# Patient Record
Sex: FEMALE | Race: WHITE | NOT HISPANIC OR LATINO | URBAN - METROPOLITAN AREA
[De-identification: names, ages, dates, MRNs, and addresses within clinical notes are randomized per-mention and may not be internally consistent; named-entity substitution may affect disease eponyms.]

---

## 2017-03-02 ENCOUNTER — OFFICE (AMBULATORY)
Dept: URBAN - METROPOLITAN AREA CLINIC 64 | Facility: CLINIC | Age: 42
End: 2017-03-02

## 2017-03-02 VITALS
SYSTOLIC BLOOD PRESSURE: 108 MMHG | HEART RATE: 83 BPM | HEIGHT: 63 IN | WEIGHT: 120 LBS | DIASTOLIC BLOOD PRESSURE: 72 MMHG

## 2017-03-02 DIAGNOSIS — R19.7 DIARRHEA, UNSPECIFIED: ICD-10-CM

## 2017-03-02 DIAGNOSIS — R10.13 EPIGASTRIC PAIN: ICD-10-CM

## 2017-03-02 PROCEDURE — 99213 OFFICE O/P EST LOW 20 MIN: CPT | Performed by: NURSE PRACTITIONER

## 2017-04-20 ENCOUNTER — OFFICE (AMBULATORY)
Dept: URBAN - METROPOLITAN AREA CLINIC 64 | Facility: CLINIC | Age: 42
End: 2017-04-20
Payer: COMMERCIAL

## 2017-04-20 ENCOUNTER — ON CAMPUS - OUTPATIENT (AMBULATORY)
Dept: URBAN - METROPOLITAN AREA HOSPITAL 2 | Facility: HOSPITAL | Age: 42
End: 2017-04-20

## 2017-04-20 VITALS
SYSTOLIC BLOOD PRESSURE: 101 MMHG | DIASTOLIC BLOOD PRESSURE: 61 MMHG | OXYGEN SATURATION: 100 % | RESPIRATION RATE: 20 BRPM | DIASTOLIC BLOOD PRESSURE: 71 MMHG | DIASTOLIC BLOOD PRESSURE: 58 MMHG | WEIGHT: 118 LBS | HEART RATE: 101 BPM | HEART RATE: 92 BPM | SYSTOLIC BLOOD PRESSURE: 127 MMHG | HEART RATE: 94 BPM | TEMPERATURE: 99.2 F | RESPIRATION RATE: 18 BRPM | HEIGHT: 63 IN | SYSTOLIC BLOOD PRESSURE: 103 MMHG | SYSTOLIC BLOOD PRESSURE: 118 MMHG | RESPIRATION RATE: 19 BRPM | DIASTOLIC BLOOD PRESSURE: 75 MMHG | RESPIRATION RATE: 16 BRPM | HEART RATE: 90 BPM | SYSTOLIC BLOOD PRESSURE: 121 MMHG | HEART RATE: 96 BPM | DIASTOLIC BLOOD PRESSURE: 85 MMHG | SYSTOLIC BLOOD PRESSURE: 108 MMHG | DIASTOLIC BLOOD PRESSURE: 70 MMHG

## 2017-04-20 DIAGNOSIS — Z48.815 ENCOUNTER FOR SURGICAL AFTERCARE FOLLOWING SURGERY ON THE DI: ICD-10-CM

## 2017-04-20 DIAGNOSIS — T81.89XS OTHER COMPLICATIONS OF PROCEDURES, NOT ELSEWHERE CLASSIFIED,: ICD-10-CM

## 2017-04-20 DIAGNOSIS — K25.9 GASTRIC ULCER, UNSPECIFIED AS ACUTE OR CHRONIC, WITHOUT HEMO: ICD-10-CM

## 2017-04-20 DIAGNOSIS — R11.2 NAUSEA WITH VOMITING, UNSPECIFIED: ICD-10-CM

## 2017-04-20 DIAGNOSIS — R10.13 EPIGASTRIC PAIN: ICD-10-CM

## 2017-04-20 LAB
GI HISTOLOGY: A. SELECT: (no result)
GI HISTOLOGY: PDF REPORT: (no result)

## 2017-04-20 PROCEDURE — 88305 TISSUE EXAM BY PATHOLOGIST: CPT | Performed by: INTERNAL MEDICINE

## 2017-04-20 PROCEDURE — 43239 EGD BIOPSY SINGLE/MULTIPLE: CPT | Performed by: INTERNAL MEDICINE

## 2017-04-20 RX ORDER — SUCRALFATE 1 G/1
4 TABLET ORAL
Qty: 120 | Refills: 6 | Status: COMPLETED
Start: 2017-04-20 | End: 2017-08-11

## 2017-04-20 RX ORDER — PANTOPRAZOLE SODIUM 40 MG/1
80 TABLET, DELAYED RELEASE ORAL
Qty: 60 | Refills: 11 | Status: COMPLETED
Start: 2017-04-20 | End: 2017-08-11

## 2017-04-20 RX ADMIN — PROPOFOL: 10 INJECTION, EMULSION INTRAVENOUS at 17:08

## 2017-04-26 ENCOUNTER — INPATIENT HOSPITAL (AMBULATORY)
Dept: URBAN - METROPOLITAN AREA HOSPITAL 84 | Facility: HOSPITAL | Age: 42
End: 2017-04-26
Payer: COMMERCIAL

## 2017-04-26 DIAGNOSIS — R10.9 UNSPECIFIED ABDOMINAL PAIN: ICD-10-CM

## 2017-04-26 DIAGNOSIS — R94.5 ABNORMAL RESULTS OF LIVER FUNCTION STUDIES: ICD-10-CM

## 2017-04-26 DIAGNOSIS — D64.9 ANEMIA, UNSPECIFIED: ICD-10-CM

## 2017-04-26 DIAGNOSIS — R18.8 OTHER ASCITES: ICD-10-CM

## 2017-04-26 PROCEDURE — 99254 IP/OBS CNSLTJ NEW/EST MOD 60: CPT | Performed by: NURSE PRACTITIONER

## 2017-04-27 PROCEDURE — 99231 SBSQ HOSP IP/OBS SF/LOW 25: CPT | Performed by: NURSE PRACTITIONER

## 2017-04-28 PROCEDURE — 99231 SBSQ HOSP IP/OBS SF/LOW 25: CPT | Performed by: NURSE PRACTITIONER

## 2017-04-29 ENCOUNTER — INPATIENT HOSPITAL (AMBULATORY)
Dept: URBAN - METROPOLITAN AREA HOSPITAL 84 | Facility: HOSPITAL | Age: 42
End: 2017-04-29
Payer: COMMERCIAL

## 2017-04-29 DIAGNOSIS — D64.9 ANEMIA, UNSPECIFIED: ICD-10-CM

## 2017-04-29 DIAGNOSIS — R94.5 ABNORMAL RESULTS OF LIVER FUNCTION STUDIES: ICD-10-CM

## 2017-04-29 DIAGNOSIS — R10.9 UNSPECIFIED ABDOMINAL PAIN: ICD-10-CM

## 2017-04-29 DIAGNOSIS — R18.8 OTHER ASCITES: ICD-10-CM

## 2017-04-29 PROCEDURE — 99232 SBSQ HOSP IP/OBS MODERATE 35: CPT | Performed by: INTERNAL MEDICINE

## 2017-04-30 PROCEDURE — 99231 SBSQ HOSP IP/OBS SF/LOW 25: CPT | Performed by: INTERNAL MEDICINE

## 2017-05-01 ENCOUNTER — INPATIENT HOSPITAL (AMBULATORY)
Dept: URBAN - METROPOLITAN AREA HOSPITAL 84 | Facility: HOSPITAL | Age: 42
End: 2017-05-01
Payer: COMMERCIAL

## 2017-05-01 DIAGNOSIS — R94.5 ABNORMAL RESULTS OF LIVER FUNCTION STUDIES: ICD-10-CM

## 2017-05-01 DIAGNOSIS — D64.9 ANEMIA, UNSPECIFIED: ICD-10-CM

## 2017-05-01 DIAGNOSIS — R18.8 OTHER ASCITES: ICD-10-CM

## 2017-05-01 DIAGNOSIS — R10.9 UNSPECIFIED ABDOMINAL PAIN: ICD-10-CM

## 2017-05-01 PROCEDURE — 99232 SBSQ HOSP IP/OBS MODERATE 35: CPT | Performed by: NURSE PRACTITIONER

## 2017-05-02 ENCOUNTER — INPATIENT HOSPITAL (AMBULATORY)
Dept: URBAN - METROPOLITAN AREA HOSPITAL 84 | Facility: HOSPITAL | Age: 42
End: 2017-05-02

## 2017-05-02 DIAGNOSIS — E46 UNSPECIFIED PROTEIN-CALORIE MALNUTRITION: ICD-10-CM

## 2017-05-02 DIAGNOSIS — R11.2 NAUSEA WITH VOMITING, UNSPECIFIED: ICD-10-CM

## 2017-05-02 PROCEDURE — 43241 EGD TUBE/CATH INSERTION: CPT | Performed by: INTERNAL MEDICINE

## 2017-05-10 ENCOUNTER — INPATIENT HOSPITAL (AMBULATORY)
Dept: URBAN - METROPOLITAN AREA HOSPITAL 76 | Facility: HOSPITAL | Age: 42
End: 2017-05-10
Payer: COMMERCIAL

## 2017-05-10 DIAGNOSIS — R63.3 FEEDING DIFFICULTIES: ICD-10-CM

## 2017-05-10 DIAGNOSIS — K74.69 OTHER CIRRHOSIS OF LIVER: ICD-10-CM

## 2017-05-10 DIAGNOSIS — E46 UNSPECIFIED PROTEIN-CALORIE MALNUTRITION: ICD-10-CM

## 2017-05-10 DIAGNOSIS — Z43.1 ENCOUNTER FOR ATTENTION TO GASTROSTOMY: ICD-10-CM

## 2017-05-10 DIAGNOSIS — R11.0 NAUSEA: ICD-10-CM

## 2017-05-10 DIAGNOSIS — K94.23 GASTROSTOMY MALFUNCTION: ICD-10-CM

## 2017-05-10 DIAGNOSIS — R18.8 OTHER ASCITES: ICD-10-CM

## 2017-05-10 PROCEDURE — 43752 NASAL/OROGASTRIC W/TUBE PLMT: CPT | Performed by: INTERNAL MEDICINE

## 2017-05-11 ENCOUNTER — INPATIENT HOSPITAL (AMBULATORY)
Dept: URBAN - METROPOLITAN AREA HOSPITAL 76 | Facility: HOSPITAL | Age: 42
End: 2017-05-11
Payer: COMMERCIAL

## 2017-05-11 DIAGNOSIS — K74.69 OTHER CIRRHOSIS OF LIVER: ICD-10-CM

## 2017-05-11 DIAGNOSIS — R63.3 FEEDING DIFFICULTIES: ICD-10-CM

## 2017-05-11 DIAGNOSIS — R18.8 OTHER ASCITES: ICD-10-CM

## 2017-05-11 DIAGNOSIS — Z43.1 ENCOUNTER FOR ATTENTION TO GASTROSTOMY: ICD-10-CM

## 2017-05-11 DIAGNOSIS — K94.23 GASTROSTOMY MALFUNCTION: ICD-10-CM

## 2017-05-11 PROCEDURE — 99232 SBSQ HOSP IP/OBS MODERATE 35: CPT | Performed by: NURSE PRACTITIONER

## 2017-05-13 ENCOUNTER — INPATIENT HOSPITAL (AMBULATORY)
Dept: URBAN - METROPOLITAN AREA HOSPITAL 84 | Facility: HOSPITAL | Age: 42
End: 2017-05-13
Payer: COMMERCIAL

## 2017-05-13 DIAGNOSIS — R18.8 OTHER ASCITES: ICD-10-CM

## 2017-05-13 DIAGNOSIS — K74.69 OTHER CIRRHOSIS OF LIVER: ICD-10-CM

## 2017-05-13 DIAGNOSIS — R10.84 GENERALIZED ABDOMINAL PAIN: ICD-10-CM

## 2017-05-13 DIAGNOSIS — D64.9 ANEMIA, UNSPECIFIED: ICD-10-CM

## 2017-05-13 PROCEDURE — 99232 SBSQ HOSP IP/OBS MODERATE 35: CPT | Performed by: INTERNAL MEDICINE

## 2017-05-14 PROCEDURE — 99232 SBSQ HOSP IP/OBS MODERATE 35: CPT | Performed by: INTERNAL MEDICINE

## 2017-05-15 ENCOUNTER — INPATIENT HOSPITAL (AMBULATORY)
Dept: URBAN - METROPOLITAN AREA HOSPITAL 84 | Facility: HOSPITAL | Age: 42
End: 2017-05-15
Payer: COMMERCIAL

## 2017-05-15 DIAGNOSIS — R18.8 OTHER ASCITES: ICD-10-CM

## 2017-05-15 DIAGNOSIS — K74.69 OTHER CIRRHOSIS OF LIVER: ICD-10-CM

## 2017-05-15 PROCEDURE — 99232 SBSQ HOSP IP/OBS MODERATE 35: CPT | Performed by: NURSE PRACTITIONER

## 2017-05-16 PROCEDURE — 99232 SBSQ HOSP IP/OBS MODERATE 35: CPT | Performed by: NURSE PRACTITIONER

## 2017-05-17 ENCOUNTER — INPATIENT HOSPITAL (AMBULATORY)
Dept: URBAN - METROPOLITAN AREA HOSPITAL 84 | Facility: HOSPITAL | Age: 42
End: 2017-05-17

## 2017-05-17 DIAGNOSIS — R63.3 FEEDING DIFFICULTIES: ICD-10-CM

## 2017-05-17 DIAGNOSIS — K31.9 DISEASE OF STOMACH AND DUODENUM, UNSPECIFIED: ICD-10-CM

## 2017-05-17 PROCEDURE — 43241 EGD TUBE/CATH INSERTION: CPT | Performed by: INTERNAL MEDICINE

## 2017-05-18 ENCOUNTER — INPATIENT HOSPITAL (AMBULATORY)
Dept: URBAN - METROPOLITAN AREA HOSPITAL 84 | Facility: HOSPITAL | Age: 42
End: 2017-05-18
Payer: COMMERCIAL

## 2017-05-18 DIAGNOSIS — R18.8 OTHER ASCITES: ICD-10-CM

## 2017-05-18 DIAGNOSIS — K74.69 OTHER CIRRHOSIS OF LIVER: ICD-10-CM

## 2017-05-18 DIAGNOSIS — K31.9 DISEASE OF STOMACH AND DUODENUM, UNSPECIFIED: ICD-10-CM

## 2017-05-18 DIAGNOSIS — R63.3 FEEDING DIFFICULTIES: ICD-10-CM

## 2017-05-18 PROCEDURE — 99232 SBSQ HOSP IP/OBS MODERATE 35: CPT | Performed by: NURSE PRACTITIONER

## 2017-05-19 ENCOUNTER — INPATIENT HOSPITAL (AMBULATORY)
Dept: URBAN - METROPOLITAN AREA HOSPITAL 84 | Facility: HOSPITAL | Age: 42
End: 2017-05-19
Payer: COMMERCIAL

## 2017-05-19 DIAGNOSIS — R18.8 OTHER ASCITES: ICD-10-CM

## 2017-05-19 DIAGNOSIS — K74.69 OTHER CIRRHOSIS OF LIVER: ICD-10-CM

## 2017-05-19 DIAGNOSIS — R63.3 FEEDING DIFFICULTIES: ICD-10-CM

## 2017-05-19 DIAGNOSIS — K31.9 DISEASE OF STOMACH AND DUODENUM, UNSPECIFIED: ICD-10-CM

## 2017-05-19 PROCEDURE — 99231 SBSQ HOSP IP/OBS SF/LOW 25: CPT | Performed by: NURSE PRACTITIONER

## 2017-05-24 ENCOUNTER — OFFICE (AMBULATORY)
Dept: URBAN - METROPOLITAN AREA CLINIC 64 | Facility: CLINIC | Age: 42
End: 2017-05-24

## 2017-05-24 VITALS
HEIGHT: 63 IN | HEART RATE: 73 BPM | SYSTOLIC BLOOD PRESSURE: 102 MMHG | WEIGHT: 116 LBS | DIASTOLIC BLOOD PRESSURE: 58 MMHG

## 2017-05-24 DIAGNOSIS — E46 UNSPECIFIED PROTEIN-CALORIE MALNUTRITION: ICD-10-CM

## 2017-05-24 DIAGNOSIS — R10.84 GENERALIZED ABDOMINAL PAIN: ICD-10-CM

## 2017-05-24 LAB
COMP. METABOLIC PANEL (14): A/G RATIO: 1.1 — LOW (ref 1.2–2.2)
COMP. METABOLIC PANEL (14): ALBUMIN, SERUM: 3.4 G/DL — LOW (ref 3.5–5.5)
COMP. METABOLIC PANEL (14): ALKALINE PHOSPHATASE, S: 153 IU/L — HIGH (ref 39–117)
COMP. METABOLIC PANEL (14): ALT (SGPT): 21 IU/L (ref 0–32)
COMP. METABOLIC PANEL (14): AST (SGOT): 37 IU/L (ref 0–40)
COMP. METABOLIC PANEL (14): BILIRUBIN, TOTAL: 0.5 MG/DL (ref 0–1.2)
COMP. METABOLIC PANEL (14): BUN/CREATININE RATIO: 18 (ref 9–23)
COMP. METABOLIC PANEL (14): BUN: 10 MG/DL (ref 6–24)
COMP. METABOLIC PANEL (14): CALCIUM, SERUM: 8.9 MG/DL (ref 8.7–10.2)
COMP. METABOLIC PANEL (14): CARBON DIOXIDE, TOTAL: 25 MMOL/L (ref 18–29)
COMP. METABOLIC PANEL (14): CHLORIDE, SERUM: 94 MMOL/L — LOW (ref 96–106)
COMP. METABOLIC PANEL (14): CREATININE, SERUM: 0.57 MG/DL (ref 0.57–1)
COMP. METABOLIC PANEL (14): EGFR IF AFRICN AM: 131 ML/MIN/1.73 (ref 59–?)
COMP. METABOLIC PANEL (14): EGFR IF NONAFRICN AM: 114 ML/MIN/1.73 (ref 59–?)
COMP. METABOLIC PANEL (14): GLOBULIN, TOTAL: 3.2 G/DL (ref 1.5–4.5)
COMP. METABOLIC PANEL (14): GLUCOSE, SERUM: 147 MG/DL — HIGH (ref 65–99)
COMP. METABOLIC PANEL (14): POTASSIUM, SERUM: 4.6 MMOL/L (ref 3.5–5.2)
COMP. METABOLIC PANEL (14): PROTEIN, TOTAL, SERUM: 6.6 G/DL (ref 6–8.5)
COMP. METABOLIC PANEL (14): SODIUM, SERUM: 137 MMOL/L (ref 134–144)

## 2017-05-24 PROCEDURE — 99213 OFFICE O/P EST LOW 20 MIN: CPT | Performed by: NURSE PRACTITIONER

## 2017-05-28 ENCOUNTER — INPATIENT HOSPITAL (AMBULATORY)
Dept: URBAN - METROPOLITAN AREA HOSPITAL 84 | Facility: HOSPITAL | Age: 42
End: 2017-05-28
Payer: COMMERCIAL

## 2017-05-28 DIAGNOSIS — R18.8 OTHER ASCITES: ICD-10-CM

## 2017-05-28 DIAGNOSIS — R94.5 ABNORMAL RESULTS OF LIVER FUNCTION STUDIES: ICD-10-CM

## 2017-05-28 DIAGNOSIS — D64.9 ANEMIA, UNSPECIFIED: ICD-10-CM

## 2017-05-28 DIAGNOSIS — R10.9 UNSPECIFIED ABDOMINAL PAIN: ICD-10-CM

## 2017-05-28 PROCEDURE — 99254 IP/OBS CNSLTJ NEW/EST MOD 60: CPT | Performed by: INTERNAL MEDICINE

## 2017-05-29 PROCEDURE — 99232 SBSQ HOSP IP/OBS MODERATE 35: CPT | Performed by: INTERNAL MEDICINE

## 2017-06-05 ENCOUNTER — INPATIENT HOSPITAL (AMBULATORY)
Dept: URBAN - METROPOLITAN AREA HOSPITAL 84 | Facility: HOSPITAL | Age: 42
End: 2017-06-05
Payer: COMMERCIAL

## 2017-06-05 DIAGNOSIS — D64.9 ANEMIA, UNSPECIFIED: ICD-10-CM

## 2017-06-05 DIAGNOSIS — R18.8 OTHER ASCITES: ICD-10-CM

## 2017-06-05 DIAGNOSIS — R10.9 UNSPECIFIED ABDOMINAL PAIN: ICD-10-CM

## 2017-06-05 DIAGNOSIS — K74.60 UNSPECIFIED CIRRHOSIS OF LIVER: ICD-10-CM

## 2017-06-05 DIAGNOSIS — R63.3 FEEDING DIFFICULTIES: ICD-10-CM

## 2017-06-05 PROCEDURE — 99254 IP/OBS CNSLTJ NEW/EST MOD 60: CPT | Performed by: NURSE PRACTITIONER

## 2017-08-11 ENCOUNTER — OFFICE (AMBULATORY)
Dept: URBAN - METROPOLITAN AREA CLINIC 64 | Facility: CLINIC | Age: 42
End: 2017-08-11
Payer: COMMERCIAL

## 2017-08-11 VITALS
HEART RATE: 83 BPM | DIASTOLIC BLOOD PRESSURE: 76 MMHG | HEIGHT: 63 IN | WEIGHT: 122 LBS | SYSTOLIC BLOOD PRESSURE: 107 MMHG

## 2017-08-11 DIAGNOSIS — K74.60 UNSPECIFIED CIRRHOSIS OF LIVER: ICD-10-CM

## 2017-08-11 DIAGNOSIS — R18.8 OTHER ASCITES: ICD-10-CM

## 2017-08-11 DIAGNOSIS — K72.90 HEPATIC FAILURE, UNSPECIFIED WITHOUT COMA: ICD-10-CM

## 2017-08-11 DIAGNOSIS — Z98.84 BARIATRIC SURGERY STATUS: ICD-10-CM

## 2017-08-11 LAB
AFP, SERUM, TUMOR MARKER: 5.6 NG/ML (ref 0–8.3)
AMMONIA, PLASMA: 79 UG/DL (ref 19–87)
CBC WITH DIFFERENTIAL/PLATELET: BASO (ABSOLUTE): 0.1 X10E3/UL (ref 0–0.2)
CBC WITH DIFFERENTIAL/PLATELET: BASOS: 3 %
CBC WITH DIFFERENTIAL/PLATELET: EOS (ABSOLUTE): 0.2 X10E3/UL (ref 0–0.4)
CBC WITH DIFFERENTIAL/PLATELET: EOS: 3 %
CBC WITH DIFFERENTIAL/PLATELET: HEMATOCRIT: 34.6 % (ref 34–46.6)
CBC WITH DIFFERENTIAL/PLATELET: HEMATOLOGY COMMENTS: (no result)
CBC WITH DIFFERENTIAL/PLATELET: HEMOGLOBIN: 10.5 G/DL — LOW (ref 11.1–15.9)
CBC WITH DIFFERENTIAL/PLATELET: IMMATURE CELLS: (no result)
CBC WITH DIFFERENTIAL/PLATELET: IMMATURE GRANS (ABS): 0 X10E3/UL (ref 0–0.1)
CBC WITH DIFFERENTIAL/PLATELET: IMMATURE GRANULOCYTES: 0 %
CBC WITH DIFFERENTIAL/PLATELET: LYMPHS (ABSOLUTE): 1.5 X10E3/UL (ref 0.7–3.1)
CBC WITH DIFFERENTIAL/PLATELET: LYMPHS: 34 %
CBC WITH DIFFERENTIAL/PLATELET: MCH: 25.2 PG — LOW (ref 26.6–33)
CBC WITH DIFFERENTIAL/PLATELET: MCHC: 30.3 G/DL — LOW (ref 31.5–35.7)
CBC WITH DIFFERENTIAL/PLATELET: MCV: 83 FL (ref 79–97)
CBC WITH DIFFERENTIAL/PLATELET: MONOCYTES(ABSOLUTE): 0.3 X10E3/UL (ref 0.1–0.9)
CBC WITH DIFFERENTIAL/PLATELET: MONOCYTES: 8 %
CBC WITH DIFFERENTIAL/PLATELET: NEUTROPHILS (ABSOLUTE): 2.2 X10E3/UL (ref 1.4–7)
CBC WITH DIFFERENTIAL/PLATELET: NEUTROPHILS: 52 %
CBC WITH DIFFERENTIAL/PLATELET: NRBC: (no result)
CBC WITH DIFFERENTIAL/PLATELET: PLATELETS: 708 X10E3/UL — HIGH (ref 150–379)
CBC WITH DIFFERENTIAL/PLATELET: RBC: 4.16 X10E6/UL (ref 3.77–5.28)
CBC WITH DIFFERENTIAL/PLATELET: RDW: 18 % — HIGH (ref 12.3–15.4)
CBC WITH DIFFERENTIAL/PLATELET: WBC: 4.4 X10E3/UL (ref 3.4–10.8)
COMP. METABOLIC PANEL (14): A/G RATIO: 0.9 — LOW (ref 1.2–2.2)
COMP. METABOLIC PANEL (14): ALBUMIN, SERUM: 3.1 G/DL — LOW (ref 3.5–5.5)
COMP. METABOLIC PANEL (14): ALKALINE PHOSPHATASE, S: 174 IU/L — HIGH (ref 39–117)
COMP. METABOLIC PANEL (14): ALT (SGPT): 16 IU/L (ref 0–32)
COMP. METABOLIC PANEL (14): AST (SGOT): 32 IU/L (ref 0–40)
COMP. METABOLIC PANEL (14): BILIRUBIN, TOTAL: 1.2 MG/DL (ref 0–1.2)
COMP. METABOLIC PANEL (14): BUN/CREATININE RATIO: 11 (ref 9–23)
COMP. METABOLIC PANEL (14): BUN: 9 MG/DL (ref 6–24)
COMP. METABOLIC PANEL (14): CALCIUM, SERUM: 8.6 MG/DL — LOW (ref 8.7–10.2)
COMP. METABOLIC PANEL (14): CARBON DIOXIDE, TOTAL: 27 MMOL/L (ref 18–29)
COMP. METABOLIC PANEL (14): CHLORIDE, SERUM: 96 MMOL/L (ref 96–106)
COMP. METABOLIC PANEL (14): CREATININE, SERUM: 0.85 MG/DL (ref 0.57–1)
COMP. METABOLIC PANEL (14): EGFR IF AFRICN AM: 97 ML/MIN/1.73 (ref 59–?)
COMP. METABOLIC PANEL (14): EGFR IF NONAFRICN AM: 84 ML/MIN/1.73 (ref 59–?)
COMP. METABOLIC PANEL (14): GLOBULIN, TOTAL: 3.5 G/DL (ref 1.5–4.5)
COMP. METABOLIC PANEL (14): GLUCOSE, SERUM: 85 MG/DL (ref 65–99)
COMP. METABOLIC PANEL (14): POTASSIUM, SERUM: 4.2 MMOL/L (ref 3.5–5.2)
COMP. METABOLIC PANEL (14): PROTEIN, TOTAL, SERUM: 6.6 G/DL (ref 6–8.5)
COMP. METABOLIC PANEL (14): SODIUM, SERUM: 137 MMOL/L (ref 134–144)

## 2017-08-11 PROCEDURE — 99214 OFFICE O/P EST MOD 30 MIN: CPT | Performed by: INTERNAL MEDICINE

## 2017-08-11 RX ORDER — LACTULOSE 10 G/15ML
SOLUTION ORAL
Qty: 1800 | Refills: 6 | Status: ACTIVE

## 2017-08-12 ENCOUNTER — INPATIENT HOSPITAL (AMBULATORY)
Dept: URBAN - METROPOLITAN AREA HOSPITAL 76 | Facility: HOSPITAL | Age: 42
End: 2017-08-12
Payer: COMMERCIAL

## 2017-08-12 DIAGNOSIS — R10.9 UNSPECIFIED ABDOMINAL PAIN: ICD-10-CM

## 2017-08-12 DIAGNOSIS — R18.8 OTHER ASCITES: ICD-10-CM

## 2017-08-12 DIAGNOSIS — R14.0 ABDOMINAL DISTENSION (GASEOUS): ICD-10-CM

## 2017-08-12 PROCEDURE — 99254 IP/OBS CNSLTJ NEW/EST MOD 60: CPT | Performed by: INTERNAL MEDICINE

## 2017-08-13 PROCEDURE — 99231 SBSQ HOSP IP/OBS SF/LOW 25: CPT | Performed by: INTERNAL MEDICINE

## 2017-09-06 ENCOUNTER — HOSPITAL ENCOUNTER (OUTPATIENT)
Dept: INFUSION THERAPY | Facility: HOSPITAL | Age: 42
Discharge: HOME | End: 2017-09-06
Attending: INTERNAL MEDICINE | Admitting: INTERNAL MEDICINE

## 2017-09-29 ENCOUNTER — HOSPITAL ENCOUNTER (OUTPATIENT)
Dept: ULTRASOUND IMAGING | Facility: HOSPITAL | Age: 42
Discharge: HOME | End: 2017-09-29
Attending: INTERNAL MEDICINE | Admitting: INTERNAL MEDICINE

## 2017-10-03 ENCOUNTER — HOSPITAL ENCOUNTER (OUTPATIENT)
Dept: INFUSION THERAPY | Facility: HOSPITAL | Age: 42
Discharge: HOME | End: 2017-10-03
Attending: INTERNAL MEDICINE | Admitting: INTERNAL MEDICINE

## 2017-10-05 ENCOUNTER — INPATIENT HOSPITAL (AMBULATORY)
Dept: URBAN - METROPOLITAN AREA HOSPITAL 84 | Facility: HOSPITAL | Age: 42
End: 2017-10-05
Payer: COMMERCIAL

## 2017-10-05 DIAGNOSIS — K44.9 DIAPHRAGMATIC HERNIA WITHOUT OBSTRUCTION OR GANGRENE: ICD-10-CM

## 2017-10-05 DIAGNOSIS — K75.81 NONALCOHOLIC STEATOHEPATITIS (NASH): ICD-10-CM

## 2017-10-05 DIAGNOSIS — D72.829 ELEVATED WHITE BLOOD CELL COUNT, UNSPECIFIED: ICD-10-CM

## 2017-10-05 DIAGNOSIS — R10.84 GENERALIZED ABDOMINAL PAIN: ICD-10-CM

## 2017-10-05 DIAGNOSIS — R18.8 OTHER ASCITES: ICD-10-CM

## 2017-10-05 DIAGNOSIS — K72.90 HEPATIC FAILURE, UNSPECIFIED WITHOUT COMA: ICD-10-CM

## 2017-10-05 PROCEDURE — 99252 IP/OBS CONSLTJ NEW/EST SF 35: CPT | Performed by: NURSE PRACTITIONER

## 2017-10-06 ENCOUNTER — INPATIENT HOSPITAL (AMBULATORY)
Dept: URBAN - METROPOLITAN AREA HOSPITAL 84 | Facility: HOSPITAL | Age: 42
End: 2017-10-06
Payer: COMMERCIAL

## 2017-10-06 DIAGNOSIS — K42.9 UMBILICAL HERNIA WITHOUT OBSTRUCTION OR GANGRENE: ICD-10-CM

## 2017-10-06 DIAGNOSIS — R18.8 OTHER ASCITES: ICD-10-CM

## 2017-10-06 DIAGNOSIS — K65.2 SPONTANEOUS BACTERIAL PERITONITIS: ICD-10-CM

## 2017-10-06 PROCEDURE — 99232 SBSQ HOSP IP/OBS MODERATE 35: CPT | Performed by: NURSE PRACTITIONER

## 2017-10-07 ENCOUNTER — INPATIENT HOSPITAL (AMBULATORY)
Dept: URBAN - METROPOLITAN AREA HOSPITAL 84 | Facility: HOSPITAL | Age: 42
End: 2017-10-07
Payer: COMMERCIAL

## 2017-10-07 DIAGNOSIS — K74.60 UNSPECIFIED CIRRHOSIS OF LIVER: ICD-10-CM

## 2017-10-07 DIAGNOSIS — R50.9 FEVER, UNSPECIFIED: ICD-10-CM

## 2017-10-07 DIAGNOSIS — K42.9 UMBILICAL HERNIA WITHOUT OBSTRUCTION OR GANGRENE: ICD-10-CM

## 2017-10-07 DIAGNOSIS — Z98.84 BARIATRIC SURGERY STATUS: ICD-10-CM

## 2017-10-07 DIAGNOSIS — K75.81 NONALCOHOLIC STEATOHEPATITIS (NASH): ICD-10-CM

## 2017-10-07 DIAGNOSIS — D72.829 ELEVATED WHITE BLOOD CELL COUNT, UNSPECIFIED: ICD-10-CM

## 2017-10-07 DIAGNOSIS — R10.9 UNSPECIFIED ABDOMINAL PAIN: ICD-10-CM

## 2017-10-07 PROCEDURE — 99232 SBSQ HOSP IP/OBS MODERATE 35: CPT | Performed by: NURSE PRACTITIONER

## 2017-10-08 ENCOUNTER — INPATIENT HOSPITAL (AMBULATORY)
Dept: URBAN - METROPOLITAN AREA HOSPITAL 84 | Facility: HOSPITAL | Age: 42
End: 2017-10-08
Payer: COMMERCIAL

## 2017-10-08 DIAGNOSIS — K74.60 UNSPECIFIED CIRRHOSIS OF LIVER: ICD-10-CM

## 2017-10-08 DIAGNOSIS — R10.9 UNSPECIFIED ABDOMINAL PAIN: ICD-10-CM

## 2017-10-08 DIAGNOSIS — D72.829 ELEVATED WHITE BLOOD CELL COUNT, UNSPECIFIED: ICD-10-CM

## 2017-10-08 DIAGNOSIS — K42.9 UMBILICAL HERNIA WITHOUT OBSTRUCTION OR GANGRENE: ICD-10-CM

## 2017-10-08 DIAGNOSIS — Z98.84 BARIATRIC SURGERY STATUS: ICD-10-CM

## 2017-10-08 DIAGNOSIS — R50.9 FEVER, UNSPECIFIED: ICD-10-CM

## 2017-10-08 PROCEDURE — 99232 SBSQ HOSP IP/OBS MODERATE 35: CPT | Performed by: NURSE PRACTITIONER

## 2017-10-09 ENCOUNTER — INPATIENT HOSPITAL (AMBULATORY)
Dept: URBAN - METROPOLITAN AREA HOSPITAL 84 | Facility: HOSPITAL | Age: 42
End: 2017-10-09
Payer: COMMERCIAL

## 2017-10-09 DIAGNOSIS — D72.829 ELEVATED WHITE BLOOD CELL COUNT, UNSPECIFIED: ICD-10-CM

## 2017-10-09 DIAGNOSIS — Z98.84 BARIATRIC SURGERY STATUS: ICD-10-CM

## 2017-10-09 DIAGNOSIS — K74.60 UNSPECIFIED CIRRHOSIS OF LIVER: ICD-10-CM

## 2017-10-09 DIAGNOSIS — K75.81 NONALCOHOLIC STEATOHEPATITIS (NASH): ICD-10-CM

## 2017-10-09 DIAGNOSIS — K42.9 UMBILICAL HERNIA WITHOUT OBSTRUCTION OR GANGRENE: ICD-10-CM

## 2017-10-09 DIAGNOSIS — R50.9 FEVER, UNSPECIFIED: ICD-10-CM

## 2017-10-09 DIAGNOSIS — R10.9 UNSPECIFIED ABDOMINAL PAIN: ICD-10-CM

## 2017-10-09 PROCEDURE — 99232 SBSQ HOSP IP/OBS MODERATE 35: CPT | Performed by: NURSE PRACTITIONER

## 2017-10-10 PROCEDURE — 99232 SBSQ HOSP IP/OBS MODERATE 35: CPT | Performed by: NURSE PRACTITIONER

## 2017-10-11 PROCEDURE — 99232 SBSQ HOSP IP/OBS MODERATE 35: CPT | Performed by: NURSE PRACTITIONER

## 2017-10-12 PROCEDURE — 99232 SBSQ HOSP IP/OBS MODERATE 35: CPT | Performed by: NURSE PRACTITIONER

## 2017-10-13 ENCOUNTER — INPATIENT HOSPITAL (AMBULATORY)
Dept: URBAN - METROPOLITAN AREA HOSPITAL 84 | Facility: HOSPITAL | Age: 42
End: 2017-10-13
Payer: COMMERCIAL

## 2017-10-13 DIAGNOSIS — R18.8 OTHER ASCITES: ICD-10-CM

## 2017-10-13 DIAGNOSIS — D64.89 OTHER SPECIFIED ANEMIAS: ICD-10-CM

## 2017-10-13 DIAGNOSIS — K72.90 HEPATIC FAILURE, UNSPECIFIED WITHOUT COMA: ICD-10-CM

## 2017-10-13 DIAGNOSIS — R10.84 GENERALIZED ABDOMINAL PAIN: ICD-10-CM

## 2017-10-13 DIAGNOSIS — K75.81 NONALCOHOLIC STEATOHEPATITIS (NASH): ICD-10-CM

## 2017-10-13 PROCEDURE — 99232 SBSQ HOSP IP/OBS MODERATE 35: CPT | Performed by: NURSE PRACTITIONER

## 2017-10-14 ENCOUNTER — INPATIENT HOSPITAL (AMBULATORY)
Dept: URBAN - METROPOLITAN AREA HOSPITAL 84 | Facility: HOSPITAL | Age: 42
End: 2017-10-14
Payer: COMMERCIAL

## 2017-10-14 DIAGNOSIS — K75.81 NONALCOHOLIC STEATOHEPATITIS (NASH): ICD-10-CM

## 2017-10-14 DIAGNOSIS — D72.829 ELEVATED WHITE BLOOD CELL COUNT, UNSPECIFIED: ICD-10-CM

## 2017-10-14 DIAGNOSIS — Z98.84 BARIATRIC SURGERY STATUS: ICD-10-CM

## 2017-10-14 DIAGNOSIS — R50.9 FEVER, UNSPECIFIED: ICD-10-CM

## 2017-10-14 DIAGNOSIS — K42.9 UMBILICAL HERNIA WITHOUT OBSTRUCTION OR GANGRENE: ICD-10-CM

## 2017-10-14 DIAGNOSIS — R10.9 UNSPECIFIED ABDOMINAL PAIN: ICD-10-CM

## 2017-10-14 DIAGNOSIS — K74.60 UNSPECIFIED CIRRHOSIS OF LIVER: ICD-10-CM

## 2017-10-14 PROCEDURE — 99232 SBSQ HOSP IP/OBS MODERATE 35: CPT | Performed by: NURSE PRACTITIONER

## 2017-10-15 PROCEDURE — 99232 SBSQ HOSP IP/OBS MODERATE 35: CPT | Performed by: NURSE PRACTITIONER

## 2017-10-16 ENCOUNTER — INPATIENT HOSPITAL (AMBULATORY)
Dept: URBAN - METROPOLITAN AREA HOSPITAL 84 | Facility: HOSPITAL | Age: 42
End: 2017-10-16
Payer: COMMERCIAL

## 2017-10-16 DIAGNOSIS — Z98.84 BARIATRIC SURGERY STATUS: ICD-10-CM

## 2017-10-16 DIAGNOSIS — R10.84 GENERALIZED ABDOMINAL PAIN: ICD-10-CM

## 2017-10-16 DIAGNOSIS — D72.829 ELEVATED WHITE BLOOD CELL COUNT, UNSPECIFIED: ICD-10-CM

## 2017-10-16 DIAGNOSIS — R18.8 OTHER ASCITES: ICD-10-CM

## 2017-10-16 DIAGNOSIS — R50.9 FEVER, UNSPECIFIED: ICD-10-CM

## 2017-10-16 DIAGNOSIS — K72.90 HEPATIC FAILURE, UNSPECIFIED WITHOUT COMA: ICD-10-CM

## 2017-10-16 DIAGNOSIS — K75.81 NONALCOHOLIC STEATOHEPATITIS (NASH): ICD-10-CM

## 2017-10-16 DIAGNOSIS — D64.89 OTHER SPECIFIED ANEMIAS: ICD-10-CM

## 2017-10-16 DIAGNOSIS — K42.9 UMBILICAL HERNIA WITHOUT OBSTRUCTION OR GANGRENE: ICD-10-CM

## 2017-10-16 DIAGNOSIS — R10.9 UNSPECIFIED ABDOMINAL PAIN: ICD-10-CM

## 2017-10-16 PROCEDURE — 99232 SBSQ HOSP IP/OBS MODERATE 35: CPT | Performed by: INTERNAL MEDICINE

## 2017-11-09 ENCOUNTER — HOSPITAL ENCOUNTER (OUTPATIENT)
Dept: ONCOLOGY | Facility: CLINIC | Age: 42
Setting detail: INFUSION SERIES
Discharge: HOME | End: 2017-11-09
Attending: INTERNAL MEDICINE | Admitting: INTERNAL MEDICINE

## 2017-11-09 ENCOUNTER — HOSPITAL ENCOUNTER (OUTPATIENT)
Dept: GENERAL RADIOLOGY | Facility: HOSPITAL | Age: 42
Discharge: HOME | End: 2017-11-09
Attending: INTERNAL MEDICINE | Admitting: INTERNAL MEDICINE

## 2017-11-09 ENCOUNTER — CLINICAL SUPPORT (OUTPATIENT)
Dept: ONCOLOGY | Facility: HOSPITAL | Age: 42
End: 2017-11-09

## 2017-11-09 ENCOUNTER — HOSPITAL ENCOUNTER (OUTPATIENT)
Dept: ONCOLOGY | Facility: HOSPITAL | Age: 42
Discharge: HOME | End: 2017-11-09
Attending: INTERNAL MEDICINE | Admitting: INTERNAL MEDICINE

## 2017-11-09 NOTE — PROGRESS NOTES
PATIENTS ONCOLOGY RECORD LOCATED IN [facility]      Subjective     Name:  APOORVA LIM     Date:  2017  Address:  [facility] [place] [address]  Home:   :  1975 AGE:  42 y.o.        RECORDS OBTAINED:  Patients Oncology Record is located in [facility]

## 2017-11-17 ENCOUNTER — HOSPITAL ENCOUNTER (OUTPATIENT)
Dept: ONCOLOGY | Facility: CLINIC | Age: 42
Setting detail: INFUSION SERIES
Discharge: HOME | End: 2017-11-17
Attending: INTERNAL MEDICINE | Admitting: INTERNAL MEDICINE

## 2017-11-17 ENCOUNTER — HOSPITAL ENCOUNTER (OUTPATIENT)
Dept: ONCOLOGY | Facility: HOSPITAL | Age: 42
Discharge: HOME | End: 2017-11-17
Attending: INTERNAL MEDICINE | Admitting: INTERNAL MEDICINE

## 2017-11-17 ENCOUNTER — CLINICAL SUPPORT (OUTPATIENT)
Dept: ONCOLOGY | Facility: HOSPITAL | Age: 42
End: 2017-11-17

## 2017-11-17 NOTE — PROGRESS NOTES
PATIENTS ONCOLOGY RECORD LOCATED IN [facility]      Subjective     Name:  APOORVA LIM     Date:  2017  Address:  [facility] [place] [address]  Home:   :  1975 AGE:  42 y.o.        RECORDS OBTAINED:  Patients Oncology Record is located in [facility]

## 2017-11-28 ENCOUNTER — INPATIENT HOSPITAL (AMBULATORY)
Dept: URBAN - METROPOLITAN AREA HOSPITAL 84 | Facility: HOSPITAL | Age: 42
End: 2017-11-28
Payer: COMMERCIAL

## 2017-11-28 DIAGNOSIS — R18.8 OTHER ASCITES: ICD-10-CM

## 2017-11-28 DIAGNOSIS — K75.81 NONALCOHOLIC STEATOHEPATITIS (NASH): ICD-10-CM

## 2017-11-28 DIAGNOSIS — R10.84 GENERALIZED ABDOMINAL PAIN: ICD-10-CM

## 2017-11-28 PROCEDURE — 99252 IP/OBS CONSLTJ NEW/EST SF 35: CPT | Performed by: NURSE PRACTITIONER

## 2017-11-29 ENCOUNTER — INPATIENT HOSPITAL (AMBULATORY)
Dept: URBAN - METROPOLITAN AREA HOSPITAL 84 | Facility: HOSPITAL | Age: 42
End: 2017-11-29
Payer: COMMERCIAL

## 2017-11-29 DIAGNOSIS — Z98.0 INTESTINAL BYPASS AND ANASTOMOSIS STATUS: ICD-10-CM

## 2017-11-29 DIAGNOSIS — R11.2 NAUSEA WITH VOMITING, UNSPECIFIED: ICD-10-CM

## 2017-11-29 DIAGNOSIS — K91.89 OTHER POSTPROCEDURAL COMPLICATIONS AND DISORDERS OF DIGESTIV: ICD-10-CM

## 2017-11-29 DIAGNOSIS — K28.7 CHRONIC GASTROJEJUNAL ULCER WITHOUT HEMORRHAGE OR PERFORATIO: ICD-10-CM

## 2017-11-29 PROCEDURE — 43235 EGD DIAGNOSTIC BRUSH WASH: CPT | Performed by: INTERNAL MEDICINE

## 2017-11-30 PROCEDURE — 99232 SBSQ HOSP IP/OBS MODERATE 35: CPT | Performed by: INTERNAL MEDICINE

## 2017-12-01 ENCOUNTER — HOSPITAL ENCOUNTER (OUTPATIENT)
Dept: PERIOP | Facility: HOSPITAL | Age: 42
Setting detail: HOSPITAL OUTPATIENT SURGERY
End: 2017-12-01
Attending: INTERNAL MEDICINE | Admitting: INTERNAL MEDICINE

## 2018-03-02 ENCOUNTER — HOSPITAL ENCOUNTER (OUTPATIENT)
Dept: INTERVENTIONAL RADIOLOGY/VASCULAR | Facility: HOSPITAL | Age: 43
Discharge: HOME | End: 2018-03-02
Attending: INTERNAL MEDICINE | Admitting: INTERNAL MEDICINE

## 2018-04-16 ENCOUNTER — TELEPHONE (OUTPATIENT)
Dept: BARIATRICS/WEIGHT MGMT | Facility: CLINIC | Age: 43
End: 2018-04-16

## 2018-04-16 NOTE — TELEPHONE ENCOUNTER
Spoke with the patient.  Called her to schedule her 2 year followup appointment.  She notes that she is not interested in having any more followup's with our office.

## 2018-04-29 ENCOUNTER — INPATIENT HOSPITAL (AMBULATORY)
Dept: URBAN - METROPOLITAN AREA HOSPITAL 84 | Facility: HOSPITAL | Age: 43
End: 2018-04-29
Payer: COMMERCIAL

## 2018-04-29 DIAGNOSIS — K42.9 UMBILICAL HERNIA WITHOUT OBSTRUCTION OR GANGRENE: ICD-10-CM

## 2018-04-29 DIAGNOSIS — K75.81 NONALCOHOLIC STEATOHEPATITIS (NASH): ICD-10-CM

## 2018-04-29 DIAGNOSIS — R10.11 RIGHT UPPER QUADRANT PAIN: ICD-10-CM

## 2018-04-29 DIAGNOSIS — R11.2 NAUSEA WITH VOMITING, UNSPECIFIED: ICD-10-CM

## 2018-04-29 PROCEDURE — 99253 IP/OBS CNSLTJ NEW/EST LOW 45: CPT | Performed by: NURSE PRACTITIONER

## 2018-04-30 ENCOUNTER — INPATIENT HOSPITAL (AMBULATORY)
Dept: URBAN - METROPOLITAN AREA HOSPITAL 84 | Facility: HOSPITAL | Age: 43
End: 2018-04-30
Payer: COMMERCIAL

## 2018-04-30 DIAGNOSIS — R10.11 RIGHT UPPER QUADRANT PAIN: ICD-10-CM

## 2018-04-30 DIAGNOSIS — K75.81 NONALCOHOLIC STEATOHEPATITIS (NASH): ICD-10-CM

## 2018-04-30 DIAGNOSIS — K42.9 UMBILICAL HERNIA WITHOUT OBSTRUCTION OR GANGRENE: ICD-10-CM

## 2018-04-30 DIAGNOSIS — R11.2 NAUSEA WITH VOMITING, UNSPECIFIED: ICD-10-CM

## 2018-04-30 PROCEDURE — 99231 SBSQ HOSP IP/OBS SF/LOW 25: CPT | Performed by: NURSE PRACTITIONER

## 2018-05-01 ENCOUNTER — INPATIENT HOSPITAL (AMBULATORY)
Dept: URBAN - METROPOLITAN AREA HOSPITAL 84 | Facility: HOSPITAL | Age: 43
End: 2018-05-01
Payer: COMMERCIAL

## 2018-05-01 DIAGNOSIS — K42.9 UMBILICAL HERNIA WITHOUT OBSTRUCTION OR GANGRENE: ICD-10-CM

## 2018-05-01 DIAGNOSIS — R10.11 RIGHT UPPER QUADRANT PAIN: ICD-10-CM

## 2018-05-01 DIAGNOSIS — K75.81 NONALCOHOLIC STEATOHEPATITIS (NASH): ICD-10-CM

## 2018-05-01 DIAGNOSIS — R11.2 NAUSEA WITH VOMITING, UNSPECIFIED: ICD-10-CM

## 2018-05-01 PROCEDURE — 99231 SBSQ HOSP IP/OBS SF/LOW 25: CPT | Performed by: NURSE PRACTITIONER

## 2018-08-24 ENCOUNTER — OFFICE (AMBULATORY)
Dept: URBAN - METROPOLITAN AREA CLINIC 64 | Facility: CLINIC | Age: 43
End: 2018-08-24

## 2018-08-24 VITALS
HEIGHT: 63 IN | HEART RATE: 74 BPM | WEIGHT: 142 LBS | DIASTOLIC BLOOD PRESSURE: 60 MMHG | SYSTOLIC BLOOD PRESSURE: 102 MMHG

## 2018-08-24 DIAGNOSIS — R11.2 NAUSEA WITH VOMITING, UNSPECIFIED: ICD-10-CM

## 2018-08-24 DIAGNOSIS — K43.9 VENTRAL HERNIA WITHOUT OBSTRUCTION OR GANGRENE: ICD-10-CM

## 2018-08-24 DIAGNOSIS — K72.90 HEPATIC FAILURE, UNSPECIFIED WITHOUT COMA: ICD-10-CM

## 2018-08-24 DIAGNOSIS — R18.8 OTHER ASCITES: ICD-10-CM

## 2018-08-24 DIAGNOSIS — K74.69 OTHER CIRRHOSIS OF LIVER: ICD-10-CM

## 2018-08-24 DIAGNOSIS — Z98.84 BARIATRIC SURGERY STATUS: ICD-10-CM

## 2018-08-24 DIAGNOSIS — R10.13 EPIGASTRIC PAIN: ICD-10-CM

## 2018-08-24 PROCEDURE — 99214 OFFICE O/P EST MOD 30 MIN: CPT | Performed by: INTERNAL MEDICINE

## 2018-08-24 RX ORDER — SUCRALFATE 1 G/1
4 TABLET ORAL
Qty: 120 | Refills: 3 | Status: ACTIVE
Start: 2018-08-24

## 2018-10-08 ENCOUNTER — ON CAMPUS - OUTPATIENT (AMBULATORY)
Dept: URBAN - METROPOLITAN AREA HOSPITAL 77 | Facility: HOSPITAL | Age: 43
End: 2018-10-08

## 2018-10-08 DIAGNOSIS — R10.13 EPIGASTRIC PAIN: ICD-10-CM

## 2018-10-08 DIAGNOSIS — Z98.0 INTESTINAL BYPASS AND ANASTOMOSIS STATUS: ICD-10-CM

## 2018-10-08 DIAGNOSIS — R11.10 VOMITING, UNSPECIFIED: ICD-10-CM

## 2018-10-08 PROCEDURE — 44360 SMALL BOWEL ENDOSCOPY: CPT | Performed by: INTERNAL MEDICINE

## 2019-02-26 ENCOUNTER — CONVERSION ENCOUNTER (OUTPATIENT)
Dept: GASTROENTEROLOGY | Facility: CLINIC | Age: 44
End: 2019-02-26

## 2019-02-26 ENCOUNTER — OFFICE VISIT CONVERTED (OUTPATIENT)
Dept: GASTROENTEROLOGY | Facility: CLINIC | Age: 44
End: 2019-02-26
Attending: INTERNAL MEDICINE

## 2019-03-08 ENCOUNTER — HOSPITAL ENCOUNTER (OUTPATIENT)
Dept: ULTRASOUND IMAGING | Facility: HOSPITAL | Age: 44
Discharge: HOME | End: 2019-03-08
Attending: INTERNAL MEDICINE

## 2019-03-08 LAB
ALBUMIN SERPL-MCNC: 3 G/DL (ref 3.5–5)
ALBUMIN/GLOB SERPL: 0.9 {RATIO} (ref 1.4–2.6)
ALP SERPL-CCNC: 183 U/L (ref 42–98)
ALT SERPL-CCNC: 21 U/L (ref 10–40)
ANION GAP SERPL CALC-SCNC: 10 MMOL/L (ref 8–19)
AST SERPL-CCNC: 26 U/L (ref 15–50)
BASOPHILS # BLD AUTO: 0.14 10*3/UL (ref 0–0.2)
BASOPHILS NFR BLD AUTO: 2 % (ref 0–3)
BILIRUB SERPL-MCNC: 1.94 MG/DL (ref 0.2–1.3)
BUN SERPL-MCNC: 6 MG/DL (ref 5–25)
BUN/CREAT SERPL: 9 {RATIO} (ref 6–20)
CALCIUM SERPL-MCNC: 9.2 MG/DL (ref 8.7–10.4)
CHLORIDE SERPL-SCNC: 102 MMOL/L (ref 99–111)
CONV ABS IMM GRAN: 0.01 10*3/UL (ref 0–0.2)
CONV CO2: 31 MMOL/L (ref 22–32)
CONV IMMATURE GRAN: 0.1 % (ref 0–1.8)
CONV TOTAL PROTEIN: 6.2 G/DL (ref 6.3–8.2)
CREAT UR-MCNC: 0.7 MG/DL (ref 0.5–0.9)
DEPRECATED RDW RBC AUTO: 52.6 FL (ref 36.4–46.3)
EOSINOPHIL # BLD AUTO: 0.49 10*3/UL (ref 0–0.7)
EOSINOPHIL # BLD AUTO: 7.1 % (ref 0–7)
ERYTHROCYTE [DISTWIDTH] IN BLOOD BY AUTOMATED COUNT: 14.9 % (ref 11.7–14.4)
GFR SERPLBLD BASED ON 1.73 SQ M-ARVRAT: >60 ML/MIN/{1.73_M2}
GLOBULIN UR ELPH-MCNC: 3.2 G/DL (ref 2–3.5)
GLUCOSE SERPL-MCNC: 96 MG/DL (ref 65–99)
HBA1C MFR BLD: 12.9 G/DL (ref 12–16)
HCT VFR BLD AUTO: 38.1 % (ref 37–47)
INR PPP: 1.17 (ref 2–3)
LYMPHOCYTES # BLD AUTO: 2.65 10*3/UL (ref 1–5)
MCH RBC QN AUTO: 32.2 PG (ref 27–31)
MCHC RBC AUTO-ENTMCNC: 33.9 G/DL (ref 33–37)
MCV RBC AUTO: 95 FL (ref 81–99)
MONOCYTES # BLD AUTO: 0.74 10*3/UL (ref 0.2–1.2)
MONOCYTES NFR BLD AUTO: 10.8 % (ref 3–10)
NEUTROPHILS # BLD AUTO: 2.83 10*3/UL (ref 2–8)
NEUTROPHILS NFR BLD AUTO: 41.4 % (ref 30–85)
NRBC CBCN: 0 % (ref 0–0.7)
OSMOLALITY SERPL CALC.SUM OF ELEC: 287 MOSM/KG (ref 273–304)
PLATELET # BLD AUTO: 398 10*3/UL (ref 130–400)
PMV BLD AUTO: 10.8 FL (ref 9.4–12.3)
POTASSIUM SERPL-SCNC: 3.1 MMOL/L (ref 3.5–5.3)
PROTHROMBIN TIME: 11.8 S (ref 9.4–12)
RBC # BLD AUTO: 4.01 10*6/UL (ref 4.2–5.4)
SODIUM SERPL-SCNC: 140 MMOL/L (ref 135–147)
VARIANT LYMPHS NFR BLD MANUAL: 38.6 % (ref 20–45)
WBC # BLD AUTO: 6.86 10*3/UL (ref 4.8–10.8)

## 2019-03-09 LAB
CONV HEPATITIS B SURFACE AG W CONFIRMATION RE: NEGATIVE
HAV AB SER QL IA: POSITIVE
HBV CORE AB SER DONR QL IA: NEGATIVE
HCV AB SER DONR QL: 0.1 S/CO RATIO (ref 0–0.9)
HCV AB SER DONR QL: 0.2 S/CO RATIO (ref 0–0.9)

## 2019-03-12 ENCOUNTER — ON CAMPUS - OUTPATIENT (AMBULATORY)
Dept: URBAN - METROPOLITAN AREA HOSPITAL 85 | Facility: HOSPITAL | Age: 44
End: 2019-03-12
Payer: COMMERCIAL

## 2019-03-12 DIAGNOSIS — R11.2 NAUSEA WITH VOMITING, UNSPECIFIED: ICD-10-CM

## 2019-03-12 DIAGNOSIS — R94.5 ABNORMAL RESULTS OF LIVER FUNCTION STUDIES: ICD-10-CM

## 2019-03-12 DIAGNOSIS — R10.84 GENERALIZED ABDOMINAL PAIN: ICD-10-CM

## 2019-03-12 DIAGNOSIS — R18.8 OTHER ASCITES: ICD-10-CM

## 2019-03-12 DIAGNOSIS — D72.829 ELEVATED WHITE BLOOD CELL COUNT, UNSPECIFIED: ICD-10-CM

## 2019-03-12 DIAGNOSIS — K75.81 NONALCOHOLIC STEATOHEPATITIS (NASH): ICD-10-CM

## 2019-03-12 PROCEDURE — 99244 OFF/OP CNSLTJ NEW/EST MOD 40: CPT | Performed by: NURSE PRACTITIONER

## 2019-03-13 ENCOUNTER — ON CAMPUS - OUTPATIENT (AMBULATORY)
Dept: URBAN - METROPOLITAN AREA HOSPITAL 85 | Facility: HOSPITAL | Age: 44
End: 2019-03-13
Payer: COMMERCIAL

## 2019-03-13 DIAGNOSIS — R10.84 GENERALIZED ABDOMINAL PAIN: ICD-10-CM

## 2019-03-13 DIAGNOSIS — K75.81 NONALCOHOLIC STEATOHEPATITIS (NASH): ICD-10-CM

## 2019-03-13 DIAGNOSIS — R18.8 OTHER ASCITES: ICD-10-CM

## 2019-03-13 DIAGNOSIS — R11.2 NAUSEA WITH VOMITING, UNSPECIFIED: ICD-10-CM

## 2019-03-13 DIAGNOSIS — D72.829 ELEVATED WHITE BLOOD CELL COUNT, UNSPECIFIED: ICD-10-CM

## 2019-03-13 DIAGNOSIS — R94.5 ABNORMAL RESULTS OF LIVER FUNCTION STUDIES: ICD-10-CM

## 2019-03-13 PROCEDURE — 99213 OFFICE O/P EST LOW 20 MIN: CPT | Performed by: NURSE PRACTITIONER

## 2019-04-10 ENCOUNTER — HOSPITAL ENCOUNTER (OUTPATIENT)
Dept: OTHER | Facility: HOSPITAL | Age: 44
Discharge: HOME | End: 2019-04-10
Attending: INTERNAL MEDICINE

## 2019-04-10 LAB
ANION GAP SERPL CALC-SCNC: 15 MMOL/L (ref 8–19)
BUN SERPL-MCNC: 8 MG/DL (ref 5–25)
BUN/CREAT SERPL: 9 {RATIO} (ref 6–20)
CALCIUM SERPL-MCNC: 8.4 MG/DL (ref 8.7–10.4)
CHLORIDE SERPL-SCNC: 105 MMOL/L (ref 99–111)
CONV CO2: 25 MMOL/L (ref 22–32)
CREAT UR-MCNC: 0.93 MG/DL (ref 0.5–0.9)
GFR SERPLBLD BASED ON 1.73 SQ M-ARVRAT: >60 ML/MIN/{1.73_M2}
GLUCOSE SERPL-MCNC: 77 MG/DL (ref 65–99)
OSMOLALITY SERPL CALC.SUM OF ELEC: 289 MOSM/KG (ref 273–304)
POTASSIUM SERPL-SCNC: 3.6 MMOL/L (ref 3.5–5.3)
SODIUM SERPL-SCNC: 141 MMOL/L (ref 135–147)

## 2019-06-27 ENCOUNTER — OFFICE VISIT CONVERTED (OUTPATIENT)
Dept: GASTROENTEROLOGY | Facility: CLINIC | Age: 44
End: 2019-06-27
Attending: INTERNAL MEDICINE

## 2019-07-26 ENCOUNTER — HOSPITAL ENCOUNTER (OUTPATIENT)
Facility: HOSPITAL | Age: 44
Setting detail: OBSERVATION
Discharge: HOME | End: 2019-07-28
Attending: INTERNAL MEDICINE | Admitting: HOSPITALIST

## 2019-07-26 ENCOUNTER — APPOINTMENT (OUTPATIENT)
Dept: GENERAL RADIOLOGY | Facility: HOSPITAL | Age: 44
End: 2019-07-26

## 2019-07-26 ENCOUNTER — APPOINTMENT (OUTPATIENT)
Dept: CT IMAGING | Facility: HOSPITAL | Age: 44
End: 2019-07-26

## 2019-07-26 DIAGNOSIS — R10.11 RIGHT UPPER QUADRANT PAIN: Primary | ICD-10-CM

## 2019-07-26 DIAGNOSIS — R18.8 OTHER ASCITES: ICD-10-CM

## 2019-07-26 LAB
ALBUMIN SERPL-MCNC: 3 G/DL (ref 3.5–4.8)
ALBUMIN/GLOB SERPL: 0.9 G/DL (ref 1–1.7)
ALP SERPL-CCNC: 154 U/L (ref 32–91)
ALT SERPL W P-5'-P-CCNC: 29 U/L (ref 14–54)
ANION GAP SERPL CALCULATED.3IONS-SCNC: 13.2 MMOL/L (ref 5–15)
AST SERPL-CCNC: 43 U/L (ref 15–41)
BASOPHILS # BLD AUTO: 0.1 10*3/MM3 (ref 0–0.2)
BASOPHILS NFR BLD AUTO: 1.4 % (ref 0–1.5)
BILIRUB SERPL-MCNC: 1.6 MG/DL (ref 0.3–1.2)
BILIRUB UR QL STRIP: ABNORMAL
BUN BLD-MCNC: 7 MG/DL (ref 8–20)
BUN/CREAT SERPL: 11.7 (ref 5.4–26.2)
CALCIUM SPEC-SCNC: 8.5 MG/DL (ref 8.9–10.3)
CHLORIDE SERPL-SCNC: 106 MMOL/L (ref 101–111)
CLARITY UR: CLEAR
CO2 SERPL-SCNC: 23 MMOL/L (ref 22–32)
COLOR UR: ABNORMAL
CREAT BLD-MCNC: 0.6 MG/DL (ref 0.4–1)
DEPRECATED RDW RBC AUTO: 49.9 FL (ref 37–54)
EOSINOPHIL # BLD AUTO: 0.4 10*3/MM3 (ref 0–0.4)
EOSINOPHIL NFR BLD AUTO: 5.1 % (ref 0.3–6.2)
ERYTHROCYTE [DISTWIDTH] IN BLOOD BY AUTOMATED COUNT: 15.1 % (ref 12.3–15.4)
GFR SERPL CREATININE-BSD FRML MDRD: 108 ML/MIN/1.73
GLOBULIN UR ELPH-MCNC: 3.5 GM/DL (ref 2.5–3.8)
GLUCOSE BLD-MCNC: 87 MG/DL (ref 65–99)
GLUCOSE UR STRIP-MCNC: NEGATIVE MG/DL
HCT VFR BLD AUTO: 39.5 % (ref 34–46.6)
HGB BLD-MCNC: 13.3 G/DL (ref 12–15.9)
HGB UR QL STRIP.AUTO: NEGATIVE
INR PPP: 1.22 (ref 2–3)
KETONES UR QL STRIP: ABNORMAL
LEUKOCYTE ESTERASE UR QL STRIP.AUTO: NEGATIVE
LIPASE SERPL-CCNC: 33 U/L (ref 22–51)
LYMPHOCYTES # BLD AUTO: 3.4 10*3/MM3 (ref 0.7–3.1)
LYMPHOCYTES NFR BLD AUTO: 43.4 % (ref 19.6–45.3)
MCH RBC QN AUTO: 32.2 PG (ref 26.6–33)
MCHC RBC AUTO-ENTMCNC: 33.8 G/DL (ref 31.5–35.7)
MCV RBC AUTO: 95.5 FL (ref 79–97)
MONOCYTES # BLD AUTO: 1 10*3/MM3 (ref 0.1–0.9)
MONOCYTES NFR BLD AUTO: 13.4 % (ref 5–12)
NEUTROPHILS # BLD AUTO: 2.8 10*3/MM3 (ref 1.7–7)
NEUTROPHILS NFR BLD AUTO: 36.7 % (ref 42.7–76)
NITRITE UR QL STRIP: NEGATIVE
NRBC BLD AUTO-RTO: 0.2 /100 WBC (ref 0–0.2)
PH UR STRIP.AUTO: 6 [PH] (ref 5–8)
PLATELET # BLD AUTO: 430 10*3/MM3 (ref 140–450)
PMV BLD AUTO: 8.9 FL (ref 6–12)
POTASSIUM BLD-SCNC: 4.2 MMOL/L (ref 3.6–5.1)
PROT SERPL-MCNC: 6.5 G/DL (ref 6.1–7.9)
PROT UR QL STRIP: NEGATIVE
PROTHROMBIN TIME: 12.2 SECONDS (ref 19.4–28.5)
RBC # BLD AUTO: 4.13 10*6/MM3 (ref 3.77–5.28)
SODIUM BLD-SCNC: 138 MMOL/L (ref 136–144)
SP GR UR STRIP: >=1.03 (ref 1–1.03)
TROPONIN I SERPL-MCNC: <0.03 NG/ML (ref 0–0.03)
UROBILINOGEN UR QL STRIP: ABNORMAL
WBC NRBC COR # BLD: 7.7 10*3/MM3 (ref 3.4–10.8)

## 2019-07-26 PROCEDURE — 99284 EMERGENCY DEPT VISIT MOD MDM: CPT

## 2019-07-26 PROCEDURE — 85610 PROTHROMBIN TIME: CPT | Performed by: NURSE PRACTITIONER

## 2019-07-26 PROCEDURE — 81003 URINALYSIS AUTO W/O SCOPE: CPT | Performed by: NURSE PRACTITIONER

## 2019-07-26 PROCEDURE — 96374 THER/PROPH/DIAG INJ IV PUSH: CPT

## 2019-07-26 PROCEDURE — 84484 ASSAY OF TROPONIN QUANT: CPT | Performed by: NURSE PRACTITIONER

## 2019-07-26 PROCEDURE — 74176 CT ABD & PELVIS W/O CONTRAST: CPT

## 2019-07-26 PROCEDURE — 83690 ASSAY OF LIPASE: CPT | Performed by: NURSE PRACTITIONER

## 2019-07-26 PROCEDURE — 25010000002 PROCHLORPERAZINE 10 MG/2ML SOLUTION: Performed by: NURSE PRACTITIONER

## 2019-07-26 PROCEDURE — 96375 TX/PRO/DX INJ NEW DRUG ADDON: CPT

## 2019-07-26 PROCEDURE — 80053 COMPREHEN METABOLIC PANEL: CPT | Performed by: NURSE PRACTITIONER

## 2019-07-26 PROCEDURE — 71045 X-RAY EXAM CHEST 1 VIEW: CPT

## 2019-07-26 PROCEDURE — 25010000002 DIPHENHYDRAMINE PER 50 MG: Performed by: NURSE PRACTITIONER

## 2019-07-26 PROCEDURE — 25010000002 HYDROMORPHONE PER 4 MG: Performed by: NURSE PRACTITIONER

## 2019-07-26 PROCEDURE — 25010000002 ONDANSETRON PER 1 MG: Performed by: NURSE PRACTITIONER

## 2019-07-26 PROCEDURE — 25010000002 CEFTRIAXONE PER 250 MG: Performed by: NURSE PRACTITIONER

## 2019-07-26 PROCEDURE — 85025 COMPLETE CBC W/AUTO DIFF WBC: CPT | Performed by: NURSE PRACTITIONER

## 2019-07-26 PROCEDURE — 93005 ELECTROCARDIOGRAM TRACING: CPT | Performed by: NURSE PRACTITIONER

## 2019-07-26 RX ORDER — ONDANSETRON 4 MG/1
8 TABLET, FILM COATED ORAL EVERY 6 HOURS PRN
COMMUNITY
End: 2020-12-16

## 2019-07-26 RX ORDER — SODIUM CHLORIDE 0.9 % (FLUSH) 0.9 %
10 SYRINGE (ML) INJECTION AS NEEDED
Status: DISCONTINUED | OUTPATIENT
Start: 2019-07-26 | End: 2019-07-28 | Stop reason: HOSPADM

## 2019-07-26 RX ORDER — MEPERIDINE HYDROCHLORIDE 25 MG/ML
25 INJECTION INTRAMUSCULAR; INTRAVENOUS; SUBCUTANEOUS ONCE
Status: COMPLETED | OUTPATIENT
Start: 2019-07-26 | End: 2019-07-26

## 2019-07-26 RX ORDER — OXYCODONE HYDROCHLORIDE 5 MG/1
10 CAPSULE ORAL EVERY 6 HOURS
COMMUNITY
End: 2021-01-09

## 2019-07-26 RX ORDER — SPIRONOLACTONE 100 MG/1
400 TABLET, FILM COATED ORAL DAILY
COMMUNITY

## 2019-07-26 RX ORDER — DIPHENHYDRAMINE HYDROCHLORIDE 50 MG/ML
50 INJECTION INTRAMUSCULAR; INTRAVENOUS ONCE
Status: COMPLETED | OUTPATIENT
Start: 2019-07-26 | End: 2019-07-26

## 2019-07-26 RX ORDER — ONDANSETRON 2 MG/ML
4 INJECTION INTRAMUSCULAR; INTRAVENOUS ONCE
Status: COMPLETED | OUTPATIENT
Start: 2019-07-26 | End: 2019-07-26

## 2019-07-26 RX ORDER — LEVOTHYROXINE SODIUM 0.2 MG/1
200 TABLET ORAL DAILY
COMMUNITY
End: 2020-03-22

## 2019-07-26 RX ORDER — BUMETANIDE 2 MG/1
4 TABLET ORAL DAILY
COMMUNITY

## 2019-07-26 RX ORDER — HYDROMORPHONE HCL 110MG/55ML
1 PATIENT CONTROLLED ANALGESIA SYRINGE INTRAVENOUS ONCE
Status: COMPLETED | OUTPATIENT
Start: 2019-07-26 | End: 2019-07-26

## 2019-07-26 RX ORDER — PROCHLORPERAZINE EDISYLATE 5 MG/ML
10 INJECTION INTRAMUSCULAR; INTRAVENOUS ONCE
Status: COMPLETED | OUTPATIENT
Start: 2019-07-26 | End: 2019-07-26

## 2019-07-26 RX ADMIN — CEFTRIAXONE SODIUM 1 G: 10 INJECTION, POWDER, FOR SOLUTION INTRAVENOUS at 23:49

## 2019-07-26 RX ADMIN — HYDROMORPHONE HYDROCHLORIDE 1 MG: 2 INJECTION INTRAMUSCULAR; INTRAVENOUS; SUBCUTANEOUS at 23:59

## 2019-07-26 RX ADMIN — DIPHENHYDRAMINE HYDROCHLORIDE 50 MG: 50 INJECTION, SOLUTION INTRAMUSCULAR; INTRAVENOUS at 23:29

## 2019-07-26 RX ADMIN — MEPERIDINE HYDROCHLORIDE 25 MG: 25 INJECTION INTRAMUSCULAR; INTRAVENOUS; SUBCUTANEOUS at 21:18

## 2019-07-26 RX ADMIN — ONDANSETRON 4 MG: 2 INJECTION INTRAMUSCULAR; INTRAVENOUS at 21:18

## 2019-07-26 RX ADMIN — PROCHLORPERAZINE EDISYLATE 10 MG: 5 INJECTION INTRAMUSCULAR; INTRAVENOUS at 23:29

## 2019-07-27 LAB
ANION GAP SERPL CALCULATED.3IONS-SCNC: 9.3 MMOL/L (ref 5–15)
BASOPHILS # BLD AUTO: 0.1 10*3/MM3 (ref 0–0.2)
BASOPHILS NFR BLD AUTO: 1 % (ref 0–1.5)
BUN BLD-MCNC: 6 MG/DL (ref 8–20)
BUN/CREAT SERPL: 10 (ref 5.4–26.2)
CALCIUM SPEC-SCNC: 8.2 MG/DL (ref 8.9–10.3)
CHLORIDE SERPL-SCNC: 106 MMOL/L (ref 101–111)
CO2 SERPL-SCNC: 25 MMOL/L (ref 22–32)
CREAT BLD-MCNC: 0.6 MG/DL (ref 0.4–1)
D DIMER PPP FEU-MCNC: 2.39 MCGFEU/ML (ref 0.17–0.59)
DEPRECATED RDW RBC AUTO: 49.9 FL (ref 37–54)
EOSINOPHIL # BLD AUTO: 0.6 10*3/MM3 (ref 0–0.4)
EOSINOPHIL NFR BLD AUTO: 7.5 % (ref 0.3–6.2)
ERYTHROCYTE [DISTWIDTH] IN BLOOD BY AUTOMATED COUNT: 14.9 % (ref 12.3–15.4)
GFR SERPL CREATININE-BSD FRML MDRD: 108 ML/MIN/1.73
GLUCOSE BLD-MCNC: 94 MG/DL (ref 65–99)
HCT VFR BLD AUTO: 37.5 % (ref 34–46.6)
HGB BLD-MCNC: 12.5 G/DL (ref 12–15.9)
LYMPHOCYTES # BLD AUTO: 3.7 10*3/MM3 (ref 0.7–3.1)
LYMPHOCYTES NFR BLD AUTO: 43.7 % (ref 19.6–45.3)
MCH RBC QN AUTO: 32.1 PG (ref 26.6–33)
MCHC RBC AUTO-ENTMCNC: 33.4 G/DL (ref 31.5–35.7)
MCV RBC AUTO: 96.1 FL (ref 79–97)
MONOCYTES # BLD AUTO: 1.1 10*3/MM3 (ref 0.1–0.9)
MONOCYTES NFR BLD AUTO: 13.1 % (ref 5–12)
NEUTROPHILS # BLD AUTO: 2.9 10*3/MM3 (ref 1.7–7)
NEUTROPHILS NFR BLD AUTO: 34.7 % (ref 42.7–76)
NRBC BLD AUTO-RTO: 0.2 /100 WBC (ref 0–0.2)
PLATELET # BLD AUTO: 372 10*3/MM3 (ref 140–450)
PMV BLD AUTO: 9.7 FL (ref 6–12)
POTASSIUM BLD-SCNC: 3.3 MMOL/L (ref 3.6–5.1)
RBC # BLD AUTO: 3.91 10*6/MM3 (ref 3.77–5.28)
SODIUM BLD-SCNC: 137 MMOL/L (ref 136–144)
TROPONIN I SERPL-MCNC: <0.03 NG/ML (ref 0–0.03)
WBC NRBC COR # BLD: 8.4 10*3/MM3 (ref 3.4–10.8)

## 2019-07-27 PROCEDURE — 96372 THER/PROPH/DIAG INJ SC/IM: CPT

## 2019-07-27 PROCEDURE — 84484 ASSAY OF TROPONIN QUANT: CPT | Performed by: PHYSICIAN ASSISTANT

## 2019-07-27 PROCEDURE — 25010000002 ENOXAPARIN PER 10 MG: Performed by: HOSPITALIST

## 2019-07-27 PROCEDURE — 99220 PR INITIAL OBSERVATION CARE/DAY 70 MINUTES: CPT | Performed by: HOSPITALIST

## 2019-07-27 PROCEDURE — 85379 FIBRIN DEGRADATION QUANT: CPT | Performed by: HOSPITALIST

## 2019-07-27 PROCEDURE — G0378 HOSPITAL OBSERVATION PER HR: HCPCS

## 2019-07-27 PROCEDURE — 80048 BASIC METABOLIC PNL TOTAL CA: CPT | Performed by: PHYSICIAN ASSISTANT

## 2019-07-27 PROCEDURE — 85025 COMPLETE CBC W/AUTO DIFF WBC: CPT | Performed by: PHYSICIAN ASSISTANT

## 2019-07-27 RX ORDER — BUMETANIDE 1 MG/1
2 TABLET ORAL DAILY
Status: DISCONTINUED | OUTPATIENT
Start: 2019-07-27 | End: 2019-07-28 | Stop reason: HOSPADM

## 2019-07-27 RX ORDER — LACTULOSE 10 G/15ML
10 SOLUTION ORAL 3 TIMES DAILY
Status: DISCONTINUED | OUTPATIENT
Start: 2019-07-27 | End: 2019-07-28 | Stop reason: HOSPADM

## 2019-07-27 RX ORDER — CHOLECALCIFEROL (VITAMIN D3) 125 MCG
5 CAPSULE ORAL NIGHTLY PRN
Status: DISCONTINUED | OUTPATIENT
Start: 2019-07-27 | End: 2019-07-28 | Stop reason: HOSPADM

## 2019-07-27 RX ORDER — SPIRONOLACTONE 100 MG/1
200 TABLET, FILM COATED ORAL DAILY
Status: DISCONTINUED | OUTPATIENT
Start: 2019-07-27 | End: 2019-07-28 | Stop reason: HOSPADM

## 2019-07-27 RX ORDER — ONDANSETRON 4 MG/1
8 TABLET, FILM COATED ORAL EVERY 6 HOURS PRN
Status: DISCONTINUED | OUTPATIENT
Start: 2019-07-27 | End: 2019-07-28 | Stop reason: HOSPADM

## 2019-07-27 RX ORDER — ACETAMINOPHEN 650 MG/1
650 SUPPOSITORY RECTAL EVERY 4 HOURS PRN
Status: DISCONTINUED | OUTPATIENT
Start: 2019-07-27 | End: 2019-07-27

## 2019-07-27 RX ORDER — NITROGLYCERIN 0.4 MG/1
0.4 TABLET SUBLINGUAL
Status: DISCONTINUED | OUTPATIENT
Start: 2019-07-27 | End: 2019-07-28 | Stop reason: HOSPADM

## 2019-07-27 RX ORDER — OXYCODONE HYDROCHLORIDE 5 MG/1
10 TABLET ORAL EVERY 4 HOURS PRN
Status: DISCONTINUED | OUTPATIENT
Start: 2019-07-27 | End: 2019-07-28 | Stop reason: HOSPADM

## 2019-07-27 RX ORDER — ACETAMINOPHEN 325 MG/1
650 TABLET ORAL EVERY 4 HOURS PRN
Status: DISCONTINUED | OUTPATIENT
Start: 2019-07-27 | End: 2019-07-27

## 2019-07-27 RX ORDER — OXYCODONE HYDROCHLORIDE 5 MG/1
10 TABLET ORAL ONCE
Status: COMPLETED | OUTPATIENT
Start: 2019-07-27 | End: 2019-07-27

## 2019-07-27 RX ORDER — BISACODYL 10 MG
10 SUPPOSITORY, RECTAL RECTAL DAILY PRN
Status: DISCONTINUED | OUTPATIENT
Start: 2019-07-27 | End: 2019-07-28 | Stop reason: HOSPADM

## 2019-07-27 RX ORDER — KETOROLAC TROMETHAMINE 15 MG/ML
15 INJECTION, SOLUTION INTRAMUSCULAR; INTRAVENOUS EVERY 6 HOURS PRN
Status: DISCONTINUED | OUTPATIENT
Start: 2019-07-27 | End: 2019-07-27

## 2019-07-27 RX ORDER — LEVOTHYROXINE SODIUM 0.2 MG/1
200 TABLET ORAL
Status: DISCONTINUED | OUTPATIENT
Start: 2019-07-27 | End: 2019-07-28 | Stop reason: HOSPADM

## 2019-07-27 RX ORDER — DOCUSATE SODIUM 100 MG/1
100 CAPSULE, LIQUID FILLED ORAL 2 TIMES DAILY PRN
Status: DISCONTINUED | OUTPATIENT
Start: 2019-07-27 | End: 2019-07-28 | Stop reason: HOSPADM

## 2019-07-27 RX ORDER — ONDANSETRON 4 MG/1
4 TABLET, FILM COATED ORAL EVERY 6 HOURS PRN
Status: DISCONTINUED | OUTPATIENT
Start: 2019-07-27 | End: 2019-07-28 | Stop reason: HOSPADM

## 2019-07-27 RX ORDER — LIDOCAINE 50 MG/G
1 PATCH TOPICAL
Status: DISCONTINUED | OUTPATIENT
Start: 2019-07-27 | End: 2019-07-28 | Stop reason: HOSPADM

## 2019-07-27 RX ORDER — ALUMINA, MAGNESIA, AND SIMETHICONE 2400; 2400; 240 MG/30ML; MG/30ML; MG/30ML
15 SUSPENSION ORAL EVERY 6 HOURS PRN
Status: DISCONTINUED | OUTPATIENT
Start: 2019-07-27 | End: 2019-07-28 | Stop reason: HOSPADM

## 2019-07-27 RX ORDER — ONDANSETRON 2 MG/ML
4 INJECTION INTRAMUSCULAR; INTRAVENOUS EVERY 6 HOURS PRN
Status: DISCONTINUED | OUTPATIENT
Start: 2019-07-27 | End: 2019-07-28 | Stop reason: HOSPADM

## 2019-07-27 RX ORDER — CALCIUM CARBONATE 200(500)MG
2 TABLET,CHEWABLE ORAL 2 TIMES DAILY PRN
Status: DISCONTINUED | OUTPATIENT
Start: 2019-07-27 | End: 2019-07-28 | Stop reason: HOSPADM

## 2019-07-27 RX ORDER — SODIUM CHLORIDE 0.9 % (FLUSH) 0.9 %
3 SYRINGE (ML) INJECTION EVERY 12 HOURS SCHEDULED
Status: DISCONTINUED | OUTPATIENT
Start: 2019-07-27 | End: 2019-07-28 | Stop reason: HOSPADM

## 2019-07-27 RX ORDER — SODIUM CHLORIDE 0.9 % (FLUSH) 0.9 %
3-10 SYRINGE (ML) INJECTION AS NEEDED
Status: DISCONTINUED | OUTPATIENT
Start: 2019-07-27 | End: 2019-07-28 | Stop reason: HOSPADM

## 2019-07-27 RX ADMIN — BUMETANIDE 2 MG: 1 TABLET ORAL at 09:10

## 2019-07-27 RX ADMIN — OXYCODONE HYDROCHLORIDE 10 MG: 5 TABLET ORAL at 14:29

## 2019-07-27 RX ADMIN — OXYCODONE HYDROCHLORIDE 10 MG: 5 TABLET ORAL at 23:31

## 2019-07-27 RX ADMIN — OXYCODONE HYDROCHLORIDE 10 MG: 5 TABLET ORAL at 05:48

## 2019-07-27 RX ADMIN — LACTULOSE 10 G: 10 SOLUTION ORAL at 20:43

## 2019-07-27 RX ADMIN — ONDANSETRON HYDROCHLORIDE 8 MG: 4 TABLET, FILM COATED ORAL at 19:13

## 2019-07-27 RX ADMIN — LEVOTHYROXINE SODIUM 200 MCG: 200 TABLET ORAL at 05:49

## 2019-07-27 RX ADMIN — Medication 3 ML: at 20:43

## 2019-07-27 RX ADMIN — SPIRONOLACTONE 200 MG: 100 TABLET, FILM COATED ORAL at 09:11

## 2019-07-27 RX ADMIN — OXYCODONE HYDROCHLORIDE 10 MG: 5 TABLET ORAL at 19:13

## 2019-07-27 RX ADMIN — ONDANSETRON HYDROCHLORIDE 8 MG: 4 TABLET, FILM COATED ORAL at 09:27

## 2019-07-27 RX ADMIN — ENOXAPARIN SODIUM 60 MG: 60 INJECTION SUBCUTANEOUS at 19:13

## 2019-07-27 RX ADMIN — ROPINIROLE 1.5 MG: 1 TABLET, FILM COATED ORAL at 20:43

## 2019-07-27 RX ADMIN — RIFAXIMIN 550 MG: 550 TABLET ORAL at 09:11

## 2019-07-27 RX ADMIN — Medication 3 ML: at 09:11

## 2019-07-28 ENCOUNTER — APPOINTMENT (OUTPATIENT)
Dept: GENERAL RADIOLOGY | Facility: HOSPITAL | Age: 44
End: 2019-07-28

## 2019-07-28 ENCOUNTER — APPOINTMENT (OUTPATIENT)
Dept: NUCLEAR MEDICINE | Facility: HOSPITAL | Age: 44
End: 2019-07-28

## 2019-07-28 VITALS
HEIGHT: 63 IN | OXYGEN SATURATION: 96 % | DIASTOLIC BLOOD PRESSURE: 72 MMHG | SYSTOLIC BLOOD PRESSURE: 111 MMHG | TEMPERATURE: 99 F | WEIGHT: 134 LBS | HEART RATE: 87 BPM | BODY MASS INDEX: 23.74 KG/M2 | RESPIRATION RATE: 14 BRPM

## 2019-07-28 PROBLEM — K59.00 CONSTIPATION: Status: ACTIVE | Noted: 2019-07-28

## 2019-07-28 PROBLEM — R07.89 CHEST PAIN, ATYPICAL: Status: ACTIVE | Noted: 2019-07-28

## 2019-07-28 LAB
AMMONIA BLD-SCNC: 56 UMOL/L (ref 9–35)
ANION GAP SERPL CALCULATED.3IONS-SCNC: 10.1 MMOL/L (ref 5–15)
BASOPHILS # BLD AUTO: 0.1 10*3/MM3 (ref 0–0.2)
BASOPHILS NFR BLD AUTO: 1 % (ref 0–1.5)
BUN BLD-MCNC: 7 MG/DL (ref 8–20)
BUN/CREAT SERPL: 8.8 (ref 5.4–26.2)
CALCIUM SPEC-SCNC: 8.1 MG/DL (ref 8.9–10.3)
CHLORIDE SERPL-SCNC: 103 MMOL/L (ref 101–111)
CO2 SERPL-SCNC: 27 MMOL/L (ref 22–32)
CREAT BLD-MCNC: 0.8 MG/DL (ref 0.4–1)
CRP SERPL-MCNC: 0.14 MG/DL (ref 0–0.7)
D-LACTATE SERPL-SCNC: 1.2 MMOL/L (ref 0.5–2.2)
DEPRECATED RDW RBC AUTO: 49 FL (ref 37–54)
EOSINOPHIL # BLD AUTO: 0.6 10*3/MM3 (ref 0–0.4)
EOSINOPHIL NFR BLD AUTO: 7.1 % (ref 0.3–6.2)
ERYTHROCYTE [DISTWIDTH] IN BLOOD BY AUTOMATED COUNT: 14.8 % (ref 12.3–15.4)
FERRITIN SERPL-MCNC: 17 NG/ML (ref 11–307)
GFR SERPL CREATININE-BSD FRML MDRD: 78 ML/MIN/1.73
GLUCOSE BLD-MCNC: 100 MG/DL (ref 65–99)
HCT VFR BLD AUTO: 37 % (ref 34–46.6)
HGB BLD-MCNC: 12.3 G/DL (ref 12–15.9)
LYMPHOCYTES # BLD AUTO: 3.8 10*3/MM3 (ref 0.7–3.1)
LYMPHOCYTES NFR BLD AUTO: 48.5 % (ref 19.6–45.3)
MAGNESIUM SERPL-MCNC: 1.6 MG/DL (ref 1.8–2.5)
MCH RBC QN AUTO: 31.8 PG (ref 26.6–33)
MCHC RBC AUTO-ENTMCNC: 33.3 G/DL (ref 31.5–35.7)
MCV RBC AUTO: 95.3 FL (ref 79–97)
MONOCYTES # BLD AUTO: 1 10*3/MM3 (ref 0.1–0.9)
MONOCYTES NFR BLD AUTO: 12.2 % (ref 5–12)
NEUTROPHILS # BLD AUTO: 2.5 10*3/MM3 (ref 1.7–7)
NEUTROPHILS NFR BLD AUTO: 31.2 % (ref 42.7–76)
NRBC BLD AUTO-RTO: 0.2 /100 WBC (ref 0–0.2)
PHOSPHATE SERPL-MCNC: 4.8 MG/DL (ref 2.4–4.7)
PLATELET # BLD AUTO: 375 10*3/MM3 (ref 140–450)
PMV BLD AUTO: 8.9 FL (ref 6–12)
POTASSIUM BLD-SCNC: 3.1 MMOL/L (ref 3.6–5.1)
RBC # BLD AUTO: 3.88 10*6/MM3 (ref 3.77–5.28)
SODIUM BLD-SCNC: 137 MMOL/L (ref 136–144)
T4 FREE SERPL-MCNC: 1.51 NG/DL (ref 0.58–1.64)
TSH SERPL DL<=0.05 MIU/L-ACNC: <0.01 MIU/ML (ref 0.34–5.6)
VIT B12 BLD-MCNC: 510 PG/ML (ref 180–914)
WBC NRBC COR # BLD: 7.9 10*3/MM3 (ref 3.4–10.8)

## 2019-07-28 PROCEDURE — 80048 BASIC METABOLIC PNL TOTAL CA: CPT | Performed by: PHYSICIAN ASSISTANT

## 2019-07-28 PROCEDURE — A9538 TC99M PYROPHOSPHATE: HCPCS | Performed by: HOSPITALIST

## 2019-07-28 PROCEDURE — 82140 ASSAY OF AMMONIA: CPT | Performed by: HOSPITALIST

## 2019-07-28 PROCEDURE — 78582 LUNG VENTILAT&PERFUS IMAGING: CPT

## 2019-07-28 PROCEDURE — 71046 X-RAY EXAM CHEST 2 VIEWS: CPT

## 2019-07-28 PROCEDURE — 0 TECHNETIUM ALBUMIN AGGREGATED: Performed by: HOSPITALIST

## 2019-07-28 PROCEDURE — 84100 ASSAY OF PHOSPHORUS: CPT | Performed by: HOSPITALIST

## 2019-07-28 PROCEDURE — 84439 ASSAY OF FREE THYROXINE: CPT | Performed by: HOSPITALIST

## 2019-07-28 PROCEDURE — 85025 COMPLETE CBC W/AUTO DIFF WBC: CPT | Performed by: PHYSICIAN ASSISTANT

## 2019-07-28 PROCEDURE — 0 TECHNETIUM TC99M PYROPHOSPHATE: Performed by: HOSPITALIST

## 2019-07-28 PROCEDURE — 25010000002 ENOXAPARIN PER 10 MG: Performed by: HOSPITALIST

## 2019-07-28 PROCEDURE — A9540 TC99M MAA: HCPCS | Performed by: HOSPITALIST

## 2019-07-28 PROCEDURE — 83735 ASSAY OF MAGNESIUM: CPT | Performed by: HOSPITALIST

## 2019-07-28 PROCEDURE — 84443 ASSAY THYROID STIM HORMONE: CPT | Performed by: HOSPITALIST

## 2019-07-28 PROCEDURE — 82728 ASSAY OF FERRITIN: CPT | Performed by: HOSPITALIST

## 2019-07-28 PROCEDURE — 86140 C-REACTIVE PROTEIN: CPT | Performed by: HOSPITALIST

## 2019-07-28 PROCEDURE — 99217 PR OBSERVATION CARE DISCHARGE MANAGEMENT: CPT | Performed by: HOSPITALIST

## 2019-07-28 PROCEDURE — 83605 ASSAY OF LACTIC ACID: CPT | Performed by: HOSPITALIST

## 2019-07-28 PROCEDURE — G0378 HOSPITAL OBSERVATION PER HR: HCPCS

## 2019-07-28 PROCEDURE — 82607 VITAMIN B-12: CPT | Performed by: HOSPITALIST

## 2019-07-28 PROCEDURE — 96372 THER/PROPH/DIAG INJ SC/IM: CPT

## 2019-07-28 RX ORDER — PREDNISONE 50 MG/1
50 TABLET ORAL DAILY
Status: DISCONTINUED | OUTPATIENT
Start: 2019-07-28 | End: 2019-07-28 | Stop reason: HOSPADM

## 2019-07-28 RX ORDER — DIPHENHYDRAMINE HYDROCHLORIDE 50 MG/ML
50 INJECTION INTRAMUSCULAR; INTRAVENOUS ONCE
Status: DISCONTINUED | OUTPATIENT
Start: 2019-07-28 | End: 2019-07-28 | Stop reason: HOSPADM

## 2019-07-28 RX ADMIN — Medication 1 DOSE: at 15:40

## 2019-07-28 RX ADMIN — LACTULOSE 10 G: 10 SOLUTION ORAL at 15:05

## 2019-07-28 RX ADMIN — LEVOTHYROXINE SODIUM 200 MCG: 200 TABLET ORAL at 05:17

## 2019-07-28 RX ADMIN — OXYCODONE HYDROCHLORIDE 10 MG: 5 TABLET ORAL at 03:37

## 2019-07-28 RX ADMIN — ENOXAPARIN SODIUM 60 MG: 60 INJECTION SUBCUTANEOUS at 09:09

## 2019-07-28 RX ADMIN — BUMETANIDE 2 MG: 1 TABLET ORAL at 09:09

## 2019-07-28 RX ADMIN — OXYCODONE HYDROCHLORIDE 10 MG: 5 TABLET ORAL at 15:05

## 2019-07-28 RX ADMIN — ONDANSETRON HYDROCHLORIDE 8 MG: 4 TABLET, FILM COATED ORAL at 03:37

## 2019-07-28 RX ADMIN — OXYCODONE HYDROCHLORIDE 10 MG: 5 TABLET ORAL at 09:08

## 2019-07-28 RX ADMIN — RIFAXIMIN 550 MG: 550 TABLET ORAL at 09:09

## 2019-07-28 RX ADMIN — LACTULOSE 10 G: 10 SOLUTION ORAL at 09:08

## 2019-07-28 RX ADMIN — SPIRONOLACTONE 200 MG: 100 TABLET, FILM COATED ORAL at 09:09

## 2019-07-28 RX ADMIN — TECHNETIUM TC99M PYROPHOSPHATE 1 DOSE: 12 INJECTION INTRAVENOUS at 15:37

## 2019-07-28 RX ADMIN — ONDANSETRON HYDROCHLORIDE 4 MG: 4 TABLET, FILM COATED ORAL at 10:59

## 2019-07-28 RX ADMIN — Medication 3 ML: at 09:12

## 2019-07-29 ENCOUNTER — READMISSION MANAGEMENT (OUTPATIENT)
Dept: CALL CENTER | Facility: HOSPITAL | Age: 44
End: 2019-07-29

## 2019-07-30 ENCOUNTER — OFFICE VISIT CONVERTED (OUTPATIENT)
Dept: ORTHOPEDIC SURGERY | Facility: CLINIC | Age: 44
End: 2019-07-30
Attending: ORTHOPAEDIC SURGERY

## 2019-07-30 NOTE — OUTREACH NOTE
Prep Survey      Responses   Facility patient discharged from?  [facility]   Is patient eligible?  Yes   Discharge diagnosis  Constipation   Does the patient have one of the following disease processes/diagnoses(primary or secondary)?  Other   Does the patient have Home health ordered?  No   Is there a DME ordered?  No   Prep survey completed?  Yes          Mirta Nance RN

## 2019-07-31 ENCOUNTER — READMISSION MANAGEMENT (OUTPATIENT)
Dept: CALL CENTER | Facility: HOSPITAL | Age: 44
End: 2019-07-31

## 2019-07-31 NOTE — OUTREACH NOTE
Medical Week 1 Survey      Responses   Facility patient discharged from?  [facility]   Does the patient have one of the following disease processes/diagnoses(primary or secondary)?  Other   Is there a successful TCM telephone encounter documented?  No   Week 1 attempt successful?  No   Rescheduled  Revoked   Revoke  Decline to participate [Left Voicemail]          Juanis Chang LPN

## 2019-08-15 ENCOUNTER — HOSPITAL ENCOUNTER (OUTPATIENT)
Dept: CARDIOLOGY | Facility: HOSPITAL | Age: 44
Setting detail: OBSERVATION
Discharge: HOME | End: 2019-08-15

## 2019-08-15 ENCOUNTER — HOSPITAL ENCOUNTER (OUTPATIENT)
Facility: HOSPITAL | Age: 44
Setting detail: OBSERVATION
Discharge: HOME | End: 2019-08-16
Attending: EMERGENCY MEDICINE | Admitting: INTERNAL MEDICINE

## 2019-08-15 ENCOUNTER — APPOINTMENT (OUTPATIENT)
Dept: CT IMAGING | Facility: HOSPITAL | Age: 44
End: 2019-08-15

## 2019-08-15 DIAGNOSIS — R18.8 OTHER ASCITES: ICD-10-CM

## 2019-08-15 DIAGNOSIS — I80.9 PHLEBITIS: ICD-10-CM

## 2019-08-15 DIAGNOSIS — R10.9 ABDOMINAL PAIN, UNSPECIFIED ABDOMINAL LOCATION: Primary | ICD-10-CM

## 2019-08-15 PROBLEM — I82.411 DEEP VENOUS THROMBOSIS OF RIGHT PROFUNDA FEMORIS VEIN (HCC): Status: ACTIVE | Noted: 2019-08-15

## 2019-08-15 PROBLEM — R07.89 CHEST PAIN, ATYPICAL: Chronic | Status: ACTIVE | Noted: 2019-07-28

## 2019-08-15 PROBLEM — K59.00 CONSTIPATION: Chronic | Status: ACTIVE | Noted: 2019-07-28

## 2019-08-15 LAB
ALBUMIN SERPL-MCNC: 2.8 G/DL (ref 3.5–4.8)
ALBUMIN/GLOB SERPL: 0.9 G/DL (ref 1–1.7)
ALP SERPL-CCNC: 117 U/L (ref 32–91)
ALT SERPL W P-5'-P-CCNC: 31 U/L (ref 14–54)
ANION GAP SERPL CALCULATED.3IONS-SCNC: 13.5 MMOL/L (ref 5–15)
APTT PPP: 25.9 SECONDS (ref 24–31)
AST SERPL-CCNC: 33 U/L (ref 15–41)
B-HCG UR QL: NEGATIVE
BASOPHILS # BLD AUTO: 0.1 10*3/MM3 (ref 0–0.2)
BASOPHILS NFR BLD AUTO: 1.4 % (ref 0–1.5)
BH CV LOW VAS RIGHT VARICOSITY AK VESSEL: 1
BH CV LOW VAS RIGHT VARICOSITY BK VESSEL: 1
BH CV LOWER VASCULAR LEFT COMMON FEMORAL AUGMENT: NORMAL
BH CV LOWER VASCULAR LEFT COMMON FEMORAL COMPETENT: NORMAL
BH CV LOWER VASCULAR LEFT COMMON FEMORAL COMPRESS: NORMAL
BH CV LOWER VASCULAR LEFT COMMON FEMORAL PHASIC: NORMAL
BH CV LOWER VASCULAR LEFT COMMON FEMORAL SPONT: NORMAL
BH CV LOWER VASCULAR RIGHT COMMON FEMORAL AUGMENT: NORMAL
BH CV LOWER VASCULAR RIGHT COMMON FEMORAL COMPETENT: NORMAL
BH CV LOWER VASCULAR RIGHT COMMON FEMORAL COMPRESS: NORMAL
BH CV LOWER VASCULAR RIGHT COMMON FEMORAL PHASIC: NORMAL
BH CV LOWER VASCULAR RIGHT COMMON FEMORAL SPONT: NORMAL
BH CV LOWER VASCULAR RIGHT DISTAL FEMORAL COMPRESS: NORMAL
BH CV LOWER VASCULAR RIGHT GASTRONEMIUS COMPRESS: NORMAL
BH CV LOWER VASCULAR RIGHT GREATER SAPH AK COMPRESS: NORMAL
BH CV LOWER VASCULAR RIGHT GREATER SAPH BK COMPRESS: NORMAL
BH CV LOWER VASCULAR RIGHT LESSER SAPH COMPRESS: NORMAL
BH CV LOWER VASCULAR RIGHT MID FEMORAL AUGMENT: NORMAL
BH CV LOWER VASCULAR RIGHT MID FEMORAL COMPETENT: NORMAL
BH CV LOWER VASCULAR RIGHT MID FEMORAL COMPRESS: NORMAL
BH CV LOWER VASCULAR RIGHT MID FEMORAL PHASIC: NORMAL
BH CV LOWER VASCULAR RIGHT MID FEMORAL SPONT: NORMAL
BH CV LOWER VASCULAR RIGHT PERONEAL COMPRESS: NORMAL
BH CV LOWER VASCULAR RIGHT POPLITEAL AUGMENT: NORMAL
BH CV LOWER VASCULAR RIGHT POPLITEAL COMPETENT: NORMAL
BH CV LOWER VASCULAR RIGHT POPLITEAL COMPRESS: NORMAL
BH CV LOWER VASCULAR RIGHT POPLITEAL PHASIC: NORMAL
BH CV LOWER VASCULAR RIGHT POPLITEAL SPONT: NORMAL
BH CV LOWER VASCULAR RIGHT POSTERIOR TIBIAL COMPRESS: NORMAL
BH CV LOWER VASCULAR RIGHT PROXIMAL FEMORAL COMPRESS: NORMAL
BH CV LOWER VASCULAR RIGHT SAPHENOFEMORAL JUNCTION COMPRESS: NORMAL
BH CV LOWER VASCULAR RIGHT VARICOSITY AK COMPRESS: NORMAL
BH CV LOWER VASCULAR RIGHT VARICOSITY AK THROMBUS: NORMAL
BH CV LOWER VASCULAR RIGHT VARICOSITY BK COMPRESS: NORMAL
BH CV LOWER VASCULAR RIGHT VARICOSITY BK THROMBUS: NORMAL
BILIRUB SERPL-MCNC: 1.1 MG/DL (ref 0.3–1.2)
BILIRUB UR QL STRIP: NEGATIVE
BUN BLD-MCNC: 9 MG/DL (ref 8–20)
BUN/CREAT SERPL: 15 (ref 5.4–26.2)
CALCIUM SPEC-SCNC: 8.2 MG/DL (ref 8.9–10.3)
CHLORIDE SERPL-SCNC: 105 MMOL/L (ref 101–111)
CLARITY UR: CLEAR
CO2 SERPL-SCNC: 21 MMOL/L (ref 22–32)
COLOR UR: YELLOW
CREAT BLD-MCNC: 0.6 MG/DL (ref 0.4–1)
D DIMER PPP FEU-MCNC: 2.84 MCGFEU/ML (ref 0.17–0.59)
DEPRECATED RDW RBC AUTO: 50.8 FL (ref 37–54)
DEPRECATED RDW RBC AUTO: 51.2 FL (ref 37–54)
EOSINOPHIL # BLD AUTO: 0.5 10*3/MM3 (ref 0–0.4)
EOSINOPHIL NFR BLD AUTO: 7.1 % (ref 0.3–6.2)
ERYTHROCYTE [DISTWIDTH] IN BLOOD BY AUTOMATED COUNT: 15.2 % (ref 12.3–15.4)
ERYTHROCYTE [DISTWIDTH] IN BLOOD BY AUTOMATED COUNT: 15.3 % (ref 12.3–15.4)
GFR SERPL CREATININE-BSD FRML MDRD: 108 ML/MIN/1.73
GLOBULIN UR ELPH-MCNC: 3 GM/DL (ref 2.5–3.8)
GLUCOSE BLD-MCNC: 75 MG/DL (ref 65–99)
GLUCOSE UR STRIP-MCNC: NEGATIVE MG/DL
HCT VFR BLD AUTO: 35.3 % (ref 34–46.6)
HCT VFR BLD AUTO: 40.9 % (ref 34–46.6)
HGB BLD-MCNC: 11.8 G/DL (ref 12–15.9)
HGB BLD-MCNC: 13.6 G/DL (ref 12–15.9)
HGB UR QL STRIP.AUTO: NEGATIVE
INR PPP: 1.07 (ref 0.9–1.1)
INR PPP: 1.11 (ref 0.9–1.1)
KETONES UR QL STRIP: NEGATIVE
LEUKOCYTE ESTERASE UR QL STRIP.AUTO: NEGATIVE
LIPASE SERPL-CCNC: 20 U/L (ref 22–51)
LYMPHOCYTES # BLD AUTO: 3.4 10*3/MM3 (ref 0.7–3.1)
LYMPHOCYTES NFR BLD AUTO: 48.7 % (ref 19.6–45.3)
MCH RBC QN AUTO: 31.8 PG (ref 26.6–33)
MCH RBC QN AUTO: 32.1 PG (ref 26.6–33)
MCHC RBC AUTO-ENTMCNC: 33.2 G/DL (ref 31.5–35.7)
MCHC RBC AUTO-ENTMCNC: 33.5 G/DL (ref 31.5–35.7)
MCV RBC AUTO: 95.1 FL (ref 79–97)
MCV RBC AUTO: 96.6 FL (ref 79–97)
MONOCYTES # BLD AUTO: 0.8 10*3/MM3 (ref 0.1–0.9)
MONOCYTES NFR BLD AUTO: 11.1 % (ref 5–12)
NEUTROPHILS # BLD AUTO: 2.2 10*3/MM3 (ref 1.7–7)
NEUTROPHILS NFR BLD AUTO: 31.7 % (ref 42.7–76)
NITRITE UR QL STRIP: NEGATIVE
NRBC BLD AUTO-RTO: 0.1 /100 WBC (ref 0–0.2)
PH UR STRIP.AUTO: 6.5 [PH] (ref 5–8)
PLATELET # BLD AUTO: 319 10*3/MM3 (ref 140–450)
PLATELET # BLD AUTO: 352 10*3/MM3 (ref 140–450)
PMV BLD AUTO: 8.1 FL (ref 6–12)
PMV BLD AUTO: 8.8 FL (ref 6–12)
POTASSIUM BLD-SCNC: 3.5 MMOL/L (ref 3.6–5.1)
POTASSIUM BLD-SCNC: 3.9 MMOL/L (ref 3.6–5.1)
PROT SERPL-MCNC: 5.8 G/DL (ref 6.1–7.9)
PROT UR QL STRIP: NEGATIVE
PROTHROMBIN TIME: 10.9 SECONDS (ref 9.6–11.7)
PROTHROMBIN TIME: 11.2 SECONDS (ref 9.6–11.7)
RBC # BLD AUTO: 3.72 10*6/MM3 (ref 3.77–5.28)
RBC # BLD AUTO: 4.23 10*6/MM3 (ref 3.77–5.28)
SODIUM BLD-SCNC: 136 MMOL/L (ref 136–144)
SP GR UR STRIP: 1.01 (ref 1–1.03)
UROBILINOGEN UR QL STRIP: NORMAL
WBC NRBC COR # BLD: 6.1 10*3/MM3 (ref 3.4–10.8)
WBC NRBC COR # BLD: 7 10*3/MM3 (ref 3.4–10.8)

## 2019-08-15 PROCEDURE — 96376 TX/PRO/DX INJ SAME DRUG ADON: CPT

## 2019-08-15 PROCEDURE — 93971 EXTREMITY STUDY: CPT

## 2019-08-15 PROCEDURE — 85025 COMPLETE CBC W/AUTO DIFF WBC: CPT | Performed by: EMERGENCY MEDICINE

## 2019-08-15 PROCEDURE — G0378 HOSPITAL OBSERVATION PER HR: HCPCS

## 2019-08-15 PROCEDURE — 80053 COMPREHEN METABOLIC PANEL: CPT | Performed by: EMERGENCY MEDICINE

## 2019-08-15 PROCEDURE — 96372 THER/PROPH/DIAG INJ SC/IM: CPT

## 2019-08-15 PROCEDURE — 85027 COMPLETE CBC AUTOMATED: CPT | Performed by: INTERNAL MEDICINE

## 2019-08-15 PROCEDURE — 83690 ASSAY OF LIPASE: CPT | Performed by: EMERGENCY MEDICINE

## 2019-08-15 PROCEDURE — 84132 ASSAY OF SERUM POTASSIUM: CPT | Performed by: INTERNAL MEDICINE

## 2019-08-15 PROCEDURE — 85610 PROTHROMBIN TIME: CPT | Performed by: EMERGENCY MEDICINE

## 2019-08-15 PROCEDURE — 85610 PROTHROMBIN TIME: CPT | Performed by: INTERNAL MEDICINE

## 2019-08-15 PROCEDURE — 85379 FIBRIN DEGRADATION QUANT: CPT | Performed by: EMERGENCY MEDICINE

## 2019-08-15 PROCEDURE — 99284 EMERGENCY DEPT VISIT MOD MDM: CPT

## 2019-08-15 PROCEDURE — 96375 TX/PRO/DX INJ NEW DRUG ADDON: CPT

## 2019-08-15 PROCEDURE — 99220 PR INITIAL OBSERVATION CARE/DAY 70 MINUTES: CPT | Performed by: NURSE PRACTITIONER

## 2019-08-15 PROCEDURE — 25010000002 ONDANSETRON PER 1 MG: Performed by: EMERGENCY MEDICINE

## 2019-08-15 PROCEDURE — 74176 CT ABD & PELVIS W/O CONTRAST: CPT

## 2019-08-15 PROCEDURE — 25010000002 HYDROMORPHONE PER 4 MG: Performed by: EMERGENCY MEDICINE

## 2019-08-15 PROCEDURE — 81025 URINE PREGNANCY TEST: CPT | Performed by: EMERGENCY MEDICINE

## 2019-08-15 PROCEDURE — 25010000002 CEFTRIAXONE PER 250 MG: Performed by: EMERGENCY MEDICINE

## 2019-08-15 PROCEDURE — 96365 THER/PROPH/DIAG IV INF INIT: CPT

## 2019-08-15 PROCEDURE — 25010000002 ENOXAPARIN PER 10 MG: Performed by: EMERGENCY MEDICINE

## 2019-08-15 PROCEDURE — 85730 THROMBOPLASTIN TIME PARTIAL: CPT | Performed by: EMERGENCY MEDICINE

## 2019-08-15 PROCEDURE — 96366 THER/PROPH/DIAG IV INF ADDON: CPT

## 2019-08-15 PROCEDURE — 81003 URINALYSIS AUTO W/O SCOPE: CPT | Performed by: EMERGENCY MEDICINE

## 2019-08-15 RX ORDER — HYDROMORPHONE HCL 110MG/55ML
0.5 PATIENT CONTROLLED ANALGESIA SYRINGE INTRAVENOUS ONCE
Status: COMPLETED | OUTPATIENT
Start: 2019-08-15 | End: 2019-08-15

## 2019-08-15 RX ORDER — POTASSIUM CHLORIDE 1.5 G/1.77G
40 POWDER, FOR SOLUTION ORAL AS NEEDED
Status: DISCONTINUED | OUTPATIENT
Start: 2019-08-15 | End: 2019-08-16 | Stop reason: HOSPADM

## 2019-08-15 RX ORDER — BUSPIRONE HYDROCHLORIDE 10 MG/1
5 TABLET ORAL 2 TIMES DAILY
Status: DISCONTINUED | OUTPATIENT
Start: 2019-08-15 | End: 2019-08-16 | Stop reason: HOSPADM

## 2019-08-15 RX ORDER — ONDANSETRON 4 MG/1
8 TABLET, FILM COATED ORAL EVERY 6 HOURS PRN
Status: DISCONTINUED | OUTPATIENT
Start: 2019-08-15 | End: 2019-08-16 | Stop reason: HOSPADM

## 2019-08-15 RX ORDER — ONDANSETRON 2 MG/ML
4 INJECTION INTRAMUSCULAR; INTRAVENOUS ONCE
Status: COMPLETED | OUTPATIENT
Start: 2019-08-15 | End: 2019-08-15

## 2019-08-15 RX ORDER — SODIUM CHLORIDE 0.9 % (FLUSH) 0.9 %
3 SYRINGE (ML) INJECTION EVERY 12 HOURS SCHEDULED
Status: DISCONTINUED | OUTPATIENT
Start: 2019-08-15 | End: 2019-08-16 | Stop reason: HOSPADM

## 2019-08-15 RX ORDER — CYCLOBENZAPRINE HCL 10 MG
5 TABLET ORAL 3 TIMES DAILY PRN
Status: DISCONTINUED | OUTPATIENT
Start: 2019-08-15 | End: 2019-08-16 | Stop reason: HOSPADM

## 2019-08-15 RX ORDER — BUMETANIDE 1 MG/1
2 TABLET ORAL DAILY
Status: DISCONTINUED | OUTPATIENT
Start: 2019-08-15 | End: 2019-08-16 | Stop reason: HOSPADM

## 2019-08-15 RX ORDER — ACETAMINOPHEN 325 MG/1
650 TABLET ORAL EVERY 6 HOURS PRN
Status: DISCONTINUED | OUTPATIENT
Start: 2019-08-15 | End: 2019-08-16 | Stop reason: HOSPADM

## 2019-08-15 RX ORDER — BUSPIRONE HYDROCHLORIDE 5 MG/1
5 TABLET ORAL 2 TIMES DAILY
COMMUNITY
End: 2020-03-22

## 2019-08-15 RX ORDER — POTASSIUM CHLORIDE 20 MEQ/1
40 TABLET, EXTENDED RELEASE ORAL AS NEEDED
Status: DISCONTINUED | OUTPATIENT
Start: 2019-08-15 | End: 2019-08-16 | Stop reason: HOSPADM

## 2019-08-15 RX ORDER — MAGNESIUM SULFATE 1 G/100ML
1 INJECTION INTRAVENOUS AS NEEDED
Status: DISCONTINUED | OUTPATIENT
Start: 2019-08-15 | End: 2019-08-16 | Stop reason: HOSPADM

## 2019-08-15 RX ORDER — OXYCODONE HCL 10 MG/1
10 TABLET, FILM COATED, EXTENDED RELEASE ORAL EVERY 6 HOURS
Status: DISCONTINUED | OUTPATIENT
Start: 2019-08-15 | End: 2019-08-15

## 2019-08-15 RX ORDER — LEVOTHYROXINE SODIUM 0.2 MG/1
200 TABLET ORAL
Status: DISCONTINUED | OUTPATIENT
Start: 2019-08-15 | End: 2019-08-16 | Stop reason: HOSPADM

## 2019-08-15 RX ORDER — LACTULOSE 10 G/15ML
20 SOLUTION ORAL 3 TIMES DAILY
Status: DISCONTINUED | OUTPATIENT
Start: 2019-08-15 | End: 2019-08-16 | Stop reason: HOSPADM

## 2019-08-15 RX ORDER — LACTULOSE 10 G/15ML
20 SOLUTION ORAL 2 TIMES DAILY PRN
COMMUNITY
End: 2020-09-16

## 2019-08-15 RX ORDER — OXYCODONE HYDROCHLORIDE 5 MG/1
10 TABLET ORAL EVERY 6 HOURS PRN
Status: DISCONTINUED | OUTPATIENT
Start: 2019-08-15 | End: 2019-08-16 | Stop reason: HOSPADM

## 2019-08-15 RX ORDER — SPIRONOLACTONE 100 MG/1
200 TABLET, FILM COATED ORAL DAILY
Status: DISCONTINUED | OUTPATIENT
Start: 2019-08-15 | End: 2019-08-16 | Stop reason: HOSPADM

## 2019-08-15 RX ORDER — MAGNESIUM SULFATE HEPTAHYDRATE 40 MG/ML
2 INJECTION, SOLUTION INTRAVENOUS AS NEEDED
Status: DISCONTINUED | OUTPATIENT
Start: 2019-08-15 | End: 2019-08-16 | Stop reason: HOSPADM

## 2019-08-15 RX ORDER — SODIUM CHLORIDE 0.9 % (FLUSH) 0.9 %
3-10 SYRINGE (ML) INJECTION AS NEEDED
Status: DISCONTINUED | OUTPATIENT
Start: 2019-08-15 | End: 2019-08-16 | Stop reason: HOSPADM

## 2019-08-15 RX ADMIN — ONDANSETRON 8 MG: 4 TABLET, FILM COATED ORAL at 20:21

## 2019-08-15 RX ADMIN — POTASSIUM CHLORIDE 40 MEQ: 20 TABLET, EXTENDED RELEASE ORAL at 10:43

## 2019-08-15 RX ADMIN — BUSPIRONE HYDROCHLORIDE 5 MG: 10 TABLET ORAL at 08:23

## 2019-08-15 RX ADMIN — OXYCODONE HYDROCHLORIDE 10 MG: 5 TABLET ORAL at 05:11

## 2019-08-15 RX ADMIN — OXYCODONE HYDROCHLORIDE 10 MG: 5 TABLET ORAL at 17:02

## 2019-08-15 RX ADMIN — OXYCODONE HYDROCHLORIDE 10 MG: 5 TABLET ORAL at 11:03

## 2019-08-15 RX ADMIN — Medication 10 ML: at 08:24

## 2019-08-15 RX ADMIN — ACETAMINOPHEN 650 MG: 325 TABLET ORAL at 09:13

## 2019-08-15 RX ADMIN — LACTULOSE 20 G: 10 SOLUTION ORAL at 16:36

## 2019-08-15 RX ADMIN — LEVOTHYROXINE SODIUM 200 MCG: 200 TABLET ORAL at 05:57

## 2019-08-15 RX ADMIN — RIFAXIMIN 550 MG: 550 TABLET ORAL at 08:22

## 2019-08-15 RX ADMIN — Medication 3 ML: at 20:21

## 2019-08-15 RX ADMIN — LACTULOSE 20 G: 10 SOLUTION ORAL at 08:22

## 2019-08-15 RX ADMIN — SPIRONOLACTONE 200 MG: 100 TABLET, FILM COATED ORAL at 08:23

## 2019-08-15 RX ADMIN — ENOXAPARIN SODIUM 60 MG: 60 INJECTION SUBCUTANEOUS at 03:05

## 2019-08-15 RX ADMIN — BUMETANIDE 2 MG: 1 TABLET ORAL at 08:23

## 2019-08-15 RX ADMIN — OXYCODONE HYDROCHLORIDE 10 MG: 5 TABLET ORAL at 23:19

## 2019-08-15 RX ADMIN — CEFTRIAXONE SODIUM 2 G: 10 INJECTION, POWDER, FOR SOLUTION INTRAVENOUS at 03:05

## 2019-08-15 RX ADMIN — BUSPIRONE HYDROCHLORIDE 5 MG: 10 TABLET ORAL at 20:20

## 2019-08-15 RX ADMIN — ROPINIROLE 1.5 MG: 1 TABLET, FILM COATED ORAL at 20:20

## 2019-08-15 RX ADMIN — ONDANSETRON 4 MG: 2 INJECTION INTRAMUSCULAR; INTRAVENOUS at 01:01

## 2019-08-15 RX ADMIN — RIFAXIMIN 550 MG: 550 TABLET ORAL at 20:21

## 2019-08-15 RX ADMIN — POTASSIUM CHLORIDE 40 MEQ: 20 TABLET, EXTENDED RELEASE ORAL at 08:30

## 2019-08-15 RX ADMIN — CYCLOBENZAPRINE HYDROCHLORIDE 5 MG: 10 TABLET, FILM COATED ORAL at 18:39

## 2019-08-15 RX ADMIN — HYDROMORPHONE HYDROCHLORIDE 0.5 MG: 2 INJECTION, SOLUTION INTRAMUSCULAR; INTRAVENOUS; SUBCUTANEOUS at 01:02

## 2019-08-15 RX ADMIN — HYDROMORPHONE HYDROCHLORIDE 0.5 MG: 2 INJECTION, SOLUTION INTRAMUSCULAR; INTRAVENOUS; SUBCUTANEOUS at 02:24

## 2019-08-15 NOTE — PLAN OF CARE
Problem: Patient Care Overview  Goal: Interprofessional Rounds/Family Conf  Outcome: Ongoing (interventions implemented as appropriate)         middle

## 2019-08-15 NOTE — PLAN OF CARE
Problem: Patient Care Overview  Goal: Plan of Care Review  Outcome: Ongoing (interventions implemented as appropriate)   08/15/19 1532   Coping/Psychosocial   Plan of Care Reviewed With patient   OTHER   Outcome Summary Patient has two superficial DVT on her right leg. Spoke to Dr. Motta and he order warm or cold compression to the right leg. Patient also has a paracentesis tomorrow.      Goal: Individualization and Mutuality  Outcome: Ongoing (interventions implemented as appropriate)   08/15/19 1532   Individualization   Patient Specific Preferences Patient likes BSC beside bed     Goal: Discharge Needs Assessment   08/15/19 1532   Discharge Needs Assessment   Readmission Within the Last 30 Days no previous admission in last 30 days   Concerns to be Addressed no discharge needs identified   Patient/Family Anticipates Transition to home with family   Transportation Concerns car, none   Transportation Anticipated family or friend will provide   Anticipated Changes Related to Illness none   Equipment Needed After Discharge none   Disability   Equipment Currently Used at Home none     Goal: Interprofessional Rounds/Family Conf  Outcome: Ongoing (interventions implemented as appropriate)   08/15/19 1532   Interdisciplinary Rounds/Family Conf   Participants ;nursing;social work/services;pharmacy       Problem: Pain, Chronic (Adult)  Goal: Identify Related Risk Factors and Signs and Symptoms  Outcome: Ongoing (interventions implemented as appropriate)   08/15/19 1532   Pain, Chronic (Adult)   Related Risk Factors (Chronic Pain) disease process;pain control inadequate   Signs and Symptoms (Chronic Pain) fear of reinjury;verbalization of pain descriptors     Goal: Acceptable Pain/Comfort Level and Functional Ability  Outcome: Ongoing (interventions implemented as appropriate)   08/15/19 1532   Pain, Chronic (Adult)   Acceptable Pain/Comfort Level and Functional Ability making progress toward outcome

## 2019-08-15 NOTE — PROGRESS NOTES
Discharge Planning Assessment   [facility]     Patient Name: Lucinda Cope  MRN: [id number]  Today's Date: 8/15/2019    Admit Date: 8/15/2019    Discharge Needs Assessment     Row Name 08/15/19 1701       Living Environment    Lives With  child(arely), adult;child(arely), dependent    Name(s) of Who Lives With Patient  26, 20 and 15 yo at home.    Current Living Arrangements  home/apartment/condo    Primary Care Provided by  self    Able to Return to Prior Arrangements  yes    Living Arrangement Comments  Lives in [facility]. Pt's PCP is in [facility].       Resource/Environmental Concerns    Resource/Environmental Concerns  none       Transition Planning    Patient/Family Anticipates Transition to  home with family       Discharge Needs Assessment    Readmission Within the Last 30 Days  current reason for admission unrelated to previous admission    Equipment Currently Used at Home  none    Anticipated Changes Related to Illness  none    Equipment Needed After Discharge  none    Discharge Coordination/Progress  Return home with family, possible anticoagulant need at DC.        Discharge Plan     Row Name 08/15/19 8681       Plan    Plan  Return home with family, possible anticoagulant need at DC.                           Beto Dunaway RN

## 2019-08-15 NOTE — H&P
"[facility] HOSPITALIST     Melissa Iniguez APRN    CHIEF COMPLAINT:     Right leg pain with swelling.  Abdominal pain    HISTORY OF PRESENT ILLNESS:    Ms. 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  She is followed by  liver transplant team.     She reports to the ER tonight with complaints of abdominal pain and right leg pain and swelling.  She also reports intermittent leg cramping over the past several days.  Initial work up in the ER showed potassium 3.5, Ca 8.2, Alk Phos 117, ALT 31, AST 33, Albumin 2.8, Lipase 20. Dimer 2.84.  INR 1.11.  WBC 7.0, Hgb 11.8, PLT's 319.  Urinalysis bland.  CT of the abdomen and pelvis showed \"The patient has an umbilical hernia that is filled with fluid. The hernia sac measures about 8 cm across and the fluid is related to the patient's moderate ascites. The hernia sac contains a small loop of small bowel that was not present on the prior study. There is no obstruction or acute abnormality of the bowel at this site. Cirrhotic liver with a moderate amount of simple appearing ascites.\"    She received a weight based dose of Lovenox in the ER.    GI: [facility]     Past Medical History:   Diagnosis Date   • Abdominal pain    • Anemia    • Cancer (CMS/HCC)    • Clot    • Deep venous thrombosis of right profunda femoris vein (CMS/HCC) 8/15/2019   • Depression    • Disease of thyroid gland    • Empyema of pleura (CMS/HCC)    • GERD (gastroesophageal reflux disease)    • Heartburn    • Hemorrhoids    • Hernia, ventral    • History of malignant neoplasm of thyroid    • History of transfusion    • Irregular menstrual cycle    • Migraine    • Parathyroid " abnormality (CMS/HCC)    • Radiation    • Restless legs syndrome (RLS)    • Urge and stress incontinence    • Varicose veins      Past Surgical History:   Procedure Laterality Date   • ABDOMINAL SURGERY     • CHOLECYSTECTOMY      Laparoscopic   • ENDOSCOPY N/A 3/17/2016    Procedure: ESOPHAGOGASTRODUODENOSCOPY WITH BALOON DILATATION 18-20MM WITH GASTRIC SLEEVE SEALANT;  Surgeon: Leonardo Ortiz Jr., MD;  Location: [facility] ENDOSCOPY;  Service:    • ENDOSCOPY N/A 3/14/2016    Procedure: esophagogastroduodenoscopy with fluoroscopy;  Surgeon: Greg Avila III, MD;  Location: [facility] ENDOSCOPY;  Service:    • ENDOSCOPY N/A 4/15/2016    Procedure: EGD WITH DILITATION AND STENT PLACEMENT dilation of pylorus ;  Surgeon: Leonardo Ortiz Jr., MD;  Location: [facility] ENDOSCOPY;  Service:    • ENDOSCOPY N/A 5/13/2016    Procedure: ESOPHAGOGASTRODUODENOSCOPY  W/ STENT;  Surgeon: Leonardo Ortiz Jr., MD;  Location: [facility] ENDOSCOPY;  Service:    • GASTRECTOMY      Gastric Surgery for Morbid Obesity Laparoscopic Longitudinal Gastrectomy   • GASTRIC SLEEVE LAPAROSCOPIC     • HEMORRHOIDECTOMY     • HERNIA REPAIR     • LAPAROSCOPY REPAIR HIATAL HERNIA     • LARYNGOSCOPY      Direct Laryngoscopy with Injection into Vocal Cords   • OTHER SURGICAL HISTORY Bilateral     Ear Pressure Equalization Tube, Insertion, Bilaterally   • PERIPHERALLY INSERTED CENTRAL CATHETER INSERTION     • SPLENECTOMY     • THYROID SURGERY     • TUBAL ABDOMINAL LIGATION       Family History   Problem Relation Age of Onset   • Stroke Father    • Diabetes Father         Diabetes Mellitus   • Hypertension Father    • Cancer Other      Social History     Tobacco Use   • Smoking status: Never Smoker   • Smokeless tobacco: Never Used   Substance Use Topics   • Alcohol use: No   • Drug use: No     Medications Prior to Admission   Medication Sig Dispense Refill Last Dose   • bumetanide (BUMEX) 2 MG tablet Take 2 mg by mouth Daily.   8/14/2019 at 0800   •  "busPIRone (BUSPAR) 5 MG tablet Take 5 mg by mouth 2 (Two) Times a Day.   8/14/2019 at 2000   • lactulose (CHRONULAC) 10 GM/15ML solution Take 20 g by mouth 2 (Two) Times a Day As Needed.      • levothyroxine (SYNTHROID, LEVOTHROID) 200 MCG tablet Take 200 mcg by mouth Daily.   8/14/2019 at 0800   • ondansetron (ZOFRAN) 4 MG tablet Take 8 mg by mouth Every 6 (Six) Hours As Needed for Nausea or Vomiting.   Past Month at Unknown time   • oxyCODONE (OXY-IR) 5 MG capsule Take 10 mg by mouth Every 6 (Six) Hours As Needed for Moderate Pain .   8/14/2019 at 1900   • rifaximin (XIFAXAN) 550 MG tablet Take 550 mg by mouth Every 12 (Twelve) Hours.   8/14/2019 at 2000   • rOPINIRole (REQUIP) 1 MG tablet Take 1.5 mg by mouth every night.   8/14/2019 at 2000   • spironolactone (ALDACTONE) 100 MG tablet Take 200 mg by mouth Daily.   8/14/2019 at 0800     Allergies:  Contrast dye; Iodinated diagnostic agents; Iodine; Morphine; Nsaids; and Hydrocodone      There is no immunization history on file for this patient.        REVIEW OF SYSTEMS:     Review of Systems   Cardiovascular: Positive for claudication and leg swelling.   Skin:        Erythema   Gastrointestinal: Positive for abdominal pain.   All other systems reviewed and are negative.      Vital Signs  Temp:  [98.5 °F (36.9 °C)] 98.5 °F (36.9 °C)  Heart Rate:  [88-98] 88  Resp:  [18] 18  BP: (125-129)/(57-92) 127/77    Flowsheet Rows      First Filed Value   Admission Height  160 cm (63\") Documented at 08/15/2019 0003   Admission Weight  61.5 kg (135 lb 9.3 oz) Documented at 08/15/2019 0003           Physical Exam:    Physical Exam   Constitutional: She is oriented to person, place, and time. She appears well-developed and well-nourished.   HENT:   Head: Normocephalic and atraumatic.   Eyes: EOM are normal. Pupils are equal, round, and reactive to light.   Neck: Normal range of motion.   Cardiovascular: Normal rate and regular rhythm.   Pulmonary/Chest: Effort normal and breath " sounds normal.   Abdominal: Soft.   Large umbilical hernia   Musculoskeletal: Normal range of motion.   Right leg edema    Neurological: She is alert and oriented to person, place, and time.   Skin: Skin is warm and dry.   Erythema to right leg         Results Review:    I reviewed the patient's new clinical results.  Lab Results (most recent)     Procedure Component Value Units Date/Time    Comprehensive Metabolic Panel [[id number]]  (Abnormal) Collected:  08/15/19 0133    Specimen:  Blood Updated:  08/15/19 0209     Glucose 75 mg/dL      BUN 9 mg/dL      Creatinine 0.60 mg/dL      Sodium 136 mmol/L      Potassium 3.5 mmol/L      Chloride 105 mmol/L      CO2 21.0 mmol/L      Calcium 8.2 mg/dL      Total Protein 5.8 g/dL      Albumin 2.80 g/dL      ALT (SGPT) 31 U/L      AST (SGOT) 33 U/L      Alkaline Phosphatase 117 U/L      Total Bilirubin 1.1 mg/dL      eGFR Non African Amer 108 mL/min/1.73      Globulin 3.0 gm/dL      A/G Ratio 0.9 g/dL      BUN/Creatinine Ratio 15.0     Anion Gap 13.5 mmol/L     Lipase [[id number]]  (Abnormal) Collected:  08/15/19 0133    Specimen:  Blood Updated:  08/15/19 0209     Lipase 20 U/L     Protime-INR [[id number]]  (Abnormal) Collected:  08/15/19 0130    Specimen:  Blood Updated:  08/15/19 0153     Protime 11.2 Seconds      INR 1.11    aPTT [[id number]]  (Normal) Collected:  08/15/19 0130    Specimen:  Blood Updated:  08/15/19 0153     PTT 25.9 seconds     D-dimer, Quantitative [[id number]]  (Abnormal) Collected:  08/15/19 0130    Specimen:  Blood Updated:  08/15/19 0153     D-Dimer, Quantitative 2.84 MCGFEU/mL     Narrative:       Reference Range  --------------------------------------------------------------------     < 0.50   Negative Predictive Value  0.50-0.59   Indeterminate    >= 0.60   Probable VTE             A very low percentage of patients with DVT may yield D-Dimer results   below the cut-off of 0.50 MCGFEU/mL.  This is known to be more   prevalent in patients with distal  DVT.             Results of this test should always be interpreted in conjunction with   the patient's medical history, clinical presentation and other   findings.  Clinical diagnosis should not be based on the result of   INNOVANCE D-Dimer alone.    Urinalysis With Microscopic If Indicated (No Culture) - Urine, Clean Catch [[id number]]  (Normal) Collected:  08/15/19 0131    Specimen:  Urine, Clean Catch Updated:  08/15/19 0142     Color, UA Yellow     Appearance, UA Clear     pH, UA 6.5     Specific Gravity, UA 1.015     Glucose, UA Negative     Ketones, UA Negative     Bilirubin, UA Negative     Blood, UA Negative     Protein, UA Negative     Leuk Esterase, UA Negative     Nitrite, UA Negative     Urobilinogen, UA 1.0 E.U./dL    Narrative:       Urine microscopic not indicated.    Pregnancy, Urine - Urine, Clean Catch [[id number]]  (Normal) Collected:  08/15/19 0131    Specimen:  Urine, Clean Catch Updated:  08/15/19 0142     HCG, Urine QL Negative    CBC & Differential [[id number]] Collected:  08/15/19 0130    Specimen:  Blood Updated:  08/15/19 0141    Narrative:       The following orders were created for panel order CBC & Differential.  Procedure                               Abnormality         Status                     ---------                               -----------         ------                     CBC Auto Differential[[id number]]        Abnormal            Final result                 Please view results for these tests on the individual orders.    CBC Auto Differential [[id number]]  (Abnormal) Collected:  08/15/19 0130    Specimen:  Blood Updated:  08/15/19 0141     WBC 7.00 10*3/mm3      RBC 3.72 10*6/mm3      Hemoglobin 11.8 g/dL      Hematocrit 35.3 %      MCV 95.1 fL      MCH 31.8 pg      MCHC 33.5 g/dL      RDW 15.3 %      RDW-SD 51.2 fl      MPV 8.1 fL      Platelets 319 10*3/mm3      Neutrophil % 31.7 %      Lymphocyte % 48.7 %      Monocyte % 11.1 %      Eosinophil % 7.1 %      Basophil % 1.4 %       Neutrophils, Absolute 2.20 10*3/mm3      Lymphocytes, Absolute 3.40 10*3/mm3      Monocytes, Absolute 0.80 10*3/mm3      Eosinophils, Absolute 0.50 10*3/mm3      Basophils, Absolute 0.10 10*3/mm3      nRBC 0.1 /100 WBC           Imaging Results (most recent)     Procedure Component Value Units Date/Time    CT Abdomen Pelvis Without Contrast [[id number]] Collected:  08/15/19 0021     Updated:  08/15/19 0233    Narrative:       EXAMINATION:  CT SCAN OF THE ABDOMEN AND PELVIS WITHOUT INTRAVENOUS CONTRAST    DATE OF EXAM: 8/15/2019 2:02 AM     HISTORY: Abdominal pain      COMPARISON: 7/26/2019    TECHNIQUE: CT examination of the abdomen and pelvis with sagittal and coronal reformations was performed without intravenous contrast.  CT dose lowering techniques were used, to include: automated exposure control, adjustment for patient size, and/or use   of iterative reconstruction.    Note: The exam is limited because some types of pathology may not be adequately demonstrated due to lack of contrast enhancement.       FINDINGS:    ABDOMEN/PELVIS:    Lower Chest:  Lung bases are clear. Inferior mediastinum is normal.    Liver: The liver is cirrhotic. No focal abnormality.    Gallbladder/Biliary: Surgically absent.    Pancreas: Normal.    Spleen: Normal.     Adrenal Glands: Normal.     Kidneys: Normal.    GI Tract: There is no bowel wall thickening or obstruction. There is a small segment of small bowel bowel protruding into the umbilical hernia which is also filled with fluid. This bowel is not obstructed in appearance    Mesentery/Peritoneum: There is a moderate amount of simple appearing ascites that appears similar to the patient's prior study.    Vasculature: Normal.     Lymph Nodes: Normal.     Abdominal Wall: Umbilical hernia that is filled with fluid and contains a small loop of small bowel.    Bladder: Normal.     Reproductive: Normal.     Musculoskeletal: Normal.        Impression:         1.  The patient has an  umbilical hernia that is filled with fluid. The hernia sac measures about 8 cm across and the fluid is related to the patient's moderate ascites. The hernia sac contains a small loop of small bowel that was not present on the prior   study. There is no obstruction or acute abnormality of the bowel at this site.  2.  Cirrhotic liver with a moderate amount of simple appearing ascites.    Electronically signed by:  Mateusz Marshall M.D.    8/15/2019 12:31 AM        reviewed    ECG/EMG Results (most recent)     None        reviewed              CT Abdomen Pelvis Without Contrast  Narrative: EXAMINATION:  CT SCAN OF THE ABDOMEN AND PELVIS WITHOUT INTRAVENOUS CONTRAST    DATE OF EXAM: 8/15/2019 2:02 AM     HISTORY: Abdominal pain      COMPARISON: 7/26/2019    TECHNIQUE: CT examination of the abdomen and pelvis with sagittal and coronal reformations was performed without intravenous contrast.  CT dose lowering techniques were used, to include: automated exposure control, adjustment for patient size, and/or use   of iterative reconstruction.    Note: The exam is limited because some types of pathology may not be adequately demonstrated due to lack of contrast enhancement.       FINDINGS:    ABDOMEN/PELVIS:    Lower Chest:  Lung bases are clear. Inferior mediastinum is normal.    Liver: The liver is cirrhotic. No focal abnormality.    Gallbladder/Biliary: Surgically absent.    Pancreas: Normal.    Spleen: Normal.     Adrenal Glands: Normal.     Kidneys: Normal.    GI Tract: There is no bowel wall thickening or obstruction. There is a small segment of small bowel bowel protruding into the umbilical hernia which is also filled with fluid. This bowel is not obstructed in appearance    Mesentery/Peritoneum: There is a moderate amount of simple appearing ascites that appears similar to the patient's prior study.    Vasculature: Normal.     Lymph Nodes: Normal.     Abdominal Wall: Umbilical hernia that is filled with fluid and  contains a small loop of small bowel.    Bladder: Normal.     Reproductive: Normal.     Musculoskeletal: Normal.  Impression: 1.  The patient has an umbilical hernia that is filled with fluid. The hernia sac measures about 8 cm across and the fluid is related to the patient's moderate ascites. The hernia sac contains a small loop of small bowel that was not present on the prior   study. There is no obstruction or acute abnormality of the bowel at this site.  2.  Cirrhotic liver with a moderate amount of simple appearing ascites.    Electronically signed by:  Mateusz Marshall M.D.    8/15/2019 12:31 AM      Assessment/Plan       Deep venous thrombosis of right profunda femoris vein (CMS/HCC)    Abdominal pain      Plan    Right leg DVT  -Received weight based dose of Lovenox in the ER.  History of PE and DVT.  -Doppler of the right leg in the am  -Continue anticoagulation if clinically indicated    Abdominal pain with history of CARTER/cirrhosis   -She reports that her abdominal pain is not worse than normal.  CT scan of the abdomen showed her hernia with moderate ascites.  She reports a paracentesis last in March.  She received a dose of Rocephin in the ER.  -No clinical indication for additional antibiotics   -INSPECT verified, continue Oxycodone  -Continue Aldactone, Bumex, and Xifaxan  -Continue Lacutlose    Hypokalemia  -Potassium 3.5, replace per protocol  -Check Mag and replace per protocol     Chronic depression/anxiety  -Continue Buspar    Hypothyroidism s/p thyroid cancer and thyroidectomy  -Continue Levothyroxine     DVT Prophylaxis  -Received weight based dose of Lovenox in the ER.  Will not need additional Lovenox for 24 hours.     Disposition     Observation     I discussed the patients findings and my recommendations with patient.    Caron Burleson-Robert, GRIFFIN  08/15/19  5:29 AM

## 2019-08-15 NOTE — ED PROVIDER NOTES
Subjective   Pleasant 43-year-old female who suffers from chronic abdominal pain with chronic ventral and umbilical hernias planes of decreased pain at her ventral hernias this evening, no vomiting or diarrhea, normal bowel movement today no dysuria.  Patient also reports history of DVT in the past, currently on no blood thinners with right leg pain and swelling this evening.  Patient has chronic intermittent chest pain but none currently, no shortness of air.            Review of Systems   Cardiovascular: Positive for chest pain.   Gastrointestinal: Positive for abdominal pain.   Musculoskeletal:        As per HPI   Skin: Positive for rash.   All other systems reviewed and are negative.      Past Medical History:   Diagnosis Date   • Abdominal pain    • Anemia    • Cancer (CMS/HCC)    • Clot    • Depression    • Disease of thyroid gland    • Empyema of pleura (CMS/HCC)    • GERD (gastroesophageal reflux disease)    • Heartburn    • Hemorrhoids    • Hernia, ventral    • History of malignant neoplasm of thyroid    • History of transfusion    • Irregular menstrual cycle    • Migraine    • Parathyroid abnormality (CMS/HCC)    • Radiation    • Restless legs syndrome (RLS)    • Urge and stress incontinence    • Varicose veins        Allergies   Allergen Reactions   • Contrast Dye Hives   • Iodinated Diagnostic Agents    • Iodine Hives   • Morphine Itching   • Nsaids Other (See Comments)     States I am not suppose to take them   • Hydrocodone Rash     Lortab elixir per pt       Past Surgical History:   Procedure Laterality Date   • ABDOMINAL SURGERY     • CHOLECYSTECTOMY      Laparoscopic   • ENDOSCOPY N/A 3/17/2016    Procedure: ESOPHAGOGASTRODUODENOSCOPY WITH BALOON DILATATION 18-20MM WITH GASTRIC SLEEVE SEALANT;  Surgeon: Leonardo Ortiz Jr., MD;  Location: [facility] ENDOSCOPY;  Service:    • ENDOSCOPY N/A 3/14/2016    Procedure: esophagogastroduodenoscopy with fluoroscopy;  Surgeon: Greg Avila III, MD;   Location: [facility] ENDOSCOPY;  Service:    • ENDOSCOPY N/A 4/15/2016    Procedure: EGD WITH DILITATION AND STENT PLACEMENT dilation of pylorus ;  Surgeon: Leonardo Ortiz Jr., MD;  Location: [facility] ENDOSCOPY;  Service:    • ENDOSCOPY N/A 5/13/2016    Procedure: ESOPHAGOGASTRODUODENOSCOPY  W/ STENT;  Surgeon: Leonardo Ortiz Jr., MD;  Location: [facility] ENDOSCOPY;  Service:    • GASTRECTOMY      Gastric Surgery for Morbid Obesity Laparoscopic Longitudinal Gastrectomy   • GASTRIC SLEEVE LAPAROSCOPIC     • HEMORRHOIDECTOMY     • HERNIA REPAIR     • LAPAROSCOPY REPAIR HIATAL HERNIA     • LARYNGOSCOPY      Direct Laryngoscopy with Injection into Vocal Cords   • OTHER SURGICAL HISTORY Bilateral     Ear Pressure Equalization Tube, Insertion, Bilaterally   • PERIPHERALLY INSERTED CENTRAL CATHETER INSERTION     • SPLENECTOMY     • THYROID SURGERY     • TUBAL ABDOMINAL LIGATION         Family History   Problem Relation Age of Onset   • Stroke Father    • Diabetes Father         Diabetes Mellitus   • Hypertension Father    • Cancer Other        Social History     Socioeconomic History   • Marital status: Single     Spouse name: Not on file   • Number of children: Not on file   • Years of education: Not on file   • Highest education level: Not on file   Tobacco Use   • Smoking status: Never Smoker   • Smokeless tobacco: Never Used   Substance and Sexual Activity   • Alcohol use: No   • Drug use: No   • Sexual activity: Defer           Objective   Physical Exam   Constitutional: She is oriented to person, place, and time.   Thin female in no acute distress   HENT:   Head: Normocephalic and atraumatic.   Mouth/Throat: Oropharynx is clear and moist.   Eyes: Conjunctivae are normal. Pupils are equal, round, and reactive to light.   Neck: Normal range of motion. Neck supple.   Cardiovascular: Normal rate, regular rhythm, normal heart sounds and intact distal pulses.   Pulmonary/Chest: Effort normal and breath sounds normal.    Abdominal:   Mildly protuberant abdomen with ventral hernia, nontender, umbilical hernia, nontender, bowel sounds present   Musculoskeletal:   Right lower extremity with 2 patches of erythema right medial knee and right medial calf approximately 5 cm in diameter, no induration, mildly tender, appear consistent with superficial thrombophlebitis.  Negative Homans.   Neurological: She is alert and oriented to person, place, and time.   Motor and sensation intact   Skin: Skin is warm and dry. Capillary refill takes less than 2 seconds.   Psychiatric: She has a normal mood and affect. Her behavior is normal.       Procedures           ED Course                  MDM  Number of Diagnoses or Management Options  Abdominal pain, unspecified abdominal location:   Other ascites:   Phlebitis:   Diagnosis management comments: Results for orders placed or performed during the hospital encounter of 08/15/19  -Comprehensive Metabolic Panel       Result                      Value             Ref Range           Glucose                     75                65 - 99 mg/dL       BUN                         9                 8 - 20 mg/dL        Creatinine                  0.60              0.40 - 1.00 *       Sodium                      136               136 - 144 mm*       Potassium                   3.5 (L)           3.6 - 5.1 mm*       Chloride                    105               101 - 111 mm*       CO2                         21.0 (L)          22.0 - 32.0 *       Calcium                     8.2 (L)           8.9 - 10.3 m*       Total Protein               5.8 (L)           6.1 - 7.9 g/*       Albumin                     2.80 (L)          3.50 - 4.80 *       ALT (SGPT)                  31                14 - 54 U/L         AST (SGOT)                  33                15 - 41 U/L         Alkaline Phosphatase        117 (H)           32 - 91 U/L         Total Bilirubin             1.1               0.3 - 1.2 mg*       eGFR Non   Amer       108               >60 mL/min/1*       Globulin                    3.0               2.5 - 3.8 gm*       A/G Ratio                   0.9 (L)           1.0 - 1.7 g/*       BUN/Creatinine Ratio        15.0              5.4 - 26.2          Anion Gap                   13.5              5.0 - 15.0 m*  -Protime-INR       Result                      Value             Ref Range           Protime                     11.2              9.6 - 11.7 S*       INR                         1.11 (H)          0.90 - 1.10    -aPTT       Result                      Value             Ref Range           PTT                         25.9              24.0 - 31.0 *  -Urinalysis With Microscopic If Indicated (No Culture) - Urine, Clean Catch       Result                      Value             Ref Range           Color, UA                   Yellow            Yellow, Straw       Appearance, UA              Clear             Clear               pH, UA                      6.5               5.0 - 8.0           [place], UA        1.015             1.005 - 1.030       Glucose, UA                 Negative          Negative            Ketones, UA                 Negative          Negative            Bilirubin, UA               Negative          Negative            Blood, UA                   Negative          Negative            Protein, UA                 Negative          Negative            Leuk Esterase, UA           Negative          Negative            Nitrite, UA                 Negative          Negative            Urobilinogen, UA            1.0 E.U./dL       0.2 - 1.0 E.*  -Lipase       Result                      Value             Ref Range           Lipase                      20 (L)            22 - 51 U/L    -Pregnancy, Urine - Urine, Clean Catch       Result                      Value             Ref Range           HCG, Urine QL               Negative          Negative       -D-dimer, Quantitative        Result                      Value             Ref Range           D-Dimer, Quantitative       2.84 (H)          0.17 - 0.59 *  -CBC Auto Differential       Result                      Value             Ref Range           WBC                         7.00              3.40 - 10.80*       RBC                         3.72 (L)          3.77 - 5.28 *       Hemoglobin                  11.8 (L)          12.0 - 15.9 *       Hematocrit                  35.3              34.0 - 46.6 %       MCV                         95.1              79.0 - 97.0 *       MCH                         31.8              26.6 - 33.0 *       MCHC                        33.5              31.5 - 35.7 *       RDW                         15.3              12.3 - 15.4 %       RDW-SD                      51.2              37.0 - 54.0 *       MPV                         8.1               6.0 - 12.0 fL       Platelets                   319               140 - 450 10*       Neutrophil %                31.7 (L)          42.7 - 76.0 %       Lymphocyte %                48.7 (H)          19.6 - 45.3 %       Monocyte %                  11.1              5.0 - 12.0 %        Eosinophil %                7.1 (H)           0.3 - 6.2 %         Basophil %                  1.4               0.0 - 1.5 %         Neutrophils, Absolute       2.20              1.70 - 7.00 *       Lymphocytes, Absolute       3.40 (H)          0.70 - 3.10 *       Monocytes, Absolute         0.80              0.10 - 0.90 *       Eosinophils, Absolute       0.50 (H)          0.00 - 0.40 *       Basophils, Absolute         0.10              0.00 - 0.20 *       nRBC                        0.1               0.0 - 0.2 /1*  Ct Abdomen Pelvis Without Contrast    Result Date: 8/15/2019  1.  The patient has an umbilical hernia that is filled with fluid. The hernia sac measures about 8 cm across and the fluid is related to the patient's moderate ascites. The hernia sac contains a small loop of small  bowel that was not present on the prior  study. There is no obstruction or acute abnormality of the bowel at this site. 2.  Cirrhotic liver with a moderate amount of simple appearing ascites. Electronically signed by:  Mateusz Marshall M.D.  8/15/2019 12:31 AM    She will need admission for right lower extremity ultrasound in the morning there was felt most likely to be superficial thrombophlebitis, patient will be covered for possible SBP though the majority of her pain seems to be related to her hernia.       Amount and/or Complexity of Data Reviewed  Clinical lab tests: reviewed  Tests in the radiology section of CPT®: reviewed          Final diagnoses:   Abdominal pain, unspecified abdominal location   Other ascites   Phlebitis            Leonardo Encinas MD  08/15/19 0250

## 2019-08-16 ENCOUNTER — ON CAMPUS - OUTPATIENT (AMBULATORY)
Dept: URBAN - METROPOLITAN AREA HOSPITAL 85 | Facility: HOSPITAL | Age: 44
End: 2019-08-16
Payer: COMMERCIAL

## 2019-08-16 VITALS
TEMPERATURE: 98.6 F | WEIGHT: 132.72 LBS | HEART RATE: 91 BPM | OXYGEN SATURATION: 95 % | SYSTOLIC BLOOD PRESSURE: 117 MMHG | DIASTOLIC BLOOD PRESSURE: 78 MMHG | RESPIRATION RATE: 18 BRPM | BODY MASS INDEX: 23.52 KG/M2 | HEIGHT: 63 IN

## 2019-08-16 DIAGNOSIS — R10.9 UNSPECIFIED ABDOMINAL PAIN: ICD-10-CM

## 2019-08-16 DIAGNOSIS — K75.81 NONALCOHOLIC STEATOHEPATITIS (NASH): ICD-10-CM

## 2019-08-16 DIAGNOSIS — R11.2 NAUSEA WITH VOMITING, UNSPECIFIED: ICD-10-CM

## 2019-08-16 DIAGNOSIS — Z98.84 BARIATRIC SURGERY STATUS: ICD-10-CM

## 2019-08-16 DIAGNOSIS — R18.8 OTHER ASCITES: ICD-10-CM

## 2019-08-16 LAB
ALBUMIN SERPL-MCNC: 2.7 G/DL (ref 3.5–4.8)
ALBUMIN/GLOB SERPL: 0.8 G/DL (ref 1–1.7)
ALP SERPL-CCNC: 136 U/L (ref 32–91)
ALT SERPL W P-5'-P-CCNC: 28 U/L (ref 14–54)
ANION GAP SERPL CALCULATED.3IONS-SCNC: 12.8 MMOL/L (ref 5–15)
AST SERPL-CCNC: 30 U/L (ref 15–41)
BASOPHILS # BLD AUTO: 0.1 10*3/MM3 (ref 0–0.2)
BASOPHILS NFR BLD AUTO: 1.2 % (ref 0–1.5)
BILIRUB SERPL-MCNC: 1.6 MG/DL (ref 0.3–1.2)
BUN BLD-MCNC: 8 MG/DL (ref 8–20)
BUN/CREAT SERPL: 11.4 (ref 5.4–26.2)
CALCIUM SPEC-SCNC: 8.6 MG/DL (ref 8.9–10.3)
CHLORIDE SERPL-SCNC: 103 MMOL/L (ref 101–111)
CO2 SERPL-SCNC: 24 MMOL/L (ref 22–32)
CREAT BLD-MCNC: 0.7 MG/DL (ref 0.4–1)
DEPRECATED RDW RBC AUTO: 50.8 FL (ref 37–54)
EOSINOPHIL # BLD AUTO: 0.4 10*3/MM3 (ref 0–0.4)
EOSINOPHIL NFR BLD AUTO: 6.6 % (ref 0.3–6.2)
ERYTHROCYTE [DISTWIDTH] IN BLOOD BY AUTOMATED COUNT: 15.2 % (ref 12.3–15.4)
GFR SERPL CREATININE-BSD FRML MDRD: 90 ML/MIN/1.73
GLOBULIN UR ELPH-MCNC: 3.3 GM/DL (ref 2.5–3.8)
GLUCOSE BLD-MCNC: 90 MG/DL (ref 65–99)
HCT VFR BLD AUTO: 38.7 % (ref 34–46.6)
HGB BLD-MCNC: 13 G/DL (ref 12–15.9)
LYMPHOCYTES # BLD AUTO: 2.2 10*3/MM3 (ref 0.7–3.1)
LYMPHOCYTES NFR BLD AUTO: 32.4 % (ref 19.6–45.3)
MCH RBC QN AUTO: 32 PG (ref 26.6–33)
MCHC RBC AUTO-ENTMCNC: 33.6 G/DL (ref 31.5–35.7)
MCV RBC AUTO: 95.1 FL (ref 79–97)
MONOCYTES # BLD AUTO: 0.9 10*3/MM3 (ref 0.1–0.9)
MONOCYTES NFR BLD AUTO: 13.2 % (ref 5–12)
NEUTROPHILS # BLD AUTO: 3.1 10*3/MM3 (ref 1.7–7)
NEUTROPHILS NFR BLD AUTO: 46.6 % (ref 42.7–76)
NRBC BLD AUTO-RTO: 0.1 /100 WBC (ref 0–0.2)
PLATELET # BLD AUTO: 347 10*3/MM3 (ref 140–450)
PMV BLD AUTO: 9.3 FL (ref 6–12)
POTASSIUM BLD-SCNC: 3.8 MMOL/L (ref 3.6–5.1)
PROT SERPL-MCNC: 6 G/DL (ref 6.1–7.9)
RBC # BLD AUTO: 4.07 10*6/MM3 (ref 3.77–5.28)
SODIUM BLD-SCNC: 136 MMOL/L (ref 136–144)
WBC NRBC COR # BLD: 6.7 10*3/MM3 (ref 3.4–10.8)

## 2019-08-16 PROCEDURE — 99243 OFF/OP CNSLTJ NEW/EST LOW 30: CPT | Performed by: NURSE PRACTITIONER

## 2019-08-16 PROCEDURE — G0378 HOSPITAL OBSERVATION PER HR: HCPCS

## 2019-08-16 PROCEDURE — 85025 COMPLETE CBC W/AUTO DIFF WBC: CPT | Performed by: INTERNAL MEDICINE

## 2019-08-16 PROCEDURE — 25010000002 CEFTRIAXONE PER 250 MG: Performed by: INTERNAL MEDICINE

## 2019-08-16 PROCEDURE — 80053 COMPREHEN METABOLIC PANEL: CPT | Performed by: INTERNAL MEDICINE

## 2019-08-16 PROCEDURE — 99217 PR OBSERVATION CARE DISCHARGE MANAGEMENT: CPT | Performed by: INTERNAL MEDICINE

## 2019-08-16 RX ORDER — CYCLOBENZAPRINE HCL 5 MG
5 TABLET ORAL 3 TIMES DAILY PRN
Qty: 30 TABLET | Refills: 0 | Status: SHIPPED | OUTPATIENT
Start: 2019-08-16 | End: 2019-08-26

## 2019-08-16 RX ADMIN — OXYCODONE HYDROCHLORIDE 10 MG: 5 TABLET ORAL at 05:58

## 2019-08-16 RX ADMIN — CEFTRIAXONE SODIUM 1 G: 1 INJECTION, POWDER, FOR SOLUTION INTRAMUSCULAR; INTRAVENOUS at 02:55

## 2019-08-16 RX ADMIN — RIFAXIMIN 550 MG: 550 TABLET ORAL at 10:37

## 2019-08-16 RX ADMIN — LEVOTHYROXINE SODIUM 200 MCG: 200 TABLET ORAL at 05:58

## 2019-08-16 RX ADMIN — BUSPIRONE HYDROCHLORIDE 5 MG: 10 TABLET ORAL at 08:44

## 2019-08-16 RX ADMIN — LACTULOSE 20 G: 10 SOLUTION ORAL at 08:43

## 2019-08-16 RX ADMIN — OXYCODONE HYDROCHLORIDE 10 MG: 5 TABLET ORAL at 12:08

## 2019-08-16 RX ADMIN — CYCLOBENZAPRINE HYDROCHLORIDE 5 MG: 10 TABLET, FILM COATED ORAL at 10:37

## 2019-08-16 RX ADMIN — Medication 3 ML: at 08:44

## 2019-08-16 RX ADMIN — BUMETANIDE 2 MG: 1 TABLET ORAL at 08:43

## 2019-08-16 RX ADMIN — SPIRONOLACTONE 200 MG: 100 TABLET, FILM COATED ORAL at 08:44

## 2019-08-16 NOTE — DISCHARGE SUMMARY
Date of Admission: 8/15/2019    Date of Discharge:  8/16/2019    Length of stay:  LOS: 0 days     Presenting Problem/History of Present Illness  Active Hospital Problems    Diagnosis  POA   • **Deep venous thrombosis of right profunda femoris vein (CMS/HCC) [I82.411]  Unknown   • Abdominal pain [R10.9]  Yes      Resolved Hospital Problems   No resolved problems to display.          Hospital Course  Patient is a 43 y.o. female presented with right teeth pain in addition to worsening chronic abdominal pain.  Work-up shows evidence of superficial acute right lower extremity superficial thrombophlebitis noted in the varicosity (above knee) and varicosity (below knee)  Patient seems to be comfortable now and her symptoms have improved in the right lower extremity.  Abdominal pain is been evaluated by GI service as patient refused further evaluation for paracentesis and will be signed off on her case given that she is been stable.  She may follow-up on outpatient basis as well.  Patient reports no active history of cancer at this time and had previous history of thyroid cancer.  She is advised to follow-up with her ENT physician.  She did not have any family history of colon cancer and has not had a colonoscopy in the past.  She is following with GI service for cirrhosis and has regular follow-up plan.  She has had regular mammograms with her primary care physician as well seems to be okay.        Past Medical History:     Past Medical History:   Diagnosis Date   • Abdominal pain    • Anemia    • Cancer (CMS/HCC)    • Clot    • Deep venous thrombosis of right profunda femoris vein (CMS/HCC) 8/15/2019   • Depression    • Disease of thyroid gland    • Empyema of pleura (CMS/HCC)    • GERD (gastroesophageal reflux disease)    • Heartburn    • Hemorrhoids    • Hernia, ventral    • History of malignant neoplasm of thyroid    • History of transfusion    • Irregular menstrual cycle    • Migraine    • Parathyroid abnormality  (CMS/HCC)    • Radiation    • Restless legs syndrome (RLS)    • Urge and stress incontinence    • Varicose veins        Past Surgical History:     Past Surgical History:   Procedure Laterality Date   • ABDOMINAL SURGERY     • CHOLECYSTECTOMY      Laparoscopic   • ENDOSCOPY N/A 3/17/2016    Procedure: ESOPHAGOGASTRODUODENOSCOPY WITH BALOON DILATATION 18-20MM WITH GASTRIC SLEEVE SEALANT;  Surgeon: Leonardo Ortiz Jr., MD;  Location: [facility] ENDOSCOPY;  Service:    • ENDOSCOPY N/A 3/14/2016    Procedure: esophagogastroduodenoscopy with fluoroscopy;  Surgeon: Greg Avila III, MD;  Location: [facility] ENDOSCOPY;  Service:    • ENDOSCOPY N/A 4/15/2016    Procedure: EGD WITH DILITATION AND STENT PLACEMENT dilation of pylorus ;  Surgeon: Leonardo Ortiz Jr., MD;  Location: [facility] ENDOSCOPY;  Service:    • ENDOSCOPY N/A 5/13/2016    Procedure: ESOPHAGOGASTRODUODENOSCOPY  W/ STENT;  Surgeon: Leonardo Ortiz Jr., MD;  Location: [facility] ENDOSCOPY;  Service:    • GASTRECTOMY      Gastric Surgery for Morbid Obesity Laparoscopic Longitudinal Gastrectomy   • GASTRIC SLEEVE LAPAROSCOPIC     • HEMORRHOIDECTOMY     • HERNIA REPAIR     • LAPAROSCOPY REPAIR HIATAL HERNIA     • LARYNGOSCOPY      Direct Laryngoscopy with Injection into Vocal Cords   • OTHER SURGICAL HISTORY Bilateral     Ear Pressure Equalization Tube, Insertion, Bilaterally   • PERIPHERALLY INSERTED CENTRAL CATHETER INSERTION     • SPLENECTOMY     • THYROID SURGERY     • TUBAL ABDOMINAL LIGATION         Social History:   Social History     Socioeconomic History   • Marital status: Single     Spouse name: Not on file   • Number of children: Not on file   • Years of education: Not on file   • Highest education level: Not on file   Tobacco Use   • Smoking status: Never Smoker   • Smokeless tobacco: Never Used   Substance and Sexual Activity   • Alcohol use: No   • Drug use: No   • Sexual activity: Defer       Procedures Performed         Consults:   Consults      Date and Time Order Name Status Description    8/16/2019 0810 Inpatient Gastroenterology Consult Completed     8/15/2019 0248 Inpatient Hospitalist Consult Completed     7/26/2019 3082 Hospitalist (on-call MD unless specified) Completed           Pertinent Test Results:     I reviewed the patient's new clinical results.    Results from last 7 days   Lab Units 08/16/19  0401 08/15/19  1331 08/15/19  0130   WBC 10*3/mm3 6.70 6.10 7.00   HEMOGLOBIN g/dL 13.0 13.6 11.8*   HEMATOCRIT % 38.7 40.9 35.3   PLATELETS 10*3/mm3 347 352 319     Results from last 7 days   Lab Units 08/16/19  0401 08/15/19  1331 08/15/19  0133   SODIUM mmol/L 136  --  136   POTASSIUM mmol/L 3.8 3.9 3.5*   CHLORIDE mmol/L 103  --  105   CO2 mmol/L 24.0  --  21.0*   BUN mg/dL 8  --  9   CREATININE mg/dL 0.70  --  0.60   GLUCOSE mg/dL 90  --  75   CALCIUM mg/dL 8.6*  --  8.2*     Results from last 7 days   Lab Units 08/16/19  0401 08/15/19  1331 08/15/19  0133   SODIUM mmol/L 136  --  136   POTASSIUM mmol/L 3.8 3.9 3.5*   CHLORIDE mmol/L 103  --  105   CO2 mmol/L 24.0  --  21.0*   BUN mg/dL 8  --  9   CREATININE mg/dL 0.70  --  0.60   CALCIUM mg/dL 8.6*  --  8.2*   BILIRUBIN mg/dL 1.6*  --  1.1   ALK PHOS U/L 136*  --  117*   ALT (SGPT) U/L 28  --  31   AST (SGOT) U/L 30  --  33   GLUCOSE mg/dL 90  --  75         Lab Results   Component Value Date    CALCIUM 8.6 (L) 08/16/2019    PHOS 4.8 (H) 07/28/2019     No results found for: HGBA1C          Microbiology Results (last 10 days)     ** No results found for the last 240 hours. **          ECG/EMG Results (most recent)     None          Results for orders placed during the hospital encounter of 08/15/19   Duplex Venous Lower Extremity - Right    Narrative · Acute right lower extremity superficial thrombophlebitis noted in the   varicosity (above knee) and varicosity (below knee).  · All other right sided veins appeared normal.               Ct Abdomen Pelvis Without Contrast    Result Date:  8/15/2019  1.  The patient has an umbilical hernia that is filled with fluid. The hernia sac measures about 8 cm across and the fluid is related to the patient's moderate ascites. The hernia sac contains a small loop of small bowel that was not present on the prior  study. There is no obstruction or acute abnormality of the bowel at this site. 2.  Cirrhotic liver with a moderate amount of simple appearing ascites. Electronically signed by:  Mateusz Marshall M.D.  8/15/2019 12:31 AM      Xrays, labs reviewed personally by physician.      Condition on Discharge:    Stable    Vital Signs  Temp:  [98.6 °F (37 °C)-98.7 °F (37.1 °C)] 98.6 °F (37 °C)  Heart Rate:  [80-91] 91  Resp:  [18-19] 18  BP: (111-117)/(73-78) 117/78    Physical Exam:  Physical Exam  Decreasing redness and tenderness right medial thigh in the upper leg over the inflamed course of venous thrombophlebitis.  Adequate pulsation bilateral dorsalis pedis.    Discharge Diagnosis:     Deep venous thrombosis of right profunda femoris vein (CMS/HCC)    Abdominal pain      Discharge Disposition  Home or Self Care       Discharge Medications      New Medications      Instructions Start Date   cyclobenzaprine 5 MG tablet  Commonly known as:  FLEXERIL   5 mg, Oral, 3 Times Daily PRN         Continue These Medications      Instructions Start Date   bumetanide 2 MG tablet  Commonly known as:  BUMEX   2 mg, Oral, Daily      busPIRone 5 MG tablet  Commonly known as:  BUSPAR   5 mg, Oral, 2 Times Daily      lactulose 10 GM/15ML solution  Commonly known as:  CHRONULAC   20 g, Oral, 2 Times Daily PRN      levothyroxine 200 MCG tablet  Commonly known as:  SYNTHROID, LEVOTHROID   200 mcg, Oral, Daily      ondansetron 4 MG tablet  Commonly known as:  ZOFRAN   8 mg, Oral, Every 6 Hours PRN      oxyCODONE 5 MG capsule  Commonly known as:  OXY-IR   10 mg, Oral, Every 6 Hours PRN      rifaximin 550 MG tablet  Commonly known as:  XIFAXAN   550 mg, Oral, Every 12 Hours  Scheduled      rOPINIRole 1 MG tablet  Commonly known as:  REQUIP   1.5 mg, Oral, Nightly      spironolactone 100 MG tablet  Commonly known as:  ALDACTONE   200 mg, Oral, Daily             Discharge Diet:     Activity at Discharge:     Follow-up Appointments  No future appointments.      Test Results Pending at Discharge       Risk for Readmission (LACE) Score: 2 (8/16/2019  6:00 AM)          Ever Motta MD  08/16/19  12:13 PM    Time: Greater than 30 minutes in arranging for the discharge.

## 2019-08-16 NOTE — CONSULTS
"GI CONSULT  NOTE:    Referring Provider:  Dr. Motta    Chief complaint: Abdominal pain    Subjective   \" Right leg pain with redness for 2 days\"    History of present illness: Patient is a 43-year-old female with a history of cirrhosis, ascites and hepatic encephalopathy status post bariatric surgery.  She had a gastric sleeve followed by Alfonzo-en-Y gastric bypass revision secondary to a leak and abscess from the gastric sleeve.  The Alfonzo-en-Y was complicated by anastomotic ulceration and stricturing requiring NJ tube feeding and serial balloon dilation.  Following that she had ulceration.  EGD in October 2018 showed Alfonzo-en-Y bypass with patent anastomosis and no further ulceration.  She is followed by Dr. Mckeon in our office with last paracentesis in March with minimal fluid removed.  She takes diuretics at home and follows 2 g sodium diet.  She is maintained on lactulose and Xifaxan without confusion.  She has intermittent chronic nausea with vomiting without hematemesis, heartburn or difficulty swallowing.  She has chronic periumbilical hernia pain that is unchanged without increased distention of abdomen.  No fevers or chills.  No constipation, diarrhea, melena or rectal bleeding.  Weight has been stable.  No pedal edema.  She has had a 2-day history of right sided leg pain with cramping and some erythema.  She has a history of PE and DVT but has not been on anticoagulation for some time.  Gallbladder is absent.  She is also followed by [facility] and follows them every 6 months.      Endo History:  10/2018 EGD by Dr. Mckeon shows Alfonzo-en-Y gastric bypass with patent anastomosis and no further ulceration noted    Past Medical History:  Past Medical History:   Diagnosis Date   • Abdominal pain    • Anemia    • Cancer (CMS/HCC)    • Clot    • Deep venous thrombosis of right profunda femoris vein (CMS/HCC) 8/15/2019   • Depression    • Disease of thyroid gland    • Empyema of pleura (CMS/HCC)    • GERD " (gastroesophageal reflux disease)    • Heartburn    • Hemorrhoids    • Hernia, ventral    • History of malignant neoplasm of thyroid    • History of transfusion    • Irregular menstrual cycle    • Migraine    • Parathyroid abnormality (CMS/HCC)    • Radiation    • Restless legs syndrome (RLS)    • Urge and stress incontinence    • Varicose veins        Past Surgical History:  Past Surgical History:   Procedure Laterality Date   • ABDOMINAL SURGERY     • CHOLECYSTECTOMY      Laparoscopic   • ENDOSCOPY N/A 3/17/2016    Procedure: ESOPHAGOGASTRODUODENOSCOPY WITH BALOON DILATATION 18-20MM WITH GASTRIC SLEEVE SEALANT;  Surgeon: Leonardo Ortiz Jr., MD;  Location: [facility] ENDOSCOPY;  Service:    • ENDOSCOPY N/A 3/14/2016    Procedure: esophagogastroduodenoscopy with fluoroscopy;  Surgeon: Greg Avila III, MD;  Location: [facility] ENDOSCOPY;  Service:    • ENDOSCOPY N/A 4/15/2016    Procedure: EGD WITH DILITATION AND STENT PLACEMENT dilation of pylorus ;  Surgeon: Leonardo Ortiz Jr., MD;  Location: [facility] ENDOSCOPY;  Service:    • ENDOSCOPY N/A 5/13/2016    Procedure: ESOPHAGOGASTRODUODENOSCOPY  W/ STENT;  Surgeon: Leonardo Ortiz Jr., MD;  Location: [facility] ENDOSCOPY;  Service:    • GASTRECTOMY      Gastric Surgery for Morbid Obesity Laparoscopic Longitudinal Gastrectomy   • GASTRIC SLEEVE LAPAROSCOPIC     • HEMORRHOIDECTOMY     • HERNIA REPAIR     • LAPAROSCOPY REPAIR HIATAL HERNIA     • LARYNGOSCOPY      Direct Laryngoscopy with Injection into Vocal Cords   • OTHER SURGICAL HISTORY Bilateral     Ear Pressure Equalization Tube, Insertion, Bilaterally   • PERIPHERALLY INSERTED CENTRAL CATHETER INSERTION     • SPLENECTOMY     • THYROID SURGERY     • TUBAL ABDOMINAL LIGATION         Social History:  Social History     Tobacco Use   • Smoking status: Never Smoker   • Smokeless tobacco: Never Used   Substance Use Topics   • Alcohol use: No   • Drug use: No   No alcohol, tobacco or illicit drug use    Family  History:  Family History   Problem Relation Age of Onset   • Stroke Father    • Diabetes Father         Diabetes Mellitus   • Hypertension Father    • Cancer Other    No colon cancer    Medications:  Medications Prior to Admission   Medication Sig Dispense Refill Last Dose   • bumetanide (BUMEX) 2 MG tablet Take 2 mg by mouth Daily.   8/14/2019 at 0800   • busPIRone (BUSPAR) 5 MG tablet Take 5 mg by mouth 2 (Two) Times a Day.   8/14/2019 at 2000   • lactulose (CHRONULAC) 10 GM/15ML solution Take 20 g by mouth 2 (Two) Times a Day As Needed.      • levothyroxine (SYNTHROID, LEVOTHROID) 200 MCG tablet Take 200 mcg by mouth Daily.   8/14/2019 at 0800   • ondansetron (ZOFRAN) 4 MG tablet Take 8 mg by mouth Every 6 (Six) Hours As Needed for Nausea or Vomiting.   Past Month at Unknown time   • oxyCODONE (OXY-IR) 5 MG capsule Take 10 mg by mouth Every 6 (Six) Hours As Needed for Moderate Pain .   8/14/2019 at 1900   • rifaximin (XIFAXAN) 550 MG tablet Take 550 mg by mouth Every 12 (Twelve) Hours.   8/14/2019 at 2000   • rOPINIRole (REQUIP) 1 MG tablet Take 1.5 mg by mouth every night.   8/14/2019 at 2000   • spironolactone (ALDACTONE) 100 MG tablet Take 200 mg by mouth Daily.   8/14/2019 at 0800       Scheduled Meds:  bumetanide 2 mg Oral Daily   busPIRone 5 mg Oral BID   ceftriaxone 1 g Intravenous Q24H   lactulose 20 g Oral TID   levothyroxine 200 mcg Oral Q AM   rifaximin 550 mg Oral Q12H   rOPINIRole 1.5 mg Oral Nightly   sodium chloride 3 mL Intravenous Q12H   spironolactone 200 mg Oral Daily     Continuous Infusions:   PRN Meds:.•  acetaminophen  •  cyclobenzaprine  •  magnesium sulfate **OR** magnesium sulfate in D5W 1g/100mL (PREMIX)  •  ondansetron  •  oxyCODONE  •  potassium chloride  •  potassium chloride  •  sodium chloride    ALLERGIES:  Contrast dye; Iodinated diagnostic agents; Iodine; Morphine; Nsaids; and Hydrocodone    ROS:  Review of Systems   Constitutional: Negative for chills, fever and unexpected  weight change.   HENT: Negative for nosebleeds and trouble swallowing.    Respiratory: Negative for cough and shortness of breath.    Cardiovascular: Negative for chest pain and palpitations.   Gastrointestinal: Positive for abdominal pain, nausea and vomiting. Negative for blood in stool, constipation and diarrhea.   Genitourinary: Negative for difficulty urinating and dysuria.   Musculoskeletal: Negative for gait problem and joint swelling.        Right leg pain   Skin: Negative for color change and rash.   Neurological: Negative for dizziness and syncope.   Psychiatric/Behavioral: Negative for agitation and confusion.       Objective Resting in bed, no acute distress, no family present    Vital Signs:   Temp:  [98.6 °F (37 °C)-98.7 °F (37.1 °C)] 98.6 °F (37 °C)  Heart Rate:  [80-91] 91  Resp:  [18-19] 18  BP: (111-117)/(73-78) 117/78    Physical Exam:      General Appearance:    Awake and alert, in no acute distress   Head:    Normocephalic, without obvious abnormality, atraumatic   Eyes:            Conjunctivae normal, anicteric sclera   Ears:    Ears appear intact with no abnormalities noted   Throat:   No oral lesions, no thrush, oral mucosa moist   Neck:    supple, no JVD   Lungs:     Clear to auscultation bilaterally, respirations regular, even and unlabored    Heart:    Regular rhythm and normal rate, normal S1 and S2   Chest Wall:    No abnormalities observed   Abdomen:     Normal bowel sounds, soft, mild periumbilical hernia tenderness, no rebound or guarding, mild/moderately distended with ascites, nontense, healed surgical scars.  Umbilical hernia easily reduced.     Rectal:     Deferred   Extremities:   Moves all extremities well, no edema, no cyanosis, no             redness   Pulses:   Pulses palpable and equal bilaterally   Skin:   No bleeding, bruising or rash, no jaundice       Neurologic:   Cranial nerves 2 - 12 grossly intact, sensation intact       Results Review:   I reviewed the patient's  labs and imaging.  Lab Results (last 24 hours)     Procedure Component Value Units Date/Time    Comprehensive Metabolic Panel [[id number]]  (Abnormal) Collected:  08/16/19 0401    Specimen:  Blood Updated:  08/16/19 0526     Glucose 90 mg/dL      BUN 8 mg/dL      Creatinine 0.70 mg/dL      Sodium 136 mmol/L      Potassium 3.8 mmol/L      Chloride 103 mmol/L      CO2 24.0 mmol/L      Calcium 8.6 mg/dL      Total Protein 6.0 g/dL      Albumin 2.70 g/dL      ALT (SGPT) 28 U/L      AST (SGOT) 30 U/L      Alkaline Phosphatase 136 U/L      Total Bilirubin 1.6 mg/dL      eGFR Non African Amer 90 mL/min/1.73      Globulin 3.3 gm/dL      A/G Ratio 0.8 g/dL      BUN/Creatinine Ratio 11.4     Anion Gap 12.8 mmol/L     CBC & Differential [[id number]] Collected:  08/16/19 0401    Specimen:  Blood Updated:  08/16/19 0509    Narrative:       The following orders were created for panel order CBC & Differential.  Procedure                               Abnormality         Status                     ---------                               -----------         ------                     CBC Auto Differential[[id number]]        Abnormal            Final result                 Please view results for these tests on the individual orders.    CBC Auto Differential [[id number]]  (Abnormal) Collected:  08/16/19 0401    Specimen:  Blood Updated:  08/16/19 0509     WBC 6.70 10*3/mm3      RBC 4.07 10*6/mm3      Hemoglobin 13.0 g/dL      Hematocrit 38.7 %      MCV 95.1 fL      MCH 32.0 pg      MCHC 33.6 g/dL      RDW 15.2 %      RDW-SD 50.8 fl      MPV 9.3 fL      Platelets 347 10*3/mm3      Neutrophil % 46.6 %      Lymphocyte % 32.4 %      Monocyte % 13.2 %      Eosinophil % 6.6 %      Basophil % 1.2 %      Neutrophils, Absolute 3.10 10*3/mm3      Lymphocytes, Absolute 2.20 10*3/mm3      Monocytes, Absolute 0.90 10*3/mm3      Eosinophils, Absolute 0.40 10*3/mm3      Basophils, Absolute 0.10 10*3/mm3      nRBC 0.1 /100 WBC     Potassium [[id number]]   (Normal) Collected:  08/15/19 1331    Specimen:  Blood Updated:  08/15/19 1417     Potassium 3.9 mmol/L     CBC (No Diff) [[id number]]  (Normal) Collected:  08/15/19 1331    Specimen:  Blood Updated:  08/15/19 1407     WBC 6.10 10*3/mm3      RBC 4.23 10*6/mm3      Hemoglobin 13.6 g/dL      Hematocrit 40.9 %      MCV 96.6 fL      MCH 32.1 pg      MCHC 33.2 g/dL      Comment: Result checked         RDW 15.2 %      RDW-SD 50.8 fl      MPV 8.8 fL      Platelets 352 10*3/mm3     Protime-INR [[id number]]  (Normal) Collected:  08/15/19 1331    Specimen:  Blood Updated:  08/15/19 1359     Protime 10.9 Seconds      INR 1.07          Imaging Results (last 24 hours)     ** No results found for the last 24 hours. **             ASSESSMENT:    Right leg phlebitis  Chronic abdominal pain with intermittent nausea/vomiting - unchanged  CARTER cirrhosis complicated by ascites and hepatic encephalopathy - stable with MELD 7  Umbilical hernia  History of gastric sleeve with revision to Alfonzo-en-Y after leak/abscess complicated by anastomotic ulceration and stricturing, splenectomy  History of DVT/PE/portal vein thrombosis  History of cholecystectomy  History of thyroid cancer     PLAN:  Patient is at baseline with intermittent chronic nausea and vomiting and abdominal pain that is unchanged.  Mild to moderate ascites without increased pain, leukocytosis or fevers to suggest SBP.  Patient is not wanting paracentesis this time she is soft without increased pain.  Diet as tolerated with 2 g sodium restriction encouraged.  Continue diuretics as well as lactulose/Xifaxan.  EGD in October with patent anastomosis, no varices and no evidence of further ulceration.  Ultrasound in March without mass.  She is followed by [facility] every 6 months with MELD 7.  Okay to discharge home from the GI standpoint as the symptoms are chronic and unchanged.  She can follow-up with Dr. Mckeon or [facility] as scheduled.  Discussed with bedside RN.    I discussed  the patients findings and my recommendations with the patient.  Smita Marino, APRN  08/16/19  9:20 AM

## 2019-08-16 NOTE — PLAN OF CARE
Problem: Patient Care Overview  Goal: Plan of Care Review  Outcome: Ongoing (interventions implemented as appropriate)   08/16/19 4014   Coping/Psychosocial   Plan of Care Reviewed With patient   OTHER   Outcome Summary patient has pain in leg, patient stated the warm and cold compresses don't work, Patient is scheduled for a possible paracenteses today

## 2019-08-17 ENCOUNTER — READMISSION MANAGEMENT (OUTPATIENT)
Dept: CALL CENTER | Facility: HOSPITAL | Age: 44
End: 2019-08-17

## 2019-08-17 NOTE — OUTREACH NOTE
Prep Survey      Responses   Facility patient discharged from?  [facility]   Is patient eligible?  Yes   Discharge diagnosis  Deep venous thrombosis of right profunda femoris vein    Does the patient have one of the following disease processes/diagnoses(primary or secondary)?  Other   Does the patient have Home health ordered?  No   Is there a DME ordered?  No   Prep survey completed?  Yes          Monserrat Banda RN

## 2019-08-19 ENCOUNTER — READMISSION MANAGEMENT (OUTPATIENT)
Dept: CALL CENTER | Facility: HOSPITAL | Age: 44
End: 2019-08-19

## 2019-08-19 NOTE — PROGRESS NOTES
Case Management Discharge Note    Final Note: Home with family.                 Final Discharge Disposition Code: 01 - home or self-care

## 2019-08-19 NOTE — OUTREACH NOTE
Medical Week 1 Survey      Responses   Facility patient discharged from?  [facility]   Does the patient have one of the following disease processes/diagnoses(primary or secondary)?  Other   Is there a successful TCM telephone encounter documented?  No   Week 1 attempt successful?  No   Rescheduled  Revoked   Revoke  Decline to participate [No answer/Left voicemail]          Juanis Chang LPN

## 2019-08-23 ENCOUNTER — HOSPITAL ENCOUNTER (OUTPATIENT)
Facility: HOSPITAL | Age: 44
Setting detail: OBSERVATION
Discharge: HOME | End: 2019-08-24
Attending: HOSPITALIST | Admitting: PHYSICIAN ASSISTANT

## 2019-08-23 ENCOUNTER — APPOINTMENT (OUTPATIENT)
Dept: GENERAL RADIOLOGY | Facility: HOSPITAL | Age: 44
End: 2019-08-23

## 2019-08-23 DIAGNOSIS — M79.604 LEG PAIN, INFERIOR, RIGHT: ICD-10-CM

## 2019-08-23 DIAGNOSIS — R06.00 DYSPNEA, UNSPECIFIED TYPE: Primary | ICD-10-CM

## 2019-08-23 DIAGNOSIS — D68.59 HYPERCOAGULABLE STATE (HCC): ICD-10-CM

## 2019-08-23 DIAGNOSIS — R76.0 LUPUS ANTICOAGULANT POSITIVE: ICD-10-CM

## 2019-08-23 DIAGNOSIS — R79.1 ELEVATED FACTOR VIII LEVEL: ICD-10-CM

## 2019-08-23 LAB
ANION GAP SERPL CALCULATED.3IONS-SCNC: 15.2 MMOL/L (ref 5–15)
BUN BLD-MCNC: 6 MG/DL (ref 8–20)
BUN/CREAT SERPL: 10 (ref 5.4–26.2)
CALCIUM SPEC-SCNC: 9.3 MG/DL (ref 8.9–10.3)
CHLORIDE SERPL-SCNC: 104 MMOL/L (ref 101–111)
CO2 SERPL-SCNC: 24 MMOL/L (ref 22–32)
CREAT BLD-MCNC: 0.6 MG/DL (ref 0.4–1)
GFR SERPL CREATININE-BSD FRML MDRD: 108 ML/MIN/1.73
GLUCOSE BLD-MCNC: 96 MG/DL (ref 65–99)
POTASSIUM BLD-SCNC: 3.2 MMOL/L (ref 3.6–5.1)
SODIUM BLD-SCNC: 140 MMOL/L (ref 136–144)
TROPONIN I SERPL-MCNC: <0.03 NG/ML (ref 0–0.03)

## 2019-08-23 PROCEDURE — 85025 COMPLETE CBC W/AUTO DIFF WBC: CPT | Performed by: PHYSICIAN ASSISTANT

## 2019-08-23 PROCEDURE — 71045 X-RAY EXAM CHEST 1 VIEW: CPT

## 2019-08-23 PROCEDURE — 93005 ELECTROCARDIOGRAM TRACING: CPT | Performed by: PHYSICIAN ASSISTANT

## 2019-08-23 PROCEDURE — 85007 BL SMEAR W/DIFF WBC COUNT: CPT | Performed by: PHYSICIAN ASSISTANT

## 2019-08-23 PROCEDURE — 84484 ASSAY OF TROPONIN QUANT: CPT | Performed by: PHYSICIAN ASSISTANT

## 2019-08-23 PROCEDURE — 80048 BASIC METABOLIC PNL TOTAL CA: CPT | Performed by: PHYSICIAN ASSISTANT

## 2019-08-23 PROCEDURE — 99283 EMERGENCY DEPT VISIT LOW MDM: CPT

## 2019-08-23 RX ORDER — SODIUM CHLORIDE 0.9 % (FLUSH) 0.9 %
10 SYRINGE (ML) INJECTION AS NEEDED
Status: DISCONTINUED | OUTPATIENT
Start: 2019-08-23 | End: 2019-08-24 | Stop reason: HOSPADM

## 2019-08-23 RX ORDER — PROCHLORPERAZINE EDISYLATE 5 MG/ML
10 INJECTION INTRAMUSCULAR; INTRAVENOUS ONCE
Status: DISCONTINUED | OUTPATIENT
Start: 2019-08-23 | End: 2019-08-24 | Stop reason: HOSPADM

## 2019-08-23 RX ORDER — DIPHENHYDRAMINE HYDROCHLORIDE 50 MG/ML
50 INJECTION INTRAMUSCULAR; INTRAVENOUS ONCE
Status: DISCONTINUED | OUTPATIENT
Start: 2019-08-23 | End: 2019-08-24 | Stop reason: HOSPADM

## 2019-08-24 ENCOUNTER — HOSPITAL ENCOUNTER (OUTPATIENT)
Dept: CARDIOLOGY | Facility: HOSPITAL | Age: 44
Setting detail: OBSERVATION
Discharge: HOME | End: 2019-08-24

## 2019-08-24 VITALS
TEMPERATURE: 98.6 F | DIASTOLIC BLOOD PRESSURE: 82 MMHG | HEART RATE: 88 BPM | HEIGHT: 63 IN | BODY MASS INDEX: 22.07 KG/M2 | WEIGHT: 124.56 LBS | SYSTOLIC BLOOD PRESSURE: 125 MMHG | RESPIRATION RATE: 20 BRPM | OXYGEN SATURATION: 97 %

## 2019-08-24 PROBLEM — D68.59 HYPERCOAGULABLE STATE (HCC): Status: ACTIVE | Noted: 2019-08-24

## 2019-08-24 PROBLEM — K75.81 STEATOHEPATITIS: Status: ACTIVE | Noted: 2018-03-16

## 2019-08-24 PROBLEM — R79.1 ELEVATED FACTOR VIII LEVEL: Status: ACTIVE | Noted: 2019-08-24

## 2019-08-24 PROBLEM — R06.00 DYSPNEA: Status: RESOLVED | Noted: 2019-08-24 | Resolved: 2019-08-24

## 2019-08-24 PROBLEM — R06.00 DYSPNEA: Status: ACTIVE | Noted: 2019-08-24

## 2019-08-24 PROBLEM — R76.0 LUPUS ANTICOAGULANT POSITIVE: Status: ACTIVE | Noted: 2019-08-24

## 2019-08-24 LAB
ANISOCYTOSIS BLD QL: ABNORMAL
BASOPHILS # BLD MANUAL: 0.1 10*3/MM3 (ref 0–0.2)
BASOPHILS NFR BLD AUTO: 1 % (ref 0–1.5)
BH CV LOW VAS RIGHT GREATER SAPH AK VESSEL: 1
BH CV LOW VAS RIGHT GREATER SAPH BK VESSEL: 1
BH CV LOWER VASCULAR LEFT COMMON FEMORAL AUGMENT: NORMAL
BH CV LOWER VASCULAR LEFT COMMON FEMORAL COMPETENT: NORMAL
BH CV LOWER VASCULAR LEFT COMMON FEMORAL COMPRESS: NORMAL
BH CV LOWER VASCULAR LEFT COMMON FEMORAL PHASIC: NORMAL
BH CV LOWER VASCULAR LEFT COMMON FEMORAL SPONT: NORMAL
BH CV LOWER VASCULAR RIGHT COMMON FEMORAL AUGMENT: NORMAL
BH CV LOWER VASCULAR RIGHT COMMON FEMORAL COMPETENT: NORMAL
BH CV LOWER VASCULAR RIGHT COMMON FEMORAL COMPRESS: NORMAL
BH CV LOWER VASCULAR RIGHT COMMON FEMORAL PHASIC: NORMAL
BH CV LOWER VASCULAR RIGHT COMMON FEMORAL SPONT: NORMAL
BH CV LOWER VASCULAR RIGHT DISTAL FEMORAL COMPRESS: NORMAL
BH CV LOWER VASCULAR RIGHT GASTRONEMIUS COMPRESS: NORMAL
BH CV LOWER VASCULAR RIGHT GREATER SAPH AK COMPRESS: NORMAL
BH CV LOWER VASCULAR RIGHT GREATER SAPH AK THROMBUS: NORMAL
BH CV LOWER VASCULAR RIGHT GREATER SAPH BK COMPRESS: NORMAL
BH CV LOWER VASCULAR RIGHT GREATER SAPH BK THROMBUS: NORMAL
BH CV LOWER VASCULAR RIGHT LESSER SAPH COMPRESS: NORMAL
BH CV LOWER VASCULAR RIGHT MID FEMORAL AUGMENT: NORMAL
BH CV LOWER VASCULAR RIGHT MID FEMORAL COMPETENT: NORMAL
BH CV LOWER VASCULAR RIGHT MID FEMORAL COMPRESS: NORMAL
BH CV LOWER VASCULAR RIGHT MID FEMORAL PHASIC: NORMAL
BH CV LOWER VASCULAR RIGHT MID FEMORAL SPONT: NORMAL
BH CV LOWER VASCULAR RIGHT PERONEAL COMPRESS: NORMAL
BH CV LOWER VASCULAR RIGHT POPLITEAL AUGMENT: NORMAL
BH CV LOWER VASCULAR RIGHT POPLITEAL COMPETENT: NORMAL
BH CV LOWER VASCULAR RIGHT POPLITEAL COMPRESS: NORMAL
BH CV LOWER VASCULAR RIGHT POPLITEAL PHASIC: NORMAL
BH CV LOWER VASCULAR RIGHT POPLITEAL SPONT: NORMAL
BH CV LOWER VASCULAR RIGHT POSTERIOR TIBIAL COMPRESS: NORMAL
BH CV LOWER VASCULAR RIGHT PROXIMAL FEMORAL COMPRESS: NORMAL
BH CV LOWER VASCULAR RIGHT SAPHENOFEMORAL JUNCTION COMPRESS: NORMAL
BURR CELLS BLD QL SMEAR: ABNORMAL
D DIMER PPP FEU-MCNC: 2.84 MCGFEU/ML (ref 0.17–0.59)
DEPRECATED RDW RBC AUTO: 49.4 FL (ref 37–54)
EOSINOPHIL # BLD MANUAL: 0.58 10*3/MM3 (ref 0–0.4)
EOSINOPHIL NFR BLD MANUAL: 6 % (ref 0.3–6.2)
ERYTHROCYTE [DISTWIDTH] IN BLOOD BY AUTOMATED COUNT: 14.7 % (ref 12.3–15.4)
GIANT PLATELETS: ABNORMAL
HCT VFR BLD AUTO: 42.8 % (ref 34–46.6)
HCYS SERPL-MCNC: 6 UMOL/L (ref 0–15)
HGB BLD-MCNC: 14.3 G/DL (ref 12–15.9)
LYMPHOCYTES # BLD MANUAL: 4.37 10*3/MM3 (ref 0.7–3.1)
LYMPHOCYTES NFR BLD MANUAL: 11 % (ref 5–12)
LYMPHOCYTES NFR BLD MANUAL: 45 % (ref 19.6–45.3)
MCH RBC QN AUTO: 31.9 PG (ref 26.6–33)
MCHC RBC AUTO-ENTMCNC: 33.4 G/DL (ref 31.5–35.7)
MCV RBC AUTO: 95.6 FL (ref 79–97)
MONOCYTES # BLD AUTO: 1.07 10*3/MM3 (ref 0.1–0.9)
NEUTROPHILS # BLD AUTO: 3.59 10*3/MM3 (ref 1.7–7)
NEUTROPHILS NFR BLD MANUAL: 35 % (ref 42.7–76)
NEUTS BAND NFR BLD MANUAL: 2 % (ref 0–5)
PLATELET # BLD AUTO: 481 10*3/MM3 (ref 140–450)
PMV BLD AUTO: 9.6 FL (ref 6–12)
POIKILOCYTOSIS BLD QL SMEAR: ABNORMAL
RBC # BLD AUTO: 4.48 10*6/MM3 (ref 3.77–5.28)
SCAN SLIDE: NORMAL
TARGETS BLD QL SMEAR: ABNORMAL
WBC MORPH BLD: NORMAL
WBC NRBC COR # BLD: 9.7 10*3/MM3 (ref 3.4–10.8)

## 2019-08-24 PROCEDURE — 85705 THROMBOPLASTIN INHIBITION: CPT | Performed by: NURSE PRACTITIONER

## 2019-08-24 PROCEDURE — 96372 THER/PROPH/DIAG INJ SC/IM: CPT

## 2019-08-24 PROCEDURE — 25010000002 ENOXAPARIN PER 10 MG: Performed by: NURSE PRACTITIONER

## 2019-08-24 PROCEDURE — 25010000002 HYDROMORPHONE PER 4 MG: Performed by: NURSE PRACTITIONER

## 2019-08-24 PROCEDURE — 83090 ASSAY OF HOMOCYSTEINE: CPT | Performed by: NURSE PRACTITIONER

## 2019-08-24 PROCEDURE — 85732 THROMBOPLASTIN TIME PARTIAL: CPT | Performed by: NURSE PRACTITIONER

## 2019-08-24 PROCEDURE — 85670 THROMBIN TIME PLASMA: CPT | Performed by: NURSE PRACTITIONER

## 2019-08-24 PROCEDURE — G0378 HOSPITAL OBSERVATION PER HR: HCPCS

## 2019-08-24 PROCEDURE — 93971 EXTREMITY STUDY: CPT

## 2019-08-24 PROCEDURE — 85379 FIBRIN DEGRADATION QUANT: CPT | Performed by: NURSE PRACTITIONER

## 2019-08-24 PROCEDURE — 85240 CLOT FACTOR VIII AHG 1 STAGE: CPT | Performed by: NURSE PRACTITIONER

## 2019-08-24 PROCEDURE — 99236 HOSP IP/OBS SAME DATE HI 85: CPT | Performed by: HOSPITALIST

## 2019-08-24 PROCEDURE — 86146 BETA-2 GLYCOPROTEIN ANTIBODY: CPT | Performed by: NURSE PRACTITIONER

## 2019-08-24 PROCEDURE — 99223 1ST HOSP IP/OBS HIGH 75: CPT | Performed by: INTERNAL MEDICINE

## 2019-08-24 PROCEDURE — 85613 RUSSELL VIPER VENOM DILUTED: CPT | Performed by: NURSE PRACTITIONER

## 2019-08-24 RX ORDER — OXYCODONE HYDROCHLORIDE 5 MG/1
10 TABLET ORAL EVERY 6 HOURS PRN
Status: DISCONTINUED | OUTPATIENT
Start: 2019-08-24 | End: 2019-08-24 | Stop reason: HOSPADM

## 2019-08-24 RX ORDER — FAMOTIDINE 20 MG/1
20 TABLET, FILM COATED ORAL
Status: DISCONTINUED | OUTPATIENT
Start: 2019-08-24 | End: 2019-08-24 | Stop reason: HOSPADM

## 2019-08-24 RX ORDER — ACETAMINOPHEN 160 MG/5ML
650 SOLUTION ORAL EVERY 4 HOURS PRN
Status: DISCONTINUED | OUTPATIENT
Start: 2019-08-24 | End: 2019-08-24 | Stop reason: HOSPADM

## 2019-08-24 RX ORDER — ACETAMINOPHEN 325 MG/1
650 TABLET ORAL EVERY 4 HOURS PRN
Status: DISCONTINUED | OUTPATIENT
Start: 2019-08-24 | End: 2019-08-24 | Stop reason: HOSPADM

## 2019-08-24 RX ORDER — CYCLOBENZAPRINE HCL 10 MG
5 TABLET ORAL 3 TIMES DAILY PRN
Status: DISCONTINUED | OUTPATIENT
Start: 2019-08-24 | End: 2019-08-24 | Stop reason: HOSPADM

## 2019-08-24 RX ORDER — ACETAMINOPHEN 650 MG/1
650 SUPPOSITORY RECTAL EVERY 4 HOURS PRN
Status: DISCONTINUED | OUTPATIENT
Start: 2019-08-24 | End: 2019-08-24 | Stop reason: HOSPADM

## 2019-08-24 RX ORDER — DABIGATRAN ETEXILATE 150 MG/1
150 CAPSULE ORAL EVERY 12 HOURS SCHEDULED
Status: DISCONTINUED | OUTPATIENT
Start: 2019-08-24 | End: 2019-08-24 | Stop reason: HOSPADM

## 2019-08-24 RX ORDER — MAGNESIUM SULFATE HEPTAHYDRATE 40 MG/ML
4 INJECTION, SOLUTION INTRAVENOUS AS NEEDED
Status: DISCONTINUED | OUTPATIENT
Start: 2019-08-24 | End: 2019-08-24 | Stop reason: HOSPADM

## 2019-08-24 RX ORDER — SODIUM CHLORIDE 0.9 % (FLUSH) 0.9 %
3-10 SYRINGE (ML) INJECTION AS NEEDED
Status: DISCONTINUED | OUTPATIENT
Start: 2019-08-24 | End: 2019-08-24 | Stop reason: HOSPADM

## 2019-08-24 RX ORDER — LEVOTHYROXINE SODIUM 0.2 MG/1
200 TABLET ORAL
Status: DISCONTINUED | OUTPATIENT
Start: 2019-08-24 | End: 2019-08-24 | Stop reason: HOSPADM

## 2019-08-24 RX ORDER — HYDROMORPHONE HCL 110MG/55ML
PATIENT CONTROLLED ANALGESIA SYRINGE INTRAVENOUS
Status: DISPENSED
Start: 2019-08-24 | End: 2019-08-24

## 2019-08-24 RX ORDER — DABIGATRAN ETEXILATE 150 MG/1
150 CAPSULE ORAL EVERY 12 HOURS SCHEDULED
Qty: 60 CAPSULE | Refills: 0 | Status: SHIPPED | OUTPATIENT
Start: 2019-08-24 | End: 2019-08-27

## 2019-08-24 RX ORDER — BISACODYL 10 MG
10 SUPPOSITORY, RECTAL RECTAL DAILY PRN
Status: DISCONTINUED | OUTPATIENT
Start: 2019-08-24 | End: 2019-08-24 | Stop reason: HOSPADM

## 2019-08-24 RX ORDER — DOCUSATE SODIUM 100 MG/1
100 CAPSULE, LIQUID FILLED ORAL 2 TIMES DAILY PRN
Status: DISCONTINUED | OUTPATIENT
Start: 2019-08-24 | End: 2019-08-24 | Stop reason: HOSPADM

## 2019-08-24 RX ORDER — MAGNESIUM SULFATE HEPTAHYDRATE 40 MG/ML
2 INJECTION, SOLUTION INTRAVENOUS AS NEEDED
Status: DISCONTINUED | OUTPATIENT
Start: 2019-08-24 | End: 2019-08-24 | Stop reason: HOSPADM

## 2019-08-24 RX ORDER — LACTULOSE 10 G/15ML
20 SOLUTION ORAL 2 TIMES DAILY
Status: DISCONTINUED | OUTPATIENT
Start: 2019-08-24 | End: 2019-08-24 | Stop reason: HOSPADM

## 2019-08-24 RX ORDER — BUMETANIDE 1 MG/1
2 TABLET ORAL DAILY
Status: DISCONTINUED | OUTPATIENT
Start: 2019-08-24 | End: 2019-08-24 | Stop reason: HOSPADM

## 2019-08-24 RX ORDER — HYDROMORPHONE HCL 110MG/55ML
1 PATIENT CONTROLLED ANALGESIA SYRINGE INTRAVENOUS ONCE
Status: COMPLETED | OUTPATIENT
Start: 2019-08-24 | End: 2019-08-24

## 2019-08-24 RX ORDER — ALUMINA, MAGNESIA, AND SIMETHICONE 2400; 2400; 240 MG/30ML; MG/30ML; MG/30ML
15 SUSPENSION ORAL EVERY 6 HOURS PRN
Status: DISCONTINUED | OUTPATIENT
Start: 2019-08-24 | End: 2019-08-24 | Stop reason: HOSPADM

## 2019-08-24 RX ORDER — ONDANSETRON 2 MG/ML
4 INJECTION INTRAMUSCULAR; INTRAVENOUS EVERY 6 HOURS PRN
Status: DISCONTINUED | OUTPATIENT
Start: 2019-08-24 | End: 2019-08-24 | Stop reason: HOSPADM

## 2019-08-24 RX ORDER — BUSPIRONE HYDROCHLORIDE 10 MG/1
5 TABLET ORAL 2 TIMES DAILY
Status: DISCONTINUED | OUTPATIENT
Start: 2019-08-24 | End: 2019-08-24 | Stop reason: HOSPADM

## 2019-08-24 RX ORDER — CALCIUM CARBONATE 200(500)MG
2 TABLET,CHEWABLE ORAL 2 TIMES DAILY PRN
Status: DISCONTINUED | OUTPATIENT
Start: 2019-08-24 | End: 2019-08-24 | Stop reason: HOSPADM

## 2019-08-24 RX ORDER — ONDANSETRON 4 MG/1
4 TABLET, FILM COATED ORAL EVERY 6 HOURS PRN
Status: DISCONTINUED | OUTPATIENT
Start: 2019-08-24 | End: 2019-08-24 | Stop reason: HOSPADM

## 2019-08-24 RX ORDER — CHOLECALCIFEROL (VITAMIN D3) 125 MCG
5 CAPSULE ORAL NIGHTLY PRN
Status: DISCONTINUED | OUTPATIENT
Start: 2019-08-24 | End: 2019-08-24 | Stop reason: HOSPADM

## 2019-08-24 RX ORDER — SODIUM CHLORIDE 0.9 % (FLUSH) 0.9 %
3 SYRINGE (ML) INJECTION EVERY 12 HOURS SCHEDULED
Status: DISCONTINUED | OUTPATIENT
Start: 2019-08-24 | End: 2019-08-24 | Stop reason: HOSPADM

## 2019-08-24 RX ORDER — HYDROXYZINE HYDROCHLORIDE 25 MG/1
50 TABLET, FILM COATED ORAL 3 TIMES DAILY PRN
Status: DISCONTINUED | OUTPATIENT
Start: 2019-08-24 | End: 2019-08-24 | Stop reason: HOSPADM

## 2019-08-24 RX ORDER — POTASSIUM CHLORIDE 20 MEQ/1
40 TABLET, EXTENDED RELEASE ORAL AS NEEDED
Status: DISCONTINUED | OUTPATIENT
Start: 2019-08-24 | End: 2019-08-24 | Stop reason: HOSPADM

## 2019-08-24 RX ORDER — LANOLIN ALCOHOL/MO/W.PET/CERES
1000 CREAM (GRAM) TOPICAL DAILY
Status: DISCONTINUED | OUTPATIENT
Start: 2019-08-24 | End: 2019-08-24 | Stop reason: HOSPADM

## 2019-08-24 RX ORDER — POTASSIUM CHLORIDE 1.5 G/1.77G
40 POWDER, FOR SOLUTION ORAL AS NEEDED
Status: DISCONTINUED | OUTPATIENT
Start: 2019-08-24 | End: 2019-08-24 | Stop reason: HOSPADM

## 2019-08-24 RX ORDER — SPIRONOLACTONE 100 MG/1
200 TABLET, FILM COATED ORAL DAILY
Status: DISCONTINUED | OUTPATIENT
Start: 2019-08-24 | End: 2019-08-24 | Stop reason: HOSPADM

## 2019-08-24 RX ADMIN — LEVOTHYROXINE SODIUM 200 MCG: 200 TABLET ORAL at 09:26

## 2019-08-24 RX ADMIN — ENOXAPARIN SODIUM 60 MG: 60 INJECTION SUBCUTANEOUS at 00:27

## 2019-08-24 RX ADMIN — LACTULOSE 20 G: 10 SOLUTION ORAL at 09:27

## 2019-08-24 RX ADMIN — CYCLOBENZAPRINE HYDROCHLORIDE 5 MG: 10 TABLET, FILM COATED ORAL at 11:18

## 2019-08-24 RX ADMIN — POTASSIUM CHLORIDE 40 MEQ: 20 TABLET, EXTENDED RELEASE ORAL at 09:32

## 2019-08-24 RX ADMIN — OXYCODONE HYDROCHLORIDE 10 MG: 5 TABLET ORAL at 06:15

## 2019-08-24 RX ADMIN — BUSPIRONE HYDROCHLORIDE 5 MG: 10 TABLET ORAL at 09:26

## 2019-08-24 RX ADMIN — Medication 3 ML: at 09:32

## 2019-08-24 RX ADMIN — HYDROMORPHONE HYDROCHLORIDE 1 MG: 2 INJECTION, SOLUTION INTRAMUSCULAR; INTRAVENOUS; SUBCUTANEOUS at 00:26

## 2019-08-24 RX ADMIN — OXYCODONE HYDROCHLORIDE 10 MG: 5 TABLET ORAL at 12:17

## 2019-08-24 RX ADMIN — BUMETANIDE 2 MG: 1 TABLET ORAL at 09:26

## 2019-08-24 RX ADMIN — RIFAXIMIN 550 MG: 550 TABLET ORAL at 09:26

## 2019-08-24 RX ADMIN — SPIRONOLACTONE 200 MG: 100 TABLET, FILM COATED ORAL at 09:26

## 2019-08-24 NOTE — H&P
[facility]    Melissa Iniguez APRN    CHIEF COMPLAINT: dyspnea, RLE pain    HISTORY OF PRESENT ILLNESS:    This is a 43 y.o.female with a past medical history of GERD, migraines, sleep apnea, thyroid cancer s/p thyroidectomy with postoperative hypothyroidism, RLS and HTN who presented to the ED on 08/23 with complaints of dyspnea and RLE pain x 2 days.  She states her leg pain has made it difficult to walk.  She denies chest pain, nausea, vomiting and dizziness.  The patient was just admitted here and had a venous duplex of her right leg on 08/15, which showed acute RLE superficial thrombophlebitis.  The patient states her pain has now increased from the original location, radiating up to her groin.    In the ED, the patient's labs included the following: Na 140, K 3.2, BUN 6, Cr 0.6, glucose 96, WBC 9.7, hgb 14.3, plts 481.  Troponin negative.  CXR: negative.  EKG: SR.  The patient was given Dilaudid and Lovenox in the ED.  She was admitted for further evaluation and management.    Family History   Problem Relation Age of Onset   • Stroke Father    • Diabetes Father         Diabetes Mellitus   • Hypertension Father    • Cancer Other        Social History     Tobacco Use   • Smoking status: Never Smoker   • Smokeless tobacco: Never Used   Substance Use Topics   • Alcohol use: No   • Drug use: No       Past Medical History:   Diagnosis Date   • Abdominal pain    • Anemia    • Cancer (CMS/HCC)    • Clot    • Deep venous thrombosis of right profunda femoris vein (CMS/HCC) 8/15/2019   • Depression    • Disease of thyroid gland    • Empyema of pleura (CMS/HCC)    • GERD (gastroesophageal reflux disease)    • Heartburn    • Hemorrhoids    • Hernia, ventral    • History of malignant neoplasm of thyroid    • History of transfusion    • Irregular menstrual cycle    • Migraine    • Parathyroid abnormality (CMS/HCC)    • Radiation    • Restless legs syndrome (RLS)    • Urge and stress incontinence    •  Varicose veins        Past Surgical History:   Procedure Laterality Date   • ABDOMINAL SURGERY     • CHOLECYSTECTOMY      Laparoscopic   • ENDOSCOPY N/A 3/17/2016    Procedure: ESOPHAGOGASTRODUODENOSCOPY WITH BALOON DILATATION 18-20MM WITH GASTRIC SLEEVE SEALANT;  Surgeon: Leonardo Ortiz Jr., MD;  Location: [facility] ENDOSCOPY;  Service:    • ENDOSCOPY N/A 3/14/2016    Procedure: esophagogastroduodenoscopy with fluoroscopy;  Surgeon: Greg Avila III, MD;  Location: [facility] ENDOSCOPY;  Service:    • ENDOSCOPY N/A 4/15/2016    Procedure: EGD WITH DILITATION AND STENT PLACEMENT dilation of pylorus ;  Surgeon: Leonardo Ortiz Jr., MD;  Location: [facility] ENDOSCOPY;  Service:    • ENDOSCOPY N/A 5/13/2016    Procedure: ESOPHAGOGASTRODUODENOSCOPY  W/ STENT;  Surgeon: Leonardo Ortiz Jr., MD;  Location: [facility] ENDOSCOPY;  Service:    • GASTRECTOMY      Gastric Surgery for Morbid Obesity Laparoscopic Longitudinal Gastrectomy   • GASTRIC SLEEVE LAPAROSCOPIC     • HEMORRHOIDECTOMY     • HERNIA REPAIR     • LAPAROSCOPY REPAIR HIATAL HERNIA     • LARYNGOSCOPY      Direct Laryngoscopy with Injection into Vocal Cords   • OTHER SURGICAL HISTORY Bilateral     Ear Pressure Equalization Tube, Insertion, Bilaterally   • PERIPHERALLY INSERTED CENTRAL CATHETER INSERTION     • SPLENECTOMY     • THYROID SURGERY     • TUBAL ABDOMINAL LIGATION           (Not in a hospital admission)      There is no immunization history on file for this patient.    Allergies  Contrast dye; Iodinated diagnostic agents; Iodine; Morphine; Nsaids; and Hydrocodone    Review of Systems   Constitutional: Negative for diaphoresis.   Respiratory: Positive for shortness of breath.    Cardiovascular: Negative for chest pain and palpitations.   Gastrointestinal: Negative for nausea and vomiting.   Musculoskeletal:        RLE pain   Neurological: Negative for dizziness.   All other systems reviewed and are negative.       Vital Signs  Temp:  [97.9 °F (36.6 °C)]  "97.9 °F (36.6 °C)  Heart Rate:  [100] 100  Resp:  [16] 16  BP: (121)/(83) 121/83    Flowsheet Rows      First Filed Value   Admission Height  160 cm (63\") Documented at 08/23/2019 2152   Admission Weight  60 kg (132 lb 4.4 oz) Documented at 08/23/2019 2152           Physical Exam:  Constitutional: Patient appears well-developed and well-nourished.  No acute distress.  Alert and oriented x 3.    Head: Normocephalic and atraumatic.    Eyes:  Pupils are equal, round, and reactive to light. EOM intact. Sclera are anicteric and non-injected.  Mouth and Throat: Moist mucous membranes. Oropharynx is clear of any erythema or exudate.   Neck: Neck supple.  No thyromegaly present.  No lymphadenopathy present.  No masses.    Cardiovascular: Regular rate and rhythm.  Normal S1 and S2.  No murmurs, rubs or gallops.     Pulmonary/Chest: Clear to auscultation bilaterally.  No wheezes, rhonchi or rales.      Abdomen /Gastrointestinal: Normal bowel sounds.  No organomegaly or masses.  Soft, non-tender, non-distended.  Musculoskeletal: Strength equal bilaterally.  Normal joints and ROM.  No deformities noted.   Extremities: RLE pain on palpation over the medial thigh and calf, .  Moves all extremities well.  No edema, clubbing or cyanosis.    Neurological: Cranial nerves II-XII are grossly intact with no focal deficits. Sensori-motor exam is normal. No cerebellar signs.   Skin: Warm and dry. No rash or bruising noted. No erythema.     Results Review:      Lab Results (most recent)     Procedure Component Value Units Date/Time    CBC & Differential [[id number]] Collected:  08/23/19 2315    Specimen:  Blood Updated:  08/24/19 0016    Narrative:       The following orders were created for panel order CBC & Differential.  Procedure                               Abnormality         Status                     ---------                               -----------         ------                     CBC Auto Differential[[id number]]        " Abnormal            Final result                 Please view results for these tests on the individual orders.    CBC Auto Differential [[id number]]  (Abnormal) Collected:  08/23/19 2315    Specimen:  Blood Updated:  08/24/19 0016     WBC 9.70 10*3/mm3      RBC 4.48 10*6/mm3      Hemoglobin 14.3 g/dL      Hematocrit 42.8 %      MCV 95.6 fL      MCH 31.9 pg      MCHC 33.4 g/dL      RDW 14.7 %      RDW-SD 49.4 fl      MPV 9.6 fL      Platelets 481 10*3/mm3      Comment: Result checked        Narrative:       The previously reported component NRBC is no longer being reported.    Scan Slide [[id number]] Collected:  08/23/19 2315    Specimen:  Blood Updated:  08/24/19 0016     Scan Slide --     Comment: See Manual Differential Results       Manual Differential [[id number]]  (Abnormal) Collected:  08/23/19 2315    Specimen:  Blood Updated:  08/24/19 0016     Neutrophil % 35.0 %      Lymphocyte % 45.0 %      Monocyte % 11.0 %      Eosinophil % 6.0 %      Basophil % 1.0 %      Bands %  2.0 %      Neutrophils Absolute 3.59 10*3/mm3      Lymphocytes Absolute 4.37 10*3/mm3      Monocytes Absolute 1.07 10*3/mm3      Eosinophils Absolute 0.58 10*3/mm3      Basophils Absolute 0.10 10*3/mm3      Anisocytosis Slight/1+     [place] Cells Slight/1+     Poikilocytes Slight/1+     Target Cells Slight/1+     WBC Morphology Normal     Giant Platelets Slight/1+    Troponin [[id number]]  (Normal) Collected:  08/23/19 2315    Specimen:  Blood Updated:  08/23/19 2344     Troponin I <0.030 ng/mL     Narrative:       Troponin I Reference Range:    0.00-0.03  Negative.  Repeat testing in 4-6 hours if clinically indicated.    0.04-0.29  Suspicious for myocardial injury. Serial measurements and clinical  correlation may be necessary to confirm or exclude diagnosis of acute  coronary syndrome.  Repeat testing in 4-6 hours if indicated.     >0.29 Consistent with myocardial injury.  Recommend clinical and laboratory correlation.     Results my be falsely  decreased if patient taking Biotin.     Basic Metabolic Panel [[id number]]  (Abnormal) Collected:  08/23/19 2315    Specimen:  Blood Updated:  08/23/19 2334     Glucose 96 mg/dL      BUN 6 mg/dL      Creatinine 0.60 mg/dL      Sodium 140 mmol/L      Potassium 3.2 mmol/L      Chloride 104 mmol/L      CO2 24.0 mmol/L      Calcium 9.3 mg/dL      eGFR Non African Amer 108 mL/min/1.73      BUN/Creatinine Ratio 10.0     Anion Gap 15.2 mmol/L           Imaging Results (most recent)     Procedure Component Value Units Date/Time    XR Chest 1 View [[id number]] Updated:  08/23/19 2347          ECG/EMG Results (most recent)     Procedure Component Value Units Date/Time    ECG 12 Lead [[id number]] Collected:  08/23/19 2305     Updated:  08/23/19 2308    Narrative:       HEART RATE= 89  bpm  RR Interval= 676  ms  MO Interval= 143  ms  P Horizontal Axis= 48  deg  P Front Axis= 15  deg  QRSD Interval= 100  ms  QT Interval= 389  ms  QRS Axis= 46  deg  T Wave Axis= 25  deg  - NORMAL ECG -  Sinus rhythm  Electronically Signed By:   Date and Time of Study: 2019-08-23 23:05:06               I have reviewed the patient's new clinical results.      Assessment/Plan     Worsening RLE pain with h/o recent superficial thrombophlebitis   - duplex venous (08/15/19): acute RLE superficial thrombophlebitis  - repeat RLE duplex   - patient given Dilaudid in the ED and keeps requesting only Dilaudid, patient advised I will not be giving anymore dilaudid during her admission   - Lovenox given in the ED, continue daily  - supportive care   - regular diet     Acute dyspnea   - r/o PE given recent history   - CXR: negative  - EKG: SR  - troponin negative  - V/Q scan pending (patient allergic to contrast)  - monitor O2 sats to maintain > 92%    Acute hypokalemia (3.2)  - replaced per protocol  - monitor     Essential hypertension, chronic   - continue home spironolactone     RLS   - continue home Requip    Cirrhosis   - continue home lactulose and Xifaxan      Depression  - continue home Buspar    Thyroid cancer s/p thyroidectomy with postoperative hypothyroidism  - continue home synthroid    Chronic edema  - continue home Bumex    Migraines   - no home meds reported     GERD  - no home meds reported    Sleep apnea   - monitor nocturnal O2 sats to maintain > 92%    Chronic pain  - continue home oxycodone (INSPECT verified)  - continue home Flexeril    VTE prophylaxis - Lovenox    I have discussed the findings and my recommendations with the patient.  He/she is agreeable with the plan.     Samantha Gore PA-C  08/24/19  12:43 AM

## 2019-08-24 NOTE — PROGRESS NOTES
Hematology/Oncology Inpatient Consultation    Patient name: Lucinda Cope  : 1975  MRN: [id number]    Referring Provider: JOSH Cervantes MD  Reason for Consultation: thrombophilia    Chief complaint: RLE pain    History of present illness:    43 y.o. female who was admitted through the ER on 2019 reporting worsening of RLE pain.  A RLE Doppler pending at time of consultation. She was started on prophylactic Lovenox.  D-dimer 2.84.  Discharged from [facility] on 2019, following a 1 day admission for acute right lower extremity pain and chronic abd. pain.  Doppler showed an SVT in the right leg.  CT A/P showed cirrhosis and an umbilical hernia.  Discharged from [facility] on 2019, following a 1 day admission for atypical chest pain and constipation.  VQ scan was negative for PE.  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  She also had a h/o right lung pulmonary emboli with infarct in - followed by treatment with Pradaxa.  PMH significant for Ramon cirrhosis, hepatic encephalopathy, s/p Alfonzo en Y gastric bypass due to gastric sleeve leak in .  H/o papillary adenocarcinoma of the thyroid.  Vit B12 deficiency-B12 510 on 19.    19  Hematology/Oncology was consulted as she is an established patient who we first met in 2017.  She was admitted to [facility] at the time and had been diagnosed with a right lung pulmonary emboli with infarct.  Her D-dimer was 10.19.  She reported a history of PVT.  BLE Dopplers were negative.  Thrombophilia work-up showed an unremarkable or normal AT 3, factor V Leiden, prothrombin gene, homocystine, protein C&S, cardiolipin antibodies and phosphatidylserine antibodies.  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  It was decided to discharge her on Pradaxa, as other DOAC's either interacted with SSRIs or  were not recommended in liver disease.  During her admission, she had chronic thrombocytosis- felt to be due to her h/o splenectomy and iron deficiency.  Candido 2 and PFA-100 were normal.  She was anemic with a low iron saturation.  Ferritin, B12, folate, SPEP and haptoglobin were all normal.  EGD 5/2017 was normal except for small gastric pouch.   Last office visit 11/9/2017, she reported she was following up with Dr. Mckeon for her cirrhosis and was scheduled to receive iron infusions-WBC 5.59, hemoglobin 9.3, MCV 89.5 and platelets 655.  A bone marrow aspiration was performed on 11/17/2017, that showed normal cellularity, adequate iron stores and was negative for malignancy with normal flow cytometry and cytogenetics.  She never returned for the results of her bone marrow or additional follow-up.    Review of medical records showed that she sees Dr. Nuria Jaramillo routinely for her h/o papillary adenocarcinoma of the thyroid.  Diagnosed in 2011.  S/p total thyroidectomy-pT3, N1B, MX with 7/8 nodes positive for malignancy.  Treated postoperatively with I-131 therapy and again in 2012 for abnormal imaging.  In 2014, she underwent resection of a lymph node mass that was positive for metastasis, followed by I-131 therapy.  Her course was complicated by radiation-induced parotitis, s/p sialadenotomy x 2. On 8/9/2019-ultrasound of thyroid showed likely subcentimeter lymph nodes present in the left inferior thyroid bed.  Last office visit 6/2019, showed SC 80, TIBC 390 iron saturation 21% and ferritin 14.5.  Folic acid was 11.9.  Outpatient medications, although not listed per patient, are to include oral B12 and vitamin D.     8/24/2019: Subjective patient appears comfortable.  She denies any abdominal pain at this time.  Also denies any shortness of breath.  Right lower extremity pain is improving.  Wants to go home.      PCP: Melissa Iniguez APRN      History:  Past Medical History:   Diagnosis Date   • Abdominal  pain    • Anemia    • Cancer (CMS/HCC)    • Clot    • Deep venous thrombosis of right profunda femoris vein (CMS/HCC) 8/15/2019   • Depression    • Disease of thyroid gland    • Empyema of pleura (CMS/HCC)    • GERD (gastroesophageal reflux disease)    • Heartburn    • Hemorrhoids    • Hernia, ventral    • History of malignant neoplasm of thyroid    • History of transfusion    • Irregular menstrual cycle    • Migraine    • Parathyroid abnormality (CMS/HCC)    • Radiation    • Restless legs syndrome (RLS)    • Urge and stress incontinence    • Varicose veins    , Past Surgical History:   Procedure Laterality Date   • ABDOMINAL SURGERY     • CHOLECYSTECTOMY      Laparoscopic   • ENDOSCOPY N/A 3/17/2016    Procedure: ESOPHAGOGASTRODUODENOSCOPY WITH BALOON DILATATION 18-20MM WITH GASTRIC SLEEVE SEALANT;  Surgeon: Leonardo Ortiz Jr., MD;  Location: [facility] ENDOSCOPY;  Service:    • ENDOSCOPY N/A 3/14/2016    Procedure: esophagogastroduodenoscopy with fluoroscopy;  Surgeon: Greg Avila III, MD;  Location: [facility] ENDOSCOPY;  Service:    • ENDOSCOPY N/A 4/15/2016    Procedure: EGD WITH DILITATION AND STENT PLACEMENT dilation of pylorus ;  Surgeon: Leonardo Ortiz Jr., MD;  Location: [facility] ENDOSCOPY;  Service:    • ENDOSCOPY N/A 5/13/2016    Procedure: ESOPHAGOGASTRODUODENOSCOPY  W/ STENT;  Surgeon: Leonardo Ortiz Jr., MD;  Location: [facility] ENDOSCOPY;  Service:    • GASTRECTOMY      Gastric Surgery for Morbid Obesity Laparoscopic Longitudinal Gastrectomy   • GASTRIC SLEEVE LAPAROSCOPIC     • HEMORRHOIDECTOMY     • HERNIA REPAIR     • LAPAROSCOPY REPAIR HIATAL HERNIA     • LARYNGOSCOPY      Direct Laryngoscopy with Injection into Vocal Cords   • OTHER SURGICAL HISTORY Bilateral     Ear Pressure Equalization Tube, Insertion, Bilaterally   • PERIPHERALLY INSERTED CENTRAL CATHETER INSERTION     • SPLENECTOMY     • THYROID SURGERY     • TUBAL ABDOMINAL LIGATION     , Family History   Problem Relation Age of Onset    • Stroke Father    • Diabetes Father         Diabetes Mellitus   • Hypertension Father    • Cancer Other    , Social History     Tobacco Use   • Smoking status: Never Smoker   • Smokeless tobacco: Never Used   Substance Use Topics   • Alcohol use: No   • Drug use: No   , No medications prior to admission.   , Scheduled Meds:    bumetanide 2 mg Oral Daily   busPIRone 5 mg Oral BID   dabigatran etexilate 150 mg Oral Q12H   diphenhydrAMINE 50 mg Intravenous Once   famotidine 20 mg Oral BID AC   lactulose 20 g Oral BID   levothyroxine 200 mcg Oral Q AM   prochlorperazine 10 mg Intravenous Once   rifaximin 550 mg Oral Q12H   rOPINIRole 1.5 mg Oral Nightly   sodium chloride 3 mL Intravenous Q12H   spironolactone 200 mg Oral Daily   vitamin B-12 1,000 mcg Oral Daily   , Continuous Infusions:   , PRN Meds:  •  acetaminophen **OR** acetaminophen **OR** acetaminophen  •  aluminum-magnesium hydroxide-simethicone  •  bisacodyl  •  calcium carbonate  •  cyclobenzaprine  •  docusate sodium  •  hydrOXYzine  •  magnesium hydroxide  •  magnesium sulfate **OR** magnesium sulfate **OR** magnesium sulfate  •  melatonin  •  ondansetron **OR** ondansetron  •  oxyCODONE  •  potassium chloride  •  potassium chloride  •  [COMPLETED] Insert peripheral IV **AND** sodium chloride  •  sodium chloride   Allergies:  Contrast dye; Iodinated diagnostic agents; Iodine; Morphine; Nsaids; and Hydrocodone    ROS:  Review of Systems   Constitutional: Positive for fatigue. Negative for chills and fever.   HENT: Negative for ear pain, mouth sores, nosebleeds and sore throat.    Eyes: Negative for photophobia and visual disturbance.   Respiratory: Negative for wheezing and stridor.         Dyspnea on exertion   Cardiovascular: Negative for chest pain and palpitations.   Gastrointestinal: Negative for abdominal pain, diarrhea, nausea and vomiting.   Endocrine: Negative for cold intolerance and heat intolerance.   Genitourinary: Negative for dysuria and  "hematuria.   Musculoskeletal: Negative for joint swelling and neck stiffness.        Right leg pain.   Skin: Negative for color change and rash.   Neurological: Negative for seizures and syncope.   Hematological: Negative for adenopathy.        No obvious bleeding   Psychiatric/Behavioral: Negative for agitation, confusion and hallucinations.        Objective     Vital Signs:   /82 (BP Location: Right arm, Patient Position: Lying)   Pulse 88   Temp 98.6 °F (37 °C) (Oral)   Resp 20   Ht 160 cm (62.99\")   Wt 56.5 kg (124 lb 9 oz)   LMP  (LMP Unknown)   SpO2 97%   BMI 22.07 kg/m²     Physical Exam:  Physical Exam   Constitutional: She is oriented to person, place, and time. She appears well-developed and well-nourished. No distress.   Somewhat thin appearing female.   HENT:   Head: Normocephalic and atraumatic.   Eyes: Conjunctivae and EOM are normal. Right eye exhibits no discharge. Left eye exhibits no discharge. No scleral icterus.   Neck: Normal range of motion. Neck supple. No thyromegaly present.   Cardiovascular: Normal rate, regular rhythm and normal heart sounds. Exam reveals no gallop and no friction rub.   Pulmonary/Chest: Effort normal. No stridor. No respiratory distress. She has no wheezes.   Abdominal: Soft. Bowel sounds are normal. She exhibits no mass. There is no tenderness. There is no rebound and no guarding.   Musculoskeletal: Normal range of motion. She exhibits tenderness.   Tenderness right leg.   Lymphadenopathy:     She has no cervical adenopathy.   Neurological: She is alert and oriented to person, place, and time. She exhibits normal muscle tone.   Skin: Skin is warm. No rash noted. She is not diaphoretic. No erythema.   Tattoos on chest.   Psychiatric: She has a normal mood and affect. Her behavior is normal.   Nursing note and vitals reviewed.       Results Review:  Lab Results (last 48 hours)     Procedure Component Value Units Date/Time    Factor 8 Activity [[id number]] " Collected:  08/24/19 1318    Specimen:  Blood Updated:  08/24/19 1333    Lupus Anticoagulant [[id number]] Collected:  08/24/19 1318    Specimen:  Blood Updated:  08/24/19 1333    Homocysteine [[id number]] Collected:  08/24/19 1318    Specimen:  Blood Updated:  08/24/19 1332    Beta-2 Glycoprotein Antibodies [[id number]] Collected:  08/24/19 1318    Specimen:  Blood Updated:  08/24/19 1332    D-dimer, Quantitative [[id number]]  (Abnormal) Collected:  08/24/19 0056    Specimen:  Blood Updated:  08/24/19 0113     D-Dimer, Quantitative 2.84 MCGFEU/mL     Narrative:       Reference Range  --------------------------------------------------------------------     < 0.50   Negative Predictive Value  0.50-0.59   Indeterminate    >= 0.60   Probable VTE             A very low percentage of patients with DVT may yield D-Dimer results   below the cut-off of 0.50 MCGFEU/mL.  This is known to be more   prevalent in patients with distal DVT.             Results of this test should always be interpreted in conjunction with   the patient's medical history, clinical presentation and other   findings.  Clinical diagnosis should not be based on the result of   INNOVANCE D-Dimer alone.    CBC & Differential [[id number]] Collected:  08/23/19 2315    Specimen:  Blood Updated:  08/24/19 0016    Narrative:       The following orders were created for panel order CBC & Differential.  Procedure                               Abnormality         Status                     ---------                               -----------         ------                     CBC Auto Differential[[id number]]        Abnormal            Final result                 Please view results for these tests on the individual orders.    CBC Auto Differential [[id number]]  (Abnormal) Collected:  08/23/19 2315    Specimen:  Blood Updated:  08/24/19 0016     WBC 9.70 10*3/mm3      RBC 4.48 10*6/mm3      Hemoglobin 14.3 g/dL      Hematocrit 42.8 %      MCV 95.6 fL      MCH 31.9 pg       MCHC 33.4 g/dL      RDW 14.7 %      RDW-SD 49.4 fl      MPV 9.6 fL      Platelets 481 10*3/mm3      Comment: Result checked        Narrative:       The previously reported component NRBC is no longer being reported.    Scan Slide [[id number]] Collected:  08/23/19 2315    Specimen:  Blood Updated:  08/24/19 0016     Scan Slide --     Comment: See Manual Differential Results       Manual Differential [[id number]]  (Abnormal) Collected:  08/23/19 2315    Specimen:  Blood Updated:  08/24/19 0016     Neutrophil % 35.0 %      Lymphocyte % 45.0 %      Monocyte % 11.0 %      Eosinophil % 6.0 %      Basophil % 1.0 %      Bands %  2.0 %      Neutrophils Absolute 3.59 10*3/mm3      Lymphocytes Absolute 4.37 10*3/mm3      Monocytes Absolute 1.07 10*3/mm3      Eosinophils Absolute 0.58 10*3/mm3      Basophils Absolute 0.10 10*3/mm3      Anisocytosis Slight/1+     White Cloud Cells Slight/1+     Poikilocytes Slight/1+     Target Cells Slight/1+     WBC Morphology Normal     Giant Platelets Slight/1+    Troponin [[id number]]  (Normal) Collected:  08/23/19 2315    Specimen:  Blood Updated:  08/23/19 2344     Troponin I <0.030 ng/mL     Narrative:       Troponin I Reference Range:    0.00-0.03  Negative.  Repeat testing in 4-6 hours if clinically indicated.    0.04-0.29  Suspicious for myocardial injury. Serial measurements and clinical  correlation may be necessary to confirm or exclude diagnosis of acute  coronary syndrome.  Repeat testing in 4-6 hours if indicated.     >0.29 Consistent with myocardial injury.  Recommend clinical and laboratory correlation.     Results my be falsely decreased if patient taking Biotin.     Basic Metabolic Panel [[id number]]  (Abnormal) Collected:  08/23/19 2315    Specimen:  Blood Updated:  08/23/19 2334     Glucose 96 mg/dL      BUN 6 mg/dL      Creatinine 0.60 mg/dL      Sodium 140 mmol/L      Potassium 3.2 mmol/L      Chloride 104 mmol/L      CO2 24.0 mmol/L      Calcium 9.3 mg/dL      eGFR Non African  Amer 108 mL/min/1.73      BUN/Creatinine Ratio 10.0     Anion Gap 15.2 mmol/L            Pending Results:     Imaging Reviewed:   Xr Chest 1 View    Result Date: 8/24/2019  No acute cardiopulmonary process.  Electronically Signed By-Muriel Gil On:8/24/2019 8:29 AM This report was finalized on [id number] by  Muriel Gil, .      I have reviewed the patient's labs, imaging, reports, and other clinician documentation.         Assessment/Plan         ASSESSMENT  1.  Acute RLE SVT with h/o PE and PVT/MTHFR C677T heterozygous- h/o of elevated factor VIII and lupus anticoagulant +, both repeated to verify diagnosis.  D-dimer 2.84.  She is on prophylactic Lovenox- will change to Pradaxa. Additional thrombophilia work-up ordered.  It appears she has been off her [id number] homocystine level.  Right lower extremity Doppler reportedly showed SVT only.  Resumed Pradaxa.   2.  Vitamin B12 deficiency/iron deficiency- due to gastric bypass.  Continue oral B12.  Hemoglobin normal so no need to treat with iron supplementation.  3.  History of recurrent papillary adenocarcinoma of the thyroid- S/P total thyroidectomy, multiple treatments of I-131 and sialadenotomy x2.  Recent ultrasound and follow-up by Dr. Jaramillo.  4.  S/p splenectomy/s/p Alfonzo-en-Y gastric bypass- Per PMD.  5.  Ramon cirrhosis/GERD/hepatic encephalopathy-last paracentesis in March 2019.  INR 1.07.  Followed by [facility] liver team.  6.  HTN/vitamin D deficiency/hypokalemia-Per primary team.    PLAN  1. Change prophy Lovenox to Pradaxa.  Patient to be discharged home on Pradaxa.  2. Right lower extremity venous Doppler was negative according to preliminary information.  3. Factor VIII and lupus anticoagulant  4. Beta-2 glycoprotein antibody  5. Homocystine level  6. Oral B12 supplementation  7. Pepcid  8. Recommend follow-up as outpatient and monitor d-dimer to see response to Pradaxa.      Craig Carrizales MD  08/24/19  4:34 PM    Much of the above report is an electronic  transcription/translation of the spoken language to printed text using Dragon Software. As such, the subtleties and finesse of the spoken language may permit erroneous, or at times, nonsensical words or phrases to be inadvertently transcribed; thus changes may be made at a later date to rectify these errors.

## 2019-08-24 NOTE — DISCHARGE SUMMARY
Date of Admission: 8/23/2019    Date of Discharge:  8/24/2019    Length of stay:  LOS: 0 days     Discharge Diagnosis:  Impression: 1. Right lower extremity superficial venous thrombosis of varicosities; local compresses and compression with OTC analgesics as needed.  2. 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  3.  History of pulmonary embolus with pulmonary infarct 2017 with a history of positive lupus anticoagulant at that time and high factor VIII level.    Presenting Problem/History of Present Illness    Active Hospital Problems    Diagnosis  POA   • **Hypercoagulable state (CMS/HCC) [D68.59]  Yes     Priority: High   • Elevated factor VIII level [R79.1]  Yes   • Lupus anticoagulant positive [R76.0]  Yes      Resolved Hospital Problems    Diagnosis Date Resolved POA   • Dyspnea [R06.00] 08/24/2019 Yes        Patient is a 43-year-old female who has a history of pulmonary emboli 6/2017 felt to be secondary to thrombophilia status post splenectomy that was done due to complications of gastric sleeve surgery.  At that time fibrinogen, antithrombin III, APC-RV, protein C and S, prothrombin gene, homocysteine, antiphospholipid antibodies were all normal. Factor VIII was 288 (H) and lupus anticoagulant was positive. She was discharged home on Pradaxa which was subsequently changed to Savaysa and outpatient.  The patient reports that she has been taken off of anticoagulation since that time but she does not remember who advised her to do that.  Two weeks ago the patient developed pain right medial thigh distally and proximal right medial lower leg which proved to be superficial thrombophlebitis of varicosities in the area with no evidence of DVT.  She had a VQ scan on 8/15/2019 that was low probability for PE.  Patient returns to the ER today because of increasing shortness of breath and increasing pain right lower extremity.  Venous duplex again confirms superficial venous thrombosis but no evidence of DVT. She  has history of Ramon cirrhosis requiring frequent paracentesis however her symptoms have improved recently and her last paracentesis was in March.    Hospital Course  She had no further complaints of shortness of breath through the course of the hospital stay.  Her only complaint was some discomfort of her right lower extremity at the site of her known professional thrombophlebitis.  Repeat venous duplex showed no evidence of a DVT.  Hematology saw the patient in consultation and recommended that she continue taking Pradaxa as previously prescribed.  Pradaxa was initiated and the patient is now felt to be stable for discharge.    Past Medical History:     Past Medical History:   Diagnosis Date   • Abdominal pain    • Anemia    • Cancer (CMS/HCC)    • Clot    • Deep venous thrombosis of right profunda femoris vein (CMS/HCC) 8/15/2019   • Depression    • Disease of thyroid gland    • Empyema of pleura (CMS/HCC)    • GERD (gastroesophageal reflux disease)    • Heartburn    • Hemorrhoids    • Hernia, ventral    • History of malignant neoplasm of thyroid    • History of transfusion    • Irregular menstrual cycle    • Migraine    • Parathyroid abnormality (CMS/HCC)    • Radiation    • Restless legs syndrome (RLS)    • Urge and stress incontinence    • Varicose veins        Past Surgical History:     Past Surgical History:   Procedure Laterality Date   • ABDOMINAL SURGERY     • CHOLECYSTECTOMY      Laparoscopic   • ENDOSCOPY N/A 3/17/2016    Procedure: ESOPHAGOGASTRODUODENOSCOPY WITH BALOON DILATATION 18-20MM WITH GASTRIC SLEEVE SEALANT;  Surgeon: Leonardo Ortiz Jr., MD;  Location: [facility] ENDOSCOPY;  Service:    • ENDOSCOPY N/A 3/14/2016    Procedure: esophagogastroduodenoscopy with fluoroscopy;  Surgeon: Greg Avila III, MD;  Location: [facility] ENDOSCOPY;  Service:    • ENDOSCOPY N/A 4/15/2016    Procedure: EGD WITH DILITATION AND STENT PLACEMENT dilation of pylorus ;  Surgeon: Leonardo Ortiz Jr., MD;  Location:  [facility] ENDOSCOPY;  Service:    • ENDOSCOPY N/A 5/13/2016    Procedure: ESOPHAGOGASTRODUODENOSCOPY  W/ STENT;  Surgeon: Leonardo Ortiz Jr., MD;  Location: [facility] ENDOSCOPY;  Service:    • GASTRECTOMY      Gastric Surgery for Morbid Obesity Laparoscopic Longitudinal Gastrectomy   • GASTRIC SLEEVE LAPAROSCOPIC     • HEMORRHOIDECTOMY     • HERNIA REPAIR     • LAPAROSCOPY REPAIR HIATAL HERNIA     • LARYNGOSCOPY      Direct Laryngoscopy with Injection into Vocal Cords   • OTHER SURGICAL HISTORY Bilateral     Ear Pressure Equalization Tube, Insertion, Bilaterally   • PERIPHERALLY INSERTED CENTRAL CATHETER INSERTION     • SPLENECTOMY     • THYROID SURGERY     • TUBAL ABDOMINAL LIGATION         Social History:   Social History     Socioeconomic History   • Marital status: Single     Spouse name: Not on file   • Number of children: Not on file   • Years of education: Not on file   • Highest education level: Not on file   Tobacco Use   • Smoking status: Never Smoker   • Smokeless tobacco: Never Used   Substance and Sexual Activity   • Alcohol use: No   • Drug use: No   • Sexual activity: Defer       Procedures Performed         Consults:   Consults     Date and Time Order Name Status Description    8/24/2019 0943 Hematology & Oncology Inpatient Consult      8/16/2019 0810 Inpatient Gastroenterology Consult Completed     8/15/2019 0248 Inpatient Hospitalist Consult Completed     7/26/2019 2324 Hospitalist (on-call MD unless specified) Completed           Pertinent Test Results:     Lab Results (last 72 hours)     Procedure Component Value Units Date/Time    Factor 8 Activity [[id number]] Collected:  08/24/19 1318    Specimen:  Blood Updated:  08/24/19 1333    Lupus Anticoagulant [[id number]] Collected:  08/24/19 1318    Specimen:  Blood Updated:  08/24/19 1333    Homocysteine [[id number]] Collected:  08/24/19 1318    Specimen:  Blood Updated:  08/24/19 1332    Beta-2 Glycoprotein Antibodies [[id number]] Collected:  08/24/19  1318    Specimen:  Blood Updated:  08/24/19 1332    D-dimer, Quantitative [[id number]]  (Abnormal) Collected:  08/24/19 0056    Specimen:  Blood Updated:  08/24/19 0113     D-Dimer, Quantitative 2.84 MCGFEU/mL     Narrative:       Reference Range  --------------------------------------------------------------------     < 0.50   Negative Predictive Value  0.50-0.59   Indeterminate    >= 0.60   Probable VTE             A very low percentage of patients with DVT may yield D-Dimer results   below the cut-off of 0.50 MCGFEU/mL.  This is known to be more   prevalent in patients with distal DVT.             Results of this test should always be interpreted in conjunction with   the patient's medical history, clinical presentation and other   findings.  Clinical diagnosis should not be based on the result of   IN[place]OVANCE D-Dimer alone.    CBC & Differential [[id number]] Collected:  08/23/19 2315    Specimen:  Blood Updated:  08/24/19 0016    Narrative:       The following orders were created for panel order CBC & Differential.  Procedure                               Abnormality         Status                     ---------                               -----------         ------                     CBC Auto Differential[[id number]]        Abnormal            Final result                 Please view results for these tests on the individual orders.    CBC Auto Differential [[id number]]  (Abnormal) Collected:  08/23/19 2315    Specimen:  Blood Updated:  08/24/19 0016     WBC 9.70 10*3/mm3      RBC 4.48 10*6/mm3      Hemoglobin 14.3 g/dL      Hematocrit 42.8 %      MCV 95.6 fL      MCH 31.9 pg      MCHC 33.4 g/dL      RDW 14.7 %      RDW-SD 49.4 fl      MPV 9.6 fL      Platelets 481 10*3/mm3      Comment: Result checked        Narrative:       The previously reported component NRBC is no longer being reported.    Scan Slide [[id number]] Collected:  08/23/19 2315    Specimen:  Blood Updated:  08/24/19 0016     Scan Slide --      Comment: See Manual Differential Results       Manual Differential [[id number]]  (Abnormal) Collected:  08/23/19 2315    Specimen:  Blood Updated:  08/24/19 0016     Neutrophil % 35.0 %      Lymphocyte % 45.0 %      Monocyte % 11.0 %      Eosinophil % 6.0 %      Basophil % 1.0 %      Bands %  2.0 %      Neutrophils Absolute 3.59 10*3/mm3      Lymphocytes Absolute 4.37 10*3/mm3      Monocytes Absolute 1.07 10*3/mm3      Eosinophils Absolute 0.58 10*3/mm3      Basophils Absolute 0.10 10*3/mm3      Anisocytosis Slight/1+     [place] Cells Slight/1+     Poikilocytes Slight/1+     Target Cells Slight/1+     WBC Morphology Normal     Giant Platelets Slight/1+    Troponin [[id number]]  (Normal) Collected:  08/23/19 2315    Specimen:  Blood Updated:  08/23/19 2344     Troponin I <0.030 ng/mL     Narrative:       Troponin I Reference Range:    0.00-0.03  Negative.  Repeat testing in 4-6 hours if clinically indicated.    0.04-0.29  Suspicious for myocardial injury. Serial measurements and clinical  correlation may be necessary to confirm or exclude diagnosis of acute  coronary syndrome.  Repeat testing in 4-6 hours if indicated.     >0.29 Consistent with myocardial injury.  Recommend clinical and laboratory correlation.     Results my be falsely decreased if patient taking Biotin.     Basic Metabolic Panel [[id number]]  (Abnormal) Collected:  08/23/19 2315    Specimen:  Blood Updated:  08/23/19 2334     Glucose 96 mg/dL      BUN 6 mg/dL      Creatinine 0.60 mg/dL      Sodium 140 mmol/L      Potassium 3.2 mmol/L      Chloride 104 mmol/L      CO2 24.0 mmol/L      Calcium 9.3 mg/dL      eGFR Non African Amer 108 mL/min/1.73      BUN/Creatinine Ratio 10.0     Anion Gap 15.2 mmol/L              No results found for: BLOODCX   No results found for: URINECX   No results found for: WOUNDCX   No results found for: RESPCX   No results found for: STOOLCX   No results found for: STOOLCXY   No results found for: MRSACX   No results found for:  VRECX   No results found for: CRECX   No components found for: AFBSTAINCX   No results found for: AFBCX   No results found for: AFBCXBLD   No results found for: FUNGUSCX   No components found for: GMSSTAIN   No results found for: KOHPREP   No results found for: ANACX   No results found for: BODYFLDCX   No results found for: CSFCX   No results found for: CULTURE   No results found for: THROATCX   No results found for: THROATCXBS   No results found for: ICECX   No results found for: DICECX   No results found for: GCCX           Imaging Results (all)     Procedure Component Value Units Date/Time    XR Chest 1 View [[id number]] Collected:  08/24/19 0829     Updated:  08/24/19 0831    Narrative:       Examination: XR CHEST 1 VW-     Date of Exam: 8/23/2019 11:44 PM     Indication: Dyspnea.     Comparison: Radiographs 07/20/2019, 07/26/2019     Technique: 1 view of the chest      Findings:  The lung volumes are low. Stable granulomatous calcifications are  present. There is stable blunting of the left costophrenic angle. There  are no airspace consolidations. No pleural effusions. No pneumothorax.  The heart size is normal. The pulmonary vasculature appears within  normal limits. No acute osseous abnormality identified.       Impression:       No acute cardiopulmonary process.     Electronically Signed By-Muriel Gil On:8/24/2019 8:29 AM  This report was finalized on [id number] by  Muriel Gil, .            Condition on Discharge: Stable    Vital Signs  Temp:  [97.9 °F (36.6 °C)-98.6 °F (37 °C)] 98.6 °F (37 °C)  Heart Rate:  [] 88  Resp:  [14-20] 20  BP: (104-126)/(61-85) 125/82    Physical Exam:   Physical exam: Well-developed thin female walking around the room and sitting up in bed awake and alert comfortable on room air in no acute distress some: Lungs clear to auscultation bilaterally; CV regular rate and rhythm; abdomen soft nontender; extremities with no edema, no cyanosis, no calf tenderness; right leg  with mild area of hyperpigmentation over palpable tender varicosities with no erythema.        Discharge Disposition  Home or Self Care    Discharge Medications     Discharge Medications      New Medications      Instructions Start Date   dabigatran etexilate 150 MG capsu  Commonly known as:  PRADAXA   150 mg, Oral, Every 12 Hours Scheduled         Continue These Medications      Instructions Start Date   bumetanide 2 MG tablet  Commonly known as:  BUMEX   2 mg, Oral, Daily      busPIRone 5 MG tablet  Commonly known as:  BUSPAR   5 mg, Oral, 2 Times Daily      cyclobenzaprine 5 MG tablet  Commonly known as:  FLEXERIL   5 mg, Oral, 3 Times Daily PRN      lactulose 10 GM/15ML solution  Commonly known as:  CHRONULAC   20 g, Oral, 2 Times Daily PRN      levothyroxine 200 MCG tablet  Commonly known as:  SYNTHROID, LEVOTHROID   200 mcg, Oral, Daily      ondansetron 4 MG tablet  Commonly known as:  ZOFRAN   8 mg, Oral, Every 6 Hours PRN      oxyCODONE 5 MG capsule  Commonly known as:  OXY-IR   10 mg, Oral, Every 6 Hours PRN      rifaximin 550 MG tablet  Commonly known as:  XIFAXAN   550 mg, Oral, Every 12 Hours Scheduled      rOPINIRole 1 MG tablet  Commonly known as:  REQUIP   1.5 mg, Oral, Nightly      spironolactone 100 MG tablet  Commonly known as:  ALDACTONE   200 mg, Oral, Daily             Discharge Diet:   Diet Instructions     Diet: Regular      Discharge Diet:  Regular          Activity at Discharge:   Activity Instructions     Activity as Tolerated            Follow-up Appointments  No future appointments.  Additional Instructions for the Follow-ups that You Need to Schedule     Ambulatory Referral to Oncology   As directed      Call MD With Problems / Concerns   As directed      Instructions: Call [phone] or email [email or web address] for problems or concerns.    Order Comments:  Instructions: Call [phone] or email [email or web address] for problems or concerns.          Discharge Follow-up  with PCP   As directed       Currently Documented PCP:    Melissa Iniguez APRN    PCP Phone Number:    [phone]     Follow Up Details:  1 week               Test Results Pending at Discharge   Order Current Status    Beta-2 Glycoprotein Antibodies In process    Factor 8 Activity In process    Homocysteine In process    Lupus Anticoagulant In process           Risk for Readmission (LACE) Score: 7 (8/24/2019  6:00 AM)          Siomara Cervantes MD  08/24/19  1:37 PM    Time: Discharge time 50 minutes

## 2019-08-24 NOTE — ED PROVIDER NOTES
Subjective   Patient was here last week having pain in her right leg and ultrasound showed SVT.  She returns tonight because the area of tenderness has gotten larger and the pain more intense.  There is no swelling            Review of Systems   Constitutional: Negative for fever.   HENT: Negative for congestion.    Respiratory: Positive for shortness of breath. Negative for cough.    Cardiovascular: Positive for leg swelling. Negative for chest pain and palpitations.       Past Medical History:   Diagnosis Date   • Abdominal pain    • Anemia    • Cancer (CMS/HCC)    • Clot    • Deep venous thrombosis of right profunda femoris vein (CMS/HCC) 8/15/2019   • Depression    • Disease of thyroid gland    • Empyema of pleura (CMS/HCC)    • GERD (gastroesophageal reflux disease)    • Heartburn    • Hemorrhoids    • Hernia, ventral    • History of malignant neoplasm of thyroid    • History of transfusion    • Irregular menstrual cycle    • Migraine    • Parathyroid abnormality (CMS/HCC)    • Radiation    • Restless legs syndrome (RLS)    • Urge and stress incontinence    • Varicose veins        Allergies   Allergen Reactions   • Contrast Dye Hives   • Iodinated Diagnostic Agents    • Iodine Hives   • Morphine Itching   • Nsaids Other (See Comments)     States I am not suppose to take them   • Hydrocodone Rash     Lortab elixir per pt       Past Surgical History:   Procedure Laterality Date   • ABDOMINAL SURGERY     • CHOLECYSTECTOMY      Laparoscopic   • ENDOSCOPY N/A 3/17/2016    Procedure: ESOPHAGOGASTRODUODENOSCOPY WITH BALOON DILATATION 18-20MM WITH GASTRIC SLEEVE SEALANT;  Surgeon: Leonardo Ortiz Jr., MD;  Location: [facility] ENDOSCOPY;  Service:    • ENDOSCOPY N/A 3/14/2016    Procedure: esophagogastroduodenoscopy with fluoroscopy;  Surgeon: Greg Avila III, MD;  Location: [facility] ENDOSCOPY;  Service:    • ENDOSCOPY N/A 4/15/2016    Procedure: EGD WITH DILITATION AND STENT PLACEMENT dilation of pylorus ;  Surgeon:  Leonardo Ortiz Jr., MD;  Location: [facility] ENDOSCOPY;  Service:    • ENDOSCOPY N/A 5/13/2016    Procedure: ESOPHAGOGASTRODUODENOSCOPY  W/ STENT;  Surgeon: Leonardo Ortiz Jr., MD;  Location: [facility] ENDOSCOPY;  Service:    • GASTRECTOMY      Gastric Surgery for Morbid Obesity Laparoscopic Longitudinal Gastrectomy   • GASTRIC SLEEVE LAPAROSCOPIC     • HEMORRHOIDECTOMY     • HERNIA REPAIR     • LAPAROSCOPY REPAIR HIATAL HERNIA     • LARYNGOSCOPY      Direct Laryngoscopy with Injection into Vocal Cords   • OTHER SURGICAL HISTORY Bilateral     Ear Pressure Equalization Tube, Insertion, Bilaterally   • PERIPHERALLY INSERTED CENTRAL CATHETER INSERTION     • SPLENECTOMY     • THYROID SURGERY     • TUBAL ABDOMINAL LIGATION         Family History   Problem Relation Age of Onset   • Stroke Father    • Diabetes Father         Diabetes Mellitus   • Hypertension Father    • Cancer Other        Social History     Socioeconomic History   • Marital status: Single     Spouse name: Not on file   • Number of children: Not on file   • Years of education: Not on file   • Highest education level: Not on file   Tobacco Use   • Smoking status: Never Smoker   • Smokeless tobacco: Never Used   Substance and Sexual Activity   • Alcohol use: No   • Drug use: No   • Sexual activity: Defer           Objective   Physical Exam  43-year-old woman awake alert and in modest distress from her leg pain examination pharynx is clear airway patent mouth is moist neck is supple with no JVD or bruit breath sounds clear and equal bilaterally she is moving air well heart sounds S1-S2 no murmur abdomen is soft nontender examination the right leg visually there is no obvious abnormality no bruising swelling or deformity no noticeable swelling.  Her previous SVT areas were marked with marker and she is tender to palpation in that area she also complains of severe pain even light palpation of the scan up into her inner thigh with no palpable abnormality has  good equal pedal pulses feet are equally warm  Procedures           ED Course      Results for orders placed or performed during the hospital encounter of 08/23/19   Basic Metabolic Panel   Result Value Ref Range    Glucose 96 65 - 99 mg/dL    BUN 6 (L) 8 - 20 mg/dL    Creatinine 0.60 0.40 - 1.00 mg/dL    Sodium 140 136 - 144 mmol/L    Potassium 3.2 (L) 3.6 - 5.1 mmol/L    Chloride 104 101 - 111 mmol/L    CO2 24.0 22.0 - 32.0 mmol/L    Calcium 9.3 8.9 - 10.3 mg/dL    eGFR Non African Amer 108 >60 mL/min/1.73    BUN/Creatinine Ratio 10.0 5.4 - 26.2    Anion Gap 15.2 (H) 5.0 - 15.0 mmol/L   Troponin   Result Value Ref Range    Troponin I <0.030 0.000 - 0.030 ng/mL   CBC Auto Differential   Result Value Ref Range    WBC 9.70 3.40 - 10.80 10*3/mm3    RBC 4.48 3.77 - 5.28 10*6/mm3    Hemoglobin 14.3 12.0 - 15.9 g/dL    Hematocrit 42.8 34.0 - 46.6 %    MCV 95.6 79.0 - 97.0 fL    MCH 31.9 26.6 - 33.0 pg    MCHC 33.4 31.5 - 35.7 g/dL    RDW 14.7 12.3 - 15.4 %    RDW-SD 49.4 37.0 - 54.0 fl    MPV 9.6 6.0 - 12.0 fL    Platelets 481 (H) 140 - 450 10*3/mm3   Scan Slide   Result Value Ref Range    Scan Slide     D-dimer, Quantitative   Result Value Ref Range    D-Dimer, Quantitative 2.84 (H) 0.17 - 0.59 MCGFEU/mL   Manual Differential   Result Value Ref Range    Neutrophil % 35.0 (L) 42.7 - 76.0 %    Lymphocyte % 45.0 19.6 - 45.3 %    Monocyte % 11.0 5.0 - 12.0 %    Eosinophil % 6.0 0.3 - 6.2 %    Basophil % 1.0 0.0 - 1.5 %    Bands %  2.0 0.0 - 5.0 %    Neutrophils Absolute 3.59 1.70 - 7.00 10*3/mm3    Lymphocytes Absolute 4.37 (H) 0.70 - 3.10 10*3/mm3    Monocytes Absolute 1.07 (H) 0.10 - 0.90 10*3/mm3    Eosinophils Absolute 0.58 (H) 0.00 - 0.40 10*3/mm3    Basophils Absolute 0.10 0.00 - 0.20 10*3/mm3    Anisocytosis Slight/1+ None Seen    [place] Cells Slight/1+ None Seen    Poikilocytes Slight/1+ None Seen    Target Cells Slight/1+ None Seen    WBC Morphology Normal Normal    Giant Platelets Slight/1+ None Seen     Chest x-ray  was unremarkable.  Patient made multiple requests for IV Dilaudid and she refused all other medications offered.  Patient was given 1 mg of IM Dilaudid, she was told she would not receive IV Dilaudid for pain control.  Patient was given Lovenox and will be admitted to the hospitalist service for VQ scan tomorrow            MDM      Final diagnoses:   Dyspnea, unspecified type   Leg pain, inferior, right            Hong Vazquez, NP  08/24/19 0204

## 2019-08-24 NOTE — CONSULTS
Hematology/Oncology Inpatient Consultation    Patient name: Lucinda Cope  : 1975  MRN: [id number]  Referring Provider: JOSH Cervantes MD  Reason for Consultation: thrombophilia    Chief complaint: RLE pain    History of present illness:    43 y.o. female who was admitted through the ER on 2019 reporting worsening of RLE pain.  A RLE Doppler pending at time of consultation. She was started on prophylactic Lovenox.  D-dimer 2.84.  Discharged from [facility] on 2019, following a 1 day admission for acute right lower extremity pain and chronic abd. pain.  Doppler showed an SVT in the right leg.  CT A/P showed cirrhosis and an umbilical hernia.  Discharged from [facility] [place] on 2019, following a 1 day admission for atypical chest pain and constipation.  VQ scan was negative for PE.  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  She also had a h/o right lung pulmonary emboli with infarct in – followed by treatment with Pradaxa.  PMH significant for Ramon cirrhosis, hepatic encephalopathy, s/p Alfonzo en Y gastric bypass due to gastric sleeve leak in .  H/o papillary adenocarcinoma of the thyroid.  Vit B12 deficiency-B12 510 on 19.    19  Hematology/Oncology was consulted as she is an established patient who we first met in 2017.  She was admitted to [facility] at the time and had been diagnosed with a right lung pulmonary emboli with infarct.  Her D-dimer was 10.19.  She reported a history of PVT.  BLE Dopplers were negative.  Thrombophilia work-up showed an unremarkable or normal AT 3, factor V Leiden, prothrombin gene, homocystine, protein C&S, cardiolipin antibodies and phosphatidylserine antibodies.  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  It was decided to discharge her on Pradaxa, as other DOAC's either interacted with SSRIs or  were not recommended in liver disease.  During her admission, she had chronic thrombocytosis– felt to be due to her h/o splenectomy and iron deficiency.  Candido 2 and PFA-100 were normal.  She was anemic with a low iron saturation.  Ferritin, B12, folate, SPEP and haptoglobin were all normal.  EGD 5/2017 was normal except for small gastric pouch.   Last office visit 11/9/2017, she reported she was following up with Dr. Mckeon for her cirrhosis and was scheduled to receive iron infusions-WBC 5.59, hemoglobin 9.3, MCV 89.5 and platelets 655.  A bone marrow aspiration was performed on 11/17/2017, that showed normal cellularity, adequate iron stores and was negative for malignancy with normal flow cytometry and cytogenetics.  She never returned for the results of her bone marrow or additional follow-up.    Review of medical records showed that she sees Dr. Nuria Jaramillo routinely for her h/o papillary adenocarcinoma of the thyroid.  Diagnosed in 2011.  S/p total thyroidectomy–pT3, N1B, MX with 7/8 nodes positive for malignancy.  Treated postoperatively with I-131 therapy and again in 2012 for abnormal imaging.  In 2014, she underwent resection of a lymph node mass that was positive for metastasis, followed by I-131 therapy.  Her course was complicated by radiation-induced parotitis, s/p sialadenotomy x 2. On 8/9/2019–ultrasound of thyroid showed likely subcentimeter lymph nodes present in the left inferior thyroid bed.  Last office visit 6/2019, showed SC 80, TIBC 390 iron saturation 21% and ferritin 14.5.  Folic acid was 11.9.  Outpatient medications, although not listed per patient, are to include oral B12 and vitamin D.     8/24/2019: Subjective patient appears comfortable.  She denies any abdominal pain at this time.  Also denies any shortness of breath.  Right lower extremity pain is improving.  Wants to go home.      PCP: Melissa Iniguez APRN    History:  Past Medical History:   Diagnosis Date   • Abdominal  pain    • Anemia    • Cancer (CMS/HCC)    • Clot    • Deep venous thrombosis of right profunda femoris vein (CMS/HCC) 8/15/2019   • Depression    • Disease of thyroid gland    • Empyema of pleura (CMS/HCC)    • GERD (gastroesophageal reflux disease)    • Heartburn    • Hemorrhoids    • Hernia, ventral    • History of malignant neoplasm of thyroid    • History of transfusion    • Irregular menstrual cycle    • Migraine    • Parathyroid abnormality (CMS/HCC)    • Radiation    • Restless legs syndrome (RLS)    • Urge and stress incontinence    • Varicose veins    ,   Past Surgical History:   Procedure Laterality Date   • ABDOMINAL SURGERY     • CHOLECYSTECTOMY      Laparoscopic   • ENDOSCOPY N/A 3/17/2016    Procedure: ESOPHAGOGASTRODUODENOSCOPY WITH BALOON DILATATION 18-20MM WITH GASTRIC SLEEVE SEALANT;  Surgeon: Leonardo Ortiz Jr., MD;  Location: [facility] ENDOSCOPY;  Service:    • ENDOSCOPY N/A 3/14/2016    Procedure: esophagogastroduodenoscopy with fluoroscopy;  Surgeon: Greg Avila III, MD;  Location: [facility] ENDOSCOPY;  Service:    • ENDOSCOPY N/A 4/15/2016    Procedure: EGD WITH DILITATION AND STENT PLACEMENT dilation of pylorus ;  Surgeon: Leonardo Ortiz Jr., MD;  Location: [facility] ENDOSCOPY;  Service:    • ENDOSCOPY N/A 5/13/2016    Procedure: ESOPHAGOGASTRODUODENOSCOPY  W/ STENT;  Surgeon: Leonardo Ortiz Jr., MD;  Location: [facility] ENDOSCOPY;  Service:    • GASTRECTOMY      Gastric Surgery for Morbid Obesity Laparoscopic Longitudinal Gastrectomy   • GASTRIC SLEEVE LAPAROSCOPIC     • HEMORRHOIDECTOMY     • HERNIA REPAIR     • LAPAROSCOPY REPAIR HIATAL HERNIA     • LARYNGOSCOPY      Direct Laryngoscopy with Injection into Vocal Cords   • OTHER SURGICAL HISTORY Bilateral     Ear Pressure Equalization Tube, Insertion, Bilaterally   • PERIPHERALLY INSERTED CENTRAL CATHETER INSERTION     • SPLENECTOMY     • THYROID SURGERY     • TUBAL ABDOMINAL LIGATION     ,   Family History   Problem Relation Age of  "Onset   • Stroke Father    • Diabetes Father         Diabetes Mellitus   • Hypertension Father    • Cancer Other    ,   Social History     Tobacco Use   • Smoking status: Never Smoker   • Smokeless tobacco: Never Used   Substance Use Topics   • Alcohol use: No   • Drug use: No   ,   No medications prior to admission.   , Scheduled Meds:      dabigatran etexilate 150 mg Oral Q12H   , Continuous Infusions:   , PRN Meds:     Allergies:  Contrast dye; Iodinated diagnostic agents; Iodine; Morphine; Nsaids; and Hydrocodone    ROS:  Review of Systems     Objective     Vital Signs:   /82 (BP Location: Right arm, Patient Position: Lying)   Pulse 88   Temp 98.6 °F (37 °C) (Oral)   Resp 20   Ht 160 cm (62.99\")   Wt 56.5 kg (124 lb 9 oz)   LMP  (LMP Unknown)   SpO2 97%   BMI 22.07 kg/m²     Physical Exam:  Physical Exam     Results Review:  Lab Results (last 48 hours)     Procedure Component Value Units Date/Time    D-dimer, Quantitative [[id number]]  (Abnormal) Collected:  08/24/19 0056    Specimen:  Blood Updated:  08/24/19 0113     D-Dimer, Quantitative 2.84 MCGFEU/mL     Narrative:       Reference Range  --------------------------------------------------------------------     < 0.50   Negative Predictive Value  0.50-0.59   Indeterminate    >= 0.60   Probable VTE             A very low percentage of patients with DVT may yield D-Dimer results   below the cut-off of 0.50 MCGFEU/mL.  This is known to be more   prevalent in patients with distal DVT.             Results of this test should always be interpreted in conjunction with   the patient's medical history, clinical presentation and other   findings.  Clinical diagnosis should not be based on the result of   INNOVANCE D-Dimer alone.    CBC & Differential [[id number]] Collected:  08/23/19 2315    Specimen:  Blood Updated:  08/24/19 0016    Narrative:       The following orders were created for panel order CBC & Differential.  Procedure                            "    Abnormality         Status                     ---------                               -----------         ------                     CBC Auto Differential[[id number]]        Abnormal            Final result                 Please view results for these tests on the individual orders.    CBC Auto Differential [[id number]]  (Abnormal) Collected:  08/23/19 2315    Specimen:  Blood Updated:  08/24/19 0016     WBC 9.70 10*3/mm3      RBC 4.48 10*6/mm3      Hemoglobin 14.3 g/dL      Hematocrit 42.8 %      MCV 95.6 fL      MCH 31.9 pg      MCHC 33.4 g/dL      RDW 14.7 %      RDW-SD 49.4 fl      MPV 9.6 fL      Platelets 481 10*3/mm3      Comment: Result checked        Narrative:       The previously reported component NRBC is no longer being reported.    Scan Slide [[id number]] Collected:  08/23/19 2315    Specimen:  Blood Updated:  08/24/19 0016     Scan Slide --     Comment: See Manual Differential Results       Manual Differential [[id number]]  (Abnormal) Collected:  08/23/19 2315    Specimen:  Blood Updated:  08/24/19 0016     Neutrophil % 35.0 %      Lymphocyte % 45.0 %      Monocyte % 11.0 %      Eosinophil % 6.0 %      Basophil % 1.0 %      Bands %  2.0 %      Neutrophils Absolute 3.59 10*3/mm3      Lymphocytes Absolute 4.37 10*3/mm3      Monocytes Absolute 1.07 10*3/mm3      Eosinophils Absolute 0.58 10*3/mm3      Basophils Absolute 0.10 10*3/mm3      Anisocytosis Slight/1+     Florence Cells Slight/1+     Poikilocytes Slight/1+     Target Cells Slight/1+     WBC Morphology Normal     Giant Platelets Slight/1+    Troponin [[id number]]  (Normal) Collected:  08/23/19 2315    Specimen:  Blood Updated:  08/23/19 2344     Troponin I <0.030 ng/mL     Narrative:       Troponin I Reference Range:    0.00-0.03  Negative.  Repeat testing in 4-6 hours if clinically indicated.    0.04-0.29  Suspicious for myocardial injury. Serial measurements and clinical  correlation may be necessary to confirm or exclude diagnosis of acute   coronary syndrome.  Repeat testing in 4-6 hours if indicated.     >0.29 Consistent with myocardial injury.  Recommend clinical and laboratory correlation.     Results my be falsely decreased if patient taking Biotin.     Basic Metabolic Panel [[id number]]  (Abnormal) Collected:  08/23/19 2315    Specimen:  Blood Updated:  08/23/19 2334     Glucose 96 mg/dL      BUN 6 mg/dL      Creatinine 0.60 mg/dL      Sodium 140 mmol/L      Potassium 3.2 mmol/L      Chloride 104 mmol/L      CO2 24.0 mmol/L      Calcium 9.3 mg/dL      eGFR Non African Amer 108 mL/min/1.73      BUN/Creatinine Ratio 10.0     Anion Gap 15.2 mmol/L            Pending Results: Factor VIII, lupus lupus anticoagulant, beta-2 glycoprotein antibodies, homocystine, RLE Doppler.    Imaging Reviewed:   Xr Chest 1 View    Result Date: 8/24/2019  No acute cardiopulmonary process.  Electronically Signed By-Muriel Gil On:8/24/2019 8:29 AM This report was finalized on [id number] by  Muriel Gil, .      I have reviewed the patient's labs, imaging, reports, and other clinician documentation.         Assessment/Plan       ASSESSMENT  1.  Acute RLE SVT with h/o PE and PVT/MTHFR C677T heterozygous- h/o of elevated factor VIII and lupus anticoagulant +, both repeated to verify diagnosis.  D-dimer 2.84.  She is on prophylactic Lovenox– will change to Pradaxa. Additional thrombophilia work-up ordered.  It appears she has been off her B12–ordered homocystine level.  Right lower extremity Doppler reportedly showed SVT only.  Resumed Pradaxa.   2.  Vitamin B12 deficiency/iron deficiency– due to gastric bypass.  Continue oral B12.  Hemoglobin normal so no need to treat with iron supplementation.  3.  History of recurrent papillary adenocarcinoma of the thyroid– S/P total thyroidectomy, multiple treatments of I-131 and sialadenotomy x2.  Recent ultrasound and follow-up by Dr. Jaramillo.  4.  S/p splenectomy/s/p Alfonzo-en-Y gastric bypass– Per PMD.  5.  Ramon cirrhosis/GERD/hepatic  encephalopathy–last paracentesis in March 2019.  INR 1.07.  Followed by [facility] liver team.  6.  HTN/vitamin D deficiency/hypokalemia–Per primary team.    PLAN  1. Change prophy Lovenox to Pradaxa.  2. Await right lower extremity Doppler.  3. Factor VIII and lupus anticoagulant  4. Beta-2 glycoprotein antibody  5. Homocystine level  6. Oral B12 supplementation  7. Pepcid        I discussed the patients findings and my recommendations with patient    A. MD Sofie  08/25/19  2:23 PM    Much of the above report is an electronic transcription/translation of the spoken language to printed text using Dragon Software. As such, the subtleties and finesse of the spoken language may permit erroneous, or at times, nonsensical words or phrases to be inadvertently transcribed; thus changes may be made at a later date to rectify these errors.

## 2019-08-25 RX ORDER — DABIGATRAN ETEXILATE 150 MG/1
150 CAPSULE ORAL EVERY 12 HOURS SCHEDULED
Status: DISCONTINUED | OUTPATIENT
Start: 2019-08-25 | End: 2020-03-31

## 2019-08-26 ENCOUNTER — TELEPHONE (OUTPATIENT)
Dept: ONCOLOGY | Facility: CLINIC | Age: 44
End: 2019-08-26

## 2019-08-26 LAB
FACT VIII ACT/NOR PPP: 191 % ACTIVITY (ref 70–160)
LA NT DPL PPP: 42 SEC (ref 0–55)
LA NT DPL/LA NT HPL PPP-RTO: 0.87 RATIO (ref 0–1.4)
LA NT PLATELET PPP: 30 SEC (ref 0–51.9)
LUPUS ANTICOAGULANT REFLEX: NORMAL
SCREEN DRVVT: 32.2 SEC (ref 0–47)
THROMBIN TIME: 18.2 SEC (ref 0–23)

## 2019-08-26 NOTE — TELEPHONE ENCOUNTER
I called pt's insurance company to initiate a PA for Pradaxa. Unable to do PA for Pradaxa since it is not on the formulary. I spoke to the pharmacist to see what medications are on the formulary list. Pharmacist states Warfarin, Eliquis and Xarelto are all on the list. She states the only way the Pradaxa would be considered is if pt has tried and failed two of the formulary drugs. Please advise.

## 2019-08-26 NOTE — TELEPHONE ENCOUNTER
Patient states she was discharged from hospital over the weekend.  Dr. Carrizales prescribed Pradaxa.  Her pharmacy is stating this is going to require a PA.

## 2019-08-26 NOTE — TELEPHONE ENCOUNTER
First, this is a [facility] patient last seen 11/2017 - didn't follow up for 1 month follow up.     Secondly, Pradaxa is for preventing blood clots from forming because of a certain irregular heart rhythm (atrial fibrillation). Dr. Cervantes gave her Rx from her last hospital admission and said to get refills from PCP office.     This needs to be addressed by PCP office.       Electronically signed by GRIFFIN Abad, 08/26/19, 3:44 PM.

## 2019-08-27 ENCOUNTER — TELEPHONE (OUTPATIENT)
Dept: ONCOLOGY | Facility: CLINIC | Age: 44
End: 2019-08-27

## 2019-08-27 DIAGNOSIS — D68.59 HYPERCOAGULABLE STATE (HCC): ICD-10-CM

## 2019-08-27 DIAGNOSIS — R79.1 ELEVATED FACTOR VIII LEVEL: Primary | ICD-10-CM

## 2019-08-27 DIAGNOSIS — R76.0 LUPUS ANTICOAGULANT POSITIVE: ICD-10-CM

## 2019-08-27 NOTE — TELEPHONE ENCOUNTER
Discussed with Dr. Carrizales.  I will send a prescription for the patient for Eliquis 2.5 mg p.o. twice daily.  Thank you.

## 2019-08-27 NOTE — TELEPHONE ENCOUNTER
Received call from pt stating that she was seen by Dr Carrizales in [facility] when she was admitted on Friday 8/23/19.  She states that he gave her the script for Pradaxa at discharge.  She also states that she has been on Pradaxa before prescribed by Dr Browne.  The pharmacy told her to contact our office as Dr Carrizales's name is on the script.

## 2019-08-28 ENCOUNTER — TELEPHONE (OUTPATIENT)
Dept: ONCOLOGY | Facility: CLINIC | Age: 44
End: 2019-08-28

## 2019-08-28 LAB
B2 GLYCOPROT1 IGA SER-ACNC: <9 GPI IGA UNITS (ref 0–25)
B2 GLYCOPROT1 IGG SER-ACNC: <9 GPI IGG UNITS (ref 0–20)
B2 GLYCOPROT1 IGM SER-ACNC: <9 GPI IGM UNITS (ref 0–32)

## 2019-08-28 NOTE — TELEPHONE ENCOUNTER
Patient states the Eliquis was eprescribed today is going to cost her $55.00 a month out of pocket and she cannot afford that.  Wanting to know if there is any other medication she can be placed on.  dayday Martinoa

## 2019-08-28 NOTE — TELEPHONE ENCOUNTER
Pt informed that Dr Carrizales is changing her medication to Eliquis 2.5 mg twice daily and that it was sent to [facility] in [place].  She v/u.  Instructed her to call back if she has any issues.  She v/u.

## 2019-08-28 NOTE — TELEPHONE ENCOUNTER
Lucinda,     Please advise patient I have discussed with Dr. Carrizales.  He wants Jeremías Batista to see if anything can be done to assist the patient. I sent him an email.     Electronically signed by GRIFFIN Abad, 08/28/19, 4:18 PM.

## 2019-08-29 ENCOUNTER — TELEPHONE (OUTPATIENT)
Dept: ONCOLOGY | Facility: CLINIC | Age: 44
End: 2019-08-29

## 2019-08-29 NOTE — TELEPHONE ENCOUNTER
Attempted to contact patient regarding NP orders LM on VM asking her to call back. Gunner, daydaya

## 2019-08-29 NOTE — PROGRESS NOTES
Documentation regarding Eliquis copay card.     There is a copay card.  I will contact the patent with the information.    Thanks.            Jeremías Batista  [place] [address]  Office:  [phone]  Fax:  [phone]  Email:  [email or web address]    From: Lisa Hurley (LEIDA) <[email or web address]>   Sent: Wednesday, August 28, 2019 4:17 PM  To: Jeremías Batista (LEIDA) <[email or web address]>  Subject: Eliquis copay   Importance: High Veronica, Is there anything you can do to assist patient? Please advise.   Lucinda Pardo,   Female, 43 y.o., 1975  Admitted:   No  PCP:   Melissa Iniguez APRN

## 2019-08-29 NOTE — TELEPHONE ENCOUNTER
Recd an email from Lisa Cortes NP.  Patient needs assistance with Eliquis copay.  I contacted patient.  Her copay is $55 a month.  She has not picked up her 1st order.  I offered her a 1st month free and $10 a month copay card.  Patient requested that I mail the card to her.  The card was mailed today.  Notified Lisa.

## 2019-09-27 ENCOUNTER — TELEPHONE (OUTPATIENT)
Dept: ONCOLOGY | Facility: CLINIC | Age: 44
End: 2019-09-27

## 2019-10-18 ENCOUNTER — HOSPITAL ENCOUNTER (OUTPATIENT)
Dept: OTHER | Facility: HOSPITAL | Age: 44
Discharge: HOME | End: 2019-10-18
Attending: INTERNAL MEDICINE

## 2019-11-11 ENCOUNTER — OFFICE VISIT CONVERTED (OUTPATIENT)
Dept: GASTROENTEROLOGY | Facility: CLINIC | Age: 44
End: 2019-11-11
Attending: INTERNAL MEDICINE

## 2019-11-13 ENCOUNTER — TELEPHONE (OUTPATIENT)
Dept: ONCOLOGY | Facility: CLINIC | Age: 44
End: 2019-11-13

## 2019-11-13 NOTE — TELEPHONE ENCOUNTER
Received fax from [facility] requesting a surgery clearance to hold Pradaxa for 3 days prior to procedure. Pt is due to have an EGD at [facility] on 1/17/20. Pt was seen by Dr. Carrizales in the hospital on 8/23/2019 and never had a follow up appt at our office. Before that, pt was not see in our office since 2017. Our records also show that pt was switched from Pradaxa to Eliquis 2.5 mg BID. I discussed this information with Norah Baird NP. Norah states pt will need to have a follow up appt before we give surgery clearance.

## 2020-01-07 ENCOUNTER — TELEPHONE (OUTPATIENT)
Dept: ONCOLOGY | Facility: CLINIC | Age: 45
End: 2020-01-07

## 2020-01-07 NOTE — TELEPHONE ENCOUNTER
Pt is suppose to be getting surgery clearance. The office of the surgeon called and wanted to know if that had been done yet. I let her know that the pt has not scheduled here and she showed VU.

## 2020-03-21 ENCOUNTER — HOSPITAL ENCOUNTER (OUTPATIENT)
Facility: HOSPITAL | Age: 45
Setting detail: OBSERVATION
Discharge: HOME | End: 2020-03-22
Attending: EMERGENCY MEDICINE | Admitting: HOSPITALIST

## 2020-03-21 ENCOUNTER — APPOINTMENT (OUTPATIENT)
Dept: CT IMAGING | Facility: HOSPITAL | Age: 45
End: 2020-03-21

## 2020-03-21 DIAGNOSIS — R91.1 LUNG NODULE: ICD-10-CM

## 2020-03-21 DIAGNOSIS — R10.84 GENERALIZED ABDOMINAL PAIN: Primary | ICD-10-CM

## 2020-03-21 DIAGNOSIS — R18.8 OTHER ASCITES: ICD-10-CM

## 2020-03-21 LAB
ALBUMIN SERPL-MCNC: 3.1 G/DL (ref 3.5–5.2)
ALBUMIN/GLOB SERPL: 0.9 G/DL
ALP SERPL-CCNC: 185 U/L (ref 39–117)
ALT SERPL W P-5'-P-CCNC: 38 U/L (ref 1–33)
ANION GAP SERPL CALCULATED.3IONS-SCNC: 8 MMOL/L (ref 5–15)
APTT PPP: 24.6 SECONDS (ref 24–31)
AST SERPL-CCNC: 82 U/L (ref 1–32)
BACTERIA UR QL AUTO: ABNORMAL /HPF
BASOPHILS # BLD AUTO: 0.1 10*3/MM3 (ref 0–0.2)
BASOPHILS NFR BLD AUTO: 1.2 % (ref 0–1.5)
BILIRUB SERPL-MCNC: 0.8 MG/DL (ref 0.2–1.2)
BILIRUB UR QL STRIP: ABNORMAL
BUN BLD-MCNC: 10 MG/DL (ref 6–20)
BUN/CREAT SERPL: 16.1 (ref 7–25)
CALCIUM SPEC-SCNC: 8.6 MG/DL (ref 8.6–10.5)
CHLORIDE SERPL-SCNC: 107 MMOL/L (ref 98–107)
CLARITY UR: ABNORMAL
CO2 SERPL-SCNC: 26 MMOL/L (ref 22–29)
COLOR UR: ABNORMAL
CREAT BLD-MCNC: 0.62 MG/DL (ref 0.57–1)
DEPRECATED RDW RBC AUTO: 48.1 FL (ref 37–54)
EOSINOPHIL # BLD AUTO: 0.6 10*3/MM3 (ref 0–0.4)
EOSINOPHIL NFR BLD AUTO: 6.4 % (ref 0.3–6.2)
ERYTHROCYTE [DISTWIDTH] IN BLOOD BY AUTOMATED COUNT: 16 % (ref 12.3–15.4)
GFR SERPL CREATININE-BSD FRML MDRD: 104 ML/MIN/1.73
GLOBULIN UR ELPH-MCNC: 3.4 GM/DL
GLUCOSE BLD-MCNC: 96 MG/DL (ref 65–99)
GLUCOSE UR STRIP-MCNC: NEGATIVE MG/DL
HCT VFR BLD AUTO: 32.7 % (ref 34–46.6)
HGB BLD-MCNC: 11.6 G/DL (ref 12–15.9)
HGB UR QL STRIP.AUTO: NEGATIVE
HYALINE CASTS UR QL AUTO: ABNORMAL /LPF
INR PPP: 1.2 (ref 0.9–1.1)
KETONES UR QL STRIP: NEGATIVE
LEUKOCYTE ESTERASE UR QL STRIP.AUTO: ABNORMAL
LIPASE SERPL-CCNC: 13 U/L (ref 13–60)
LYMPHOCYTES # BLD AUTO: 4 10*3/MM3 (ref 0.7–3.1)
LYMPHOCYTES NFR BLD AUTO: 41.3 % (ref 19.6–45.3)
MCH RBC QN AUTO: 30.4 PG (ref 26.6–33)
MCHC RBC AUTO-ENTMCNC: 35.6 G/DL (ref 31.5–35.7)
MCV RBC AUTO: 85.5 FL (ref 79–97)
MONOCYTES # BLD AUTO: 0.7 10*3/MM3 (ref 0.1–0.9)
MONOCYTES NFR BLD AUTO: 7.1 % (ref 5–12)
NEUTROPHILS # BLD AUTO: 4.2 10*3/MM3 (ref 1.7–7)
NEUTROPHILS NFR BLD AUTO: 44 % (ref 42.7–76)
NITRITE UR QL STRIP: NEGATIVE
NRBC BLD AUTO-RTO: 0.1 /100 WBC (ref 0–0.2)
PH UR STRIP.AUTO: 6 [PH] (ref 5–8)
PLATELET # BLD AUTO: 392 10*3/MM3 (ref 140–450)
PMV BLD AUTO: 8.4 FL (ref 6–12)
POTASSIUM BLD-SCNC: 3.7 MMOL/L (ref 3.5–5.2)
PROT SERPL-MCNC: 6.5 G/DL (ref 6–8.5)
PROT UR QL STRIP: ABNORMAL
PROTHROMBIN TIME: 12.2 SECONDS (ref 9.6–11.7)
RBC # BLD AUTO: 3.82 10*6/MM3 (ref 3.77–5.28)
RBC # UR: ABNORMAL /HPF
REF LAB TEST METHOD: ABNORMAL
SODIUM BLD-SCNC: 141 MMOL/L (ref 136–145)
SP GR UR STRIP: 1.04 (ref 1–1.03)
SQUAMOUS #/AREA URNS HPF: ABNORMAL /HPF
UROBILINOGEN UR QL STRIP: ABNORMAL
WBC NRBC COR # BLD: 9.6 10*3/MM3 (ref 3.4–10.8)
WBC UR QL AUTO: ABNORMAL /HPF

## 2020-03-21 PROCEDURE — 85025 COMPLETE CBC W/AUTO DIFF WBC: CPT | Performed by: EMERGENCY MEDICINE

## 2020-03-21 PROCEDURE — 25010000002 ONDANSETRON PER 1 MG: Performed by: EMERGENCY MEDICINE

## 2020-03-21 PROCEDURE — 80053 COMPREHEN METABOLIC PANEL: CPT | Performed by: EMERGENCY MEDICINE

## 2020-03-21 PROCEDURE — 74176 CT ABD & PELVIS W/O CONTRAST: CPT

## 2020-03-21 PROCEDURE — 96374 THER/PROPH/DIAG INJ IV PUSH: CPT

## 2020-03-21 PROCEDURE — 85730 THROMBOPLASTIN TIME PARTIAL: CPT | Performed by: EMERGENCY MEDICINE

## 2020-03-21 PROCEDURE — 85610 PROTHROMBIN TIME: CPT | Performed by: EMERGENCY MEDICINE

## 2020-03-21 PROCEDURE — 96375 TX/PRO/DX INJ NEW DRUG ADDON: CPT

## 2020-03-21 PROCEDURE — 25010000002 HYDROMORPHONE PER 4 MG: Performed by: EMERGENCY MEDICINE

## 2020-03-21 PROCEDURE — 83690 ASSAY OF LIPASE: CPT | Performed by: EMERGENCY MEDICINE

## 2020-03-21 PROCEDURE — 81001 URINALYSIS AUTO W/SCOPE: CPT | Performed by: EMERGENCY MEDICINE

## 2020-03-21 PROCEDURE — 99283 EMERGENCY DEPT VISIT LOW MDM: CPT

## 2020-03-21 RX ORDER — HYDROMORPHONE HCL 110MG/55ML
0.5 PATIENT CONTROLLED ANALGESIA SYRINGE INTRAVENOUS ONCE
Status: COMPLETED | OUTPATIENT
Start: 2020-03-21 | End: 2020-03-21

## 2020-03-21 RX ORDER — ONDANSETRON 2 MG/ML
4 INJECTION INTRAMUSCULAR; INTRAVENOUS ONCE
Status: COMPLETED | OUTPATIENT
Start: 2020-03-21 | End: 2020-03-21

## 2020-03-21 RX ADMIN — ONDANSETRON 4 MG: 2 INJECTION INTRAMUSCULAR; INTRAVENOUS at 23:38

## 2020-03-21 RX ADMIN — HYDROMORPHONE HYDROCHLORIDE 0.5 MG: 2 INJECTION, SOLUTION INTRAMUSCULAR; INTRAVENOUS; SUBCUTANEOUS at 23:38

## 2020-03-22 VITALS
HEIGHT: 63 IN | BODY MASS INDEX: 25.08 KG/M2 | DIASTOLIC BLOOD PRESSURE: 70 MMHG | TEMPERATURE: 98.4 F | OXYGEN SATURATION: 97 % | WEIGHT: 141.54 LBS | SYSTOLIC BLOOD PRESSURE: 105 MMHG | RESPIRATION RATE: 14 BRPM | HEART RATE: 85 BPM

## 2020-03-22 PROBLEM — K75.81 NASH (NONALCOHOLIC STEATOHEPATITIS): Chronic | Status: ACTIVE | Noted: 2020-03-22

## 2020-03-22 PROBLEM — R14.0 ABDOMINAL DISTENSION: Status: ACTIVE | Noted: 2019-08-15

## 2020-03-22 LAB
ALBUMIN SERPL-MCNC: 2.9 G/DL (ref 3.5–5.2)
ALBUMIN/GLOB SERPL: 0.9 G/DL
ALP SERPL-CCNC: 186 U/L (ref 39–117)
ALT SERPL W P-5'-P-CCNC: 45 U/L (ref 1–33)
ANION GAP SERPL CALCULATED.3IONS-SCNC: 9 MMOL/L (ref 5–15)
AST SERPL-CCNC: 118 U/L (ref 1–32)
BASOPHILS # BLD AUTO: 0.1 10*3/MM3 (ref 0–0.2)
BASOPHILS NFR BLD AUTO: 0.7 % (ref 0–1.5)
BILIRUB SERPL-MCNC: 1.3 MG/DL (ref 0.2–1.2)
BUN BLD-MCNC: 8 MG/DL (ref 6–20)
BUN/CREAT SERPL: 13.3 (ref 7–25)
CALCIUM SPEC-SCNC: 8.2 MG/DL (ref 8.6–10.5)
CHLORIDE SERPL-SCNC: 106 MMOL/L (ref 98–107)
CK SERPL-CCNC: 62 U/L (ref 20–180)
CO2 SERPL-SCNC: 26 MMOL/L (ref 22–29)
CREAT BLD-MCNC: 0.6 MG/DL (ref 0.57–1)
DEPRECATED RDW RBC AUTO: 48.1 FL (ref 37–54)
EOSINOPHIL # BLD AUTO: 0.6 10*3/MM3 (ref 0–0.4)
EOSINOPHIL NFR BLD AUTO: 5.5 % (ref 0.3–6.2)
ERYTHROCYTE [DISTWIDTH] IN BLOOD BY AUTOMATED COUNT: 16.1 % (ref 12.3–15.4)
FERRITIN SERPL-MCNC: 25.46 NG/ML (ref 13–150)
GFR SERPL CREATININE-BSD FRML MDRD: 108 ML/MIN/1.73
GLOBULIN UR ELPH-MCNC: 3.1 GM/DL
GLUCOSE BLD-MCNC: 89 MG/DL (ref 65–99)
HCT VFR BLD AUTO: 33.2 % (ref 34–46.6)
HGB BLD-MCNC: 11.3 G/DL (ref 12–15.9)
INR PPP: 1.25 (ref 0.9–1.1)
LYMPHOCYTES # BLD AUTO: 3.9 10*3/MM3 (ref 0.7–3.1)
LYMPHOCYTES NFR BLD AUTO: 34.9 % (ref 19.6–45.3)
MAGNESIUM SERPL-MCNC: 1.7 MG/DL (ref 1.6–2.6)
MCH RBC QN AUTO: 29.2 PG (ref 26.6–33)
MCHC RBC AUTO-ENTMCNC: 34.1 G/DL (ref 31.5–35.7)
MCV RBC AUTO: 85.7 FL (ref 79–97)
MONOCYTES # BLD AUTO: 0.9 10*3/MM3 (ref 0.1–0.9)
MONOCYTES NFR BLD AUTO: 7.9 % (ref 5–12)
NEUTROPHILS # BLD AUTO: 5.7 10*3/MM3 (ref 1.7–7)
NEUTROPHILS NFR BLD AUTO: 51 % (ref 42.7–76)
NRBC BLD AUTO-RTO: 0.1 /100 WBC (ref 0–0.2)
NT-PROBNP SERPL-MCNC: 72.4 PG/ML (ref 5–450)
PHOSPHATE SERPL-MCNC: 4.2 MG/DL (ref 2.5–4.5)
PLATELET # BLD AUTO: 371 10*3/MM3 (ref 140–450)
PMV BLD AUTO: 9.6 FL (ref 6–12)
POTASSIUM BLD-SCNC: 3.6 MMOL/L (ref 3.5–5.2)
PROT SERPL-MCNC: 6 G/DL (ref 6–8.5)
PROTHROMBIN TIME: 12.7 SECONDS (ref 9.6–11.7)
RBC # BLD AUTO: 3.87 10*6/MM3 (ref 3.77–5.28)
SODIUM BLD-SCNC: 141 MMOL/L (ref 136–145)
TROPONIN T SERPL-MCNC: <0.01 NG/ML (ref 0–0.03)
WBC NRBC COR # BLD: 11.2 10*3/MM3 (ref 3.4–10.8)

## 2020-03-22 PROCEDURE — 49083 ABD PARACENTESIS W/IMAGING: CPT | Performed by: INTERNAL MEDICINE

## 2020-03-22 PROCEDURE — 84484 ASSAY OF TROPONIN QUANT: CPT | Performed by: PHYSICIAN ASSISTANT

## 2020-03-22 PROCEDURE — 82728 ASSAY OF FERRITIN: CPT | Performed by: PHYSICIAN ASSISTANT

## 2020-03-22 PROCEDURE — 80053 COMPREHEN METABOLIC PANEL: CPT | Performed by: PHYSICIAN ASSISTANT

## 2020-03-22 PROCEDURE — 99236 HOSP IP/OBS SAME DATE HI 85: CPT | Performed by: HOSPITALIST

## 2020-03-22 PROCEDURE — 84100 ASSAY OF PHOSPHORUS: CPT | Performed by: PHYSICIAN ASSISTANT

## 2020-03-22 PROCEDURE — 82550 ASSAY OF CK (CPK): CPT | Performed by: PHYSICIAN ASSISTANT

## 2020-03-22 PROCEDURE — 83735 ASSAY OF MAGNESIUM: CPT | Performed by: PHYSICIAN ASSISTANT

## 2020-03-22 PROCEDURE — 96375 TX/PRO/DX INJ NEW DRUG ADDON: CPT

## 2020-03-22 PROCEDURE — 85025 COMPLETE CBC W/AUTO DIFF WBC: CPT | Performed by: PHYSICIAN ASSISTANT

## 2020-03-22 PROCEDURE — 83880 ASSAY OF NATRIURETIC PEPTIDE: CPT | Performed by: PHYSICIAN ASSISTANT

## 2020-03-22 PROCEDURE — 25010000002 CEFTRIAXONE PER 250 MG: Performed by: EMERGENCY MEDICINE

## 2020-03-22 PROCEDURE — G0378 HOSPITAL OBSERVATION PER HR: HCPCS

## 2020-03-22 PROCEDURE — 76942 ECHO GUIDE FOR BIOPSY: CPT

## 2020-03-22 PROCEDURE — 85610 PROTHROMBIN TIME: CPT | Performed by: PHYSICIAN ASSISTANT

## 2020-03-22 RX ORDER — CALCIUM GLUCONATE 20 MG/ML
1 INJECTION, SOLUTION INTRAVENOUS AS NEEDED
Status: DISCONTINUED | OUTPATIENT
Start: 2020-03-22 | End: 2020-03-22 | Stop reason: HOSPADM

## 2020-03-22 RX ORDER — SPIRONOLACTONE 100 MG/1
200 TABLET, FILM COATED ORAL DAILY
Status: DISCONTINUED | OUTPATIENT
Start: 2020-03-22 | End: 2020-03-22 | Stop reason: HOSPADM

## 2020-03-22 RX ORDER — ACETAMINOPHEN 650 MG/1
650 SUPPOSITORY RECTAL EVERY 4 HOURS PRN
Status: DISCONTINUED | OUTPATIENT
Start: 2020-03-22 | End: 2020-03-22 | Stop reason: HOSPADM

## 2020-03-22 RX ORDER — MAGNESIUM SULFATE HEPTAHYDRATE 40 MG/ML
4 INJECTION, SOLUTION INTRAVENOUS AS NEEDED
Status: DISCONTINUED | OUTPATIENT
Start: 2020-03-22 | End: 2020-03-22 | Stop reason: HOSPADM

## 2020-03-22 RX ORDER — MAGNESIUM SULFATE HEPTAHYDRATE 40 MG/ML
2 INJECTION, SOLUTION INTRAVENOUS AS NEEDED
Status: DISCONTINUED | OUTPATIENT
Start: 2020-03-22 | End: 2020-03-22 | Stop reason: HOSPADM

## 2020-03-22 RX ORDER — ALBUMIN (HUMAN) 12.5 G/50ML
75 SOLUTION INTRAVENOUS ONCE
Status: DISCONTINUED | OUTPATIENT
Start: 2020-03-22 | End: 2020-03-22 | Stop reason: HOSPADM

## 2020-03-22 RX ORDER — BUMETANIDE 1 MG/1
2 TABLET ORAL DAILY
Status: DISCONTINUED | OUTPATIENT
Start: 2020-03-22 | End: 2020-03-22 | Stop reason: HOSPADM

## 2020-03-22 RX ORDER — CHOLECALCIFEROL (VITAMIN D3) 125 MCG
5 CAPSULE ORAL NIGHTLY PRN
Status: DISCONTINUED | OUTPATIENT
Start: 2020-03-22 | End: 2020-03-22 | Stop reason: HOSPADM

## 2020-03-22 RX ORDER — LEVOTHYROXINE SODIUM 0.15 MG/1
150 TABLET ORAL DAILY
COMMUNITY
End: 2021-03-07

## 2020-03-22 RX ORDER — OXYCODONE HYDROCHLORIDE 5 MG/1
5 TABLET ORAL EVERY 6 HOURS PRN
Status: DISCONTINUED | OUTPATIENT
Start: 2020-03-22 | End: 2020-03-22

## 2020-03-22 RX ORDER — OXYCODONE HYDROCHLORIDE 5 MG/1
5 TABLET ORAL ONCE
Status: COMPLETED | OUTPATIENT
Start: 2020-03-22 | End: 2020-03-22

## 2020-03-22 RX ORDER — CALCIUM GLUCONATE 20 MG/ML
2 INJECTION, SOLUTION INTRAVENOUS AS NEEDED
Status: DISCONTINUED | OUTPATIENT
Start: 2020-03-22 | End: 2020-03-22 | Stop reason: HOSPADM

## 2020-03-22 RX ORDER — OXYCODONE HYDROCHLORIDE 5 MG/1
5 TABLET ORAL ONCE
Status: DISCONTINUED | OUTPATIENT
Start: 2020-03-22 | End: 2020-03-22 | Stop reason: SDUPTHER

## 2020-03-22 RX ORDER — ACETAMINOPHEN 325 MG/1
650 TABLET ORAL EVERY 4 HOURS PRN
Status: DISCONTINUED | OUTPATIENT
Start: 2020-03-22 | End: 2020-03-22 | Stop reason: HOSPADM

## 2020-03-22 RX ORDER — ALBUMIN (HUMAN) 12.5 G/50ML
50 SOLUTION INTRAVENOUS ONCE
Status: DISCONTINUED | OUTPATIENT
Start: 2020-03-22 | End: 2020-03-22 | Stop reason: HOSPADM

## 2020-03-22 RX ORDER — ONDANSETRON 4 MG/1
4 TABLET, FILM COATED ORAL EVERY 6 HOURS PRN
Status: DISCONTINUED | OUTPATIENT
Start: 2020-03-22 | End: 2020-03-22 | Stop reason: HOSPADM

## 2020-03-22 RX ORDER — POTASSIUM CHLORIDE 20 MEQ/1
40 TABLET, EXTENDED RELEASE ORAL AS NEEDED
Status: DISCONTINUED | OUTPATIENT
Start: 2020-03-22 | End: 2020-03-22 | Stop reason: HOSPADM

## 2020-03-22 RX ORDER — ALBUMIN (HUMAN) 12.5 G/50ML
100 SOLUTION INTRAVENOUS ONCE
Status: DISCONTINUED | OUTPATIENT
Start: 2020-03-22 | End: 2020-03-22 | Stop reason: HOSPADM

## 2020-03-22 RX ORDER — ALBUMIN (HUMAN) 12.5 G/50ML
112.5 SOLUTION INTRAVENOUS ONCE
Status: DISCONTINUED | OUTPATIENT
Start: 2020-03-22 | End: 2020-03-22 | Stop reason: HOSPADM

## 2020-03-22 RX ORDER — ALBUMIN (HUMAN) 12.5 G/50ML
87.5 SOLUTION INTRAVENOUS ONCE
Status: DISCONTINUED | OUTPATIENT
Start: 2020-03-22 | End: 2020-03-22 | Stop reason: HOSPADM

## 2020-03-22 RX ORDER — SODIUM CHLORIDE 0.9 % (FLUSH) 0.9 %
10 SYRINGE (ML) INJECTION EVERY 12 HOURS SCHEDULED
Status: DISCONTINUED | OUTPATIENT
Start: 2020-03-22 | End: 2020-03-22 | Stop reason: HOSPADM

## 2020-03-22 RX ORDER — ALBUMIN (HUMAN) 12.5 G/50ML
37.5 SOLUTION INTRAVENOUS ONCE
Status: DISCONTINUED | OUTPATIENT
Start: 2020-03-22 | End: 2020-03-22 | Stop reason: HOSPADM

## 2020-03-22 RX ORDER — ALBUMIN (HUMAN) 12.5 G/50ML
62.5 SOLUTION INTRAVENOUS ONCE
Status: DISCONTINUED | OUTPATIENT
Start: 2020-03-22 | End: 2020-03-22 | Stop reason: HOSPADM

## 2020-03-22 RX ORDER — DOCUSATE SODIUM 100 MG/1
100 CAPSULE, LIQUID FILLED ORAL 2 TIMES DAILY PRN
Status: DISCONTINUED | OUTPATIENT
Start: 2020-03-22 | End: 2020-03-22 | Stop reason: HOSPADM

## 2020-03-22 RX ORDER — LEVOTHYROXINE SODIUM 175 UG/1
175 TABLET ORAL
Status: DISCONTINUED | OUTPATIENT
Start: 2020-03-22 | End: 2020-03-22 | Stop reason: HOSPADM

## 2020-03-22 RX ORDER — POTASSIUM CHLORIDE 1.5 G/1.77G
40 POWDER, FOR SOLUTION ORAL AS NEEDED
Status: DISCONTINUED | OUTPATIENT
Start: 2020-03-22 | End: 2020-03-22 | Stop reason: HOSPADM

## 2020-03-22 RX ORDER — LACTULOSE 10 G/15ML
20 SOLUTION ORAL 2 TIMES DAILY PRN
Status: DISCONTINUED | OUTPATIENT
Start: 2020-03-22 | End: 2020-03-22 | Stop reason: HOSPADM

## 2020-03-22 RX ORDER — ONDANSETRON 2 MG/ML
4 INJECTION INTRAMUSCULAR; INTRAVENOUS EVERY 6 HOURS PRN
Status: DISCONTINUED | OUTPATIENT
Start: 2020-03-22 | End: 2020-03-22 | Stop reason: HOSPADM

## 2020-03-22 RX ORDER — ACETAMINOPHEN 160 MG/5ML
650 SOLUTION ORAL EVERY 4 HOURS PRN
Status: DISCONTINUED | OUTPATIENT
Start: 2020-03-22 | End: 2020-03-22 | Stop reason: HOSPADM

## 2020-03-22 RX ORDER — ALUMINA, MAGNESIA, AND SIMETHICONE 2400; 2400; 240 MG/30ML; MG/30ML; MG/30ML
15 SUSPENSION ORAL EVERY 6 HOURS PRN
Status: DISCONTINUED | OUTPATIENT
Start: 2020-03-22 | End: 2020-03-22 | Stop reason: HOSPADM

## 2020-03-22 RX ORDER — CEFDINIR 300 MG/1
300 CAPSULE ORAL 2 TIMES DAILY
Qty: 10 CAPSULE | Refills: 0 | Status: SHIPPED | OUTPATIENT
Start: 2020-03-22 | End: 2020-04-18

## 2020-03-22 RX ORDER — OXYCODONE HYDROCHLORIDE 5 MG/1
10 TABLET ORAL EVERY 6 HOURS PRN
Status: DISCONTINUED | OUTPATIENT
Start: 2020-03-22 | End: 2020-03-22 | Stop reason: HOSPADM

## 2020-03-22 RX ORDER — SODIUM CHLORIDE 0.9 % (FLUSH) 0.9 %
10 SYRINGE (ML) INJECTION AS NEEDED
Status: DISCONTINUED | OUTPATIENT
Start: 2020-03-22 | End: 2020-03-22 | Stop reason: HOSPADM

## 2020-03-22 RX ADMIN — RIFAXIMIN 550 MG: 550 TABLET ORAL at 02:00

## 2020-03-22 RX ADMIN — Medication 10 ML: at 08:24

## 2020-03-22 RX ADMIN — SPIRONOLACTONE 200 MG: 100 TABLET ORAL at 08:58

## 2020-03-22 RX ADMIN — OXYCODONE HYDROCHLORIDE 10 MG: 5 TABLET ORAL at 14:16

## 2020-03-22 RX ADMIN — OXYCODONE HYDROCHLORIDE 10 MG: 5 TABLET ORAL at 08:21

## 2020-03-22 RX ADMIN — BUMETANIDE 2 MG: 1 TABLET ORAL at 09:04

## 2020-03-22 RX ADMIN — OXYCODONE HYDROCHLORIDE 5 MG: 5 TABLET ORAL at 02:01

## 2020-03-22 RX ADMIN — LEVOTHYROXINE SODIUM 175 MCG: 175 TABLET ORAL at 08:58

## 2020-03-22 RX ADMIN — OXYCODONE HYDROCHLORIDE 5 MG: 5 TABLET ORAL at 02:39

## 2020-03-22 RX ADMIN — Medication 10 ML: at 02:00

## 2020-03-22 RX ADMIN — WATER 1 G: 1000 INJECTION, SOLUTION INTRAVENOUS at 00:36

## 2020-04-18 ENCOUNTER — APPOINTMENT (OUTPATIENT)
Dept: GENERAL RADIOLOGY | Facility: HOSPITAL | Age: 45
End: 2020-04-18

## 2020-04-18 ENCOUNTER — HOSPITAL ENCOUNTER (OUTPATIENT)
Facility: HOSPITAL | Age: 45
Setting detail: OBSERVATION
Discharge: HOME | End: 2020-04-19
Attending: HOSPITALIST | Admitting: HOSPITALIST

## 2020-04-18 ENCOUNTER — APPOINTMENT (OUTPATIENT)
Dept: CT IMAGING | Facility: HOSPITAL | Age: 45
End: 2020-04-18

## 2020-04-18 DIAGNOSIS — R18.8 OTHER ASCITES: ICD-10-CM

## 2020-04-18 DIAGNOSIS — R10.84 GENERALIZED ABDOMINAL PAIN: ICD-10-CM

## 2020-04-18 DIAGNOSIS — R11.2 NON-INTRACTABLE VOMITING WITH NAUSEA, UNSPECIFIED VOMITING TYPE: ICD-10-CM

## 2020-04-18 DIAGNOSIS — R07.9 CHEST PAIN, UNSPECIFIED TYPE: Primary | ICD-10-CM

## 2020-04-18 DIAGNOSIS — K75.81 NASH (NONALCOHOLIC STEATOHEPATITIS): Chronic | ICD-10-CM

## 2020-04-18 PROBLEM — K43.9 VENTRAL HERNIA: Chronic | Status: ACTIVE | Noted: 2020-04-18

## 2020-04-18 LAB
ALBUMIN SERPL-MCNC: 3 G/DL (ref 3.5–5.2)
ALBUMIN/GLOB SERPL: 0.9 G/DL
ALP SERPL-CCNC: 170 U/L (ref 39–117)
ALT SERPL W P-5'-P-CCNC: 23 U/L (ref 1–33)
ANION GAP SERPL CALCULATED.3IONS-SCNC: 8 MMOL/L (ref 5–15)
APTT PPP: 23.8 SECONDS (ref 24–31)
AST SERPL-CCNC: 28 U/L (ref 1–32)
BASOPHILS # BLD AUTO: 0.2 10*3/MM3 (ref 0–0.2)
BASOPHILS NFR BLD AUTO: 1.9 % (ref 0–1.5)
BILIRUB SERPL-MCNC: 0.7 MG/DL (ref 0.2–1.2)
BILIRUB UR QL STRIP: NEGATIVE
BUN BLD-MCNC: 8 MG/DL (ref 6–20)
BUN/CREAT SERPL: 11.1 (ref 7–25)
CALCIUM SPEC-SCNC: 8.8 MG/DL (ref 8.6–10.5)
CHLORIDE SERPL-SCNC: 108 MMOL/L (ref 98–107)
CLARITY UR: CLEAR
CO2 SERPL-SCNC: 26 MMOL/L (ref 22–29)
COLOR UR: YELLOW
CREAT BLD-MCNC: 0.72 MG/DL (ref 0.57–1)
DEPRECATED RDW RBC AUTO: 52.9 FL (ref 37–54)
EOSINOPHIL # BLD AUTO: 0.7 10*3/MM3 (ref 0–0.4)
EOSINOPHIL NFR BLD AUTO: 8.6 % (ref 0.3–6.2)
ERYTHROCYTE [DISTWIDTH] IN BLOOD BY AUTOMATED COUNT: 17.5 % (ref 12.3–15.4)
GFR SERPL CREATININE-BSD FRML MDRD: 87 ML/MIN/1.73
GIANT PLATELETS: NORMAL
GLOBULIN UR ELPH-MCNC: 3.5 GM/DL
GLUCOSE BLD-MCNC: 93 MG/DL (ref 65–99)
GLUCOSE UR STRIP-MCNC: NEGATIVE MG/DL
HCT VFR BLD AUTO: 36.2 % (ref 34–46.6)
HGB BLD-MCNC: 11.8 G/DL (ref 12–15.9)
HGB UR QL STRIP.AUTO: NEGATIVE
INR PPP: 1.13 (ref 0.9–1.1)
KETONES UR QL STRIP: NEGATIVE
LEUKOCYTE ESTERASE UR QL STRIP.AUTO: NEGATIVE
LIPASE SERPL-CCNC: 21 U/L (ref 13–60)
LYMPHOCYTES # BLD AUTO: 4 10*3/MM3 (ref 0.7–3.1)
LYMPHOCYTES NFR BLD AUTO: 48.1 % (ref 19.6–45.3)
MCH RBC QN AUTO: 28 PG (ref 26.6–33)
MCHC RBC AUTO-ENTMCNC: 32.6 G/DL (ref 31.5–35.7)
MCV RBC AUTO: 86 FL (ref 79–97)
MONOCYTES # BLD AUTO: 0.7 10*3/MM3 (ref 0.1–0.9)
MONOCYTES NFR BLD AUTO: 8.5 % (ref 5–12)
NEUTROPHILS # BLD AUTO: 2.8 10*3/MM3 (ref 1.7–7)
NEUTROPHILS NFR BLD AUTO: 32.9 % (ref 42.7–76)
NITRITE UR QL STRIP: NEGATIVE
NRBC BLD AUTO-RTO: 0.1 /100 WBC (ref 0–0.2)
PH UR STRIP.AUTO: 6.5 [PH] (ref 5–8)
PLATELET # BLD AUTO: 420 10*3/MM3 (ref 140–450)
PMV BLD AUTO: 8.4 FL (ref 6–12)
POTASSIUM BLD-SCNC: 3.9 MMOL/L (ref 3.5–5.2)
PROT SERPL-MCNC: 6.5 G/DL (ref 6–8.5)
PROT UR QL STRIP: NEGATIVE
PROTHROMBIN TIME: 11.6 SECONDS (ref 9.6–11.7)
RBC # BLD AUTO: 4.21 10*6/MM3 (ref 3.77–5.28)
RBC MORPH BLD: NORMAL
SODIUM BLD-SCNC: 142 MMOL/L (ref 136–145)
SP GR UR STRIP: 1.03 (ref 1–1.03)
TROPONIN T SERPL-MCNC: <0.01 NG/ML (ref 0–0.03)
UROBILINOGEN UR QL STRIP: NORMAL
WBC MORPH BLD: NORMAL
WBC NRBC COR # BLD: 8.4 10*3/MM3 (ref 3.4–10.8)

## 2020-04-18 PROCEDURE — 84484 ASSAY OF TROPONIN QUANT: CPT | Performed by: NURSE PRACTITIONER

## 2020-04-18 PROCEDURE — 76942 ECHO GUIDE FOR BIOPSY: CPT

## 2020-04-18 PROCEDURE — 74176 CT ABD & PELVIS W/O CONTRAST: CPT

## 2020-04-18 PROCEDURE — 81003 URINALYSIS AUTO W/O SCOPE: CPT | Performed by: NURSE PRACTITIONER

## 2020-04-18 PROCEDURE — G0378 HOSPITAL OBSERVATION PER HR: HCPCS

## 2020-04-18 PROCEDURE — 85007 BL SMEAR W/DIFF WBC COUNT: CPT | Performed by: NURSE PRACTITIONER

## 2020-04-18 PROCEDURE — 85610 PROTHROMBIN TIME: CPT | Performed by: NURSE PRACTITIONER

## 2020-04-18 PROCEDURE — 93005 ELECTROCARDIOGRAM TRACING: CPT | Performed by: NURSE PRACTITIONER

## 2020-04-18 PROCEDURE — 85730 THROMBOPLASTIN TIME PARTIAL: CPT | Performed by: NURSE PRACTITIONER

## 2020-04-18 PROCEDURE — 83690 ASSAY OF LIPASE: CPT | Performed by: NURSE PRACTITIONER

## 2020-04-18 PROCEDURE — 99219 PR INITIAL OBSERVATION CARE/DAY 50 MINUTES: CPT | Performed by: PHYSICIAN ASSISTANT

## 2020-04-18 PROCEDURE — 85025 COMPLETE CBC W/AUTO DIFF WBC: CPT | Performed by: NURSE PRACTITIONER

## 2020-04-18 PROCEDURE — 71045 X-RAY EXAM CHEST 1 VIEW: CPT

## 2020-04-18 PROCEDURE — 80053 COMPREHEN METABOLIC PANEL: CPT | Performed by: NURSE PRACTITIONER

## 2020-04-18 PROCEDURE — 99284 EMERGENCY DEPT VISIT MOD MDM: CPT

## 2020-04-18 RX ORDER — OXYCODONE HYDROCHLORIDE 5 MG/1
5 TABLET ORAL ONCE
Status: COMPLETED | OUTPATIENT
Start: 2020-04-18 | End: 2020-04-18

## 2020-04-18 RX ORDER — SODIUM CHLORIDE 0.9 % (FLUSH) 0.9 %
10 SYRINGE (ML) INJECTION AS NEEDED
Status: DISCONTINUED | OUTPATIENT
Start: 2020-04-18 | End: 2020-04-19 | Stop reason: HOSPADM

## 2020-04-18 RX ADMIN — OXYCODONE HYDROCHLORIDE 5 MG: 5 TABLET ORAL at 21:23

## 2020-04-19 VITALS
HEART RATE: 73 BPM | SYSTOLIC BLOOD PRESSURE: 123 MMHG | RESPIRATION RATE: 14 BRPM | DIASTOLIC BLOOD PRESSURE: 78 MMHG | TEMPERATURE: 97.8 F | BODY MASS INDEX: 24.45 KG/M2 | HEIGHT: 63 IN | OXYGEN SATURATION: 99 % | WEIGHT: 138 LBS

## 2020-04-19 LAB
ANION GAP SERPL CALCULATED.3IONS-SCNC: 9 MMOL/L (ref 5–15)
BUN BLD-MCNC: 8 MG/DL (ref 6–20)
BUN/CREAT SERPL: 12.1 (ref 7–25)
CALCIUM SPEC-SCNC: 8.2 MG/DL (ref 8.6–10.5)
CHLORIDE SERPL-SCNC: 106 MMOL/L (ref 98–107)
CO2 SERPL-SCNC: 26 MMOL/L (ref 22–29)
CREAT BLD-MCNC: 0.66 MG/DL (ref 0.57–1)
GFR SERPL CREATININE-BSD FRML MDRD: 96 ML/MIN/1.73
GLUCOSE BLD-MCNC: 119 MG/DL (ref 65–99)
POTASSIUM BLD-SCNC: 3.1 MMOL/L (ref 3.5–5.2)
SODIUM BLD-SCNC: 141 MMOL/L (ref 136–145)
TROPONIN T SERPL-MCNC: <0.01 NG/ML (ref 0–0.03)
TSH SERPL DL<=0.05 MIU/L-ACNC: 0.01 UIU/ML (ref 0.27–4.2)

## 2020-04-19 PROCEDURE — 84443 ASSAY THYROID STIM HORMONE: CPT | Performed by: PHYSICIAN ASSISTANT

## 2020-04-19 PROCEDURE — 84484 ASSAY OF TROPONIN QUANT: CPT | Performed by: PHYSICIAN ASSISTANT

## 2020-04-19 PROCEDURE — 25010000002 ONDANSETRON PER 1 MG: Performed by: PHYSICIAN ASSISTANT

## 2020-04-19 PROCEDURE — 99217 PR OBSERVATION CARE DISCHARGE MANAGEMENT: CPT | Performed by: HOSPITALIST

## 2020-04-19 PROCEDURE — 96374 THER/PROPH/DIAG INJ IV PUSH: CPT

## 2020-04-19 PROCEDURE — 80048 BASIC METABOLIC PNL TOTAL CA: CPT | Performed by: PHYSICIAN ASSISTANT

## 2020-04-19 PROCEDURE — 49083 ABD PARACENTESIS W/IMAGING: CPT | Performed by: INTERNAL MEDICINE

## 2020-04-19 PROCEDURE — 63710000001 ONDANSETRON PER 8 MG: Performed by: PHYSICIAN ASSISTANT

## 2020-04-19 PROCEDURE — 96376 TX/PRO/DX INJ SAME DRUG ADON: CPT

## 2020-04-19 PROCEDURE — G0378 HOSPITAL OBSERVATION PER HR: HCPCS

## 2020-04-19 RX ORDER — LACTULOSE 10 G/15ML
20 SOLUTION ORAL 2 TIMES DAILY PRN
Status: DISCONTINUED | OUTPATIENT
Start: 2020-04-19 | End: 2020-04-19 | Stop reason: HOSPADM

## 2020-04-19 RX ORDER — BISACODYL 10 MG
10 SUPPOSITORY, RECTAL RECTAL DAILY PRN
Status: DISCONTINUED | OUTPATIENT
Start: 2020-04-19 | End: 2020-04-19 | Stop reason: HOSPADM

## 2020-04-19 RX ORDER — NITROGLYCERIN 0.4 MG/1
0.4 TABLET SUBLINGUAL
Status: DISCONTINUED | OUTPATIENT
Start: 2020-04-19 | End: 2020-04-19 | Stop reason: HOSPADM

## 2020-04-19 RX ORDER — MAGNESIUM SULFATE HEPTAHYDRATE 40 MG/ML
2 INJECTION, SOLUTION INTRAVENOUS AS NEEDED
Status: DISCONTINUED | OUTPATIENT
Start: 2020-04-19 | End: 2020-04-19 | Stop reason: HOSPADM

## 2020-04-19 RX ORDER — SODIUM CHLORIDE 0.9 % (FLUSH) 0.9 %
10 SYRINGE (ML) INJECTION AS NEEDED
Status: DISCONTINUED | OUTPATIENT
Start: 2020-04-19 | End: 2020-04-19 | Stop reason: HOSPADM

## 2020-04-19 RX ORDER — SPIRONOLACTONE 100 MG/1
200 TABLET, FILM COATED ORAL DAILY
Status: DISCONTINUED | OUTPATIENT
Start: 2020-04-19 | End: 2020-04-19 | Stop reason: HOSPADM

## 2020-04-19 RX ORDER — LEVOTHYROXINE SODIUM 175 UG/1
175 TABLET ORAL
Status: DISCONTINUED | OUTPATIENT
Start: 2020-04-19 | End: 2020-04-19 | Stop reason: HOSPADM

## 2020-04-19 RX ORDER — ONDANSETRON 2 MG/ML
4 INJECTION INTRAMUSCULAR; INTRAVENOUS EVERY 6 HOURS PRN
Status: DISCONTINUED | OUTPATIENT
Start: 2020-04-19 | End: 2020-04-19 | Stop reason: HOSPADM

## 2020-04-19 RX ORDER — SODIUM CHLORIDE 0.9 % (FLUSH) 0.9 %
10 SYRINGE (ML) INJECTION EVERY 12 HOURS SCHEDULED
Status: DISCONTINUED | OUTPATIENT
Start: 2020-04-19 | End: 2020-04-19 | Stop reason: HOSPADM

## 2020-04-19 RX ORDER — OXYCODONE HYDROCHLORIDE 5 MG/1
10 TABLET ORAL EVERY 6 HOURS PRN
Status: DISCONTINUED | OUTPATIENT
Start: 2020-04-19 | End: 2020-04-19 | Stop reason: HOSPADM

## 2020-04-19 RX ORDER — ONDANSETRON 4 MG/1
8 TABLET, FILM COATED ORAL EVERY 6 HOURS PRN
Status: DISCONTINUED | OUTPATIENT
Start: 2020-04-19 | End: 2020-04-19 | Stop reason: HOSPADM

## 2020-04-19 RX ORDER — ONDANSETRON 4 MG/1
4 TABLET, FILM COATED ORAL EVERY 6 HOURS PRN
Status: DISCONTINUED | OUTPATIENT
Start: 2020-04-19 | End: 2020-04-19 | Stop reason: HOSPADM

## 2020-04-19 RX ORDER — ALUMINA, MAGNESIA, AND SIMETHICONE 2400; 2400; 240 MG/30ML; MG/30ML; MG/30ML
15 SUSPENSION ORAL EVERY 6 HOURS PRN
Status: DISCONTINUED | OUTPATIENT
Start: 2020-04-19 | End: 2020-04-19 | Stop reason: HOSPADM

## 2020-04-19 RX ORDER — ACETAMINOPHEN 650 MG/1
650 SUPPOSITORY RECTAL EVERY 4 HOURS PRN
Status: DISCONTINUED | OUTPATIENT
Start: 2020-04-19 | End: 2020-04-19 | Stop reason: HOSPADM

## 2020-04-19 RX ORDER — CHOLECALCIFEROL (VITAMIN D3) 125 MCG
5 CAPSULE ORAL NIGHTLY PRN
Status: DISCONTINUED | OUTPATIENT
Start: 2020-04-19 | End: 2020-04-19 | Stop reason: HOSPADM

## 2020-04-19 RX ORDER — BUMETANIDE 1 MG/1
2 TABLET ORAL DAILY
Status: DISCONTINUED | OUTPATIENT
Start: 2020-04-19 | End: 2020-04-19 | Stop reason: HOSPADM

## 2020-04-19 RX ORDER — POTASSIUM CHLORIDE 20 MEQ/1
40 TABLET, EXTENDED RELEASE ORAL AS NEEDED
Status: DISCONTINUED | OUTPATIENT
Start: 2020-04-19 | End: 2020-04-19 | Stop reason: HOSPADM

## 2020-04-19 RX ORDER — ACETAMINOPHEN 325 MG/1
650 TABLET ORAL EVERY 4 HOURS PRN
Status: DISCONTINUED | OUTPATIENT
Start: 2020-04-19 | End: 2020-04-19 | Stop reason: HOSPADM

## 2020-04-19 RX ORDER — MAGNESIUM SULFATE 1 G/100ML
1 INJECTION INTRAVENOUS AS NEEDED
Status: DISCONTINUED | OUTPATIENT
Start: 2020-04-19 | End: 2020-04-19 | Stop reason: HOSPADM

## 2020-04-19 RX ORDER — ACETAMINOPHEN 160 MG/5ML
650 SOLUTION ORAL EVERY 4 HOURS PRN
Status: DISCONTINUED | OUTPATIENT
Start: 2020-04-19 | End: 2020-04-19 | Stop reason: HOSPADM

## 2020-04-19 RX ADMIN — ONDANSETRON 4 MG: 2 INJECTION INTRAMUSCULAR; INTRAVENOUS at 01:03

## 2020-04-19 RX ADMIN — RIFAXIMIN 550 MG: 550 TABLET ORAL at 12:38

## 2020-04-19 RX ADMIN — OXYCODONE HYDROCHLORIDE 10 MG: 5 TABLET ORAL at 13:22

## 2020-04-19 RX ADMIN — POTASSIUM CHLORIDE 40 MEQ: 1500 TABLET, EXTENDED RELEASE ORAL at 12:38

## 2020-04-19 RX ADMIN — SPIRONOLACTONE 200 MG: 100 TABLET ORAL at 12:38

## 2020-04-19 RX ADMIN — OXYCODONE HYDROCHLORIDE 10 MG: 5 TABLET ORAL at 01:02

## 2020-04-19 RX ADMIN — LEVOTHYROXINE SODIUM 175 MCG: 175 TABLET ORAL at 05:09

## 2020-04-19 RX ADMIN — ONDANSETRON 4 MG: 2 INJECTION INTRAMUSCULAR; INTRAVENOUS at 07:37

## 2020-04-19 RX ADMIN — Medication 10 ML: at 01:04

## 2020-04-19 RX ADMIN — RIFAXIMIN 550 MG: 550 TABLET ORAL at 01:02

## 2020-04-19 RX ADMIN — Medication 10 ML: at 08:32

## 2020-04-19 RX ADMIN — BUMETANIDE 2 MG: 1 TABLET ORAL at 12:38

## 2020-04-19 RX ADMIN — OXYCODONE HYDROCHLORIDE 10 MG: 5 TABLET ORAL at 07:21

## 2020-04-19 RX ADMIN — ONDANSETRON HYDROCHLORIDE 4 MG: 4 TABLET, FILM COATED ORAL at 13:22

## 2020-04-19 RX ADMIN — POTASSIUM CHLORIDE 40 MEQ: 1500 TABLET, EXTENDED RELEASE ORAL at 07:37

## 2020-04-19 NOTE — PROCEDURES
Procedure:Ultrasound guided Paracentesis    Consent: Informed    Vitals:    04/19/20 1052 04/19/20 1115 04/19/20 1130 04/19/20 1145   BP: 99/62 106/74 107/64 104/65   BP Location: Left arm      Patient Position: Lying      Pulse: 84 80 78 79   Resp: 14 10 17 16   Temp: 98.7 °F (37.1 °C)      TempSrc: Oral      SpO2: 99% 97% 97% 96%   Weight:       Height:           Anesthesia Provider: Eric Unger MD    Anesthesia type: Local infiltration with 1% Xylocaine    Surgeon: .Eric Unger MD    Assistant: none    Procedure summary:    Clinical findings of significance: Adhesions and various fluid pockets visible on ultrasound    Allergies , labs and medications were reviewed. Patient was made to lie supine and the area of interest was imaged with ultrasound and fluid verified and marked. Area of interest was cleaned and draped in a sterile fashion. Subsequently local infiltration with Xylocaine. Trocar and cannula were advanced. Upon verification of the fluid, trocar was steadied and cannula advanced further. Trocar was drawn out. Cannula was connected to extension tubing and to the vacuum bottle. Subsequently the cannula was removed after completion of the procedure.     Findings: Clear straw yellow fluid was obtained.     Total volume drained: See nursing progress note for volume drained.    Lab Specimen sent: 0 ml    Complication: none    EBL: 0 ml    Post op condition: Stable. Albumin was given by protocol if needed.    Post op plan: Discharge back to the referring physician.

## 2020-04-19 NOTE — DISCHARGE INSTRUCTIONS
Dr. Marshall sent Paracentesis orders to Ambulatory Care at [facility]. Please call Ambulatory to schedule paracentesis. [phone]

## 2020-04-19 NOTE — H&P
[facility] ER Medicine Services      Patient Name: Lucinda Cope  : 1975  MRN: [id number]  Primary Care Physician: Melissa Iniguez APRN  Date of admission: 2020    Patient Care Team:  Melissa Iniguez APRN as PCP - General          Subjective   History Present Illness     Chief Complaint:   Chief Complaint   Patient presents with   • Abdominal Pain       Ms. Cope is a 44 y.o. female presents to [facility] complaining of increased abdominal pain and swelling for the past 2 days.  She describes the pain is diffuse and achy and is currently a 7 out of 10.  She states the pain radiates to her chest and left shoulder as well as to her back.  She feels that the in increase in abdominal pressure gives her pain throughout.  She states that this pain is exactly the same as 3 or 4 weeks ago when she needed her last paracentesis.  She reports nausea with a few episodes of vomiting x3.  Patient denies any blood in emesis.  Patient denies fever, chills, cough, constipation, diarrhea, blood in stool, swelling in her lower extremities or shortness of breath. Patient states she has required paracentesis in the past with the most recent time being about a month ago.  She states she is followed by a gastro-neurologist in [facility] but states that she has seen Dr. Mckeon here in the past. Patient underwent therapeutic paracentesis with approximately 3 L removed during her hospital stay on 3/21/2020.  Patient states that there was something wrong with the order upon discharge in which she could not set up therapeutic paracentesis at ambulatory care center as outpatient.     In the ED initial troponin negative, alk phos mildly elevated 170 which is normal for her as last labs on 3/22/2020 showed an alk phos of 186.  Liver enzymes unremarkable.  Albumin low at 3.0 which is about baseline for her.  Lipase negative.  Coag studies about baseline for her.  Hemoglobin 11.8  about baseline for her.  CBC otherwise unremarkable.  UA negative.  Chest x-ray pending read but largely unremarkable for acute cardiopulmonary processes.  EKG showed normal sinus rhythm of 68 and without acute ST changes.  CT of abdomen and pelvis showed stable large volume peritoneal ascites. Unchanged fluid containing ventral abdominal hernias.  Patient is afebrile, pulse in the 70s, on room air oxygen 99% SPO2 and normotensive.  Patient given her home med Roxicodone in ED for pain.  Patient admitted to hospital service.    Review of Systems   Constitution: Negative.   HENT: Negative.    Cardiovascular: Positive for chest pain. Negative for cyanosis, dyspnea on exertion, leg swelling, near-syncope, palpitations and syncope.   Respiratory: Negative.    Skin: Negative.    Musculoskeletal: Negative.    Gastrointestinal: Positive for abdominal pain (diffuse), nausea and vomiting. Negative for change in bowel habit, constipation, diarrhea, hematemesis, hematochezia and melena.   Genitourinary: Negative.    Neurological: Negative.    Psychiatric/Behavioral: Negative.            Personal History     Past Medical History:   Past Medical History:   Diagnosis Date   • Abdominal pain    • Anemia    • Cancer (CMS/HCC)    • Clot    • Deep venous thrombosis of right profunda femoris vein (CMS/HCC) 8/15/2019   • Depression    • Disease of thyroid gland    • Empyema of pleura (CMS/HCC)    • GERD (gastroesophageal reflux disease)    • Heartburn    • Hemorrhoids    • Hernia, ventral    • History of malignant neoplasm of thyroid    • History of transfusion    • Irregular menstrual cycle    • Migraine    • Parathyroid abnormality (CMS/HCC)    • Radiation    • Restless legs syndrome (RLS)    • Urge and stress incontinence    • Varicose veins        Surgical History:      Past Surgical History:   Procedure Laterality Date   • ABDOMINAL SURGERY     • BEDSIDE PARACENTESIS  3/22/2020        • CHOLECYSTECTOMY      Laparoscopic   •  ENDOSCOPY N/A 3/17/2016    Procedure: ESOPHAGOGASTRODUODENOSCOPY WITH BALOON DILATATION 18-20MM WITH GASTRIC SLEEVE SEALANT;  Surgeon: Leonardo Ortiz Jr., MD;  Location: [facility] ENDOSCOPY;  Service:    • ENDOSCOPY N/A 3/14/2016    Procedure: esophagogastroduodenoscopy with fluoroscopy;  Surgeon: Greg Avila III, MD;  Location: [facility] ENDOSCOPY;  Service:    • ENDOSCOPY N/A 4/15/2016    Procedure: EGD WITH DILITATION AND STENT PLACEMENT dilation of pylorus ;  Surgeon: Leonardo Ortiz Jr., MD;  Location: [facility] ENDOSCOPY;  Service:    • ENDOSCOPY N/A 5/13/2016    Procedure: ESOPHAGOGASTRODUODENOSCOPY  W/ STENT;  Surgeon: Leonardo Ortiz Jr., MD;  Location: [facility] ENDOSCOPY;  Service:    • GASTRECTOMY      Gastric Surgery for Morbid Obesity Laparoscopic Longitudinal Gastrectomy   • GASTRIC SLEEVE LAPAROSCOPIC     • HEMORRHOIDECTOMY     • HERNIA REPAIR     • LAPAROSCOPY REPAIR HIATAL HERNIA     • LARYNGOSCOPY      Direct Laryngoscopy with Injection into Vocal Cords   • OTHER SURGICAL HISTORY Bilateral     Ear Pressure Equalization Tube, Insertion, Bilaterally   • PERIPHERALLY INSERTED CENTRAL CATHETER INSERTION     • SPLENECTOMY     • THYROID SURGERY     • TUBAL ABDOMINAL LIGATION             Family History: family history includes Cancer in an other family member; Diabetes in her father; Hypertension in her father; Stroke in her father. Otherwise pertinent FHx was reviewed and unremarkable.     Social History:  reports that she has never smoked. She has never used smokeless tobacco. She reports that she does not drink alcohol or use drugs.      Medications:  Prior to Admission medications    Medication Sig Start Date End Date Taking? Authorizing Provider   bumetanide (BUMEX) 2 MG tablet Take 2 mg by mouth Daily.   Yes ProviderJustyn MD   lactulose (CHRONULAC) 10 GM/15ML solution Take 20 g by mouth 2 (Two) Times a Day As Needed.   Yes Justyn Erwin MD   levothyroxine (SYNTHROID, LEVOTHROID)  175 MCG tablet Take 175 mcg by mouth Daily.   Yes Justyn Erwin MD   ondansetron (ZOFRAN) 4 MG tablet Take 8 mg by mouth Every 6 (Six) Hours As Needed for Nausea or Vomiting.   Yes Justyn Erwin MD   oxyCODONE (OXY-IR) 5 MG capsule Take 10 mg by mouth Every 6 (Six) Hours As Needed for Moderate Pain .   Yes Justyn Erwin MD   rifaximin (XIFAXAN) 550 MG tablet Take 550 mg by mouth Every 12 (Twelve) Hours.   Yes Justyn Erwin MD   spironolactone (ALDACTONE) 100 MG tablet Take 200 mg by mouth Daily.   Yes Justyn Erwin MD   cefdinir (OMNICEF) 300 MG capsule Take 1 capsule by mouth 2 (Two) Times a Day. 3/22/20 4/18/20  Ni Marshall MD       Allergies:    Allergies   Allergen Reactions   • Compazine [Prochlorperazine Edisylate] Hives   • Contrast Dye Hives   • Iodinated Diagnostic Agents    • Iodine Hives   • Morphine Itching   • Nsaids Other (See Comments)     States I am not suppose to take them   • Hydrocodone Rash     Lortab elixir per pt       Objective   Objective     Vital Signs  Temp:  [98.1 °F (36.7 °C)] 98.1 °F (36.7 °C)  Heart Rate:  [] 74  Resp:  [16] 16  BP: (119-132)/(73-88) 120/73  SpO2:  [97 %-100 %] 98 %  on   ;      Body mass index is 24.64 kg/m².    Physical Exam   Constitutional: She is oriented to person, place, and time. She appears well-developed and well-nourished. No distress.   HENT:   Head: Normocephalic and atraumatic.   Nose: Nose normal.   Mouth/Throat: No oropharyngeal exudate.   Eyes: Conjunctivae and EOM are normal.   Neck: Normal range of motion.   Cardiovascular: Normal rate, regular rhythm, normal heart sounds and intact distal pulses.   S1, S2 audible.   Pulmonary/Chest: Effort normal and breath sounds normal. No respiratory distress. She has no wheezes.   On room air.   Abdominal: She exhibits distension. There is tenderness (mild tenderness in all 4 quadrants). There is no rebound and no guarding. A hernia (obvious multiple large  reducable ventral hernias. No signs of infection, erythema, or discoloration ) is present.   Hypoactive BS.    Musculoskeletal: Normal range of motion. She exhibits no edema, tenderness or deformity.   Neurological: She is alert and oriented to person, place, and time. No cranial nerve deficit.   Skin: Skin is warm. No rash noted. She is not diaphoretic. No erythema.   Psychiatric: She has a normal mood and affect. Her behavior is normal.   Nursing note and vitals reviewed.      Results Review:  I have personally reviewed most recent cardiac tracings, lab results and radiology images and interpretations and agree with findings.    Results from last 7 days   Lab Units 04/18/20  2117   WBC 10*3/mm3 8.40   HEMOGLOBIN g/dL 11.8*   HEMATOCRIT % 36.2   PLATELETS 10*3/mm3 420   INR  1.13*     Results from last 7 days   Lab Units 04/18/20  2122   SODIUM mmol/L 142   POTASSIUM mmol/L 3.9   CHLORIDE mmol/L 108*   CO2 mmol/L 26.0   BUN mg/dL 8   CREATININE mg/dL 0.72   GLUCOSE mg/dL 93   CALCIUM mg/dL 8.8   ALT (SGPT) U/L 23   AST (SGOT) U/L 28   TROPONIN T ng/mL <0.010     Estimated Creatinine Clearance: 87.4 mL/min (by C-G formula based on SCr of 0.72 mg/dL).  Brief Urine Lab Results  (Last result in the past 365 days)      Color   Clarity   Blood   Leuk Est   Nitrite   Protein   CREAT   Urine HCG        04/18/20 2201 Yellow Clear Negative Negative Negative Negative               Microbiology Results (last 10 days)     ** No results found for the last 240 hours. **          ECG/EMG Results (most recent)     Procedure Component Value Units Date/Time    ECG 12 Lead [[id number]] Collected:  04/18/20 2214     Updated:  04/18/20 2216    Narrative:       HEART RATE= 68  bpm  RR Interval= 888  ms  ME Interval= 136  ms  P Horizontal Axis= 51  deg  P Front Axis= 32  deg  QRSD Interval= 95  ms  QT Interval= 414  ms  QRS Axis= 47  deg  T Wave Axis= 26  deg  - NORMAL ECG -  Sinus rhythm  Electronically Signed By:   Date and Time of  Study: 2020-04-18 22:14:08          Results for orders placed during the hospital encounter of 08/23/19   Duplex Venous Lower Extremity - Right CAR    Narrative · Acute right lower extremity superficial thrombophlebitis noted in the   greater saphenous (above knee) and greater saphenous (below knee).  · All other right sided veins appeared normal.               Ct Abdomen Pelvis Without Contrast    Result Date: 4/18/2020  Impression: 1. Stable large volume peritoneal ascites. 2. Unchanged fluid containing ventral abdominal hernias. 3. No acute abnormality seen. Other stable chronic/incidental findings as above. Electronically signed by:  Josué Santillan M.D.  4/18/2020 8:06 PM        Estimated Creatinine Clearance: 87.4 mL/min (by C-G formula based on SCr of 0.72 mg/dL).    Assessment/Plan   Assessment/Plan       Active Hospital Problems    Diagnosis  POA   • **Chest pain [R07.9]  Yes   • Ventral hernia [K43.9]  Yes   • CARTER (nonalcoholic steatohepatitis) [K75.81]  Yes   • Abdominal distension [R14.0]  Yes   • Hypothyroidism associated with surgical procedure [E89.0]  Yes   • Hypothyroid [E03.9]  Yes   • Back pain [M54.9]  Yes   • Thyroid cancer (CMS/HCC) [C73]  Yes   • Depression [F32.9]  Yes      Resolved Hospital Problems   No resolved problems to display.       Acute chest pain  -most likely due to abdominal distention but cannot rule out ACS  -initial troponin negative  -Chest x-ray pending read but largely unremarkable for acute cardiopulmonary processes.    -EKG showed normal sinus rhythm of 68 and without acute ST changes.   -Patient is afebrile, pulse in the 70s, on room air oxygen 99% SPO2 and normotensive.  -Patient given her home med Roxicodone 5 mg in ED for pain.  -Check serial troponins  -Nitro as needed  -Cardiac monitoring  -Heart healthy diet    Abdominal distention and pain, nausea and vomiting  -Patient notes progressing abdominal swelling over the past 2 weeks with significant distention over the  past 2 days  -CT of abdomen and pelvis showed stable large volume peritoneal ascites. Unchanged fluid containing ventral abdominal hernias.  -No signs of infection or SBP on patient's labs or exam.  -Therapeutic paracentesis ordered (most recent paracentesis one month ago)  -Zofran as needed  -Continue spironolactone and Bumex     Ramon  -ALT/AST normal, alk phos: 170.  Albumin low at 3.0 which is about baseline for her.  Lipase negative.  Coag studies about baseline for her.    -No change in mental status reported or noted on exam  -Nodular liver compatible with fibrosis noted on CT  -Acute ascites addressed as above  -Continue lactulose, rifaximin and spironolactone  -Monitor CMP while admitted    Ventral hernias  -no change since last CT scan     Hypothyroidism (status post thyroidectomy secondary to thyroid cancer)  -check TSH  -Continue levothyroxine     Depression  -Not on home medication for this     Chronic pain  -Continue oxycodone (inspect verified)          VTE Prophylaxis - SCDs  Mechanical Order History:     None      Pharmalogical Order History:     None          CODE STATUS:    Code Status and Medical Interventions:   Ordered at: 04/18/20 2329     Code Status:    CPR     Medical Interventions (Level of Support Prior to Arrest):    Full       This patient has been examined wearing appropriate Personal Protective Equipment. 04/18/20      I discussed the patient's findings and my recommendations with patient.        Electronically signed by KWAME Perez, 04/18/20, 10:40 PM.  [facility] Hospitalist Team

## 2020-04-19 NOTE — ED PROVIDER NOTES
Subjective   Patient is a 44-year-old white female with history of Ramon cirrhosis and chronic pain presents today with complaints of increasing abdominal pain with increased swelling for the last 2 days.  She reports nausea with a few episodes of vomiting.  She denies any diarrhea, melena or hematochezia.  She denies any fever or chills.  She also complains of pain in her chest with some mild shortness of breath although she states she thinks pain her abdomen is radiating up into her chest.  She denies any cough or congestion.  She states she has required paracentesis in the past most recently approximately 3 weeks ago.  She states she is followed by gastroenterologist in [facility] but states that she has seen Dr. Mckeon here in the past.          Review of Systems   Constitutional: Negative for chills and fever.   HENT: Negative for congestion.    Respiratory: Positive for shortness of breath. Negative for cough.    Cardiovascular: Positive for chest pain. Negative for leg swelling.   Gastrointestinal: Positive for abdominal distention, abdominal pain, nausea and vomiting. Negative for blood in stool, constipation and diarrhea.   Genitourinary: Negative for dysuria, frequency and urgency.   Musculoskeletal: Negative for neck pain and neck stiffness.   Skin: Negative for rash.       Past Medical History:   Diagnosis Date   • Abdominal pain    • Anemia    • Cancer (CMS/HCC)    • Clot    • Deep venous thrombosis of right profunda femoris vein (CMS/HCC) 8/15/2019   • Depression    • Disease of thyroid gland    • Empyema of pleura (CMS/HCC)    • GERD (gastroesophageal reflux disease)    • Heartburn    • Hemorrhoids    • Hernia, ventral    • History of malignant neoplasm of thyroid    • History of transfusion    • Irregular menstrual cycle    • Migraine    • Parathyroid abnormality (CMS/HCC)    • Radiation    • Restless legs syndrome (RLS)    • Urge and stress incontinence    • Varicose veins         Allergies   Allergen Reactions   • Compazine [Prochlorperazine Edisylate] Hives   • Contrast Dye Hives   • Iodinated Diagnostic Agents    • Iodine Hives   • Morphine Itching   • Nsaids Other (See Comments)     States I am not suppose to take them   • Hydrocodone Rash     Lortab elixir per pt       Past Surgical History:   Procedure Laterality Date   • ABDOMINAL SURGERY     • BEDSIDE PARACENTESIS  3/22/2020        • CHOLECYSTECTOMY      Laparoscopic   • ENDOSCOPY N/A 3/17/2016    Procedure: ESOPHAGOGASTRODUODENOSCOPY WITH BALOON DILATATION 18-20MM WITH GASTRIC SLEEVE SEALANT;  Surgeon: Leonardo Ortiz Jr., MD;  Location: [facility] ENDOSCOPY;  Service:    • ENDOSCOPY N/A 3/14/2016    Procedure: esophagogastroduodenoscopy with fluoroscopy;  Surgeon: Greg Avila III, MD;  Location: [facility] ENDOSCOPY;  Service:    • ENDOSCOPY N/A 4/15/2016    Procedure: EGD WITH DILITATION AND STENT PLACEMENT dilation of pylorus ;  Surgeon: Leonardo Ortiz Jr., MD;  Location: [facility] ENDOSCOPY;  Service:    • ENDOSCOPY N/A 5/13/2016    Procedure: ESOPHAGOGASTRODUODENOSCOPY  W/ STENT;  Surgeon: Leonardo Ortiz Jr., MD;  Location: [facility] ENDOSCOPY;  Service:    • GASTRECTOMY      Gastric Surgery for Morbid Obesity Laparoscopic Longitudinal Gastrectomy   • GASTRIC SLEEVE LAPAROSCOPIC     • HEMORRHOIDECTOMY     • HERNIA REPAIR     • LAPAROSCOPY REPAIR HIATAL HERNIA     • LARYNGOSCOPY      Direct Laryngoscopy with Injection into Vocal Cords   • OTHER SURGICAL HISTORY Bilateral     Ear Pressure Equalization Tube, Insertion, Bilaterally   • PERIPHERALLY INSERTED CENTRAL CATHETER INSERTION     • SPLENECTOMY     • THYROID SURGERY     • TUBAL ABDOMINAL LIGATION         Family History   Problem Relation Age of Onset   • Stroke Father    • Diabetes Father         Diabetes Mellitus   • Hypertension Father    • Cancer Other        Social History     Socioeconomic History   • Marital status: Single     Spouse name: Not on file   • Number  of children: Not on file   • Years of education: Not on file   • Highest education level: Not on file   Tobacco Use   • Smoking status: Never Smoker   • Smokeless tobacco: Never Used   Substance and Sexual Activity   • Alcohol use: No   • Drug use: No   • Sexual activity: Defer           Objective   Physical Exam   Constitutional: She appears well-developed.   Vital signs and triage nurse note reviewed.  Constitutional: Awake, alert; well-developed and well-nourished. No acute distress is noted.  HEENT: Normocephalic, atraumatic; pupils are PERRL with intact EOM; oropharynx is pink and moist without exudate or erythema.  No drooling or pooling of oral secretions.  Neck: Supple, full range of motion without pain; no cervical lymphadenopathy. Normal phonation.  Cardiovascular: Regular rate and rhythm, normal S1-S2.  No murmur noted.  Pulmonary: Respiratory effort regular nonlabored, breath sounds clear to auscultation all fields.  Abdomen: Soft, generally tender, distended with fluid wave, normoactive bowel sounds; no rebound or guarding.  No CVAT.  Musculoskeletal: Independent range of motion of all extremities with no palpable tenderness or edema.  Calves are symmetric and nontender.  Neuro: Alert oriented x3, speech is clear and appropriate, GCS 15.    Skin: Flesh tone, warm, dry, intact; no erythematous or petechial rash or lesion.        Procedures           ED Course  ED Course as of Apr 18 2242   Sat Apr 18, 2020 2215 EKG reviewed by me and interpreted by Dr. Pearson: Sinus rhythm with ventricular rate of 68.  No acute ST or T wave changes noted.  No ectopy.    [MD]      ED Course User Index  [MD] Charo Zavaleta APRN      Labs Reviewed   COMPREHENSIVE METABOLIC PANEL - Abnormal; Notable for the following components:       Result Value    Chloride 108 (*)     Albumin 3.00 (*)     Alkaline Phosphatase 170 (*)     All other components within normal limits    Narrative:     GFR Normal >60  Chronic Kidney Disease  <60  Kidney Failure <15     PROTIME-INR - Abnormal; Notable for the following components:    INR 1.13 (*)     All other components within normal limits   APTT - Abnormal; Notable for the following components:    PTT 23.8 (*)     All other components within normal limits   CBC WITH AUTO DIFFERENTIAL - Abnormal; Notable for the following components:    Hemoglobin 11.8 (*)     RDW 17.5 (*)     Neutrophil % 32.9 (*)     Lymphocyte % 48.1 (*)     Eosinophil % 8.6 (*)     Basophil % 1.9 (*)     Lymphocytes, Absolute 4.00 (*)     Eosinophils, Absolute 0.70 (*)     All other components within normal limits   LIPASE - Normal   URINALYSIS W/ CULTURE IF INDICATED - Normal    Narrative:     Urine microscopic not indicated.   TROPONIN (IN-HOUSE) - Normal    Narrative:     Troponin T Reference Range:  <= 0.03 ng/mL-   Negative for AMI  >0.03 ng/mL-     Abnormal for myocardial necrosis.  Clinicians would have to utilize clinical acumen, EKG, Troponin and serial changes to determine if it is an Acute Myocardial Infarction or myocardial injury due to an underlying chronic condition.       Results may be falsely decreased if patient taking Biotin.     SCAN SLIDE    Narrative:     Slide Reviewed     CBC AND DIFFERENTIAL    Narrative:     The following orders were created for panel order CBC & Differential.  Procedure                               Abnormality         Status                     ---------                               -----------         ------                     CBC Auto Differential[[id number]]        Abnormal            Final result                 Please view results for these tests on the individual orders.     Ct Abdomen Pelvis Without Contrast    Result Date: 4/18/2020  Impression: 1. Stable large volume peritoneal ascites. 2. Unchanged fluid containing ventral abdominal hernias. 3. No acute abnormality seen. Other stable chronic/incidental findings as above. Electronically signed by:  Josué Santillan M.D.   4/18/2020 8:06 PM    Medications   sodium chloride 0.9 % flush 10 mL (has no administration in time range)   oxyCODONE (ROXICODONE) immediate release tablet 5 mg (5 mg Oral Given 4/18/20 2123)                                          MDM  Number of Diagnoses or Management Options  Chest pain, unspecified type:   Generalized abdominal pain:   Non-intractable vomiting with nausea, unspecified vomiting type:   Other ascites:   Diagnosis management comments: Patient is placed in continuous cardiac monitor.  She had IV established.  She had labs, EKG chest x-ray and CT abdomen pelvis obtained.  She was given 1 oxycodone for pain.    Patient be admitted to the hospital for serial enzymes, EKGs, further evaluation and treatment.  She was discussed with the hospitalist PA.    Diagnosis and treatment plan discussed with patient.  Patient agreeable to plan.          Amount and/or Complexity of Data Reviewed  Clinical lab tests: ordered and reviewed  Tests in the radiology section of CPT®: ordered and reviewed    Patient Progress  Patient progress: stable      Final diagnoses:   Chest pain, unspecified type   Generalized abdominal pain   Other ascites   Non-intractable vomiting with nausea, unspecified vomiting type            Charo Zavaleta, GRIFFIN  04/18/20 8623

## 2020-04-19 NOTE — ED NOTES
Pt reports patient has had abd pain for one year, reports she is on pain management and reports she comes here during flare ups and is usually given dilaudid.      Leidy Wheeler, MONAN  04/18/20 2026

## 2020-04-19 NOTE — PLAN OF CARE
Problem: Patient Care Overview  Goal: Plan of Care Review  Outcome: Ongoing (interventions implemented as appropriate)  Flowsheets (Taken 4/19/2020 0354)  Progress: no change  Plan of Care Reviewed With: patient  Note:   Pt rested, states she has constant pain, relief with narco, no distress noted, pt is having paracentesis in am, will cont to monitor.     Problem: Pain, Chronic (Adult)  Goal: Identify Related Risk Factors and Signs and Symptoms  Outcome: Ongoing (interventions implemented as appropriate)     
Statement Selected

## 2020-04-19 NOTE — NURSING NOTE
PARACENTESIS    Time Arrived in Department:1020      Consent: yes  Allergies have been Reviewed: yes      ID Band Verified: yes    Method: Wheelchair    Lab Results: Checked    Physician: jaimie      Nurse: Rosalia Velazquez RN    IR Care Plan: Person Educated - Patient, Current Knowledge - Understands, Readiness to Learn - Acceptance, Teaching Topic - Procedure and Evaluation of Teaching - Verbalized Understanding      Neurological: Within Normal Limits    Skin: Flesh, Warm and Dry    Respiratory Status: Respirations even and enlabored    Room In: 9      Procedure: Paracentesis    Time Out Completed: yes    Time Out: 1100      Prep Time: 1105    Prep Site 1: Right Lateral Abdomen      Prep: Chlorhexidine and Sterile drape    Procedure Position: Right Side    Guidance: Ultrasound    Fluid Quality: Cloudy and Yellow    Procedure Note: para began        Intra Time 1:1110    Intra Assess 1:   LOC: Alert  Pain:  No Issues  Skin: Flesh, Warm and Dry  Respiratory Status:  Unlabored  Intra Procedure Note 1:         Intra Time 2: 1130    Intra Assess 2:   LOC: Alert  Pain: No Issues  Skin: Flesh, Warm and Dry  Respiratory Status: Even  Intra Procedure Note 2:         Intra Time 3: 1210    Intra Assess 3:   LOC: Alert  Pain: No Issues  Skin: Flesh, Warm and Dry  Respiratory Status: Even  Intra Procedure Note 3:para completed covaderm applied to site marked with 4-21 removal date                Post-Procedure Position: Supine    Dressing Site 1: Covaderm     Post Procedure Status:  Alert and Oriented, returned to baseline    Specimen To Lab: no    Total Fluid Removed: 3.4 L    Post Procedure Note:  Een well        Report Given To: Gogo JOHNSON    Admit/Transfer To: [id number]    Transfer Time: 1225    Discharge Time:     Method: Wheelchair    O2 on Transfer: no    Accompanied By:  Nurse    Clothes Changed:      Discharged Location:      Discharge Teaching:  Inpatient

## 2020-04-20 NOTE — PROGRESS NOTES
Case Management Discharge Note      Final Note: Discharged home self /family care                   Final Discharge Disposition Code: 01 - home or self-care

## 2020-05-29 DIAGNOSIS — R18.8 OTHER ASCITES: Primary | ICD-10-CM

## 2020-05-29 RX ORDER — ALBUMIN (HUMAN) 12.5 G/50ML
25 SOLUTION INTRAVENOUS DAILY PRN
Status: CANCELLED | OUTPATIENT
Start: 2020-06-01

## 2020-05-29 RX ORDER — ALBUMIN (HUMAN) 12.5 G/50ML
12.5 SOLUTION INTRAVENOUS DAILY PRN
Status: CANCELLED | OUTPATIENT
Start: 2020-06-01

## 2020-06-01 ENCOUNTER — HOSPITAL ENCOUNTER (OUTPATIENT)
Dept: INFUSION THERAPY | Facility: HOSPITAL | Age: 45
Discharge: HOME | End: 2020-06-01
Admitting: PHYSICIAN ASSISTANT

## 2020-06-01 VITALS
DIASTOLIC BLOOD PRESSURE: 76 MMHG | TEMPERATURE: 98.2 F | SYSTOLIC BLOOD PRESSURE: 118 MMHG | RESPIRATION RATE: 13 BRPM | OXYGEN SATURATION: 97 % | HEART RATE: 72 BPM

## 2020-06-01 DIAGNOSIS — R18.8 OTHER ASCITES: ICD-10-CM

## 2020-06-01 LAB
DEPRECATED RDW RBC AUTO: 54.7 FL (ref 37–54)
ERYTHROCYTE [DISTWIDTH] IN BLOOD BY AUTOMATED COUNT: 17.8 % (ref 12.3–15.4)
HCT VFR BLD AUTO: 37.6 % (ref 34–46.6)
HGB BLD-MCNC: 12.4 G/DL (ref 12–15.9)
INR PPP: 1.15 (ref 0.9–1.1)
MCH RBC QN AUTO: 28.6 PG (ref 26.6–33)
MCHC RBC AUTO-ENTMCNC: 33 G/DL (ref 31.5–35.7)
MCV RBC AUTO: 86.6 FL (ref 79–97)
PLATELET # BLD AUTO: 457 10*3/MM3 (ref 140–450)
PMV BLD AUTO: 8.4 FL (ref 6–12)
PROTHROMBIN TIME: 11.7 SECONDS (ref 9.6–11.7)
RBC # BLD AUTO: 4.34 10*6/MM3 (ref 3.77–5.28)
WBC NRBC COR # BLD: 6 10*3/MM3 (ref 3.4–10.8)

## 2020-06-01 PROCEDURE — 76942 ECHO GUIDE FOR BIOPSY: CPT

## 2020-06-01 PROCEDURE — 49083 ABD PARACENTESIS W/IMAGING: CPT | Performed by: HOSPITALIST

## 2020-06-01 PROCEDURE — 85610 PROTHROMBIN TIME: CPT | Performed by: PHYSICIAN ASSISTANT

## 2020-06-01 PROCEDURE — 85027 COMPLETE CBC AUTOMATED: CPT | Performed by: PHYSICIAN ASSISTANT

## 2020-06-01 NOTE — PROGRESS NOTES
PARACENTESIS    Time Arrived in Department: 1150    Consent: yes  Allergies have been Reviewed: yes      ID Band Verified: yes    Method: Ambulatory    Lab Results: Checked and MD Notified     Physician: Dr. Peña      Nurse: Chioma Landa RN    IR Care Plan: Person Educated - Patient, Current Knowledge - Understands, Learning Barrier - None, Readiness to Learn - Acceptance, Teaching Topic - Procedure and Evaluation of Teaching - Verbalized Understanding      Neurological: Within Normal Limits    Skin: Flesh, Warm and Dry    Respiratory Status: Respirations even and enlabored    Room In: 8      Procedure: Paracentesis    Time Out Completed: yes    Time Out: 1315    Prep Time: 1320    Prep Site 1: Right Lateral Abdomen      Prep: Chlorhexidine and Sterile drape    Procedure Position:  Left side    Guidance: Ultrasound    Fluid Quality: Cloudy and Yellow    Procedure Note: Paracentesis began at 1325        Intra Time 1:1325    Intra Assess 1:   LOC: Alert  Pain:  No Issues  Skin: Flesh, Warm and Dry  Respiratory Status:  Even and Unlabored  Intra Procedure Note 1:         Intra Time 2: 1345    Intra Assess 2:   LOC: Alert  Pain: No Issues  Skin: Flesh, Warm and Dry  Respiratory Status: Even and Unlabored  Intra Procedure Note 2:         Intra Time 3: 1350    Intra Assess 3:   LOC: Alert  Pain: No Issues  Skin: Flesh, Warm and Dry  Respiratory Status: Even and Unlabored  Intra Procedure Note 3:para completed                Post-Procedure Position: HOB Elevated    Dressing Site 1: 2x2, 4x4 and Tegaderm    Post Procedure Status:  Alert and Oriented, returned to baseline, Return to baseline, Dressing Dry/ Intact, IV documented (see flowsheet), Stable Condition and Dressing properly labeled    Specimen To Lab: no    Total Fluid Removed: 1.9 liters    Post Procedure Note:  Patient tolerated well        Report Given To:     Admit/Transfer To:     Transfer Time:     Discharge Time: 1400    Method: Ambulatory    O2 on  Transfer: no    Accompanied By:  None    Clothes Changed:  Self    Discharged Location:  Routine to Home    Discharge Teaching:  Care of Dressing, Home Care Instructions Sheet Given and PAtient

## 2020-06-01 NOTE — PROCEDURES
"Ultrasound guided paracentesis  Date/Time: 6/1/2020 2:26 PM  Performed by: Jessica Peña MD  Authorized by: Jessica Peña MD   Risks and benefits: risks, benefits and alternatives were discussed  Consent given by: patient  Patient understanding: patient states understanding of the procedure being performed  Patient consent: the patient's understanding of the procedure matches consent given  Procedure consent: procedure consent matches procedure scheduled  Relevant documents: relevant documents present and verified  Test results: test results available and properly labeled  Site marked: the operative site was marked  Imaging studies: imaging studies available  Patient identity confirmed: verbally with patient  Time out: Immediately prior to procedure a \"time out\" was called to verify the correct patient, procedure, equipment, support staff and site/side marked as required.  Procedure purpose: therapeutic  Indications: abdominal discomfort secondary to ascites  Anesthesia: local infiltration    Anesthesia:  Local Anesthetic: lidocaine 1% without epinephrine  Preparation: Patient was prepped and draped in the usual sterile fashion.  Needle gauge: 20  Ultrasound guidance: yes  Puncture site: right lower quadrant  Fluid appearance: serous  Dressing: 4x4 sterile gauze  Patient tolerance: Patient tolerated the procedure well with no immediate complications  Comments: Please see nurses note for amount of fluid and albumin given.        "

## 2020-06-23 ENCOUNTER — HOSPITAL ENCOUNTER (OUTPATIENT)
Dept: OTHER | Facility: HOSPITAL | Age: 45
Discharge: HOME | End: 2020-06-23
Attending: PHYSICIAN ASSISTANT

## 2020-06-23 LAB
ALBUMIN SERPL-MCNC: 3.2 G/DL (ref 3.5–5)
ALBUMIN/GLOB SERPL: 1 {RATIO} (ref 1.4–2.6)
ALP SERPL-CCNC: 193 U/L (ref 42–98)
ALT SERPL-CCNC: 21 U/L (ref 10–40)
ANION GAP SERPL CALC-SCNC: 14 MMOL/L (ref 8–19)
AST SERPL-CCNC: 34 U/L (ref 15–50)
BASOPHILS # BLD AUTO: 0.18 10*3/UL (ref 0–0.2)
BASOPHILS NFR BLD AUTO: 2.4 % (ref 0–3)
BILIRUB SERPL-MCNC: 1.04 MG/DL (ref 0.2–1.3)
BUN SERPL-MCNC: 10 MG/DL (ref 5–25)
BUN/CREAT SERPL: 13 {RATIO} (ref 6–20)
CALCIUM SERPL-MCNC: 9.4 MG/DL (ref 8.7–10.4)
CHLORIDE SERPL-SCNC: 105 MMOL/L (ref 99–111)
CONV ABS IMM GRAN: 0.01 10*3/UL (ref 0–0.2)
CONV AFP: 4.8 NG/ML (ref 0–8.7)
CONV CO2: 26 MMOL/L (ref 22–32)
CONV IMMATURE GRAN: 0.1 % (ref 0–1.8)
CONV TOTAL PROTEIN: 6.3 G/DL (ref 6.3–8.2)
CREAT UR-MCNC: 0.77 MG/DL (ref 0.5–0.9)
DEPRECATED RDW RBC AUTO: 57.4 FL (ref 36.4–46.3)
EOSINOPHIL # BLD AUTO: 0.79 10*3/UL (ref 0–0.7)
EOSINOPHIL # BLD AUTO: 10.4 % (ref 0–7)
ERYTHROCYTE [DISTWIDTH] IN BLOOD BY AUTOMATED COUNT: 17.8 % (ref 11.7–14.4)
GFR SERPLBLD BASED ON 1.73 SQ M-ARVRAT: >60 ML/MIN/{1.73_M2}
GLOBULIN UR ELPH-MCNC: 3.1 G/DL (ref 2–3.5)
GLUCOSE SERPL-MCNC: 74 MG/DL (ref 65–99)
HCT VFR BLD AUTO: 34.8 % (ref 37–47)
HGB BLD-MCNC: 11.3 G/DL (ref 12–16)
INR PPP: 1.08 (ref 2–3)
LYMPHOCYTES # BLD AUTO: 3.8 10*3/UL (ref 1–5)
LYMPHOCYTES NFR BLD AUTO: 49.9 % (ref 20–45)
MAGNESIUM SERPL-MCNC: 1.75 MG/DL (ref 1.6–2.3)
MCH RBC QN AUTO: 28.5 PG (ref 27–31)
MCHC RBC AUTO-ENTMCNC: 32.5 G/DL (ref 33–37)
MCV RBC AUTO: 87.7 FL (ref 81–99)
MONOCYTES # BLD AUTO: 0.81 10*3/UL (ref 0.2–1.2)
MONOCYTES NFR BLD AUTO: 10.6 % (ref 3–10)
NEUTROPHILS # BLD AUTO: 2.03 10*3/UL (ref 2–8)
NEUTROPHILS NFR BLD AUTO: 26.6 % (ref 30–85)
NRBC CBCN: 0 % (ref 0–0.7)
OSMOLALITY SERPL CALC.SUM OF ELEC: 290 MOSM/KG (ref 273–304)
PLATELET # BLD AUTO: 400 10*3/UL (ref 130–400)
PMV BLD AUTO: 10.7 FL (ref 9.4–12.3)
POTASSIUM SERPL-SCNC: 3.7 MMOL/L (ref 3.5–5.3)
PROTHROMBIN TIME: 11.5 S (ref 9.4–12)
RBC # BLD AUTO: 3.97 10*6/UL (ref 4.2–5.4)
SODIUM SERPL-SCNC: 141 MMOL/L (ref 135–147)
WBC # BLD AUTO: 7.62 10*3/UL (ref 4.8–10.8)

## 2020-08-13 ENCOUNTER — HOSPITAL ENCOUNTER (OUTPATIENT)
Dept: OTHER | Facility: HOSPITAL | Age: 45
Discharge: HOME | End: 2020-08-13
Attending: PHYSICIAN ASSISTANT

## 2020-08-13 LAB
ALBUMIN SERPL-MCNC: 3.6 G/DL (ref 3.5–5)
ALBUMIN/GLOB SERPL: 1.1 {RATIO} (ref 1.4–2.6)
ALP SERPL-CCNC: 198 U/L (ref 42–98)
ALT SERPL-CCNC: 23 U/L (ref 10–40)
ANION GAP SERPL CALC-SCNC: 20 MMOL/L (ref 8–19)
AST SERPL-CCNC: 31 U/L (ref 15–50)
BASOPHILS # BLD AUTO: 0.21 10*3/UL (ref 0–0.2)
BASOPHILS NFR BLD AUTO: 2.8 % (ref 0–3)
BILIRUB SERPL-MCNC: 1.37 MG/DL (ref 0.2–1.3)
BUN SERPL-MCNC: 7 MG/DL (ref 5–25)
BUN/CREAT SERPL: 7 {RATIO} (ref 6–20)
CALCIUM SERPL-MCNC: 9.7 MG/DL (ref 8.7–10.4)
CHLORIDE SERPL-SCNC: 98 MMOL/L (ref 99–111)
CONV ABS IMM GRAN: 0.01 10*3/UL (ref 0–0.2)
CONV CO2: 24 MMOL/L (ref 22–32)
CONV IMMATURE GRAN: 0.1 % (ref 0–1.8)
CONV TOTAL PROTEIN: 7 G/DL (ref 6.3–8.2)
CREAT UR-MCNC: 1.03 MG/DL (ref 0.5–0.9)
DEPRECATED RDW RBC AUTO: 58.6 FL (ref 36.4–46.3)
EOSINOPHIL # BLD AUTO: 0.52 10*3/UL (ref 0–0.7)
EOSINOPHIL # BLD AUTO: 6.8 % (ref 0–7)
ERYTHROCYTE [DISTWIDTH] IN BLOOD BY AUTOMATED COUNT: 17.8 % (ref 11.7–14.4)
GFR SERPLBLD BASED ON 1.73 SQ M-ARVRAT: >60 ML/MIN/{1.73_M2}
GLOBULIN UR ELPH-MCNC: 3.4 G/DL (ref 2–3.5)
GLUCOSE SERPL-MCNC: 133 MG/DL (ref 65–99)
HCT VFR BLD AUTO: 38.7 % (ref 37–47)
HGB BLD-MCNC: 12.7 G/DL (ref 12–16)
INR PPP: 1.15 (ref 2–3)
LYMPHOCYTES # BLD AUTO: 3.65 10*3/UL (ref 1–5)
LYMPHOCYTES NFR BLD AUTO: 48 % (ref 20–45)
MCH RBC QN AUTO: 29.3 PG (ref 27–31)
MCHC RBC AUTO-ENTMCNC: 32.8 G/DL (ref 33–37)
MCV RBC AUTO: 89.2 FL (ref 81–99)
MONOCYTES # BLD AUTO: 0.61 10*3/UL (ref 0.2–1.2)
MONOCYTES NFR BLD AUTO: 8 % (ref 3–10)
NEUTROPHILS # BLD AUTO: 2.61 10*3/UL (ref 2–8)
NEUTROPHILS NFR BLD AUTO: 34.3 % (ref 30–85)
NRBC CBCN: 0 % (ref 0–0.7)
OSMOLALITY SERPL CALC.SUM OF ELEC: 286 MOSM/KG (ref 273–304)
PLATELET # BLD AUTO: 409 10*3/UL (ref 130–400)
PMV BLD AUTO: 10.7 FL (ref 9.4–12.3)
POTASSIUM SERPL-SCNC: 3.6 MMOL/L (ref 3.5–5.3)
PROTHROMBIN TIME: 12.1 S (ref 9.4–12)
RBC # BLD AUTO: 4.34 10*6/UL (ref 4.2–5.4)
SODIUM SERPL-SCNC: 138 MMOL/L (ref 135–147)
WBC # BLD AUTO: 7.61 10*3/UL (ref 4.8–10.8)

## 2020-09-15 ENCOUNTER — HOSPITAL ENCOUNTER (OUTPATIENT)
Facility: HOSPITAL | Age: 45
Setting detail: OBSERVATION
Discharge: HOME | End: 2020-09-17
Attending: HOSPITALIST | Admitting: HOSPITALIST

## 2020-09-15 DIAGNOSIS — R10.33 PERIUMBILICAL PAIN: Primary | ICD-10-CM

## 2020-09-15 DIAGNOSIS — R06.00 DYSPNEA, UNSPECIFIED TYPE: ICD-10-CM

## 2020-09-15 DIAGNOSIS — M79.604 RIGHT LEG PAIN: ICD-10-CM

## 2020-09-15 DIAGNOSIS — R18.8 OTHER ASCITES: ICD-10-CM

## 2020-09-15 LAB
ALBUMIN SERPL-MCNC: 3.1 G/DL (ref 3.5–5.2)
ALBUMIN/GLOB SERPL: 1 G/DL
ALP SERPL-CCNC: 145 U/L (ref 39–117)
ALT SERPL W P-5'-P-CCNC: 15 U/L (ref 1–33)
ANION GAP SERPL CALCULATED.3IONS-SCNC: 8 MMOL/L (ref 5–15)
AST SERPL-CCNC: 22 U/L (ref 1–32)
BILIRUB SERPL-MCNC: 0.7 MG/DL (ref 0–1.2)
BUN SERPL-MCNC: 8 MG/DL (ref 6–20)
BUN SERPL-MCNC: ABNORMAL MG/DL
BUN/CREAT SERPL: ABNORMAL
CALCIUM SPEC-SCNC: 8.2 MG/DL (ref 8.6–10.5)
CHLORIDE SERPL-SCNC: 102 MMOL/L (ref 98–107)
CO2 SERPL-SCNC: 28 MMOL/L (ref 22–29)
CREAT SERPL-MCNC: 0.69 MG/DL (ref 0.57–1)
GFR SERPL CREATININE-BSD FRML MDRD: 92 ML/MIN/1.73
GLOBULIN UR ELPH-MCNC: 3 GM/DL
GLUCOSE SERPL-MCNC: 87 MG/DL (ref 65–99)
HOLD SPECIMEN: NORMAL
INR PPP: 1.09 (ref 0.93–1.1)
LIPASE SERPL-CCNC: 15 U/L (ref 13–60)
POTASSIUM SERPL-SCNC: 3.6 MMOL/L (ref 3.5–5.2)
PROT SERPL-MCNC: 6.1 G/DL (ref 6–8.5)
PROTHROMBIN TIME: 11.7 SECONDS (ref 9.6–11.7)
SODIUM SERPL-SCNC: 138 MMOL/L (ref 136–145)
TROPONIN T SERPL-MCNC: <0.01 NG/ML (ref 0–0.03)

## 2020-09-15 PROCEDURE — 84484 ASSAY OF TROPONIN QUANT: CPT | Performed by: NURSE PRACTITIONER

## 2020-09-15 PROCEDURE — 85007 BL SMEAR W/DIFF WBC COUNT: CPT | Performed by: NURSE PRACTITIONER

## 2020-09-15 PROCEDURE — 25010000002 ONDANSETRON PER 1 MG: Performed by: NURSE PRACTITIONER

## 2020-09-15 PROCEDURE — 93005 ELECTROCARDIOGRAM TRACING: CPT | Performed by: NURSE PRACTITIONER

## 2020-09-15 PROCEDURE — 80053 COMPREHEN METABOLIC PANEL: CPT | Performed by: NURSE PRACTITIONER

## 2020-09-15 PROCEDURE — 85025 COMPLETE CBC W/AUTO DIFF WBC: CPT | Performed by: NURSE PRACTITIONER

## 2020-09-15 PROCEDURE — 85610 PROTHROMBIN TIME: CPT | Performed by: NURSE PRACTITIONER

## 2020-09-15 PROCEDURE — 96374 THER/PROPH/DIAG INJ IV PUSH: CPT

## 2020-09-15 PROCEDURE — 83690 ASSAY OF LIPASE: CPT | Performed by: NURSE PRACTITIONER

## 2020-09-15 PROCEDURE — 99283 EMERGENCY DEPT VISIT LOW MDM: CPT

## 2020-09-15 PROCEDURE — 25010000002 FENTANYL CITRATE (PF) 100 MCG/2ML SOLUTION: Performed by: NURSE PRACTITIONER

## 2020-09-15 PROCEDURE — 96375 TX/PRO/DX INJ NEW DRUG ADDON: CPT

## 2020-09-15 PROCEDURE — 25010000002 DIPHENHYDRAMINE PER 50 MG: Performed by: NURSE PRACTITIONER

## 2020-09-15 RX ORDER — DIPHENHYDRAMINE HYDROCHLORIDE 50 MG/ML
50 INJECTION INTRAMUSCULAR; INTRAVENOUS ONCE
Status: COMPLETED | OUTPATIENT
Start: 2020-09-15 | End: 2020-09-15

## 2020-09-15 RX ORDER — METHYLPREDNISOLONE SODIUM SUCCINATE 125 MG/2ML
125 INJECTION, POWDER, LYOPHILIZED, FOR SOLUTION INTRAMUSCULAR; INTRAVENOUS EVERY 4 HOURS
Status: DISCONTINUED | OUTPATIENT
Start: 2020-09-16 | End: 2020-09-16

## 2020-09-15 RX ORDER — SODIUM CHLORIDE 0.9 % (FLUSH) 0.9 %
10 SYRINGE (ML) INJECTION AS NEEDED
Status: DISCONTINUED | OUTPATIENT
Start: 2020-09-15 | End: 2020-09-17 | Stop reason: HOSPADM

## 2020-09-15 RX ORDER — FENTANYL CITRATE 50 UG/ML
25 INJECTION, SOLUTION INTRAMUSCULAR; INTRAVENOUS ONCE
Status: COMPLETED | OUTPATIENT
Start: 2020-09-15 | End: 2020-09-15

## 2020-09-15 RX ORDER — ONDANSETRON 2 MG/ML
4 INJECTION INTRAMUSCULAR; INTRAVENOUS ONCE
Status: COMPLETED | OUTPATIENT
Start: 2020-09-15 | End: 2020-09-15

## 2020-09-15 RX ADMIN — FENTANYL CITRATE 25 MCG: 50 INJECTION INTRAMUSCULAR; INTRAVENOUS at 23:33

## 2020-09-15 RX ADMIN — DIPHENHYDRAMINE HYDROCHLORIDE 50 MG: 50 INJECTION, SOLUTION INTRAMUSCULAR; INTRAVENOUS at 23:34

## 2020-09-15 RX ADMIN — SODIUM CHLORIDE 500 ML: 900 INJECTION, SOLUTION INTRAVENOUS at 23:33

## 2020-09-15 RX ADMIN — METHYLPREDNISOLONE SODIUM SUCCINATE 125 MG: 125 INJECTION, POWDER, FOR SOLUTION INTRAMUSCULAR; INTRAVENOUS at 23:33

## 2020-09-15 RX ADMIN — ONDANSETRON 4 MG: 2 INJECTION INTRAMUSCULAR; INTRAVENOUS at 23:34

## 2020-09-16 ENCOUNTER — APPOINTMENT (OUTPATIENT)
Dept: CT IMAGING | Facility: HOSPITAL | Age: 45
End: 2020-09-16

## 2020-09-16 PROBLEM — R10.9 ABDOMINAL PAIN: Status: ACTIVE | Noted: 2020-09-16

## 2020-09-16 PROBLEM — M79.604 RIGHT LEG PAIN: Status: ACTIVE | Noted: 2020-09-16

## 2020-09-16 PROBLEM — R06.00 DYSPNEA: Status: ACTIVE | Noted: 2020-09-16

## 2020-09-16 LAB
ANISOCYTOSIS BLD QL: ABNORMAL
B PARAPERT DNA SPEC QL NAA+PROBE: NOT DETECTED
B PERT DNA SPEC QL NAA+PROBE: NOT DETECTED
BASOPHILS # BLD MANUAL: 0.4 10*3/MM3 (ref 0–0.2)
BASOPHILS NFR BLD AUTO: 5 % (ref 0–1.5)
BILIRUB UR QL STRIP: NEGATIVE
C PNEUM DNA NPH QL NAA+NON-PROBE: NOT DETECTED
CA-I SERPL ISE-MCNC: 1.21 MMOL/L (ref 1.2–1.3)
CLARITY UR: CLEAR
COLOR UR: YELLOW
CRP SERPL-MCNC: 0.09 MG/DL (ref 0–0.5)
D DIMER PPP FEU-MCNC: 1.48 MG/L (FEU) (ref 0–0.59)
D-LACTATE SERPL-SCNC: 1.9 MMOL/L (ref 0.5–2)
D-LACTATE SERPL-SCNC: 3.2 MMOL/L (ref 0.5–2)
DEPRECATED RDW RBC AUTO: 52.5 FL (ref 37–54)
EOSINOPHIL # BLD MANUAL: 0.55 10*3/MM3 (ref 0–0.4)
EOSINOPHIL NFR BLD MANUAL: 7 % (ref 0.3–6.2)
ERYTHROCYTE [DISTWIDTH] IN BLOOD BY AUTOMATED COUNT: 16.9 % (ref 12.3–15.4)
FERRITIN SERPL-MCNC: 13.53 NG/ML (ref 13–150)
FLUAV H1 2009 PAND RNA NPH QL NAA+PROBE: NOT DETECTED
FLUAV H1 HA GENE NPH QL NAA+PROBE: NOT DETECTED
FLUAV H3 RNA NPH QL NAA+PROBE: NOT DETECTED
FLUAV SUBTYP SPEC NAA+PROBE: NOT DETECTED
FLUBV RNA ISLT QL NAA+PROBE: NOT DETECTED
GLUCOSE UR STRIP-MCNC: NEGATIVE MG/DL
HADV DNA SPEC NAA+PROBE: NOT DETECTED
HCOV 229E RNA SPEC QL NAA+PROBE: NOT DETECTED
HCOV HKU1 RNA SPEC QL NAA+PROBE: NOT DETECTED
HCOV NL63 RNA SPEC QL NAA+PROBE: NOT DETECTED
HCOV OC43 RNA SPEC QL NAA+PROBE: NOT DETECTED
HCT VFR BLD AUTO: 34.7 % (ref 34–46.6)
HGB BLD-MCNC: 11.3 G/DL (ref 12–15.9)
HGB UR QL STRIP.AUTO: NEGATIVE
HMPV RNA NPH QL NAA+NON-PROBE: NOT DETECTED
HOLD SPECIMEN: NORMAL
HPIV1 RNA SPEC QL NAA+PROBE: NOT DETECTED
HPIV2 RNA SPEC QL NAA+PROBE: NOT DETECTED
HPIV3 RNA NPH QL NAA+PROBE: NOT DETECTED
HPIV4 P GENE NPH QL NAA+PROBE: NOT DETECTED
KETONES UR QL STRIP: NEGATIVE
LACTATE HOLD SPECIMEN: NORMAL
LARGE PLATELETS: ABNORMAL
LDH SERPL-CCNC: 224 U/L (ref 135–214)
LEUKOCYTE ESTERASE UR QL STRIP.AUTO: NEGATIVE
LYMPHOCYTES # BLD MANUAL: 4.42 10*3/MM3 (ref 0.7–3.1)
LYMPHOCYTES NFR BLD MANUAL: 56 % (ref 19.6–45.3)
LYMPHOCYTES NFR BLD MANUAL: 9 % (ref 5–12)
M PNEUMO IGG SER IA-ACNC: NOT DETECTED
MCH RBC QN AUTO: 29.4 PG (ref 26.6–33)
MCHC RBC AUTO-ENTMCNC: 32.6 G/DL (ref 31.5–35.7)
MCV RBC AUTO: 90.2 FL (ref 79–97)
MONOCYTES # BLD AUTO: 0.71 10*3/MM3 (ref 0.1–0.9)
NEUTROPHILS # BLD AUTO: 1.74 10*3/MM3 (ref 1.7–7)
NEUTROPHILS NFR BLD MANUAL: 22 % (ref 42.7–76)
NITRITE UR QL STRIP: NEGATIVE
PH UR STRIP.AUTO: 7.5 [PH] (ref 5–8)
PLATELET # BLD AUTO: 468 10*3/MM3 (ref 140–450)
PMV BLD AUTO: 8.2 FL (ref 6–12)
PROCALCITONIN SERPL-MCNC: 0.04 NG/ML (ref 0–0.25)
PROT UR QL STRIP: NEGATIVE
RBC # BLD AUTO: 3.85 10*6/MM3 (ref 3.77–5.28)
RHINOVIRUS RNA SPEC NAA+PROBE: NOT DETECTED
RSV RNA NPH QL NAA+NON-PROBE: NOT DETECTED
SARS-COV-2 RNA NPH QL NAA+NON-PROBE: DETECTED
SCAN SLIDE: NORMAL
SP GR UR STRIP: <=1.005 (ref 1–1.03)
TARGETS BLD QL SMEAR: ABNORMAL
TROPONIN T SERPL-MCNC: <0.01 NG/ML (ref 0–0.03)
UROBILINOGEN UR QL STRIP: NORMAL
VARIANT LYMPHS NFR BLD MANUAL: 1 % (ref 0–5)
WBC # BLD AUTO: 7.9 10*3/MM3 (ref 3.4–10.8)
WBC MORPH BLD: NORMAL
WHOLE BLOOD HOLD SPECIMEN: NORMAL
WHOLE BLOOD HOLD SPECIMEN: NORMAL

## 2020-09-16 PROCEDURE — G0378 HOSPITAL OBSERVATION PER HR: HCPCS

## 2020-09-16 PROCEDURE — 86140 C-REACTIVE PROTEIN: CPT | Performed by: HOSPITALIST

## 2020-09-16 PROCEDURE — 96361 HYDRATE IV INFUSION ADD-ON: CPT

## 2020-09-16 PROCEDURE — 82330 ASSAY OF CALCIUM: CPT | Performed by: NURSE PRACTITIONER

## 2020-09-16 PROCEDURE — 84484 ASSAY OF TROPONIN QUANT: CPT | Performed by: NURSE PRACTITIONER

## 2020-09-16 PROCEDURE — 83615 LACTATE (LD) (LDH) ENZYME: CPT | Performed by: HOSPITALIST

## 2020-09-16 PROCEDURE — 82728 ASSAY OF FERRITIN: CPT | Performed by: HOSPITALIST

## 2020-09-16 PROCEDURE — 0202U NFCT DS 22 TRGT SARS-COV-2: CPT | Performed by: NURSE PRACTITIONER

## 2020-09-16 PROCEDURE — 81003 URINALYSIS AUTO W/O SCOPE: CPT | Performed by: NURSE PRACTITIONER

## 2020-09-16 PROCEDURE — 25010000002 METHYLPREDNISOLONE PER 125 MG: Performed by: NURSE PRACTITIONER

## 2020-09-16 PROCEDURE — 85379 FIBRIN DEGRADATION QUANT: CPT | Performed by: HOSPITALIST

## 2020-09-16 PROCEDURE — 83605 ASSAY OF LACTIC ACID: CPT | Performed by: HOSPITALIST

## 2020-09-16 PROCEDURE — 25010000002 ENOXAPARIN PER 10 MG: Performed by: NURSE PRACTITIONER

## 2020-09-16 PROCEDURE — 0 IOPAMIDOL PER 1 ML: Performed by: NURSE PRACTITIONER

## 2020-09-16 PROCEDURE — 96372 THER/PROPH/DIAG INJ SC/IM: CPT

## 2020-09-16 PROCEDURE — 71275 CT ANGIOGRAPHY CHEST: CPT

## 2020-09-16 PROCEDURE — 74177 CT ABD & PELVIS W/CONTRAST: CPT

## 2020-09-16 PROCEDURE — 84145 PROCALCITONIN (PCT): CPT | Performed by: HOSPITALIST

## 2020-09-16 PROCEDURE — 99220 PR INITIAL OBSERVATION CARE/DAY 70 MINUTES: CPT | Performed by: HOSPITALIST

## 2020-09-16 RX ORDER — ONDANSETRON 4 MG/1
8 TABLET, FILM COATED ORAL EVERY 6 HOURS PRN
Status: DISCONTINUED | OUTPATIENT
Start: 2020-09-16 | End: 2020-09-17 | Stop reason: HOSPADM

## 2020-09-16 RX ORDER — SPIRONOLACTONE 100 MG/1
200 TABLET, FILM COATED ORAL DAILY
Status: DISCONTINUED | OUTPATIENT
Start: 2020-09-16 | End: 2020-09-17 | Stop reason: HOSPADM

## 2020-09-16 RX ORDER — ONDANSETRON 4 MG/1
4 TABLET, FILM COATED ORAL EVERY 6 HOURS PRN
Status: DISCONTINUED | OUTPATIENT
Start: 2020-09-16 | End: 2020-09-17 | Stop reason: HOSPADM

## 2020-09-16 RX ORDER — SODIUM CHLORIDE 9 MG/ML
100 INJECTION, SOLUTION INTRAVENOUS CONTINUOUS
Status: DISCONTINUED | OUTPATIENT
Start: 2020-09-16 | End: 2020-09-17 | Stop reason: HOSPADM

## 2020-09-16 RX ORDER — SODIUM CHLORIDE 0.9 % (FLUSH) 0.9 %
10 SYRINGE (ML) INJECTION AS NEEDED
Status: DISCONTINUED | OUTPATIENT
Start: 2020-09-16 | End: 2020-09-17 | Stop reason: HOSPADM

## 2020-09-16 RX ORDER — BUMETANIDE 1 MG/1
2 TABLET ORAL DAILY
Status: DISCONTINUED | OUTPATIENT
Start: 2020-09-16 | End: 2020-09-17 | Stop reason: HOSPADM

## 2020-09-16 RX ORDER — OXYCODONE HYDROCHLORIDE 5 MG/1
10 TABLET ORAL EVERY 6 HOURS PRN
Status: DISCONTINUED | OUTPATIENT
Start: 2020-09-16 | End: 2020-09-17 | Stop reason: HOSPADM

## 2020-09-16 RX ORDER — NITROGLYCERIN 0.4 MG/1
0.4 TABLET SUBLINGUAL
Status: DISCONTINUED | OUTPATIENT
Start: 2020-09-16 | End: 2020-09-17 | Stop reason: HOSPADM

## 2020-09-16 RX ORDER — ONDANSETRON 2 MG/ML
4 INJECTION INTRAMUSCULAR; INTRAVENOUS EVERY 6 HOURS PRN
Status: DISCONTINUED | OUTPATIENT
Start: 2020-09-16 | End: 2020-09-17 | Stop reason: HOSPADM

## 2020-09-16 RX ORDER — SODIUM CHLORIDE 0.9 % (FLUSH) 0.9 %
10 SYRINGE (ML) INJECTION EVERY 12 HOURS SCHEDULED
Status: DISCONTINUED | OUTPATIENT
Start: 2020-09-16 | End: 2020-09-17 | Stop reason: HOSPADM

## 2020-09-16 RX ORDER — LEVOTHYROXINE SODIUM 0.15 MG/1
150 TABLET ORAL
Status: DISCONTINUED | OUTPATIENT
Start: 2020-09-16 | End: 2020-09-17 | Stop reason: HOSPADM

## 2020-09-16 RX ADMIN — OXYCODONE HYDROCHLORIDE 10 MG: 5 TABLET ORAL at 23:40

## 2020-09-16 RX ADMIN — BUMETANIDE 2 MG: 1 TABLET ORAL at 09:24

## 2020-09-16 RX ADMIN — ENOXAPARIN SODIUM 60 MG: 60 INJECTION SUBCUTANEOUS at 03:50

## 2020-09-16 RX ADMIN — Medication 10 ML: at 09:24

## 2020-09-16 RX ADMIN — RIFAXIMIN 550 MG: 550 TABLET ORAL at 09:24

## 2020-09-16 RX ADMIN — Medication 10 ML: at 20:00

## 2020-09-16 RX ADMIN — RIFAXIMIN 550 MG: 550 TABLET ORAL at 20:00

## 2020-09-16 RX ADMIN — OXYCODONE HYDROCHLORIDE 10 MG: 5 TABLET ORAL at 04:05

## 2020-09-16 RX ADMIN — IOPAMIDOL 100 ML: 755 INJECTION, SOLUTION INTRAVENOUS at 00:55

## 2020-09-16 RX ADMIN — SPIRONOLACTONE 200 MG: 100 TABLET, FILM COATED ORAL at 09:25

## 2020-09-16 RX ADMIN — LEVOTHYROXINE SODIUM 150 MCG: 150 TABLET ORAL at 05:01

## 2020-09-16 RX ADMIN — OXYCODONE HYDROCHLORIDE 10 MG: 5 TABLET ORAL at 17:24

## 2020-09-16 RX ADMIN — SODIUM CHLORIDE 100 ML/HR: 900 INJECTION, SOLUTION INTRAVENOUS at 03:51

## 2020-09-16 RX ADMIN — OXYCODONE HYDROCHLORIDE 10 MG: 5 TABLET ORAL at 10:59

## 2020-09-16 NOTE — ED PROVIDER NOTES
"Subjective   44-year-old female presents with multiple complaints: She reports periumbilical pain around her hernia that radiates through to her back.  She also complains of left upper extremity \"numbness and heaviness\" and right lower extremity pain at the medial aspect of the knee with ecchymosis.  She reports that she has a history of DVT and this feels similar.  She denies taking blood thinner she previously was on Pradaxa.  She reports nausea and 2 episodes of vomiting denies diarrhea.  She denies melena nor hematochezia or urinary symptoms.  She denies any chest pain does report some dyspnea.  Denies fever sweats or chills.    1. Location: Periumbilical  2. Quality: Pressure  3. Severity: Moderate  4. Worsening factors: Denies  5. Alleviating factors: Denies  6. Onset: Yesterday  7. Radiation: Back  8. Frequency: Constant  9. Co-morbidities: Past Medical History:  No date: Abdominal pain  No date: Anemia  No date: Cancer (CMS/[facility])  No date: Clot  8/15/2019: Deep venous thrombosis of right profunda femoris vein (CMS/  [facility])  No date: Depression  No date: Disease of thyroid gland  No date: Empyema of pleura (CMS/[facility])  No date: GERD (gastroesophageal reflux disease)  No date: Heartburn  No date: Hemorrhoids  No date: Hernia, ventral  No date: History of malignant neoplasm of thyroid  No date: History of transfusion  No date: Irregular menstrual cycle  No date: Migraine  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 Source: Patient            Review of Systems   Constitutional: Negative for appetite change, chills, diaphoresis and fever.   HENT: Negative for congestion, ear pain, rhinorrhea, sneezing, sore throat, trouble swallowing and voice change.    Respiratory: Positive for shortness of breath. Negative for cough, chest tightness, wheezing and stridor.    Cardiovascular: Negative for " chest pain, palpitations and leg swelling.   Gastrointestinal: Positive for abdominal pain, nausea and vomiting. Negative for blood in stool, constipation and diarrhea.   Genitourinary: Negative for decreased urine volume, difficulty urinating, flank pain and hematuria.   Skin: Negative for color change, pallor and rash.   Allergic/Immunologic: Negative for immunocompromised state.   Neurological: Negative for dizziness and weakness.   Hematological: Negative for adenopathy.   Psychiatric/Behavioral: Negative for confusion.   All other systems reviewed and are negative.      Past Medical History:   Diagnosis Date   • Abdominal pain    • Anemia    • Cancer (CMS/HCC)    • Clot    • Deep venous thrombosis of right profunda femoris vein (CMS/HCC) 8/15/2019   • Depression    • Disease of thyroid gland    • Empyema of pleura (CMS/HCC)    • GERD (gastroesophageal reflux disease)    • Heartburn    • Hemorrhoids    • Hernia, ventral    • History of malignant neoplasm of thyroid    • History of transfusion    • Irregular menstrual cycle    • Migraine    • Parathyroid abnormality (CMS/HCC)    • Radiation    • Restless legs syndrome (RLS)    • Urge and stress incontinence    • Varicose veins        Allergies   Allergen Reactions   • Compazine [Prochlorperazine Edisylate] Hives   • Contrast Dye Hives   • Iodinated Diagnostic Agents    • Iodine Hives   • Morphine Itching   • Nsaids Other (See Comments)     States I am not suppose to take them   • Hydrocodone Rash     Lortab elixir per pt       Past Surgical History:   Procedure Laterality Date   • ABDOMINAL SURGERY     • BEDSIDE PARACENTESIS  3/22/2020        • BEDSIDE PARACENTESIS  4/19/2020        • CHOLECYSTECTOMY      Laparoscopic   • ENDOSCOPY N/A 3/17/2016    Procedure: ESOPHAGOGASTRODUODENOSCOPY WITH BALOON DILATATION 18-20MM WITH GASTRIC SLEEVE SEALANT;  Surgeon: Leonardo Ortiz Jr., MD;  Location: [facility] ENDOSCOPY;  Service:    • ENDOSCOPY N/A 3/14/2016    Procedure:  esophagogastroduodenoscopy with fluoroscopy;  Surgeon: Greg Avila III, MD;  Location: [facility] ENDOSCOPY;  Service:    • ENDOSCOPY N/A 4/15/2016    Procedure: EGD WITH DILITATION AND STENT PLACEMENT dilation of pylorus ;  Surgeon: Leonardo Ortiz Jr., MD;  Location: [facility] ENDOSCOPY;  Service:    • ENDOSCOPY N/A 5/13/2016    Procedure: ESOPHAGOGASTRODUODENOSCOPY  W/ STENT;  Surgeon: Leonardo Ortiz Jr., MD;  Location: [facility] ENDOSCOPY;  Service:    • GASTRECTOMY      Gastric Surgery for Morbid Obesity Laparoscopic Longitudinal Gastrectomy   • GASTRIC SLEEVE LAPAROSCOPIC     • HEMORRHOIDECTOMY     • HERNIA REPAIR     • LAPAROSCOPY REPAIR HIATAL HERNIA     • LARYNGOSCOPY      Direct Laryngoscopy with Injection into Vocal Cords   • OTHER SURGICAL HISTORY Bilateral     Ear Pressure Equalization Tube, Insertion, Bilaterally   • PERIPHERALLY INSERTED CENTRAL CATHETER INSERTION     • SPLENECTOMY     • THYROID SURGERY     • TUBAL ABDOMINAL LIGATION         Family History   Problem Relation Age of Onset   • Stroke Father    • Diabetes Father         Diabetes Mellitus   • Hypertension Father    • Cancer Other        Social History     Socioeconomic History   • Marital status: Single     Spouse name: Not on file   • Number of children: Not on file   • Years of education: Not on file   • Highest education level: Not on file   Tobacco Use   • Smoking status: Never Smoker   • Smokeless tobacco: Never Used   Substance and Sexual Activity   • Alcohol use: No   • Drug use: No   • Sexual activity: Defer           Objective   Physical Exam  Vitals signs and nursing note reviewed.   Constitutional:       General: She is awake. She is not in acute distress.     Appearance: Normal appearance. She is well-developed and normal weight. She is not ill-appearing or toxic-appearing.   HENT:      Head: Normocephalic and atraumatic.      Mouth/Throat:      Mouth: Mucous membranes are moist.   Eyes:      General: No scleral icterus.      Conjunctiva/sclera: Conjunctivae normal.      Pupils: Pupils are equal, round, and reactive to light.   Neck:      Musculoskeletal: Normal range of motion and neck supple.   Cardiovascular:      Rate and Rhythm: Normal rate and regular rhythm.      Pulses: Normal pulses.           Radial pulses are 2+ on the right side and 2+ on the left side.        Dorsalis pedis pulses are 2+ on the right side and 2+ on the left side.        Posterior tibial pulses are 2+ on the right side and 2+ on the left side.      Heart sounds: Normal heart sounds, S1 normal and S2 normal. No murmur. No friction rub. No gallop.    Pulmonary:      Effort: Pulmonary effort is normal. No respiratory distress.      Breath sounds: Normal breath sounds. No wheezing or rales.   Chest:      Chest wall: No tenderness.   Abdominal:      General: Bowel sounds are normal. There is distension.      Palpations: Abdomen is soft. There is no mass.      Tenderness: There is abdominal tenderness in the periumbilical area. There is no right CVA tenderness, left CVA tenderness, guarding or rebound.      Hernia: No hernia is present.          Comments: Patient is noted to have large periumbilical hernia that is reducible.  There is no overlying erythema or induration noted.   Skin:     General: Skin is warm and dry.      Capillary Refill: Capillary refill takes less than 2 seconds.      Coloration: Skin is not jaundiced or pale.      Findings: No erythema or rash.   Neurological:      Mental Status: She is alert and oriented to person, place, and time.   Psychiatric:         Mood and Affect: Mood normal.         Behavior: Behavior normal. Behavior is cooperative.         Thought Content: Thought content normal.         Judgment: Judgment normal.         Procedures           ED Course  ED Course as of Sep 16 0307   Tue Sep 15, 2020   2320 Sinus rhythm rate of 68.  No ectopy noted.  Compared to previous EKG from 4/18/2020.  Interpreted Dr. Vines and reviewed by  me.   ECG 12 Lead [AL]   Wed Sep 16, 2020   0038 Awaiting CT results.    [AL]   0117 Awaiting CT of the abdomen results.    [AL]      ED Course User Index  [AL] Florina Calix, NP      Ct Abdomen Pelvis With Contrast    Result Date: 9/15/2020  1. Ventral hernias are present. An umbilical hernia contains a short segment of nonobstructed small bowel and fluid. A supraumbilical hernia contains fluid. Both hernia sacs appear similar relative to 4/18/2020. 2. Large volume of ascites. 3. 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 There is nonocclusive clot within the superior mesenteric vein. 4. Hepatic fibrosis and steatosis. Of note are prominent bands of fibrosis in the hepatic dome. 5. Status post gastric bypass, cholecystectomy, splenectomy and hysterectomy. 6. Additional chronic findings as above. Electronically signed by:  Champ Gregory M.D.  9/15/2020 11:28 PM    Ct Chest Pulmonary Embolism    Result Date: 9/15/2020  1. No evidence of pulmonary embolism. 2. Minimal patchy groundglass airspace disease in the right costophrenic sulcus. This is nonspecific. In the acute setting this may represent an infectious or inflammatory process. 3. Please refer to the separately dictated report for findings below the diaphragm. Electronically signed by:  Champ Gregory M.D.  9/15/2020 11:07 PM    Medications   sodium chloride 0.9 % flush 10 mL (has no administration in time range)   methylPREDNISolone sodium succinate (SOLU-Medrol) injection 125 mg (125 mg Intravenous Given 9/15/20 2333)   diphenhydrAMINE (BENADRYL) injection 50 mg (50 mg Intravenous Given 9/15/20 2334)   fentaNYL citrate (PF) (SUBLIMAZE) injection 25 mcg (25 mcg Intravenous Given 9/15/20 2333)   ondansetron (ZOFRAN) injection 4 mg (4 mg Intravenous Given 9/15/20 2334)   sodium chloride 0.9 % bolus 500 mL (0 mL Intravenous Stopped 9/16/20 0003)   iopamidol (ISOVUE-370) 76 % injection 100 mL (100 mL Intravenous Given  9/16/20 0055)     Labs Reviewed   COMPREHENSIVE METABOLIC PANEL - Abnormal; Notable for the following components:       Result Value    Calcium 8.2 (*)     Albumin 3.10 (*)     Alkaline Phosphatase 145 (*)     All other components within normal limits    Narrative:     GFR Normal >60  Chronic Kidney Disease <60  Kidney Failure <15     CBC WITH AUTO DIFFERENTIAL - Abnormal; Notable for the following components:    Hemoglobin 11.3 (*)     RDW 16.9 (*)     Platelets 468 (*)     All other components within normal limits   MANUAL DIFFERENTIAL - Abnormal; Notable for the following components:    Neutrophil % 22.0 (*)     Lymphocyte % 56.0 (*)     Eosinophil % 7.0 (*)     Basophil % 5.0 (*)     Lymphocytes Absolute 4.42 (*)     Eosinophils Absolute 0.55 (*)     Basophils Absolute 0.40 (*)     All other components within normal limits   LIPASE - Normal   URINALYSIS W/ MICROSCOPIC IF INDICATED (NO CULTURE) - Normal    Narrative:     Urine microscopic not indicated.   PROTIME-INR - Normal   TROPONIN (IN-HOUSE) - Normal    Narrative:     Troponin T Reference Range:  <= 0.03 ng/mL-   Negative for AMI  >0.03 ng/mL-     Abnormal for myocardial necrosis.  Clinicians would have to utilize clinical acumen, EKG, Troponin and serial changes to determine if it is an Acute Myocardial Infarction or myocardial injury due to an underlying chronic condition.       Results may be falsely decreased if patient taking Biotin.     BUN - Normal   RAINBOW DRAW    Narrative:     The following orders were created for panel order [place] Draw.  Procedure                               Abnormality         Status                     ---------                               -----------         ------                     Light Blue Top[[id number]]                                   Final result               Green Top (Gel)[[id number]]                                  Final result               Lavender Top[[id number]]                                     Final  result               [facility][[id number]]                                   Final result                 Please view results for these tests on the individual orders.   SCAN SLIDE   CBC AND DIFFERENTIAL    Narrative:     The following orders were created for panel order CBC & Differential.  Procedure                               Abnormality         Status                     ---------                               -----------         ------                     CBC Auto Differential[[id number]]        Abnormal            Final result                 Please view results for these tests on the individual orders.   LIGHT BLUE TOP   GREEN TOP   LAVENDER TOP   [facility]                                          MDM  Number of Diagnoses or Management Options  Diagnosis management comments: Chart Review: 7/16/2020 patient was seen by endocrinology for follow-up on thyroidectomy  Comorbidity: Past Medical History:  No date: Abdominal pain  No date: Anemia  No date: Cancer (CMS/HCC)  No date: Clot  8/15/2019: Deep venous thrombosis of right profunda femoris vein (CMS/  HCC)  No date: Depression  No date: Disease of thyroid gland  No date: Empyema of pleura (CMS/HCC)  No date: GERD (gastroesophageal reflux disease)  No date: Heartburn  No date: Hemorrhoids  No date: Hernia, ventral  No date: History of malignant neoplasm of thyroid  No date: History of transfusion  No date: Irregular menstrual cycle  No date: Migraine  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 Ventral hernias are present. An umbilical hernia contains a short segment of nonobstructed small bowel and fluid. A supraumbilical hernia contains fluid. Both hernia sacs appear similar relative to 4/18/2020. 2. Large  "volume of ascites. 3. 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 There is nonocclusive clot within the superior mesenteric vein. 4. Hepatic fibrosis and steatosis. Of note are prominent bands of fibrosis in the hepatic dome. 5. Status post gastric bypass, cholecystectomy, splenectomy and hysterectomy. 6. Additional chronic findings as above. Electronically signed by:  Champ Gregory M.D.  9/15/2020 11:28 PM    Ct Chest Pulmonary Embolism    Result Date: 9/15/2020  1. No evidence of pulmonary embolism. 2. Minimal patchy groundglass airspace disease in the right costophrenic sulcus. This is nonspecific. In the acute setting this may represent an infectious or inflammatory process. 3. Please refer to the separately dictated report for findings below the diaphragm. Electronically signed by:  Champ Gregory M.D.  9/15/2020 11:07 PM    Patient undressed and placed in gown for exam.  Appropriate PPE worn during patient exam. 44-year-old female presents with multiple complaints: She reports periumbilical pain around her hernia that radiates through to her back.  She also complains of left upper extremity \"numbness and heaviness\" and right lower extremity pain at the medial aspect of the knee with ecchymosis.  She reports that she has a history of DVT and this feels similar.  She denies taking blood thinner she previously was on Pradaxa.  She reports nausea and 2 episodes of vomiting denies diarrhea.  She denies melena nor hematochezia or urinary symptoms.  She denies any chest pain does report some dyspnea.  Denies fever sweats or chills.  IV established and labs obtained.  Abdominal and shortness of air protocol set initiated.  A CT of the chest with IV contrast and CT of the abdomen pelvis with IV contrast obtained with the above findings.  Patient has a history of allergy to contrast dye.  She was premedicated with Solu-Medrol 125 mg IV and Benadryl 50 mg IV.  She was given " normal saline 500 cc bolus, fentanyl 25 mcg IV, and Zofran 4 mg IV. Aside from platelets being slightly elevated 468 labs are essentially unremarkable when comparing to her previous labs from 5 months ago.  She will be admitted for ascites on her CT, as well as concern for DVT of the right lower extremity.  Hospitalist paged for admission.  Spoke with GRIFFIN Carroll accepted admission on behalf of Dr. Cervantes.    Disposition/Treatment: Discussed results with patient, verbalized understanding.  Discussed reasons to return to the ER, patient verbalized understanding.  Agreeable with plan of care.  Patient was stable upon discharge.    EMR Dragon transcription disclaimer:  Some of this encounter note is an electronic transcription translation of spoken language to printed text. The electronic translation of spoken language may permit erroneous, or at times, nonsensical words or phrases to be inadvertently transcribed; Although I have reviewed the note for such errors some may still exist.              Amount and/or Complexity of Data Reviewed  Clinical lab tests: reviewed  Tests in the radiology section of CPT®: reviewed  Tests in the medicine section of CPT®: reviewed    Patient Progress  Patient progress: stable      Final diagnoses:   Periumbilical pain   Other ascites   Right leg pain   Dyspnea, unspecified type            Florina Calix, JUAN  09/16/20 1098

## 2020-09-16 NOTE — PROGRESS NOTES
Discharge Planning Assessment   [facility]     Patient Name: Lucinda Cope  MRN: [id number]  Today's Date: 9/16/2020    Admit Date: 9/15/2020    Discharge Needs Assessment     Row Name 09/16/20 1235       Living Environment    Lives With  child(areyl), dependent    Name(s) of Who Lives With Patient  17 year old daughter    Current Living Arrangements  home/apartment/condo    Primary Care Provided by  self    Provides Primary Care For  no one    Able to Return to Prior Arrangements  yes       Resource/Environmental Concerns    Resource/Environmental Concerns  none    Transportation Concerns  car, none       Transition Planning    Patient/Family Anticipates Transition to  home    Patient/Family Anticipated Services at Transition  none    Transportation Anticipated  family or friend will provide       Discharge Needs Assessment    Readmission Within the Last 30 Days  no previous admission in last 30 days    Equipment Currently Used at Home  none    Concerns to be Addressed  no discharge needs identified;denies needs/concerns at this time    Anticipated Changes Related to Illness  none    Equipment Needed After Discharge  none        Discharge Plan     Row Name 09/16/20 1235       Plan    Plan  Anticipate routine home    Patient/Family in Agreement with Plan  yes    Plan Comments  Called and spoke to patient via room phone, reports she lives at home with 17 year old daughter. I with ADLs, no DME, still drives. PCP confirmed. No issues affording food or medications. Currently denies any d/c needs or concerns. DC barriers: IVF          Expected Discharge Date and Time     Expected Discharge Date Expected Discharge Time    Sep 17, 2020         Demographic Summary     Row Name 09/16/20 1235       General Information    Admission Type  observation    Arrived From  emergency department    Referral Source  admission list    Reason for Consult  discharge planning    Preferred Language  English     Used During This  Interaction  no        Functional Status     Row Name 09/16/20 1235       Functional Status    Usual Activity Tolerance  excellent    Current Activity Tolerance  good       Functional Status, IADL    Medications  independent    Meal Preparation  independent    Housekeeping  independent    Laundry  independent    Shopping  independent              Anny Cartagena RN

## 2020-09-16 NOTE — PLAN OF CARE
Goal Outcome Evaluation:  Plan of Care Reviewed With: patient  Progress: no change  Outcome Summary: Pt is a new admission to the floor. Pt is complaining of pain in her abdomen that radiates to her back. Pt was medicated, will follow up. will continue to monitor.

## 2020-09-16 NOTE — H&P
"      [facility] Medicine Services      Patient Name: Lucinda Cope  : 1975  MRN: [id number]  Primary Care Physician: Melissa Iniguez APRN  Date of admission: 9/15/2020    Patient Care Team:  Melissa Iniguez APRN as PCP - General          Subjective   History Present Illness     Chief Complaint:   Chief Complaint   Patient presents with   • Hernia                  44 year old female with multiple chronic medical issues presents with several complaints. She is awake, alert and good historian . First, she reports increased abdominal pain , nausea and dyspnea secondary to what she believes was from her chronic umbilical hernia. She also has a PMH of CARTER cirrhosis followed by a GI group at . She reports need for frequent therapeutic paracentesis. Last one was performed here as an outpatient in early . CT abdomen with contrast per radiology today:  \"1. Ventral hernias are present. An umbilical hernia contains a short segment of nonobstructed small bowel and fluid. A supraumbilical hernia contains fluid. Both hernia sacs appear similar relative to 2020.  2. Large volume of ascites.  3. 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 There is nonocclusive clot within the superior mesenteric vein.  4. Hepatic fibrosis and steatosis. Of note are prominent bands of fibrosis in the hepatic dome.  5. Status post gastric bypass, cholecystectomy, splenectomy and hysterectomy.  6. Additional chronic findings as above.\"    She has a PMH of gastric bypass complicated by spleen injury and s/p splenectomy. PMH also of multiple hernia repair. Hernia is reducible and no incarceration per CT. She will be admitted for likely therapeutic paracentesis pending results of further testing due to patients next complaint. Secondly , patient reports pain and bruising to medial aspect of inner right thigh . She denies injury and reports she had a DVT in same area " "in past and used to be on Pradaxa which was stopped. DVT doppler ordered and pending. 1x dose of therapeutic Lovenox will be given pending results. Paracentesis will depend on results of DVT Doppler and whether further anticoagulation required.     Thirdly patient reports \" aching\" in left upper extremely and upper back and shoulder and associated dyspnea. Troponin and EKG shows no ischemia CT chest per radiology today:  \"1. No evidence of pulmonary embolism.  2. Minimal patchy groundglass airspace disease in the right costophrenic sulcus. This is nonspecific. In the acute setting this may represent an infectious or inflammatory process.  3. Please refer to the separately dictated report for findings below the diaphragm.\"    Covid-19 testing ordered and pending given CT results and dyspnea which maybe secondary to ascites but unclear at this time. Pateint is afebrile. Serial troponin ordered. Patient has a further PMH of thyroid cancer s/p total thyroidectomy, chronic pain on home narcotics . Labs show Hgb 11.3 was 12.4 in June,platelets 468, 457 in June, Ca 8.2, albumin 3.10. She will be admitted for further evaluation and treatment .      Review of Systems   Constitution: Negative.   HENT: Negative.    Eyes: Negative.    Cardiovascular: Negative.    Respiratory: Positive for shortness of breath.    Endocrine: Negative.    Hematologic/Lymphatic: Negative.    Skin: Positive for color change.   Musculoskeletal: Positive for back pain.   Gastrointestinal: Positive for bloating, abdominal pain and nausea.   Genitourinary: Negative.    Neurological: Negative.    Psychiatric/Behavioral: Negative.    Allergic/Immunologic: Negative.            Personal History     Past Medical History:   Past Medical History:   Diagnosis Date   • Abdominal pain    • Anemia    • Cancer (CMS/HCC)    • Clot    • Deep venous thrombosis of right profunda femoris vein (CMS/HCC) 8/15/2019   • Depression    • Disease of thyroid gland    • Empyema " of pleura (CMS/HCC)    • GERD (gastroesophageal reflux disease)    • Heartburn    • Hemorrhoids    • Hernia, ventral    • History of malignant neoplasm of thyroid    • History of transfusion    • Irregular menstrual cycle    • Migraine    • Parathyroid abnormality (CMS/HCC)    • Radiation    • Restless legs syndrome (RLS)    • Urge and stress incontinence    • Varicose veins        Surgical History:      Past Surgical History:   Procedure Laterality Date   • ABDOMINAL SURGERY     • BEDSIDE PARACENTESIS  3/22/2020        • BEDSIDE PARACENTESIS  4/19/2020        • CHOLECYSTECTOMY      Laparoscopic   • ENDOSCOPY N/A 3/17/2016    Procedure: ESOPHAGOGASTRODUODENOSCOPY WITH BALOON DILATATION 18-20MM WITH GASTRIC SLEEVE SEALANT;  Surgeon: Leonardo Ortiz Jr., MD;  [place] [facility];  Service:    • ENDOSCOPY N/A 3/14/2016    Procedure: esophagogastroduodenoscopy with fluoroscopy;  Surgeon: Greg Avila III, MD;  [place] [facility];  Service:    • ENDOSCOPY N/A 4/15/2016    Procedure: EGD WITH DILITATION AND STENT PLACEMENT dilation of pylorus ;  Surgeon: Leonardo Ortiz Jr., MD;  [place] [facility];  Service:    • ENDOSCOPY N/A 5/13/2016    Procedure: ESOPHAGOGASTRODUODENOSCOPY  W/ STENT;  Surgeon: Leonardo Ortiz Jr., MD;  [place] [facility];  Service:    • GASTRECTOMY      Gastric Surgery for Morbid Obesity Laparoscopic Longitudinal Gastrectomy   • GASTRIC SLEEVE LAPAROSCOPIC     • HEMORRHOIDECTOMY     • HERNIA REPAIR     • LAPAROSCOPY REPAIR HIATAL HERNIA     • LARYNGOSCOPY      Direct Laryngoscopy with Injection into Vocal Cords   • OTHER SURGICAL HISTORY Bilateral     Ear Pressure Equalization Tube, Insertion, Bilaterally   • PERIPHERALLY INSERTED CENTRAL CATHETER INSERTION     • SPLENECTOMY     • THYROID SURGERY     • TUBAL ABDOMINAL LIGATION             Family History: family history includes Cancer in an other family member; Diabetes in her father; Hypertension in her  father; Stroke in her father. Otherwise pertinent FHx was reviewed and unremarkable.     Social History:  reports that she has never smoked. She has never used smokeless tobacco. She reports that she does not drink alcohol or use drugs.      Medications:  Prior to Admission medications    Medication Sig Start Date End Date Taking? Authorizing Provider   bumetanide (BUMEX) 2 MG tablet Take 2 mg by mouth Daily.   Yes Justyn Erwin MD   levothyroxine (SYNTHROID, LEVOTHROID) 150 MCG tablet Take 150 mcg by mouth Daily.   Yes Justyn Erwin MD   ondansetron (ZOFRAN) 4 MG tablet Take 8 mg by mouth Every 6 (Six) Hours As Needed for Nausea or Vomiting.   Yes Justyn Erwin MD   oxyCODONE (OXY-IR) 5 MG capsule Take 10 mg by mouth Every 6 (Six) Hours As Needed for Moderate Pain .   Yes Justyn Erwin MD   rifaximin (XIFAXAN) 550 MG tablet Take 550 mg by mouth Every 12 (Twelve) Hours.   Yes Justyn Erwin MD   spironolactone (ALDACTONE) 100 MG tablet Take 200 mg by mouth Daily.   Yes Justyn Erwin MD   lactulose (CHRONULAC) 10 GM/15ML solution Take 20 g by mouth 2 (Two) Times a Day As Needed.  9/16/20  Justyn Erwin MD       Allergies:    Allergies   Allergen Reactions   • Compazine [Prochlorperazine Edisylate] Hives   • Contrast Dye Hives   • Iodinated Diagnostic Agents    • Iodine Hives   • Morphine Itching   • Nsaids Other (See Comments)     States I am not suppose to take them   • Hydrocodone Rash     Lortab elixir per pt       Objective   Objective     Vital Signs  Temp:  [98.4 °F (36.9 °C)] 98.4 °F (36.9 °C)  Heart Rate:  [68-76] 68  Resp:  [16] 16  BP: (109-150)/(68-81) 122/73  SpO2:  [97 %-98 %] 98 %  on   ;   Device (Oxygen Therapy): room air  Body mass index is 24.49 kg/m².    Physical Exam  Vitals signs reviewed.   Constitutional:       Appearance: Normal appearance. She is normal weight.   HENT:      Head: Normocephalic and atraumatic.      Right Ear: External ear  normal.      Left Ear: External ear normal.      Nose: Nose normal.      Mouth/Throat:      Mouth: Mucous membranes are moist.   Eyes:      Extraocular Movements: Extraocular movements intact.   Neck:      Musculoskeletal: Normal range of motion and neck supple.   Cardiovascular:      Rate and Rhythm: Normal rate and regular rhythm.      Pulses: Normal pulses.      Heart sounds: Normal heart sounds.   Pulmonary:      Effort: Pulmonary effort is normal.      Breath sounds: Normal breath sounds.   Abdominal:      General: There is distension.      Hernia: A hernia is present.   Genitourinary:     Comments: deferred  Musculoskeletal: Normal range of motion.   Skin:     Findings: Bruising present.      Comments: Right medial thigh    Neurological:      General: No focal deficit present.      Mental Status: She is alert and oriented to person, place, and time.   Psychiatric:         Mood and Affect: Mood normal.         Behavior: Behavior normal.         Thought Content: Thought content normal.         Judgment: Judgment normal.         Results Review:  I have personally reviewed most recent radiology images and interpretations and agree with findings, most notably: CT abdomen and CT chest .    Results from last 7 days   Lab Units 09/15/20  2324   WBC 10*3/mm3 7.90   HEMOGLOBIN g/dL 11.3*   HEMATOCRIT % 34.7   PLATELETS 10*3/mm3 468*   INR  1.09     Results from last 7 days   Lab Units 09/15/20  2324   SODIUM mmol/L 138   POTASSIUM mmol/L 3.6   CHLORIDE mmol/L 102   CO2 mmol/L 28.0   BUN  8   CREATININE mg/dL 0.69   GLUCOSE mg/dL 87   CALCIUM mg/dL 8.2*   ALT (SGPT) U/L 15   AST (SGOT) U/L 22   TROPONIN T ng/mL <0.010     Estimated Creatinine Clearance: 100.8 mL/min (by C-G formula based on SCr of 0.69 mg/dL).  Brief Urine Lab Results  (Last result in the past 365 days)      Color   Clarity   Blood   Leuk Est   Nitrite   Protein   CREAT   Urine HCG        09/16/20 0117 Yellow Clear Negative Negative Negative Negative                Microbiology Results (last 10 days)     ** No results found for the last 240 hours. **          ECG/EMG Results (most recent)     Procedure Component Value Units Date/Time    ECG 12 Lead [[id number]] Collected: 09/15/20 2320     Updated: 09/15/20 2322    Narrative:      HEART RATE= 68  bpm  RR Interval= 880  ms  ND Interval= 144  ms  P Horizontal Axis= 41  deg  P Front Axis= 22  deg  QRSD Interval= 101  ms  QT Interval= 410  ms  QRS Axis= 31  deg  T Wave Axis= 10  deg  - NORMAL ECG -  Sinus rhythm  Electronically Signed By:   Date and Time of Study: 2020-09-15 23:20:34          Results for orders placed during the hospital encounter of 08/23/19   Duplex Venous Lower Extremity - Right CAR    Narrative · Acute right lower extremity superficial thrombophlebitis noted in the   greater saphenous (above knee) and greater saphenous (below knee).  · All other right sided veins appeared normal.               Ct Abdomen Pelvis With Contrast    Result Date: 9/15/2020  1. Ventral hernias are present. An umbilical hernia contains a short segment of nonobstructed small bowel and fluid. A supraumbilical hernia contains fluid. Both hernia sacs appear similar relative to 4/18/2020. 2. Large volume of ascites. 3. 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 There is nonocclusive clot within the superior mesenteric vein. 4. Hepatic fibrosis and steatosis. Of note are prominent bands of fibrosis in the hepatic dome. 5. Status post gastric bypass, cholecystectomy, splenectomy and hysterectomy. 6. Additional chronic findings as above. Electronically signed by:  Champ Gregory M.D.  9/15/2020 11:28 PM    Ct Chest Pulmonary Embolism    Result Date: 9/15/2020  1. No evidence of pulmonary embolism. 2. Minimal patchy groundglass airspace disease in the right costophrenic sulcus. This is nonspecific. In the acute setting this may represent an infectious or inflammatory process. 3. Please refer  to the separately dictated report for findings below the diaphragm. Electronically signed by:  Champ Gregory M.D.  9/15/2020 11:07 PM        Estimated Creatinine Clearance: 100.8 mL/min (by C-G formula based on SCr of 0.69 mg/dL).    Assessment/Plan   Assessment/Plan       Active Hospital Problems    Diagnosis  POA   • Abdominal pain [R10.9]  Yes   • Right leg pain [M79.604]  Yes   • Dyspnea [R06.00]  Yes      Resolved Hospital Problems   No resolved problems to display.     Abdominal pain, nausea likely secondary to large volume of ascites per CT abdomen last paracentesis was June, 2020. 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 First troponin and EKG negative for ischemia , continuous cardiac monitoring     Hypothyroidism s/p total thyroidectomy secondary to cancer - on levothyroxine     Mild hypocalcemia 8.2- ionized Ca pending         VTE Prophylaxis -   Mechanical Order History:     None      Pharmalogical Order History:      Ordered     Dose Route Frequency Stop    09/16/20 0323  Pharmacy to Dose enoxaparin (LOVENOX)      -- XX Once --                CODE STATUS:    Code Status and Medical Interventions:   Ordered  at: 09/16/20 0323     Code Status:    CPR     Medical Interventions (Level of Support Prior to Arrest):    Full       This patient has been examined wearing appropriate Personal Protective Equipment  09/16/20      I discussed the patient's findings and my recommendations with patient.        Electronically signed by GRIFFIN Bob, 09/16/20, 3:24 AM EDT.  [facility], [place] Hospitalist Team

## 2020-09-17 ENCOUNTER — READMISSION MANAGEMENT (OUTPATIENT)
Dept: CALL CENTER | Facility: HOSPITAL | Age: 45
End: 2020-09-17

## 2020-09-17 VITALS
OXYGEN SATURATION: 99 % | RESPIRATION RATE: 16 BRPM | WEIGHT: 140.43 LBS | HEART RATE: 78 BPM | TEMPERATURE: 98.1 F | SYSTOLIC BLOOD PRESSURE: 114 MMHG | HEIGHT: 63 IN | BODY MASS INDEX: 24.88 KG/M2 | DIASTOLIC BLOOD PRESSURE: 70 MMHG

## 2020-09-17 LAB
ANION GAP SERPL CALCULATED.3IONS-SCNC: 7 MMOL/L (ref 5–15)
BASOPHILS # BLD AUTO: 0.1 10*3/MM3 (ref 0–0.2)
BASOPHILS NFR BLD AUTO: 0.6 % (ref 0–1.5)
BUN SERPL-MCNC: 10 MG/DL (ref 6–20)
BUN SERPL-MCNC: ABNORMAL MG/DL
BUN/CREAT SERPL: ABNORMAL
CALCIUM SPEC-SCNC: 7.8 MG/DL (ref 8.6–10.5)
CHLORIDE SERPL-SCNC: 104 MMOL/L (ref 98–107)
CO2 SERPL-SCNC: 29 MMOL/L (ref 22–29)
CREAT SERPL-MCNC: 0.75 MG/DL (ref 0.57–1)
DEPRECATED RDW RBC AUTO: 51.6 FL (ref 37–54)
EOSINOPHIL # BLD AUTO: 0.2 10*3/MM3 (ref 0–0.4)
EOSINOPHIL NFR BLD AUTO: 1.4 % (ref 0.3–6.2)
ERYTHROCYTE [DISTWIDTH] IN BLOOD BY AUTOMATED COUNT: 16.6 % (ref 12.3–15.4)
GFR SERPL CREATININE-BSD FRML MDRD: 83 ML/MIN/1.73
GLUCOSE SERPL-MCNC: 104 MG/DL (ref 65–99)
HCT VFR BLD AUTO: 34.8 % (ref 34–46.6)
HGB BLD-MCNC: 11.3 G/DL (ref 12–15.9)
LYMPHOCYTES # BLD AUTO: 5 10*3/MM3 (ref 0.7–3.1)
LYMPHOCYTES NFR BLD AUTO: 36.7 % (ref 19.6–45.3)
MCH RBC QN AUTO: 29 PG (ref 26.6–33)
MCHC RBC AUTO-ENTMCNC: 32.4 G/DL (ref 31.5–35.7)
MCV RBC AUTO: 89.6 FL (ref 79–97)
MONOCYTES # BLD AUTO: 1.1 10*3/MM3 (ref 0.1–0.9)
MONOCYTES NFR BLD AUTO: 8 % (ref 5–12)
NEUTROPHILS NFR BLD AUTO: 53.3 % (ref 42.7–76)
NEUTROPHILS NFR BLD AUTO: 7.3 10*3/MM3 (ref 1.7–7)
NRBC BLD AUTO-RTO: 0.1 /100 WBC (ref 0–0.2)
PLATELET # BLD AUTO: 359 10*3/MM3 (ref 140–450)
PMV BLD AUTO: 8.9 FL (ref 6–12)
POTASSIUM SERPL-SCNC: 3.6 MMOL/L (ref 3.5–5.2)
RBC # BLD AUTO: 3.89 10*6/MM3 (ref 3.77–5.28)
SODIUM SERPL-SCNC: 140 MMOL/L (ref 136–145)
TROPONIN T SERPL-MCNC: <0.01 NG/ML (ref 0–0.03)
TROPONIN T SERPL-MCNC: <0.01 NG/ML (ref 0–0.03)
WBC # BLD AUTO: 13.7 10*3/MM3 (ref 3.4–10.8)

## 2020-09-17 PROCEDURE — 99217 PR OBSERVATION CARE DISCHARGE MANAGEMENT: CPT | Performed by: HOSPITALIST

## 2020-09-17 PROCEDURE — 76942 ECHO GUIDE FOR BIOPSY: CPT

## 2020-09-17 PROCEDURE — 80048 BASIC METABOLIC PNL TOTAL CA: CPT | Performed by: NURSE PRACTITIONER

## 2020-09-17 PROCEDURE — 84484 ASSAY OF TROPONIN QUANT: CPT | Performed by: NURSE PRACTITIONER

## 2020-09-17 PROCEDURE — G0378 HOSPITAL OBSERVATION PER HR: HCPCS

## 2020-09-17 PROCEDURE — 85025 COMPLETE CBC W/AUTO DIFF WBC: CPT | Performed by: NURSE PRACTITIONER

## 2020-09-17 RX ORDER — DABIGATRAN ETEXILATE 150 MG/1
150 CAPSULE ORAL EVERY 12 HOURS SCHEDULED
Qty: 60 CAPSULE | Refills: 0 | Status: SHIPPED | OUTPATIENT
Start: 2020-09-17 | End: 2020-12-16 | Stop reason: ALTCHOICE

## 2020-09-17 RX ORDER — DABIGATRAN ETEXILATE 150 MG/1
150 CAPSULE ORAL EVERY 12 HOURS SCHEDULED
Status: DISCONTINUED | OUTPATIENT
Start: 2020-09-17 | End: 2020-09-17 | Stop reason: HOSPADM

## 2020-09-17 RX ORDER — ACETAMINOPHEN 325 MG/1
650 TABLET ORAL ONCE
Status: COMPLETED | OUTPATIENT
Start: 2020-09-17 | End: 2020-09-17

## 2020-09-17 RX ADMIN — OXYCODONE HYDROCHLORIDE 10 MG: 5 TABLET ORAL at 12:29

## 2020-09-17 RX ADMIN — SPIRONOLACTONE 200 MG: 100 TABLET, FILM COATED ORAL at 09:52

## 2020-09-17 RX ADMIN — Medication 10 ML: at 09:53

## 2020-09-17 RX ADMIN — OXYCODONE HYDROCHLORIDE 10 MG: 5 TABLET ORAL at 05:52

## 2020-09-17 RX ADMIN — LEVOTHYROXINE SODIUM 150 MCG: 150 TABLET ORAL at 05:52

## 2020-09-17 RX ADMIN — ACETAMINOPHEN 650 MG: 325 TABLET ORAL at 16:16

## 2020-09-17 RX ADMIN — DABIGATRAN ETEXILATE MESYLATE 150 MG: 150 CAPSULE ORAL at 12:29

## 2020-09-17 RX ADMIN — RIFAXIMIN 550 MG: 550 TABLET ORAL at 09:53

## 2020-09-17 RX ADMIN — BUMETANIDE 2 MG: 1 TABLET ORAL at 09:52

## 2020-09-17 NOTE — NURSING NOTE
Bedside ultrasound performed by Dr. Diaz showed no ascites, therefore Paracentesis was not performed. Patient understands findings and plan of care.

## 2020-09-17 NOTE — PLAN OF CARE
Goal Outcome Evaluation:  Plan of Care Reviewed With: patient  Progress: no change  Outcome Summary: Patient resting in bed with complaints of abdominal and back pain. She is scheduled to have a paracentesis in the morning and lovenox has been held. She is on room air and vital signs are stable. Will continue to monitor patient.

## 2020-09-17 NOTE — PROGRESS NOTES
Continued Stay Note  SARAH Galdamez     Patient Name: Lucinda Cope  MRN: [id number]  Today's Date: 9/17/2020    Admit Date: 9/15/2020    Discharge Plan     Row Name 09/17/20 1113       Plan    Plan Comments  DC barriers: paracentesis ordered today, however per bedside RN report there was not enough fluid. Pending MD rounds for plan of care.            Anny Cartagena RN

## 2020-09-17 NOTE — SIGNIFICANT NOTE
09/17/20 1637   Discharge of Care   Discharge Mode wheel chair   Nurse Report Given To Patient   Discharge Teaching Done  Yes   Learning Method Explanation;Written Materials   Pt to transport self home. Will cont to monitor.

## 2020-09-17 NOTE — DISCHARGE SUMMARY
[facility] Medicine Services  DISCHARGE SUMMARY        Prepared For PCP:  Melissa Iniguez APRN    Patient Name: Lucinda Cope  : 1975  MRN: [id number]      Date of Admission:   9/15/2020    Date of Discharge:  2020    Length of stay:  LOS: 0 days     Hospital Course     Presenting Problem:   Abdominal pain [R10.9]  Right leg pain [M79.604]  Other ascites [R18.8]  Periumbilical pain [R10.33]  Dyspnea, unspecified type [R06.00]      Active Hospital Problems    Diagnosis  POA   • Hypercoagulable state (CMS/HCC) [D68.59]  Yes     Priority: High   • Abdominal pain [R10.9]  Yes   • Right leg pain [M79.604]  Yes   • Dyspnea [R06.00]  Yes   • Ventral hernia [K43.9]  Yes   • CARTER (nonalcoholic steatohepatitis) [K75.81]  Yes   • Lupus anticoagulant positive [R76.0]  Yes   • Restless leg syndrome [G25.81]  Yes   • Hypothyroid [E03.9]  Yes   • Portal vein thrombosis [I81]  Yes   • Edema [R60.9]  Yes   • Gastroesophageal reflux disease [K21.9]  Yes   • Hypertension [I10]  Yes   • Sleep apnea [G47.30]  Yes   • Iron deficiency anemia [D50.9]  Yes   • Thyroid cancer (CMS/HCC) [C73]  Yes   • Depression [F32.9]  Yes      Resolved Hospital Problems   No resolved problems to display.     Assessment and plan:     Acute COVID-19 pneumonia with mild groundglass opacities on CT chest and mild dyspnea (not requiring supplemental oxygen)  -Supportive care    Hypercoagulable state with anticoagulant noncompliance preadmission due to reported difficulty obtaining prescription  -Pradaxa prescription renewed for 1 month and patient encouraged to maintain close contact with PCP for continued medical care and refills     Carter cirrhosis with portal vein thrombosis  -Patient encouraged to continue medical compliance and to follow-up with gastroenterology     Abdominal pain with distention and mild dyspnea due to significant ascites and ventral and umbilical hernias   -Unfortunately paracentesis attempted on  9/17/2020 showed no accessible fluid  -continue home Bumex, spironolactone, and rifaximin      SVT right lower extremity by clinical exam, venous duplex deferred because of COVID-19 positivity     Left upper extremity pain and back pain, likely secondary to musculoskeletal strain  -Ruled out for acute MI by EKG and enzymes  -No complaints of chest pain or shortness of breath, sweats or nausea  -Further outpatient work-up for cardiac etiology should be considered if symptoms persist or worsen     Chronic back pain with narcotic dependency  -Continue home oxycodone     Iatrogenic hypothyroidism status post total thyroidectomy for cancer  -Continue levothyroxine      Hospital Course:  Lucinda Cope is a 44 y.o. female with history of thyroid cancer s/p resection with iatrogenic hypothyroidism, HTN, lupus coagulant positive hypercoagulable state, Ramon cirrhosis with portal vein thrombosis and lower extremity venous thrombosis, who is supposed to be on Pradaxa but reports she was unable to get the medication refilled and has been off of it for several months.  She presented to the emergency room complaining of pain and bruising right middle thigh with no known trauma, and aching type pain throughout the left upper extremity with no tingling numbness or weakness or swelling noted.  She also complains of abdominal pain due to distention with discomfort at the ventral and umbilical hernia which is the typical discomfort she has when she is due for paracentesis.       CT scan abdomen showed large volume ascites, however paracentesis could not be done because the fluid was not accessible.      The patient was ruled out for MI by EKG and enzymes. There is not a good indication for the Myoview or echocardiogram at this time, especially since the patient is COVID positive and would expose multiple staff. This could be addressed later as an outpatient if more concerning symptoms develop. No current symptoms of CXP, SOA or  ACS.    Pt is better and is felt to be stable for discharge,            Day of Discharge     HPI: No current c/o.      Vital Signs:   Temp:  [98 °F (36.7 °C)-99.3 °F (37.4 °C)] 98.1 °F (36.7 °C)  Heart Rate:  [] 78  Resp:  [16-19] 16  BP: ()/(53-78) 114/70     Physical Exam:  Physical Exam  Constitutional:       General: She is not in acute distress.     Appearance: Normal appearance. She is not ill-appearing, toxic-appearing or diaphoretic.   HENT:      Mouth/Throat:      Mouth: Mucous membranes are moist.   Eyes:      General: No scleral icterus.  Cardiovascular:      Rate and Rhythm: Normal rate and regular rhythm.      Pulses: Normal pulses.      Heart sounds: Normal heart sounds.   Pulmonary:      Effort: Pulmonary effort is normal.      Breath sounds: Normal breath sounds.   Abdominal:      General: Bowel sounds are normal. There is no distension.      Palpations: Abdomen is soft.      Tenderness: There is no abdominal tenderness.      Hernia: A hernia is present.   Musculoskeletal:         General: No swelling or tenderness.   Skin:     General: Skin is warm and dry.   Neurological:      Mental Status: She is alert.   Psychiatric:         Mood and Affect: Mood normal.         Behavior: Behavior normal.         Pertinent  and/or Most Recent Results     Results from last 7 days   Lab Units 09/17/20  0340 09/15/20  2324   WBC 10*3/mm3 13.70* 7.90   HEMOGLOBIN g/dL 11.3* 11.3*   HEMATOCRIT % 34.8 34.7   PLATELETS 10*3/mm3 359 468*   SODIUM mmol/L 140 138   POTASSIUM mmol/L 3.6 3.6   CHLORIDE mmol/L 104 102   CO2 mmol/L 29.0 28.0   BUN  10 8   CREATININE mg/dL 0.75 0.69   GLUCOSE mg/dL 104* 87   CALCIUM mg/dL 7.8* 8.2*     Results from last 7 days   Lab Units 09/15/20  2324   BILIRUBIN mg/dL 0.7   ALK PHOS U/L 145*   ALT (SGPT) U/L 15   AST (SGOT) U/L 22   PROTIME Seconds 11.7   INR  1.09           Invalid input(s): TG, LDLCALC, LDLREALC  Results from last 7 days   Lab Units 09/17/20  0340  09/17/20  0032 09/16/20  2042 09/16/20  1320 09/16/20  0817 09/16/20  0813 09/16/20  0433   TROPONIN T ng/mL <0.010 <0.010 <0.010  --   --  <0.010 <0.010   PROCALCITONIN ng/mL  --   --   --   --   --   --  0.04   LACTATE mmol/L  --   --   --  1.9 3.2*  --   --        Brief Urine Lab Results  (Last result in the past 365 days)      Color   Clarity   Blood   Leuk Est   Nitrite   Protein   CREAT   Urine HCG        09/16/20 0117 Yellow Clear Negative Negative Negative Negative               Microbiology Results Abnormal     Procedure Component Value - Date/Time    Respiratory Panel PCR w/COVID-19(SARS-CoV-2) [facility]-House, NP Swab in UTM/VTM, 3-4 HR TAT - Swab, Nasopharynx [[id number]]  (Abnormal) Collected: 09/16/20 0428    Lab Status: Final result Specimen: Swab from Nasopharynx Updated: 09/16/20 0550     ADENOVIRUS, PCR Not Detected     Coronavirus 229E Not Detected     Coronavirus HKU1 Not Detected     Coronavirus NL63 Not Detected     Coronavirus OC43 Not Detected     COVID19 Detected     Human Metapneumovirus Not Detected     Human Rhinovirus/Enterovirus Not Detected     Influenza A PCR Not Detected     Influenza A H1 Not Detected     Influenza A H1 2009 PCR Not Detected     Influenza A H3 Not Detected     Influenza B PCR Not Detected     Parainfluenza Virus 1 Not Detected     Parainfluenza Virus 2 Not Detected     Parainfluenza Virus 3 Not Detected     Parainfluenza Virus 4 Not Detected     RSV, PCR Not Detected     Bordetella pertussis pcr Not Detected     Bordetella parapertussis PCR Not Detected     Chlamydophila pneumoniae PCR Not Detected     Mycoplasma pneumo by PCR Not Detected    Narrative:      Fact sheet for providers: https://docs.[employer]/wp-content/uploads/XFI7418-3189-SY6.1-EUA-Provider-Fact-Sheet-3.pdf    Fact sheet for patients: https://docs.[employer]/wp-content/uploads/NMU5693-2467-HN2.1-EUA-Patient-Fact-Sheet-1.pdf          Ct Abdomen Pelvis With Contrast    Result  Date: 9/15/2020  Impression: 1. Ventral hernias are present. An umbilical hernia contains a short segment of nonobstructed small bowel and fluid. A supraumbilical hernia contains fluid. Both hernia sacs appear similar relative to 4/18/2020. 2. Large volume of ascites. 3. 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 There is nonocclusive clot within the superior mesenteric vein. 4. Hepatic fibrosis and steatosis. Of note are prominent bands of fibrosis in the hepatic dome. 5. Status post gastric bypass, cholecystectomy, splenectomy and hysterectomy. 6. Additional chronic findings as above. Electronically signed by:  Champ Gregory M.D.  9/15/2020 11:28 PM    Ct Chest Pulmonary Embolism    Result Date: 9/15/2020  Impression: 1. No evidence of pulmonary embolism. 2. Minimal patchy groundglass airspace disease in the right costophrenic sulcus. This is nonspecific. In the acute setting this may represent an infectious or inflammatory process. 3. Please refer to the separately dictated report for findings below the diaphragm. Electronically signed by:  Champ Gregory M.D.  9/15/2020 11:07 PM      Results for orders placed during the hospital encounter of 08/23/19   Duplex Venous Lower Extremity - Right CAR    Narrative · Acute right lower extremity superficial thrombophlebitis noted in the   greater saphenous (above knee) and greater saphenous (below knee).  · All other right sided veins appeared normal.          Results for orders placed during the hospital encounter of 08/23/19   Duplex Venous Lower Extremity - Right CAR    Narrative · Acute right lower extremity superficial thrombophlebitis noted in the   greater saphenous (above knee) and greater saphenous (below knee).  · All other right sided veins appeared normal.                       Test Results Pending at Discharge        Procedures Performed           Consults:   Consults     No orders found from 8/17/2020 to 9/16/2020.             Discharge Details        Discharge Medications      New Medications      Instructions Start Date   dabigatran etexilate 150 MG capsu  Commonly known as: PRADAXA   150 mg, Oral, Every 12 Hours Scheduled         Continue These Medications      Instructions Start Date   bumetanide 2 MG tablet  Commonly known as: BUMEX   2 mg, Oral, Daily      levothyroxine 150 MCG tablet  Commonly known as: SYNTHROID, LEVOTHROID   150 mcg, Oral, Daily      ondansetron 4 MG tablet  Commonly known as: ZOFRAN   8 mg, Oral, Every 6 Hours PRN      oxyCODONE 5 MG capsule  Commonly known as: OXY-IR   10 mg, Oral, Every 6 Hours PRN      riFAXIMin 550 MG tablet  Commonly known as: XIFAXAN   550 mg, Oral, Every 12 Hours Scheduled      spironolactone 100 MG tablet  Commonly known as: ALDACTONE   200 mg, Oral, Daily             Allergies   Allergen Reactions   • Compazine [Prochlorperazine Edisylate] Hives   • Contrast Dye Hives   • Iodinated Diagnostic Agents    • Iodine Hives   • Morphine Itching   • Nsaids Other (See Comments)     States I am not suppose to take them   • Hydrocodone Rash     Lortab elixir per pt         Discharge Disposition:  Home or Self Care    Diet:  Hospital:  Diet Order   Procedures   • Diet Cardiac; 2gm Na+         Discharge Activity:   Activity Instructions     Activity as Tolerated              CODE STATUS:    Code Status and Medical Interventions:   Ordered at: 09/16/20 0323     Code Status:    CPR     Medical Interventions (Level of Support Prior to Arrest):    Full         Follow-up Appointments  No future appointments.    Additional Instructions for the Follow-ups that You Need to Schedule     Call MD With Problems / Concerns   As directed      Instructions: Call [phone] or email [email or web address] for problems or concerns.    Order Comments: Instructions: Call [phone] or email [email or web address] for problems or concerns.          Discharge Follow-up with PCP   As directed        Currently Documented PCP:    Melissa Iniguez APRN    PCP Phone Number:    [phone]     Follow Up Details: 2 to 3 days via telehealth if possible                 Condition on Discharge:      Stable      This patient has been examined wearing appropriate Personal Protective Equipment  09/17/20      Electronically signed by Siomara Cervantes MD, 09/17/20, 10:33 AM EDT.      Time: I spent  30  minutes on this discharge activity which included face-to-face encounter with the patient/reviewing the data in the system/coordination of the care with the nursing staff as well as consultants/documentation/entering orders.

## 2020-09-17 NOTE — OUTREACH NOTE
Prep Survey      Responses   [facility] facility patient discharged from?  [facility]   Is LACE score < 7 ?  No   Eligibility  Readm Mgmt   Discharge diagnosis  hx thyroid ca,  HTN,  lupus coagulant pos,  CARTER cirrhosis,  acute Covid-19 PN   COVID-19 Test Status  Confirmed   Does the patient have one of the following disease processes/diagnoses(primary or secondary)?  COPD/Pneumonia   Does the patient have Home health ordered?  No   Is there a DME ordered?  No   Prep survey completed?  Yes          Abby Bravo RN

## 2020-09-18 ENCOUNTER — READMISSION MANAGEMENT (OUTPATIENT)
Dept: CALL CENTER | Facility: HOSPITAL | Age: 45
End: 2020-09-18

## 2020-09-18 NOTE — PROGRESS NOTES
Case Management Discharge Note      Final Note: Home            Final Discharge Disposition Code: 01 - home or self-care

## 2020-09-18 NOTE — OUTREACH NOTE
COPD/PN Week 1 Survey      Responses   [facility] patient discharged from?  [facility]   COVID-19 Test Status  Confirmed   Does the patient have one of the following disease processes/diagnoses(primary or secondary)?  COPD/Pneumonia   Is there a successful TCM telephone encounter documented?  No   Was the primary reason for admission:  Pneumonia   Week 1 attempt successful?  No          Rosa Maria Shaikh RN

## 2020-09-19 ENCOUNTER — READMISSION MANAGEMENT (OUTPATIENT)
Dept: CALL CENTER | Facility: HOSPITAL | Age: 45
End: 2020-09-19

## 2020-09-19 NOTE — OUTREACH NOTE
COPD/PN Week 1 Survey      Responses   [facility] patient discharged from?  [facility]   COVID-19 Test Status  Confirmed   Does the patient have one of the following disease processes/diagnoses(primary or secondary)?  COPD/Pneumonia   Is there a successful TCM telephone encounter documented?  No   Was the primary reason for admission:  Pneumonia   Week 1 attempt successful?  No          Alton Krause RN

## 2020-09-20 ENCOUNTER — READMISSION MANAGEMENT (OUTPATIENT)
Dept: CALL CENTER | Facility: HOSPITAL | Age: 45
End: 2020-09-20

## 2020-09-20 NOTE — OUTREACH NOTE
COPD/PN Week 1 Survey      Responses   [facility] patient discharged from?  [facility]   COVID-19 Test Status  Confirmed   Does the patient have one of the following disease processes/diagnoses(primary or secondary)?  COPD/Pneumonia   Is there a successful TCM telephone encounter documented?  No   Was the primary reason for admission:  Pneumonia          Altno Krause, RN

## 2020-09-23 ENCOUNTER — READMISSION MANAGEMENT (OUTPATIENT)
Dept: CALL CENTER | Facility: HOSPITAL | Age: 45
End: 2020-09-23

## 2020-09-23 NOTE — OUTREACH NOTE
COPD/PN Week 1 Survey      Responses   [facility] patient discharged from?  [facility]   COVID-19 Test Status  Confirmed   Does the patient have one of the following disease processes/diagnoses(primary or secondary)?  COPD/Pneumonia   Is there a successful TCM telephone encounter documented?  No   Was the primary reason for admission:  Pneumonia   Week 1 attempt successful?  No   Unsuccessful attempts  Attempt 3          Junie Goodman RN

## 2020-09-26 ENCOUNTER — READMISSION MANAGEMENT (OUTPATIENT)
Dept: CALL CENTER | Facility: HOSPITAL | Age: 45
End: 2020-09-26

## 2020-09-26 NOTE — OUTREACH NOTE
COPD/PN Week 2 Survey      Responses   [facility] patient discharged from?  [facility]   COVID-19 Test Status  Confirmed   Does the patient have one of the following disease processes/diagnoses(primary or secondary)?  COPD/Pneumonia   Was the primary reason for admission:  Pneumonia   Week 2 attempt successful?  No   Revoked  No further contact(revokes)-requires comment   Wrap up additional comments  four consecutive unsuccessful attempts          Alton Krause RN

## 2020-12-16 ENCOUNTER — HOSPITAL ENCOUNTER (OUTPATIENT)
Facility: HOSPITAL | Age: 45
Setting detail: OBSERVATION
Discharge: HOME | End: 2020-12-16
Attending: EMERGENCY MEDICINE | Admitting: INTERNAL MEDICINE

## 2020-12-16 ENCOUNTER — APPOINTMENT (OUTPATIENT)
Dept: GENERAL RADIOLOGY | Facility: HOSPITAL | Age: 45
End: 2020-12-16

## 2020-12-16 ENCOUNTER — APPOINTMENT (OUTPATIENT)
Dept: NUCLEAR MEDICINE | Facility: HOSPITAL | Age: 45
End: 2020-12-16

## 2020-12-16 ENCOUNTER — APPOINTMENT (OUTPATIENT)
Dept: CT IMAGING | Facility: HOSPITAL | Age: 45
End: 2020-12-16

## 2020-12-16 VITALS
WEIGHT: 130.73 LBS | RESPIRATION RATE: 14 BRPM | TEMPERATURE: 98.4 F | HEART RATE: 82 BPM | OXYGEN SATURATION: 99 % | DIASTOLIC BLOOD PRESSURE: 90 MMHG | BODY MASS INDEX: 23.16 KG/M2 | SYSTOLIC BLOOD PRESSURE: 139 MMHG | HEIGHT: 63 IN

## 2020-12-16 DIAGNOSIS — R18.8 OTHER ASCITES: ICD-10-CM

## 2020-12-16 DIAGNOSIS — R10.9 ABDOMINAL PAIN, UNSPECIFIED ABDOMINAL LOCATION: ICD-10-CM

## 2020-12-16 DIAGNOSIS — R07.9 CHEST PAIN, UNSPECIFIED TYPE: Primary | ICD-10-CM

## 2020-12-16 DIAGNOSIS — R06.09 DOE (DYSPNEA ON EXERTION): ICD-10-CM

## 2020-12-16 LAB
ALBUMIN SERPL-MCNC: 3.4 G/DL (ref 3.5–5.2)
ALBUMIN/GLOB SERPL: 1.2 G/DL
ALP SERPL-CCNC: 143 U/L (ref 39–117)
ALT SERPL W P-5'-P-CCNC: 26 U/L (ref 1–33)
AMMONIA BLD-SCNC: 49 UMOL/L (ref 11–51)
AMPHET+METHAMPHET UR QL: NEGATIVE
ANION GAP SERPL CALCULATED.3IONS-SCNC: 10 MMOL/L (ref 5–15)
AST SERPL-CCNC: 38 U/L (ref 1–32)
BARBITURATES UR QL SCN: NEGATIVE
BASOPHILS # BLD AUTO: 0 10*3/MM3 (ref 0–0.2)
BASOPHILS NFR BLD AUTO: 0.5 % (ref 0–1.5)
BENZODIAZ UR QL SCN: NEGATIVE
BH CV STRESS BP STAGE 1: NORMAL
BH CV STRESS BP STAGE 2: NORMAL
BH CV STRESS BP STAGE 3: NORMAL
BH CV STRESS DURATION MIN STAGE 1: 3
BH CV STRESS DURATION MIN STAGE 2: 3
BH CV STRESS DURATION MIN STAGE 3: 3
BH CV STRESS DURATION SEC STAGE 1: 0
BH CV STRESS DURATION SEC STAGE 2: 0
BH CV STRESS DURATION SEC STAGE 3: 0
BH CV STRESS GRADE STAGE 1: 10
BH CV STRESS GRADE STAGE 2: 12
BH CV STRESS GRADE STAGE 3: 14
BH CV STRESS HR STAGE 1: 117
BH CV STRESS HR STAGE 2: 144
BH CV STRESS HR STAGE 3: 157
BH CV STRESS METS STAGE 1: 5
BH CV STRESS METS STAGE 2: 7.5
BH CV STRESS METS STAGE 3: 10
BH CV STRESS PROTOCOL 1: NORMAL
BH CV STRESS RECOVERY BP: NORMAL MMHG
BH CV STRESS RECOVERY HR: 89 BPM
BH CV STRESS SPEED STAGE 1: 1.7
BH CV STRESS SPEED STAGE 2: 2.5
BH CV STRESS SPEED STAGE 3: 3.4
BH CV STRESS STAGE 1: 1
BH CV STRESS STAGE 2: 2
BH CV STRESS STAGE 3: 3
BILIRUB SERPL-MCNC: 1.2 MG/DL (ref 0–1.2)
BILIRUB UR QL STRIP: NEGATIVE
BUN SERPL-MCNC: 11 MG/DL (ref 6–20)
BUN/CREAT SERPL: 19.6 (ref 7–25)
CALCIUM SPEC-SCNC: 8.7 MG/DL (ref 8.6–10.5)
CANNABINOIDS SERPL QL: NEGATIVE
CHLORIDE SERPL-SCNC: 105 MMOL/L (ref 98–107)
CLARITY UR: CLEAR
CO2 SERPL-SCNC: 24 MMOL/L (ref 22–29)
COCAINE UR QL: NEGATIVE
COLOR UR: ABNORMAL
CREAT SERPL-MCNC: 0.56 MG/DL (ref 0.57–1)
D DIMER PPP FEU-MCNC: 1.78 MG/L (FEU) (ref 0–0.59)
DEPRECATED RDW RBC AUTO: 52.1 FL (ref 37–54)
EOSINOPHIL # BLD AUTO: 0.2 10*3/MM3 (ref 0–0.4)
EOSINOPHIL NFR BLD AUTO: 2.2 % (ref 0.3–6.2)
ERYTHROCYTE [DISTWIDTH] IN BLOOD BY AUTOMATED COUNT: 16.7 % (ref 12.3–15.4)
ERYTHROCYTE [SEDIMENTATION RATE] IN BLOOD: 8 MM/HR (ref 0–20)
GFR SERPL CREATININE-BSD FRML MDRD: 116 ML/MIN/1.73
GLOBULIN UR ELPH-MCNC: 2.9 GM/DL
GLUCOSE SERPL-MCNC: 76 MG/DL (ref 65–99)
GLUCOSE UR STRIP-MCNC: ABNORMAL MG/DL
HCT VFR BLD AUTO: 38.5 % (ref 34–46.6)
HGB BLD-MCNC: 12.5 G/DL (ref 12–15.9)
HGB UR QL STRIP.AUTO: NEGATIVE
HOLD SPECIMEN: NORMAL
INR PPP: 1.1 (ref 0.93–1.1)
KETONES UR QL STRIP: NEGATIVE
LEUKOCYTE ESTERASE UR QL STRIP.AUTO: NEGATIVE
LV EF NUC BP: 72 %
LYMPHOCYTES # BLD AUTO: 3.9 10*3/MM3 (ref 0.7–3.1)
LYMPHOCYTES NFR BLD AUTO: 46.5 % (ref 19.6–45.3)
MAGNESIUM SERPL-MCNC: 1.9 MG/DL (ref 1.6–2.6)
MAXIMAL PREDICTED HEART RATE: 173 BPM
MCH RBC QN AUTO: 28.8 PG (ref 26.6–33)
MCHC RBC AUTO-ENTMCNC: 32.5 G/DL (ref 31.5–35.7)
MCV RBC AUTO: 88.6 FL (ref 79–97)
METHADONE UR QL SCN: NEGATIVE
MONOCYTES # BLD AUTO: 0.6 10*3/MM3 (ref 0.1–0.9)
MONOCYTES NFR BLD AUTO: 7.1 % (ref 5–12)
NEUTROPHILS NFR BLD AUTO: 3.7 10*3/MM3 (ref 1.7–7)
NEUTROPHILS NFR BLD AUTO: 43.7 % (ref 42.7–76)
NITRITE UR QL STRIP: NEGATIVE
NRBC BLD AUTO-RTO: 0.1 /100 WBC (ref 0–0.2)
NT-PROBNP SERPL-MCNC: 58.7 PG/ML (ref 0–450)
OPIATES UR QL: NEGATIVE
OXYCODONE UR QL SCN: POSITIVE
PERCENT MAX PREDICTED HR: 90.75 %
PH UR STRIP.AUTO: 5.5 [PH] (ref 5–8)
PLATELET # BLD AUTO: 528 10*3/MM3 (ref 140–450)
PMV BLD AUTO: 8.9 FL (ref 6–12)
POTASSIUM SERPL-SCNC: 3.3 MMOL/L (ref 3.5–5.2)
PROT SERPL-MCNC: 6.3 G/DL (ref 6–8.5)
PROT UR QL STRIP: ABNORMAL
PROTHROMBIN TIME: 12 SECONDS (ref 9.6–11.7)
QT INTERVAL: 386 MS
RBC # BLD AUTO: 4.35 10*6/MM3 (ref 3.77–5.28)
SODIUM SERPL-SCNC: 139 MMOL/L (ref 136–145)
SP GR UR STRIP: 1.05 (ref 1–1.03)
STRESS BASELINE BP: NORMAL MMHG
STRESS BASELINE HR: 76 BPM
STRESS PERCENT HR: 107 %
STRESS POST EXERCISE DUR MIN: 6 MIN
STRESS POST EXERCISE DUR SEC: 40 SEC
STRESS POST PEAK BP: NORMAL MMHG
STRESS POST PEAK HR: 157 BPM
STRESS TARGET HR: 147 BPM
TROPONIN T SERPL-MCNC: <0.01 NG/ML (ref 0–0.03)
TROPONIN T SERPL-MCNC: <0.01 NG/ML (ref 0–0.03)
TSH SERPL DL<=0.05 MIU/L-ACNC: 0.01 UIU/ML (ref 0.27–4.2)
UROBILINOGEN UR QL STRIP: ABNORMAL
WBC # BLD AUTO: 8.5 10*3/MM3 (ref 3.4–10.8)

## 2020-12-16 PROCEDURE — A9500 TC99M SESTAMIBI: HCPCS | Performed by: INTERNAL MEDICINE

## 2020-12-16 PROCEDURE — 78452 HT MUSCLE IMAGE SPECT MULT: CPT | Performed by: INTERNAL MEDICINE

## 2020-12-16 PROCEDURE — 93017 CV STRESS TEST TRACING ONLY: CPT

## 2020-12-16 PROCEDURE — 25010000002 HYDROMORPHONE PER 4 MG: Performed by: EMERGENCY MEDICINE

## 2020-12-16 PROCEDURE — 80307 DRUG TEST PRSMV CHEM ANLYZR: CPT | Performed by: EMERGENCY MEDICINE

## 2020-12-16 PROCEDURE — 78452 HT MUSCLE IMAGE SPECT MULT: CPT

## 2020-12-16 PROCEDURE — 85025 COMPLETE CBC W/AUTO DIFF WBC: CPT | Performed by: EMERGENCY MEDICINE

## 2020-12-16 PROCEDURE — 93016 CV STRESS TEST SUPVJ ONLY: CPT | Performed by: INTERNAL MEDICINE

## 2020-12-16 PROCEDURE — 80053 COMPREHEN METABOLIC PANEL: CPT | Performed by: EMERGENCY MEDICINE

## 2020-12-16 PROCEDURE — 99236 HOSP IP/OBS SAME DATE HI 85: CPT | Performed by: INTERNAL MEDICINE

## 2020-12-16 PROCEDURE — 99285 EMERGENCY DEPT VISIT HI MDM: CPT

## 2020-12-16 PROCEDURE — G0378 HOSPITAL OBSERVATION PER HR: HCPCS

## 2020-12-16 PROCEDURE — 96375 TX/PRO/DX INJ NEW DRUG ADDON: CPT

## 2020-12-16 PROCEDURE — 74176 CT ABD & PELVIS W/O CONTRAST: CPT

## 2020-12-16 PROCEDURE — 96374 THER/PROPH/DIAG INJ IV PUSH: CPT

## 2020-12-16 PROCEDURE — 85610 PROTHROMBIN TIME: CPT | Performed by: EMERGENCY MEDICINE

## 2020-12-16 PROCEDURE — 81003 URINALYSIS AUTO W/O SCOPE: CPT | Performed by: EMERGENCY MEDICINE

## 2020-12-16 PROCEDURE — 71045 X-RAY EXAM CHEST 1 VIEW: CPT

## 2020-12-16 PROCEDURE — 84484 ASSAY OF TROPONIN QUANT: CPT | Performed by: EMERGENCY MEDICINE

## 2020-12-16 PROCEDURE — 93005 ELECTROCARDIOGRAM TRACING: CPT | Performed by: EMERGENCY MEDICINE

## 2020-12-16 PROCEDURE — 85651 RBC SED RATE NONAUTOMATED: CPT | Performed by: EMERGENCY MEDICINE

## 2020-12-16 PROCEDURE — 0 TECHNETIUM SESTAMIBI: Performed by: INTERNAL MEDICINE

## 2020-12-16 PROCEDURE — 85379 FIBRIN DEGRADATION QUANT: CPT | Performed by: EMERGENCY MEDICINE

## 2020-12-16 PROCEDURE — 82140 ASSAY OF AMMONIA: CPT | Performed by: EMERGENCY MEDICINE

## 2020-12-16 PROCEDURE — 25010000002 ONDANSETRON PER 1 MG: Performed by: EMERGENCY MEDICINE

## 2020-12-16 PROCEDURE — 93018 CV STRESS TEST I&R ONLY: CPT | Performed by: INTERNAL MEDICINE

## 2020-12-16 PROCEDURE — 83735 ASSAY OF MAGNESIUM: CPT | Performed by: NURSE PRACTITIONER

## 2020-12-16 PROCEDURE — 84484 ASSAY OF TROPONIN QUANT: CPT | Performed by: NURSE PRACTITIONER

## 2020-12-16 PROCEDURE — 84443 ASSAY THYROID STIM HORMONE: CPT | Performed by: NURSE PRACTITIONER

## 2020-12-16 PROCEDURE — 83880 ASSAY OF NATRIURETIC PEPTIDE: CPT | Performed by: EMERGENCY MEDICINE

## 2020-12-16 RX ORDER — LEVOTHYROXINE SODIUM 0.15 MG/1
150 TABLET ORAL
Status: DISCONTINUED | OUTPATIENT
Start: 2020-12-17 | End: 2020-12-16 | Stop reason: HOSPADM

## 2020-12-16 RX ORDER — POTASSIUM CHLORIDE 20 MEQ/1
40 TABLET, EXTENDED RELEASE ORAL AS NEEDED
Status: DISCONTINUED | OUTPATIENT
Start: 2020-12-16 | End: 2020-12-16 | Stop reason: HOSPADM

## 2020-12-16 RX ORDER — ONDANSETRON 2 MG/ML
4 INJECTION INTRAMUSCULAR; INTRAVENOUS ONCE
Status: COMPLETED | OUTPATIENT
Start: 2020-12-16 | End: 2020-12-16

## 2020-12-16 RX ORDER — PREGABALIN 25 MG/1
25 CAPSULE ORAL NIGHTLY
Status: DISCONTINUED | OUTPATIENT
Start: 2020-12-16 | End: 2020-12-16 | Stop reason: HOSPADM

## 2020-12-16 RX ORDER — SPIRONOLACTONE 100 MG/1
300 TABLET, FILM COATED ORAL DAILY
Status: DISCONTINUED | OUTPATIENT
Start: 2020-12-16 | End: 2020-12-16 | Stop reason: HOSPADM

## 2020-12-16 RX ORDER — ACETAMINOPHEN 160 MG/5ML
650 SOLUTION ORAL EVERY 4 HOURS PRN
Status: DISCONTINUED | OUTPATIENT
Start: 2020-12-16 | End: 2020-12-16 | Stop reason: HOSPADM

## 2020-12-16 RX ORDER — ONDANSETRON 2 MG/ML
4 INJECTION INTRAMUSCULAR; INTRAVENOUS EVERY 6 HOURS PRN
Status: DISCONTINUED | OUTPATIENT
Start: 2020-12-16 | End: 2020-12-16 | Stop reason: HOSPADM

## 2020-12-16 RX ORDER — ONDANSETRON 8 MG/1
8 TABLET, ORALLY DISINTEGRATING ORAL EVERY 8 HOURS PRN
COMMUNITY

## 2020-12-16 RX ORDER — BUMETANIDE 1 MG/1
2 TABLET ORAL DAILY
Status: DISCONTINUED | OUTPATIENT
Start: 2020-12-16 | End: 2020-12-16 | Stop reason: HOSPADM

## 2020-12-16 RX ORDER — MAGNESIUM SULFATE 1 G/100ML
1 INJECTION INTRAVENOUS AS NEEDED
Status: DISCONTINUED | OUTPATIENT
Start: 2020-12-16 | End: 2020-12-16 | Stop reason: HOSPADM

## 2020-12-16 RX ORDER — ACETAMINOPHEN 650 MG/1
650 SUPPOSITORY RECTAL EVERY 4 HOURS PRN
Status: DISCONTINUED | OUTPATIENT
Start: 2020-12-16 | End: 2020-12-16 | Stop reason: HOSPADM

## 2020-12-16 RX ORDER — DIPHENOXYLATE HYDROCHLORIDE AND ATROPINE SULFATE 2.5; .025 MG/1; MG/1
1 TABLET ORAL
Status: DISCONTINUED | OUTPATIENT
Start: 2020-12-16 | End: 2020-12-16 | Stop reason: HOSPADM

## 2020-12-16 RX ORDER — NITROGLYCERIN 0.4 MG/1
0.4 TABLET SUBLINGUAL
Status: DISCONTINUED | OUTPATIENT
Start: 2020-12-16 | End: 2020-12-16 | Stop reason: HOSPADM

## 2020-12-16 RX ORDER — OXYCODONE HYDROCHLORIDE 5 MG/1
10 TABLET ORAL EVERY 6 HOURS SCHEDULED
Status: DISCONTINUED | OUTPATIENT
Start: 2020-12-16 | End: 2020-12-16 | Stop reason: HOSPADM

## 2020-12-16 RX ORDER — ASPIRIN 325 MG
325 TABLET, DELAYED RELEASE (ENTERIC COATED) ORAL DAILY
Status: DISCONTINUED | OUTPATIENT
Start: 2020-12-16 | End: 2020-12-16 | Stop reason: HOSPADM

## 2020-12-16 RX ORDER — PREGABALIN 25 MG/1
25 CAPSULE ORAL
COMMUNITY
End: 2021-03-07

## 2020-12-16 RX ORDER — MAGNESIUM SULFATE HEPTAHYDRATE 40 MG/ML
2 INJECTION, SOLUTION INTRAVENOUS AS NEEDED
Status: DISCONTINUED | OUTPATIENT
Start: 2020-12-16 | End: 2020-12-16 | Stop reason: HOSPADM

## 2020-12-16 RX ORDER — HYDROMORPHONE HCL 110MG/55ML
0.5 PATIENT CONTROLLED ANALGESIA SYRINGE INTRAVENOUS ONCE
Status: COMPLETED | OUTPATIENT
Start: 2020-12-16 | End: 2020-12-16

## 2020-12-16 RX ORDER — ACETAMINOPHEN 325 MG/1
650 TABLET ORAL EVERY 4 HOURS PRN
Status: DISCONTINUED | OUTPATIENT
Start: 2020-12-16 | End: 2020-12-16 | Stop reason: HOSPADM

## 2020-12-16 RX ORDER — SODIUM CHLORIDE 0.9 % (FLUSH) 0.9 %
10 SYRINGE (ML) INJECTION AS NEEDED
Status: DISCONTINUED | OUTPATIENT
Start: 2020-12-16 | End: 2020-12-16 | Stop reason: HOSPADM

## 2020-12-16 RX ORDER — SODIUM CHLORIDE 0.9 % (FLUSH) 0.9 %
10 SYRINGE (ML) INJECTION EVERY 12 HOURS SCHEDULED
Status: DISCONTINUED | OUTPATIENT
Start: 2020-12-16 | End: 2020-12-16 | Stop reason: HOSPADM

## 2020-12-16 RX ORDER — ONDANSETRON 4 MG/1
4 TABLET, FILM COATED ORAL EVERY 6 HOURS PRN
Status: DISCONTINUED | OUTPATIENT
Start: 2020-12-16 | End: 2020-12-16 | Stop reason: HOSPADM

## 2020-12-16 RX ADMIN — TECHNETIUM TC 99M SESTAMIBI 1 DOSE: 1 INJECTION INTRAVENOUS at 14:15

## 2020-12-16 RX ADMIN — OXYCODONE HYDROCHLORIDE 10 MG: 5 TABLET ORAL at 18:09

## 2020-12-16 RX ADMIN — OXYCODONE HYDROCHLORIDE 10 MG: 5 TABLET ORAL at 13:16

## 2020-12-16 RX ADMIN — TECHNETIUM TC 99M SESTAMIBI 1 DOSE: 1 INJECTION INTRAVENOUS at 13:10

## 2020-12-16 RX ADMIN — HYDROMORPHONE HYDROCHLORIDE 0.5 MG: 2 INJECTION, SOLUTION INTRAMUSCULAR; INTRAVENOUS; SUBCUTANEOUS at 07:55

## 2020-12-16 RX ADMIN — SODIUM CHLORIDE 500 ML: 9 INJECTION, SOLUTION INTRAVENOUS at 07:55

## 2020-12-16 RX ADMIN — ONDANSETRON 4 MG: 2 INJECTION, SOLUTION INTRAMUSCULAR; INTRAVENOUS at 07:55

## 2020-12-16 RX ADMIN — RIFAXIMIN 550 MG: 550 TABLET ORAL at 18:10

## 2020-12-16 NOTE — ED PROVIDER NOTES
Subjective   45-year-old female complaining of abdominal pain and distention.  The patient states that she has had similar episodes in the past without infection when her ascites increases the patient reports no definite fever or chills.  She reports no cough.  She states that she is also had some intermittent chest pain when she takes a deep breath.  She reports no diaphoresis or palpitations.  Reports no leg pain or swelling.  She states she is always tired and has a decrease in exercise tolerance.  She states that she is generally followed by specialist in [place] for her medical problems          Review of Systems   Constitutional: Positive for fatigue and unexpected weight change. Negative for chills and fever.   HENT: Negative for trouble swallowing.    Eyes: Negative for discharge, redness and visual disturbance.   Respiratory: Positive for chest tightness and shortness of breath. Negative for cough.    Cardiovascular: Positive for chest pain. Negative for palpitations and leg swelling.   Gastrointestinal: Positive for abdominal distention, abdominal pain and nausea.   Genitourinary: Negative for flank pain.   Hematological: Does not bruise/bleed easily.   Psychiatric/Behavioral: The patient is nervous/anxious.    All other systems reviewed and are negative.      Past Medical History:   Diagnosis Date   • Abdominal pain    • Anemia    • Cancer (CMS/HCC)    • Clot    • Deep venous thrombosis of right profunda femoris vein (CMS/HCC) 8/15/2019   • Depression    • Disease of thyroid gland    • Empyema of pleura (CMS/HCC)    • GERD (gastroesophageal reflux disease)    • Heartburn    • Hemorrhoids    • Hernia, ventral    • History of malignant neoplasm of thyroid    • History of transfusion    • Irregular menstrual cycle    • Migraine    • Parathyroid abnormality (CMS/HCC)    • Radiation    • Restless legs syndrome (RLS)    • Urge and stress incontinence    • Varicose veins        Allergies   Allergen Reactions    • Compazine [Prochlorperazine Edisylate] Hives   • Contrast Dye Hives   • Iodinated Diagnostic Agents    • Iodine Hives   • Morphine Itching   • Nsaids Other (See Comments)     States I am not suppose to take them   • Hydrocodone Rash     Lortab elixir per pt     The patient's allergy list gives cause for concern  Past Surgical History:   Procedure Laterality Date   • ABDOMINAL SURGERY     • BEDSIDE PARACENTESIS  3/22/2020        • BEDSIDE PARACENTESIS  4/19/2020        • CHOLECYSTECTOMY      Laparoscopic   • ENDOSCOPY N/A 3/17/2016    Procedure: ESOPHAGOGASTRODUODENOSCOPY WITH BALOON DILATATION 18-20MM WITH GASTRIC SLEEVE SEALANT;  Surgeon: Leonardo Ortiz Jr., MD;  Location: [facility] ENDOSCOPY;  Service:    • ENDOSCOPY N/A 3/14/2016    Procedure: esophagogastroduodenoscopy with fluoroscopy;  Surgeon: Greg Avila III, MD;  Location: [facility] ENDOSCOPY;  Service:    • ENDOSCOPY N/A 4/15/2016    Procedure: EGD WITH DILITATION AND STENT PLACEMENT dilation of pylorus ;  Surgeon: Leonardo Ortiz Jr., MD;  Location: [facility] ENDOSCOPY;  Service:    • ENDOSCOPY N/A 5/13/2016    Procedure: ESOPHAGOGASTRODUODENOSCOPY  W/ STENT;  Surgeon: Leonardo Ortiz Jr., MD;  Location: [facility] ENDOSCOPY;  Service:    • GASTRECTOMY      Gastric Surgery for Morbid Obesity Laparoscopic Longitudinal Gastrectomy   • GASTRIC SLEEVE LAPAROSCOPIC     • HEMORRHOIDECTOMY     • HERNIA REPAIR     • LAPAROSCOPY REPAIR HIATAL HERNIA     • LARYNGOSCOPY      Direct Laryngoscopy with Injection into Vocal Cords   • OTHER SURGICAL HISTORY Bilateral     Ear Pressure Equalization Tube, Insertion, Bilaterally   • PERIPHERALLY INSERTED CENTRAL CATHETER INSERTION     • SPLENECTOMY     • THYROID SURGERY     • TUBAL ABDOMINAL LIGATION         Family History   Problem Relation Age of Onset   • Stroke Father    • Diabetes Father         Diabetes Mellitus   • Hypertension Father    • Cancer Other        Social History     Socioeconomic History   • Marital  status: Single     Spouse name: Not on file   • Number of children: Not on file   • Years of education: Not on file   • Highest education level: Not on file   Tobacco Use   • Smoking status: Never Smoker   • Smokeless tobacco: Never Used   Substance and Sexual Activity   • Alcohol use: No   • Drug use: No   • Sexual activity: Defer       Ports she is a [place] resident.  Reports no unusual food water or activity    Objective   Physical Exam  Alert [place] Coma Scale 15   HEENT: Pupils equal and reactive to light. Conjunctivae are not injected. normal tympanic membranes. Oropharynx and nares are normal.   Neck: Supple. Midline trachea. No JVD. No goiter.   Chest: Clear and equal breath sounds bilaterally regular rate and rhythm without murmur or rub.  No S3 or S4 some reproduction of pain with deep breathing   Abdomen: Positive bowel sounds nontender the patient appears to have some ascites/fluid wave.  There is some mild discomfort with right upper quadrant palpation negative Devine sign. No rebound or peritoneal signs. No CVA tenderness.   Extremities no clubbing cyanosis or edema motor sensory exam is normal the full range of motion is intact   skin: Warm and dry, no rashes or petechia.   Lymphatic: No regional lymphadenopathy. No calf pain, swelling or Heriberto's sign    Procedures           ED Course  ED Course as of Dec 16 1033   Wed Dec 16, 2020   0650 I examined the patient using the appropriate personal protective equipment.          [TH]   0745 Patient care assumed from Dr. Hermosillo pending CT abdomen pelvis and lab work results.  Expect likely admission for chest pain rule out    [MM]   1005 Patient was reevaluated, patient states that approximately 1 hour ago she experienced midsternal chest pain and pressure that radiates to her left shoulder.  Patient states that this lasted about 3 to 4 minutes but has since subsided.  Discussed lab work and imaging results with patient at bedside, recommended admission  for chest pain rule out.  Patient is agreeable to admission    [MM]   1019 Spoke with JUAN Weaver who agreed accept patient for Dr. Brannon    []      ED Course User Index  [MM] Geetha Dow PA  [TH] Jim Hermosillo MD                      HEART Score: 2              Labs Reviewed   COMPREHENSIVE METABOLIC PANEL - Abnormal; Notable for the following components:       Result Value    Creatinine 0.56 (*)     Potassium 3.3 (*)     Albumin 3.40 (*)     AST (SGOT) 38 (*)     Alkaline Phosphatase 143 (*)     All other components within normal limits    Narrative:     GFR Normal >60  Chronic Kidney Disease <60  Kidney Failure <15     PROTIME-INR - Abnormal; Notable for the following components:    Protime 12.0 (*)     All other components within normal limits   URINALYSIS W/ CULTURE IF INDICATED - Abnormal; Notable for the following components:    Color, UA Dark Yellow (*)     Specific Gravity, UA 1.052 (*)     Glucose,  mg/dL (Trace) (*)     Protein, UA Trace (*)     All other components within normal limits    Narrative:     Urine microscopic not indicated.   D-DIMER, QUANTITATIVE - Abnormal; Notable for the following components:    D-Dimer, Quantitative 1.78 (*)     All other components within normal limits    Narrative:     Reference Range  --------------------------------------------------------------------     < 0.50   Negative Predictive Value  0.50-0.59   Indeterminate    >= 0.60   Probable VTE             A very low percentage of patients with DVT may yield D-Dimer results   below the cut-off of 0.50 mg/L FEU.  This is known to be more   prevalent in patients with distal DVT.             Results of this test should always be interpreted in conjunction with   the patient's medical history, clinical presentation and other   findings.  Clinical diagnosis should not be based on the result of   [place] D-Dimer alone.   URINE DRUG SCREEN - Abnormal; Notable for the following components:    Oxycodone  Screen, Urine Positive (*)     All other components within normal limits    Narrative:     Negative Thresholds For Drugs Screened:     Amphetamines               500 ng/ml   Barbiturates               200 ng/ml   Benzodiazepines            100 ng/ml   Cocaine                    300 ng/ml   Methadone                  300 ng/ml   Opiates                    300 ng/ml   Oxycodone                  100 ng/ml   THC                        50 ng/ml    The Normal Value for all drugs tested is negative. This report includes final unconfirmed screening results to be used for medical treatment purposes only. Unconfirmed results must not be used for non-medical purposes such as employment or legal testing. Clinical consideration should be applied to any drug of abuse test, particulary when unconfirmed results are used.  All urine drugs of abuse requests without chain of custody are for medical screening purposes only.  False positives are possible.     CBC WITH AUTO DIFFERENTIAL - Abnormal; Notable for the following components:    RDW 16.7 (*)     Platelets 528 (*)     Lymphocyte % 46.5 (*)     Lymphocytes, Absolute 3.90 (*)     All other components within normal limits   BNP (IN-HOUSE) - Normal    Narrative:     Among patients with dyspnea, NT-proBNP is highly sensitive for the detection of acute congestive heart failure. In addition NT-proBNP of <300 pg/ml effectively rules out acute congestive heart failure with 99% negative predictive value.    Results may be falsely decreased if patient taking Biotin.     TROPONIN (IN-HOUSE) - Normal    Narrative:     Troponin T Reference Range:  <= 0.03 ng/mL-   Negative for AMI  >0.03 ng/mL-     Abnormal for myocardial necrosis.  Clinicians would have to utilize clinical acumen, EKG, Troponin and serial changes to determine if it is an Acute Myocardial Infarction or myocardial injury due to an underlying chronic condition.       Results may be falsely decreased if patient taking Biotin.      SEDIMENTATION RATE - Normal   AMMONIA - Normal   CBC AND DIFFERENTIAL    Narrative:     The following orders were created for panel order CBC & Differential.  Procedure                               Abnormality         Status                     ---------                               -----------         ------                     CBC Auto Differential[[id number]]        Abnormal            Final result                 Please view results for these tests on the individual orders.   EXTRA TUBES    Narrative:     The following orders were created for panel order Extra Tubes.  Procedure                               Abnormality         Status                     ---------                               -----------         ------                     Gold Top - SST[[id number]]                                   Final result                 Please view results for these tests on the individual orders.   GOLD TOP - SST     Medications   sodium chloride 0.9 % bolus 500 mL (500 mL Intravenous New Bag 12/16/20 0755)   HYDROmorphone (DILAUDID) injection 0.5 mg (0.5 mg Intravenous Given 12/16/20 0755)   ondansetron (ZOFRAN) injection 4 mg (4 mg Intravenous Given 12/16/20 0755)             MDM  Number of Diagnoses or Management Options  Abdominal pain, unspecified abdominal location:   Chest pain, unspecified type:   GE (dyspnea on exertion):   Other ascites:   Diagnosis management comments: MEDICAL DECISION  Epic Chart Review: Patient was last hospitalist admission 9/15/2020 4 COVID-19, Ramon cirrhosis with significant ascites, no signs infections, bedside paracentesis was not done.  Last paracentesis June 2020  Comorbidities: cirrhosis, history of cancer, history of DVT, thyroid disease  Differentials: Cirrhosis, ascites, SBP, obstruction, angina, MI; this list is not all inclusive and does not constitute the entirety of considered causes  Radiology interpretation:  Images reviewed by me and interpreted by radiologist,   CT  abdomen pelvis shows cirrhosis and fatty infiltration of liver with moderate amount of ascites, umbilical hernia containing ascites in the loops of bowel, no obstruction at this time, hernia appears grossly similar to prior exam.  Chest x-ray shows no cardiopulmonary abnormalities.  Lab interpretation:  Labs viewed by me significant for, CMP creatinine 0.56, potassium 3.3, AST 30, alk phos 143.  BMP normal 58.7.  Troponin normal less than 0.01.  Ammonia normal 49.  Urinalysis dark, negative for UTI.  UDS positive for oxycodone.  Dimer elevated 1.78.  Sed rate normal 8.  EKG interpretation: Reviewed and interpreted by Dr. Hermosillo, sinus rhythm with a rate of 76 with no acute ST changes.    Initial patient HPI, physical exam and evaluation and orders were placed by Dr. Hermosillo.  I, Geetha Dow assumed care at 0800 pending lab work and CT abdomen pelvis results.  Patient reevaluated at that time, no complaints, vital signs are stable.  Lab work was reviewed and is as noted above.  Ammonia normal, troponin normal.  D-dimer found to be elevated, however patient is allergic to contrast dye, patient vital signs are stable, O2 99% on room air and patient is not tachycardic.  Patient also has history of DVT, history of cancer which could account for elevated dimer.  CT abdomen pelvis shows cirrhosis and fatty infiltration of the liver with moderate ascites, no acute inflammatory or infectious process.  On reevaluation patient states that she had midsternal chest pain and pressure episode while in the ER that radiated to her left shoulder and back.  Patient states that this pain has since subsided.  Patient states that she has never had full cardiac work-up.  Patient will be admitted for abdominal pain, chest pain and cardiac rule out.  Consult placed to hospitalist, spoke with JUAN Weaver who agreed to accept patient for Dr. Brannon.  Patient stable on admission.       Amount and/or Complexity of Data Reviewed  Clinical lab  tests: reviewed and ordered  Tests in the radiology section of CPT®: reviewed and ordered  Tests in the medicine section of CPT®: reviewed    Patient Progress  Patient progress: stable      Final diagnoses:   Chest pain, unspecified type   GE (dyspnea on exertion)   Abdominal pain, unspecified abdominal location   Other ascites            Geetha Dow PA  12/16/20 1109

## 2020-12-16 NOTE — H&P
"      [facility] Medicine Services      Patient Name: Lucinda Cope  : 1975  MRN: [id number]  Primary Care Physician: Melissa Iniguez APRN  Date of admission: 2020    Patient Care Team:  Melissa Iniguez APRN as PCP - General          Subjective   History Present Illness     Chief Complaint:   Chief Complaint   Patient presents with   • Chest Pain         Ms. Cope is a 45 y.o.  female with a past medical history of Ramon, portal vein thrombosis, thyroid cancer status post thyroidectomy, and chronic pain who presented to [facility] on 2020 with complaints of chest pain.  Patient reports her chest pain started yesterday and progressively got worse.  She describes her chest pain as intermittent and sharp.  It radiated into her left shoulder down her left arm and her left arm felt \"heavy and achy.  \"She explains this happened 1 other time in September when she got chest pain, however at that time her abdomen was severely distended and she came to the ED and was found to have portal vein thrombosis and was placed on Eliquis.  She denies any aggravating or alleviating factors of the chest pain.  She has had accompanying shortness of breath.  Her shortness of breath is worse with exertion and when she has fluid buildup.  She denies any alleviating factors other shortness of breath.  She has also had nausea, vomiting, and diarrhea.  She explains nausea is normal for her.  She had 7-8 diarrhea episodes yesterday.  She denies any recent fever, chills, cough, heart palpitations, dizziness, or lightheadedness.  She has never had a stress test.  The patient does not have a previous history of coronary artery disease.    In the ED, chest x-ray unremarkable.  CT abdomen pelvis showed Cirrhosis and fatty infiltration of the liver with moderate amount of  ascites.Umbilical hernia containing ascites and a loop of small bowel. No  significant small bowel dilatation is " visualized to indicate obstruction  at this time. Correlate for symptoms in this location. There is also a  supraumbilical ventral hernia containing fluid. Hernias appear grossly  similar to the prior exam.Postoperative changes of gastric bypass, splenectomy, cystectomy, hysterectomy. EKG showed sinus rhythm.  All labs unremarkable upon admission except potassium 3.3 and D-dimer 1.78(has slightly improved from previous hospitalization).  Urine drug screen positive for oxycodone.    Review of Systems   Constitution: Negative.   HENT: Negative.    Cardiovascular: Positive for chest pain and dyspnea on exertion.   Respiratory: Positive for shortness of breath.    Skin: Negative.    Musculoskeletal:        Left arm achiness/heaviness    Gastrointestinal: Positive for diarrhea, nausea and vomiting.   Genitourinary: Negative.    Neurological: Negative.    Psychiatric/Behavioral: Negative.            Personal History     Past Medical History:   Past Medical History:   Diagnosis Date   • Abdominal pain    • Anemia    • Cancer (CMS/HCC)    • Clot    • Deep venous thrombosis of right profunda femoris vein (CMS/HCC) 8/15/2019   • Depression    • Disease of thyroid gland    • Empyema of pleura (CMS/HCC)    • GERD (gastroesophageal reflux disease)    • Heartburn    • Hemorrhoids    • Hernia, ventral    • History of malignant neoplasm of thyroid    • History of transfusion    • Irregular menstrual cycle    • Migraine    • Parathyroid abnormality (CMS/HCC)    • Radiation    • Restless legs syndrome (RLS)    • Urge and stress incontinence    • Varicose veins        Surgical History:      Past Surgical History:   Procedure Laterality Date   • ABDOMINAL SURGERY     • BEDSIDE PARACENTESIS  3/22/2020        • BEDSIDE PARACENTESIS  4/19/2020        • CHOLECYSTECTOMY      Laparoscopic   • ENDOSCOPY N/A 3/17/2016    Procedure: ESOPHAGOGASTRODUODENOSCOPY WITH BALOON DILATATION 18-20MM WITH GASTRIC SLEEVE SEALANT;  Surgeon: Leonardo Robledo  Diana Parada MD;  Location: [facility] ENDOSCOPY;  Service:    • ENDOSCOPY N/A 3/14/2016    Procedure: esophagogastroduodenoscopy with fluoroscopy;  Surgeon: Greg Avila III, MD;  Location[facility] ENDOSCOPY;  Service:    • ENDOSCOPY N/A 4/15/2016    Procedure: EGD WITH DILITATION AND STENT PLACEMENT dilation of pylorus ;  Surgeon: Leonardo Ortiz Jr., MD;  Location: [facility] ENDOSCOPY;  Service:    • ENDOSCOPY N/A 5/13/2016    Procedure: ESOPHAGOGASTRODUODENOSCOPY  W/ STENT;  Surgeon: Leonardo Ortiz Jr., MD;  Location: [facility] ENDOSCOPY;  Service:    • GASTRECTOMY      Gastric Surgery for Morbid Obesity Laparoscopic Longitudinal Gastrectomy   • GASTRIC SLEEVE LAPAROSCOPIC     • HEMORRHOIDECTOMY     • HERNIA REPAIR     • LAPAROSCOPY REPAIR HIATAL HERNIA     • LARYNGOSCOPY      Direct Laryngoscopy with Injection into Vocal Cords   • OTHER SURGICAL HISTORY Bilateral     Ear Pressure Equalization Tube, Insertion, Bilaterally   • PERIPHERALLY INSERTED CENTRAL CATHETER INSERTION     • SPLENECTOMY     • THYROID SURGERY     • TUBAL ABDOMINAL LIGATION             Family History: family history includes Cancer in an other family member; Diabetes in her father; Hypertension in her father; Stroke in her father. Otherwise pertinent FHx was reviewed and unremarkable.     Social History:  reports that she has never smoked. She has never used smokeless tobacco. She reports that she does not drink alcohol or use drugs.      Medications:  Prior to Admission medications    Medication Sig Start Date End Date Taking? Authorizing Provider   apixaban (ELIQUIS) 5 MG tablet tablet Take 5 mg by mouth 2 (Two) Times a Day.   Yes Justyn Erwin MD   levothyroxine (SYNTHROID, LEVOTHROID) 150 MCG tablet Take 150 mcg by mouth Daily.   Yes Justyn Erwin MD   ondansetron ODT (ZOFRAN-ODT) 8 MG disintegrating tablet Place 8 mg on the tongue Every 8 (Eight) Hours As Needed for Nausea or Vomiting.   Yes Justyn Erwin MD    oxyCODONE (OXY-IR) 5 MG capsule Take 10 mg by mouth Every 6 (Six) Hours As Needed for Moderate Pain .   Yes Justyn Erwin MD   pregabalin (LYRICA) 25 MG capsule Take 25 mg by mouth 3 (Three) Times a Day As Needed (nerve pain).   Yes Justyn Erwin MD   rifaximin (XIFAXAN) 550 MG tablet Take 550 mg by mouth Every 12 (Twelve) Hours.   Yes Justyn Erwin MD   bumetanide (BUMEX) 2 MG tablet Take 2 mg by mouth Daily.    Justyn Erwin MD   spironolactone (ALDACTONE) 100 MG tablet Take 200 mg by mouth Daily.    Justyn Erwin MD   dabigatran etexilate (PRADAXA) 150 MG capsu Take 1 capsule by mouth Every 12 (Twelve) Hours. Indications: Other - full anticoagulation, Hypercoagulable state 9/17/20 12/16/20  Siomara Cervantes MD   ondansetron (ZOFRAN) 4 MG tablet Take 8 mg by mouth Every 6 (Six) Hours As Needed for Nausea or Vomiting.  12/16/20  Justyn Erwin MD       Allergies:    Allergies   Allergen Reactions   • Compazine [Prochlorperazine Edisylate] Hives   • Contrast Dye Hives   • Iodinated Diagnostic Agents    • Iodine Hives   • Morphine Itching   • Nsaids Other (See Comments)     States I am not suppose to take them   • Hydrocodone Rash     Lortab elixir per pt       Objective   Objective     Vital Signs  Temp:  [97.5 °F (36.4 °C)] 97.5 °F (36.4 °C)  Heart Rate:  [68-90] 78  Resp:  [14-18] 14  BP: (121-149)/(74-94) 138/81  SpO2:  [98 %-100 %] 99 %  on   ;   Device (Oxygen Therapy): room air  Body mass index is 23.16 kg/m².    Physical Exam  Vitals signs reviewed.   Constitutional:       Appearance: Normal appearance. She is normal weight.   HENT:      Head: Normocephalic and atraumatic.      Nose: Nose normal.      Mouth/Throat:      Mouth: Mucous membranes are moist.      Pharynx: Oropharynx is clear.   Eyes:      Extraocular Movements: Extraocular movements intact.      Conjunctiva/sclera: Conjunctivae normal.      Pupils: Pupils are equal, round, and reactive to light.    Neck:      Musculoskeletal: Normal range of motion.   Cardiovascular:      Rate and Rhythm: Normal rate and regular rhythm.      Pulses: Normal pulses.      Heart sounds: Normal heart sounds.      Comments: S1, S2 audible  Pulmonary:      Effort: Pulmonary effort is normal.      Breath sounds: Normal breath sounds.      Comments: On room air.  The patient had good and equal chest wall expansion.  The patient had no active wheezing.  Abdominal:      General: Abdomen is flat. Bowel sounds are normal.      Palpations: Abdomen is soft.   Musculoskeletal: Normal range of motion.   Skin:     General: Skin is warm and dry.   Neurological:      General: No focal deficit present.      Mental Status: She is alert and oriented to person, place, and time. Mental status is at baseline.   Psychiatric:         Mood and Affect: Mood normal.         Behavior: Behavior normal.         Thought Content: Thought content normal.         Judgment: Judgment normal.         Results Review:  I have personally reviewed most recent cardiac tracings, lab results and radiology images and interpretations and agree with findings.    Results from last 7 days   Lab Units 12/16/20  0600   WBC 10*3/mm3 8.50   HEMOGLOBIN g/dL 12.5   HEMATOCRIT % 38.5   PLATELETS 10*3/mm3 528*   INR  1.10     Results from last 7 days   Lab Units 12/16/20  0704   SODIUM mmol/L 139   POTASSIUM mmol/L 3.3*   CHLORIDE mmol/L 105   CO2 mmol/L 24.0   BUN mg/dL 11   CREATININE mg/dL 0.56*   GLUCOSE mg/dL 76   CALCIUM mg/dL 8.7   ALT (SGPT) U/L 26   AST (SGOT) U/L 38*   TROPONIN T ng/mL <0.010   PROBNP pg/mL 58.7     Estimated Creatinine Clearance: 116.3 mL/min (A) (by C-G formula based on SCr of 0.56 mg/dL (L)).  Brief Urine Lab Results  (Last result in the past 365 days)      Color   Clarity   Blood   Leuk Est   Nitrite   Protein   CREAT   Urine HCG        12/16/20 0606 Dark Yellow Clear Negative Negative Negative Trace               Microbiology Results (last 10 days)      ** No results found for the last 240 hours. **          ECG/EMG Results (most recent)     Procedure Component Value Units Date/Time    ECG 12 Lead [[id number]] Collected: 12/16/20 0539     Updated: 12/16/20 1111     QT Interval 386 ms     Narrative:      HEART RATE= 76  bpm  RR Interval= 788  ms  SD Interval= 166  ms  P Horizontal Axis= -14  deg  P Front Axis= 48  deg  QRSD Interval= 100  ms  QT Interval= 386  ms  QRS Axis= 62  deg  T Wave Axis= 28  deg  - NORMAL ECG -  Sinus rhythm  When compared with ECG of 15-Sep-2020 23:20:34,  No significant change  Electronically Signed By: Jim Hermosillo (Zan) 16-Dec-2020 11:00:14  Date and Time of Study: 2020-12-16 05:39:24          Results for orders placed during the hospital encounter of 08/23/19   Duplex Venous Lower Extremity - Right CAR    Narrative · Acute right lower extremity superficial thrombophlebitis noted in the   greater saphenous (above knee) and greater saphenous (below knee).  · All other right sided veins appeared normal.               Ct Abdomen Pelvis Without Contrast    Result Date: 12/16/2020  1.Cirrhosis and fatty infiltration of the liver with moderate amount of ascites. 2.Umbilical hernia containing ascites and a loop of small bowel. No significant small bowel dilatation is visualized to indicate obstruction at this time. Correlate for symptoms in this location. There is also a supraumbilical ventral hernia containing fluid. Hernias appear grossly similar to the prior exam. 3.Postoperative changes of gastric bypass, splenectomy, cystectomy, hysterectomy.     Electronically Signed By-Josué Gay MD On:12/16/2020 8:19 AM This report was finalized on [id number] by  Josué Gay MD.    Xr Chest 1 View    Result Date: 12/16/2020  No radiographic findings of acute cardiopulmonary abnormality.  Electronically Signed By-Josué Gay MD On:12/16/2020 7:50 AM This report was finalized on [id number] by  Josué Gay MD.        Estimated Creatinine  Clearance: 116.3 mL/min (A) (by C-G formula based on SCr of 0.56 mg/dL (L)).    Assessment/Plan   Assessment/Plan       Active Hospital Problems    Diagnosis  POA   • **Chest pain [R07.9]  Yes     Priority: High   • CARTER (nonalcoholic steatohepatitis) [K75.81]  Yes   • Hypothyroid [E03.9]  Yes   • Thyroid cancer (CMS/HCC) [C73]  Yes   • Depression [F32.9]  Yes      Resolved Hospital Problems   No resolved problems to display.     Acute chest pain  -Chest x-ray unremarkable.    - EKG showed sinus rhythm.    -  D-dimer 1.78(has slightly improved from previous hospitalization).  - Troponin X1 normal, trend   - Continuous cardiac monitoring  - Check TSH and mag   - Aspirin and nitrostat ordered  - Myoview ordered        -This remains pending at this time.    Acute diarrhea  -CT abdomen pelvis showed Cirrhosis and fatty infiltration of the liver with moderate amount of ascites.Umbilical hernia containing ascites and a loop of small bowel. No significant small bowel dilatation is visualized to indicate obstruction at this time. Correlate for symptoms in this location. There is also a supraumbilical ventral hernia containing fluid. Hernias appear grossly similar to the prior exam. Postoperative changes of gastric bypass, splenectomy, cystectomy, hysterectomy.   - WBC 8.5, monitor   - Lomotil ordered PRN     Acute hypokalemia  - Likely combination of diarrhea and diuretics contirbuting  - K 3.3, monitor   - Electrolyte protocol ordered     CARTER cirrhosis with portal vein thrombosis   - History of gastric sleeve/gastric bypass with multiple complications  - Ammonia 49, monitor   - Holding home eliquis, lovenox subcutaneous ordered for now   - Continue bumex, xifaxan, and spirolactone     Chronic back pain  - Continue lyrica and oxycodone (INSPECT verified)  - Urine tox screen positive for oxycodone     Iatrogenic hypothyroidism status post total thyroidectomy for cancer  -Continue levothyroxine    Patient previously positive  for COVID 9/16/2020     VTE Prophylaxis - Subcutaneous lovenox    CODE STATUS:    Code Status and Medical Interventions:   Ordered at: 12/16/20 1115     Level Of Support Discussed With:    Patient     Code Status:    CPR     Medical Interventions (Level of Support Prior to Arrest):    Full       This patient has been examined wearing appropriate Personal Protective Equipment. 12/16/20      I discussed the patient's findings and my recommendations with patient.      Signature: Electronically signed by GRIFFIN Lozada, 12/16/20, 11:20 AM EST.    [facility] Hospitalist Team

## 2020-12-16 NOTE — DISCHARGE SUMMARY
[facility] Medicine Services  DISCHARGE SUMMARY        Prepared For PCP:  Melissa Iniguez APRN    Patient Name: Lucinda Cope  : 1975  MRN: [id number]      Date of Admission:   2020    Date of Discharge:  2020    Length of stay:  LOS: 0 days     Hospital Course     Presenting Problem:   Chest pain, unspecified type [R07.9]      Active Hospital Problems    Diagnosis  POA   • **Chest pain [R07.9]  Yes     Priority: Medium   • CARTER (nonalcoholic steatohepatitis) [K75.81]  Yes     Priority: High   • Thyroid cancer (CMS/HCC) [C73]  Yes     Priority: High   • Hypothyroid [E03.9]  Yes     Priority: Medium   • Depression [F32.9]  Yes     Priority: Medium      Resolved Hospital Problems   No resolved problems to display.           Hospital Course:  Lucinda Cope is a 45 y.o. female with a history of Carter cirrhosis.  The patient also had a gastric sleeve done in the past.  The patient presented with chest pain.  The patient was not not in pain at the time of my visit.  The patient was ruled out for myocardial injury.  The patient also underwent a nuclear stress test which was negative.  The patient was doing well and was ready for discharge.    The patient is a full code toe to discharge.  The patient should be on a regular diet.  The patient has no pending studies at discharge.  The patient's medications are as listed in that section of this report.  The patient should follow-up with your primary care provider in 3 to 5 days.  The patient may resume her regular activity.  The patient is discharged in satisfactory condition.          Recommendation for Outpatient Providers:             Reasons For Change In Medications and Indications for New Medications:        Day of Discharge     HPI:   The patient's chest pain has resolved.    Vital Signs:   Temp:  [97.5 °F (36.4 °C)-98.4 °F (36.9 °C)] 98.4 °F (36.9 °C)  Heart Rate:  [68-90] 89  Resp:  [14-18] 14  BP: (121-149)/(74-96)  138/90     Physical Exam:  Physical Exam  See earlier physical exam this morning.  Pertinent  and/or Most Recent Results     Results from last 7 days   Lab Units 12/16/20  0704 12/16/20  0600   WBC 10*3/mm3  --  8.50   HEMOGLOBIN g/dL  --  12.5   HEMATOCRIT %  --  38.5   PLATELETS 10*3/mm3  --  528*   SODIUM mmol/L 139  --    POTASSIUM mmol/L 3.3*  --    CHLORIDE mmol/L 105  --    CO2 mmol/L 24.0  --    BUN mg/dL 11  --    CREATININE mg/dL 0.56*  --    GLUCOSE mg/dL 76  --    CALCIUM mg/dL 8.7  --      Results from last 7 days   Lab Units 12/16/20  0704 12/16/20  0600   BILIRUBIN mg/dL 1.2  --    ALK PHOS U/L 143*  --    ALT (SGPT) U/L 26  --    AST (SGOT) U/L 38*  --    PROTIME Seconds  --  12.0*   INR   --  1.10           Invalid input(s): TG, LDLCALC, LDLREALC  Results from last 7 days   Lab Units 12/16/20  1159 12/16/20  0704   TSH uIU/mL  --  0.006*   PROBNP pg/mL  --  58.7   TROPONIN T ng/mL <0.010 <0.010       Brief Urine Lab Results  (Last result in the past 365 days)      Color   Clarity   Blood   Leuk Est   Nitrite   Protein   CREAT   Urine HCG        12/16/20 0606 Dark Yellow Clear Negative Negative Negative Trace               Microbiology Results Abnormal     None          Ct Abdomen Pelvis Without Contrast    Result Date: 12/16/2020  Impression: 1.Cirrhosis and fatty infiltration of the liver with moderate amount of ascites. 2.Umbilical hernia containing ascites and a loop of small bowel. No significant small bowel dilatation is visualized to indicate obstruction at this time. Correlate for symptoms in this location. There is also a supraumbilical ventral hernia containing fluid. Hernias appear grossly similar to the prior exam. 3.Postoperative changes of gastric bypass, splenectomy, cystectomy, hysterectomy.     Electronically Signed By-Josué Gay MD On:12/16/2020 8:19 AM This report was finalized on [id number] by  Josué Gay MD.    Xr Chest 1 View    Result Date: 12/16/2020  Impression: No  radiographic findings of acute cardiopulmonary abnormality.  Electronically Signed By-Josué Gay MD On:12/16/2020 7:50 AM This report was finalized on [id number] by  Josué Gay MD.      Results for orders placed during the hospital encounter of 08/23/19   Duplex Venous Lower Extremity - Right CAR    Narrative · Acute right lower extremity superficial thrombophlebitis noted in the   greater saphenous (above knee) and greater saphenous (below knee).  · All other right sided veins appeared normal.          Results for orders placed during the hospital encounter of 08/23/19   Duplex Venous Lower Extremity - Right CAR    Narrative · Acute right lower extremity superficial thrombophlebitis noted in the   greater saphenous (above knee) and greater saphenous (below knee).  · All other right sided veins appeared normal.                       Test Results Pending at Discharge        Procedures Performed           Consults:   Consults     Date and Time Order Name Status Description    12/16/2020 1011 Hospitalist (on-call MD unless specified) Completed             Discharge Details        Discharge Medications      Continue These Medications      Instructions Start Date   apixaban 5 MG tablet tablet  Commonly known as: ELIQUIS   5 mg, Oral, 2 Times Daily      bumetanide 2 MG tablet  Commonly known as: BUMEX   2 mg, Oral, Daily      levothyroxine 150 MCG tablet  Commonly known as: SYNTHROID, LEVOTHROID   150 mcg, Oral, Daily      ondansetron ODT 8 MG disintegrating tablet  Commonly known as: ZOFRAN-ODT   8 mg, Translingual, Every 8 Hours PRN      oxyCODONE 5 MG capsule  Commonly known as: OXY-IR   10 mg, Oral, Every 6 Hours      pregabalin 25 MG capsule  Commonly known as: LYRICA   25 mg, Oral, Every Night at Bedtime      riFAXIMin 550 MG tablet  Commonly known as: XIFAXAN   550 mg, Oral, Every 12 Hours Scheduled      spironolactone 100 MG tablet  Commonly known as: ALDACTONE   300 mg, Oral, Daily             Allergies    Allergen Reactions   • Compazine [Prochlorperazine Edisylate] Hives   • Contrast Dye Hives   • Iodinated Diagnostic Agents    • Iodine Hives   • Morphine Itching   • Nsaids Other (See Comments)     States I am not suppose to take them   • Hydrocodone Rash     Lortab elixir per pt         Discharge Disposition:  Home or Self Care    Diet:  Hospital:  Diet Order   Procedures   • NPO Diet         Discharge Activity:         CODE STATUS:    Code Status and Medical Interventions:   Ordered at: 12/16/20 1115     Level Of Support Discussed With:    Patient     Code Status:    CPR     Medical Interventions (Level of Support Prior to Arrest):    Full     Follow-up Appointments  No future appointments.    Condition on Discharge:    Stable    This patient has been examined wearing appropriate Personal Protective Equipment and discussed with hospital infection control department. 12/16/20    Electronically signed by Jing Brannon MD, 12/16/20, 6:51 PM EST.      Time: I spent  20  minutes on this discharge activity which included face-to-face encounter with the patient/reviewing the data in the system/coordination of the care with the nursing staff as well as consultants/documentation/entering orders.
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
## 2020-12-17 NOTE — ED NOTES
Pt anticipated being kept over night. Dr. Brannon evaluated pt after normal stress test results, she thought it was appropriate to d/c. Pt does not have means of transportation home. Pt does not have vehicle and no one to call to come pick her up. Pt reports that she lives an hour away and that she cannot afford a taxi home or a hotel room until she can get a ride. Case management and social work have exhausted all options for pt with no resolution.      Orquidea Sandoval, RN  12/16/20 1945

## 2021-01-04 ENCOUNTER — HOSPITAL ENCOUNTER (OUTPATIENT)
Facility: HOSPITAL | Age: 46
Setting detail: OBSERVATION
Discharge: HOME | End: 2021-01-05
Attending: INTERNAL MEDICINE | Admitting: INTERNAL MEDICINE

## 2021-01-04 ENCOUNTER — APPOINTMENT (OUTPATIENT)
Dept: CT IMAGING | Facility: HOSPITAL | Age: 46
End: 2021-01-04

## 2021-01-04 DIAGNOSIS — I85.00 ESOPHAGEAL VARICES WITHOUT BLEEDING, UNSPECIFIED ESOPHAGEAL VARICES TYPE (HCC): ICD-10-CM

## 2021-01-04 DIAGNOSIS — K76.6 PORTAL HYPERTENSION (HCC): ICD-10-CM

## 2021-01-04 DIAGNOSIS — R14.0 ABDOMINAL DISTENTION: Primary | ICD-10-CM

## 2021-01-04 DIAGNOSIS — R06.00 DYSPNEA, UNSPECIFIED TYPE: ICD-10-CM

## 2021-01-04 DIAGNOSIS — R18.8 OTHER ASCITES: ICD-10-CM

## 2021-01-04 LAB
APTT PPP: 26 SECONDS (ref 24–31)
BASOPHILS # BLD AUTO: 0.1 10*3/MM3 (ref 0–0.2)
BASOPHILS NFR BLD AUTO: 1.1 % (ref 0–1.5)
DEPRECATED RDW RBC AUTO: 54.3 FL (ref 37–54)
EOSINOPHIL # BLD AUTO: 0.6 10*3/MM3 (ref 0–0.4)
EOSINOPHIL NFR BLD AUTO: 5.8 % (ref 0.3–6.2)
ERYTHROCYTE [DISTWIDTH] IN BLOOD BY AUTOMATED COUNT: 17.3 % (ref 12.3–15.4)
HCT VFR BLD AUTO: 35.2 % (ref 34–46.6)
HGB BLD-MCNC: 11.4 G/DL (ref 12–15.9)
INR PPP: 1.04 (ref 0.93–1.1)
LYMPHOCYTES # BLD AUTO: 3.6 10*3/MM3 (ref 0.7–3.1)
LYMPHOCYTES NFR BLD AUTO: 36.3 % (ref 19.6–45.3)
MCH RBC QN AUTO: 28.7 PG (ref 26.6–33)
MCHC RBC AUTO-ENTMCNC: 32.3 G/DL (ref 31.5–35.7)
MCV RBC AUTO: 88.9 FL (ref 79–97)
MONOCYTES # BLD AUTO: 1 10*3/MM3 (ref 0.1–0.9)
MONOCYTES NFR BLD AUTO: 9.7 % (ref 5–12)
NEUTROPHILS NFR BLD AUTO: 4.7 10*3/MM3 (ref 1.7–7)
NEUTROPHILS NFR BLD AUTO: 47.1 % (ref 42.7–76)
NRBC BLD AUTO-RTO: 0.1 /100 WBC (ref 0–0.2)
PLATELET # BLD AUTO: 612 10*3/MM3 (ref 140–450)
PMV BLD AUTO: 7.8 FL (ref 6–12)
PROTHROMBIN TIME: 11.4 SECONDS (ref 9.6–11.7)
RBC # BLD AUTO: 3.97 10*6/MM3 (ref 3.77–5.28)
WBC # BLD AUTO: 10 10*3/MM3 (ref 3.4–10.8)

## 2021-01-04 PROCEDURE — 96374 THER/PROPH/DIAG INJ IV PUSH: CPT

## 2021-01-04 PROCEDURE — 99284 EMERGENCY DEPT VISIT MOD MDM: CPT

## 2021-01-04 PROCEDURE — 99285 EMERGENCY DEPT VISIT HI MDM: CPT

## 2021-01-04 PROCEDURE — 25010000002 ONDANSETRON PER 1 MG: Performed by: NURSE PRACTITIONER

## 2021-01-04 PROCEDURE — 25010000002 METHYLPREDNISOLONE PER 125 MG: Performed by: NURSE PRACTITIONER

## 2021-01-04 PROCEDURE — 96376 TX/PRO/DX INJ SAME DRUG ADON: CPT

## 2021-01-04 PROCEDURE — 25010000002 DIPHENHYDRAMINE PER 50 MG: Performed by: NURSE PRACTITIONER

## 2021-01-04 PROCEDURE — 85610 PROTHROMBIN TIME: CPT | Performed by: NURSE PRACTITIONER

## 2021-01-04 PROCEDURE — 80053 COMPREHEN METABOLIC PANEL: CPT | Performed by: NURSE PRACTITIONER

## 2021-01-04 PROCEDURE — 85730 THROMBOPLASTIN TIME PARTIAL: CPT | Performed by: NURSE PRACTITIONER

## 2021-01-04 PROCEDURE — 25010000002 HYDROMORPHONE PER 4 MG: Performed by: NURSE PRACTITIONER

## 2021-01-04 PROCEDURE — 96375 TX/PRO/DX INJ NEW DRUG ADDON: CPT

## 2021-01-04 PROCEDURE — 85025 COMPLETE CBC W/AUTO DIFF WBC: CPT | Performed by: NURSE PRACTITIONER

## 2021-01-04 RX ORDER — HYDROMORPHONE HCL 110MG/55ML
0.5 PATIENT CONTROLLED ANALGESIA SYRINGE INTRAVENOUS ONCE
Status: COMPLETED | OUTPATIENT
Start: 2021-01-04 | End: 2021-01-04

## 2021-01-04 RX ORDER — DIPHENHYDRAMINE HYDROCHLORIDE 50 MG/ML
25 INJECTION INTRAMUSCULAR; INTRAVENOUS ONCE
Status: COMPLETED | OUTPATIENT
Start: 2021-01-04 | End: 2021-01-04

## 2021-01-04 RX ORDER — ONDANSETRON 2 MG/ML
4 INJECTION INTRAMUSCULAR; INTRAVENOUS ONCE
Status: COMPLETED | OUTPATIENT
Start: 2021-01-04 | End: 2021-01-04

## 2021-01-04 RX ORDER — METHYLPREDNISOLONE SODIUM SUCCINATE 125 MG/2ML
125 INJECTION, POWDER, LYOPHILIZED, FOR SOLUTION INTRAMUSCULAR; INTRAVENOUS ONCE
Status: COMPLETED | OUTPATIENT
Start: 2021-01-04 | End: 2021-01-04

## 2021-01-04 RX ORDER — HYDROMORPHONE HCL 110MG/55ML
1 PATIENT CONTROLLED ANALGESIA SYRINGE INTRAVENOUS ONCE
Status: COMPLETED | OUTPATIENT
Start: 2021-01-04 | End: 2021-01-04

## 2021-01-04 RX ADMIN — HYDROMORPHONE HYDROCHLORIDE 0.5 MG: 2 INJECTION, SOLUTION INTRAMUSCULAR; INTRAVENOUS; SUBCUTANEOUS at 21:24

## 2021-01-04 RX ADMIN — DIPHENHYDRAMINE HYDROCHLORIDE 25 MG: 50 INJECTION, SOLUTION INTRAMUSCULAR; INTRAVENOUS at 21:24

## 2021-01-04 RX ADMIN — ONDANSETRON 4 MG: 2 INJECTION, SOLUTION INTRAMUSCULAR; INTRAVENOUS at 21:24

## 2021-01-04 RX ADMIN — HYDROMORPHONE HYDROCHLORIDE 1 MG: 2 INJECTION, SOLUTION INTRAMUSCULAR; INTRAVENOUS; SUBCUTANEOUS at 23:52

## 2021-01-04 RX ADMIN — METHYLPREDNISOLONE SODIUM SUCCINATE 125 MG: 125 INJECTION, POWDER, FOR SOLUTION INTRAMUSCULAR; INTRAVENOUS at 21:25

## 2021-01-05 ENCOUNTER — APPOINTMENT (OUTPATIENT)
Dept: CT IMAGING | Facility: HOSPITAL | Age: 46
End: 2021-01-05

## 2021-01-05 VITALS
BODY MASS INDEX: 25 KG/M2 | TEMPERATURE: 98.3 F | DIASTOLIC BLOOD PRESSURE: 78 MMHG | HEART RATE: 62 BPM | SYSTOLIC BLOOD PRESSURE: 141 MMHG | HEIGHT: 63 IN | RESPIRATION RATE: 18 BRPM | WEIGHT: 141.09 LBS | OXYGEN SATURATION: 98 %

## 2021-01-05 PROBLEM — R14.0 ABDOMINAL DISTENTION: Status: ACTIVE | Noted: 2021-01-05

## 2021-01-05 PROBLEM — G89.29 CHRONIC PAIN: Chronic | Status: ACTIVE | Noted: 2021-01-05

## 2021-01-05 PROBLEM — Z86.718 HISTORY OF DVT (DEEP VEIN THROMBOSIS): Chronic | Status: ACTIVE | Noted: 2021-01-05

## 2021-01-05 LAB
ALBUMIN SERPL-MCNC: 3.4 G/DL (ref 3.5–5.2)
ALBUMIN/GLOB SERPL: 1.2 G/DL
ALP SERPL-CCNC: 201 U/L (ref 39–117)
ALT SERPL W P-5'-P-CCNC: 29 U/L (ref 1–33)
ANION GAP SERPL CALCULATED.3IONS-SCNC: 8 MMOL/L (ref 5–15)
AST SERPL-CCNC: 77 U/L (ref 1–32)
BILIRUB SERPL-MCNC: 1.3 MG/DL (ref 0–1.2)
BUN SERPL-MCNC: 6 MG/DL (ref 6–20)
BUN/CREAT SERPL: 9.8 (ref 7–25)
CALCIUM SPEC-SCNC: 8.4 MG/DL (ref 8.6–10.5)
CHLORIDE SERPL-SCNC: 103 MMOL/L (ref 98–107)
CO2 SERPL-SCNC: 26 MMOL/L (ref 22–29)
CREAT SERPL-MCNC: 0.61 MG/DL (ref 0.57–1)
DEPRECATED RDW RBC AUTO: 51.6 FL (ref 37–54)
ERYTHROCYTE [DISTWIDTH] IN BLOOD BY AUTOMATED COUNT: 16.7 % (ref 12.3–15.4)
GFR SERPL CREATININE-BSD FRML MDRD: 105 ML/MIN/1.73
GLOBULIN UR ELPH-MCNC: 2.9 GM/DL
GLUCOSE SERPL-MCNC: 109 MG/DL (ref 65–99)
HCT VFR BLD AUTO: 37.2 % (ref 34–46.6)
HGB BLD-MCNC: 12.1 G/DL (ref 12–15.9)
INR PPP: 1.09 (ref 0.93–1.1)
MCH RBC QN AUTO: 29.1 PG (ref 26.6–33)
MCHC RBC AUTO-ENTMCNC: 32.6 G/DL (ref 31.5–35.7)
MCV RBC AUTO: 89.3 FL (ref 79–97)
PLATELET # BLD AUTO: 648 10*3/MM3 (ref 140–450)
PMV BLD AUTO: 8.9 FL (ref 6–12)
POTASSIUM SERPL-SCNC: 3.7 MMOL/L (ref 3.5–5.2)
POTASSIUM SERPL-SCNC: 3.7 MMOL/L (ref 3.5–5.2)
PROT SERPL-MCNC: 6.3 G/DL (ref 6–8.5)
PROTHROMBIN TIME: 11.9 SECONDS (ref 9.6–11.7)
RBC # BLD AUTO: 4.16 10*6/MM3 (ref 3.77–5.28)
SARS-COV-2 RNA PNL SPEC NAA+PROBE: NOT DETECTED
SODIUM SERPL-SCNC: 137 MMOL/L (ref 136–145)
TSH SERPL DL<=0.05 MIU/L-ACNC: 0.01 UIU/ML (ref 0.27–4.2)
WBC # BLD AUTO: 7.8 10*3/MM3 (ref 3.4–10.8)

## 2021-01-05 PROCEDURE — 96376 TX/PRO/DX INJ SAME DRUG ADON: CPT

## 2021-01-05 PROCEDURE — G0378 HOSPITAL OBSERVATION PER HR: HCPCS

## 2021-01-05 PROCEDURE — 85027 COMPLETE CBC AUTOMATED: CPT | Performed by: PHYSICIAN ASSISTANT

## 2021-01-05 PROCEDURE — 84132 ASSAY OF SERUM POTASSIUM: CPT | Performed by: PHYSICIAN ASSISTANT

## 2021-01-05 PROCEDURE — 84443 ASSAY THYROID STIM HORMONE: CPT | Performed by: PHYSICIAN ASSISTANT

## 2021-01-05 PROCEDURE — 63710000001 ONDANSETRON PER 8 MG: Performed by: PHYSICIAN ASSISTANT

## 2021-01-05 PROCEDURE — 49083 ABD PARACENTESIS W/IMAGING: CPT | Performed by: FAMILY MEDICINE

## 2021-01-05 PROCEDURE — 0 IOPAMIDOL PER 1 ML: Performed by: NURSE PRACTITIONER

## 2021-01-05 PROCEDURE — 74177 CT ABD & PELVIS W/CONTRAST: CPT

## 2021-01-05 PROCEDURE — 99235 HOSP IP/OBS SAME DATE MOD 70: CPT | Performed by: INTERNAL MEDICINE

## 2021-01-05 PROCEDURE — U0003 INFECTIOUS AGENT DETECTION BY NUCLEIC ACID (DNA OR RNA); SEVERE ACUTE RESPIRATORY SYNDROME CORONAVIRUS 2 (SARS-COV-2) (CORONAVIRUS DISEASE [COVID-19]), AMPLIFIED PROBE TECHNIQUE, MAKING USE OF HIGH THROUGHPUT TECHNOLOGIES AS DESCRIBED BY CMS-2020-01-R: HCPCS | Performed by: INTERNAL MEDICINE

## 2021-01-05 PROCEDURE — 85610 PROTHROMBIN TIME: CPT | Performed by: PHYSICIAN ASSISTANT

## 2021-01-05 RX ORDER — ALBUMIN (HUMAN) 12.5 G/50ML
112.5 SOLUTION INTRAVENOUS ONCE
Status: DISCONTINUED | OUTPATIENT
Start: 2021-01-05 | End: 2021-01-05 | Stop reason: HOSPADM

## 2021-01-05 RX ORDER — ALBUMIN (HUMAN) 12.5 G/50ML
50 SOLUTION INTRAVENOUS ONCE
Status: DISCONTINUED | OUTPATIENT
Start: 2021-01-05 | End: 2021-01-05 | Stop reason: HOSPADM

## 2021-01-05 RX ORDER — SODIUM CHLORIDE 0.9 % (FLUSH) 0.9 %
10 SYRINGE (ML) INJECTION AS NEEDED
Status: DISCONTINUED | OUTPATIENT
Start: 2021-01-05 | End: 2021-01-05 | Stop reason: HOSPADM

## 2021-01-05 RX ORDER — CALCIUM GLUCONATE 20 MG/ML
2 INJECTION, SOLUTION INTRAVENOUS AS NEEDED
Status: DISCONTINUED | OUTPATIENT
Start: 2021-01-05 | End: 2021-01-05 | Stop reason: HOSPADM

## 2021-01-05 RX ORDER — CHOLECALCIFEROL (VITAMIN D3) 125 MCG
5 CAPSULE ORAL NIGHTLY PRN
Status: DISCONTINUED | OUTPATIENT
Start: 2021-01-05 | End: 2021-01-05 | Stop reason: HOSPADM

## 2021-01-05 RX ORDER — ONDANSETRON 4 MG/1
4 TABLET, FILM COATED ORAL EVERY 6 HOURS PRN
Status: DISCONTINUED | OUTPATIENT
Start: 2021-01-05 | End: 2021-01-05 | Stop reason: HOSPADM

## 2021-01-05 RX ORDER — MAGNESIUM SULFATE HEPTAHYDRATE 40 MG/ML
2 INJECTION, SOLUTION INTRAVENOUS AS NEEDED
Status: DISCONTINUED | OUTPATIENT
Start: 2021-01-05 | End: 2021-01-05 | Stop reason: HOSPADM

## 2021-01-05 RX ORDER — SPIRONOLACTONE 100 MG/1
300 TABLET, FILM COATED ORAL DAILY
Status: DISCONTINUED | OUTPATIENT
Start: 2021-01-05 | End: 2021-01-05 | Stop reason: HOSPADM

## 2021-01-05 RX ORDER — MAGNESIUM SULFATE HEPTAHYDRATE 40 MG/ML
4 INJECTION, SOLUTION INTRAVENOUS AS NEEDED
Status: DISCONTINUED | OUTPATIENT
Start: 2021-01-05 | End: 2021-01-05 | Stop reason: HOSPADM

## 2021-01-05 RX ORDER — NITROGLYCERIN 0.4 MG/1
0.4 TABLET SUBLINGUAL
Status: DISCONTINUED | OUTPATIENT
Start: 2021-01-05 | End: 2021-01-05 | Stop reason: HOSPADM

## 2021-01-05 RX ORDER — ALBUMIN (HUMAN) 12.5 G/50ML
87.5 SOLUTION INTRAVENOUS ONCE
Status: DISCONTINUED | OUTPATIENT
Start: 2021-01-05 | End: 2021-01-05 | Stop reason: HOSPADM

## 2021-01-05 RX ORDER — ALBUMIN (HUMAN) 12.5 G/50ML
62.5 SOLUTION INTRAVENOUS ONCE
Status: DISCONTINUED | OUTPATIENT
Start: 2021-01-05 | End: 2021-01-05 | Stop reason: HOSPADM

## 2021-01-05 RX ORDER — ONDANSETRON 2 MG/ML
4 INJECTION INTRAMUSCULAR; INTRAVENOUS EVERY 6 HOURS PRN
Status: DISCONTINUED | OUTPATIENT
Start: 2021-01-05 | End: 2021-01-05 | Stop reason: HOSPADM

## 2021-01-05 RX ORDER — CALCIUM GLUCONATE 20 MG/ML
1 INJECTION, SOLUTION INTRAVENOUS AS NEEDED
Status: DISCONTINUED | OUTPATIENT
Start: 2021-01-05 | End: 2021-01-05 | Stop reason: HOSPADM

## 2021-01-05 RX ORDER — OXYCODONE HYDROCHLORIDE 5 MG/1
5 TABLET ORAL EVERY 6 HOURS PRN
Status: DISCONTINUED | OUTPATIENT
Start: 2021-01-05 | End: 2021-01-05

## 2021-01-05 RX ORDER — ALBUMIN (HUMAN) 12.5 G/50ML
37.5 SOLUTION INTRAVENOUS ONCE
Status: DISCONTINUED | OUTPATIENT
Start: 2021-01-05 | End: 2021-01-05 | Stop reason: HOSPADM

## 2021-01-05 RX ORDER — HYDROMORPHONE HYDROCHLORIDE 2 MG/1
2 TABLET ORAL ONCE
Status: COMPLETED | OUTPATIENT
Start: 2021-01-05 | End: 2021-01-05

## 2021-01-05 RX ORDER — OXYCODONE HYDROCHLORIDE 5 MG/1
10 TABLET ORAL EVERY 6 HOURS PRN
Status: DISCONTINUED | OUTPATIENT
Start: 2021-01-05 | End: 2021-01-05 | Stop reason: HOSPADM

## 2021-01-05 RX ORDER — ALBUMIN (HUMAN) 12.5 G/50ML
100 SOLUTION INTRAVENOUS ONCE
Status: DISCONTINUED | OUTPATIENT
Start: 2021-01-05 | End: 2021-01-05 | Stop reason: HOSPADM

## 2021-01-05 RX ORDER — ALBUMIN (HUMAN) 12.5 G/50ML
75 SOLUTION INTRAVENOUS ONCE
Status: DISCONTINUED | OUTPATIENT
Start: 2021-01-05 | End: 2021-01-05 | Stop reason: HOSPADM

## 2021-01-05 RX ORDER — PREGABALIN 25 MG/1
25 CAPSULE ORAL DAILY
Status: DISCONTINUED | OUTPATIENT
Start: 2021-01-05 | End: 2021-01-05 | Stop reason: HOSPADM

## 2021-01-05 RX ORDER — SODIUM CHLORIDE 0.9 % (FLUSH) 0.9 %
10 SYRINGE (ML) INJECTION EVERY 12 HOURS SCHEDULED
Status: DISCONTINUED | OUTPATIENT
Start: 2021-01-05 | End: 2021-01-05 | Stop reason: HOSPADM

## 2021-01-05 RX ORDER — POTASSIUM CHLORIDE 20 MEQ/1
40 TABLET, EXTENDED RELEASE ORAL AS NEEDED
Status: DISCONTINUED | OUTPATIENT
Start: 2021-01-05 | End: 2021-01-05 | Stop reason: HOSPADM

## 2021-01-05 RX ORDER — POTASSIUM CHLORIDE 1.5 G/1.77G
40 POWDER, FOR SOLUTION ORAL AS NEEDED
Status: DISCONTINUED | OUTPATIENT
Start: 2021-01-05 | End: 2021-01-05 | Stop reason: HOSPADM

## 2021-01-05 RX ORDER — LEVOTHYROXINE SODIUM 0.15 MG/1
150 TABLET ORAL
Status: DISCONTINUED | OUTPATIENT
Start: 2021-01-05 | End: 2021-01-05 | Stop reason: HOSPADM

## 2021-01-05 RX ORDER — BUMETANIDE 1 MG/1
2 TABLET ORAL DAILY
Status: DISCONTINUED | OUTPATIENT
Start: 2021-01-05 | End: 2021-01-05 | Stop reason: HOSPADM

## 2021-01-05 RX ADMIN — HYDROMORPHONE HYDROCHLORIDE 2 MG: 2 TABLET ORAL at 04:49

## 2021-01-05 RX ADMIN — OXYCODONE 5 MG: 5 TABLET ORAL at 03:44

## 2021-01-05 RX ADMIN — LEVOTHYROXINE SODIUM 150 MCG: 0.15 TABLET ORAL at 06:39

## 2021-01-05 RX ADMIN — ONDANSETRON HYDROCHLORIDE 4 MG: 4 TABLET, FILM COATED ORAL at 02:50

## 2021-01-05 RX ADMIN — OXYCODONE 5 MG: 5 TABLET ORAL at 11:55

## 2021-01-05 RX ADMIN — IOPAMIDOL 100 ML: 755 INJECTION, SOLUTION INTRAVENOUS at 01:00

## 2021-01-05 RX ADMIN — OXYCODONE 5 MG: 5 TABLET ORAL at 09:35

## 2021-01-05 RX ADMIN — Medication 10 ML: at 03:45

## 2021-01-05 NOTE — NURSING NOTE
PARACENTESIS    Time Arrived in Department: 0945      Consent: yes  Allergies have been Reviewed: yes      ID Band Verified: yes    Method: Wheelchair    Lab Results: Checked    Physician: Joe      Nurse: Rosalia Velazquez RN    IR Care Plan: Person Educated - Patient, Current Knowledge - Understands, Readiness to Learn - Acceptance, Teaching Topic - Procedure and Evaluation of Teaching - Verbalized Understanding      Neurological: Within Normal Limits    Skin: Flesh, Warm and Dry    Respiratory Status: Respirations even and enlabored    Room In: 9      Procedure: Paracentesis    Time Out Completed: yes    Time Out: 1030    Prep Time: 1035    Prep Site 1: Right Lateral Abdomen      Prep: Chlorhexidine and Sterile drape    Procedure Position: Right Side    Guidance: Ultrasound    Fluid Quality: Cloudy and Light Yellow    Procedure Note: para began debbie well        Intra Time 1:1040    Intra Assess 1:   LOC: Alert  Pain:  No Issues  Skin: Flesh, Warm and Dry  Respiratory Status:  Unlabored  Intra Procedure Note 1:         Intra Time 2: 1050    Intra Assess 2:   LOC: Alert  Pain: No Issues  Skin: Flesh, Warm and Dry  Respiratory Status: Unlabored  Intra Procedure Note 2:         Intra Time 3: 1055    Intra Assess 3:   LOC: Alert  Pain: No Issues  Skin: Flesh, Warm and Dry  Respiratory Status: Unlabored  Intra Procedure Note 3:para completed                Post-Procedure Position: Supine    Dressing Site 1: Covaderm    Post Procedure Status:  Alert and Oriented, returned to baseline, Dressing Dry/ Intact and Dressing properly labeled    Specimen To Lab: no    Total Fluid Removed: 1.9L    Post Procedure Note:  debbie well        Report Given To: Abby JOHNSON    Admit/Transfer To: back to ER    Transfer Time: 1115    Discharge Time:     Method: Wheelchair    O2 on Transfer: no    Accompanied By:  Nurse    Clothes Changed:      Discharged Location:      Discharge Teaching:  Care of Dressing and PAtient

## 2021-01-05 NOTE — PROGRESS NOTES
Discharge Planning Assessment   [facility]     Patient Name: Lucinda Cpoe  MRN: [id number]  Today's Date: 1/5/2021    Admit Date: 1/4/2021    Discharge Needs Assessment     Row Name 01/05/21 0833       Living Environment    Lives With  child(arely), adult;grandchild(arely)    Current Living Arrangements  home/apartment/condo    Primary Care Provided by  self    Provides Primary Care For  grandchild(arely)    Family Caregiver if Needed  child(arely), adult    Quality of Family Relationships  helpful    Able to Return to Prior Arrangements  yes       Resource/Environmental Concerns    Resource/Environmental Concerns  none    Transportation Concerns  car, none       Transition Planning    Patient/Family Anticipates Transition to  home with family    Patient/Family Anticipated Services at Transition  none    Transportation Anticipated  family or friend will provide;car, drives self       Discharge Needs Assessment    Readmission Within the Last 30 Days  no previous admission in last 30 days    Equipment Currently Used at Home  none    Concerns to be Addressed  denies needs/concerns at this time        Discharge Plan     Row Name 01/05/21 0834       Plan    Plan  Anticipate return to home    Patient/Family in Agreement with Plan  yes    Plan Comments  Spoke to patient at bedside.  Patient states she lives with her daughter and grandchild.  Reports she still drives and is independent with ADLs.  Denies any issues obtaining medications.  Denies any needs at this time.  PCP confirmed  Pharmacy: [facility] in [place]            Demographic Summary     Row Name 01/05/21 0832       General Information    Admission Type  observation    Arrived From  emergency department    Referral Source  admission list    Reason for Consult  discharge planning     Used During This Interaction  no        Functional Status     Row Name 01/05/21 0833       Functional Status, IADL    Medications  independent    Meal Preparation  independent     Housekeeping  independent    Laundry  independent    Shopping  independent        Met with patient in room wearing PPE: mask, face shield/goggles.      Maintained distance greater than six feet and spent less than 15 minutes in the room.    Lucinda Ramos RN    /Utilization Review  [address] [address] [phone] office  [phone] fax  [email or web address]  [facility] NPI:   Hospital Tax ID: [id number]

## 2021-01-05 NOTE — PROCEDURES
Procedure:Ultrasound guided Paracentesis    Consent: Informed    Vitals:    01/05/21 1000 01/05/21 1045 01/05/21 1100 01/05/21 1144   BP: 151/84 152/81 157/90 141/78   BP Location:    Right arm   Pulse: 61 63 62    Resp: 16 17 18    Temp:    98.3 °F (36.8 °C)   TempSrc:    Oral   SpO2: 98% 98%     Weight:       Height:           Anesthesia Provider: Ariel Diaz MD    Anesthesia type: Local infiltration with 1% buffered lidocaine    Surgeon: .Ariel Diaz MD    Assistant: none    Clinical findings of significance:      Procedure summary:    Allergies , labs and medications were reviewed. Patient was made to lie supine and the area of interest was imaged with ultrasound and fluid verified and marked. Area of interest was cleaned and draped in a sterile fashion. Subsequently local infiltration with Xylocaine. Trocar and cannula were advanced. Upon verification of the fluid, trocar was steadied and cannula advanced further. Trocar was drawn out. Cannula was connected to extension tubing and to the vacuum bottle. Subsequently the cannula was removed after completion of the procedure.       Findings: Clear straw yellow fluid was obtained.     Total volume drained: See nursing progress note for volume drained.    Lab Specimen sent: 0 ml    Complication: none    EBL: 0 ml    Post op condition: Stable. Albumin was given by protocol if needed.  If patient is intolerant to albumin then hetastarch is used per institutional preference.    Post op plan: Discharge back to the referring physician.

## 2021-01-05 NOTE — ED PROVIDER NOTES
Subjective   History: Patient is a 45-year-old female history of Ramon complains of abdominal distention that started today.  States she is also had some shortness of breath.  Says last time she had a paracentesis is approximately 6 months ago, they are not scheduled she only has them when she has intermittent distention and discomfort.  History of large umbilical hernia.  Denies fever chills chest pain cough congestion dysuria      Onset: Today  Location: Abdomen  Duration: Constant  Character: Distention  Aggravating/Alleviating factors: None  Radiation not applicable  Severity: Mild to moderate            Review of Systems   Constitutional: Negative for chills, fatigue and fever.   HENT: Negative for congestion, sore throat, tinnitus and trouble swallowing.    Eyes: Negative for photophobia, discharge and visual disturbance.   Respiratory: Positive for shortness of breath. Negative for cough.    Cardiovascular: Negative for chest pain.   Gastrointestinal: Positive for abdominal distention. Negative for abdominal pain, diarrhea, nausea and vomiting.   Genitourinary: Negative for dysuria, frequency and urgency.   Musculoskeletal: Negative for back pain and myalgias.   Skin: Negative for rash.   Neurological: Negative for dizziness and headaches.   Psychiatric/Behavioral: Negative for confusion.       Past Medical History:   Diagnosis Date   • Abdominal pain    • Anemia    • Cancer (CMS/HCC)    • Cirrhosis of liver (CMS/HCC) 04/2017   • Clot    • Deep venous thrombosis of right profunda femoris vein (CMS/HCC) 8/15/2019   • Depression    • Disease of thyroid gland    • Empyema of pleura (CMS/HCC)    • GERD (gastroesophageal reflux disease)    • Heartburn    • Hemorrhoids    • Hernia, ventral    • History of malignant neoplasm of thyroid    • History of transfusion    • Irregular menstrual cycle    • Migraine    • Parathyroid abnormality (CMS/HCC)    • Radiation    • Restless legs syndrome (RLS)    • Urge and stress  incontinence    • Varicose veins        Allergies   Allergen Reactions   • Compazine [Prochlorperazine Edisylate] Hives   • Contrast Dye Hives   • Iodinated Diagnostic Agents    • Iodine Hives   • Morphine Itching   • Nsaids Other (See Comments)     States I am not suppose to take them   • Hydrocodone Rash     Lortab elixir per pt       Past Surgical History:   Procedure Laterality Date   • ABDOMINAL SURGERY     • BEDSIDE PARACENTESIS  3/22/2020        • BEDSIDE PARACENTESIS  4/19/2020        • CHOLECYSTECTOMY      Laparoscopic   • ENDOSCOPY N/A 3/17/2016    Procedure: ESOPHAGOGASTRODUODENOSCOPY WITH BALOON DILATATION 18-20MM WITH GASTRIC SLEEVE SEALANT;  Surgeon: Leonardo Ortiz Jr., MD;  Location: [facility] ENDOSCOPY;  Service:    • ENDOSCOPY N/A 3/14/2016    Procedure: esophagogastroduodenoscopy with fluoroscopy;  Surgeon: Greg Avila III, MD;  Location: [facility] ENDOSCOPY;  Service:    • ENDOSCOPY N/A 4/15/2016    Procedure: EGD WITH DILITATION AND STENT PLACEMENT dilation of pylorus ;  Surgeon: Leonardo Ortiz Jr., MD;  Location: [facility] ENDOSCOPY;  Service:    • ENDOSCOPY N/A 5/13/2016    Procedure: ESOPHAGOGASTRODUODENOSCOPY  W/ STENT;  Surgeon: Leonardo Ortiz Jr., MD;  Location: [facility] ENDOSCOPY;  Service:    • GASTRECTOMY      Gastric Surgery for Morbid Obesity Laparoscopic Longitudinal Gastrectomy   • GASTRIC SLEEVE LAPAROSCOPIC     • HEMORRHOIDECTOMY     • HERNIA REPAIR     • LAPAROSCOPY REPAIR HIATAL HERNIA     • LARYNGOSCOPY      Direct Laryngoscopy with Injection into Vocal Cords   • OTHER SURGICAL HISTORY Bilateral     Ear Pressure Equalization Tube, Insertion, Bilaterally   • PERIPHERALLY INSERTED CENTRAL CATHETER INSERTION     • SPLENECTOMY     • THYROID SURGERY     • TUBAL ABDOMINAL LIGATION         Family History   Problem Relation Age of Onset   • Stroke Father    • Diabetes Father         Diabetes Mellitus   • Hypertension Father    • Cancer Other        Social History     Socioeconomic  "History   • Marital status: Single     Spouse name: Not on file   • Number of children: Not on file   • Years of education: Not on file   • Highest education level: Not on file   Tobacco Use   • Smoking status: Never Smoker   • Smokeless tobacco: Never Used   Substance and Sexual Activity   • Alcohol use: No   • Drug use: No   • Sexual activity: Defer           Objective   Physical Exam  Vitals signs reviewed.   Constitutional:       General: She is not in acute distress.     Appearance: She is well-developed.   HENT:      Head: Normocephalic and atraumatic.      Mouth/Throat:      Mouth: Mucous membranes are moist.      Pharynx: Oropharynx is clear.   Eyes:      Pupils: Pupils are equal, round, and reactive to light.   Cardiovascular:      Rate and Rhythm: Normal rate.      Heart sounds: Normal heart sounds. No murmur.   Pulmonary:      Effort: Pulmonary effort is normal. No respiratory distress.      Breath sounds: Normal breath sounds. No wheezing, rhonchi or rales.   Abdominal:      General: Bowel sounds are normal. There is distension.      Palpations: Abdomen is soft.      Tenderness: There is no right CVA tenderness, left CVA tenderness, guarding or rebound. Negative signs include Devine's sign, Rovsing's sign and McBurney's sign.      Hernia: A hernia is present. Hernia is present in the umbilical area.   Skin:     General: Skin is warm and dry.      Findings: No rash.   Neurological:      Mental Status: She is alert and oriented to person, place, and time.   Psychiatric:         Mood and Affect: Mood normal.         Behavior: Behavior normal.         Procedures           ED Course    /90   Pulse 78   Temp 98.4 °F (36.9 °C) (Oral)   Resp 16   Ht 160 cm (63\")   Wt 64 kg (141 lb 1.5 oz)   LMP  (LMP Unknown)   SpO2 95%   BMI 24.99 kg/m²   Labs Reviewed   COMPREHENSIVE METABOLIC PANEL - Abnormal; Notable for the following components:       Result Value    Glucose 109 (*)     Calcium 8.4 (*)     " Albumin 3.40 (*)     AST (SGOT) 77 (*)     Alkaline Phosphatase 201 (*)     Total Bilirubin 1.3 (*)     All other components within normal limits    Narrative:     GFR Normal >60  Chronic Kidney Disease <60  Kidney Failure <15     CBC WITH AUTO DIFFERENTIAL - Abnormal; Notable for the following components:    Hemoglobin 11.4 (*)     RDW 17.3 (*)     RDW-SD 54.3 (*)     Platelets 612 (*)     Lymphocytes, Absolute 3.60 (*)     Monocytes, Absolute 1.00 (*)     Eosinophils, Absolute 0.60 (*)     All other components within normal limits   PROTIME-INR - Normal   APTT - Normal   CBC AND DIFFERENTIAL    Narrative:     The following orders were created for panel order CBC & Differential.  Procedure                               Abnormality         Status                     ---------                               -----------         ------                     CBC Auto Differential[[id number]]        Abnormal            Final result                 Please view results for these tests on the individual orders.     Medications   methylPREDNISolone sodium succinate (SOLU-Medrol) injection 125 mg (125 mg Intravenous Given 1/4/21 2125)   diphenhydrAMINE (BENADRYL) injection 25 mg (25 mg Intravenous Given 1/4/21 2124)   HYDROmorphone (DILAUDID) injection 0.5 mg (0.5 mg Intravenous Given 1/4/21 2124)   ondansetron (ZOFRAN) injection 4 mg (4 mg Intravenous Given 1/4/21 2124)   HYDROmorphone (DILAUDID) injection 1 mg (1 mg Intravenous Given 1/4/21 2352)   iopamidol (ISOVUE-370) 76 % injection 100 mL (100 mL Intravenous Given 1/5/21 0100)     Ct Abdomen Pelvis With Contrast    Result Date: 1/4/2021  1. Cirrhotic liver morphology, portosystemic collateralization (including esophageal varices and hemorrhoids), cavernous transformation of the main portal vein and moderate ascites. Findings are compatible with portal venous hypertension. 2. An umbilical hernia contains a short segment of nonobstructed small bowel and ascites. A  supraumbilical ventral hernia also contains ascites. 3. Additional chronic and postoperative findings as described above. Electronically signed by:  Champ Gregory M.D.  1/4/2021 11:16 PM                                               MDM     I examined the patient using the appropriate personal protective equipment.      DISPOSITION:   Chart Review: 12/16/2020 chest pain dyspnea  Comorbidity:  has a past medical history of Abdominal pain, Anemia, Cancer (CMS/HCC), Cirrhosis of liver (CMS/HCC) (04/2017), Clot, Deep venous thrombosis of right profunda femoris vein (CMS/HCC) (8/15/2019), Depression, Disease of thyroid gland, Empyema of pleura (CMS/HCC), GERD (gastroesophageal reflux disease), Heartburn, Hemorrhoids, Hernia, ventral, History of malignant neoplasm of thyroid, History of transfusion, Irregular menstrual cycle, Migraine, Parathyroid abnormality (CMS/HCC), Radiation, Restless legs syndrome (RLS), Urge and stress incontinence, and Varicose veins.    Labs: CBC white count normal H&H 11.4 and 35.2 platelets 612.  CMP glucose 109 calcium 8.4 albumin 3.4 ALT 29 AST 77 alk phos 201 total bili 1.3.  PT/INR normal    Imaging:   Ct Abdomen Pelvis With Contrast    Result Date: 1/4/2021  1. Cirrhotic liver morphology, portosystemic collateralization (including esophageal varices and hemorrhoids), cavernous transformation of the main portal vein and moderate ascites. Findings are compatible with portal venous hypertension. 2. An umbilical hernia contains a short segment of nonobstructed small bowel and ascites. A supraumbilical ventral hernia also contains ascites. 3. Additional chronic and postoperative findings as described above. Electronically signed by:  Champ Gregory M.D.  1/4/2021 11:16 PM      Disposition/Treatment:    Patient presented to the ER after having abdominal distention that started today with shortness of breath.  Patient had IV established blood drawn CT abdomen.  She was given Dilaudid and Zofran  in ER.  She has a history of CARTER last paracentesis 6 months ago.  Lab work was fairly unremarkable elevated platelets at 612, 2 weeks ago 11/5/2028.  CT abdomen showed new esophageal varices and portal hypertension, moderate ascites.  Blood pressure has been consistently 130s systolic while in the ER, no nausea or vomiting.  Patient be admitted overnight, agreeable to stay possible paracentesis in a.m.  Spoke with Jules GONZALEZ agreeable to admit.    Final diagnoses:   Dyspnea, unspecified type   Other ascites   Portal hypertension (CMS/HCC)   Esophageal varices without bleeding, unspecified esophageal varices type (CMS/HCC)   Abdominal distention            Kirti Lee, APRN  01/05/21 0143

## 2021-01-05 NOTE — DISCHARGE SUMMARY
"      [facility] Medicine Services  DISCHARGE SUMMARY        Prepared For PCP:  Provider, No Known    Patient Name: Lucinda Cope  : 1975  MRN: [id number]      Date of Admission:   2021    Date of Discharge:  2021    Length of stay:  LOS: 0 days     Hospital Course     Presenting Problem:   Abdominal distention [R14.0]      Active Hospital Problems    Diagnosis  POA   • **Abdominal distention [R14.0]  Yes   • History of DVT (deep vein thrombosis) [Z86.718]  Not Applicable   • Chronic pain [G89.29]  Yes   • CARTER (nonalcoholic steatohepatitis) [K75.81]  Yes   • Hypothyroid [E03.9]  Yes      Resolved Hospital Problems   No resolved problems to display.           Hospital Course:  Lucinda Cope is a 45 y.o. 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  Patient reports that throughout the day on 2021 she had an sudden increase in her abdominal distention with previously noted umbilical and ventral hernias becoming significantly more enlarged along with a \"sharp spasming\" diffuse abdominal pain that radiates to her back rated 8/10 without obvious provoking or palliative factors. Patient reports that she has had multiple paracentesis in the past with the most recent being proximately 10 to 12 months prior.  She confirms compliance with all of her outpatient medical therapy and denies any tobacco or alcohol use. CT of abdomen shows cirrhotic liver morphology with portosystemic collateralization including esophageal varices and hemorrhoids as well as cavernous transformation of the main portal vein and moderate ascites.  Had umbilical hernia containing a short segment of nonobstructed small bowel and ascites is noted as well as a supraumbilical ventral hernia which also contains ascites.  Patient had paracentesis done with 1.9 L fluid " removal.  She did well afterwards and feels significantly better and wants to go home today.  Labs look stable otherwise.  Patient has good follow-up with PCP outpatient and takes her home meds regularly.  Patient is stable to discharge home today with follow-up with PCP as an outpatient.          Recommendation for Outpatient Providers:     Follow with PCP        Reasons For Change In Medications and Indications for New Medications:    No changes    Day of Discharge     HPI:   Patient seen and examined the day of discharge.  Seen with significant ascites, paracentesis done with 1.9 L removal.  Patient feeling much better and wants to go home today.    Vital Signs:   Temp:  [98.4 °F (36.9 °C)] 98.4 °F (36.9 °C)  Heart Rate:  [61-81] 62  Resp:  [15-18] 18  BP: (130-157)/(72-94) 157/90     Physical Exam:  General: Awake, alert, NAD  Eyes: PERRL, EOMI, conjunctive are clear  Cardiovascular: Regular rate and rhythm, no murmurs  Respiratory: Clear to auscultation bilaterally, no wheezing or rales, unlabored breathing  Abdomen: Soft, distended with fluid wave noted, positive bowel sounds, no guarding  Neurologic: A&O, CN grossly intact, moves all extremities spontaneously  Musculoskeletal: Normal range of motion, no deformities  Skin: Warm, dry, intact      Pertinent  and/or Most Recent Results     Results from last 7 days   Lab Units 01/05/21  0614 01/04/21 2341 01/04/21 2118   WBC 10*3/mm3 7.80  --  10.00   HEMOGLOBIN g/dL 12.1  --  11.4*   HEMATOCRIT % 37.2  --  35.2   PLATELETS 10*3/mm3 648*  --  612*   SODIUM mmol/L  --  137  --    POTASSIUM mmol/L 3.7 3.7  --    CHLORIDE mmol/L  --  103  --    CO2 mmol/L  --  26.0  --    BUN mg/dL  --  6  --    CREATININE mg/dL  --  0.61  --    GLUCOSE mg/dL  --  109*  --    CALCIUM mg/dL  --  8.4*  --      Results from last 7 days   Lab Units 01/05/21  0614 01/04/21 2341 01/04/21  2118   BILIRUBIN mg/dL  --  1.3*  --    ALK PHOS U/L  --  201*  --    ALT (SGPT) U/L  --  29  --     AST (SGOT) U/L  --  77*  --    PROTIME Seconds 11.9*  --  11.4   INR  1.09  --  1.04   APTT seconds  --   --  26.0           Invalid input(s): TG, LDLCALC, LDLREALC  Results from last 7 days   Lab Units 01/05/21  0614   TSH uIU/mL 0.006*       Brief Urine Lab Results  (Last result in the past 365 days)      Color   Clarity   Blood   Leuk Est   Nitrite   Protein   CREAT   Urine HCG        12/16/20 0606 Dark Yellow Clear Negative Negative Negative Trace               Microbiology Results Abnormal     Procedure Component Value - Date/Time    COVID-19,CEPHEID,COR/LEIDA/PAD IN-HOUSE(OR EMERGENT/ADD-ON),NP SWAB IN TRANSPORT MEDIA 3-4 HR TAT - Swab, Nasopharynx [[id number]]  (Normal) Collected: 01/05/21 0623    Lab Status: Final result Specimen: Swab from Nasopharynx Updated: 01/05/21 0730     COVID19 Not Detected    Narrative:      Fact sheet for providers: https://www.fda.gov/media/967109/download     Fact sheet for patients: https://www.fda.gov/media/011785/download          Ct Abdomen Pelvis Without Contrast    Result Date: 12/16/2020  Impression: 1.Cirrhosis and fatty infiltration of the liver with moderate amount of ascites. 2.Umbilical hernia containing ascites and a loop of small bowel. No significant small bowel dilatation is visualized to indicate obstruction at this time. Correlate for symptoms in this location. There is also a supraumbilical ventral hernia containing fluid. Hernias appear grossly similar to the prior exam. 3.Postoperative changes of gastric bypass, splenectomy, cystectomy, hysterectomy.     Electronically Signed By-Josué Gay MD On:12/16/2020 8:19 AM This report was finalized on [id number] by  Josué Gay MD.    Ct Abdomen Pelvis With Contrast    Result Date: 1/4/2021  Impression: 1. Cirrhotic liver morphology, portosystemic collateralization (including esophageal varices and hemorrhoids), cavernous transformation of the main portal vein and moderate ascites. Findings are compatible  with portal venous hypertension. 2. An umbilical hernia contains a short segment of nonobstructed small bowel and ascites. A supraumbilical ventral hernia also contains ascites. 3. Additional chronic and postoperative findings as described above. Electronically signed by:  Champ Gregory M.D.  1/4/2021 11:16 PM    Xr Chest 1 View    Result Date: 12/16/2020  Impression: No radiographic findings of acute cardiopulmonary abnormality.  Electronically Signed By-Josué Gay MD On:12/16/2020 7:50 AM This report was finalized on [id number] by  Josué Gay MD.      Results for orders placed during the hospital encounter of 08/23/19   Duplex Venous Lower Extremity - Right CAR    Narrative · Acute right lower extremity superficial thrombophlebitis noted in the   greater saphenous (above knee) and greater saphenous (below knee).  · All other right sided veins appeared normal.          Results for orders placed during the hospital encounter of 08/23/19   Duplex Venous Lower Extremity - Right CAR    Narrative · Acute right lower extremity superficial thrombophlebitis noted in the   greater saphenous (above knee) and greater saphenous (below knee).  · All other right sided veins appeared normal.                       Test Results Pending at Discharge        Procedures Performed           Consults:   Consults     Date and Time Order Name Status Description    1/5/2021 0131 Hospitalist (on-call MD unless specified) Completed     12/16/2020 1011 Hospitalist (on-call MD unless specified) Completed             Discharge Details        Discharge Medications      Continue These Medications      Instructions Start Date   apixaban 5 MG tablet tablet  Commonly known as: ELIQUIS   5 mg, Oral, 2 Times Daily      bumetanide 2 MG tablet  Commonly known as: BUMEX   2 mg, Oral, Daily      levothyroxine 150 MCG tablet  Commonly known as: SYNTHROID, LEVOTHROID   150 mcg, Oral, Daily      ondansetron ODT 8 MG disintegrating tablet  Commonly  known as: ZOFRAN-ODT   8 mg, Translingual, Every 8 Hours PRN      oxyCODONE 5 MG capsule  Commonly known as: OXY-IR   10 mg, Oral, Every 6 Hours      pregabalin 25 MG capsule  Commonly known as: LYRICA   25 mg, Oral, Every Night at Bedtime      riFAXIMin 550 MG tablet  Commonly known as: XIFAXAN   550 mg, Oral, Every 12 Hours Scheduled      spironolactone 100 MG tablet  Commonly known as: ALDACTONE   300 mg, Oral, Daily             Allergies   Allergen Reactions   • Compazine [Prochlorperazine Edisylate] Hives   • Contrast Dye Hives   • Iodinated Diagnostic Agents    • Iodine Hives   • Morphine Itching   • Nsaids Other (See Comments)     States I am not suppose to take them   • Hydrocodone Rash     Lortab elixir per pt         Discharge Disposition:  Home or Self Care    Diet:  Hospital:  Diet Order   Procedures   • NPO Diet         Discharge Activity:         CODE STATUS:    Code Status and Medical Interventions:   Ordered at: 01/05/21 0152     Code Status:    CPR     Medical Interventions (Level of Support Prior to Arrest):    Full         Follow-up Appointments  No future appointments.          Condition on Discharge:      Stable      This patient has been examined wearing appropriate Personal Protective Equipment on 01/05/21      Electronically signed by Wilbur Tran DO, 01/05/21, 11:17 AM EST.      Time: I spent  52 minutes on this H&P and discharge activity which included face-to-face encounter with the patient/reviewing the data in the system/coordination of the care with the nursing staff as well as consultants/documentation/entering orders.

## 2021-01-05 NOTE — H&P
"      [facility] Medicine Services      Patient Name: Lucinda Cope  : 1975  MRN: [id number]  Primary Care Physician: Provider, No Known  Date of admission: 2021    Patient Care Team:  Provider, No Known as PCP - General          Subjective   History Present Illness     Chief Complaint:   Chief Complaint   Patient presents with   • Abdominal Pain         Ms. Cope is a 45 y.o. female with past medical history of cirrhosis secondary to Ramon, hypothyroidism, previous DVT and chronic pain who presents to [facility] complaining of abdominal pain and distention.  Patient reports that throughout the day on 2021 she had an sudden increase in her abdominal distention with previously noted umbilical and ventral hernias becoming significantly more enlarged along with a \"sharp spasming\" diffuse abdominal pain that radiates to her back rated 8/10 without obvious provoking or palliative factors.  She notes some associated dyspnea as well as nausea with 3 episodes of nonbloody vomiting but denies any cough or fever, change in bowel or bladder habits, sensation of tachycardia or palpitations, peripheral edema or syncope.  Patient reports that she has had multiple paracentesis in the past with the most recent being proximately 10 to 12 months prior.  She confirms compliance with all of her outpatient medical therapy and denies any tobacco or alcohol use.    In the ED patient on a lab significant for calcium: 8.4, alk phos: 201, AST: 77, hemoglobin: 11.4 with a normal MCV and MCHC.  Remainder of CBC and CMP was generally unremarkable.  PT: 26.0, INR: 1.04, PTT: 11.4.  CT of abdomen shows cirrhotic liver morphology with portosystemic collateralization including esophageal varices and hemorrhoids as well as cavernous transformation of the main portal vein and moderate ascites.  Had umbilical hernia containing a short segment of nonobstructed small bowel and ascites is noted as well as a " supraumbilical ventral hernia which also contains ascites.    History of Present Illness    Review of Systems   Constitution: Negative.   HENT: Negative.    Eyes: Negative.    Cardiovascular: Positive for chest pain. Negative for irregular heartbeat, leg swelling, near-syncope, palpitations and syncope.   Respiratory: Positive for shortness of breath. Negative for cough.    Endocrine: Negative.    Skin: Negative.    Musculoskeletal: Negative.    Gastrointestinal: Positive for abdominal pain, nausea and vomiting. Negative for constipation, diarrhea, hematemesis, hematochezia and melena.   Genitourinary: Negative.    Neurological: Negative.    Psychiatric/Behavioral: Negative.            Personal History     Past Medical History:   Past Medical History:   Diagnosis Date   • Abdominal pain    • Anemia    • Cancer (CMS/HCC)    • Cirrhosis of liver (CMS/HCC) 04/2017   • Clot    • Deep venous thrombosis of right profunda femoris vein (CMS/HCC) 8/15/2019   • Depression    • Disease of thyroid gland    • Empyema of pleura (CMS/HCC)    • GERD (gastroesophageal reflux disease)    • Heartburn    • Hemorrhoids    • Hernia, ventral    • History of malignant neoplasm of thyroid    • History of transfusion    • Irregular menstrual cycle    • Migraine    • Parathyroid abnormality (CMS/HCC)    • Radiation    • Restless legs syndrome (RLS)    • Urge and stress incontinence    • Varicose veins        Surgical History:      Past Surgical History:   Procedure Laterality Date   • ABDOMINAL SURGERY     • BEDSIDE PARACENTESIS  3/22/2020        • BEDSIDE PARACENTESIS  4/19/2020        • CHOLECYSTECTOMY      Laparoscopic   • ENDOSCOPY N/A 3/17/2016    Procedure: ESOPHAGOGASTRODUODENOSCOPY WITH BALOON DILATATION 18-20MM WITH GASTRIC SLEEVE SEALANT;  Surgeon: Leonardo Ortiz Jr., MD;  Location: [facility];  Service:    • ENDOSCOPY N/A 3/14/2016    Procedure: esophagogastroduodenoscopy with fluoroscopy;  Surgeon: Greg Avila III,  MD;  Location: [facility] ENDOSCOPY;  Service:    • ENDOSCOPY N/A 4/15/2016    Procedure: EGD WITH DILITATION AND STENT PLACEMENT dilation of pylorus ;  Surgeon: Leonardo Ortiz Jr., MD;  Location: [facility] ENDOSCOPY;  Service:    • ENDOSCOPY N/A 5/13/2016    Procedure: ESOPHAGOGASTRODUODENOSCOPY  W/ STENT;  Surgeon: Leonardo Ortiz Jr., MD;  Location: [facility] ENDOSCOPY;  Service:    • GASTRECTOMY      Gastric Surgery for Morbid Obesity Laparoscopic Longitudinal Gastrectomy   • GASTRIC SLEEVE LAPAROSCOPIC     • HEMORRHOIDECTOMY     • HERNIA REPAIR     • LAPAROSCOPY REPAIR HIATAL HERNIA     • LARYNGOSCOPY      Direct Laryngoscopy with Injection into Vocal Cords   • OTHER SURGICAL HISTORY Bilateral     Ear Pressure Equalization Tube, Insertion, Bilaterally   • PERIPHERALLY INSERTED CENTRAL CATHETER INSERTION     • SPLENECTOMY     • THYROID SURGERY     • TUBAL ABDOMINAL LIGATION             Family History: family history includes Cancer in an other family member; Diabetes in her father; Hypertension in her father; Stroke in her father. Otherwise pertinent FHx was reviewed and unremarkable.     Social History:  reports that she has never smoked. She has never used smokeless tobacco. She reports that she does not drink alcohol or use drugs.      Medications:  Prior to Admission medications    Medication Sig Start Date End Date Taking? Authorizing Provider   apixaban (ELIQUIS) 5 MG tablet tablet Take 5 mg by mouth 2 (Two) Times a Day.    ProviderJustyn MD   bumetanide (BUMEX) 2 MG tablet Take 2 mg by mouth Daily.    Justyn Erwin MD   levothyroxine (SYNTHROID, LEVOTHROID) 150 MCG tablet Take 150 mcg by mouth Daily.    Justyn Erwin MD   ondansetron ODT (ZOFRAN-ODT) 8 MG disintegrating tablet Place 8 mg on the tongue Every 8 (Eight) Hours As Needed for Nausea or Vomiting.    Justyn Erwin MD   oxyCODONE (OXY-IR) 5 MG capsule Take 10 mg by mouth Every 6 (Six) Hours.    Justyn Erwin MD    pregabalin (LYRICA) 25 MG capsule Take 25 mg by mouth every night at bedtime.    ProviderJustyn MD   rifaximin (XIFAXAN) 550 MG tablet Take 550 mg by mouth Every 12 (Twelve) Hours.    ProviderJustyn MD   spironolactone (ALDACTONE) 100 MG tablet Take 300 mg by mouth Daily.    ProviderJustyn MD       Allergies:    Allergies   Allergen Reactions   • Compazine [Prochlorperazine Edisylate] Hives   • Contrast Dye Hives   • Iodinated Diagnostic Agents    • Iodine Hives   • Morphine Itching   • Nsaids Other (See Comments)     States I am not suppose to take them   • Hydrocodone Rash     Lortab elixir per pt       Objective   Objective     Vital Signs  Temp:  [98.4 °F (36.9 °C)] 98.4 °F (36.9 °C)  Heart Rate:  [67-81] 78  Resp:  [15-18] 16  BP: (133-153)/(80-93) 136/90  SpO2:  [95 %-100 %] 95 %  on   ;      Body mass index is 24.99 kg/m².    Physical Exam  Constitutional:       General: She is not in acute distress.     Appearance: Normal appearance. She is normal weight. She is not ill-appearing or toxic-appearing.   HENT:      Head: Normocephalic and atraumatic.      Right Ear: External ear normal.      Left Ear: External ear normal.      Nose: Nose normal.      Mouth/Throat:      Mouth: Mucous membranes are moist.   Eyes:      Extraocular Movements: Extraocular movements intact.   Neck:      Musculoskeletal: Normal range of motion.   Cardiovascular:      Rate and Rhythm: Normal rate and regular rhythm.      Pulses: Normal pulses.      Heart sounds: Murmur present.   Pulmonary:      Effort: Pulmonary effort is normal.      Breath sounds: Normal breath sounds.   Abdominal:      General: Bowel sounds are normal. There is distension.      Tenderness: There is abdominal tenderness.      Comments: Large ventral and umbilical hernia noted   Musculoskeletal: Normal range of motion.      Right lower leg: No edema.      Left lower leg: No edema.   Skin:     General: Skin is warm and dry.   Neurological:       General: No focal deficit present.      Mental Status: She is alert and oriented to person, place, and time.   Psychiatric:         Mood and Affect: Mood normal.         Behavior: Behavior normal.         Thought Content: Thought content normal.         Judgment: Judgment normal.         Results Review:  I have personally reviewed most recent lab results and radiology images and interpretations and agree with findings, most notably: CBC, CMP, INR/PT, PTT, CT of abdomen.    Results from last 7 days   Lab Units 01/04/21  2118   WBC 10*3/mm3 10.00   HEMOGLOBIN g/dL 11.4*   HEMATOCRIT % 35.2   PLATELETS 10*3/mm3 612*   INR  1.04     Results from last 7 days   Lab Units 01/04/21  2341   SODIUM mmol/L 137   POTASSIUM mmol/L 3.7   CHLORIDE mmol/L 103   CO2 mmol/L 26.0   BUN mg/dL 6   CREATININE mg/dL 0.61   GLUCOSE mg/dL 109*   CALCIUM mg/dL 8.4*   ALT (SGPT) U/L 29   AST (SGOT) U/L 77*     Estimated Creatinine Clearance: 102.6 mL/min (by C-G formula based on SCr of 0.61 mg/dL).  Brief Urine Lab Results  (Last result in the past 365 days)      Color   Clarity   Blood   Leuk Est   Nitrite   Protein   CREAT   Urine HCG        12/16/20 0606 Dark Yellow Clear Negative Negative Negative Trace               Microbiology Results (last 10 days)     ** No results found for the last 240 hours. **          ECG/EMG Results (most recent)     None          Results for orders placed during the hospital encounter of 08/23/19   Duplex Venous Lower Extremity - Right CAR    Narrative · Acute right lower extremity superficial thrombophlebitis noted in the   greater saphenous (above knee) and greater saphenous (below knee).  · All other right sided veins appeared normal.               Ct Abdomen Pelvis With Contrast    Result Date: 1/4/2021  1. Cirrhotic liver morphology, portosystemic collateralization (including esophageal varices and hemorrhoids), cavernous transformation of the main portal vein and moderate ascites. Findings are compatible  with portal venous hypertension. 2. An umbilical hernia contains a short segment of nonobstructed small bowel and ascites. A supraumbilical ventral hernia also contains ascites. 3. Additional chronic and postoperative findings as described above. Electronically signed by:  Champ Gregory M.D.  1/4/2021 11:16 PM        Estimated Creatinine Clearance: 102.6 mL/min (by C-G formula based on SCr of 0.61 mg/dL).    Assessment/Plan   Assessment/Plan       Active Hospital Problems    Diagnosis  POA   • **Abdominal distention [R14.0]  Yes     Priority: High   • CARTER (nonalcoholic steatohepatitis) [K75.81]  Yes     Priority: High   • History of DVT (deep vein thrombosis) [Z86.718]  Not Applicable     Priority: Low   • Chronic pain [G89.29]  Yes     Priority: Low   • Hypothyroid [E03.9]  Yes     Priority: Low      Resolved Hospital Problems   No resolved problems to display.     Abdominal distention in a patient with history of Carter  -Patient presents with 1 day history of sudden onset abdominal distention and pain  -CT of abdomen shows cirrhotic liver morphology with portosystemic collateralization including esophageal varices and hemorrhoids as well as cavernous transformation of the main portal vein and moderate ascites.  Had umbilical hernia containing a short segment of nonobstructed small bowel and ascites is noted as well as a supraumbilical ventral hernia which also contains ascites.  -INR: 1.04, PT: 11.4, PTT: 26.0  -Continue Bumex, spironolactone and Xifaxan  -Monitor I's and O's and daily weights while admitted  -Paracentesis ordered    History of DVT and portal vein thrombosis  -Hold Eliquis temporarily with paracentesis planned in a.m.    Hypothyroidism  -Check TSH  -Continue levothyroxine    Chronic pain  -Continue oxycodone            VTE Prophylaxis -SCDs  Mechanical Order History:     None      Pharmalogical Order History:     None          CODE STATUS: Full  There are no questions and answers to display.            I discussed the patient's findings and my recommendations with patient and nursing staff.      Signature:,Electronically signed by Mateusz Kingston PA-C, 01/05/21, 4:46 AM EST.    Kiley Galdamez [facility] Hospitalist Team

## 2021-01-09 ENCOUNTER — APPOINTMENT (OUTPATIENT)
Dept: GENERAL RADIOLOGY | Facility: HOSPITAL | Age: 46
End: 2021-01-09

## 2021-01-09 ENCOUNTER — HOSPITAL ENCOUNTER (INPATIENT)
Facility: HOSPITAL | Age: 46
LOS: 2 days | Discharge: HOME | End: 2021-01-14
Attending: EMERGENCY MEDICINE | Admitting: INTERNAL MEDICINE

## 2021-01-09 ENCOUNTER — APPOINTMENT (OUTPATIENT)
Dept: CT IMAGING | Facility: HOSPITAL | Age: 46
End: 2021-01-09

## 2021-01-09 DIAGNOSIS — R10.13 EPIGASTRIC PAIN: ICD-10-CM

## 2021-01-09 DIAGNOSIS — R10.9 ABDOMINAL PAIN, UNSPECIFIED ABDOMINAL LOCATION: Primary | ICD-10-CM

## 2021-01-09 DIAGNOSIS — K46.0 INCARCERATED HERNIA: ICD-10-CM

## 2021-01-09 LAB
ALBUMIN SERPL-MCNC: 3.5 G/DL (ref 3.5–5.2)
ALBUMIN/GLOB SERPL: 0.9 G/DL
ALP SERPL-CCNC: 198 U/L (ref 39–117)
ALT SERPL W P-5'-P-CCNC: 28 U/L (ref 1–33)
ANION GAP SERPL CALCULATED.3IONS-SCNC: 7 MMOL/L (ref 5–15)
AST SERPL-CCNC: 28 U/L (ref 1–32)
BASOPHILS # BLD AUTO: 0.1 10*3/MM3 (ref 0–0.2)
BASOPHILS NFR BLD AUTO: 0.8 % (ref 0–1.5)
BILIRUB SERPL-MCNC: 0.7 MG/DL (ref 0–1.2)
BILIRUB UR QL STRIP: NEGATIVE
BUN SERPL-MCNC: 9 MG/DL (ref 6–20)
BUN/CREAT SERPL: 13.8 (ref 7–25)
CALCIUM SPEC-SCNC: 9 MG/DL (ref 8.6–10.5)
CHLORIDE SERPL-SCNC: 102 MMOL/L (ref 98–107)
CLARITY UR: CLEAR
CO2 SERPL-SCNC: 29 MMOL/L (ref 22–29)
COLOR UR: ABNORMAL
CREAT SERPL-MCNC: 0.65 MG/DL (ref 0.57–1)
DEPRECATED RDW RBC AUTO: 52.9 FL (ref 37–54)
EOSINOPHIL # BLD AUTO: 0.3 10*3/MM3 (ref 0–0.4)
EOSINOPHIL NFR BLD AUTO: 3.3 % (ref 0.3–6.2)
ERYTHROCYTE [DISTWIDTH] IN BLOOD BY AUTOMATED COUNT: 17.2 % (ref 12.3–15.4)
GFR SERPL CREATININE-BSD FRML MDRD: 98 ML/MIN/1.73
GLOBULIN UR ELPH-MCNC: 3.9 GM/DL
GLUCOSE SERPL-MCNC: 80 MG/DL (ref 65–99)
GLUCOSE UR STRIP-MCNC: NEGATIVE MG/DL
HCT VFR BLD AUTO: 37.2 % (ref 34–46.6)
HGB BLD-MCNC: 12.2 G/DL (ref 12–15.9)
HGB UR QL STRIP.AUTO: NEGATIVE
INR PPP: 1.06 (ref 0.93–1.1)
KETONES UR QL STRIP: NEGATIVE
LEUKOCYTE ESTERASE UR QL STRIP.AUTO: NEGATIVE
LIPASE SERPL-CCNC: 28 U/L (ref 13–60)
LYMPHOCYTES # BLD AUTO: 2.8 10*3/MM3 (ref 0.7–3.1)
LYMPHOCYTES NFR BLD AUTO: 29.3 % (ref 19.6–45.3)
MCH RBC QN AUTO: 28.9 PG (ref 26.6–33)
MCHC RBC AUTO-ENTMCNC: 32.8 G/DL (ref 31.5–35.7)
MCV RBC AUTO: 88.3 FL (ref 79–97)
MONOCYTES # BLD AUTO: 0.8 10*3/MM3 (ref 0.1–0.9)
MONOCYTES NFR BLD AUTO: 8.5 % (ref 5–12)
NEUTROPHILS NFR BLD AUTO: 5.5 10*3/MM3 (ref 1.7–7)
NEUTROPHILS NFR BLD AUTO: 58.1 % (ref 42.7–76)
NITRITE UR QL STRIP: NEGATIVE
NRBC BLD AUTO-RTO: 0.1 /100 WBC (ref 0–0.2)
PH UR STRIP.AUTO: 6.5 [PH] (ref 5–8)
PLATELET # BLD AUTO: 581 10*3/MM3 (ref 140–450)
PMV BLD AUTO: 8.1 FL (ref 6–12)
POTASSIUM SERPL-SCNC: 3.4 MMOL/L (ref 3.5–5.2)
PROT SERPL-MCNC: 7.4 G/DL (ref 6–8.5)
PROT UR QL STRIP: NEGATIVE
PROTHROMBIN TIME: 11.6 SECONDS (ref 9.6–11.7)
RBC # BLD AUTO: 4.21 10*6/MM3 (ref 3.77–5.28)
SODIUM SERPL-SCNC: 138 MMOL/L (ref 136–145)
SP GR UR STRIP: 1.03 (ref 1–1.03)
TROPONIN T SERPL-MCNC: <0.01 NG/ML (ref 0–0.03)
UROBILINOGEN UR QL STRIP: ABNORMAL
WBC # BLD AUTO: 9.5 10*3/MM3 (ref 3.4–10.8)

## 2021-01-09 PROCEDURE — G0378 HOSPITAL OBSERVATION PER HR: HCPCS

## 2021-01-09 PROCEDURE — 93005 ELECTROCARDIOGRAM TRACING: CPT | Performed by: EMERGENCY MEDICINE

## 2021-01-09 PROCEDURE — 81003 URINALYSIS AUTO W/O SCOPE: CPT | Performed by: EMERGENCY MEDICINE

## 2021-01-09 PROCEDURE — 84484 ASSAY OF TROPONIN QUANT: CPT | Performed by: EMERGENCY MEDICINE

## 2021-01-09 PROCEDURE — 71045 X-RAY EXAM CHEST 1 VIEW: CPT

## 2021-01-09 PROCEDURE — 25010000002 HYDROMORPHONE PER 4 MG: Performed by: EMERGENCY MEDICINE

## 2021-01-09 PROCEDURE — 83690 ASSAY OF LIPASE: CPT | Performed by: EMERGENCY MEDICINE

## 2021-01-09 PROCEDURE — 74176 CT ABD & PELVIS W/O CONTRAST: CPT

## 2021-01-09 PROCEDURE — 85025 COMPLETE CBC W/AUTO DIFF WBC: CPT | Performed by: EMERGENCY MEDICINE

## 2021-01-09 PROCEDURE — 85610 PROTHROMBIN TIME: CPT | Performed by: EMERGENCY MEDICINE

## 2021-01-09 PROCEDURE — 99222 1ST HOSP IP/OBS MODERATE 55: CPT | Performed by: PHYSICIAN ASSISTANT

## 2021-01-09 PROCEDURE — 99284 EMERGENCY DEPT VISIT MOD MDM: CPT

## 2021-01-09 PROCEDURE — 25010000002 ONDANSETRON PER 1 MG: Performed by: EMERGENCY MEDICINE

## 2021-01-09 PROCEDURE — 80053 COMPREHEN METABOLIC PANEL: CPT | Performed by: EMERGENCY MEDICINE

## 2021-01-09 RX ORDER — ONDANSETRON 2 MG/ML
4 INJECTION INTRAMUSCULAR; INTRAVENOUS ONCE
Status: COMPLETED | OUTPATIENT
Start: 2021-01-09 | End: 2021-01-09

## 2021-01-09 RX ORDER — HYDROMORPHONE HCL 110MG/55ML
0.5 PATIENT CONTROLLED ANALGESIA SYRINGE INTRAVENOUS ONCE
Status: COMPLETED | OUTPATIENT
Start: 2021-01-09 | End: 2021-01-09

## 2021-01-09 RX ORDER — SODIUM CHLORIDE 0.9 % (FLUSH) 0.9 %
10 SYRINGE (ML) INJECTION EVERY 12 HOURS SCHEDULED
Status: DISCONTINUED | OUTPATIENT
Start: 2021-01-09 | End: 2021-01-14 | Stop reason: HOSPADM

## 2021-01-09 RX ORDER — POTASSIUM CHLORIDE 1.5 G/1.77G
40 POWDER, FOR SOLUTION ORAL AS NEEDED
Status: DISCONTINUED | OUTPATIENT
Start: 2021-01-09 | End: 2021-01-14 | Stop reason: HOSPADM

## 2021-01-09 RX ORDER — OXYCODONE HYDROCHLORIDE 10 MG/1
10 TABLET ORAL EVERY 6 HOURS
COMMUNITY
End: 2021-03-29

## 2021-01-09 RX ORDER — CHOLECALCIFEROL (VITAMIN D3) 125 MCG
5 CAPSULE ORAL NIGHTLY PRN
Status: DISCONTINUED | OUTPATIENT
Start: 2021-01-09 | End: 2021-01-14 | Stop reason: HOSPADM

## 2021-01-09 RX ORDER — MAGNESIUM SULFATE HEPTAHYDRATE 40 MG/ML
2 INJECTION, SOLUTION INTRAVENOUS AS NEEDED
Status: DISCONTINUED | OUTPATIENT
Start: 2021-01-09 | End: 2021-01-14 | Stop reason: HOSPADM

## 2021-01-09 RX ORDER — SODIUM CHLORIDE 0.9 % (FLUSH) 0.9 %
10 SYRINGE (ML) INJECTION AS NEEDED
Status: DISCONTINUED | OUTPATIENT
Start: 2021-01-09 | End: 2021-01-14 | Stop reason: HOSPADM

## 2021-01-09 RX ORDER — CALCIUM GLUCONATE 20 MG/ML
2 INJECTION, SOLUTION INTRAVENOUS AS NEEDED
Status: DISCONTINUED | OUTPATIENT
Start: 2021-01-09 | End: 2021-01-14 | Stop reason: HOSPADM

## 2021-01-09 RX ORDER — ONDANSETRON 4 MG/1
4 TABLET, FILM COATED ORAL EVERY 6 HOURS PRN
Status: DISCONTINUED | OUTPATIENT
Start: 2021-01-09 | End: 2021-01-14 | Stop reason: HOSPADM

## 2021-01-09 RX ORDER — ONDANSETRON 2 MG/ML
4 INJECTION INTRAMUSCULAR; INTRAVENOUS EVERY 6 HOURS PRN
Status: DISCONTINUED | OUTPATIENT
Start: 2021-01-09 | End: 2021-01-14 | Stop reason: HOSPADM

## 2021-01-09 RX ORDER — SPIRONOLACTONE 100 MG/1
300 TABLET, FILM COATED ORAL DAILY
Status: DISCONTINUED | OUTPATIENT
Start: 2021-01-10 | End: 2021-01-14 | Stop reason: HOSPADM

## 2021-01-09 RX ORDER — POTASSIUM CHLORIDE 20 MEQ/1
40 TABLET, EXTENDED RELEASE ORAL AS NEEDED
Status: DISCONTINUED | OUTPATIENT
Start: 2021-01-09 | End: 2021-01-14 | Stop reason: HOSPADM

## 2021-01-09 RX ORDER — CALCIUM GLUCONATE 20 MG/ML
1 INJECTION, SOLUTION INTRAVENOUS AS NEEDED
Status: DISCONTINUED | OUTPATIENT
Start: 2021-01-09 | End: 2021-01-14 | Stop reason: HOSPADM

## 2021-01-09 RX ORDER — NITROGLYCERIN 0.4 MG/1
0.4 TABLET SUBLINGUAL
Status: DISCONTINUED | OUTPATIENT
Start: 2021-01-09 | End: 2021-01-14 | Stop reason: HOSPADM

## 2021-01-09 RX ORDER — LEVOTHYROXINE SODIUM 0.15 MG/1
150 TABLET ORAL DAILY
Status: DISCONTINUED | OUTPATIENT
Start: 2021-01-10 | End: 2021-01-14 | Stop reason: HOSPADM

## 2021-01-09 RX ORDER — HYDROMORPHONE HYDROCHLORIDE 2 MG/1
2 TABLET ORAL EVERY 4 HOURS PRN
Status: DISCONTINUED | OUTPATIENT
Start: 2021-01-09 | End: 2021-01-10

## 2021-01-09 RX ORDER — PREGABALIN 25 MG/1
25 CAPSULE ORAL NIGHTLY
Status: DISCONTINUED | OUTPATIENT
Start: 2021-01-09 | End: 2021-01-14 | Stop reason: HOSPADM

## 2021-01-09 RX ORDER — BUMETANIDE 1 MG/1
2 TABLET ORAL DAILY
Status: DISCONTINUED | OUTPATIENT
Start: 2021-01-10 | End: 2021-01-14 | Stop reason: HOSPADM

## 2021-01-09 RX ORDER — MAGNESIUM SULFATE HEPTAHYDRATE 40 MG/ML
4 INJECTION, SOLUTION INTRAVENOUS AS NEEDED
Status: DISCONTINUED | OUTPATIENT
Start: 2021-01-09 | End: 2021-01-14 | Stop reason: HOSPADM

## 2021-01-09 RX ADMIN — HYDROMORPHONE HYDROCHLORIDE 0.5 MG: 2 INJECTION, SOLUTION INTRAMUSCULAR; INTRAVENOUS; SUBCUTANEOUS at 21:45

## 2021-01-09 RX ADMIN — HYDROMORPHONE HYDROCHLORIDE 0.5 MG: 2 INJECTION, SOLUTION INTRAMUSCULAR; INTRAVENOUS; SUBCUTANEOUS at 18:29

## 2021-01-09 RX ADMIN — ONDANSETRON 4 MG: 2 INJECTION, SOLUTION INTRAMUSCULAR; INTRAVENOUS at 18:29

## 2021-01-09 RX ADMIN — RIFAXIMIN 550 MG: 550 TABLET ORAL at 23:24

## 2021-01-09 RX ADMIN — HYDROMORPHONE HYDROCHLORIDE 2 MG: 2 TABLET ORAL at 23:24

## 2021-01-09 RX ADMIN — PREGABALIN 25 MG: 25 CAPSULE ORAL at 23:24

## 2021-01-09 RX ADMIN — HYDROMORPHONE HYDROCHLORIDE 0.5 MG: 2 INJECTION, SOLUTION INTRAMUSCULAR; INTRAVENOUS; SUBCUTANEOUS at 20:30

## 2021-01-09 RX ADMIN — Medication 10 ML: at 23:26

## 2021-01-09 NOTE — ED NOTES
Patient presents to ED with complaints of abdominal pain x 1 day. History of cirrhosis and is seen by Dr. Sebastina and Dr. Goodwin for GI.      Smita Donovan, ALEX  01/09/21 6911

## 2021-01-09 NOTE — ED PROVIDER NOTES
"Subjective   Chief complaint: Patient is a pleasant 45-year-old female.  She has a history of Ramon and secondary to this cirrhosis of the liver.  She states that she was recently admitted and had a paracentesis done.  For the last few days she has had increasing pain in her abdomen shooting up into her chest.  She is concerned that something \"is not right\".  She states that she has been taking her pain medicine that she chronically takes and has not been helping.  She has not had a fever.  She has had a little bit of nausea and some vomiting she has not had any diarrhea.    Context: Been a progressive issue since her paracentesis centesis and DC.    Duration: Few days    Timing: Progressive    Severity: States her pain is severe    Associated Symptoms: Negative except as noted above.  Appropriate PPE was used.        PCP:  LMP:          Review of Systems   Constitutional: Negative.    HENT: Negative.    Eyes: Negative.    Respiratory: Negative.    Cardiovascular: Positive for chest pain.   Gastrointestinal: Positive for abdominal pain, nausea and vomiting.   Genitourinary: Negative.    Musculoskeletal: Negative.    Skin: Negative.    Neurological: Negative.    Psychiatric/Behavioral: Negative.        Past Medical History:   Diagnosis Date   • Abdominal pain    • Anemia    • Cancer (CMS/HCC)    • Cirrhosis of liver (CMS/HCC) 04/2017   • Clot    • Deep venous thrombosis of right profunda femoris vein (CMS/HCC) 8/15/2019   • Depression    • Disease of thyroid gland    • Empyema of pleura (CMS/HCC)    • GERD (gastroesophageal reflux disease)    • Heartburn    • Hemorrhoids    • Hernia, ventral    • History of malignant neoplasm of thyroid    • History of transfusion    • Irregular menstrual cycle    • Migraine    • Parathyroid abnormality (CMS/HCC)    • Radiation    • Restless legs syndrome (RLS)    • Urge and stress incontinence    • Varicose veins        Allergies   Allergen Reactions   • Compazine [Prochlorperazine " Edisylate] Hives   • Contrast Dye Hives   • Iodinated Diagnostic Agents    • Iodine Hives   • Morphine Itching   • Nsaids Other (See Comments)     States I am not suppose to take them   • Hydrocodone Rash     Lortab elixir per pt       Past Surgical History:   Procedure Laterality Date   • ABDOMINAL SURGERY     • BEDSIDE PARACENTESIS  3/22/2020        • BEDSIDE PARACENTESIS  4/19/2020        • CHOLECYSTECTOMY      Laparoscopic   • ENDOSCOPY N/A 3/17/2016    Procedure: ESOPHAGOGASTRODUODENOSCOPY WITH BALOON DILATATION 18-20MM WITH GASTRIC SLEEVE SEALANT;  Surgeon: Leonardo Ortiz Jr., MD;  Location: [facility] ENDOSCOPY;  Service:    • ENDOSCOPY N/A 3/14/2016    Procedure: esophagogastroduodenoscopy with fluoroscopy;  Surgeon: Greg Avila III, MD;  Location: [facility] ENDOSCOPY;  Service:    • ENDOSCOPY N/A 4/15/2016    Procedure: EGD WITH DILITATION AND STENT PLACEMENT dilation of pylorus ;  Surgeon: Leonardo Ortiz Jr., MD;  Location: [facility] ENDOSCOPY;  Service:    • ENDOSCOPY N/A 5/13/2016    Procedure: ESOPHAGOGASTRODUODENOSCOPY  W/ STENT;  Surgeon: Leonardo Ortiz Jr., MD;  Location: [facility] ENDOSCOPY;  Service:    • GASTRECTOMY      Gastric Surgery for Morbid Obesity Laparoscopic Longitudinal Gastrectomy   • GASTRIC SLEEVE LAPAROSCOPIC     • HEMORRHOIDECTOMY     • HERNIA REPAIR     • LAPAROSCOPY REPAIR HIATAL HERNIA     • LARYNGOSCOPY      Direct Laryngoscopy with Injection into Vocal Cords   • OTHER SURGICAL HISTORY Bilateral     Ear Pressure Equalization Tube, Insertion, Bilaterally   • PERIPHERALLY INSERTED CENTRAL CATHETER INSERTION     • SPLENECTOMY     • THYROID SURGERY     • TUBAL ABDOMINAL LIGATION         Family History   Problem Relation Age of Onset   • Stroke Father    • Diabetes Father         Diabetes Mellitus   • Hypertension Father    • Cancer Other        Social History     Socioeconomic History   • Marital status: Single     Spouse name: Not on file   • Number of children: Not on file    • Years of education: Not on file   • Highest education level: Not on file   Tobacco Use   • Smoking status: Never Smoker   • Smokeless tobacco: Never Used   Substance and Sexual Activity   • Alcohol use: No   • Drug use: No   • Sexual activity: Defer           Objective   Physical Exam  Vitals signs and nursing note reviewed.   Constitutional:       Appearance: She is well-developed.   Cardiovascular:      Rate and Rhythm: Normal rate and regular rhythm.   Pulmonary:      Effort: Pulmonary effort is normal.      Breath sounds: Normal breath sounds.   Abdominal:      Tenderness: There is abdominal tenderness in the epigastric area.      Hernia: A hernia is present. Hernia is present in the umbilical area and ventral area.      Comments: Patient is generally tender from mid abdomen.  She has 2 reducible hernias.  Guards some on exam.  Her she is not rigid.  Her abdomen is not overly distended at this point.   Skin:     General: Skin is warm and dry.      Capillary Refill: Capillary refill takes less than 2 seconds.   Neurological:      General: No focal deficit present.      Mental Status: She is alert and oriented to person, place, and time.         Procedures           ED Course      Results for orders placed or performed during the hospital encounter of 01/09/21   Comprehensive Metabolic Panel    Specimen: Blood   Result Value Ref Range    Glucose 80 65 - 99 mg/dL    BUN 9 6 - 20 mg/dL    Creatinine 0.65 0.57 - 1.00 mg/dL    Sodium 138 136 - 145 mmol/L    Potassium 3.4 (L) 3.5 - 5.2 mmol/L    Chloride 102 98 - 107 mmol/L    CO2 29.0 22.0 - 29.0 mmol/L    Calcium 9.0 8.6 - 10.5 mg/dL    Total Protein 7.4 6.0 - 8.5 g/dL    Albumin 3.50 3.50 - 5.20 g/dL    ALT (SGPT) 28 1 - 33 U/L    AST (SGOT) 28 1 - 32 U/L    Alkaline Phosphatase 198 (H) 39 - 117 U/L    Total Bilirubin 0.7 0.0 - 1.2 mg/dL    eGFR Non African Amer 98 >60 mL/min/1.73    Globulin 3.9 gm/dL    A/G Ratio 0.9 g/dL    BUN/Creatinine Ratio 13.8 7.0 -  25.0    Anion Gap 7.0 5.0 - 15.0 mmol/L   Lipase    Specimen: Blood   Result Value Ref Range    Lipase 28 13 - 60 U/L   Protime-INR    Specimen: Blood   Result Value Ref Range    Protime 11.6 9.6 - 11.7 Seconds    INR 1.06 0.93 - 1.10   Urinalysis With Microscopic If Indicated (No Culture) - Urine, Clean Catch    Specimen: Urine, Clean Catch   Result Value Ref Range    Color, UA Dark Yellow (A) Yellow, Straw    Appearance, UA Clear Clear    pH, UA 6.5 5.0 - 8.0    Specific Gravity, UA 1.032 (H) 1.005 - 1.030    Glucose, UA Negative Negative    Ketones, UA Negative Negative    Bilirubin, UA Negative Negative    Blood, UA Negative Negative    Protein, UA Negative Negative    Leuk Esterase, UA Negative Negative    Nitrite, UA Negative Negative    Urobilinogen, UA 0.2 E.U./dL 0.2 - 1.0 E.U./dL   Troponin    Specimen: Blood   Result Value Ref Range    Troponin T <0.010 0.000 - 0.030 ng/mL   CBC Auto Differential    Specimen: Blood   Result Value Ref Range    WBC 9.50 3.40 - 10.80 10*3/mm3    RBC 4.21 3.77 - 5.28 10*6/mm3    Hemoglobin 12.2 12.0 - 15.9 g/dL    Hematocrit 37.2 34.0 - 46.6 %    MCV 88.3 79.0 - 97.0 fL    MCH 28.9 26.6 - 33.0 pg    MCHC 32.8 31.5 - 35.7 g/dL    RDW 17.2 (H) 12.3 - 15.4 %    RDW-SD 52.9 37.0 - 54.0 fl    MPV 8.1 6.0 - 12.0 fL    Platelets 581 (H) 140 - 450 10*3/mm3    Neutrophil % 58.1 42.7 - 76.0 %    Lymphocyte % 29.3 19.6 - 45.3 %    Monocyte % 8.5 5.0 - 12.0 %    Eosinophil % 3.3 0.3 - 6.2 %    Basophil % 0.8 0.0 - 1.5 %    Neutrophils, Absolute 5.50 1.70 - 7.00 10*3/mm3    Lymphocytes, Absolute 2.80 0.70 - 3.10 10*3/mm3    Monocytes, Absolute 0.80 0.10 - 0.90 10*3/mm3    Eosinophils, Absolute 0.30 0.00 - 0.40 10*3/mm3    Basophils, Absolute 0.10 0.00 - 0.20 10*3/mm3    nRBC 0.1 0.0 - 0.2 /100 WBC   ECG 12 Lead   Result Value Ref Range    QT Interval 374 ms     Ct Abdomen Pelvis Without Contrast    Result Date: 1/9/2021  1.  Redemonstration of ventral body wall narrowneck hernia, with  fluid, now containing a longer segment of mildly distended small bowel with fecaloid contents, query incarceration. There could be early obstruction. 2.  Slight improvement in large volume ascites. Evidence of portal hypertension. 3.  Splenectomy. Hysterectomy. Cholecystectomy. Surgical findings on the stomach. Electronically signed by:  Dina Duran  1/9/2021 7:11 PM    Xr Chest 1 View    Result Date: 1/9/2021  Low lung volumes and chronic changes with no active disease.  Electronically Signed By-Randell Stevens MD On:1/9/2021 6:10 PM This report was finalized on [id number] by  Randell Stevens MD.         EKG interpreted by myself shows sinus rhythm rate of 76                                MDM  Number of Diagnoses or Management Options  Diagnosis management comments: On reexamination the patient is no better.  She appears to reduce the hernia on exam however she does start guarding and its difficult to see if it is fully reduced or not.  There is some distention and equalization this point time I will bring her in as an observation for surgical evaluation in the morning.  She is actively vomiting at this point.  She has been nauseated.  She is in continued pain think she has a persistent history of pain secondary to her comorbidities however at this point have surgery evaluate and follow patient for the potential incarcerated hernia.  Did discuss this with on-call hospitalist.       Amount and/or Complexity of Data Reviewed  Clinical lab tests: reviewed  Tests in the radiology section of CPT®: reviewed  Tests in the medicine section of CPT®: reviewed  Discussion of test results with the performing providers: yes  Discuss the patient with other providers: yes  Independent visualization of images, tracings, or specimens: yes    Risk of Complications, Morbidity, and/or Mortality  Presenting problems: high    Patient Progress  Patient progress: stable      Final diagnoses:   None     Abdominal pain  Incarcerated  hernia     Blayne Perales,   01/09/21 2117       Blayne Perales,   01/09/21 2123

## 2021-01-10 LAB
ALBUMIN SERPL-MCNC: 3.1 G/DL (ref 3.5–5.2)
ALBUMIN/GLOB SERPL: 1.1 G/DL
ALP SERPL-CCNC: 166 U/L (ref 39–117)
ALT SERPL W P-5'-P-CCNC: 25 U/L (ref 1–33)
ANION GAP SERPL CALCULATED.3IONS-SCNC: 7 MMOL/L (ref 5–15)
AST SERPL-CCNC: 24 U/L (ref 1–32)
BASOPHILS # BLD AUTO: 0.1 10*3/MM3 (ref 0–0.2)
BASOPHILS NFR BLD AUTO: 0.8 % (ref 0–1.5)
BILIRUB SERPL-MCNC: 1 MG/DL (ref 0–1.2)
BUN SERPL-MCNC: 8 MG/DL (ref 6–20)
BUN/CREAT SERPL: 14 (ref 7–25)
CALCIUM SPEC-SCNC: 8.3 MG/DL (ref 8.6–10.5)
CHLORIDE SERPL-SCNC: 103 MMOL/L (ref 98–107)
CO2 SERPL-SCNC: 26 MMOL/L (ref 22–29)
CREAT SERPL-MCNC: 0.57 MG/DL (ref 0.57–1)
DEPRECATED RDW RBC AUTO: 52.1 FL (ref 37–54)
EOSINOPHIL # BLD AUTO: 0.5 10*3/MM3 (ref 0–0.4)
EOSINOPHIL NFR BLD AUTO: 3.9 % (ref 0.3–6.2)
ERYTHROCYTE [DISTWIDTH] IN BLOOD BY AUTOMATED COUNT: 17 % (ref 12.3–15.4)
GFR SERPL CREATININE-BSD FRML MDRD: 114 ML/MIN/1.73
GLOBULIN UR ELPH-MCNC: 2.8 GM/DL
GLUCOSE SERPL-MCNC: 92 MG/DL (ref 65–99)
HCT VFR BLD AUTO: 34.4 % (ref 34–46.6)
HGB BLD-MCNC: 11 G/DL (ref 12–15.9)
LYMPHOCYTES # BLD AUTO: 5.3 10*3/MM3 (ref 0.7–3.1)
LYMPHOCYTES NFR BLD AUTO: 42 % (ref 19.6–45.3)
MAGNESIUM SERPL-MCNC: 2.1 MG/DL (ref 1.6–2.6)
MCH RBC QN AUTO: 28.3 PG (ref 26.6–33)
MCHC RBC AUTO-ENTMCNC: 32 G/DL (ref 31.5–35.7)
MCV RBC AUTO: 88.4 FL (ref 79–97)
MONOCYTES # BLD AUTO: 1 10*3/MM3 (ref 0.1–0.9)
MONOCYTES NFR BLD AUTO: 7.5 % (ref 5–12)
NEUTROPHILS NFR BLD AUTO: 45.8 % (ref 42.7–76)
NEUTROPHILS NFR BLD AUTO: 5.8 10*3/MM3 (ref 1.7–7)
NRBC BLD AUTO-RTO: 0.2 /100 WBC (ref 0–0.2)
PLATELET # BLD AUTO: 480 10*3/MM3 (ref 140–450)
PMV BLD AUTO: 8.8 FL (ref 6–12)
POTASSIUM SERPL-SCNC: 3.5 MMOL/L (ref 3.5–5.2)
PROT SERPL-MCNC: 5.9 G/DL (ref 6–8.5)
QT INTERVAL: 374 MS
RBC # BLD AUTO: 3.89 10*6/MM3 (ref 3.77–5.28)
SODIUM SERPL-SCNC: 136 MMOL/L (ref 136–145)
TROPONIN T SERPL-MCNC: <0.01 NG/ML (ref 0–0.03)
WBC # BLD AUTO: 12.7 10*3/MM3 (ref 3.4–10.8)

## 2021-01-10 PROCEDURE — 99232 SBSQ HOSP IP/OBS MODERATE 35: CPT | Performed by: HOSPITALIST

## 2021-01-10 PROCEDURE — 99244 OFF/OP CNSLTJ NEW/EST MOD 40: CPT | Performed by: SURGERY

## 2021-01-10 PROCEDURE — 80053 COMPREHEN METABOLIC PANEL: CPT | Performed by: PHYSICIAN ASSISTANT

## 2021-01-10 PROCEDURE — 25010000002 ONDANSETRON PER 1 MG: Performed by: PHYSICIAN ASSISTANT

## 2021-01-10 PROCEDURE — 85025 COMPLETE CBC W/AUTO DIFF WBC: CPT | Performed by: PHYSICIAN ASSISTANT

## 2021-01-10 PROCEDURE — 83735 ASSAY OF MAGNESIUM: CPT | Performed by: PHYSICIAN ASSISTANT

## 2021-01-10 PROCEDURE — G0378 HOSPITAL OBSERVATION PER HR: HCPCS

## 2021-01-10 PROCEDURE — 84484 ASSAY OF TROPONIN QUANT: CPT | Performed by: PHYSICIAN ASSISTANT

## 2021-01-10 RX ORDER — OXYCODONE HYDROCHLORIDE 5 MG/1
10 TABLET ORAL EVERY 4 HOURS PRN
Status: DISCONTINUED | OUTPATIENT
Start: 2021-01-10 | End: 2021-01-11

## 2021-01-10 RX ADMIN — LEVOTHYROXINE SODIUM 150 MCG: 150 TABLET ORAL at 08:54

## 2021-01-10 RX ADMIN — RIFAXIMIN 550 MG: 550 TABLET ORAL at 08:54

## 2021-01-10 RX ADMIN — PREGABALIN 25 MG: 25 CAPSULE ORAL at 20:06

## 2021-01-10 RX ADMIN — APIXABAN 5 MG: 5 TABLET, FILM COATED ORAL at 20:06

## 2021-01-10 RX ADMIN — ONDANSETRON 4 MG: 2 INJECTION, SOLUTION INTRAMUSCULAR; INTRAVENOUS at 18:54

## 2021-01-10 RX ADMIN — ONDANSETRON 4 MG: 2 INJECTION, SOLUTION INTRAMUSCULAR; INTRAVENOUS at 03:40

## 2021-01-10 RX ADMIN — Medication 10 ML: at 08:54

## 2021-01-10 RX ADMIN — ONDANSETRON 4 MG: 2 INJECTION, SOLUTION INTRAMUSCULAR; INTRAVENOUS at 09:53

## 2021-01-10 RX ADMIN — HYDROMORPHONE HYDROCHLORIDE 2 MG: 2 TABLET ORAL at 20:06

## 2021-01-10 RX ADMIN — HYDROMORPHONE HYDROCHLORIDE 2 MG: 2 TABLET ORAL at 11:41

## 2021-01-10 RX ADMIN — HYDROMORPHONE HYDROCHLORIDE 2 MG: 2 TABLET ORAL at 07:37

## 2021-01-10 RX ADMIN — POTASSIUM CHLORIDE 40 MEQ: 1500 TABLET, EXTENDED RELEASE ORAL at 20:06

## 2021-01-10 RX ADMIN — SPIRONOLACTONE 300 MG: 100 TABLET ORAL at 08:54

## 2021-01-10 RX ADMIN — HYDROMORPHONE HYDROCHLORIDE 2 MG: 2 TABLET ORAL at 15:41

## 2021-01-10 RX ADMIN — Medication 10 ML: at 20:10

## 2021-01-10 RX ADMIN — BUMETANIDE 2 MG: 1 TABLET ORAL at 08:54

## 2021-01-10 RX ADMIN — HYDROMORPHONE HYDROCHLORIDE 2 MG: 2 TABLET ORAL at 03:36

## 2021-01-10 RX ADMIN — RIFAXIMIN 550 MG: 550 TABLET ORAL at 20:06

## 2021-01-10 NOTE — CONSULTS
Surgery (on-call MD unless specified)  Consult performed by: Enzo Silva MD  Consult ordered by: Blayne Perales DO  Reason for consult: incisional hernia          General Surgery Consult Note      Name: Lucinda Cope ADMIT: 2021   : 1975  PCP: Melissa Iniguez APRN    MRN: [id number] LOS: 0 days   AGE/SEX: 45 y.o. female  ROOM: [address] [place] [address]      Patient Care Team:  Melissa Iniguez APRN as PCP - General (Family Medicine)  Chief Complaint   Patient presents with   • Abdominal Pain       Subjective   45-year-old lady with a complicated surgical history including gastric sleeve complicated by a leak revision to Alfonzo-en-Y gastric bypass history of splenic injury requiring splenectomy history of ventral hernia with ventral hernia repair who has a known incisional hernia.  She has Ramon cirrhosis has been working with the transplant team in [place] she has had serial paracentesis over the last several years most recent paracentesis was a few days ago.  She says that she is having abdominal symptoms including some sharp stabbing periumbilical pain that radiates up into her chest.  It is worse whenever her hernia seems to be more distended.  It is better when it is less distended.  She has had some nausea but no overt vomiting related to this.  Her last bowel movement was yesterday.  She came into the ER because she did not feel like things were right after her paracentesis she was found to have these reducible ventral hernias and I was asked to see her for consideration for repair.    Past Medical History:   Diagnosis Date   • Abdominal pain    • Anemia    • Cancer (CMS/HCC)    • Cirrhosis of liver (CMS/HCC) 2017   • Clot    • Deep venous thrombosis of right profunda femoris vein (CMS/HCC) 8/15/2019   • Depression    • Disease of thyroid gland    • Empyema of pleura (CMS/HCC)    • GERD (gastroesophageal reflux disease)    • Heartburn    • Hemorrhoids    • Hernia, ventral    • History  of malignant neoplasm of thyroid    • History of transfusion    • Irregular menstrual cycle    • Migraine    • Parathyroid abnormality (CMS/HCC)    • Radiation    • Restless legs syndrome (RLS)    • Urge and stress incontinence    • Varicose veins      Past Surgical History:   Procedure Laterality Date   • ABDOMINAL SURGERY     • BEDSIDE PARACENTESIS  3/22/2020        • BEDSIDE PARACENTESIS  4/19/2020        • CHOLECYSTECTOMY      Laparoscopic   • ENDOSCOPY N/A 3/17/2016    Procedure: ESOPHAGOGASTRODUODENOSCOPY WITH BALOON DILATATION 18-20MM WITH GASTRIC SLEEVE SEALANT;  Surgeon: Leonardo Ortiz Jr., MD;  Location: [facility] ENDOSCOPY;  Service:    • ENDOSCOPY N/A 3/14/2016    Procedure: esophagogastroduodenoscopy with fluoroscopy;  Surgeon: Greg Avila III, MD;  Location: [facility] ENDOSCOPY;  Service:    • ENDOSCOPY N/A 4/15/2016    Procedure: EGD WITH DILITATION AND STENT PLACEMENT dilation of pylorus ;  Surgeon: Leonardo Ortiz Jr., MD;  Location: [facility] ENDOSCOPY;  Service:    • ENDOSCOPY N/A 5/13/2016    Procedure: ESOPHAGOGASTRODUODENOSCOPY  W/ STENT;  Surgeon: Leonardo Ortiz Jr., MD;  Location: [facility] ENDOSCOPY;  Service:    • GASTRECTOMY      Gastric Surgery for Morbid Obesity Laparoscopic Longitudinal Gastrectomy   • GASTRIC SLEEVE LAPAROSCOPIC     • HEMORRHOIDECTOMY     • HERNIA REPAIR     • LAPAROSCOPY REPAIR HIATAL HERNIA     • LARYNGOSCOPY      Direct Laryngoscopy with Injection into Vocal Cords   • OTHER SURGICAL HISTORY Bilateral     Ear Pressure Equalization Tube, Insertion, Bilaterally   • PERIPHERALLY INSERTED CENTRAL CATHETER INSERTION     • SPLENECTOMY     • THYROID SURGERY     • TUBAL ABDOMINAL LIGATION       Family History   Problem Relation Age of Onset   • Stroke Father    • Diabetes Father         Diabetes Mellitus   • Hypertension Father    • Cancer Other      Social History     Tobacco Use   • Smoking status: Never Smoker   • Smokeless tobacco: Never Used   Substance Use Topics    • Alcohol use: No   • Drug use: No     Medications Prior to Admission   Medication Sig Dispense Refill Last Dose   • apixaban (ELIQUIS) 5 MG tablet tablet Take 5 mg by mouth 2 (Two) Times a Day.      • bumetanide (BUMEX) 2 MG tablet Take 2 mg by mouth Daily.      • levothyroxine (SYNTHROID, LEVOTHROID) 150 MCG tablet Take 150 mcg by mouth Daily.      • ondansetron ODT (ZOFRAN-ODT) 8 MG disintegrating tablet Place 8 mg on the tongue Every 8 (Eight) Hours As Needed for Nausea or Vomiting.      • oxyCODONE (ROXICODONE) 10 MG tablet Take 10 mg by mouth Every 6 (Six) Hours.      • pregabalin (LYRICA) 25 MG capsule Take 25 mg by mouth every night at bedtime.      • rifaximin (XIFAXAN) 550 MG tablet Take 550 mg by mouth Every 12 (Twelve) Hours.      • spironolactone (ALDACTONE) 100 MG tablet Take 300 mg by mouth Daily.        apixaban, 5 mg, Oral, Q12H  bumetanide, 2 mg, Oral, Daily  levothyroxine, 150 mcg, Oral, Daily  pregabalin, 25 mg, Oral, Nightly  riFAXIMin, 550 mg, Oral, Q12H  sodium chloride, 10 mL, Intravenous, Q12H  spironolactone, 300 mg, Oral, Daily         Calcium Gluconate-NaCl **AND** calcium gluconate **AND** Calcium, Ionized  •  HYDROmorphone  •  ketamine (KETALAR) IVPB **AND** Ketamine Vital Signs & Assessment  •  magnesium sulfate **OR** magnesium sulfate **OR** magnesium sulfate  •  melatonin  •  nitroglycerin  •  ondansetron **OR** ondansetron  •  potassium & sodium phosphates **OR** potassium & sodium phosphates  •  potassium chloride  •  potassium chloride  •  [COMPLETED] Insert peripheral IV **AND** sodium chloride  •  sodium chloride  Compazine [prochlorperazine edisylate], Contrast dye, Iodinated diagnostic agents, Iodine, Morphine, Nsaids, and Hydrocodone    Review of Systems   Constitutional: Negative for chills and fever.   HENT: Negative for sore throat and trouble swallowing.    Eyes: Negative for blurred vision and double vision.   Respiratory: Negative for cough and shortness of breath.     Cardiovascular: Positive for chest pain. Negative for leg swelling.   Gastrointestinal: Positive for abdominal distention and abdominal pain. Negative for blood in stool, constipation, diarrhea, nausea and vomiting.   Genitourinary: Negative for dysuria and hematuria.   Skin: Negative for rash and bruise.   Neurological: Negative for dizziness and confusion.   Psychiatric/Behavioral: Negative for agitation and depressed mood.        Objective     Vital Signs and Labs:  Vital Signs (range)  Temp:  [97.8 °F (36.6 °C)-98.9 °F (37.2 °C)] 98.2 °F (36.8 °C)  Heart Rate:  [69-88] 69  Resp:  [14-18] 15  BP: (130-153)/(81-93) 131/81    Physical Exam:  Physical Exam  Constitutional:       Appearance: Normal appearance.   HENT:      Head: Normocephalic.   Eyes:      Conjunctiva/sclera: Conjunctivae normal.      Pupils: Pupils are equal, round, and reactive to light.   Cardiovascular:      Rate and Rhythm: Normal rate.      Pulses: Normal pulses.   Pulmonary:      Effort: Pulmonary effort is normal. No respiratory distress.   Abdominal:      Comments: Soft mild distention complex incisional hernia feels multilobulated mostly reducible, there is some skin thinning over the hernia and some dark discoloration of the skin likely from the chronic nature moderate tenderness to palpation in this location   Musculoskeletal:         General: No swelling or tenderness.   Skin:     General: Skin is warm and dry.   Neurological:      General: No focal deficit present.      Mental Status: She is alert.   Psychiatric:         Mood and Affect: Mood normal.         Behavior: Behavior normal.         CBC    Results from last 7 days   Lab Units 01/10/21  0059 01/09/21 1810 01/05/21  0614 01/04/21  2118   WBC 10*3/mm3 12.70* 9.50 7.80 10.00   HEMOGLOBIN g/dL 11.0* 12.2 12.1 11.4*   PLATELETS 10*3/mm3 480* 581* 648* 612*     CMP   Results from last 7 days   Lab Units 01/10/21  0059 01/09/21 1810 01/05/21  0614 01/04/21  2341   SODIUM mmol/L  136 138  --  137   POTASSIUM mmol/L 3.5 3.4* 3.7 3.7   CHLORIDE mmol/L 103 102  --  103   CO2 mmol/L 26.0 29.0  --  26.0   BUN mg/dL 8 9  --  6   CREATININE mg/dL 0.57 0.65  --  0.61   GLUCOSE mg/dL 92 80  --  109*   ALBUMIN g/dL 3.10* 3.50  --  3.40*   BILIRUBIN mg/dL 1.0 0.7  --  1.3*   ALK PHOS U/L 166* 198*  --  201*   AST (SGOT) U/L 24 28  --  77*   ALT (SGPT) U/L 25 28  --  29   LIPASE U/L  --  28  --   --         I reviewed the patient's new clinical results.  I reviewed the patient's new imaging results and agree with the interpretation.    Assessment/Plan       Abdominal pain    H/O thyroidectomy    Thyroid cancer (CMS/HCC)    CARTER (nonalcoholic steatohepatitis)    Ventral hernia    History of DVT (deep vein thrombosis)      45 y.o. female complicated surgical history including gastric sleeve revised to Alfonzo-en-Y gastric bypass history of significant bleeding requiring splenectomy history of incisional hernia history of Carter cirrhosis with serial paracentesis with abdominal pain likely related to her incisional hernia    Based on a brief look at the calculators she appears to be a child's class B indicating the perioperative mortality is about 30%.  This is risk prohibitive from my standpoint for elective surgery.  We will give her some clear liquid diet to see she tolerates.  If she is tolerating some diet having bowel function will likely need to be discharged with follow-up with her transplant surgery team.  If she fails the p.o. challenge will consider small bowel follow-through to ensure that she is not completely obstructed.    Abdominal binder for comfort    Does not seem to be obstructed.  Her  Personal protective equipment used for this patient encounter:  Patient wearing surgical mask []    Provider wearing a surgical mask [x]    Gloves []    Eye protection [x]    Face Shield []    Gown []    N 95 respirator or CAPR/PAPR []     This note was created using Dragon Voice Recognition  software.    Enzo Silva MD  01/10/21  14:51 EST

## 2021-01-10 NOTE — H&P
[facility] Medicine Services      Patient Name: Lucinda Cope  : 1975  MRN: [id number]  Primary Care Physician: Melissa Iniguez APRN  Date of admission: 2021    Patient Care Team:  Melissa Iniguez APRN as PCP - General (Family Medicine)          Subjective   History Present Illness     Chief Complaint:   Chief Complaint   Patient presents with   • Abdominal Pain         Ms. Cope is a 45 y.o. female past medical history of Ramon, ventral hernia, umbilical hernia, thyroid cancer status post thyroidectomy, previous DVT and chronic pain who presents to [facility] complaining of abdominal pain.  Patient states that on 2020 when she noted significant increase in her midline abdominal pain at the area of her ventral hernia.  She describes the pain as a tightness with muscle spasms rated 10/10 at its worst without obvious provoking factors however she does note that being in a hot bath seems to improve the pain slightly.  Pain is noted is radiating to her chest and back with a constant and worsening intensity.  Some nausea without vomiting is reported but she denies any dyspnea, cough or fever, sensation of tachycardia or palpitations, changes in bowel or bladder habits, hematochezia or melena.  Patient does not smoke or drink alcohol.  Also of note patient was recently seen at this facility for abdominal pain and distention and had paracentesis on 2021 with 1.9 L of fluid removed at that time.  She notes that her distention and diffuse abdominal pain have improved significantly since that procedure.    In the ED patient had lab significant for troponin of less than 0.010, potassium: 3.4, alk phos: 198, remainder of CBC and CMP was generally unremarkable.  Lipase: 28, PT: 11.6, INR: 1.06.  UA shows a specific gravity of 1.032 but is otherwise unremarkable.  CT of abdomen shows redemonstration of ventral body wall narrow neck hernia with fluid noted is now  containing a longer segment of mildly distended small bowel with fecaloid contents with possible incarceration noted.  A slight improvement in large volume ascites with evidence of portal hypertension continues to be present as well as splenectomy, hysterectomy, cholecystectomy and surgical findings on the stomach.  Chest x-ray reported with lower lung volumes and changes consistent with chronic but no active disease.  EKG shows sinus rhythm at 87 without obvious acute ST changes or ectopy and a QTC of 423 ms.    History of Present Illness    Review of Systems   Constitution: Negative.   HENT: Negative.    Eyes: Negative.    Cardiovascular: Positive for chest pain. Negative for dyspnea on exertion, irregular heartbeat, leg swelling, near-syncope, palpitations and syncope.        Abdominal pain reported as radiating towards chest   Respiratory: Negative.    Endocrine: Negative.    Skin: Negative.    Musculoskeletal: Negative.    Gastrointestinal: Positive for abdominal pain and nausea. Negative for constipation, diarrhea, hematemesis, hematochezia, melena and vomiting.   Genitourinary: Negative.    Neurological: Negative.    Psychiatric/Behavioral: Negative.            Personal History     Past Medical History:   Past Medical History:   Diagnosis Date   • Abdominal pain    • Anemia    • Cancer (CMS/HCC)    • Cirrhosis of liver (CMS/HCC) 04/2017   • Clot    • Deep venous thrombosis of right profunda femoris vein (CMS/HCC) 8/15/2019   • Depression    • Disease of thyroid gland    • Empyema of pleura (CMS/HCC)    • GERD (gastroesophageal reflux disease)    • Heartburn    • Hemorrhoids    • Hernia, ventral    • History of malignant neoplasm of thyroid    • History of transfusion    • Irregular menstrual cycle    • Migraine    • Parathyroid abnormality (CMS/HCC)    • Radiation    • Restless legs syndrome (RLS)    • Urge and stress incontinence    • Varicose veins        Surgical History:      Past Surgical History:    Procedure Laterality Date   • ABDOMINAL SURGERY     • BEDSIDE PARACENTESIS  3/22/2020        • BEDSIDE PARACENTESIS  4/19/2020        • CHOLECYSTECTOMY      Laparoscopic   • ENDOSCOPY N/A 3/17/2016    Procedure: ESOPHAGOGASTRODUODENOSCOPY WITH BALOON DILATATION 18-20MM WITH GASTRIC SLEEVE SEALANT;  Surgeon: Leonardo Ortiz Jr., MD;  Location: [facility] ENDOSCOPY;  Service:    • ENDOSCOPY N/A 3/14/2016    Procedure: esophagogastroduodenoscopy with fluoroscopy;  Surgeon: Greg Avila III, MD;  Location: [facility] ENDOSCOPY;  Service:    • ENDOSCOPY N/A 4/15/2016    Procedure: EGD WITH DILITATION AND STENT PLACEMENT dilation of pylorus ;  Surgeon: Leonardo Ortiz Jr., MD;  Location: [facility] ENDOSCOPY;  Service:    • ENDOSCOPY N/A 5/13/2016    Procedure: ESOPHAGOGASTRODUODENOSCOPY  W/ STENT;  Surgeon: Leonardo Ortiz Jr., MD;  Location: [facility] ENDOSCOPY;  Service:    • GASTRECTOMY      Gastric Surgery for Morbid Obesity Laparoscopic Longitudinal Gastrectomy   • GASTRIC SLEEVE LAPAROSCOPIC     • HEMORRHOIDECTOMY     • HERNIA REPAIR     • LAPAROSCOPY REPAIR HIATAL HERNIA     • LARYNGOSCOPY      Direct Laryngoscopy with Injection into Vocal Cords   • OTHER SURGICAL HISTORY Bilateral     Ear Pressure Equalization Tube, Insertion, Bilaterally   • PERIPHERALLY INSERTED CENTRAL CATHETER INSERTION     • SPLENECTOMY     • THYROID SURGERY     • TUBAL ABDOMINAL LIGATION             Family History: family history includes Cancer in an other family member; Diabetes in her father; Hypertension in her father; Stroke in her father. Otherwise pertinent FHx was reviewed and unremarkable.     Social History:  reports that she has never smoked. She has never used smokeless tobacco. She reports that she does not drink alcohol or use drugs.      Medications:  Prior to Admission medications    Medication Sig Start Date End Date Taking? Authorizing Provider   apixaban (ELIQUIS) 5 MG tablet tablet Take 5 mg by mouth 2 (Two) Times a Day.    Yes Justyn Erwin MD   bumetanide (BUMEX) 2 MG tablet Take 2 mg by mouth Daily.   Yes Justyn Erwin MD   levothyroxine (SYNTHROID, LEVOTHROID) 150 MCG tablet Take 150 mcg by mouth Daily.   Yes Justyn Erwin MD   ondansetron ODT (ZOFRAN-ODT) 8 MG disintegrating tablet Place 8 mg on the tongue Every 8 (Eight) Hours As Needed for Nausea or Vomiting.   Yes Justyn Erwin MD   oxyCODONE (ROXICODONE) 10 MG tablet Take 10 mg by mouth Every 6 (Six) Hours.   Yes Justyn Erwin MD   pregabalin (LYRICA) 25 MG capsule Take 25 mg by mouth every night at bedtime.   Yes Justyn Erwin MD   rifaximin (XIFAXAN) 550 MG tablet Take 550 mg by mouth Every 12 (Twelve) Hours.   Yes Justyn Erwin MD   spironolactone (ALDACTONE) 100 MG tablet Take 300 mg by mouth Daily.   Yes Justyn Erwin MD   oxyCODONE (OXY-IR) 5 MG capsule Take 10 mg by mouth Every 6 (Six) Hours.  1/9/21  Justyn Erwin MD       Allergies:    Allergies   Allergen Reactions   • Compazine [Prochlorperazine Edisylate] Hives   • Contrast Dye Hives   • Iodinated Diagnostic Agents    • Iodine Hives   • Morphine Itching   • Nsaids Other (See Comments)     States I am not suppose to take them   • Hydrocodone Rash     Lortab elixir per pt       Objective   Objective     Vital Signs  Temp:  [98.4 °F (36.9 °C)] 98.4 °F (36.9 °C)  Heart Rate:  [76-88] 76  Resp:  [16-18] 18  BP: (130-153)/(82-93) 153/93  SpO2:  [96 %-99 %] 96 %  on   ;   Device (Oxygen Therapy): room air  Body mass index is 23.31 kg/m².    Physical Exam  Vitals signs reviewed.   Constitutional:       General: She is not in acute distress.     Appearance: Normal appearance. She is not ill-appearing or toxic-appearing.   HENT:      Head: Normocephalic and atraumatic.      Right Ear: External ear normal.      Left Ear: External ear normal.      Nose: Nose normal.      Mouth/Throat:      Mouth: Mucous membranes are moist.   Eyes:      Extraocular  Movements: Extraocular movements intact.      Conjunctiva/sclera: Conjunctivae normal.   Neck:      Musculoskeletal: Normal range of motion.   Cardiovascular:      Rate and Rhythm: Normal rate and regular rhythm.      Pulses: Normal pulses.      Heart sounds: Normal heart sounds.   Pulmonary:      Effort: Pulmonary effort is normal.      Breath sounds: Normal breath sounds.   Abdominal:      General: There is no distension.      Tenderness: There is abdominal tenderness.      Hernia: A hernia is present.      Comments: Hyperactive bowel sounds noted   Musculoskeletal: Normal range of motion.      Right lower leg: No edema.      Left lower leg: No edema.   Skin:     General: Skin is warm and dry.   Neurological:      General: No focal deficit present.      Mental Status: She is alert and oriented to person, place, and time.   Psychiatric:         Mood and Affect: Mood normal.         Behavior: Behavior normal.         Thought Content: Thought content normal.         Judgment: Judgment normal.         Results Review:  I have personally reviewed most recent cardiac tracings, lab results and radiology images and interpretations and agree with findings, most notably: Troponin, CBC, CMP, lipase, PT/INR, UA, CT of abdomen, chest x-ray and EKG.    Results from last 7 days   Lab Units 01/09/21  1810   WBC 10*3/mm3 9.50   HEMOGLOBIN g/dL 12.2   HEMATOCRIT % 37.2   PLATELETS 10*3/mm3 581*   INR  1.06     Results from last 7 days   Lab Units 01/09/21  1810   SODIUM mmol/L 138   POTASSIUM mmol/L 3.4*   CHLORIDE mmol/L 102   CO2 mmol/L 29.0   BUN mg/dL 9   CREATININE mg/dL 0.65   GLUCOSE mg/dL 80   CALCIUM mg/dL 9.0   ALT (SGPT) U/L 28   AST (SGOT) U/L 28   TROPONIN T ng/mL <0.010     Estimated Creatinine Clearance: 100.8 mL/min (by C-G formula based on SCr of 0.65 mg/dL).  Brief Urine Lab Results  (Last result in the past 365 days)      Color   Clarity   Blood   Leuk Est   Nitrite   Protein   CREAT   Urine HCG        01/09/21  1750 Dark Yellow Clear Negative Negative Negative Negative               Microbiology Results (last 10 days)     Procedure Component Value - Date/Time    COVID-19,CEPHEID,COR/LEIDA/PAD IN-HOUSE(OR EMERGENT/ADD-ON),NP SWAB IN TRANSPORT MEDIA 3-4 HR TAT - Swab, Nasopharynx [[id number]]  (Normal) Collected: 01/05/21 0623    Lab Status: Final result Specimen: Swab from Nasopharynx Updated: 01/05/21 0730     COVID19 Not Detected    Narrative:      Fact sheet for providers: https://www.fda.gov/media/820789/download     Fact sheet for patients: https://www.fda.gov/media/010761/download          ECG/EMG Results (most recent)     Procedure Component Value Units Date/Time    ECG 12 Lead [[id number]] Collected: 01/09/21 1746     Updated: 01/09/21 1749     QT Interval 374 ms     Narrative:      HEART RATE= 87  bpm  RR Interval= 692  ms  GA Interval= 151  ms  P Horizontal Axis= 4  deg  P Front Axis= 21  deg  QRSD Interval= 96  ms  QT Interval= 374  ms  QRS Axis= 25  deg  T Wave Axis= 4  deg  - NORMAL ECG -  Sinus rhythm  When compared with ECG of 16-Dec-2020 5:39:24,  No significant change  Electronically Signed By:   Date and Time of Study: 2021-01-09 17:46:11          Results for orders placed during the hospital encounter of 08/23/19   Duplex Venous Lower Extremity - Right CAR    Narrative · Acute right lower extremity superficial thrombophlebitis noted in the   greater saphenous (above knee) and greater saphenous (below knee).  · All other right sided veins appeared normal.               Ct Abdomen Pelvis Without Contrast    Result Date: 1/9/2021  1.  Redemonstration of ventral body wall narrowneck hernia, with fluid, now containing a longer segment of mildly distended small bowel with fecaloid contents, query incarceration. There could be early obstruction. 2.  Slight improvement in large volume ascites. Evidence of portal hypertension. 3.  Splenectomy. Hysterectomy. Cholecystectomy. Surgical findings on the stomach.  Electronically signed by:  Dina Duran  1/9/2021 7:11 PM    Ct Abdomen Pelvis With Contrast    Result Date: 1/4/2021  1. Cirrhotic liver morphology, portosystemic collateralization (including esophageal varices and hemorrhoids), cavernous transformation of the main portal vein and moderate ascites. Findings are compatible with portal venous hypertension. 2. An umbilical hernia contains a short segment of nonobstructed small bowel and ascites. A supraumbilical ventral hernia also contains ascites. 3. Additional chronic and postoperative findings as described above. Electronically signed by:  Champ Gregory M.D.  1/4/2021 11:16 PM    Xr Chest 1 View    Result Date: 1/9/2021  Low lung volumes and chronic changes with no active disease.  Electronically Signed By-Randell Stevens MD On:1/9/2021 6:10 PM This report was finalized on [id number] by  Randell Stevens MD.        Estimated Creatinine Clearance: 100.8 mL/min (by C-G formula based on SCr of 0.65 mg/dL).    Assessment/Plan   Assessment/Plan       Active Hospital Problems    Diagnosis  POA   • **Abdominal pain [R10.9]  Yes     Priority: High   • Ventral hernia [K43.9]  Yes     Priority: High   • CARTER (nonalcoholic steatohepatitis) [K75.81]  Yes     Priority: Medium   • History of DVT (deep vein thrombosis) [Z86.718]  Not Applicable     Priority: Low   • H/O thyroidectomy [Z90.09]  Not Applicable     Priority: Low   • Thyroid cancer (CMS/HCC) [C73]  Yes     Priority: Low      Resolved Hospital Problems   No resolved problems to display.     Abdominal pain with history of ventral hernia  -Patient presents with significantly worsening midline abdominal pain which began on 1/8/2021 rated at 10/10 radiating to her chest and back.  ED provider notes uncertain complete reduction attempt in ED.  -CT of abdomen shows redemonstration of ventral body wall narrow neck hernia with fluid noted is now containing a longer segment of mildly distended small bowel with fecaloid  contents with possible incarceration noted.  A slight improvement in large volume ascites with evidence of portal hypertension continues to be present as well as splenectomy, hysterectomy, cholecystectomy and surgical findings on the stomach.   -N.p.o. at midnight  -Patient given 1.5 mg of Dilaudid IV in ED with some relief in pain reported, continue p.o. Dilaudid as needed  -Continuous pulse ox ordered  -General surgery consulted in ED    Ramon  -CMP shows an alk phos of 198 with otherwise normal ALT, AST, bilirubin and INR and PT.  -Patient had paracentesis on 1/5/2020 with 1.9 L of fluid removed and clinical improvement noted at that time  -CT continues to demonstrate large volume of ascites though patient appears clinically much less distended and reports improvement following her procedure  -Continue Bumex, Xifaxan and spironolactone  -Monitor CMP while admitted    Hypothyroidism with previous thyroid cancer and thyroidectomy  -Continue levothyroxine    Chronic pain  -Continue Lyrica  -Hold home oxycodone while patient receiving p.o. Dilaudid as above    History of DVT  -Continue Eliquis            VTE Prophylaxis -Eliquis  Mechanical Order History:     None      Pharmalogical Order History:     None          CODE STATUS: Full  There are no questions and answers to display.         I discussed the patient's findings and my recommendations with patient.      Signature:Electronically signed by Mateusz Kingston PA-C, 01/09/21, 10:43 PM EST.    [facility] Hospitalist Team

## 2021-01-10 NOTE — PLAN OF CARE
Goal Outcome Evaluation:      Patient alert oriented and able to make needs known. Patient is NPO. Pain controlled with po pain medication. Patient on room air.

## 2021-01-10 NOTE — PLAN OF CARE
Goal Outcome Evaluation:         Patient complains of pain and nausea throughout the day. NPO since admission. Consult out to surgery. Patient has been up to the bathroom herself. Vitals within normal limits will continue to monitor.

## 2021-01-11 ENCOUNTER — APPOINTMENT (OUTPATIENT)
Dept: GENERAL RADIOLOGY | Facility: HOSPITAL | Age: 46
End: 2021-01-11

## 2021-01-11 LAB
ALBUMIN SERPL-MCNC: 3.2 G/DL (ref 3.5–5.2)
ALBUMIN/GLOB SERPL: 0.9 G/DL
ALP SERPL-CCNC: 239 U/L (ref 39–117)
ALT SERPL W P-5'-P-CCNC: 56 U/L (ref 1–33)
ANION GAP SERPL CALCULATED.3IONS-SCNC: 8 MMOL/L (ref 5–15)
AST SERPL-CCNC: 95 U/L (ref 1–32)
BASOPHILS # BLD AUTO: 0.1 10*3/MM3 (ref 0–0.2)
BASOPHILS NFR BLD AUTO: 0.5 % (ref 0–1.5)
BILIRUB SERPL-MCNC: 1.2 MG/DL (ref 0–1.2)
BUN SERPL-MCNC: 8 MG/DL (ref 6–20)
BUN/CREAT SERPL: 10.5 (ref 7–25)
CALCIUM SPEC-SCNC: 8.6 MG/DL (ref 8.6–10.5)
CHLORIDE SERPL-SCNC: 97 MMOL/L (ref 98–107)
CO2 SERPL-SCNC: 33 MMOL/L (ref 22–29)
CREAT SERPL-MCNC: 0.76 MG/DL (ref 0.57–1)
DEPRECATED RDW RBC AUTO: 52.5 FL (ref 37–54)
EOSINOPHIL # BLD AUTO: 0.7 10*3/MM3 (ref 0–0.4)
EOSINOPHIL NFR BLD AUTO: 6.3 % (ref 0.3–6.2)
ERYTHROCYTE [DISTWIDTH] IN BLOOD BY AUTOMATED COUNT: 17.1 % (ref 12.3–15.4)
GFR SERPL CREATININE-BSD FRML MDRD: 82 ML/MIN/1.73
GLOBULIN UR ELPH-MCNC: 3.5 GM/DL
GLUCOSE SERPL-MCNC: 109 MG/DL (ref 65–99)
HCT VFR BLD AUTO: 38.2 % (ref 34–46.6)
HGB BLD-MCNC: 12.5 G/DL (ref 12–15.9)
LYMPHOCYTES # BLD AUTO: 3.3 10*3/MM3 (ref 0.7–3.1)
LYMPHOCYTES NFR BLD AUTO: 31.7 % (ref 19.6–45.3)
MAGNESIUM SERPL-MCNC: 1.8 MG/DL (ref 1.6–2.6)
MCH RBC QN AUTO: 28.6 PG (ref 26.6–33)
MCHC RBC AUTO-ENTMCNC: 32.9 G/DL (ref 31.5–35.7)
MCV RBC AUTO: 87.1 FL (ref 79–97)
MONOCYTES # BLD AUTO: 1 10*3/MM3 (ref 0.1–0.9)
MONOCYTES NFR BLD AUTO: 9.8 % (ref 5–12)
NEUTROPHILS NFR BLD AUTO: 5.4 10*3/MM3 (ref 1.7–7)
NEUTROPHILS NFR BLD AUTO: 51.7 % (ref 42.7–76)
NRBC BLD AUTO-RTO: 0.3 /100 WBC (ref 0–0.2)
PLATELET # BLD AUTO: 519 10*3/MM3 (ref 140–450)
PMV BLD AUTO: 8.7 FL (ref 6–12)
POTASSIUM SERPL-SCNC: 3.7 MMOL/L (ref 3.5–5.2)
POTASSIUM SERPL-SCNC: 4 MMOL/L (ref 3.5–5.2)
PROT SERPL-MCNC: 6.7 G/DL (ref 6–8.5)
RBC # BLD AUTO: 4.38 10*6/MM3 (ref 3.77–5.28)
SODIUM SERPL-SCNC: 138 MMOL/L (ref 136–145)
WBC # BLD AUTO: 10.5 10*3/MM3 (ref 3.4–10.8)

## 2021-01-11 PROCEDURE — 85025 COMPLETE CBC W/AUTO DIFF WBC: CPT | Performed by: PHYSICIAN ASSISTANT

## 2021-01-11 PROCEDURE — 84132 ASSAY OF SERUM POTASSIUM: CPT | Performed by: INTERNAL MEDICINE

## 2021-01-11 PROCEDURE — 99232 SBSQ HOSP IP/OBS MODERATE 35: CPT | Performed by: INTERNAL MEDICINE

## 2021-01-11 PROCEDURE — 99213 OFFICE O/P EST LOW 20 MIN: CPT | Performed by: SURGERY

## 2021-01-11 PROCEDURE — 83735 ASSAY OF MAGNESIUM: CPT | Performed by: PHYSICIAN ASSISTANT

## 2021-01-11 PROCEDURE — 25010000002 ONDANSETRON PER 1 MG: Performed by: PHYSICIAN ASSISTANT

## 2021-01-11 PROCEDURE — 74250 X-RAY XM SM INT 1CNTRST STD: CPT

## 2021-01-11 PROCEDURE — G0378 HOSPITAL OBSERVATION PER HR: HCPCS

## 2021-01-11 PROCEDURE — 80053 COMPREHEN METABOLIC PANEL: CPT | Performed by: PHYSICIAN ASSISTANT

## 2021-01-11 RX ORDER — OXYCODONE HYDROCHLORIDE 5 MG/1
10 TABLET ORAL EVERY 4 HOURS PRN
Status: DISCONTINUED | OUTPATIENT
Start: 2021-01-11 | End: 2021-01-14 | Stop reason: HOSPADM

## 2021-01-11 RX ORDER — OXYCODONE HYDROCHLORIDE 5 MG/1
10 TABLET ORAL EVERY 6 HOURS PRN
Status: DISCONTINUED | OUTPATIENT
Start: 2021-01-11 | End: 2021-01-11

## 2021-01-11 RX ADMIN — APIXABAN 5 MG: 5 TABLET, FILM COATED ORAL at 19:17

## 2021-01-11 RX ADMIN — RIFAXIMIN 550 MG: 550 TABLET ORAL at 19:17

## 2021-01-11 RX ADMIN — OXYCODONE 10 MG: 5 TABLET ORAL at 15:15

## 2021-01-11 RX ADMIN — ONDANSETRON 4 MG: 2 INJECTION, SOLUTION INTRAMUSCULAR; INTRAVENOUS at 16:12

## 2021-01-11 RX ADMIN — OXYCODONE 10 MG: 5 TABLET ORAL at 05:14

## 2021-01-11 RX ADMIN — ONDANSETRON 4 MG: 2 INJECTION, SOLUTION INTRAMUSCULAR; INTRAVENOUS at 23:28

## 2021-01-11 RX ADMIN — Medication 10 ML: at 19:18

## 2021-01-11 RX ADMIN — OXYCODONE 10 MG: 5 TABLET ORAL at 09:35

## 2021-01-11 RX ADMIN — ONDANSETRON 4 MG: 2 INJECTION, SOLUTION INTRAMUSCULAR; INTRAVENOUS at 05:14

## 2021-01-11 RX ADMIN — OXYCODONE 10 MG: 5 TABLET ORAL at 19:17

## 2021-01-11 RX ADMIN — Medication 10 ML: at 09:36

## 2021-01-11 RX ADMIN — OXYCODONE 10 MG: 5 TABLET ORAL at 00:13

## 2021-01-11 RX ADMIN — PREGABALIN 25 MG: 25 CAPSULE ORAL at 19:17

## 2021-01-11 RX ADMIN — BARIUM SULFATE 100 ML: 400 SUSPENSION ORAL at 15:28

## 2021-01-11 RX ADMIN — BUMETANIDE 2 MG: 1 TABLET ORAL at 09:35

## 2021-01-11 RX ADMIN — OXYCODONE 10 MG: 5 TABLET ORAL at 23:28

## 2021-01-11 RX ADMIN — SPIRONOLACTONE 300 MG: 100 TABLET ORAL at 09:35

## 2021-01-11 RX ADMIN — POTASSIUM CHLORIDE 40 MEQ: 1500 TABLET, EXTENDED RELEASE ORAL at 21:44

## 2021-01-11 RX ADMIN — LEVOTHYROXINE SODIUM 150 MCG: 150 TABLET ORAL at 09:35

## 2021-01-11 RX ADMIN — APIXABAN 5 MG: 5 TABLET, FILM COATED ORAL at 09:35

## 2021-01-11 RX ADMIN — RIFAXIMIN 550 MG: 550 TABLET ORAL at 09:35

## 2021-01-11 NOTE — PLAN OF CARE
Goal Outcome Evaluation:         Patient having small bowel study today. Continues to have abdominal pain. Controlled with medication

## 2021-01-11 NOTE — PROGRESS NOTES
Discharge Planning Assessment   [facility]     Patient Name: Lucinda Cope  MRN: [id number]  Today's Date: 1/11/2021    Admit Date: 1/9/2021    Discharge Needs Assessment     Row Name 01/11/21 1406       Living Environment    Lives With  child(arely), adult    Current Living Arrangements  home/apartment/condo    Primary Care Provided by  self    Provides Primary Care For  no one    Family Caregiver if Needed  child(arely), adult    Quality of Family Relationships  helpful    Able to Return to Prior Arrangements  yes       Resource/Environmental Concerns    Resource/Environmental Concerns  none    Transportation Concerns  car, none       Transition Planning    Patient/Family Anticipates Transition to  home with family    Patient/Family Anticipated Services at Transition  none    Transportation Anticipated  family or friend will provide       Discharge Needs Assessment    Readmission Within the Last 30 Days  no previous admission in last 30 days    Equipment Currently Used at Home  none    Concerns to be Addressed  denies needs/concerns at this time    Anticipated Changes Related to Illness  none    Equipment Needed After Discharge  none        Discharge Plan     Row Name 01/11/21 1407       Plan    Plan  D/C Plan: Home with family.    Patient/Family in Agreement with Plan  yes    Plan Comments   spoke to patient at bedside wearing mask and goggles and keeping distance greater than 6 feet and spent less than 15 minutes in room. Patient lives with adult children, is IADLs and drives. PCP and pharmacy verified-denies any difficulty affording meds. Denies any d/c needs at this time. Patient states she follows with [facility]. Barrier to D/C: SBFT today, clear liquid diet.          Expected Discharge Date and Time     Expected Discharge Date Expected Discharge Time    Jan 12, 2021         Demographic Summary     Row Name 01/11/21 1406       General Information    Admission Type  observation    Arrived From   emergency department    Referral Source  admission list    Reason for Consult  discharge planning    Preferred Language  English     Used During This Interaction  no        Functional Status     Row Name 01/11/21 1406       Functional Status    Usual Activity Tolerance  good    Current Activity Tolerance  good       Functional Status, IADL    Medications  independent    Meal Preparation  independent    Housekeeping  independent    Laundry  independent    Shopping  independent       Mental Status    General Appearance WDL  WDL       Mental Status Summary    Recent Changes in Mental Status/Cognitive Functioning  no changes          Isaura Rodriguez

## 2021-01-11 NOTE — PLAN OF CARE
Outcome:    Patient is resting at this time. Tolerating clear liq diet. No surgery scheduled at this time. Patient ambulating independently. No complications. VSS

## 2021-01-11 NOTE — PROGRESS NOTES
General Surgery Progress Note    Name: Lucinda Cope ADMIT: 2021   : 1975  PCP: Melissa Iniguez APRN    MRN: [id number] LOS: 0 days   AGE/SEX: 45 y.o. female  ROOM: [address] [place] [address]    Chief Complaint   Patient presents with   • Abdominal Pain     Subjective     45 y.o. female complicated abdominal surgical history status post gastric sleeve revision to Alfonzo-en-Y gastric bypass for abscess splenectomy previous hernia repair cirrhosis requiring serial paracenteses with a ventral hernia.    Objective   No overt nausea or vomiting. No significant bowel function the last 24 hours. Still having the epigastric abdominal pain and pain around the hernia. It is still sharp and fairly severe.    Scheduled Medications:   apixaban, 5 mg, Oral, Q12H  bumetanide, 2 mg, Oral, Daily  levothyroxine, 150 mcg, Oral, Daily  pregabalin, 25 mg, Oral, Nightly  riFAXIMin, 550 mg, Oral, Q12H  sodium chloride, 10 mL, Intravenous, Q12H  spironolactone, 300 mg, Oral, Daily        Active Infusions:       As Needed Medications:  Calcium Gluconate-NaCl **AND** calcium gluconate **AND** Calcium, Ionized  •  ketamine (KETALAR) IVPB **AND** Ketamine Vital Signs & Assessment  •  magnesium sulfate **OR** magnesium sulfate **OR** magnesium sulfate  •  melatonin  •  nitroglycerin  •  ondansetron **OR** ondansetron  •  oxyCODONE  •  potassium & sodium phosphates **OR** potassium & sodium phosphates  •  potassium chloride  •  potassium chloride  •  [COMPLETED] Insert peripheral IV **AND** sodium chloride  •  sodium chloride    Vital Signs  Vital Signs (range)  Temp:  [97.6 °F (36.4 °C)-98.4 °F (36.9 °C)] 98 °F (36.7 °C)  Heart Rate:  [71-84] 78  Resp:  [14-16] 14  BP: (123-145)/(75-88) 123/81    Physical Exam:  Physical Exam  Constitutional:       Appearance: Normal appearance.   HENT:      Head: Normocephalic and atraumatic.   Cardiovascular:      Rate and Rhythm: Normal rate.   Pulmonary:      Effort: Pulmonary effort is normal. No  respiratory distress.   Abdominal:      Comments: Soft nondistended minimal tenderness to palpation most focally around the hernias which are mostly reducible but not completely reducible, the hernia mouth seem to be fairly broad   Skin:     General: Skin is warm and dry.   Neurological:      General: No focal deficit present.      Mental Status: She is alert. Mental status is at baseline.   Psychiatric:         Mood and Affect: Mood normal.         Behavior: Behavior normal.         Results Review:     CBC    Results from last 7 days   Lab Units 01/11/21  0410 01/10/21  0059 01/09/21  1810 01/05/21  0614 01/04/21  2118   WBC 10*3/mm3 10.50 12.70* 9.50 7.80 10.00   HEMOGLOBIN g/dL 12.5 11.0* 12.2 12.1 11.4*   PLATELETS 10*3/mm3 519* 480* 581* 648* 612*     BMP   Results from last 7 days   Lab Units 01/11/21  0410 01/10/21  0059 01/09/21  1810 01/05/21  0614 01/04/21  2341   SODIUM mmol/L 138 136 138  --  137   POTASSIUM mmol/L 4.0 3.5 3.4* 3.7 3.7   CHLORIDE mmol/L 97* 103 102  --  103   CO2 mmol/L 33.0* 26.0 29.0  --  26.0   BUN mg/dL 8 8 9  --  6   CREATININE mg/dL 0.76 0.57 0.65  --  0.61   GLUCOSE mg/dL 109* 92 80  --  109*   MAGNESIUM mg/dL 1.8 2.1  --   --   --        I reviewed the patient's new clinical results.    Assessment/Plan       Abdominal pain    H/O thyroidectomy    Thyroid cancer (CMS/HCC)    CARTER (nonalcoholic steatohepatitis)    Ventral hernia    History of DVT (deep vein thrombosis)      45 y.o. female complicated abdominal surgical history status post gastric sleeve revision to Alfonzo-en-Y gastric bypass for abscess splenectomy previous hernia repair cirrhosis requiring serial paracenteses with a ventral hernia.    We will get a small bowel follow-through today  Will review the potential for hernia repair if complete obstruction. If not we will consider having gastroenterology weigh in about any alternative etiologies that may be able to be addressed nonsurgically that could help with her  epigastric abdominal discomfort.    Personal protective equipment used for this patient encounter:  Patient wearing surgical mask []    Provider wearing a surgical mask [x]    Gloves []    Eye protection [x]    Face Shield []    Gown []    N 95 respirator or CAPR/PAPR []     This note was created using Dragon Voice Recognition software.    Enzo Silva MD  01/11/21  11:40 EST

## 2021-01-11 NOTE — PROGRESS NOTES
[facility] Medicine Services Daily Progress Note      Hospitalist Team  LOS 0 days      Patient Care Team:  Melissa Iniguez APRN as PCP - General (Family Medicine)    Patient Location: [facility]      Subjective   Subjective     Chief Complaint / Subjective  Chief Complaint   Patient presents with   • Abdominal Pain       Present on Admission:  • Abdominal pain  • Ventral hernia  • Thyroid cancer (CMS/HCC)  • CARTER (nonalcoholic steatohepatitis)      Brief Synopsis of Hospital Course/HPI  Ms. Cope is a 45 y.o. female past medical history of Carter, ventral hernia, umbilical hernia, thyroid cancer status post thyroidectomy, previous DVT and chronic pain who presents to [facility] complaining of abdominal pain.  Patient states that on 1/8/2020 when she noted significant increase in her midline abdominal pain at the area of her ventral hernia.  She describes the pain as a tightness with muscle spasms rated 10/10 at its worst without obvious provoking factors however she does note that being in a hot bath seems to improve the pain slightly.  Pain is noted is radiating to her chest and back with a constant and worsening intensity.  Some nausea without vomiting is reported but she denies any dyspnea, cough or fever, sensation of tachycardia or palpitations, changes in bowel or bladder habits, hematochezia or melena.  Patient does not smoke or drink alcohol.  Also of note patient was recently seen at this facility for abdominal pain and distention and had paracentesis on 1/5/2021 with 1.9 L of fluid removed at that time.  She notes that her distention and diffuse abdominal pain have improved significantly since that procedure.     In the ED patient had lab significant for troponin of less than 0.010, potassium: 3.4, alk phos: 198, remainder of CBC and CMP was generally unremarkable.  Lipase: 28, PT: 11.6, INR: 1.06.  UA shows a specific gravity of 1.032 but is otherwise unremarkable.  CT of  "abdomen shows redemonstration of ventral body wall narrow neck hernia with fluid noted is now containing a longer segment of mildly distended small bowel with fecaloid contents with possible incarceration noted.  A slight improvement in large volume ascites with evidence of portal hypertension continues to be present as well as splenectomy, hysterectomy, cholecystectomy and surgical findings on the stomach.  Chest x-ray reported with lower lung volumes and changes consistent with chronic but no active disease.  EKG shows sinus rhythm at 87 without obvious acute ST changes or ectopy and a QTC of 423 ms.     Date::    1/10/21  Continues to report some abdominal pain    ROS  10 point ROS reviewed, negative unless otherwise stated     Objective   Objective      Vital Signs  Temp:  [97.6 °F (36.4 °C)-98.4 °F (36.9 °C)] 98.4 °F (36.9 °C)  Heart Rate:  [69-84] 77  Resp:  [14-16] 16  BP: (124-146)/(81-88) 124/82  Oxygen Therapy  SpO2: 100 %  Pulse Oximetry Type: Continuous  Device (Oxygen Therapy): room air  Flowsheet Rows      First Filed Value   Admission Height  160 cm (63\") Documented at 01/09/2021 1657   Admission Weight  59.7 kg (131 lb 9.8 oz) Documented at 01/09/2021 1657        Intake & Output (last 3 days)       01/08 0701 - 01/09 0700 01/09 0701 - 01/10 0700 01/10 0701 - 01/11 0700    P.O.   1520    Total Intake(mL/kg)   1520 (24.8)    Urine (mL/kg/hr)   2750 (1.9)    Total Output   2750    Net   -1230               Lines, Drains & Airways    Active LDAs     Name:   Placement date:   Placement time:   Site:   Days:    Peripheral IV 01/09/21 1811 Left Hand   01/09/21 1811    Hand   1                  Physical Exam:    Physical Exam  Physical Exam  Vitals signs reviewed.   Constitutional:       General: She is not in acute distress.     Appearance: Normal appearance. She is not ill-appearing or toxic-appearing.   HENT:      Head: Normocephalic and atraumatic.      Right Ear: External ear normal.      Left Ear: " External ear normal.      Nose: Nose normal.      Mouth/Throat:      Mouth: Mucous membranes are moist.   Eyes:      Extraocular Movements: Extraocular movements intact.      Conjunctiva/sclera: Conjunctivae normal.   Neck:      Musculoskeletal: Normal range of motion.   Cardiovascular:      Rate and Rhythm: Normal rate and regular rhythm.      Pulses: Normal pulses.      Heart sounds: Normal heart sounds.   Pulmonary:      Effort: Pulmonary effort is normal.      Breath sounds: Normal breath sounds.   Abdominal:      General: There is no distension.      Tenderness: There is abdominal tenderness.      Hernia: A hernia is present.      Comments: Hyperactive bowel sounds noted   Musculoskeletal: Normal range of motion.      Right lower leg: No edema.      Left lower leg: No edema.   Skin:     General: Skin is warm and dry.   Neurological:      General: No focal deficit present.      Mental Status: She is alert and oriented to person, place, and time.   Psychiatric:         Mood and Affect: Mood normal.         Behavior: Behavior normal.         Thought Content: Thought content normal.         Judgment: Judgment normal.     Procedures:              Results Review:     I reviewed the patient's new clinical results.      Lab Results (last 24 hours)     ** No results found for the last 24 hours. **        No results found for: HGBA1C  Results from last 7 days   Lab Units 01/09/21  1810 01/05/21  0614 01/04/21  2118   INR  1.06 1.09 1.04               Microbiology Results (last 10 days)     Procedure Component Value - Date/Time    COVID-19,CEPHEID,COR/LEIDA/PAD IN-HOUSE(OR EMERGENT/ADD-ON),NP SWAB IN TRANSPORT MEDIA 3-4 HR TAT - Swab, Nasopharynx [[id number]]  (Normal) Collected: 01/05/21 0623    Lab Status: Final result Specimen: Swab from Nasopharynx Updated: 01/05/21 0730     COVID19 Not Detected    Narrative:      Fact sheet for providers: https://www.fda.gov/media/419165/download     Fact sheet for patients:  https://www.fda.gov/media/521242/download          ECG/EMG Results (most recent)     Procedure Component Value Units Date/Time    ECG 12 Lead [[id number]] Collected: 01/09/21 1746     Updated: 01/10/21 1403     QT Interval 374 ms     Narrative:      HEART RATE= 87  bpm  RR Interval= 692  ms  NE Interval= 151  ms  P Horizontal Axis= 4  deg  P Front Axis= 21  deg  QRSD Interval= 96  ms  QT Interval= 374  ms  QRS Axis= 25  deg  T Wave Axis= 4  deg  - NORMAL ECG -  Sinus rhythm  When compared with ECG of 16-Dec-2020 5:39:24,  No significant change  Electronically Signed By: Blayne Perales (LEIDA) 10-Munir-2021 13:59:25  Date and Time of Study: 2021-01-09 17:46:11          Results for orders placed during the hospital encounter of 08/23/19   Duplex Venous Lower Extremity - Right CAR    Narrative · Acute right lower extremity superficial thrombophlebitis noted in the   greater saphenous (above knee) and greater saphenous (below knee).  · All other right sided veins appeared normal.               Ct Abdomen Pelvis Without Contrast    Result Date: 1/9/2021  1.  Redemonstration of ventral body wall narrowneck hernia, with fluid, now containing a longer segment of mildly distended small bowel with fecaloid contents, query incarceration. There could be early obstruction. 2.  Slight improvement in large volume ascites. Evidence of portal hypertension. 3.  Splenectomy. Hysterectomy. Cholecystectomy. Surgical findings on the stomach. Electronically signed by:  Dina Duran  1/9/2021 7:11 PM    Ct Abdomen Pelvis With Contrast    Result Date: 1/4/2021  1. Cirrhotic liver morphology, portosystemic collateralization (including esophageal varices and hemorrhoids), cavernous transformation of the main portal vein and moderate ascites. Findings are compatible with portal venous hypertension. 2. An umbilical hernia contains a short segment of nonobstructed small bowel and ascites. A supraumbilical ventral hernia also contains ascites. 3.  Additional chronic and postoperative findings as described above. Electronically signed by:  Champ Gregory M.D.  1/4/2021 11:16 PM    Xr Chest 1 View    Result Date: 1/9/2021  Low lung volumes and chronic changes with no active disease.  Electronically Signed By-Randell Stevens MD On:1/9/2021 6:10 PM This report was finalized on [id number] by  Randell Stevens MD.      Xrays, labs reviewed personally by physician.    Medication Review:   I have reviewed the patient's current medication list      Scheduled Meds  apixaban, 5 mg, Oral, Q12H  bumetanide, 2 mg, Oral, Daily  levothyroxine, 150 mcg, Oral, Daily  pregabalin, 25 mg, Oral, Nightly  riFAXIMin, 550 mg, Oral, Q12H  sodium chloride, 10 mL, Intravenous, Q12H  spironolactone, 300 mg, Oral, Daily        Meds Infusions       Meds PRN  Calcium Gluconate-NaCl **AND** calcium gluconate **AND** Calcium, Ionized  •  ketamine (KETALAR) IVPB **AND** Ketamine Vital Signs & Assessment  •  magnesium sulfate **OR** magnesium sulfate **OR** magnesium sulfate  •  melatonin  •  nitroglycerin  •  ondansetron **OR** ondansetron  •  oxyCODONE  •  potassium & sodium phosphates **OR** potassium & sodium phosphates  •  potassium chloride  •  potassium chloride  •  [COMPLETED] Insert peripheral IV **AND** sodium chloride  •  sodium chloride      Assessment/Plan   Assessment/Plan     Active Hospital Problems    Diagnosis  POA   • **Abdominal pain [R10.9]  Yes   • History of DVT (deep vein thrombosis) [Z86.718]  Not Applicable   • Ventral hernia [K43.9]  Yes   • CARTER (nonalcoholic steatohepatitis) [K75.81]  Yes   • H/O thyroidectomy [Z90.09]  Not Applicable   • Thyroid cancer (CMS/HCC) [C73]  Yes      Resolved Hospital Problems   No resolved problems to display.     Abdominal pain with history of ventral hernia  -Patient presents with significantly worsening midline abdominal pain which began on 1/8/2021 rated at 10/10 radiating to her chest and back.  ED provider notes uncertain complete  reduction attempt in ED.  -CT of abdomen shows redemonstration of ventral body wall narrow neck hernia with fluid noted is now containing a longer segment of mildly distended small bowel with fecaloid contents with possible incarceration noted.  A slight improvement in large volume ascites with evidence of portal hypertension continues to be present as well as splenectomy, hysterectomy, cholecystectomy and surgical findings on the stomach.   -N.p.o. at midnight, diet advanced per surgery  -Patient given 1.5 mg of Dilaudid IV in ED with some relief in pain reported, continue p.o. Dilaudid as needed  -Continuous pulse ox ordered  -General surgery consulted, appreciate recs      Ramon  -CMP shows an alk phos of 198 with otherwise normal ALT, AST, bilirubin and INR and PT.  -Patient had paracentesis on 1/5/2020 with 1.9 L of fluid removed and clinical improvement noted at that time  -CT continues to demonstrate large volume of ascites though patient appears clinically much less distended and reports improvement following her procedure  -Continue Bumex, Xifaxan and spironolactone  -Monitor CMP while admitted     Hypothyroidism with previous thyroid cancer and thyroidectomy  -Continue levothyroxine      Chronic pain  -Continue Lyrica  -Hold home oxycodone while patient receiving p.o. Dilaudid as above     History of DVT  -Continue Eliquis    VTE Prophylaxis - Eliquis (home med).  Hold Eliquis dose if plans for surgery     Code Status -   Code Status and Medical Interventions:   Ordered at: 01/09/21 2238     Code Status:    CPR     Medical Interventions (Level of Support Prior to Arrest):    Full       Discharge Planning  Pending finalized surgery recommendations. Patient follows actively with transplant center.    Continued Care and Services - Admitted Since 1/9/2021    Coordination has not been started for this encounter.           Electronically signed by Ni Marshall MD, 01/11/21, 02:47 EST.  Kiley Galdamez  Hospitalist Team

## 2021-01-11 NOTE — PROGRESS NOTES
[facility] Emergency Room Medicine Services Daily Progress Note      Hospitalist Team  LOS 0 days      Patient Care Team:  Melissa Iniguez APRN as PCP - General (Family Medicine)    Patient Location: [facility]      Subjective   Subjective     Chief Complaint / Subjective  Chief Complaint   Patient presents with   • Abdominal Pain       Present on Admission:  • Abdominal pain  • Ventral hernia  • Thyroid cancer (CMS/HCC)  • CARTER (nonalcoholic steatohepatitis)      Brief Synopsis of Hospital Course/HPI  Ms. Cope is a 45 y.o. female past medical history of Carter, ventral hernia, umbilical hernia, thyroid cancer status post thyroidectomy, previous DVT and chronic pain who presents to [facility] complaining of abdominal pain.  Patient states that on 1/8/2020 when she noted significant increase in her midline abdominal pain at the area of her ventral hernia.  She describes the pain as a tightness with muscle spasms rated 10/10 at its worst without obvious provoking factors however she does note that being in a hot bath seems to improve the pain slightly.  Pain is noted is radiating to her chest and back with a constant and worsening intensity.  Some nausea without vomiting is reported but she denies any dyspnea, cough or fever, sensation of tachycardia or palpitations, changes in bowel or bladder habits, hematochezia or melena.  Patient does not smoke or drink alcohol.  Also of note patient was recently seen at this facility for abdominal pain and distention and had paracentesis on 1/5/2021 with 1.9 L of fluid removed at that time.  She notes that her distention and diffuse abdominal pain have improved significantly since that procedure.     In the ED patient had lab significant for troponin of less than 0.010, potassium: 3.4, alk phos: 198, remainder of CBC and CMP was generally unremarkable.  Lipase: 28, PT: 11.6, INR: 1.06.  UA shows a specific gravity of 1.032 but is otherwise unremarkable.  CT of  "abdomen shows redemonstration of ventral body wall narrow neck hernia with fluid noted is now containing a longer segment of mildly distended small bowel with fecaloid contents with possible incarceration noted.  A slight improvement in large volume ascites with evidence of portal hypertension continues to be present as well as splenectomy, hysterectomy, cholecystectomy and surgical findings on the stomach.  Chest x-ray reported with lower lung volumes and changes consistent with chronic but no active disease.  EKG shows sinus rhythm at 87 without obvious acute ST changes or ectopy and a QTC of 423 ms.     Date::    1/10/21  Continues to report some abdominal pain      11/1/2021  Patient seen by Dr. Silva who ordered small bowel follow-through.  No bowel movements reported by patient  Denies vomiting  Went for small bowel follow-through      ROS  10 point ROS reviewed, negative unless otherwise stated     Objective   Objective      Vital Signs  Temp:  [97.6 °F (36.4 °C)-98.4 °F (36.9 °C)] 98 °F (36.7 °C)  Heart Rate:  [71-84] 78  Resp:  [14-16] 14  BP: (123-145)/(75-88) 123/81  Oxygen Therapy  SpO2: 99 %  Pulse Oximetry Type: Intermittent  Device (Oxygen Therapy): room air  Flowsheet Rows      First Filed Value   Admission Height  160 cm (63\") Documented at 01/09/2021 1657   Admission Weight  59.7 kg (131 lb 9.8 oz) Documented at 01/09/2021 1657        Intake & Output (last 3 days)       01/08 0701 - 01/09 0700 01/09 0701 - 01/10 0700 01/10 0701 - 01/11 0700 01/11 0701 - 01/12 0700    P.O.   1520     Total Intake(mL/kg)   1520 (24.8)     Urine (mL/kg/hr)   2750 (1.9) 600 (1.3)    Total Output   2750 600    Net   -1230 -600                Lines, Drains & Airways    Active LDAs     Name:   Placement date:   Placement time:   Site:   Days:    Peripheral IV 01/09/21 1811 Left Hand   01/09/21 1811    Hand   1                  Physical Exam:    Physical Exam  Physical Exam  Vitals signs reviewed.   Constitutional:      "  General: She is not in acute distress.     Appearance: Normal appearance. She is not ill-appearing or toxic-appearing.   HENT:      Head: Normocephalic and atraumatic.      Right Ear: External ear normal.      Left Ear: External ear normal.      Nose: Nose normal.      Mouth/Throat:      Mouth: Mucous membranes are moist.   Eyes:      Extraocular Movements: Extraocular movements intact.      Conjunctiva/sclera: Conjunctivae normal.   Neck:      Musculoskeletal: Normal range of motion.   Cardiovascular:      Rate and Rhythm: Normal rate and regular rhythm.      Pulses: Normal pulses.      Heart sounds: Normal heart sounds.   Pulmonary:      Effort: Pulmonary effort is normal.      Breath sounds: Normal breath sounds.   Abdominal:      General: There is no distension.      Tenderness: There is abdominal tenderness.      Hernia: A hernia is present.      Comments: Hyperactive bowel sounds noted   Musculoskeletal: Normal range of motion.      Right lower leg: No edema.      Left lower leg: No edema.   Skin:     General: Skin is warm and dry.   Neurological:      General: No focal deficit present.      Mental Status: She is alert and oriented to person, place, and time.   Psychiatric:         Mood and Affect: Mood normal.         Behavior: Behavior normal.         Thought Content: Thought content normal.         Judgment: Judgment normal.     Procedures:              Results Review:     I reviewed the patient's new clinical results.      Lab Results (last 24 hours)     Procedure Component Value Units Date/Time    Comprehensive Metabolic Panel [[id number]]  (Abnormal) Collected: 01/11/21 0410    Specimen: Blood Updated: 01/11/21 0457     Glucose 109 mg/dL      BUN 8 mg/dL      Creatinine 0.76 mg/dL      Sodium 138 mmol/L      Potassium 4.0 mmol/L      Chloride 97 mmol/L      CO2 33.0 mmol/L      Calcium 8.6 mg/dL      Total Protein 6.7 g/dL      Albumin 3.20 g/dL      ALT (SGPT) 56 U/L      AST (SGOT) 95 U/L      Alkaline  Phosphatase 239 U/L      Total Bilirubin 1.2 mg/dL      eGFR Non African Amer 82 mL/min/1.73      Globulin 3.5 gm/dL      A/G Ratio 0.9 g/dL      BUN/Creatinine Ratio 10.5     Anion Gap 8.0 mmol/L     Narrative:      GFR Normal >60  Chronic Kidney Disease <60  Kidney Failure <15      Magnesium [[id number]]  (Normal) Collected: 01/11/21 0410    Specimen: Blood Updated: 01/11/21 0457     Magnesium 1.8 mg/dL     CBC & Differential [[id number]]  (Abnormal) Collected: 01/11/21 0410    Specimen: Blood Updated: 01/11/21 0439    Narrative:      The following orders were created for panel order CBC & Differential.  Procedure                               Abnormality         Status                     ---------                               -----------         ------                     CBC Auto Differential[[id number]]        Abnormal            Final result                 Please view results for these tests on the individual orders.    CBC Auto Differential [[id number]]  (Abnormal) Collected: 01/11/21 0410    Specimen: Blood Updated: 01/11/21 0439     WBC 10.50 10*3/mm3      RBC 4.38 10*6/mm3      Hemoglobin 12.5 g/dL      Hematocrit 38.2 %      MCV 87.1 fL      MCH 28.6 pg      MCHC 32.9 g/dL      RDW 17.1 %      RDW-SD 52.5 fl      MPV 8.7 fL      Platelets 519 10*3/mm3      Neutrophil % 51.7 %      Lymphocyte % 31.7 %      Monocyte % 9.8 %      Eosinophil % 6.3 %      Basophil % 0.5 %      Neutrophils, Absolute 5.40 10*3/mm3      Lymphocytes, Absolute 3.30 10*3/mm3      Monocytes, Absolute 1.00 10*3/mm3      Eosinophils, Absolute 0.70 10*3/mm3      Basophils, Absolute 0.10 10*3/mm3      nRBC 0.3 /100 WBC         No results found for: HGBA1C  Results from last 7 days   Lab Units 01/09/21  1810 01/05/21  0614 01/04/21  2118   INR  1.06 1.09 1.04               Microbiology Results (last 10 days)     Procedure Component Value - Date/Time    COVID-19,CEPHEID,COR/LEIDA/PAD IN-HOUSE(OR EMERGENT/ADD-ON),NP SWAB IN TRANSPORT MEDIA 3-4  HR TAT - Swab, Nasopharynx [[id number]]  (Normal) Collected: 01/05/21 0623    Lab Status: Final result Specimen: Swab from Nasopharynx Updated: 01/05/21 0730     COVID19 Not Detected    Narrative:      Fact sheet for providers: https://www.fda.gov/media/053369/download     Fact sheet for patients: https://www.fda.gov/media/809135/download          ECG/EMG Results (most recent)     Procedure Component Value Units Date/Time    ECG 12 Lead [[id number]] Collected: 01/09/21 1746     Updated: 01/10/21 1403     QT Interval 374 ms     Narrative:      HEART RATE= 87  bpm  RR Interval= 692  ms  MI Interval= 151  ms  P Horizontal Axis= 4  deg  P Front Axis= 21  deg  QRSD Interval= 96  ms  QT Interval= 374  ms  QRS Axis= 25  deg  T Wave Axis= 4  deg  - NORMAL ECG -  Sinus rhythm  When compared with ECG of 16-Dec-2020 5:39:24,  No significant change  Electronically Signed By: Blayne Perales (LEIDA) 10-Munir-2021 13:59:25  Date and Time of Study: 2021-01-09 17:46:11          Results for orders placed during the hospital encounter of 08/23/19   Duplex Venous Lower Extremity - Right CAR    Narrative · Acute right lower extremity superficial thrombophlebitis noted in the   greater saphenous (above knee) and greater saphenous (below knee).  · All other right sided veins appeared normal.               Ct Abdomen Pelvis Without Contrast    Result Date: 1/9/2021  1.  Redemonstration of ventral body wall narrowneck hernia, with fluid, now containing a longer segment of mildly distended small bowel with fecaloid contents, query incarceration. There could be early obstruction. 2.  Slight improvement in large volume ascites. Evidence of portal hypertension. 3.  Splenectomy. Hysterectomy. Cholecystectomy. Surgical findings on the stomach. Electronically signed by:  Dina Duran  1/9/2021 7:11 PM    Ct Abdomen Pelvis With Contrast    Result Date: 1/4/2021  1. Cirrhotic liver morphology, portosystemic collateralization (including esophageal  varices and hemorrhoids), cavernous transformation of the main portal vein and moderate ascites. Findings are compatible with portal venous hypertension. 2. An umbilical hernia contains a short segment of nonobstructed small bowel and ascites. A supraumbilical ventral hernia also contains ascites. 3. Additional chronic and postoperative findings as described above. Electronically signed by:  Champ Gregory M.D.  1/4/2021 11:16 PM    Xr Chest 1 View    Result Date: 1/9/2021  Low lung volumes and chronic changes with no active disease.  Electronically Signed By-Randell Stevens MD On:1/9/2021 6:10 PM This report was finalized on [id number] by  Randell Stevens MD.      Xrays, labs reviewed personally by physician.    Medication Review:   I have reviewed the patient's current medication list      Scheduled Meds  apixaban, 5 mg, Oral, Q12H  bumetanide, 2 mg, Oral, Daily  levothyroxine, 150 mcg, Oral, Daily  pregabalin, 25 mg, Oral, Nightly  riFAXIMin, 550 mg, Oral, Q12H  sodium chloride, 10 mL, Intravenous, Q12H  spironolactone, 300 mg, Oral, Daily        Meds Infusions       Meds PRN  Calcium Gluconate-NaCl **AND** calcium gluconate **AND** Calcium, Ionized  •  ketamine (KETALAR) IVPB **AND** Ketamine Vital Signs & Assessment  •  magnesium sulfate **OR** magnesium sulfate **OR** magnesium sulfate  •  melatonin  •  nitroglycerin  •  ondansetron **OR** ondansetron  •  oxyCODONE  •  potassium & sodium phosphates **OR** potassium & sodium phosphates  •  potassium chloride  •  potassium chloride  •  [COMPLETED] Insert peripheral IV **AND** sodium chloride  •  sodium chloride      Assessment/Plan   Assessment/Plan     Active Hospital Problems    Diagnosis  POA   • **Abdominal pain [R10.9]  Yes   • History of DVT (deep vein thrombosis) [Z86.718]  Not Applicable   • Ventral hernia [K43.9]  Yes   • CARTER (nonalcoholic steatohepatitis) [K75.81]  Yes   • H/O thyroidectomy [Z90.09]  Not Applicable   • Thyroid cancer (CMS/HCC) [C73]   Yes      Resolved Hospital Problems   No resolved problems to display.     Abdominal pain with history of ventral hernia  -Patient presents with significantly worsening midline abdominal pain which began on 1/8/2021 rated at 10/10 radiating to her chest and back.  ED provider notes uncertain complete reduction attempt in ED.  -CT of abdomen shows redemonstration of ventral body wall narrow neck hernia with fluid noted is now containing a longer segment of mildly distended small bowel with fecaloid contents with possible incarceration noted.  A slight improvement in large volume ascites with evidence of portal hypertension continues to be present as well as splenectomy, hysterectomy, cholecystectomy and surgical findings on the stomach.   -N.p.o. at midnight, diet advanced per surgery  -Patient given 1.5 mg of Dilaudid IV in ED with some relief in pain reported, continue p.o. Dilaudid as needed  -Continuous pulse ox ordered  -General surgery consulted, appreciate recs  Small bowel follow-through today       Ramon  -CMP shows an alk phos of 198 with otherwise normal ALT, AST, bilirubin and INR and PT.  -Patient had paracentesis on 1/5/2020 with 1.9 L of fluid removed and clinical improvement noted at that time  -CT continues to demonstrate large volume of ascites though patient appears clinically much less distended and reports improvement following her procedure  -Continue Bumex, Xifaxan and spironolactone  -Monitor CMP while admitted     Hypothyroidism with previous thyroid cancer and thyroidectomy  -Continue levothyroxine      Chronic pain  -Continue Lyrica  -Hold home oxycodone while patient receiving p.o. Dilaudid as above     History of DVT  -Continue Eliquis    VTE Prophylaxis - Eliquis (home med).  Hold Eliquis dose if plans for surgery     Code Status -   Code Status and Medical Interventions:   Ordered at: 01/09/21 2328     Code Status:    CPR     Medical Interventions (Level of Support Prior to Arrest):     Full       Discharge Planning  Pending finalized surgery recommendations. Patient follows actively with transplant center.    Continued Care and Services - Admitted Since 1/9/2021    Coordination has not been started for this encounter.           Electronically signed by Ever Motta MD, 01/11/21, 14:31 EST.  [facility] Hospitalist Team

## 2021-01-12 ENCOUNTER — APPOINTMENT (OUTPATIENT)
Dept: GENERAL RADIOLOGY | Facility: HOSPITAL | Age: 46
End: 2021-01-12

## 2021-01-12 LAB
ALBUMIN SERPL-MCNC: 3.4 G/DL (ref 3.5–5.2)
ALBUMIN/GLOB SERPL: 0.9 G/DL
ALP SERPL-CCNC: 245 U/L (ref 39–117)
ALT SERPL W P-5'-P-CCNC: 45 U/L (ref 1–33)
ANION GAP SERPL CALCULATED.3IONS-SCNC: 10 MMOL/L (ref 5–15)
AST SERPL-CCNC: 49 U/L (ref 1–32)
BASOPHILS # BLD AUTO: 0 10*3/MM3 (ref 0–0.2)
BASOPHILS NFR BLD AUTO: 0.3 % (ref 0–1.5)
BILIRUB SERPL-MCNC: 1 MG/DL (ref 0–1.2)
BUN SERPL-MCNC: 10 MG/DL (ref 6–20)
BUN/CREAT SERPL: 11.1 (ref 7–25)
CALCIUM SPEC-SCNC: 8.6 MG/DL (ref 8.6–10.5)
CHLORIDE SERPL-SCNC: 94 MMOL/L (ref 98–107)
CO2 SERPL-SCNC: 31 MMOL/L (ref 22–29)
CREAT SERPL-MCNC: 0.9 MG/DL (ref 0.57–1)
DEPRECATED RDW RBC AUTO: 52.5 FL (ref 37–54)
EOSINOPHIL # BLD AUTO: 0.4 10*3/MM3 (ref 0–0.4)
EOSINOPHIL NFR BLD AUTO: 2.6 % (ref 0.3–6.2)
ERYTHROCYTE [DISTWIDTH] IN BLOOD BY AUTOMATED COUNT: 17.1 % (ref 12.3–15.4)
GFR SERPL CREATININE-BSD FRML MDRD: 67 ML/MIN/1.73
GLOBULIN UR ELPH-MCNC: 3.8 GM/DL
GLUCOSE SERPL-MCNC: 119 MG/DL (ref 65–99)
HCT VFR BLD AUTO: 41.1 % (ref 34–46.6)
HGB BLD-MCNC: 13 G/DL (ref 12–15.9)
LYMPHOCYTES # BLD AUTO: 3.8 10*3/MM3 (ref 0.7–3.1)
LYMPHOCYTES NFR BLD AUTO: 27.8 % (ref 19.6–45.3)
MAGNESIUM SERPL-MCNC: 1.8 MG/DL (ref 1.6–2.6)
MCH RBC QN AUTO: 28.1 PG (ref 26.6–33)
MCHC RBC AUTO-ENTMCNC: 31.6 G/DL (ref 31.5–35.7)
MCV RBC AUTO: 88.7 FL (ref 79–97)
MONOCYTES # BLD AUTO: 1.5 10*3/MM3 (ref 0.1–0.9)
MONOCYTES NFR BLD AUTO: 10.8 % (ref 5–12)
NEUTROPHILS NFR BLD AUTO: 58.5 % (ref 42.7–76)
NEUTROPHILS NFR BLD AUTO: 8.1 10*3/MM3 (ref 1.7–7)
NRBC BLD AUTO-RTO: 0.1 /100 WBC (ref 0–0.2)
PLATELET # BLD AUTO: 557 10*3/MM3 (ref 140–450)
PMV BLD AUTO: 8.9 FL (ref 6–12)
POTASSIUM SERPL-SCNC: 4.1 MMOL/L (ref 3.5–5.2)
PROT SERPL-MCNC: 7.2 G/DL (ref 6–8.5)
RBC # BLD AUTO: 4.63 10*6/MM3 (ref 3.77–5.28)
SODIUM SERPL-SCNC: 135 MMOL/L (ref 136–145)
WBC # BLD AUTO: 13.8 10*3/MM3 (ref 3.4–10.8)

## 2021-01-12 PROCEDURE — 25010000002 ONDANSETRON PER 1 MG: Performed by: PHYSICIAN ASSISTANT

## 2021-01-12 PROCEDURE — 85025 COMPLETE CBC W/AUTO DIFF WBC: CPT | Performed by: PHYSICIAN ASSISTANT

## 2021-01-12 PROCEDURE — 93005 ELECTROCARDIOGRAM TRACING: CPT | Performed by: INTERNAL MEDICINE

## 2021-01-12 PROCEDURE — 83735 ASSAY OF MAGNESIUM: CPT | Performed by: PHYSICIAN ASSISTANT

## 2021-01-12 PROCEDURE — 93010 ELECTROCARDIOGRAM REPORT: CPT | Performed by: INTERNAL MEDICINE

## 2021-01-12 PROCEDURE — 99232 SBSQ HOSP IP/OBS MODERATE 35: CPT | Performed by: SURGERY

## 2021-01-12 PROCEDURE — 99232 SBSQ HOSP IP/OBS MODERATE 35: CPT | Performed by: INTERNAL MEDICINE

## 2021-01-12 PROCEDURE — 80053 COMPREHEN METABOLIC PANEL: CPT | Performed by: PHYSICIAN ASSISTANT

## 2021-01-12 PROCEDURE — 25010000002 HYDROMORPHONE PER 4 MG: Performed by: STUDENT IN AN ORGANIZED HEALTH CARE EDUCATION/TRAINING PROGRAM

## 2021-01-12 PROCEDURE — 74018 RADEX ABDOMEN 1 VIEW: CPT

## 2021-01-12 RX ORDER — HYDROMORPHONE HCL 110MG/55ML
1 PATIENT CONTROLLED ANALGESIA SYRINGE INTRAVENOUS ONCE
Status: DISCONTINUED | OUTPATIENT
Start: 2021-01-12 | End: 2021-01-12

## 2021-01-12 RX ORDER — HYDROMORPHONE HCL 110MG/55ML
1 PATIENT CONTROLLED ANALGESIA SYRINGE INTRAVENOUS ONCE
Status: COMPLETED | OUTPATIENT
Start: 2021-01-12 | End: 2021-01-12

## 2021-01-12 RX ADMIN — OXYCODONE 10 MG: 5 TABLET ORAL at 22:22

## 2021-01-12 RX ADMIN — RIFAXIMIN 550 MG: 550 TABLET ORAL at 20:16

## 2021-01-12 RX ADMIN — OXYCODONE 10 MG: 5 TABLET ORAL at 18:18

## 2021-01-12 RX ADMIN — OXYCODONE 10 MG: 5 TABLET ORAL at 14:16

## 2021-01-12 RX ADMIN — SPIRONOLACTONE 300 MG: 100 TABLET ORAL at 11:13

## 2021-01-12 RX ADMIN — APIXABAN 5 MG: 5 TABLET, FILM COATED ORAL at 20:16

## 2021-01-12 RX ADMIN — LEVOTHYROXINE SODIUM 150 MCG: 150 TABLET ORAL at 11:15

## 2021-01-12 RX ADMIN — BUMETANIDE 2 MG: 1 TABLET ORAL at 11:15

## 2021-01-12 RX ADMIN — Medication 10 ML: at 20:16

## 2021-01-12 RX ADMIN — Medication 10 ML: at 09:56

## 2021-01-12 RX ADMIN — OXYCODONE 10 MG: 5 TABLET ORAL at 09:53

## 2021-01-12 RX ADMIN — ONDANSETRON 4 MG: 2 INJECTION, SOLUTION INTRAMUSCULAR; INTRAVENOUS at 09:53

## 2021-01-12 RX ADMIN — APIXABAN 5 MG: 5 TABLET, FILM COATED ORAL at 11:15

## 2021-01-12 RX ADMIN — ONDANSETRON 4 MG: 2 INJECTION, SOLUTION INTRAMUSCULAR; INTRAVENOUS at 20:16

## 2021-01-12 RX ADMIN — RIFAXIMIN 550 MG: 550 TABLET ORAL at 11:14

## 2021-01-12 RX ADMIN — OXYCODONE 10 MG: 5 TABLET ORAL at 04:33

## 2021-01-12 RX ADMIN — HYDROMORPHONE HYDROCHLORIDE 1 MG: 2 INJECTION, SOLUTION INTRAMUSCULAR; INTRAVENOUS; SUBCUTANEOUS at 02:33

## 2021-01-12 RX ADMIN — PREGABALIN 25 MG: 25 CAPSULE ORAL at 20:16

## 2021-01-12 NOTE — PLAN OF CARE
Plan of Care :  Patient is resting at this time. PO pain rx given Q 4 hours. Patients diet was advanced to regular per MD. Patient ate a turkey sandwich and chips. Experienced some nausea but no emesis and pp zofran helped. Ambulates independently. Plan of care is ongoing.

## 2021-01-12 NOTE — PROGRESS NOTES
General Surgery Progress Note    Name: Lucinda Cope ADMIT: 2021   : 1975  PCP: Melissa Iniguez APRN    MRN: [id number] LOS: 0 days   AGE/SEX: 45 y.o. female  ROOM: [address] [place] [address]    Chief Complaint   Patient presents with   • Abdominal Pain     Subjective     45 y.o. female complicated abdominal surgical history status post gastric sleeve revision to Alfonzo-en-Y gastric bypass for abscess splenectomy previous hernia repair cirrhosis requiring serial paracenteses with a ventral hernia.    Objective   No evidence of bowel obstruction based on the small bowel follow-through yesterday.  Diet advanced to regular where she had some upper abdominal pain.  She was backed down to n.p.o. and then full liquid diet this morning.  X-ray this morning was reviewed all the contrast is passed into the colon.    Scheduled Medications:   apixaban, 5 mg, Oral, Q12H  bumetanide, 2 mg, Oral, Daily  levothyroxine, 150 mcg, Oral, Daily  pregabalin, 25 mg, Oral, Nightly  riFAXIMin, 550 mg, Oral, Q12H  sodium chloride, 10 mL, Intravenous, Q12H  spironolactone, 300 mg, Oral, Daily        Active Infusions:       As Needed Medications:  Calcium Gluconate-NaCl **AND** calcium gluconate **AND** Calcium, Ionized  •  ketamine (KETALAR) IVPB **AND** Ketamine Vital Signs & Assessment  •  magnesium sulfate **OR** magnesium sulfate **OR** magnesium sulfate  •  melatonin  •  nitroglycerin  •  ondansetron **OR** ondansetron  •  oxyCODONE  •  potassium & sodium phosphates **OR** potassium & sodium phosphates  •  potassium chloride  •  potassium chloride  •  [COMPLETED] Insert peripheral IV **AND** sodium chloride  •  sodium chloride    Vital Signs  Vital Signs (range)  Temp:  [98 °F (36.7 °C)-98.3 °F (36.8 °C)] 98.1 °F (36.7 °C)  Heart Rate:  [] 80  Resp:  [14-16] 16  BP: (115-123)/(73-80) 115/80    Physical Exam:  Physical Exam  Constitutional:       Appearance: Normal appearance.   HENT:      Head: Normocephalic and atraumatic.    Cardiovascular:      Rate and Rhythm: Normal rate.   Pulmonary:      Effort: Pulmonary effort is normal. No respiratory distress.   Abdominal:      Comments: Soft nondistended minimal tenderness to palpation most focally around the hernias which are mostly reducible but not completely reducible, this seems to be a couple of separate hernia sites each at least a few centimeters in diameter 1 around the umbilicus and one just to the left of midline in the supraumbilical location   Skin:     General: Skin is warm and dry.   Neurological:      General: No focal deficit present.      Mental Status: She is alert. Mental status is at baseline.   Psychiatric:         Mood and Affect: Mood normal.         Behavior: Behavior normal.         Results Review:     CBC    Results from last 7 days   Lab Units 01/12/21  0214 01/11/21  0410 01/10/21  0059 01/09/21  1810   WBC 10*3/mm3 13.80* 10.50 12.70* 9.50   HEMOGLOBIN g/dL 13.0 12.5 11.0* 12.2   PLATELETS 10*3/mm3 557* 519* 480* 581*     BMP   Results from last 7 days   Lab Units 01/12/21  0214 01/11/21  2058 01/11/21  0410 01/10/21  0059 01/09/21  1810   SODIUM mmol/L 135*  --  138 136 138   POTASSIUM mmol/L 4.1 3.7 4.0 3.5 3.4*   CHLORIDE mmol/L 94*  --  97* 103 102   CO2 mmol/L 31.0*  --  33.0* 26.0 29.0   BUN mg/dL 10  --  8 8 9   CREATININE mg/dL 0.90  --  0.76 0.57 0.65   GLUCOSE mg/dL 119*  --  109* 92 80   MAGNESIUM mg/dL 1.8  --  1.8 2.1  --        I reviewed the patient's new clinical results.    Assessment/Plan       Abdominal pain    H/O thyroidectomy    Thyroid cancer (CMS/HCC)    CARTER (nonalcoholic steatohepatitis)    Ventral hernia    History of DVT (deep vein thrombosis)      45 y.o. female complicated abdominal surgical history status post gastric sleeve revision to Alfonzo-en-Y gastric bypass for abscess splenectomy previous hernia repair cirrhosis requiring serial paracenteses with a ventral hernia.    Small bowel study without any evidence of bowel obstruction.   This could be intermittent related to her hernia.  However will entertain some alternative diagnoses.  GI is going to see.  May consider evaluation of the gastrojejunostomy to evaluate for any evidence of ulceration that could be contributing to her upper abdominal symptoms with oral intake with pain that radiates to her chest.    Personal protective equipment used for this patient encounter:  Patient wearing surgical mask []    Provider wearing a surgical mask [x]    Gloves []    Eye protection [x]    Face Shield []    Gown []    N 95 respirator or CAPR/PAPR []     This note was created using Dragon Voice Recognition software.    Enzo Silva MD  01/12/21  14:49 EST

## 2021-01-12 NOTE — PLAN OF CARE
Goal Outcome Evaluation:      Patient feeling better. Requesting increase in diet. Dr. Silva notified. Awaiting response

## 2021-01-12 NOTE — NURSING NOTE
Patient c/o a sudden increase in abdominal pain. States it is a cramping/squeeze, located in her left abd into chest. Stat ekg ordered and resulted at sinus tach. Hannah Ward NP notified and ordered patients diet to change to NPO, stat kub ordered, and 1 time dose of pain rx for pain mgmt.   Continue to monitor at this time.

## 2021-01-12 NOTE — PROGRESS NOTES
Continued Stay Note  [facility] Galdame[facility]     Patient Name: Lucinda Cope  MRN: [id number]  Today's Date: 1/12/2021    Admit Date: 1/9/2021    Discharge Plan     Row Name 01/12/21 1306       Plan    Plan  D/C Plan: Home with family.    Plan Comments  Barrier to D/C: advancing diet as tolerated.          Expected Discharge Date and Time     Expected Discharge Date Expected Discharge Time    Jan 13, 2021             Isaura Rodriguez

## 2021-01-12 NOTE — PAYOR COMM NOTE
"IP Auth request for Apoorva Cope  1975  [facility]- [id number]  NOTE:  Pt admit Observation status , IP status beginning 2021.    AUTHORIZATION PENDING  PLEASE FORWARD DETERMINATION TO FOLLOWING CONTACT:    LAUREN HILL POWERS [facility]    [phone]    Apoorva Cope (45 y.o. Female)     Date of Birth Social Security Number Address Home Phone MRN    1975  [address] [place] [address] [phone] [id number]    Protestant Marital Status          None Single       Admission Date Admission Type Admitting Provider Attending Provider Department, Room/Bed    21 Emergency Ever Motta MD Gad, George Fayez Labib Youssief, MD [facility] SURGICAL INPATIENT,     Discharge Date Discharge Disposition Discharge Destination                       Attending Provider: Ever Motta MD    Allergies: Compazine [Prochlorperazine Edisylate], Contrast Dye, Iodinated Diagnostic Agents, Iodine, Morphine, Nsaids, Hydrocodone    Isolation: None   Infection: None   Code Status: CPR    Ht: 160 cm (63\")   Wt: 61.3 kg (135 lb 2.3 oz)    Admission Cmt: None   Principal Problem: Abdominal pain [R10.9]                 Active Insurance as of 2021     Primary Coverage     Payor Plan Insurance Group Employer/Plan Group    [place] [id number]     Payor Plan Address Payor Plan Phone Number Payor Plan Fax Number Effective Dates    [address]   2015 - None Entered    [place] [id number]       Subscriber Name Subscriber Birth Date Member ID       APOORVA COPE 1975 [id number]                 Emergency Contacts      (Rel.) Home Phone Work Phone Mobile Phone    JORGE MORE (Mother) -- -- [phone]    EHNRIK CARMEN (Daughter) -- -- [phone]               History & Physical      Mateusz Kingston PA-C at [phone]     Attestation signed by Ni Marshall MD at 01/10/21 1110    I have reviewed " and agree with the documentation.                        [facility] Medicine Services      Patient Name: Lucinda Cope  : 1975  MRN: [id number]  Primary Care Physician: Melissa Iniguez APRN  Date of admission: 2021    Patient Care Team:  Melissa Iniguez APRN as PCP - General (Family Medicine)          Subjective   History Present Illness     Chief Complaint:   Chief Complaint   Patient presents with   • Abdominal Pain         Ms. Cope is a 45 y.o. female past medical history of Ramon, ventral hernia, umbilical hernia, thyroid cancer status post thyroidectomy, previous DVT and chronic pain who presents to [facility] complaining of abdominal pain.  Patient states that on 2020 when she noted significant increase in her midline abdominal pain at the area of her ventral hernia.  She describes the pain as a tightness with muscle spasms rated 10/10 at its worst without obvious provoking factors however she does note that being in a hot bath seems to improve the pain slightly.  Pain is noted is radiating to her chest and back with a constant and worsening intensity.  Some nausea without vomiting is reported but she denies any dyspnea, cough or fever, sensation of tachycardia or palpitations, changes in bowel or bladder habits, hematochezia or melena.  Patient does not smoke or drink alcohol.  Also of note patient was recently seen at this facility for abdominal pain and distention and had paracentesis on 2021 with 1.9 L of fluid removed at that time.  She notes that her distention and diffuse abdominal pain have improved significantly since that procedure.    In the ED patient had lab significant for troponin of less than 0.010, potassium: 3.4, alk phos: 198, remainder of CBC and CMP was generally unremarkable.  Lipase: 28, PT: 11.6, INR: 1.06.  UA shows a specific gravity of 1.032 but is otherwise unremarkable.  CT of abdomen shows redemonstration of ventral body wall  narrow neck hernia with fluid noted is now containing a longer segment of mildly distended small bowel with fecaloid contents with possible incarceration noted.  A slight improvement in large volume ascites with evidence of portal hypertension continues to be present as well as splenectomy, hysterectomy, cholecystectomy and surgical findings on the stomach.  Chest x-ray reported with lower lung volumes and changes consistent with chronic but no active disease.  EKG shows sinus rhythm at 87 without obvious acute ST changes or ectopy and a QTC of 423 ms.    History of Present Illness    Review of Systems   Constitution: Negative.   HENT: Negative.    Eyes: Negative.    Cardiovascular: Positive for chest pain. Negative for dyspnea on exertion, irregular heartbeat, leg swelling, near-syncope, palpitations and syncope.        Abdominal pain reported as radiating towards chest   Respiratory: Negative.    Endocrine: Negative.    Skin: Negative.    Musculoskeletal: Negative.    Gastrointestinal: Positive for abdominal pain and nausea. Negative for constipation, diarrhea, hematemesis, hematochezia, melena and vomiting.   Genitourinary: Negative.    Neurological: Negative.    Psychiatric/Behavioral: Negative.            Personal History     Past Medical History:   Past Medical History:   Diagnosis Date   • Abdominal pain    • Anemia    • Cancer (CMS/HCC)    • Cirrhosis of liver (CMS/HCC) 04/2017   • Clot    • Deep venous thrombosis of right profunda femoris vein (CMS/HCC) 8/15/2019   • Depression    • Disease of thyroid gland    • Empyema of pleura (CMS/HCC)    • GERD (gastroesophageal reflux disease)    • Heartburn    • Hemorrhoids    • Hernia, ventral    • History of malignant neoplasm of thyroid    • History of transfusion    • Irregular menstrual cycle    • Migraine    • Parathyroid abnormality (CMS/HCC)    • Radiation    • Restless legs syndrome (RLS)    • Urge and stress incontinence    • Varicose veins        Surgical  History:      Past Surgical History:   Procedure Laterality Date   • ABDOMINAL SURGERY     • BEDSIDE PARACENTESIS  3/22/2020        • BEDSIDE PARACENTESIS  4/19/2020        • CHOLECYSTECTOMY      Laparoscopic   • ENDOSCOPY N/A 3/17/2016    Procedure: ESOPHAGOGASTRODUODENOSCOPY WITH BALOON DILATATION 18-20MM WITH GASTRIC SLEEVE SEALANT;  Surgeon: Leonardo Ortiz Jr., MD;  Location: [facility] ENDOSCOPY;  Service:    • ENDOSCOPY N/A 3/14/2016    Procedure: esophagogastroduodenoscopy with fluoroscopy;  Surgeon: Greg Avila III, MD;  Location: [facility] ENDOSCOPY;  Service:    • ENDOSCOPY N/A 4/15/2016    Procedure: EGD WITH DILITATION AND STENT PLACEMENT dilation of pylorus ;  Surgeon: Leonardo Ortiz Jr., MD;  Location: [facility] ENDOSCOPY;  Service:    • ENDOSCOPY N/A 5/13/2016    Procedure: ESOPHAGOGASTRODUODENOSCOPY  W/ STENT;  Surgeon: Leonardo Ortiz Jr., MD;  Location: [facility] ENDOSCOPY;  Service:    • GASTRECTOMY      Gastric Surgery for Morbid Obesity Laparoscopic Longitudinal Gastrectomy   • GASTRIC SLEEVE LAPAROSCOPIC     • HEMORRHOIDECTOMY     • HERNIA REPAIR     • LAPAROSCOPY REPAIR HIATAL HERNIA     • LARYNGOSCOPY      Direct Laryngoscopy with Injection into Vocal Cords   • OTHER SURGICAL HISTORY Bilateral     Ear Pressure Equalization Tube, Insertion, Bilaterally   • PERIPHERALLY INSERTED CENTRAL CATHETER INSERTION     • SPLENECTOMY     • THYROID SURGERY     • TUBAL ABDOMINAL LIGATION             Family History: family history includes Cancer in an other family member; Diabetes in her father; Hypertension in her father; Stroke in her father. Otherwise pertinent FHx was reviewed and unremarkable.     Social History:  reports that she has never smoked. She has never used smokeless tobacco. She reports that she does not drink alcohol or use drugs.      Medications:  Prior to Admission medications    Medication Sig Start Date End Date Taking? Authorizing Provider   apixaban (ELIQUIS) 5 MG tablet tablet  Take 5 mg by mouth 2 (Two) Times a Day.   Yes Justyn Erwin MD   bumetanide (BUMEX) 2 MG tablet Take 2 mg by mouth Daily.   Yes Justyn Erwin MD   levothyroxine (SYNTHROID, LEVOTHROID) 150 MCG tablet Take 150 mcg by mouth Daily.   Yes Justyn Erwin MD   ondansetron ODT (ZOFRAN-ODT) 8 MG disintegrating tablet Place 8 mg on the tongue Every 8 (Eight) Hours As Needed for Nausea or Vomiting.   Yes Justyn Erwin MD   oxyCODONE (ROXICODONE) 10 MG tablet Take 10 mg by mouth Every 6 (Six) Hours.   Yes Justyn Erwin MD   pregabalin (LYRICA) 25 MG capsule Take 25 mg by mouth every night at bedtime.   Yes Justyn Erwin MD   rifaximin (XIFAXAN) 550 MG tablet Take 550 mg by mouth Every 12 (Twelve) Hours.   Yes Justyn Erwin MD   spironolactone (ALDACTONE) 100 MG tablet Take 300 mg by mouth Daily.   Yes Justyn Erwin MD   oxyCODONE (OXY-IR) 5 MG capsule Take 10 mg by mouth Every 6 (Six) Hours.  1/9/21  Justyn Erwin MD       Allergies:    Allergies   Allergen Reactions   • Compazine [Prochlorperazine Edisylate] Hives   • Contrast Dye Hives   • Iodinated Diagnostic Agents    • Iodine Hives   • Morphine Itching   • Nsaids Other (See Comments)     States I am not suppose to take them   • Hydrocodone Rash     Lortab elixir per pt       Objective   Objective     Vital Signs  Temp:  [98.4 °F (36.9 °C)] 98.4 °F (36.9 °C)  Heart Rate:  [76-88] 76  Resp:  [16-18] 18  BP: (130-153)/(82-93) 153/93  SpO2:  [96 %-99 %] 96 %  on   ;   Device (Oxygen Therapy): room air  Body mass index is 23.31 kg/m².    Physical Exam  Vitals signs reviewed.   Constitutional:       General: She is not in acute distress.     Appearance: Normal appearance. She is not ill-appearing or toxic-appearing.   HENT:      Head: Normocephalic and atraumatic.      Right Ear: External ear normal.      Left Ear: External ear normal.      Nose: Nose normal.      Mouth/Throat:      Mouth: Mucous membranes  are moist.   Eyes:      Extraocular Movements: Extraocular movements intact.      Conjunctiva/sclera: Conjunctivae normal.   Neck:      Musculoskeletal: Normal range of motion.   Cardiovascular:      Rate and Rhythm: Normal rate and regular rhythm.      Pulses: Normal pulses.      Heart sounds: Normal heart sounds.   Pulmonary:      Effort: Pulmonary effort is normal.      Breath sounds: Normal breath sounds.   Abdominal:      General: There is no distension.      Tenderness: There is abdominal tenderness.      Hernia: A hernia is present.      Comments: Hyperactive bowel sounds noted   Musculoskeletal: Normal range of motion.      Right lower leg: No edema.      Left lower leg: No edema.   Skin:     General: Skin is warm and dry.   Neurological:      General: No focal deficit present.      Mental Status: She is alert and oriented to person, place, and time.   Psychiatric:         Mood and Affect: Mood normal.         Behavior: Behavior normal.         Thought Content: Thought content normal.         Judgment: Judgment normal.         Results Review:  I have personally reviewed most recent cardiac tracings, lab results and radiology images and interpretations and agree with findings, most notably: Troponin, CBC, CMP, lipase, PT/INR, UA, CT of abdomen, chest x-ray and EKG.    Results from last 7 days   Lab Units 01/09/21  1810   WBC 10*3/mm3 9.50   HEMOGLOBIN g/dL 12.2   HEMATOCRIT % 37.2   PLATELETS 10*3/mm3 581*   INR  1.06     Results from last 7 days   Lab Units 01/09/21  1810   SODIUM mmol/L 138   POTASSIUM mmol/L 3.4*   CHLORIDE mmol/L 102   CO2 mmol/L 29.0   BUN mg/dL 9   CREATININE mg/dL 0.65   GLUCOSE mg/dL 80   CALCIUM mg/dL 9.0   ALT (SGPT) U/L 28   AST (SGOT) U/L 28   TROPONIN T ng/mL <0.010     Estimated Creatinine Clearance: 100.8 mL/min (by C-G formula based on SCr of 0.65 mg/dL).  Brief Urine Lab Results  (Last result in the past 365 days)      Color   Clarity   Blood   Leuk Est   Nitrite   Protein    CREAT   Urine HCG        01/09/21 1750 Dark Yellow Clear Negative Negative Negative Negative               Microbiology Results (last 10 days)     Procedure Component Value - Date/Time    COVID-19,CEPHEID,COR/LEIDA/PAD IN-HOUSE(OR EMERGENT/ADD-ON),NP SWAB IN TRANSPORT MEDIA 3-4 HR TAT - Swab, Nasopharynx [[id number]]  (Normal) Collected: 01/05/21 0623    Lab Status: Final result Specimen: Swab from Nasopharynx Updated: 01/05/21 0730     COVID19 Not Detected    Narrative:      Fact sheet for providers: https://www.fda.gov/media/050067/download     Fact sheet for patients: https://www.fda.gov/media/934923/download          ECG/EMG Results (most recent)     Procedure Component Value Units Date/Time    ECG 12 Lead [[id number]] Collected: 01/09/21 1746     Updated: 01/09/21 1749     QT Interval 374 ms     Narrative:      HEART RATE= 87  bpm  RR Interval= 692  ms  MO Interval= 151  ms  P Horizontal Axis= 4  deg  P Front Axis= 21  deg  QRSD Interval= 96  ms  QT Interval= 374  ms  QRS Axis= 25  deg  T Wave Axis= 4  deg  - NORMAL ECG -  Sinus rhythm  When compared with ECG of 16-Dec-2020 5:39:24,  No significant change  Electronically Signed By:   Date and Time of Study: 2021-01-09 17:46:11          Results for orders placed during the hospital encounter of 08/23/19   Duplex Venous Lower Extremity - Right CAR    Narrative · Acute right lower extremity superficial thrombophlebitis noted in the   greater saphenous (above knee) and greater saphenous (below knee).  · All other right sided veins appeared normal.               Ct Abdomen Pelvis Without Contrast    Result Date: 1/9/2021  1.  Redemonstration of ventral body wall narrowneck hernia, with fluid, now containing a longer segment of mildly distended small bowel with fecaloid contents, query incarceration. There could be early obstruction. 2.  Slight improvement in large volume ascites. Evidence of portal hypertension. 3.  Splenectomy. Hysterectomy. Cholecystectomy. Surgical  findings on the stomach. Electronically signed by:  Dina Duran  1/9/2021 7:11 PM    Ct Abdomen Pelvis With Contrast    Result Date: 1/4/2021  1. Cirrhotic liver morphology, portosystemic collateralization (including esophageal varices and hemorrhoids), cavernous transformation of the main portal vein and moderate ascites. Findings are compatible with portal venous hypertension. 2. An umbilical hernia contains a short segment of nonobstructed small bowel and ascites. A supraumbilical ventral hernia also contains ascites. 3. Additional chronic and postoperative findings as described above. Electronically signed by:  Champ Gregory M.D.  1/4/2021 11:16 PM    Xr Chest 1 View    Result Date: 1/9/2021  Low lung volumes and chronic changes with no active disease.  Electronically Signed By-Randell Stevens MD On:1/9/2021 6:10 PM This report was finalized on [id number] by  Randell Stevens MD.        Estimated Creatinine Clearance: 100.8 mL/min (by C-G formula based on SCr of 0.65 mg/dL).    Assessment/Plan   Assessment/Plan       Active Hospital Problems    Diagnosis  POA   • **Abdominal pain [R10.9]  Yes     Priority: High   • Ventral hernia [K43.9]  Yes     Priority: High   • CARTER (nonalcoholic steatohepatitis) [K75.81]  Yes     Priority: Medium   • History of DVT (deep vein thrombosis) [Z86.718]  Not Applicable     Priority: Low   • H/O thyroidectomy [Z90.09]  Not Applicable     Priority: Low   • Thyroid cancer (CMS/HCC) [C73]  Yes     Priority: Low      Resolved Hospital Problems   No resolved problems to display.     Abdominal pain with history of ventral hernia  -Patient presents with significantly worsening midline abdominal pain which began on 1/8/2021 rated at 10/10 radiating to her chest and back.  ED provider notes uncertain complete reduction attempt in ED.  -CT of abdomen shows redemonstration of ventral body wall narrow neck hernia with fluid noted is now containing a longer segment of mildly distended small  bowel with fecaloid contents with possible incarceration noted.  A slight improvement in large volume ascites with evidence of portal hypertension continues to be present as well as splenectomy, hysterectomy, cholecystectomy and surgical findings on the stomach.   -N.p.o. at midnight  -Patient given 1.5 mg of Dilaudid IV in ED with some relief in pain reported, continue p.o. Dilaudid as needed  -Continuous pulse ox ordered  -General surgery consulted in ED    Ramon  -CMP shows an alk phos of 198 with otherwise normal ALT, AST, bilirubin and INR and PT.  -Patient had paracentesis on 1/5/2020 with 1.9 L of fluid removed and clinical improvement noted at that time  -CT continues to demonstrate large volume of ascites though patient appears clinically much less distended and reports improvement following her procedure  -Continue Bumex, Xifaxan and spironolactone  -Monitor CMP while admitted    Hypothyroidism with previous thyroid cancer and thyroidectomy  -Continue levothyroxine    Chronic pain  -Continue Lyrica  -Hold home oxycodone while patient receiving p.o. Dilaudid as above    History of DVT  -Continue Eliquis            VTE Prophylaxis -Eliquis  Mechanical Order History:     None      Pharmalogical Order History:     None          CODE STATUS: Full  There are no questions and answers to display.         I discussed the patient's findings and my recommendations with patient.      Signature:Electronically signed by Mateusz Kingston PA-C, 01/09/21, 10:43 PM EST.    [facility] Hospitalist Team          Electronically signed by Ni Marshall MD at 01/10/21 1114          Physician Progress Notes (last 48 hours) (Notes from 01/10/21 0829 through 01/12/21 0829)      Ever Motta MD at 01/11/21 1431                [facility] Medicine Services Daily Progress Note      Hospitalist Team  LOS 0 days      Patient Care Team:  Melissa Iniguez APRN as PCP - General (Family  Medicine)    Patient [facility]: [facility]      Subjective   Subjective     Chief Complaint / Subjective  Chief Complaint   Patient presents with   • Abdominal Pain       Present on Admission:  • Abdominal pain  • Ventral hernia  • Thyroid cancer (CMS/HCC)  • CARTER (nonalcoholic steatohepatitis)      Brief Synopsis of Hospital Course/HPI  Ms. Cope is a 45 y.o. female past medical history of Carter, ventral hernia, umbilical hernia, thyroid cancer status post thyroidectomy, previous DVT and chronic pain who presents to [facility] complaining of abdominal pain.  Patient states that on 1/8/2020 when she noted significant increase in her midline abdominal pain at the area of her ventral hernia.  She describes the pain as a tightness with muscle spasms rated 10/10 at its worst without obvious provoking factors however she does note that being in a hot bath seems to improve the pain slightly.  Pain is noted is radiating to her chest and back with a constant and worsening intensity.  Some nausea without vomiting is reported but she denies any dyspnea, cough or fever, sensation of tachycardia or palpitations, changes in bowel or bladder habits, hematochezia or melena.  Patient does not smoke or drink alcohol.  Also of note patient was recently seen at this facility for abdominal pain and distention and had paracentesis on 1/5/2021 with 1.9 L of fluid removed at that time.  She notes that her distention and diffuse abdominal pain have improved significantly since that procedure.     In the ED patient had lab significant for troponin of less than 0.010, potassium: 3.4, alk phos: 198, remainder of CBC and CMP was generally unremarkable.  Lipase: 28, PT: 11.6, INR: 1.06.  UA shows a specific gravity of 1.032 but is otherwise unremarkable.  CT of abdomen shows redemonstration of ventral body wall narrow neck hernia with fluid noted is now containing a longer segment of mildly distended small bowel with fecaloid contents with  "possible incarceration noted.  A slight improvement in large volume ascites with evidence of portal hypertension continues to be present as well as splenectomy, hysterectomy, cholecystectomy and surgical findings on the stomach.  Chest x-ray reported with lower lung volumes and changes consistent with chronic but no active disease.  EKG shows sinus rhythm at 87 without obvious acute ST changes or ectopy and a QTC of 423 ms.     Date::    1/10/21  Continues to report some abdominal pain      11/1/2021  Patient seen by Dr. Silva who ordered small bowel follow-through.  No bowel movements reported by patient  Denies vomiting  Went for small bowel follow-through      ROS  10 point ROS reviewed, negative unless otherwise stated     Objective   Objective      Vital Signs  Temp:  [97.6 °F (36.4 °C)-98.4 °F (36.9 °C)] 98 °F (36.7 °C)  Heart Rate:  [71-84] 78  Resp:  [14-16] 14  BP: (123-145)/(75-88) 123/81  Oxygen Therapy  SpO2: 99 %  Pulse Oximetry Type: Intermittent  Device (Oxygen Therapy): room air  Flowsheet Rows      First Filed Value   Admission Height  160 cm (63\") Documented at 01/09/2021 1657   Admission Weight  59.7 kg (131 lb 9.8 oz) Documented at 01/09/2021 1657        Intake & Output (last 3 days)       01/08 0701 - 01/09 0700 01/09 0701 - 01/10 0700 01/10 0701 - 01/11 0700 01/11 0701 - 01/12 0700    P.O.   1520     Total Intake(mL/kg)   1520 (24.8)     Urine (mL/kg/hr)   2750 (1.9) 600 (1.3)    Total Output   2750 600    Net   -1230 -600                Lines, Drains & Airways    Active LDAs     Name:   Placement date:   Placement time:   Site:   Days:    Peripheral IV 01/09/21 1811 Left Hand   01/09/21 1811    Hand   1                  Physical Exam:    Physical Exam  Physical Exam  Vitals signs reviewed.   Constitutional:       General: She is not in acute distress.     Appearance: Normal appearance. She is not ill-appearing or toxic-appearing.   HENT:      Head: Normocephalic and atraumatic.      Right " Ear: External ear normal.      Left Ear: External ear normal.      Nose: Nose normal.      Mouth/Throat:      Mouth: Mucous membranes are moist.   Eyes:      Extraocular Movements: Extraocular movements intact.      Conjunctiva/sclera: Conjunctivae normal.   Neck:      Musculoskeletal: Normal range of motion.   Cardiovascular:      Rate and Rhythm: Normal rate and regular rhythm.      Pulses: Normal pulses.      Heart sounds: Normal heart sounds.   Pulmonary:      Effort: Pulmonary effort is normal.      Breath sounds: Normal breath sounds.   Abdominal:      General: There is no distension.      Tenderness: There is abdominal tenderness.      Hernia: A hernia is present.      Comments: Hyperactive bowel sounds noted   Musculoskeletal: Normal range of motion.      Right lower leg: No edema.      Left lower leg: No edema.   Skin:     General: Skin is warm and dry.   Neurological:      General: No focal deficit present.      Mental Status: She is alert and oriented to person, place, and time.   Psychiatric:         Mood and Affect: Mood normal.         Behavior: Behavior normal.         Thought Content: Thought content normal.         Judgment: Judgment normal.     Procedures:              Results Review:     I reviewed the patient's new clinical results.      Lab Results (last 24 hours)     Procedure Component Value Units Date/Time    Comprehensive Metabolic Panel [[id number]]  (Abnormal) Collected: 01/11/21 0410    Specimen: Blood Updated: 01/11/21 0457     Glucose 109 mg/dL      BUN 8 mg/dL      Creatinine 0.76 mg/dL      Sodium 138 mmol/L      Potassium 4.0 mmol/L      Chloride 97 mmol/L      CO2 33.0 mmol/L      Calcium 8.6 mg/dL      Total Protein 6.7 g/dL      Albumin 3.20 g/dL      ALT (SGPT) 56 U/L      AST (SGOT) 95 U/L      Alkaline Phosphatase 239 U/L      Total Bilirubin 1.2 mg/dL      eGFR Non African Amer 82 mL/min/1.73      Globulin 3.5 gm/dL      A/G Ratio 0.9 g/dL      BUN/Creatinine Ratio 10.5      Anion Gap 8.0 mmol/L     Narrative:      GFR Normal >60  Chronic Kidney Disease <60  Kidney Failure <15      Magnesium [[id number]]  (Normal) Collected: 01/11/21 0410    Specimen: Blood Updated: 01/11/21 0457     Magnesium 1.8 mg/dL     CBC & Differential [[id number]]  (Abnormal) Collected: 01/11/21 0410    Specimen: Blood Updated: 01/11/21 0439    Narrative:      The following orders were created for panel order CBC & Differential.  Procedure                               Abnormality         Status                     ---------                               -----------         ------                     CBC Auto Differential[[id number]]        Abnormal            Final result                 Please view results for these tests on the individual orders.    CBC Auto Differential [[id number]]  (Abnormal) Collected: 01/11/21 0410    Specimen: Blood Updated: 01/11/21 0439     WBC 10.50 10*3/mm3      RBC 4.38 10*6/mm3      Hemoglobin 12.5 g/dL      Hematocrit 38.2 %      MCV 87.1 fL      MCH 28.6 pg      MCHC 32.9 g/dL      RDW 17.1 %      RDW-SD 52.5 fl      MPV 8.7 fL      Platelets 519 10*3/mm3      Neutrophil % 51.7 %      Lymphocyte % 31.7 %      Monocyte % 9.8 %      Eosinophil % 6.3 %      Basophil % 0.5 %      Neutrophils, Absolute 5.40 10*3/mm3      Lymphocytes, Absolute 3.30 10*3/mm3      Monocytes, Absolute 1.00 10*3/mm3      Eosinophils, Absolute 0.70 10*3/mm3      Basophils, Absolute 0.10 10*3/mm3      nRBC 0.3 /100 WBC         No results found for: HGBA1C  Results from last 7 days   Lab Units 01/09/21  1810 01/05/21  0614 01/04/21  2118   INR  1.06 1.09 1.04               Microbiology Results (last 10 days)     Procedure Component Value - Date/Time    COVID-19,CEPHEID,COR/LEIDA/PAD IN-HOUSE(OR EMERGENT/ADD-ON),NP SWAB IN TRANSPORT MEDIA 3-4 HR TAT - Swab, Nasopharynx [[id number]]  (Normal) Collected: 01/05/21 0623    Lab Status: Final result Specimen: Swab from Nasopharynx Updated: 01/05/21 0730     COVID19 Not  Detected    Narrative:      Fact sheet for providers: https://www.fda.gov/media/484668/download     Fact sheet for patients: https://www.fda.gov/media/835342/download          ECG/EMG Results (most recent)     Procedure Component Value Units Date/Time    ECG 12 Lead [[id number]] Collected: 01/09/21 1746     Updated: 01/10/21 1403     QT Interval 374 ms     Narrative:      HEART RATE= 87  bpm  RR Interval= 692  ms  VT Interval= 151  ms  P Horizontal Axis= 4  deg  P Front Axis= 21  deg  QRSD Interval= 96  ms  QT Interval= 374  ms  QRS Axis= 25  deg  T Wave Axis= 4  deg  - NORMAL ECG -  Sinus rhythm  When compared with ECG of 16-Dec-2020 5:39:24,  No significant change  Electronically Signed By: Blayne Perales (LEIDA) 10-Munir-2021 13:59:25  Date and Time of Study: 2021-01-09 17:46:11          Results for orders placed during the hospital encounter of 08/23/19   Duplex Venous Lower Extremity - Right CAR    Narrative · Acute right lower extremity superficial thrombophlebitis noted in the   greater saphenous (above knee) and greater saphenous (below knee).  · All other right sided veins appeared normal.               Ct Abdomen Pelvis Without Contrast    Result Date: 1/9/2021  1.  Redemonstration of ventral body wall narrowneck hernia, with fluid, now containing a longer segment of mildly distended small bowel with fecaloid contents, query incarceration. There could be early obstruction. 2.  Slight improvement in large volume ascites. Evidence of portal hypertension. 3.  Splenectomy. Hysterectomy. Cholecystectomy. Surgical findings on the stomach. Electronically signed by:  Dina Duran  1/9/2021 7:11 PM    Ct Abdomen Pelvis With Contrast    Result Date: 1/4/2021  1. Cirrhotic liver morphology, portosystemic collateralization (including esophageal varices and hemorrhoids), cavernous transformation of the main portal vein and moderate ascites. Findings are compatible with portal venous hypertension. 2. An umbilical hernia  contains a short segment of nonobstructed small bowel and ascites. A supraumbilical ventral hernia also contains ascites. 3. Additional chronic and postoperative findings as described above. Electronically signed by:  Champ Gregory M.D.  1/4/2021 11:16 PM    Xr Chest 1 View    Result Date: 1/9/2021  Low lung volumes and chronic changes with no active disease.  Electronically Signed By-Randell Stevens MD On:1/9/2021 6:10 PM This report was finalized on [id number] by  Randell Stevens MD.      Xrays, labs reviewed personally by physician.    Medication Review:   I have reviewed the patient's current medication list      Scheduled Meds  apixaban, 5 mg, Oral, Q12H  bumetanide, 2 mg, Oral, Daily  levothyroxine, 150 mcg, Oral, Daily  pregabalin, 25 mg, Oral, Nightly  riFAXIMin, 550 mg, Oral, Q12H  sodium chloride, 10 mL, Intravenous, Q12H  spironolactone, 300 mg, Oral, Daily        Meds Infusions       Meds PRN  Calcium Gluconate-NaCl **AND** calcium gluconate **AND** Calcium, Ionized  •  ketamine (KETALAR) IVPB **AND** Ketamine Vital Signs & Assessment  •  magnesium sulfate **OR** magnesium sulfate **OR** magnesium sulfate  •  melatonin  •  nitroglycerin  •  ondansetron **OR** ondansetron  •  oxyCODONE  •  potassium & sodium phosphates **OR** potassium & sodium phosphates  •  potassium chloride  •  potassium chloride  •  [COMPLETED] Insert peripheral IV **AND** sodium chloride  •  sodium chloride      Assessment/Plan   Assessment/Plan     Active Hospital Problems    Diagnosis  POA   • **Abdominal pain [R10.9]  Yes   • History of DVT (deep vein thrombosis) [Z86.718]  Not Applicable   • Ventral hernia [K43.9]  Yes   • CARTER (nonalcoholic steatohepatitis) [K75.81]  Yes   • H/O thyroidectomy [Z90.09]  Not Applicable   • Thyroid cancer (CMS/HCC) [C73]  Yes      Resolved Hospital Problems   No resolved problems to display.     Abdominal pain with history of ventral hernia  -Patient presents with significantly worsening midline  abdominal pain which began on 1/8/2021 rated at 10/10 radiating to her chest and back.  ED provider notes uncertain complete reduction attempt in ED.  -CT of abdomen shows redemonstration of ventral body wall narrow neck hernia with fluid noted is now containing a longer segment of mildly distended small bowel with fecaloid contents with possible incarceration noted.  A slight improvement in large volume ascites with evidence of portal hypertension continues to be present as well as splenectomy, hysterectomy, cholecystectomy and surgical findings on the stomach.   -N.p.o. at midnight, diet advanced per surgery  -Patient given 1.5 mg of Dilaudid IV in ED with some relief in pain reported, continue p.o. Dilaudid as needed  -Continuous pulse ox ordered  -General surgery consulted, appreciate recs  Small bowel follow-through today       Ramon  -CMP shows an alk phos of 198 with otherwise normal ALT, AST, bilirubin and INR and PT.  -Patient had paracentesis on 1/5/2020 with 1.9 L of fluid removed and clinical improvement noted at that time  -CT continues to demonstrate large volume of ascites though patient appears clinically much less distended and reports improvement following her procedure  -Continue Bumex, Xifaxan and spironolactone  -Monitor CMP while admitted     Hypothyroidism with previous thyroid cancer and thyroidectomy  -Continue levothyroxine      Chronic pain  -Continue Lyrica  -Hold home oxycodone while patient receiving p.o. Dilaudid as above     History of DVT  -Continue Eliquis    VTE Prophylaxis - Eliquis (home med).  Hold Eliquis dose if plans for surgery     Code Status -   Code Status and Medical Interventions:   Ordered at: 01/09/21 2238     Code Status:    CPR     Medical Interventions (Level of Support Prior to Arrest):    Full       Discharge Planning  Pending finalized surgery recommendations. Patient follows actively with transplant center.    Continued Care and Services - Admitted Since  2021    Coordination has not been started for this encounter.           Electronically signed by Ever Motta MD, 21, 14:31 ESTJourdan  [facility] Hospitalist Team          Electronically signed by Ever Motta MD at 21 1544     Enzo Silva MD at 21 1140          General Surgery Progress Note    Name: Lucinda Cope ADMIT: 2021   : 1975  PCP: Melissa Iniguez APRN    MRN: [id number] LOS: 0 days   AGE/SEX: 45 y.o. female  ROOM: [address] [place] [address]    Chief Complaint   Patient presents with   • Abdominal Pain     Subjective     45 y.o. female complicated abdominal surgical history status post gastric sleeve revision to Alfonzo-en-Y gastric bypass for abscess splenectomy previous hernia repair cirrhosis requiring serial paracenteses with a ventral hernia.    Objective   No overt nausea or vomiting. No significant bowel function the last 24 hours. Still having the epigastric abdominal pain and pain around the hernia. It is still sharp and fairly severe.    Scheduled Medications:   apixaban, 5 mg, Oral, Q12H  bumetanide, 2 mg, Oral, Daily  levothyroxine, 150 mcg, Oral, Daily  pregabalin, 25 mg, Oral, Nightly  riFAXIMin, 550 mg, Oral, Q12H  sodium chloride, 10 mL, Intravenous, Q12H  spironolactone, 300 mg, Oral, Daily        Active Infusions:       As Needed Medications:  Calcium Gluconate-NaCl **AND** calcium gluconate **AND** Calcium, Ionized  •  ketamine (KETALAR) IVPB **AND** Ketamine Vital Signs & Assessment  •  magnesium sulfate **OR** magnesium sulfate **OR** magnesium sulfate  •  melatonin  •  nitroglycerin  •  ondansetron **OR** ondansetron  •  oxyCODONE  •  potassium & sodium phosphates **OR** potassium & sodium phosphates  •  potassium chloride  •  potassium chloride  •  [COMPLETED] Insert peripheral IV **AND** sodium chloride  •  sodium chloride    Vital Signs  Vital Signs (range)  Temp:  [97.6 °F (36.4 °C)-98.4 °F (36.9 °C)] 98 °F (36.7 °C)  Heart Rate:   [71-84] 78  Resp:  [14-16] 14  BP: (123-145)/(75-88) 123/81    Physical Exam:  Physical Exam  Constitutional:       Appearance: Normal appearance.   HENT:      Head: Normocephalic and atraumatic.   Cardiovascular:      Rate and Rhythm: Normal rate.   Pulmonary:      Effort: Pulmonary effort is normal. No respiratory distress.   Abdominal:      Comments: Soft nondistended minimal tenderness to palpation most focally around the hernias which are mostly reducible but not completely reducible, the hernia mouth seem to be fairly broad   Skin:     General: Skin is warm and dry.   Neurological:      General: No focal deficit present.      Mental Status: She is alert. Mental status is at baseline.   Psychiatric:         Mood and Affect: Mood normal.         Behavior: Behavior normal.         Results Review:     CBC    Results from last 7 days   Lab Units 01/11/21  0410 01/10/21  0059 01/09/21  1810 01/05/21  0614 01/04/21  2118   WBC 10*3/mm3 10.50 12.70* 9.50 7.80 10.00   HEMOGLOBIN g/dL 12.5 11.0* 12.2 12.1 11.4*   PLATELETS 10*3/mm3 519* 480* 581* 648* 612*     BMP   Results from last 7 days   Lab Units 01/11/21  0410 01/10/21  0059 01/09/21  1810 01/05/21  0614 01/04/21  2341   SODIUM mmol/L 138 136 138  --  137   POTASSIUM mmol/L 4.0 3.5 3.4* 3.7 3.7   CHLORIDE mmol/L 97* 103 102  --  103   CO2 mmol/L 33.0* 26.0 29.0  --  26.0   BUN mg/dL 8 8 9  --  6   CREATININE mg/dL 0.76 0.57 0.65  --  0.61   GLUCOSE mg/dL 109* 92 80  --  109*   MAGNESIUM mg/dL 1.8 2.1  --   --   --        I reviewed the patient's new clinical results.    Assessment/Plan       Abdominal pain    H/O thyroidectomy    Thyroid cancer (CMS/HCC)    CARTER (nonalcoholic steatohepatitis)    Ventral hernia    History of DVT (deep vein thrombosis)      45 y.o. female complicated abdominal surgical history status post gastric sleeve revision to Alfonzo-en-Y gastric bypass for abscess splenectomy previous hernia repair cirrhosis requiring serial paracenteses with a  ventral hernia.    We will get a small bowel follow-through today  Will review the potential for hernia repair if complete obstruction. If not we will consider having gastroenterology weigh in about any alternative etiologies that may be able to be addressed nonsurgically that could help with her epigastric abdominal discomfort.    Personal protective equipment used for this patient encounter:  Patient wearing surgical mask []    Provider wearing a surgical mask [x]    Gloves []    Eye protection [x]    Face Shield []    Gown []    N 95 respirator or CAPR/PAPR []     This note was created using Dragon Voice Recognition software.    Enzo Silva MD  01/11/21  11:40 EST    Electronically signed by Enzo Silva MD at 01/11/21 1147     Ni Marshall MD at 01/10/21 1800                [facility] Medicine Services Daily Progress Note      Hospitalist Team  LOS 0 days      Patient Care Team:  Melissa Iniguez APRN as PCP - General (Family Medicine)    Patient Location: [facility]      Subjective   Subjective     Chief Complaint / Subjective  Chief Complaint   Patient presents with   • Abdominal Pain       Present on Admission:  • Abdominal pain  • Ventral hernia  • Thyroid cancer (CMS/HCC)  • RAMON (nonalcoholic steatohepatitis)      Brief Synopsis of Hospital Course/HPI  Ms. Cope is a 45 y.o. female past medical history of Ramon, ventral hernia, umbilical hernia, thyroid cancer status post thyroidectomy, previous DVT and chronic pain who presents to [facility] complaining of abdominal pain.  Patient states that on 1/8/2020 when she noted significant increase in her midline abdominal pain at the area of her ventral hernia.  She describes the pain as a tightness with muscle spasms rated 10/10 at its worst without obvious provoking factors however she does note that being in a hot bath seems to improve the pain slightly.  Pain is noted is radiating to her chest and back with a constant  "and worsening intensity.  Some nausea without vomiting is reported but she denies any dyspnea, cough or fever, sensation of tachycardia or palpitations, changes in bowel or bladder habits, hematochezia or melena.  Patient does not smoke or drink alcohol.  Also of note patient was recently seen at this facility for abdominal pain and distention and had paracentesis on 1/5/2021 with 1.9 L of fluid removed at that time.  She notes that her distention and diffuse abdominal pain have improved significantly since that procedure.     In the ED patient had lab significant for troponin of less than 0.010, potassium: 3.4, alk phos: 198, remainder of CBC and CMP was generally unremarkable.  Lipase: 28, PT: 11.6, INR: 1.06.  UA shows a specific gravity of 1.032 but is otherwise unremarkable.  CT of abdomen shows redemonstration of ventral body wall narrow neck hernia with fluid noted is now containing a longer segment of mildly distended small bowel with fecaloid contents with possible incarceration noted.  A slight improvement in large volume ascites with evidence of portal hypertension continues to be present as well as splenectomy, hysterectomy, cholecystectomy and surgical findings on the stomach.  Chest x-ray reported with lower lung volumes and changes consistent with chronic but no active disease.  EKG shows sinus rhythm at 87 without obvious acute ST changes or ectopy and a QTC of 423 ms.     Date::    1/10/21  Continues to report some abdominal pain    ROS  10 point ROS reviewed, negative unless otherwise stated     Objective   Objective      Vital Signs  Temp:  [97.6 °F (36.4 °C)-98.4 °F (36.9 °C)] 98.4 °F (36.9 °C)  Heart Rate:  [69-84] 77  Resp:  [14-16] 16  BP: (124-146)/(81-88) 124/82  Oxygen Therapy  SpO2: 100 %  Pulse Oximetry Type: Continuous  Device (Oxygen Therapy): room air  Flowsheet Rows      First Filed Value   Admission Height  160 cm (63\") Documented at 01/09/2021 1657   Admission Weight  59.7 kg (131 " lb 9.8 oz) Documented at 01/09/2021 1657        Intake & Output (last 3 days)       01/08 0701 - 01/09 0700 01/09 0701 - 01/10 0700 01/10 0701 - 01/11 0700    P.O.   1520    Total Intake(mL/kg)   1520 (24.8)    Urine (mL/kg/hr)   2750 (1.9)    Total Output   2750    Net   -1230               Lines, Drains & Airways    Active LDAs     Name:   Placement date:   Placement time:   Site:   Days:    Peripheral IV 01/09/21 1811 Left Hand   01/09/21 1811    Hand   1                  Physical Exam:    Physical Exam  Physical Exam  Vitals signs reviewed.   Constitutional:       General: She is not in acute distress.     Appearance: Normal appearance. She is not ill-appearing or toxic-appearing.   HENT:      Head: Normocephalic and atraumatic.      Right Ear: External ear normal.      Left Ear: External ear normal.      Nose: Nose normal.      Mouth/Throat:      Mouth: Mucous membranes are moist.   Eyes:      Extraocular Movements: Extraocular movements intact.      Conjunctiva/sclera: Conjunctivae normal.   Neck:      Musculoskeletal: Normal range of motion.   Cardiovascular:      Rate and Rhythm: Normal rate and regular rhythm.      Pulses: Normal pulses.      Heart sounds: Normal heart sounds.   Pulmonary:      Effort: Pulmonary effort is normal.      Breath sounds: Normal breath sounds.   Abdominal:      General: There is no distension.      Tenderness: There is abdominal tenderness.      Hernia: A hernia is present.      Comments: Hyperactive bowel sounds noted   Musculoskeletal: Normal range of motion.      Right lower leg: No edema.      Left lower leg: No edema.   Skin:     General: Skin is warm and dry.   Neurological:      General: No focal deficit present.      Mental Status: She is alert and oriented to person, place, and time.   Psychiatric:         Mood and Affect: Mood normal.         Behavior: Behavior normal.         Thought Content: Thought content normal.         Judgment: Judgment normal.      Procedures:              Results Review:     I reviewed the patient's new clinical results.      Lab Results (last 24 hours)     ** No results found for the last 24 hours. **        No results found for: HGBA1C  Results from last 7 days   Lab Units 01/09/21  1810 01/05/21  0614 01/04/21  2118   INR  1.06 1.09 1.04               Microbiology Results (last 10 days)     Procedure Component Value - Date/Time    COVID-19,CEPHEID,COR/LEIDA/PAD IN-HOUSE(OR EMERGENT/ADD-ON),NP SWAB IN TRANSPORT MEDIA 3-4 HR TAT - Swab, Nasopharynx [[id number]]  (Normal) Collected: 01/05/21 0623    Lab Status: Final result Specimen: Swab from Nasopharynx Updated: 01/05/21 0730     COVID19 Not Detected    Narrative:      Fact sheet for providers: https://www.fda.gov/media/580646/download     Fact sheet for patients: https://www.fda.gov/media/048518/download          ECG/EMG Results (most recent)     Procedure Component Value Units Date/Time    ECG 12 Lead [[id number]] Collected: 01/09/21 1746     Updated: 01/10/21 1403     QT Interval 374 ms     Narrative:      HEART RATE= 87  bpm  RR Interval= 692  ms  CT Interval= 151  ms  P Horizontal Axis= 4  deg  P Front Axis= 21  deg  QRSD Interval= 96  ms  QT Interval= 374  ms  QRS Axis= 25  deg  T Wave Axis= 4  deg  - NORMAL ECG -  Sinus rhythm  When compared with ECG of 16-Dec-2020 5:39:24,  No significant change  Electronically Signed By: Blayne Perales (LEIDA) 10-Munir-2021 13:59:25  Date and Time of Study: 2021-01-09 17:46:11          Results for orders placed during the hospital encounter of 08/23/19   Duplex Venous Lower Extremity - Right CAR    Narrative · Acute right lower extremity superficial thrombophlebitis noted in the   greater saphenous (above knee) and greater saphenous (below knee).  · All other right sided veins appeared normal.               Ct Abdomen Pelvis Without Contrast    Result Date: 1/9/2021  1.  Redemonstration of ventral body wall narrowneck hernia, with fluid, now containing  a longer segment of mildly distended small bowel with fecaloid contents, query incarceration. There could be early obstruction. 2.  Slight improvement in large volume ascites. Evidence of portal hypertension. 3.  Splenectomy. Hysterectomy. Cholecystectomy. Surgical findings on the stomach. Electronically signed by:  Dina Duran  1/9/2021 7:11 PM    Ct Abdomen Pelvis With Contrast    Result Date: 1/4/2021  1. Cirrhotic liver morphology, portosystemic collateralization (including esophageal varices and hemorrhoids), cavernous transformation of the main portal vein and moderate ascites. Findings are compatible with portal venous hypertension. 2. An umbilical hernia contains a short segment of nonobstructed small bowel and ascites. A supraumbilical ventral hernia also contains ascites. 3. Additional chronic and postoperative findings as described above. Electronically signed by:  Champ Gregory M.D.  1/4/2021 11:16 PM    Xr Chest 1 View    Result Date: 1/9/2021  Low lung volumes and chronic changes with no active disease.  Electronically Signed By-Randell Stevens MD On:1/9/2021 6:10 PM This report was finalized on [id number] by  Randell Stevens MD.      Xrays, labs reviewed personally by physician.    Medication Review:   I have reviewed the patient's current medication list      Scheduled Meds  apixaban, 5 mg, Oral, Q12H  bumetanide, 2 mg, Oral, Daily  levothyroxine, 150 mcg, Oral, Daily  pregabalin, 25 mg, Oral, Nightly  riFAXIMin, 550 mg, Oral, Q12H  sodium chloride, 10 mL, Intravenous, Q12H  spironolactone, 300 mg, Oral, Daily        Meds Infusions       Meds PRN  Calcium Gluconate-NaCl **AND** calcium gluconate **AND** Calcium, Ionized  •  ketamine (KETALAR) IVPB **AND** Ketamine Vital Signs & Assessment  •  magnesium sulfate **OR** magnesium sulfate **OR** magnesium sulfate  •  melatonin  •  nitroglycerin  •  ondansetron **OR** ondansetron  •  oxyCODONE  •  potassium & sodium phosphates **OR** potassium & sodium  phosphates  •  potassium chloride  •  potassium chloride  •  [COMPLETED] Insert peripheral IV **AND** sodium chloride  •  sodium chloride      Assessment/Plan   Assessment/Plan     Active Hospital Problems    Diagnosis  POA   • **Abdominal pain [R10.9]  Yes   • History of DVT (deep vein thrombosis) [Z86.718]  Not Applicable   • Ventral hernia [K43.9]  Yes   • CARTER (nonalcoholic steatohepatitis) [K75.81]  Yes   • H/O thyroidectomy [Z90.09]  Not Applicable   • Thyroid cancer (CMS/HCC) [C73]  Yes      Resolved Hospital Problems   No resolved problems to display.     Abdominal pain with history of ventral hernia  -Patient presents with significantly worsening midline abdominal pain which began on 1/8/2021 rated at 10/10 radiating to her chest and back.  ED provider notes uncertain complete reduction attempt in ED.  -CT of abdomen shows redemonstration of ventral body wall narrow neck hernia with fluid noted is now containing a longer segment of mildly distended small bowel with fecaloid contents with possible incarceration noted.  A slight improvement in large volume ascites with evidence of portal hypertension continues to be present as well as splenectomy, hysterectomy, cholecystectomy and surgical findings on the stomach.   -N.p.o. at midnight, diet advanced per surgery  -Patient given 1.5 mg of Dilaudid IV in ED with some relief in pain reported, continue p.o. Dilaudid as needed  -Continuous pulse ox ordered  -General surgery consulted, appreciate dagmar Carter  -CMP shows an alk phos of 198 with otherwise normal ALT, AST, bilirubin and INR and PT.  -Patient had paracentesis on 1/5/2020 with 1.9 L of fluid removed and clinical improvement noted at that time  -CT continues to demonstrate large volume of ascites though patient appears clinically much less distended and reports improvement following her procedure  -Continue Bumex, Xifaxan and spironolactone  -Monitor CMP while admitted     Hypothyroidism with previous  thyroid cancer and thyroidectomy  -Continue levothyroxine      Chronic pain  -Continue Lyrica  -Hold home oxycodone while patient receiving p.o. Dilaudid as above     History of DVT  -Continue Eliquis    VTE Prophylaxis - Eliquis (home med).  Hold Eliquis dose if plans for surgery     Code Status -   Code Status and Medical Interventions:   Ordered at: 21 2238     Code Status:    CPR     Medical Interventions (Level of Support Prior to Arrest):    Full       Discharge Planning  Pending finalized surgery recommendations. Patient follows actively with transplant center.    Continued Care and Services - Admitted Since 2021    Coordination has not been started for this encounter.           Electronically signed by Ni Marshall MD, 21, 02:47 EST.  [facility] Hospitalist Team          Electronically signed by Ni Marshall MD at 21 0252          Consult Notes (last 48 hours) (Notes from 01/10/21 0829 through 21 0829)      Enzo Silva MD at 01/10/21 1451      Consult Orders    1. Surgery (on-call MD unless specified) [[id number]] ordered by Blayne Perales DO               Surgery (on-call MD unless specified)  Consult performed by: Enzo Silva MD  Consult ordered by: Blayne Perales DO  Reason for consult: incisional hernia          General Surgery Consult Note      Name: Lucinda [facility] ADMIT: 2021   : 1975  PCP: Melissa Iniguez APRN    MRN: [id number] LOS: 0 days   AGE/SEX: 45 y.o. female  ROOM: [address] [place] [address]      Patient Care Team:  Melissa Iniguez APRN as PCP - General (Family Medicine)  Chief Complaint   Patient presents with   • Abdominal Pain       Subjective   45-year-old lady with a complicated surgical history including gastric sleeve complicated by a leak revision to Alfonzo-en-Y gastric bypass history of splenic injury requiring splenectomy history of ventral hernia with ventral hernia repair who has a known incisional hernia.   She has Ramon cirrhosis has been working with the transplant team in [place] she has had serial paracentesis over the last several years most recent paracentesis was a few days ago.  She says that she is having abdominal symptoms including some sharp stabbing periumbilical pain that radiates up into her chest.  It is worse whenever her hernia seems to be more distended.  It is better when it is less distended.  She has had some nausea but no overt vomiting related to this.  Her last bowel movement was yesterday.  She came into the ER because she did not feel like things were right after her paracentesis she was found to have these reducible ventral hernias and I was asked to see her for consideration for repair.    Past Medical History:   Diagnosis Date   • Abdominal pain    • Anemia    • Cancer (CMS/HCC)    • Cirrhosis of liver (CMS/HCC) 04/2017   • Clot    • Deep venous thrombosis of right profunda femoris vein (CMS/HCC) 8/15/2019   • Depression    • Disease of thyroid gland    • Empyema of pleura (CMS/HCC)    • GERD (gastroesophageal reflux disease)    • Heartburn    • Hemorrhoids    • Hernia, ventral    • History of malignant neoplasm of thyroid    • History of transfusion    • Irregular menstrual cycle    • Migraine    • Parathyroid abnormality (CMS/HCC)    • Radiation    • Restless legs syndrome (RLS)    • Urge and stress incontinence    • Varicose veins      Past Surgical History:   Procedure Laterality Date   • ABDOMINAL SURGERY     • BEDSIDE PARACENTESIS  3/22/2020        • BEDSIDE PARACENTESIS  4/19/2020        • CHOLECYSTECTOMY      Laparoscopic   • ENDOSCOPY N/A 3/17/2016    Procedure: ESOPHAGOGASTRODUODENOSCOPY WITH BALOON DILATATION 18-20MM WITH GASTRIC SLEEVE SEALANT;  Surgeon: Leonardo Ortiz Jr., MD;  Location: [facility] ENDOSCOPY;  Service:    • ENDOSCOPY N/A 3/14/2016    Procedure: esophagogastroduodenoscopy with fluoroscopy;  Surgeon: Greg Avila III, MD;  Location: [facility];   Service:    • ENDOSCOPY N/A 4/15/2016    Procedure: EGD WITH DILITATION AND STENT PLACEMENT dilation of pylorus ;  Surgeon: Leonardo Ortiz Jr., MD;  Location: [facility] ENDOSCOPY;  Service:    • ENDOSCOPY N/A 5/13/2016    Procedure: ESOPHAGOGASTRODUODENOSCOPY  W/ STENT;  Surgeon: Leonardo Ortiz Jr., MD;  Location: [facility] ENDOSCOPY;  Service:    • GASTRECTOMY      Gastric Surgery for Morbid Obesity Laparoscopic Longitudinal Gastrectomy   • GASTRIC SLEEVE LAPAROSCOPIC     • HEMORRHOIDECTOMY     • HERNIA REPAIR     • LAPAROSCOPY REPAIR HIATAL HERNIA     • LARYNGOSCOPY      Direct Laryngoscopy with Injection into Vocal Cords   • OTHER SURGICAL HISTORY Bilateral     Ear Pressure Equalization Tube, Insertion, Bilaterally   • PERIPHERALLY INSERTED CENTRAL CATHETER INSERTION     • SPLENECTOMY     • THYROID SURGERY     • TUBAL ABDOMINAL LIGATION       Family History   Problem Relation Age of Onset   • Stroke Father    • Diabetes Father         Diabetes Mellitus   • Hypertension Father    • Cancer Other      Social History     Tobacco Use   • Smoking status: Never Smoker   • Smokeless tobacco: Never Used   Substance Use Topics   • Alcohol use: No   • Drug use: No     Medications Prior to Admission   Medication Sig Dispense Refill Last Dose   • apixaban (ELIQUIS) 5 MG tablet tablet Take 5 mg by mouth 2 (Two) Times a Day.      • bumetanide (BUMEX) 2 MG tablet Take 2 mg by mouth Daily.      • levothyroxine (SYNTHROID, LEVOTHROID) 150 MCG tablet Take 150 mcg by mouth Daily.      • ondansetron ODT (ZOFRAN-ODT) 8 MG disintegrating tablet Place 8 mg on the tongue Every 8 (Eight) Hours As Needed for Nausea or Vomiting.      • oxyCODONE (ROXICODONE) 10 MG tablet Take 10 mg by mouth Every 6 (Six) Hours.      • pregabalin (LYRICA) 25 MG capsule Take 25 mg by mouth every night at bedtime.      • rifaximin (XIFAXAN) 550 MG tablet Take 550 mg by mouth Every 12 (Twelve) Hours.      • spironolactone (ALDACTONE) 100 MG tablet Take 300 mg  by mouth Daily.        apixaban, 5 mg, Oral, Q12H  bumetanide, 2 mg, Oral, Daily  levothyroxine, 150 mcg, Oral, Daily  pregabalin, 25 mg, Oral, Nightly  riFAXIMin, 550 mg, Oral, Q12H  sodium chloride, 10 mL, Intravenous, Q12H  spironolactone, 300 mg, Oral, Daily         Calcium Gluconate-NaCl **AND** calcium gluconate **AND** Calcium, Ionized  •  HYDROmorphone  •  ketamine (KETALAR) IVPB **AND** Ketamine Vital Signs & Assessment  •  magnesium sulfate **OR** magnesium sulfate **OR** magnesium sulfate  •  melatonin  •  nitroglycerin  •  ondansetron **OR** ondansetron  •  potassium & sodium phosphates **OR** potassium & sodium phosphates  •  potassium chloride  •  potassium chloride  •  [COMPLETED] Insert peripheral IV **AND** sodium chloride  •  sodium chloride  Compazine [prochlorperazine edisylate], Contrast dye, Iodinated diagnostic agents, Iodine, Morphine, Nsaids, and Hydrocodone    Review of Systems   Constitutional: Negative for chills and fever.   HENT: Negative for sore throat and trouble swallowing.    Eyes: Negative for blurred vision and double vision.   Respiratory: Negative for cough and shortness of breath.    Cardiovascular: Positive for chest pain. Negative for leg swelling.   Gastrointestinal: Positive for abdominal distention and abdominal pain. Negative for blood in stool, constipation, diarrhea, nausea and vomiting.   Genitourinary: Negative for dysuria and hematuria.   Skin: Negative for rash and bruise.   Neurological: Negative for dizziness and confusion.   Psychiatric/Behavioral: Negative for agitation and depressed mood.        Objective     Vital Signs and Labs:  Vital Signs (range)  Temp:  [97.8 °F (36.6 °C)-98.9 °F (37.2 °C)] 98.2 °F (36.8 °C)  Heart Rate:  [69-88] 69  Resp:  [14-18] 15  BP: (130-153)/(81-93) 131/81    Physical Exam:  Physical Exam  Constitutional:       Appearance: Normal appearance.   HENT:      Head: Normocephalic.   Eyes:      Conjunctiva/sclera: Conjunctivae normal.       Pupils: Pupils are equal, round, and reactive to light.   Cardiovascular:      Rate and Rhythm: Normal rate.      Pulses: Normal pulses.   Pulmonary:      Effort: Pulmonary effort is normal. No respiratory distress.   Abdominal:      Comments: Soft mild distention complex incisional hernia feels multilobulated mostly reducible, there is some skin thinning over the hernia and some dark discoloration of the skin likely from the chronic nature moderate tenderness to palpation in this location   Musculoskeletal:         General: No swelling or tenderness.   Skin:     General: Skin is warm and dry.   Neurological:      General: No focal deficit present.      Mental Status: She is alert.   Psychiatric:         Mood and Affect: Mood normal.         Behavior: Behavior normal.         CBC    Results from last 7 days   Lab Units 01/10/21  0059 01/09/21  1810 01/05/21  0614 01/04/21  2118   WBC 10*3/mm3 12.70* 9.50 7.80 10.00   HEMOGLOBIN g/dL 11.0* 12.2 12.1 11.4*   PLATELETS 10*3/mm3 480* 581* 648* 612*     CMP   Results from last 7 days   Lab Units 01/10/21  0059 01/09/21  1810 01/05/21  0614 01/04/21  2341   SODIUM mmol/L 136 138  --  137   POTASSIUM mmol/L 3.5 3.4* 3.7 3.7   CHLORIDE mmol/L 103 102  --  103   CO2 mmol/L 26.0 29.0  --  26.0   BUN mg/dL 8 9  --  6   CREATININE mg/dL 0.57 0.65  --  0.61   GLUCOSE mg/dL 92 80  --  109*   ALBUMIN g/dL 3.10* 3.50  --  3.40*   BILIRUBIN mg/dL 1.0 0.7  --  1.3*   ALK PHOS U/L 166* 198*  --  201*   AST (SGOT) U/L 24 28  --  77*   ALT (SGPT) U/L 25 28  --  29   LIPASE U/L  --  28  --   --         I reviewed the patient's new clinical results.  I reviewed the patient's new imaging results and agree with the interpretation.    Assessment/Plan       Abdominal pain    H/O thyroidectomy    Thyroid cancer (CMS/HCC)    CARTER (nonalcoholic steatohepatitis)    Ventral hernia    History of DVT (deep vein thrombosis)      45 y.o. female complicated surgical history including gastric  sleeve revised to Alfonzo-en-Y gastric bypass history of significant bleeding requiring splenectomy history of incisional hernia history of Ramon cirrhosis with serial paracentesis with abdominal pain likely related to her incisional hernia    Based on a brief look at the calculators she appears to be a child's class B indicating the perioperative mortality is about 30%.  This is risk prohibitive from my standpoint for elective surgery.  We will give her some clear liquid diet to see she tolerates.  If she is tolerating some diet having bowel function will likely need to be discharged with follow-up with her transplant surgery team.  If she fails the p.o. challenge will consider small bowel follow-through to ensure that she is not completely obstructed.    Abdominal binder for comfort    Does not seem to be obstructed.  Her  Personal protective equipment used for this patient encounter:  Patient wearing surgical mask []    Provider wearing a surgical mask [x]    Gloves []    Eye protection [x]    Face Shield []    Gown []    N 95 respirator or CAPR/PAPR []     This note was created using Dragon Voice Recognition software.    Enzo Silva MD  01/10/21  14:51 EST    Electronically signed by Enzo Silva MD at 01/10/21 1765

## 2021-01-12 NOTE — PROGRESS NOTES
[facility] Daily Progress Note      Hospitalist Team  LOS 0 days      Patient Care Team:  Melissa Iniguez APRN as PCP - General (Family Medicine)    Patient Location: [facility]0/1      Subjective   Subjective     Chief Complaint / Subjective  Chief Complaint   Patient presents with   • Abdominal Pain       Present on Admission:  • Abdominal pain  • Ventral hernia  • Thyroid cancer (CMS/HCC)  • CARTER (nonalcoholic steatohepatitis)      Brief Synopsis of Hospital Course/HPI  Ms. Cope is a 45 y.o. female past medical history of Carter, ventral hernia, umbilical hernia, thyroid cancer status post thyroidectomy, previous DVT and chronic pain who presents to [facility] complaining of abdominal pain.  Patient states that on 1/8/2020 when she noted significant increase in her midline abdominal pain at the area of her ventral hernia.  She describes the pain as a tightness with muscle spasms rated 10/10 at its worst without obvious provoking factors however she does note that being in a hot bath seems to improve the pain slightly.  Pain is noted is radiating to her chest and back with a constant and worsening intensity.  Some nausea without vomiting is reported but she denies any dyspnea, cough or fever, sensation of tachycardia or palpitations, changes in bowel or bladder habits, hematochezia or melena.  Patient does not smoke or drink alcohol.  Also of note patient was recently seen at this facility for abdominal pain and distention and had paracentesis on 1/5/2021 with 1.9 L of fluid removed at that time.  She notes that her distention and diffuse abdominal pain have improved significantly since that procedure.     In the ED patient had lab significant for troponin of less than 0.010, potassium: 3.4, alk phos: 198, remainder of CBC and CMP was generally unremarkable.  Lipase: 28, PT: 11.6, INR: 1.06.  UA shows a specific gravity of 1.032 but is otherwise unremarkable.  CT of  "abdomen shows redemonstration of ventral body wall narrow neck hernia with fluid noted is now containing a longer segment of mildly distended small bowel with fecaloid contents with possible incarceration noted.  A slight improvement in large volume ascites with evidence of portal hypertension continues to be present as well as splenectomy, hysterectomy, cholecystectomy and surgical findings on the stomach.  Chest x-ray reported with lower lung volumes and changes consistent with chronic but no active disease.  EKG shows sinus rhythm at 87 without obvious acute ST changes or ectopy and a QTC of 423 ms.     Date::    1/10/21  Continues to report some abdominal pain      1/11/2021  Patient seen by Dr. Silva who ordered small bowel follow-through.  No bowel movements reported by patient  Denies vomiting  Went for small bowel follow-through    1/12/2021  Patient still complaining of left upper quadrant pain with food.  GI consult for further assistance of care  Small bowel follow-through was negative for obstruction    ROS  10 point ROS reviewed, negative unless otherwise stated     Objective   Objective      Vital Signs  Temp:  [98 °F (36.7 °C)-98.3 °F (36.8 °C)] 98.1 °F (36.7 °C)  Heart Rate:  [] 80  Resp:  [14-16] 16  BP: (115-123)/(73-80) 115/80  Oxygen Therapy  SpO2: 97 %  Pulse Oximetry Type: Intermittent  Device (Oxygen Therapy): room air  Flowsheet Rows      First Filed Value   Admission Height  160 cm (63\") Documented at 01/09/2021 1657   Admission Weight  59.7 kg (131 lb 9.8 oz) Documented at 01/09/2021 1657        Intake & Output (last 3 days)       01/09 0701 - 01/10 0700 01/10 0701 - 01/11 0700 01/11 0701 - 01/12 0700 01/12 0701 - 01/13 0700    P.O.  1520  240    Total Intake(mL/kg)  1520 (24.8)  240 (3.9)    Urine (mL/kg/hr)  2750 (1.9) 1100 (0.7) 1850 (3.1)    Total Output  2750 1100 1850    Net  -1230 -1100 -1610                Lines, Drains & Airways    Active LDAs     Name:   Placement date:   " Placement time:   Site:   Days:    Peripheral IV 01/09/21 1811 Left Hand   01/09/21 1811    Hand   1                  Physical Exam:    Physical Exam  Physical Exam  Vitals signs reviewed.   Constitutional:       General: She is not in acute distress.     Appearance: Normal appearance. She is not ill-appearing or toxic-appearing.   HENT:      Head: Normocephalic and atraumatic.      Right Ear: External ear normal.      Left Ear: External ear normal.      Nose: Nose normal.      Mouth/Throat:      Mouth: Mucous membranes are moist.   Eyes:      Extraocular Movements: Extraocular movements intact.      Conjunctiva/sclera: Conjunctivae normal.   Neck:      Musculoskeletal: Normal range of motion.   Cardiovascular:      Rate and Rhythm: Normal rate and regular rhythm.      Pulses: Normal pulses.      Heart sounds: Normal heart sounds.   Pulmonary:      Effort: Pulmonary effort is normal.      Breath sounds: Normal breath sounds.   Abdominal:      General: There is no distension.      Tenderness: There is abdominal tenderness.      Hernia: A hernia is present.      Comments: Hyperactive bowel sounds noted   Musculoskeletal: Normal range of motion.      Right lower leg: No edema.      Left lower leg: No edema.   Skin:     General: Skin is warm and dry.   Neurological:      General: No focal deficit present.      Mental Status: She is alert and oriented to person, place, and time.   Psychiatric:         Mood and Affect: Mood normal.         Behavior: Behavior normal.         Thought Content: Thought content normal.         Judgment: Judgment normal.     Procedures:              Results Review:     I reviewed the patient's new clinical results.      Lab Results (last 24 hours)     Procedure Component Value Units Date/Time    Comprehensive Metabolic Panel [[id number]]  (Abnormal) Collected: 01/12/21 0214    Specimen: Blood Updated: 01/12/21 0408     Glucose 119 mg/dL      BUN 10 mg/dL      Creatinine 0.90 mg/dL      Sodium  135 mmol/L      Potassium 4.1 mmol/L      Chloride 94 mmol/L      CO2 31.0 mmol/L      Calcium 8.6 mg/dL      Total Protein 7.2 g/dL      Albumin 3.40 g/dL      ALT (SGPT) 45 U/L      AST (SGOT) 49 U/L      Alkaline Phosphatase 245 U/L      Total Bilirubin 1.0 mg/dL      eGFR Non African Amer 67 mL/min/1.73      Globulin 3.8 gm/dL      A/G Ratio 0.9 g/dL      BUN/Creatinine Ratio 11.1     Anion Gap 10.0 mmol/L     Narrative:      GFR Normal >60  Chronic Kidney Disease <60  Kidney Failure <15      CBC & Differential [[id number]]  (Abnormal) Collected: 01/12/21 0214    Specimen: Blood Updated: 01/12/21 0322    Narrative:      The following orders were created for panel order CBC & Differential.  Procedure                               Abnormality         Status                     ---------                               -----------         ------                     CBC Auto Differential[[id number]]        Abnormal            Final result                 Please view results for these tests on the individual orders.    CBC Auto Differential [[id number]]  (Abnormal) Collected: 01/12/21 0214    Specimen: Blood Updated: 01/12/21 0322     WBC 13.80 10*3/mm3      RBC 4.63 10*6/mm3      Hemoglobin 13.0 g/dL      Hematocrit 41.1 %      MCV 88.7 fL      MCH 28.1 pg      MCHC 31.6 g/dL      RDW 17.1 %      RDW-SD 52.5 fl      MPV 8.9 fL      Platelets 557 10*3/mm3      Neutrophil % 58.5 %      Lymphocyte % 27.8 %      Monocyte % 10.8 %      Eosinophil % 2.6 %      Basophil % 0.3 %      Neutrophils, Absolute 8.10 10*3/mm3      Lymphocytes, Absolute 3.80 10*3/mm3      Monocytes, Absolute 1.50 10*3/mm3      Eosinophils, Absolute 0.40 10*3/mm3      Basophils, Absolute 0.00 10*3/mm3      nRBC 0.1 /100 WBC     Magnesium [[id number]]  (Normal) Collected: 01/12/21 0214    Specimen: Blood Updated: 01/12/21 0308     Magnesium 1.8 mg/dL     Potassium [[id number]]  (Normal) Collected: 01/11/21 2058    Specimen: Blood Updated: 01/11/21 2132      Potassium 3.7 mmol/L         No results found for: HGBA1C  Results from last 7 days   Lab Units 01/09/21  1810   INR  1.06               Microbiology Results (last 10 days)     Procedure Component Value - Date/Time    COVID-19,CEPHEID,COR/LEIDA/PAD IN-HOUSE(OR EMERGENT/ADD-ON),NP SWAB IN TRANSPORT MEDIA 3-4 HR TAT - Swab, Nasopharynx [[id number]]  (Normal) Collected: 01/05/21 0623    Lab Status: Final result Specimen: Swab from Nasopharynx Updated: 01/05/21 0730     COVID19 Not Detected    Narrative:      Fact sheet for providers: https://www.fda.gov/media/168271/download     Fact sheet for patients: https://www.fda.gov/media/895157/download          ECG/EMG Results (most recent)     Procedure Component Value Units Date/Time    ECG 12 Lead [[id number]] Collected: 01/09/21 1746     Updated: 01/10/21 1403     QT Interval 374 ms     Narrative:      HEART RATE= 87  bpm  RR Interval= 692  ms  NM Interval= 151  ms  P Horizontal Axis= 4  deg  P Front Axis= 21  deg  QRSD Interval= 96  ms  QT Interval= 374  ms  QRS Axis= 25  deg  T Wave Axis= 4  deg  - NORMAL ECG -  Sinus rhythm  When compared with ECG of 16-Dec-2020 5:39:24,  No significant change  Electronically Signed By: Blayne Perales (LEIDA) 10-Munir-2021 13:59:25  Date and Time of Study: 2021-01-09 17:46:11    ECG 12 Lead [[id number]] Collected: 01/12/21 0208     Updated: 01/12/21 0210     QT Interval 365 ms     Narrative:      HEART RATE= 95  bpm  RR Interval= 632  ms  NM Interval= 133  ms  P Horizontal Axis= 5  deg  P Front Axis= 44  deg  QRSD Interval= 93  ms  QT Interval= 365  ms  QRS Axis= 67  deg  T Wave Axis= 21  deg  - NORMAL ECG -  Sinus rhythm  Electronically Signed By:   Date and Time of Study: 2021-01-12 02:08:36          Results for orders placed during the hospital encounter of 08/23/19   Duplex Venous Lower Extremity - Right CAR    Narrative · Acute right lower extremity superficial thrombophlebitis noted in the   greater saphenous (above knee) and greater  saphenous (below knee).  · All other right sided veins appeared normal.               Ct Abdomen Pelvis Without Contrast    Result Date: 1/9/2021  1.  Redemonstration of ventral body wall narrowneck hernia, with fluid, now containing a longer segment of mildly distended small bowel with fecaloid contents, query incarceration. There could be early obstruction. 2.  Slight improvement in large volume ascites. Evidence of portal hypertension. 3.  Splenectomy. Hysterectomy. Cholecystectomy. Surgical findings on the stomach. Electronically signed by:  Dina Duran  1/9/2021 7:11 PM    Ct Abdomen Pelvis With Contrast    Result Date: 1/4/2021  1. Cirrhotic liver morphology, portosystemic collateralization (including esophageal varices and hemorrhoids), cavernous transformation of the main portal vein and moderate ascites. Findings are compatible with portal venous hypertension. 2. An umbilical hernia contains a short segment of nonobstructed small bowel and ascites. A supraumbilical ventral hernia also contains ascites. 3. Additional chronic and postoperative findings as described above. Electronically signed by:  Champ Gregory M.D.  1/4/2021 11:16 PM    Xr Chest 1 View    Result Date: 1/9/2021  Low lung volumes and chronic changes with no active disease.  Electronically Signed By-Randell Stevens MD On:1/9/2021 6:10 PM This report was finalized on [id number] by  Randell Stevens MD.    Fl Small Bowel Follow Through Single-contrast    Result Date: 1/11/2021  1. Oral contrast passes into the right colon between 90 minutes and 5.5 hours. (No imaging was done between the 90 minute and 5.5 hour images.) No small bowel dilatation is seen. 2. Evidence of prior gastric surgery.  Electronically Signed By-Casi Sandra MD On:1/11/2021 6:23 PM This report was finalized on [id number] by  Casi Sandra MD.    Xr Abdomen Kub    Result Date: 1/12/2021  1. No small bowel obstruction. Slot 63 Electronically signed by:  Deepak Aponte  M.D.  1/12/2021 1:38 AM      Xrays, labs reviewed personally by physician.    Medication Review:   I have reviewed the patient's current medication list      Scheduled Meds  apixaban, 5 mg, Oral, Q12H  bumetanide, 2 mg, Oral, Daily  levothyroxine, 150 mcg, Oral, Daily  pregabalin, 25 mg, Oral, Nightly  riFAXIMin, 550 mg, Oral, Q12H  sodium chloride, 10 mL, Intravenous, Q12H  spironolactone, 300 mg, Oral, Daily        Meds Infusions       Meds PRN  Calcium Gluconate-NaCl **AND** calcium gluconate **AND** Calcium, Ionized  •  ketamine (KETALAR) IVPB **AND** Ketamine Vital Signs & Assessment  •  magnesium sulfate **OR** magnesium sulfate **OR** magnesium sulfate  •  melatonin  •  nitroglycerin  •  ondansetron **OR** ondansetron  •  oxyCODONE  •  potassium & sodium phosphates **OR** potassium & sodium phosphates  •  potassium chloride  •  potassium chloride  •  [COMPLETED] Insert peripheral IV **AND** sodium chloride  •  sodium chloride      Assessment/Plan   Assessment/Plan     Active Hospital Problems    Diagnosis  POA   • **Abdominal pain [R10.9]  Yes   • History of DVT (deep vein thrombosis) [Z86.718]  Not Applicable   • Ventral hernia [K43.9]  Yes   • CARTER (nonalcoholic steatohepatitis) [K75.81]  Yes   • H/O thyroidectomy [Z90.09]  Not Applicable   • Thyroid cancer (CMS/HCC) [C73]  Yes      Resolved Hospital Problems   No resolved problems to display.     Abdominal pain with history of ventral hernia  -Patient presents with significantly worsening midline abdominal pain which began on 1/8/2021 rated at 10/10 radiating to her chest and back.  ED provider notes uncertain complete reduction attempt in ED.  -CT of abdomen shows redemonstration of ventral body wall narrow neck hernia with fluid noted is now containing a longer segment of mildly distended small bowel with fecaloid contents with possible incarceration noted.  A slight improvement in large volume ascites with evidence of portal hypertension continues to be  present as well as splenectomy, hysterectomy, cholecystectomy and surgical findings on the stomach.   -N.p.o. at midnight, diet advanced per surgery  -Patient given 1.5 mg of Dilaudid IV in ED with some relief in pain reported, continue p.o. Dilaudid as needed  -Continuous pulse ox ordered  -General surgery consulted, appreciate recs  Small bowel follow-through negative for obstruction  GI consulted for further assistance         Ramon  -CMP shows an alk phos of 198 with otherwise normal ALT, AST, bilirubin and INR and PT.  -Patient had paracentesis on 1/5/2020 with 1.9 L of fluid removed and clinical improvement noted at that time  -CT continues to demonstrate large volume of ascites though patient appears clinically much less distended and reports improvement following her procedure  -Continue Bumex, Xifaxan and spironolactone  -Monitor CMP while admitted     Hypothyroidism with previous thyroid cancer and thyroidectomy  -Continue levothyroxine      Chronic pain  -Continue Lyrica  -Hold home oxycodone while patient receiving p.o. Dilaudid as above     History of DVT  -Continue Eliquis    VTE Prophylaxis - Eliquis (home med).  Hold Eliquis dose if plans for surgery     Code Status -   Code Status and Medical Interventions:   Ordered at: 01/09/21 2238     Code Status:    CPR     Medical Interventions (Level of Support Prior to Arrest):    Full       Discharge Planning  Pending finalized surgery recommendations. Patient follows actively with transplant center.    Continued Care and Services - Admitted Since 1/9/2021    Coordination has not been started for this encounter.           Electronically signed by Ever Motta MD, 01/12/21, 16:44 EST.  [facility] Hospitalist Team

## 2021-01-13 ENCOUNTER — ANESTHESIA EVENT (OUTPATIENT)
Dept: GASTROENTEROLOGY | Facility: HOSPITAL | Age: 46
End: 2021-01-13

## 2021-01-13 ENCOUNTER — APPOINTMENT (OUTPATIENT)
Dept: GENERAL RADIOLOGY | Facility: HOSPITAL | Age: 46
End: 2021-01-13

## 2021-01-13 ENCOUNTER — ANESTHESIA (OUTPATIENT)
Dept: GASTROENTEROLOGY | Facility: HOSPITAL | Age: 46
End: 2021-01-13

## 2021-01-13 ENCOUNTER — INPATIENT HOSPITAL (AMBULATORY)
Dept: URBAN - METROPOLITAN AREA HOSPITAL 84 | Facility: HOSPITAL | Age: 46
End: 2021-01-13

## 2021-01-13 DIAGNOSIS — K31.7 POLYP OF STOMACH AND DUODENUM: ICD-10-CM

## 2021-01-13 DIAGNOSIS — K29.50 UNSPECIFIED CHRONIC GASTRITIS WITHOUT BLEEDING: ICD-10-CM

## 2021-01-13 DIAGNOSIS — Z98.84 BARIATRIC SURGERY STATUS: ICD-10-CM

## 2021-01-13 DIAGNOSIS — R10.13 EPIGASTRIC PAIN: ICD-10-CM

## 2021-01-13 DIAGNOSIS — I85.00 ESOPHAGEAL VARICES WITHOUT BLEEDING: ICD-10-CM

## 2021-01-13 LAB
ABO GROUP BLD: NORMAL
ALBUMIN SERPL-MCNC: 3.2 G/DL (ref 3.5–5.2)
ALBUMIN/GLOB SERPL: 0.9 G/DL
ALP SERPL-CCNC: 227 U/L (ref 39–117)
ALT SERPL W P-5'-P-CCNC: 30 U/L (ref 1–33)
ANION GAP SERPL CALCULATED.3IONS-SCNC: 7 MMOL/L (ref 5–15)
AST SERPL-CCNC: 30 U/L (ref 1–32)
BASOPHILS # BLD AUTO: 0.1 10*3/MM3 (ref 0–0.2)
BASOPHILS NFR BLD AUTO: 0.6 % (ref 0–1.5)
BILIRUB SERPL-MCNC: 0.7 MG/DL (ref 0–1.2)
BLD GP AB SCN SERPL QL: NEGATIVE
BUN SERPL-MCNC: 9 MG/DL (ref 6–20)
BUN/CREAT SERPL: 8.6 (ref 7–25)
CALCIUM SPEC-SCNC: 8.7 MG/DL (ref 8.6–10.5)
CHLORIDE SERPL-SCNC: 94 MMOL/L (ref 98–107)
CO2 SERPL-SCNC: 30 MMOL/L (ref 22–29)
CREAT SERPL-MCNC: 1.05 MG/DL (ref 0.57–1)
DEPRECATED RDW RBC AUTO: 51.2 FL (ref 37–54)
EOSINOPHIL # BLD AUTO: 0.5 10*3/MM3 (ref 0–0.4)
EOSINOPHIL NFR BLD AUTO: 4.5 % (ref 0.3–6.2)
ERYTHROCYTE [DISTWIDTH] IN BLOOD BY AUTOMATED COUNT: 16.8 % (ref 12.3–15.4)
GFR SERPL CREATININE-BSD FRML MDRD: 56 ML/MIN/1.73
GLOBULIN UR ELPH-MCNC: 3.4 GM/DL
GLUCOSE SERPL-MCNC: 95 MG/DL (ref 65–99)
HCT VFR BLD AUTO: 40.4 % (ref 34–46.6)
HGB BLD-MCNC: 13.1 G/DL (ref 12–15.9)
LYMPHOCYTES # BLD AUTO: 3.8 10*3/MM3 (ref 0.7–3.1)
LYMPHOCYTES NFR BLD AUTO: 31.7 % (ref 19.6–45.3)
MAGNESIUM SERPL-MCNC: 1.9 MG/DL (ref 1.6–2.6)
MCH RBC QN AUTO: 28.7 PG (ref 26.6–33)
MCHC RBC AUTO-ENTMCNC: 32.5 G/DL (ref 31.5–35.7)
MCV RBC AUTO: 88.2 FL (ref 79–97)
MONOCYTES # BLD AUTO: 1.5 10*3/MM3 (ref 0.1–0.9)
MONOCYTES NFR BLD AUTO: 12.6 % (ref 5–12)
NEUTROPHILS NFR BLD AUTO: 50.6 % (ref 42.7–76)
NEUTROPHILS NFR BLD AUTO: 6.1 10*3/MM3 (ref 1.7–7)
NRBC BLD AUTO-RTO: 0.2 /100 WBC (ref 0–0.2)
PLATELET # BLD AUTO: 508 10*3/MM3 (ref 140–450)
PMV BLD AUTO: 8.5 FL (ref 6–12)
POTASSIUM SERPL-SCNC: 4.7 MMOL/L (ref 3.5–5.2)
PROT SERPL-MCNC: 6.6 G/DL (ref 6–8.5)
RBC # BLD AUTO: 4.59 10*6/MM3 (ref 3.77–5.28)
RH BLD: POSITIVE
SARS-COV-2 RNA PNL SPEC NAA+PROBE: DETECTED
SODIUM SERPL-SCNC: 131 MMOL/L (ref 136–145)
T&S EXPIRATION DATE: NORMAL
WBC # BLD AUTO: 12.1 10*3/MM3 (ref 3.4–10.8)

## 2021-01-13 PROCEDURE — 25010000002 ONDANSETRON PER 1 MG: Performed by: PHYSICIAN ASSISTANT

## 2021-01-13 PROCEDURE — 0DB68ZX EXCISION OF STOMACH, VIA NATURAL OR ARTIFICIAL OPENING ENDOSCOPIC, DIAGNOSTIC: ICD-10-PCS | Performed by: INTERNAL MEDICINE

## 2021-01-13 PROCEDURE — 99232 SBSQ HOSP IP/OBS MODERATE 35: CPT | Performed by: INTERNAL MEDICINE

## 2021-01-13 PROCEDURE — 86901 BLOOD TYPING SEROLOGIC RH(D): CPT

## 2021-01-13 PROCEDURE — 86900 BLOOD TYPING SEROLOGIC ABO: CPT

## 2021-01-13 PROCEDURE — 25010000002 PROPOFOL 10 MG/ML EMULSION: Performed by: ANESTHESIOLOGY

## 2021-01-13 PROCEDURE — 43239 EGD BIOPSY SINGLE/MULTIPLE: CPT | Performed by: INTERNAL MEDICINE

## 2021-01-13 PROCEDURE — 86901 BLOOD TYPING SEROLOGIC RH(D): CPT | Performed by: HOSPITALIST

## 2021-01-13 PROCEDURE — 83735 ASSAY OF MAGNESIUM: CPT | Performed by: PHYSICIAN ASSISTANT

## 2021-01-13 PROCEDURE — 88305 TISSUE EXAM BY PATHOLOGIST: CPT | Performed by: INTERNAL MEDICINE

## 2021-01-13 PROCEDURE — 71045 X-RAY EXAM CHEST 1 VIEW: CPT

## 2021-01-13 PROCEDURE — U0003 INFECTIOUS AGENT DETECTION BY NUCLEIC ACID (DNA OR RNA); SEVERE ACUTE RESPIRATORY SYNDROME CORONAVIRUS 2 (SARS-COV-2) (CORONAVIRUS DISEASE [COVID-19]), AMPLIFIED PROBE TECHNIQUE, MAKING USE OF HIGH THROUGHPUT TECHNOLOGIES AS DESCRIBED BY CMS-2020-01-R: HCPCS | Performed by: NURSE PRACTITIONER

## 2021-01-13 PROCEDURE — 85025 COMPLETE CBC W/AUTO DIFF WBC: CPT | Performed by: PHYSICIAN ASSISTANT

## 2021-01-13 PROCEDURE — 80053 COMPREHEN METABOLIC PANEL: CPT | Performed by: PHYSICIAN ASSISTANT

## 2021-01-13 PROCEDURE — 25010000002 ONDANSETRON PER 1 MG: Performed by: INTERNAL MEDICINE

## 2021-01-13 PROCEDURE — 0DB78ZX EXCISION OF STOMACH, PYLORUS, VIA NATURAL OR ARTIFICIAL OPENING ENDOSCOPIC, DIAGNOSTIC: ICD-10-PCS | Performed by: INTERNAL MEDICINE

## 2021-01-13 PROCEDURE — 86850 RBC ANTIBODY SCREEN: CPT | Performed by: HOSPITALIST

## 2021-01-13 PROCEDURE — 86900 BLOOD TYPING SEROLOGIC ABO: CPT | Performed by: HOSPITALIST

## 2021-01-13 RX ORDER — PANTOPRAZOLE SODIUM 40 MG/1
40 TABLET, DELAYED RELEASE ORAL
Status: DISCONTINUED | OUTPATIENT
Start: 2021-01-13 | End: 2021-01-13

## 2021-01-13 RX ORDER — ONDANSETRON 2 MG/ML
4 INJECTION INTRAMUSCULAR; INTRAVENOUS EVERY 6 HOURS PRN
Status: DISCONTINUED | OUTPATIENT
Start: 2021-01-13 | End: 2021-01-13 | Stop reason: SDUPTHER

## 2021-01-13 RX ORDER — PANTOPRAZOLE SODIUM 40 MG/1
40 TABLET, DELAYED RELEASE ORAL
Status: DISCONTINUED | OUTPATIENT
Start: 2021-01-13 | End: 2021-01-14 | Stop reason: HOSPADM

## 2021-01-13 RX ORDER — SUCRALFATE 1 G/1
1 TABLET ORAL
Status: DISCONTINUED | OUTPATIENT
Start: 2021-01-13 | End: 2021-01-14 | Stop reason: HOSPADM

## 2021-01-13 RX ORDER — ONDANSETRON 4 MG/1
4 TABLET, FILM COATED ORAL EVERY 6 HOURS PRN
Status: DISCONTINUED | OUTPATIENT
Start: 2021-01-13 | End: 2021-01-13 | Stop reason: SDUPTHER

## 2021-01-13 RX ORDER — POLYETHYLENE GLYCOL 3350 17 G/17G
17 POWDER, FOR SOLUTION ORAL 2 TIMES DAILY
Status: DISCONTINUED | OUTPATIENT
Start: 2021-01-13 | End: 2021-01-14 | Stop reason: HOSPADM

## 2021-01-13 RX ORDER — PROPOFOL 10 MG/ML
VIAL (ML) INTRAVENOUS AS NEEDED
Status: DISCONTINUED | OUTPATIENT
Start: 2021-01-13 | End: 2021-01-13 | Stop reason: SURG

## 2021-01-13 RX ADMIN — PANTOPRAZOLE SODIUM 40 MG: 40 TABLET, DELAYED RELEASE ORAL at 16:02

## 2021-01-13 RX ADMIN — ONDANSETRON 4 MG: 2 INJECTION, SOLUTION INTRAMUSCULAR; INTRAVENOUS at 20:21

## 2021-01-13 RX ADMIN — LEVOTHYROXINE SODIUM 150 MCG: 150 TABLET ORAL at 08:57

## 2021-01-13 RX ADMIN — RIFAXIMIN 550 MG: 550 TABLET ORAL at 20:20

## 2021-01-13 RX ADMIN — PROPOFOL 425 MG: 10 INJECTION, EMULSION INTRAVENOUS at 14:16

## 2021-01-13 RX ADMIN — ONDANSETRON 4 MG: 2 INJECTION, SOLUTION INTRAMUSCULAR; INTRAVENOUS at 08:55

## 2021-01-13 RX ADMIN — PREGABALIN 25 MG: 25 CAPSULE ORAL at 20:20

## 2021-01-13 RX ADMIN — SUCRALFATE 1 G: 1 TABLET ORAL at 16:02

## 2021-01-13 RX ADMIN — OXYCODONE 10 MG: 5 TABLET ORAL at 10:24

## 2021-01-13 RX ADMIN — OXYCODONE 10 MG: 5 TABLET ORAL at 02:26

## 2021-01-13 RX ADMIN — Medication 10 ML: at 08:57

## 2021-01-13 RX ADMIN — SUCRALFATE 1 G: 1 TABLET ORAL at 20:21

## 2021-01-13 RX ADMIN — RIFAXIMIN 550 MG: 550 TABLET ORAL at 08:57

## 2021-01-13 RX ADMIN — Medication 10 ML: at 20:20

## 2021-01-13 RX ADMIN — OXYCODONE 10 MG: 5 TABLET ORAL at 20:21

## 2021-01-13 RX ADMIN — OXYCODONE 10 MG: 5 TABLET ORAL at 16:02

## 2021-01-13 RX ADMIN — POLYETHYLENE GLYCOL 3350 17 G: 17 POWDER, FOR SOLUTION ORAL at 20:20

## 2021-01-13 RX ADMIN — OXYCODONE 10 MG: 5 TABLET ORAL at 06:19

## 2021-01-13 RX ADMIN — APIXABAN 5 MG: 5 TABLET, FILM COATED ORAL at 20:20

## 2021-01-13 NOTE — PLAN OF CARE
Patient had no complaints of increased pain with her diet today. GI to see in the AM and patient is now NPO r/t consultation. VSS. Plan of care ongoing

## 2021-01-13 NOTE — CONSULTS
GI CONSULT  NOTE:    Referring Provider:  Dr. Silva    Chief complaint: Epigastric pain    Subjective .     History of present illness: Patient is a 45-year-old female with complicated past medical/surgical history.  She has Ramon cirrhosis that was diagnosed in 2017 and this has been complicated by hepatic encephalopathy and ascites.  Recent CT also suggests esophageal varices.  Patient also has history of portal vein thrombus as well as DVT and takes Eliquis.  She has history of gastric sleeve, splenectomy, Alfonzo-en-Y gastric bypass, cholecystectomy, and incisional hernia repair.  She also has history of thyroid cancer status post thyroidectomy.  Patient has been seen by Dr. Mckeon but not since 2018.  She reports following up at  for her chronic liver disease and is seen there every 3 months.  She had EGD in October 2018 but reports not having one since that.    She presented to the hospital on January 9, 2021 complaining of upper abdominal pain that radiates into her chest.  It normally worsens with abdominal distention/ascites.  She had a paracentesis last week and had some improvement.  Reports that fluid has not reaccumulated and last paracentesis prior to the one last week was 5 to 6 months ago.  Patient also has associated nausea but no vomiting.  Upper abdominal pain is worsened by eating within only a few minutes.  She has mainly only been on liquid diet until last night when she did eat some solid food.  Last bowel movement was 5 or 6 days ago.  No bright red blood per rectum or melena.      Endo History:  10/2018 EGD by Dr. Mckeon -Alfonzo-en-Y gastric bypass anatomy with patent anastomosis.    Past Medical History:  Past Medical History:   Diagnosis Date   • Abdominal pain    • Anemia    • Cancer (CMS/HCC)    • Cirrhosis of liver (CMS/HCC) 04/2017   • Clot    • Deep venous thrombosis of right profunda femoris vein (CMS/HCC) 8/15/2019   • Depression    • Disease of thyroid gland    • Empyema of pleura  (CMS/HCC)    • GERD (gastroesophageal reflux disease)    • Heartburn    • Hemorrhoids    • Hernia, ventral    • History of malignant neoplasm of thyroid    • History of transfusion    • Irregular menstrual cycle    • Migraine    • Parathyroid abnormality (CMS/HCC)    • Radiation    • Restless legs syndrome (RLS)    • Urge and stress incontinence    • Varicose veins        Past Surgical History:  Past Surgical History:   Procedure Laterality Date   • ABDOMINAL SURGERY     • BEDSIDE PARACENTESIS  3/22/2020        • BEDSIDE PARACENTESIS  4/19/2020        • CHOLECYSTECTOMY      Laparoscopic   • ENDOSCOPY N/A 3/17/2016    Procedure: ESOPHAGOGASTRODUODENOSCOPY WITH BALOON DILATATION 18-20MM WITH GASTRIC SLEEVE SEALANT;  Surgeon: Leonardo Ortiz Jr., MD;  Location: [facility] ENDOSCOPY;  Service:    • ENDOSCOPY N/A 3/14/2016    Procedure: esophagogastroduodenoscopy with fluoroscopy;  Surgeon: Greg Avila III, MD;  Location: [facility] ENDOSCOPY;  Service:    • ENDOSCOPY N/A 4/15/2016    Procedure: EGD WITH DILITATION AND STENT PLACEMENT dilation of pylorus ;  Surgeon: Leonardo Ortiz Jr., MD;  Location: [facility] ENDOSCOPY;  Service:    • ENDOSCOPY N/A 5/13/2016    Procedure: ESOPHAGOGASTRODUODENOSCOPY  W/ STENT;  Surgeon: Leonardo Ortiz Jr., MD;  Location: [facility] ENDOSCOPY;  Service:    • GASTRECTOMY      Gastric Surgery for Morbid Obesity Laparoscopic Longitudinal Gastrectomy   • GASTRIC SLEEVE LAPAROSCOPIC     • HEMORRHOIDECTOMY     • HERNIA REPAIR     • LAPAROSCOPY REPAIR HIATAL HERNIA     • LARYNGOSCOPY      Direct Laryngoscopy with Injection into Vocal Cords   • OTHER SURGICAL HISTORY Bilateral     Ear Pressure Equalization Tube, Insertion, Bilaterally   • PERIPHERALLY INSERTED CENTRAL CATHETER INSERTION     • SPLENECTOMY     • THYROID SURGERY     • TUBAL ABDOMINAL LIGATION         Social History:  Social History     Tobacco Use   • Smoking status: Never Smoker   • Smokeless tobacco: Never Used   Substance Use  Topics   • Alcohol use: No   • Drug use: No       Family History:  Family History   Problem Relation Age of Onset   • Stroke Father    • Diabetes Father         Diabetes Mellitus   • Hypertension Father    • Cancer Other        Medications:  Medications Prior to Admission   Medication Sig Dispense Refill Last Dose   • apixaban (ELIQUIS) 5 MG tablet tablet Take 5 mg by mouth 2 (Two) Times a Day.      • bumetanide (BUMEX) 2 MG tablet Take 2 mg by mouth Daily.      • levothyroxine (SYNTHROID, LEVOTHROID) 150 MCG tablet Take 150 mcg by mouth Daily.      • ondansetron ODT (ZOFRAN-ODT) 8 MG disintegrating tablet Place 8 mg on the tongue Every 8 (Eight) Hours As Needed for Nausea or Vomiting.      • oxyCODONE (ROXICODONE) 10 MG tablet Take 10 mg by mouth Every 6 (Six) Hours.      • pregabalin (LYRICA) 25 MG capsule Take 25 mg by mouth every night at bedtime.      • rifaximin (XIFAXAN) 550 MG tablet Take 550 mg by mouth Every 12 (Twelve) Hours.      • spironolactone (ALDACTONE) 100 MG tablet Take 300 mg by mouth Daily.          Scheduled Meds:apixaban, 5 mg, Oral, Q12H  bumetanide, 2 mg, Oral, Daily  levothyroxine, 150 mcg, Oral, Daily  pregabalin, 25 mg, Oral, Nightly  riFAXIMin, 550 mg, Oral, Q12H  sodium chloride, 10 mL, Intravenous, Q12H  spironolactone, 300 mg, Oral, Daily      Continuous Infusions:   PRN Meds:.Calcium Gluconate-NaCl **AND** calcium gluconate **AND** Calcium, Ionized  •  ketamine (KETALAR) IVPB **AND** Ketamine Vital Signs & Assessment  •  magnesium sulfate **OR** magnesium sulfate **OR** magnesium sulfate  •  melatonin  •  nitroglycerin  •  ondansetron **OR** ondansetron  •  oxyCODONE  •  potassium & sodium phosphates **OR** potassium & sodium phosphates  •  potassium chloride  •  potassium chloride  •  [COMPLETED] Insert peripheral IV **AND** sodium chloride  •  sodium chloride    ALLERGIES:  Compazine [prochlorperazine edisylate], Contrast dye, Iodinated diagnostic agents, Iodine, Morphine, Nsaids,  "and Hydrocodone    ROS:  Review of Systems   Constitutional: Negative for chills and fever.   HENT: Negative for congestion and trouble swallowing.    Respiratory: Negative for cough and shortness of breath.    Cardiovascular: Positive for chest pain. Negative for palpitations.   Gastrointestinal: Positive for abdominal distention, abdominal pain, constipation and nausea. Negative for blood in stool and vomiting.   Genitourinary: Negative for dysuria and hematuria.   Musculoskeletal: Negative for arthralgias and back pain.   Neurological: Positive for weakness. Negative for dizziness.   Psychiatric/Behavioral: Negative for agitation and confusion.       Objective     Vital Signs:   Visit Vitals  /67 (BP Location: Right arm, Patient Position: Lying)   Pulse 82   Temp 97.8 °F (36.6 °C) (Oral)   Resp 16   Ht 160 cm (63\")   Wt 62.1 kg (136 lb 14.5 oz)   LMP  (LMP Unknown)   SpO2 94%   Breastfeeding No   BMI 24.25 kg/m²       Physical Exam:      General Appearance:    Awake and alert, in no acute distress   Head:    Normocephalic, without obvious abnormality, atraumatic   Eyes:            Conjunctivae normal, anicteric sclera   Ears:    Ears appear intact with no abnormalities noted   Throat:   No oral lesions, no thrush, oral mucosa moist   Neck:   No adenopathy, supple, no thyromegaly, no JVD   Lungs:     Respirations regular, even and unlabored       Chest Wall:    No abnormalities observed   Abdomen:     Soft, tenderness noted from umbilicus extending to chest, no rebound or guarding, non-distended, umbilical hernia present that is reducible as well as hernia above this area   Rectal:     Deferred   Extremities:   Moves all extremities well, no edema, no cyanosis, no             redness   Pulses:   Pulses palpable and equal bilaterally   Skin:   No bleeding, bruising or rash, no jaundice   Lymph nodes:   No palpable adenopathy   Neurologic:   Cranial nerves 2 - 12 grossly intact, no asterixis, sensation intact "       Results Review:   I reviewed the patient's labs and imaging.  CBC  Results from last 7 days   Lab Units 01/13/21  0202 01/12/21  0214 01/11/21  0410 01/10/21  0059 01/09/21  1810   RBC 10*6/mm3 4.59 4.63 4.38 3.89 4.21   WBC 10*3/mm3 12.10* 13.80* 10.50 12.70* 9.50   HEMOGLOBIN g/dL 13.1 13.0 12.5 11.0* 12.2   PLATELETS 10*3/mm3 508* 557* 519* 480* 581*       CMP  Results from last 7 days   Lab Units 01/13/21  0202 01/12/21  0214 01/11/21  2058 01/11/21  0410 01/10/21  0059 01/09/21  1810   SODIUM mmol/L 131* 135*  --  138 136 138   POTASSIUM mmol/L 4.7 4.1 3.7 4.0 3.5 3.4*   CHLORIDE mmol/L 94* 94*  --  97* 103 102   CO2 mmol/L 30.0* 31.0*  --  33.0* 26.0 29.0   BUN mg/dL 9 10  --  8 8 9   CREATININE mg/dL 1.05* 0.90  --  0.76 0.57 0.65   GLUCOSE mg/dL 95 119*  --  109* 92 80   ALBUMIN g/dL 3.20* 3.40*  --  3.20* 3.10* 3.50   BILIRUBIN mg/dL 0.7 1.0  --  1.2 1.0 0.7   ALK PHOS U/L 227* 245*  --  239* 166* 198*   AST (SGOT) U/L 30 49*  --  95* 24 28   ALT (SGPT) U/L 30 45*  --  56* 25 28       Amylase and Lipase  Results from last 7 days   Lab Units 01/09/21  1810   LIPASE U/L 28       CRP         Imaging Results (Last 24 Hours)     ** No results found for the last 24 hours. **            ASSESSMENT AND PLAN:    Upper abdominal pain/chest pain  Nausea  Abdominal hernias  Constipation  Carter cirrhosis that has been complicated by ascites and hepatic encephalopathy  Status post paracentesis last week  History of DVT and PVT on Eliquis  History of gastric sleeve complicated by abscess and ulceration followed by Alfonzo-en-Y gastric bypass and splenectomy  History of hernia repairs, cholecystectomy, and hysterectomy  History of thyroid cancer status post thyroidectomy      Principal Problem:    Abdominal pain  Active Problems:    H/O thyroidectomy    Thyroid cancer (CMS/HCC)    CARTER (nonalcoholic steatohepatitis)    Ventral hernia    History of DVT (deep vein thrombosis)     Plan:  45-year-old female with complicated  past medical/surgical history admitted with upper abdominal pain.  CT originally suggested ventral hernia with distended small bowel with possible early bowel obstruction.  She also has Ramon cirrhosis and history of ascites with paracentesis performed last week.  Abdominal distention is greatly improved and fluid has not reaccumulated.  Small bowel follow-through was performed yesterday and shows no obstruction.  We will plan EGD today for further evaluation to exclude cause for her pain including anastomotic ulcer or gastritis.  Also evaluate for esophageal varices as these were suggested on CT performed on January 4, 2021.  Last EGD was 2018.  General surgery has been following.  Add PPI.  Also start MiraLAX twice daily for her constipation.  She will continue following up at  for chronic liver disease.  Meld sodium score is 8.    I discussed the patients findings and my recommendations with the patient.  Kalani Lock, APRN  01/13/21  09:25 EST

## 2021-01-13 NOTE — ANESTHESIA POSTPROCEDURE EVALUATION
Patient: Lucinda Cope    Procedure Summary     Date: 01/13/21 Room / Location: [facility] ENDOSCOPY 1 / [facility] ENDOSCOPY    Anesthesia Start: 1414 Anesthesia Stop: 1424    Procedure: ESOPHAGOGASTRODUODENOSCOPY WITH POLYPECTOMY X1 AND  BIOPSY X1. (N/A ) Diagnosis:       Epigastric pain      (Epigastric pain [R10.13])    Surgeon: Bryson Cotton MD Provider: Fili Dubon MD    Anesthesia Type: MAC ASA Status: 3          Anesthesia Type: MAC    Vitals  Vitals Value Taken Time   /67 01/13/21 1448   Temp     Pulse 71 01/13/21 1448   Resp 16 01/13/21 1448   SpO2 100 % 01/13/21 1448           Post Anesthesia Care and Evaluation    Patient location during evaluation: PACU  Patient participation: complete - patient participated  Level of consciousness: awake  Pain scale: See nurse's notes for pain score.  Pain management: adequate  Airway patency: patent  Anesthetic complications: No anesthetic complications  PONV Status: none  Cardiovascular status: acceptable  Respiratory status: acceptable  Hydration status: acceptable    Comments: Patient seen and examined postoperatively; vital signs stable; SpO2 greater than or equal to 90%; cardiopulmonary status stable; nausea/vomiting adequately controlled; pain adequately controlled; no apparent anesthesia complications; patient discharged from anesthesia care when discharge criteria were met

## 2021-01-13 NOTE — PROGRESS NOTES
[facility] Medicine Services Daily Progress Note      Hospitalist Team  LOS 1 days      Patient Care Team:  Melissa Iniguez APRN as PCP - General (Family Medicine)    Patient Location: [facility]      Subjective   Subjective     Chief Complaint / Subjective  Chief Complaint   Patient presents with   • Abdominal Pain       Present on Admission:  • Abdominal pain  • Ventral hernia  • Thyroid cancer (CMS/HCC)  • CARTER (nonalcoholic steatohepatitis)      Brief Synopsis of Hospital Course/HPI  Ms. Cope is a 45 y.o. female past medical history of Carter, ventral hernia, umbilical hernia, thyroid cancer status post thyroidectomy, previous DVT and chronic pain who presents to [facility] complaining of abdominal pain.  Patient states that on 1/8/2020 when she noted significant increase in her midline abdominal pain at the area of her ventral hernia.  She describes the pain as a tightness with muscle spasms rated 10/10 at its worst without obvious provoking factors however she does note that being in a hot bath seems to improve the pain slightly.  Pain is noted is radiating to her chest and back with a constant and worsening intensity.  Some nausea without vomiting is reported but she denies any dyspnea, cough or fever, sensation of tachycardia or palpitations, changes in bowel or bladder habits, hematochezia or melena.  Patient does not smoke or drink alcohol.  Also of note patient was recently seen at this facility for abdominal pain and distention and had paracentesis on 1/5/2021 with 1.9 L of fluid removed at that time.  She notes that her distention and diffuse abdominal pain have improved significantly since that procedure.     In the ED patient had lab significant for troponin of less than 0.010, potassium: 3.4, alk phos: 198, remainder of CBC and CMP was generally unremarkable.  Lipase: 28, PT: 11.6, INR: 1.06.  UA shows a specific gravity of 1.032 but is otherwise unremarkable.  CT of  "abdomen shows redemonstration of ventral body wall narrow neck hernia with fluid noted is now containing a longer segment of mildly distended small bowel with fecaloid contents with possible incarceration noted.  A slight improvement in large volume ascites with evidence of portal hypertension continues to be present as well as splenectomy, hysterectomy, cholecystectomy and surgical findings on the stomach.  Chest x-ray reported with lower lung volumes and changes consistent with chronic but no active disease.  EKG shows sinus rhythm at 87 without obvious acute ST changes or ectopy and a QTC of 423 ms.     Date::    1/10/21  Continues to report some abdominal pain      1/11/2021  Patient seen by Dr. Silva who ordered small bowel follow-through.  No bowel movements reported by patient  Denies vomiting  Went for small bowel follow-through    1/12/2021  Patient still complaining of left upper quadrant pain with food.  GI consult for further assistance of care  Small bowel follow-through was negative for obstruction    1/13  Preop Covid came back positive.  Patient asymptomatic and afebrile.  Saturating well on room air  X-ray ordered for the chest going for EGD today  Had further left upper quadrant pain overnight with no relation to food routine to the left lower chest wall.  Tender on exam on the left upper quadrant and epigastric region today.    ROS  10 point ROS reviewed, negative unless otherwise stated     Objective   Objective      Vital Signs  Temp:  [97.8 °F (36.6 °C)-98.1 °F (36.7 °C)] 97.8 °F (36.6 °C)  Heart Rate:  [80-96] 82  Resp:  [15-16] 16  BP: (104-120)/(67-82) 104/67  Oxygen Therapy  SpO2: 94 %  Pulse Oximetry Type: Intermittent  Device (Oxygen Therapy): room air  Flowsheet Rows      First Filed Value   Admission Height  160 cm (63\") Documented at 01/09/2021 1657   Admission Weight  59.7 kg (131 lb 9.8 oz) Documented at 01/09/2021 1657        Intake & Output (last 3 days)       01/10 0701 - 01/11 " 0700 01/11 0701 - 01/12 0700 01/12 0701 - 01/13 0700 01/13 0701 - 01/14 0700    P.O. 1520  240     Total Intake(mL/kg) 1520 (24.8)  240 (3.9)     Urine (mL/kg/hr) 2750 (1.9) 1100 (0.7) 1850 (1.2)     Total Output 2750 1100 1850     Net -1230 -1100 -1610             Urine Unmeasured Occurrence   1 x         Lines, Drains & Airways    Active LDAs     Name:   Placement date:   Placement time:   Site:   Days:    Peripheral IV 01/09/21 1811 Left Hand   01/09/21 1811    Hand   1                  Physical Exam:    Physical Exam  Physical Exam  Vitals signs reviewed.   Constitutional:       General: She is not in acute distress.     Appearance: Normal appearance. She is not ill-appearing or toxic-appearing.   HENT:      Head: Normocephalic and atraumatic.      Right Ear: External ear normal.      Left Ear: External ear normal.      Nose: Nose normal.      Mouth/Throat:      Mouth: Mucous membranes are moist.   Eyes:      Extraocular Movements: Extraocular movements intact.      Conjunctiva/sclera: Conjunctivae normal.   Neck:      Musculoskeletal: Normal range of motion.   Cardiovascular:      Rate and Rhythm: Normal rate and regular rhythm.      Pulses: Normal pulses.      Heart sounds: Normal heart sounds.   Pulmonary:      Effort: Pulmonary effort is normal.      Breath sounds: Normal breath sounds.   Abdominal:      General: There is no distension.      Tenderness: There is abdominal tenderness.      Hernia: A hernia is present.      Comments: Hyperactive bowel sounds noted   Musculoskeletal: Normal range of motion.      Right lower leg: No edema.      Left lower leg: No edema.   Skin:     General: Skin is warm and dry.   Neurological:      General: No focal deficit present.      Mental Status: She is alert and oriented to person, place, and time.   Psychiatric:         Mood and Affect: Mood normal.         Behavior: Behavior normal.         Thought Content: Thought content normal.         Judgment: Judgment normal.      Procedures:              Results Review:     I reviewed the patient's new clinical results.      Lab Results (last 24 hours)     Procedure Component Value Units Date/Time    Comprehensive Metabolic Panel [[id number]]  (Abnormal) Collected: 01/13/21 0202    Specimen: Blood Updated: 01/13/21 0235     Glucose 95 mg/dL      BUN 9 mg/dL      Creatinine 1.05 mg/dL      Sodium 131 mmol/L      Potassium 4.7 mmol/L      Chloride 94 mmol/L      CO2 30.0 mmol/L      Calcium 8.7 mg/dL      Total Protein 6.6 g/dL      Albumin 3.20 g/dL      ALT (SGPT) 30 U/L      AST (SGOT) 30 U/L      Alkaline Phosphatase 227 U/L      Total Bilirubin 0.7 mg/dL      eGFR Non African Amer 56 mL/min/1.73      Globulin 3.4 gm/dL      A/G Ratio 0.9 g/dL      BUN/Creatinine Ratio 8.6     Anion Gap 7.0 mmol/L     Narrative:      GFR Normal >60  Chronic Kidney Disease <60  Kidney Failure <15      Magnesium [[id number]]  (Normal) Collected: 01/13/21 0202    Specimen: Blood Updated: 01/13/21 0235     Magnesium 1.9 mg/dL     CBC & Differential [[id number]]  (Abnormal) Collected: 01/13/21 0202    Specimen: Blood Updated: 01/13/21 0225    Narrative:      The following orders were created for panel order CBC & Differential.  Procedure                               Abnormality         Status                     ---------                               -----------         ------                     CBC Auto Differential[[id number]]        Abnormal            Final result                 Please view results for these tests on the individual orders.    CBC Auto Differential [[id number]]  (Abnormal) Collected: 01/13/21 0202    Specimen: Blood Updated: 01/13/21 0225     WBC 12.10 10*3/mm3      RBC 4.59 10*6/mm3      Hemoglobin 13.1 g/dL      Hematocrit 40.4 %      MCV 88.2 fL      MCH 28.7 pg      MCHC 32.5 g/dL      RDW 16.8 %      RDW-SD 51.2 fl      MPV 8.5 fL      Platelets 508 10*3/mm3      Neutrophil % 50.6 %      Lymphocyte % 31.7 %      Monocyte % 12.6 %       Eosinophil % 4.5 %      Basophil % 0.6 %      Neutrophils, Absolute 6.10 10*3/mm3      Lymphocytes, Absolute 3.80 10*3/mm3      Monocytes, Absolute 1.50 10*3/mm3      Eosinophils, Absolute 0.50 10*3/mm3      Basophils, Absolute 0.10 10*3/mm3      nRBC 0.2 /100 WBC         No results found for: HGBA1C  Results from last 7 days   Lab Units 01/09/21  1810   INR  1.06               Microbiology Results (last 10 days)     Procedure Component Value - Date/Time    COVID-19,CEPHEID,COR/LEIDA/PAD IN-HOUSE(OR EMERGENT/ADD-ON),NP SWAB IN TRANSPORT MEDIA 3-4 HR TAT - Swab, Nasopharynx [[id number]]  (Normal) Collected: 01/05/21 0623    Lab Status: Final result Specimen: Swab from Nasopharynx Updated: 01/05/21 0730     COVID19 Not Detected    Narrative:      Fact sheet for providers: https://www.fda.gov/media/373885/download     Fact sheet for patients: https://www.fda.gov/media/201224/download          ECG/EMG Results (most recent)     Procedure Component Value Units Date/Time    ECG 12 Lead [[id number]] Collected: 01/09/21 1746     Updated: 01/10/21 1403     QT Interval 374 ms     Narrative:      HEART RATE= 87  bpm  RR Interval= 692  ms  AR Interval= 151  ms  P Horizontal Axis= 4  deg  P Front Axis= 21  deg  QRSD Interval= 96  ms  QT Interval= 374  ms  QRS Axis= 25  deg  T Wave Axis= 4  deg  - NORMAL ECG -  Sinus rhythm  When compared with ECG of 16-Dec-2020 5:39:24,  No significant change  Electronically Signed By: Blayne Perales (LEIDA) 10-Munir-2021 13:59:25  Date and Time of Study: 2021-01-09 17:46:11    ECG 12 Lead [[id number]] Collected: 01/12/21 0208     Updated: 01/12/21 0210     QT Interval 365 ms     Narrative:      HEART RATE= 95  bpm  RR Interval= 632  ms  AR Interval= 133  ms  P Horizontal Axis= 5  deg  P Front Axis= 44  deg  QRSD Interval= 93  ms  QT Interval= 365  ms  QRS Axis= 67  deg  T Wave Axis= 21  deg  - NORMAL ECG -  Sinus rhythm  Electronically Signed By:   Date and Time of Study: 2021-01-12 02:08:36           Results for orders placed during the hospital encounter of 08/23/19   Duplex Venous Lower Extremity - Right CAR    Narrative · Acute right lower extremity superficial thrombophlebitis noted in the   greater saphenous (above knee) and greater saphenous (below knee).  · All other right sided veins appeared normal.               Ct Abdomen Pelvis Without Contrast    Result Date: 1/9/2021  1.  Redemonstration of ventral body wall narrowneck hernia, with fluid, now containing a longer segment of mildly distended small bowel with fecaloid contents, query incarceration. There could be early obstruction. 2.  Slight improvement in large volume ascites. Evidence of portal hypertension. 3.  Splenectomy. Hysterectomy. Cholecystectomy. Surgical findings on the stomach. Electronically signed by:  Dina Duran  1/9/2021 7:11 PM    Xr Chest 1 View    Result Date: 1/9/2021  Low lung volumes and chronic changes with no active disease.  Electronically Signed By-Randell Stevens MD On:1/9/2021 6:10 PM This report was finalized on [id number] by  Randell Stevens MD.    Fl Small Bowel Follow Through Single-contrast    Result Date: 1/11/2021  1. Oral contrast passes into the right colon between 90 minutes and 5.5 hours. (No imaging was done between the 90 minute and 5.5 hour images.) No small bowel dilatation is seen. 2. Evidence of prior gastric surgery.  Electronically Signed By-Casi Sandra MD On:1/11/2021 6:23 PM This report was finalized on [id number] by  Casi Sandra MD.    Xr Abdomen Kub    Result Date: 1/12/2021  1. No small bowel obstruction. Slot 63 Electronically signed by:  Deepak Aponte M.D.  1/12/2021 1:38 AM      Xrays, labs reviewed personally by physician.    Medication Review:   I have reviewed the patient's current medication list      Scheduled Meds  apixaban, 5 mg, Oral, Q12H  bumetanide, 2 mg, Oral, Daily  levothyroxine, 150 mcg, Oral, Daily  pregabalin, 25 mg, Oral, Nightly  riFAXIMin, 550 mg, Oral,  Q12H  sodium chloride, 10 mL, Intravenous, Q12H  spironolactone, 300 mg, Oral, Daily        Meds Infusions       Meds PRN  Calcium Gluconate-NaCl **AND** calcium gluconate **AND** Calcium, Ionized  •  ketamine (KETALAR) IVPB **AND** Ketamine Vital Signs & Assessment  •  magnesium sulfate **OR** magnesium sulfate **OR** magnesium sulfate  •  melatonin  •  nitroglycerin  •  ondansetron **OR** ondansetron  •  oxyCODONE  •  potassium & sodium phosphates **OR** potassium & sodium phosphates  •  potassium chloride  •  potassium chloride  •  [COMPLETED] Insert peripheral IV **AND** sodium chloride  •  sodium chloride      Assessment/Plan   Assessment/Plan     Active Hospital Problems    Diagnosis  POA   • **Abdominal pain [R10.9]  Yes   • History of DVT (deep vein thrombosis) [Z86.718]  Not Applicable   • Ventral hernia [K43.9]  Yes   • CARTER (nonalcoholic steatohepatitis) [K75.81]  Yes   • H/O thyroidectomy [Z90.09]  Not Applicable   • Thyroid cancer (CMS/HCC) [C73]  Yes      Resolved Hospital Problems   No resolved problems to display.     Abdominal pain with history of ventral hernia  -Patient presents with significantly worsening midline abdominal pain which began on 1/8/2021 rated at 10/10 radiating to her chest and back.  ED provider notes uncertain complete reduction attempt in ED.  -CT of abdomen shows redemonstration of ventral body wall narrow neck hernia with fluid noted is now containing a longer segment of mildly distended small bowel with fecaloid contents with possible incarceration noted.  A slight improvement in large volume ascites with evidence of portal hypertension continues to be present as well as splenectomy, hysterectomy, cholecystectomy and surgical findings on the stomach.   -N.p.o. at midnight, diet advanced per surgery  -Patient given 1.5 mg of Dilaudid IV in ED with some relief in pain reported, continue p.o. Dilaudid as needed  -Continuous pulse ox ordered  -General surgery consulted,  appreciate recs  Small bowel follow-through negative for obstruction  GI consulted for further assistance  EGD planned today           Ramon  -CMP shows an alk phos of 198 with otherwise normal ALT, AST, bilirubin and INR and PT.  -Patient had paracentesis on 1/5/2020 with 1.9 L of fluid removed and clinical improvement noted at that time  -CT continues to demonstrate large volume of ascites though patient appears clinically much less distended and reports improvement following her procedure  -Continue Bumex, Xifaxan and spironolactone  -Monitor CMP while admitted     Hypothyroidism with previous thyroid cancer and thyroidectomy  -Continue levothyroxine      Chronic pain  -Continue Lyrica  -Hold home oxycodone while patient receiving p.o. Dilaudid as above     History of DVT  -Continue Eliquis    Initial positive Covid 9/16/2020  Second preop test positive Covid 1/13/2021.  Asymptomatic.  Saturation high 90s on room air  Afebrile  Chest x-ray ordered          VTE Prophylaxis - Eliquis (home med).  Hold Eliquis dose if plans for surgery     Code Status -   Code Status and Medical Interventions:   Ordered at: 01/09/21 2238     Code Status:    CPR     Medical Interventions (Level of Support Prior to Arrest):    Full       Discharge Planning  Pending finalized surgery recommendations. Patient follows actively with transplant center.    Continued Care and Services - Admitted Since 1/9/2021    Coordination has not been started for this encounter.           Electronically signed by Ever Motta MD, 01/13/21, 09:23 EST.  [facility] Hospitalist Team

## 2021-01-13 NOTE — OP NOTE
ESOPHAGOGASTRODUODENOSCOPY Procedure Report    Patient Name:  Lucinda Cope  YOB: 1975    Date of Surgery:  1/13/2021     Pre-Op Diagnosis:  Epigastric pain [R10.13]       Post-Op Diagnosis Codes:     * Epigastric pain [R10.13]   1 cm clean-based anastomotic ulcer  2 cords of grade 1 varices  Evidence of prior gastric bypass  3 mm polyp in the antr um biopsied  Mild gastritis biopsied      Procedure/CPT® Codes:      Procedure(s):  ESOPHAGOGASTRODUODENOSCOPY WITH POLYPECTOMY X1 AND  BIOPSY X1.    Staff:  Surgeon(s):  Bryson Cotton MD      Anesthesia: Monitored Anesthesia Care    Description of Procedure:  A description of the procedure as well as risks, benefits and alternative methods were explained to the patient who voiced understanding and signed the corresponding consent form. A physical exam was performed and vital signs were monitored throughout the procedure.    An upper GI endoscope was placed into the mouth and proceeded through the esophagus, stomach and second portion of the duodenum without difficulty. The scope was then retroflexed and the fundus was visualized. The procedure was not difficult and there were no immediate complications.    Specimen:        See Below    Estimated blood loss: none    Complications:  None    Findings:  1 cm clean-based anastomotic ulcer  2 cords of grade 1 varices  Evidence of prior gastric bypass  3 mm polyp in the antrum biopsied  Mild gastritis biopsied    Impression:  Epigastric pain most likely due to anastomotic ulcer    Recommendations:  Await biopsy results  PPI twice daily  Carafate  We will see as needed in the hospital      Bryson Cotton MD     Date: 1/13/2021    Time: 14:25 EST

## 2021-01-13 NOTE — ANESTHESIA PREPROCEDURE EVALUATION
Anesthesia Evaluation                  Airway   Dental      Pulmonary    (+) sleep apnea,   Cardiovascular     (+) hypertension, DVT,       Neuro/Psych  (+) headaches,     GI/Hepatic/Renal/Endo    (+)  GERD,  hepatitis, liver disease,     Musculoskeletal     (+) back pain,   Abdominal    Substance History      OB/GYN          Other      history of cancer                    Anesthesia Plan    ASA 3     MAC     intravenous induction     Anesthetic plan, all risks, benefits, and alternatives have been provided, discussed and informed consent has been obtained with: patient.

## 2021-01-13 NOTE — PROGRESS NOTES
Continued Stay Note  SARAH Galdamez     Patient Name: Lucinda Cope  MRN: [id number]  Today's Date: 1/13/2021    Admit Date: 1/9/2021    Discharge Plan     Row Name 01/13/21 1345       Plan    Plan  D/C Plan: Home with family.    Plan Comments  Barrier to D/C: EGD today.          Expected Discharge Date and Time     Expected Discharge Date Expected Discharge Time    Munir 15, 2021             Isaura Rodriguez

## 2021-01-14 ENCOUNTER — READMISSION MANAGEMENT (OUTPATIENT)
Dept: CALL CENTER | Facility: HOSPITAL | Age: 46
End: 2021-01-14

## 2021-01-14 VITALS
BODY MASS INDEX: 23.5 KG/M2 | DIASTOLIC BLOOD PRESSURE: 73 MMHG | RESPIRATION RATE: 14 BRPM | OXYGEN SATURATION: 97 % | SYSTOLIC BLOOD PRESSURE: 114 MMHG | TEMPERATURE: 98.4 F | HEART RATE: 77 BPM | WEIGHT: 132.6 LBS | HEIGHT: 63 IN

## 2021-01-14 LAB
ALBUMIN SERPL-MCNC: 3.2 G/DL (ref 3.5–5.2)
ALBUMIN/GLOB SERPL: 1 G/DL
ALP SERPL-CCNC: 203 U/L (ref 39–117)
ALT SERPL W P-5'-P-CCNC: 22 U/L (ref 1–33)
ANION GAP SERPL CALCULATED.3IONS-SCNC: 9 MMOL/L (ref 5–15)
AST SERPL-CCNC: 25 U/L (ref 1–32)
BASOPHILS # BLD AUTO: 0.6 10*3/MM3 (ref 0–0.2)
BASOPHILS NFR BLD AUTO: 5.1 % (ref 0–1.5)
BILIRUB SERPL-MCNC: 0.8 MG/DL (ref 0–1.2)
BUN SERPL-MCNC: 10 MG/DL (ref 6–20)
BUN/CREAT SERPL: 10.8 (ref 7–25)
CALCIUM SPEC-SCNC: 8.6 MG/DL (ref 8.6–10.5)
CHLORIDE SERPL-SCNC: 96 MMOL/L (ref 98–107)
CO2 SERPL-SCNC: 29 MMOL/L (ref 22–29)
CREAT SERPL-MCNC: 0.93 MG/DL (ref 0.57–1)
DEPRECATED RDW RBC AUTO: 50.3 FL (ref 37–54)
EOSINOPHIL # BLD AUTO: 0.5 10*3/MM3 (ref 0–0.4)
EOSINOPHIL NFR BLD AUTO: 4.7 % (ref 0.3–6.2)
ERYTHROCYTE [DISTWIDTH] IN BLOOD BY AUTOMATED COUNT: 16.6 % (ref 12.3–15.4)
GFR SERPL CREATININE-BSD FRML MDRD: 65 ML/MIN/1.73
GIANT PLATELETS: NORMAL
GLOBULIN UR ELPH-MCNC: 3.1 GM/DL
GLUCOSE SERPL-MCNC: 87 MG/DL (ref 65–99)
HCT VFR BLD AUTO: 35.9 % (ref 34–46.6)
HGB BLD-MCNC: 11.7 G/DL (ref 12–15.9)
LAB AP CASE REPORT: NORMAL
LAB AP DIAGNOSIS COMMENT: NORMAL
LYMPHOCYTES # BLD AUTO: 3.4 10*3/MM3 (ref 0.7–3.1)
LYMPHOCYTES NFR BLD AUTO: 30 % (ref 19.6–45.3)
MAGNESIUM SERPL-MCNC: 1.9 MG/DL (ref 1.6–2.6)
MCH RBC QN AUTO: 28.5 PG (ref 26.6–33)
MCHC RBC AUTO-ENTMCNC: 32.6 G/DL (ref 31.5–35.7)
MCV RBC AUTO: 87.4 FL (ref 79–97)
MONOCYTES # BLD AUTO: 1.2 10*3/MM3 (ref 0.1–0.9)
MONOCYTES NFR BLD AUTO: 10.2 % (ref 5–12)
NEUTROPHILS NFR BLD AUTO: 5.7 10*3/MM3 (ref 1.7–7)
NEUTROPHILS NFR BLD AUTO: 50 % (ref 42.7–76)
NRBC BLD AUTO-RTO: 0.1 /100 WBC (ref 0–0.2)
PATH REPORT.FINAL DX SPEC: NORMAL
PATH REPORT.GROSS SPEC: NORMAL
PLATELET # BLD AUTO: 502 10*3/MM3 (ref 140–450)
PMV BLD AUTO: 8.8 FL (ref 6–12)
POTASSIUM SERPL-SCNC: 3.6 MMOL/L (ref 3.5–5.2)
PROT SERPL-MCNC: 6.3 G/DL (ref 6–8.5)
RBC # BLD AUTO: 4.1 10*6/MM3 (ref 3.77–5.28)
RBC MORPH BLD: NORMAL
SMALL PLATELETS BLD QL SMEAR: NORMAL
SODIUM SERPL-SCNC: 134 MMOL/L (ref 136–145)
WBC # BLD AUTO: 11.3 10*3/MM3 (ref 3.4–10.8)
WBC MORPH BLD: NORMAL

## 2021-01-14 PROCEDURE — 99232 SBSQ HOSP IP/OBS MODERATE 35: CPT | Performed by: SURGERY

## 2021-01-14 PROCEDURE — 25010000002 ONDANSETRON PER 1 MG: Performed by: INTERNAL MEDICINE

## 2021-01-14 PROCEDURE — 99239 HOSP IP/OBS DSCHRG MGMT >30: CPT | Performed by: INTERNAL MEDICINE

## 2021-01-14 PROCEDURE — 80053 COMPREHEN METABOLIC PANEL: CPT | Performed by: INTERNAL MEDICINE

## 2021-01-14 PROCEDURE — 85025 COMPLETE CBC W/AUTO DIFF WBC: CPT | Performed by: INTERNAL MEDICINE

## 2021-01-14 PROCEDURE — 83735 ASSAY OF MAGNESIUM: CPT | Performed by: INTERNAL MEDICINE

## 2021-01-14 PROCEDURE — 85007 BL SMEAR W/DIFF WBC COUNT: CPT | Performed by: INTERNAL MEDICINE

## 2021-01-14 RX ORDER — LIDOCAINE 50 MG/G
1 PATCH TOPICAL
Qty: 5 PATCH | Refills: 0 | Status: SHIPPED | OUTPATIENT
Start: 2021-01-15 | End: 2021-01-20

## 2021-01-14 RX ORDER — DICYCLOMINE HYDROCHLORIDE 10 MG/1
10 CAPSULE ORAL
Qty: 20 CAPSULE | Refills: 0 | Status: SHIPPED | OUTPATIENT
Start: 2021-01-14

## 2021-01-14 RX ORDER — SUCRALFATE 1 G/1
1 TABLET ORAL
Qty: 56 TABLET | Refills: 0 | Status: SHIPPED | OUTPATIENT
Start: 2021-01-14 | End: 2021-01-28

## 2021-01-14 RX ORDER — POLYETHYLENE GLYCOL 3350 17 G/17G
17 POWDER, FOR SOLUTION ORAL 2 TIMES DAILY
Qty: 1020 G | Refills: 0 | Status: SHIPPED | OUTPATIENT
Start: 2021-01-14 | End: 2021-02-13

## 2021-01-14 RX ORDER — LIDOCAINE 50 MG/G
1 PATCH TOPICAL
Status: DISCONTINUED | OUTPATIENT
Start: 2021-01-14 | End: 2021-01-14 | Stop reason: HOSPADM

## 2021-01-14 RX ORDER — PANTOPRAZOLE SODIUM 40 MG/1
40 TABLET, DELAYED RELEASE ORAL
Qty: 60 TABLET | Refills: 0 | Status: SHIPPED | OUTPATIENT
Start: 2021-01-14 | End: 2021-02-13

## 2021-01-14 RX ADMIN — PANTOPRAZOLE SODIUM 40 MG: 40 TABLET, DELAYED RELEASE ORAL at 08:45

## 2021-01-14 RX ADMIN — OXYCODONE 10 MG: 5 TABLET ORAL at 13:49

## 2021-01-14 RX ADMIN — SUCRALFATE 1 G: 1 TABLET ORAL at 08:45

## 2021-01-14 RX ADMIN — OXYCODONE 10 MG: 5 TABLET ORAL at 00:20

## 2021-01-14 RX ADMIN — LIDOCAINE 1 PATCH: 50 PATCH TOPICAL at 11:45

## 2021-01-14 RX ADMIN — RIFAXIMIN 550 MG: 550 TABLET ORAL at 08:45

## 2021-01-14 RX ADMIN — ONDANSETRON 4 MG: 2 INJECTION, SOLUTION INTRAMUSCULAR; INTRAVENOUS at 08:45

## 2021-01-14 RX ADMIN — OXYCODONE 10 MG: 5 TABLET ORAL at 05:23

## 2021-01-14 RX ADMIN — SUCRALFATE 1 G: 1 TABLET ORAL at 11:45

## 2021-01-14 RX ADMIN — OXYCODONE 10 MG: 5 TABLET ORAL at 09:25

## 2021-01-14 RX ADMIN — POLYETHYLENE GLYCOL 3350 17 G: 17 POWDER, FOR SOLUTION ORAL at 08:38

## 2021-01-14 RX ADMIN — LEVOTHYROXINE SODIUM 150 MCG: 150 TABLET ORAL at 08:45

## 2021-01-14 RX ADMIN — Medication 10 ML: at 08:45

## 2021-01-14 RX ADMIN — APIXABAN 5 MG: 5 TABLET, FILM COATED ORAL at 08:45

## 2021-01-14 RX ADMIN — KETAMINE HYDROCHLORIDE 18 MG: 50 INJECTION, SOLUTION INTRAMUSCULAR; INTRAVENOUS at 02:48

## 2021-01-14 NOTE — PROGRESS NOTES
General Surgery Progress Note    Name: Lucinda Cope ADMIT: 2021   : 1975  PCP: Melissa Iniguez APRN    MRN: [id number] LOS: 2 days   AGE/SEX: 45 y.o. female  ROOM: [address] [place] [address]    Chief Complaint   Patient presents with   • Abdominal Pain     Subjective     45 y.o. female complicated abdominal surgical history status post gastric sleeve revision to Alfonzo-en-Y gastric bypass for abscess splenectomy previous hernia repair cirrhosis requiring serial paracenteses with a ventral hernia.    Objective   Had quite a bit of epigastric abdominal pain yesterday evening requiring pain medication including oxycodone and ketamine.  Did have a good bowel movement this morning.  No significant lower abdominal pain.  No fevers or chills.    Scheduled Medications:   apixaban, 5 mg, Oral, Q12H  bumetanide, 2 mg, Oral, Daily  levothyroxine, 150 mcg, Oral, Daily  pantoprazole, 40 mg, Oral, BID AC  polyethylene glycol, 17 g, Oral, BID  pregabalin, 25 mg, Oral, Nightly  sodium chloride, 10 mL, Intravenous, Q12H  spironolactone, 300 mg, Oral, Daily  sucralfate, 1 g, Oral, 4x Daily AC & at Bedtime        Active Infusions:       As Needed Medications:  Calcium Gluconate-NaCl **AND** calcium gluconate **AND** Calcium, Ionized  •  ketamine (KETALAR) IVPB **AND** Ketamine Vital Signs & Assessment  •  magnesium sulfate **OR** magnesium sulfate **OR** magnesium sulfate  •  melatonin  •  nitroglycerin  •  ondansetron **OR** ondansetron  •  oxyCODONE  •  potassium & sodium phosphates **OR** potassium & sodium phosphates  •  potassium chloride  •  potassium chloride  •  [COMPLETED] Insert peripheral IV **AND** sodium chloride  •  sodium chloride    Vital Signs  Vital Signs (range)  Temp:  [97.9 °F (36.6 °C)-98.8 °F (37.1 °C)] 98.8 °F (37.1 °C)  Heart Rate:  [] 72  Resp:  [14-18] 14  BP: ()/(51-91) 103/69    Physical Exam:  Physical Exam  Constitutional:       Appearance: Normal appearance.   HENT:      Head:  Normocephalic and atraumatic.   Cardiovascular:      Rate and Rhythm: Normal rate.   Pulmonary:      Effort: Pulmonary effort is normal. No respiratory distress.   Abdominal:      Comments: Soft nondistended mild tenderness to palpation around the reducible hernias   Skin:     General: Skin is warm and dry.   Neurological:      General: No focal deficit present.      Mental Status: She is alert. Mental status is at baseline.   Psychiatric:         Mood and Affect: Mood normal.         Behavior: Behavior normal.         Results Review:     CBC    Results from last 7 days   Lab Units 01/14/21  0349 01/13/21  0202 01/12/21  0214 01/11/21  0410 01/10/21  0059 01/09/21  1810   WBC 10*3/mm3 11.30* 12.10* 13.80* 10.50 12.70* 9.50   HEMOGLOBIN g/dL 11.7* 13.1 13.0 12.5 11.0* 12.2   PLATELETS 10*3/mm3 502* 508* 557* 519* 480* 581*     BMP   Results from last 7 days   Lab Units 01/14/21  0349 01/13/21  0202 01/12/21  0214 01/11/21  2058 01/11/21  0410 01/10/21  0059 01/09/21  1810   SODIUM mmol/L 134* 131* 135*  --  138 136 138   POTASSIUM mmol/L 3.6 4.7 4.1 3.7 4.0 3.5 3.4*   CHLORIDE mmol/L 96* 94* 94*  --  97* 103 102   CO2 mmol/L 29.0 30.0* 31.0*  --  33.0* 26.0 29.0   BUN mg/dL 10 9 10  --  8 8 9   CREATININE mg/dL 0.93 1.05* 0.90  --  0.76 0.57 0.65   GLUCOSE mg/dL 87 95 119*  --  109* 92 80   MAGNESIUM mg/dL 1.9 1.9 1.8  --  1.8 2.1  --        I reviewed the patient's new clinical results.  I have reviewed the GI op note  Assessment/Plan       Abdominal pain    H/O thyroidectomy    Thyroid cancer (CMS/HCC)    CARTER (nonalcoholic steatohepatitis)    Ventral hernia    History of DVT (deep vein thrombosis)    Epigastric pain      45 y.o. female complicated abdominal surgical history status post gastric sleeve revision to Alfonzo-en-Y gastric bypass for abscess splenectomy previous hernia repair cirrhosis requiring serial paracenteses with a ventral hernia.    Epigastric abdominal pain likely related to a 1 cm anastomotic  ulcer.  Agree with PPI Carafate as described and outlined by gastroenterology.  Diet as tolerated my standpoint from a bowel obstruction standpoint.  We will continue nonoperative therapy.  Once pain is controlled anticipate discharge home.  Please recall me if there is any clinical change for reevaluation.     Personal protective equipment used for this patient encounter:  Patient wearing surgical mask [x]    Provider wearing a surgical mask [x]    Gloves [x]    Eye protection [x]    Face Shield []    Gown [x]     N 95 respirator or CAPR/PAPR []     This note was created using Dragon Voice Recognition software.    Enzo Silva MD  01/14/21  09:43 EST

## 2021-01-14 NOTE — PLAN OF CARE
Goal Outcome Evaluation:  Plan of Care Reviewed With: patient  Progress: no change  Outcome Summary: Patient had EGD this afternoon, tolerated well with minimal complaints of discomfort. Pt is resting in bed. Will continue to monitor.

## 2021-01-14 NOTE — DISCHARGE SUMMARY
[facility] Medicine Services  DISCHARGE SUMMARY        Prepared For PCP:  Melissa Iniguez APRN    Patient Name: Lucinda Cope  : 1975  MRN: [id number]      Date of Admission:   2021    Date of Discharge:  2021    Length of stay:  LOS: 2 days     Hospital Course     Presenting Problem:   Incarcerated hernia [K46.0]  Abdominal pain, unspecified abdominal location [R10.9]  Abdominal pain, unspecified abdominal location [R10.9]      Active Hospital Problems    Diagnosis  POA   • **Abdominal pain [R10.9]  Yes   • Epigastric pain [R10.13]  Yes   • History of DVT (deep vein thrombosis) [Z86.718]  Not Applicable   • Ventral hernia [K43.9]  Yes   • CARTER (nonalcoholic steatohepatitis) [K75.81]  Yes   • H/O thyroidectomy [Z90.09]  Not Applicable   • Thyroid cancer (CMS/HCC) [C73]  Yes      Resolved Hospital Problems   No resolved problems to display.           Hospital Course:  Lucinda Cope is a 45 y.o. female with chronic medical problems including DVT, Carter cirrhosis, and ascites.      She presented to the hospital on 2021 complaining of upper abdominal pain that radiates into her chest.  It normally worsens with abdominal distention/ascites.  She had a paracentesis last week and had some improvement.  Her previous paracenteses status was about 5 months ago.  Her upper abdominal pain is worsened by eating within only a few minutes.    Her abdomen is soft with mild epigastric tenderness and fluid wave.  Creatinine is 1.05 and hemoglobin is 13 with platelets of 508.  Her albumin is 3.2 and bilirubin 0.7 and alkaline phosphatase 227.  Her lipase is 28.  Meld score is 8.    She had CT suggesting ventral hernia possible bowel obstruction but small bowel follow-through yesterday showed no obstruction.  She also has a history of anastomotic ulcer.  S/p EGD showing 2021  1 cm clean-based anastomotic ulcer  2 cords of grade 1 varices  Evidence of prior gastric bypass  3  mm polyp in the antr         um biopsied  Mild gastritis biopsied    GI recommending PPI and Carafate and follow-up on the results  Patient hemoglobin is stable  She is already on Eliquis  She is doing much better and concerned about her pain that is relieved with pain medication  She was requesting Dilaudid initially according to the nursing staff  She is on oxycodone at home  Her abdomen is benign on exam  She was cleared for discharge by current subspecialist  She tested positive for preop Covid evaluation and she is totally asymptomatic [id number]% on room air and afebrile during her hospital stay.  I doubt this is a valid result but patient will be on isolation as instructed for 10 days  We will check also with infectious disease service if you have any further recommendation  I do not feel patient will need any treatment at this time  Her chest x-ray shows no acute findings.                  Recommendation for Outpatient Providers:     Follow-up with GI in 2 weeks  Follow-up on pathology report for biopsy with GI service  Follow-up with PCP next week  Follow-up with Dr. Silva as needed  Minimize opioids to avoid slowing down of bowel movements and constipation  May use dicyclomine as needed  Avoid NSAIDs  Monitor CBC and BMP with primary care physician as needed   Continue isolation for positive Covid test for 10 days      Reasons For Change In Medications and Indications for New Medications:        Day of Discharge     HPI:       Vital Signs:   Temp:  [97.9 °F (36.6 °C)-98.8 °F (37.1 °C)] 98.8 °F (37.1 °C)  Heart Rate:  [] 72  Resp:  [14-18] 14  BP: ()/(51-91) 103/69     Physical Exam:  Physical Exam  Abdomen soft nontender  Cardiovascular and pulmonary exam unremarkable    Pertinent  and/or Most Recent Results     Results from last 7 days   Lab Units 01/14/21  0349 01/13/21  0202 01/12/21  0214 01/11/21  2058 01/11/21  0410 01/10/21  0059 01/09/21  1810   WBC 10*3/mm3 11.30* 12.10* 13.80*  --   10.50 12.70* 9.50   HEMOGLOBIN g/dL 11.7* 13.1 13.0  --  12.5 11.0* 12.2   HEMATOCRIT % 35.9 40.4 41.1  --  38.2 34.4 37.2   PLATELETS 10*3/mm3 502* 508* 557*  --  519* 480* 581*   SODIUM mmol/L 134* 131* 135*  --  138 136 138   POTASSIUM mmol/L 3.6 4.7 4.1 3.7 4.0 3.5 3.4*   CHLORIDE mmol/L 96* 94* 94*  --  97* 103 102   CO2 mmol/L 29.0 30.0* 31.0*  --  33.0* 26.0 29.0   BUN mg/dL 10 9 10  --  8 8 9   CREATININE mg/dL 0.93 1.05* 0.90  --  0.76 0.57 0.65   GLUCOSE mg/dL 87 95 119*  --  109* 92 80   CALCIUM mg/dL 8.6 8.7 8.6  --  8.6 8.3* 9.0     Results from last 7 days   Lab Units 01/14/21  0349 01/13/21  0202 01/12/21  0214 01/11/21  0410 01/10/21  0059 01/09/21  1810   BILIRUBIN mg/dL 0.8 0.7 1.0 1.2 1.0 0.7   ALK PHOS U/L 203* 227* 245* 239* 166* 198*   ALT (SGPT) U/L 22 30 45* 56* 25 28   AST (SGOT) U/L 25 30 49* 95* 24 28   PROTIME Seconds  --   --   --   --   --  11.6   INR   --   --   --   --   --  1.06           Invalid input(s): TG, LDLCALC, LDLREALC  Results from last 7 days   Lab Units 01/10/21  0059 01/09/21  1810   TROPONIN T ng/mL <0.010 <0.010       Brief Urine Lab Results  (Last result in the past 365 days)      Color   Clarity   Blood   Leuk Est   Nitrite   Protein   CREAT   Urine HCG        01/09/21 1750 Dark Yellow Clear Negative Negative Negative Negative               Microbiology Results Abnormal     Procedure Component Value - Date/Time    COVID PRE-OP / PRE-PROCEDURE SCREENING ORDER (NO ISOLATION) - Swab, Nasopharynx [[id number]]  (Abnormal) Collected: 01/13/21 0943    Lab Status: Final result Specimen: Swab from Nasopharynx Updated: 01/13/21 1140    Narrative:      The following orders were created for panel order COVID PRE-OP / PRE-PROCEDURE SCREENING ORDER (NO ISOLATION) - Swab, Nasopharynx.  Procedure                               Abnormality         Status                     ---------                               -----------         ------                      COVID-19,CEPHEID,COR/LEIDA...[[id number]]  Abnormal            Final result                 Please view results for these tests on the individual orders.    COVID-19,CEPHEID,COR/LEIDA/PAD IN-HOUSE(OR EMERGENT/ADD-ON),NP SWAB IN TRANSPORT MEDIA 3-4 HR TAT - Swab, Nasopharynx [[id number]]  (Abnormal) Collected: 01/13/21 0943    Lab Status: Final result Specimen: Swab from Nasopharynx Updated: 01/13/21 1140     COVID19 Detected    Narrative:      Fact sheet for providers: https://www.fda.gov/media/891525/download     Fact sheet for patients: https://www.fda.gov/media/641793/download          Ct Abdomen Pelvis Without Contrast    Result Date: 1/9/2021  Impression: 1.  Redemonstration of ventral body wall narrowneck hernia, with fluid, now containing a longer segment of mildly distended small bowel with fecaloid contents, query incarceration. There could be early obstruction. 2.  Slight improvement in large volume ascites. Evidence of portal hypertension. 3.  Splenectomy. Hysterectomy. Cholecystectomy. Surgical findings on the stomach. Electronically signed by:  Dina Duran  1/9/2021 7:11 PM    Ct Abdomen Pelvis Without Contrast    Result Date: 12/16/2020  Impression: 1.Cirrhosis and fatty infiltration of the liver with moderate amount of ascites. 2.Umbilical hernia containing ascites and a loop of small bowel. No significant small bowel dilatation is visualized to indicate obstruction at this time. Correlate for symptoms in this location. There is also a supraumbilical ventral hernia containing fluid. Hernias appear grossly similar to the prior exam. 3.Postoperative changes of gastric bypass, splenectomy, cystectomy, hysterectomy.     Electronically Signed By-Josué Gay MD On:12/16/2020 8:19 AM This report was finalized on [id number] by  Josué Gay MD.    Ct Abdomen Pelvis With Contrast    Result Date: 1/4/2021  Impression: 1. Cirrhotic liver morphology, portosystemic collateralization (including esophageal  varices and hemorrhoids), cavernous transformation of the main portal vein and moderate ascites. Findings are compatible with portal venous hypertension. 2. An umbilical hernia contains a short segment of nonobstructed small bowel and ascites. A supraumbilical ventral hernia also contains ascites. 3. Additional chronic and postoperative findings as described above. Electronically signed by:  Champ Gregory M.D.  1/4/2021 11:16 PM    Xr Chest 1 View    Result Date: 1/13/2021  Impression: No active disease  Electronically Signed By-Leonardo Luis MD On:1/13/2021 1:25 PM This report was finalized on [id number] by  Leonardo Luis MD.    Xr Chest 1 View    Result Date: 1/9/2021  Impression: Low lung volumes and chronic changes with no active disease.  Electronically Signed By-Randell Stevens MD On:1/9/2021 6:10 PM This report was finalized on [id number] by  Randell Stevens MD.    Xr Chest 1 View    Result Date: 12/16/2020  Impression: No radiographic findings of acute cardiopulmonary abnormality.  Electronically Signed By-Josué Gay MD On:12/16/2020 7:50 AM This report was finalized on [id number] by  Josué Gay MD.    Fl Small Bowel Follow Through Single-contrast    Result Date: 1/11/2021  Impression: 1. Oral contrast passes into the right colon between 90 minutes and 5.5 hours. (No imaging was done between the 90 minute and 5.5 hour images.) No small bowel dilatation is seen. 2. Evidence of prior gastric surgery.  Electronically Signed By-Casi Sandra MD On:1/11/2021 6:23 PM This report was finalized on [id number] by  Casi Sandra MD.    Xr Abdomen Kub    Result Date: 1/12/2021  Impression: 1. No small bowel obstruction. Slot 63 Electronically signed by:  Deepak Aponte M.D.  1/12/2021 1:38 AM      Results for orders placed during the hospital encounter of 08/23/19   Duplex Venous Lower Extremity - Right CAR    Narrative · Acute right lower extremity superficial thrombophlebitis noted in the   greater  saphenous (above knee) and greater saphenous (below knee).  · All other right sided veins appeared normal.          Results for orders placed during the hospital encounter of 08/23/19   Duplex Venous Lower Extremity - Right CAR    Narrative · Acute right lower extremity superficial thrombophlebitis noted in the   greater saphenous (above knee) and greater saphenous (below knee).  · All other right sided veins appeared normal.                       Test Results Pending at Discharge  Pending Labs     Order Current Status    Tissue Pathology Exam In process            Procedures Performed  Procedure(s):  ESOPHAGOGASTRODUODENOSCOPY WITH POLYPECTOMY X1 AND  BIOPSY X1.         Consults:   Consults     Date and Time Order Name Status Description    1/12/2021 1814 Inpatient Gastroenterology Consult      1/9/2021 2117 Surgery (on-call MD unless specified) Completed     1/9/2021 2117 Hospitalist (on-call MD unless specified) Completed     1/5/2021 0131 Hospitalist (on-call MD unless specified) Completed     12/16/2020 1011 Hospitalist (on-call MD unless specified) Completed             Discharge Details        Discharge Medications      New Medications      Instructions Start Date   dicyclomine 10 MG capsule  Commonly known as: Bentyl   10 mg, Oral, 4 Times Daily Before Meals & Nightly PRN      lidocaine 5 %  Commonly known as: LIDODERM   1 patch, Transdermal, Every 24 Hours Scheduled, Remove & Discard patch within 12 hours or as directed by MD   Start Date: January 15, 2021     pantoprazole 40 MG EC tablet  Commonly known as: PROTONIX   40 mg, Oral, 2 Times Daily Before Meals      polyethylene glycol 17 g packet  Commonly known as: MIRALAX   17 g, Oral, 2 Times Daily      sucralfate 1 g tablet  Commonly known as: CARAFATE   1 g, Oral, 4 Times Daily Before Meals & Nightly         Continue These Medications      Instructions Start Date   apixaban 5 MG tablet tablet  Commonly known as: ELIQUIS   5 mg, Oral, 2 Times Daily       bumetanide 2 MG tablet  Commonly known as: BUMEX   2 mg, Oral, Daily      levothyroxine 150 MCG tablet  Commonly known as: SYNTHROID, LEVOTHROID   150 mcg, Oral, Daily      ondansetron ODT 8 MG disintegrating tablet  Commonly known as: ZOFRAN-ODT   8 mg, Translingual, Every 8 Hours PRN      oxyCODONE 10 MG tablet  Commonly known as: ROXICODONE   10 mg, Oral, Every 6 Hours      pregabalin 25 MG capsule  Commonly known as: LYRICA   25 mg, Oral, Every Night at Bedtime      riFAXIMin 550 MG tablet  Commonly known as: XIFAXAN   550 mg, Oral, Every 12 Hours Scheduled      spironolactone 100 MG tablet  Commonly known as: ALDACTONE   300 mg, Oral, Daily             Allergies   Allergen Reactions   • Compazine [Prochlorperazine Edisylate] Hives   • Contrast Dye Hives   • Iodinated Diagnostic Agents    • Iodine Hives   • Morphine Itching   • Nsaids Other (See Comments)     States I am not suppose to take them   • Hydrocodone Rash     Lortab elixir per pt         Discharge Disposition:      Diet:  Hospital:  Diet Order   Procedures   • Diet Cardiac; Healthy Heart         Discharge Activity:     As tolerated    CODE STATUS:    Code Status and Medical Interventions:   Ordered at: 01/09/21 2238     Code Status:    CPR     Medical Interventions (Level of Support Prior to Arrest):    Full         Follow-up Appointments  No future appointments.    GI  General surgery as needed  PCP next week        Condition on Discharge:      Stable      This patient has been examined wearing appropriate Personal Protective Equipment and discussed with hospital infection control department. 01/14/21      Electronically signed by Ever Motta MD, 01/14/21, 12:28 PM EST.      Time: I spent  45  minutes on this discharge activity which included face-to-face encounter with the patient/reviewing the data in the system/coordination of the care with the nursing staff as well as consultants/documentation/entering orders.

## 2021-01-14 NOTE — PROGRESS NOTES
Case Management Discharge Note      Final Note: Home       Discharge orders noted        Final Discharge Disposition Code: 01 - home or self-care

## 2021-01-14 NOTE — PLAN OF CARE
Goal Outcome Evaluation:  Plan of Care Reviewed With: patient  Progress: no change   Patient complained of pain throughout the night with little relief.  Started ketamine q6 with oxycodone q4.  Patient remains on RA.  No other concerns at this time, will continue to monitor.

## 2021-01-15 ENCOUNTER — READMISSION MANAGEMENT (OUTPATIENT)
Dept: CALL CENTER | Facility: HOSPITAL | Age: 46
End: 2021-01-15

## 2021-01-15 LAB — QT INTERVAL: 365 MS

## 2021-01-15 NOTE — OUTREACH NOTE
Prep Survey      Responses   Denominational facility patient discharged from?  [facility]   Is LACE score < 7 ?  No   Emergency Room discharge w/ pulse ox?  No   Eligibility  Readm Mgmt   Discharge diagnosis  Incarcerated hernia CARTER covid   Does the patient have one of the following disease processes/diagnoses(primary or secondary)?  COVID-19   Does the patient have Home health ordered?  No   Is there a DME ordered?  No   Prep survey completed?  Yes          Geetha Watson RN

## 2021-01-15 NOTE — PAYOR COMM NOTE
"Discharge notification for case# [id number]    D/c home on 1/14/21  MD d/c summary attached.   -------------    THANK YOU,    IFTIKHAR HigueraN, RN  Utilization Review  [facility]  Phone: [phone]  Fax: [phone]      NPI: [id number]  Tax ID: [id number]      Jayden Copepiper SIBLEY (45 y.o. Female)     Date of Birth Social Security Number Address Home Phone MRN    1975  [address] [place] [address] [phone] [id number]    [employer] Marital Status          None Single       Admission Date Admission Type Admitting Provider Attending Provider Department, Room/Bed    1/9/21 Emergency Ever Motta MD  [facility] 3A MEDICAL INPATIENT, 302/1    Discharge Date Discharge Disposition Discharge Destination        1/14/2021 Home or Self Care              Attending Provider: (none)   Allergies: Compazine [Prochlorperazine Edisylate], Contrast Dye, Iodinated Diagnostic Agents, Iodine, Morphine, Nsaids, Hydrocodone    Isolation: Enh Drop/Con   Infection: COVID (confirmed) (01/13/21)   Code Status: Prior    Ht: 160 cm (63\")   Wt: 60.1 kg (132 lb 9.6 oz)    Admission Cmt: None   Principal Problem: Abdominal pain [R10.9]                 Active Insurance as of 1/9/2021     Primary Coverage     Payor Plan Insurance Group Employer/Plan Group    [id number]     Payor Plan Address Payor Plan Phone Number Payor Plan Fax Number Effective Dates    [address]   5/31/2015 - None Entered    [place] [id number]       Subscriber Name Subscriber Birth Date Member ID       APOORVA COPE MARYJO 1975 [id number]                 Emergency Contacts      (Rel.) Home Phone Work Phone Mobile Phone    JORGE MORE (Mother) -- -- [phone]    CARMEN CHESTER (Daughter) -- -- [phone]               Discharge Summary      Ever Motta MD at 01/14/21 1229                [facility] Medicine Services  DISCHARGE " SUMMARY        Prepared For PCP:  Melissa Iniguez APRN    Patient Name: Lucinda Cope  : 1975  MRN: [id number]      Date of Admission:   2021    Date of Discharge:  2021      Hospital Course     Presenting Problem:   Incarcerated hernia [K46.0]  Abdominal pain, unspecified abdominal location [R10.9]  Abdominal pain, unspecified abdominal location [R10.9]      Active Hospital Problems    Diagnosis  POA   • **Abdominal pain [R10.9]  Yes   • Epigastric pain [R10.13]  Yes   • History of DVT (deep vein thrombosis) [Z86.718]  Not Applicable   • Ventral hernia [K43.9]  Yes   • CARTER (nonalcoholic steatohepatitis) [K75.81]  Yes   • H/O thyroidectomy [Z90.09]  Not Applicable   • Thyroid cancer (CMS/HCC) [C73]  Yes      Resolved Hospital Problems   No resolved problems to display.           Hospital Course:  Lucinda Cope is a 45 y.o. female with chronic medical problems including DVT, Carter cirrhosis, and ascites.      She presented to the hospital on 2021 complaining of upper abdominal pain that radiates into her chest.  It normally worsens with abdominal distention/ascites.  She had a paracentesis last week and had some improvement.  Her previous paracenteses status was about 5 months ago.  Her upper abdominal pain is worsened by eating within only a few minutes.    Her abdomen is soft with mild epigastric tenderness and fluid wave.  Creatinine is 1.05 and hemoglobin is 13 with platelets of 508.  Her albumin is 3.2 and bilirubin 0.7 and alkaline phosphatase 227.  Her lipase is 28.  Meld score is 8.    She had CT suggesting ventral hernia possible bowel obstruction but small bowel follow-through yesterday showed no obstruction.  She also has a history of anastomotic ulcer.  S/p EGD showing 2021  1 cm clean-based anastomotic ulcer  2 cords of grade 1 varices  Evidence of prior gastric bypass  3 mm polyp in the antr         um biopsied  Mild gastritis biopsied    GI recommending PPI and  Carafate and follow-up on the results  Patient hemoglobin is stable  She is already on Eliquis  She is doing much better and concerned about her pain that is relieved with pain medication  She was requesting Dilaudid initially according to the nursing staff  She is on oxycodone at home  Her abdomen is benign on exam  She was cleared for discharge by current subspecialist  She tested positive for preop Covid evaluation and she is totally asymptomatic [id number]% on room air and afebrile during her hospital stay.  I doubt this is a valid result but patient will be on isolation as instructed for 10 days  We will check also with infectious disease service if you have any further recommendation  I do not feel patient will need any treatment at this time  Her chest x-ray shows no acute findings.                  Recommendation for Outpatient Providers:     Follow-up with GI in 2 weeks  Follow-up on pathology report for biopsy with GI service  Follow-up with PCP next week  Follow-up with Dr. Silva as needed  Minimize opioids to avoid slowing down of bowel movements and constipation  May use dicyclomine as needed  Avoid NSAIDs  Monitor CBC and BMP with primary care physician as needed   Continue isolation for positive Covid test for 10 days      Reasons For Change In Medications and Indications for New Medications:        Day of Discharge     HPI:       Vital Signs:   Temp:  [97.9 °F (36.6 °C)-98.8 °F (37.1 °C)] 98.8 °F (37.1 °C)  Heart Rate:  [] 72  Resp:  [14-18] 14  BP: ()/(51-91) 103/69     Physical Exam:  Physical Exam  Abdomen soft nontender  Cardiovascular and pulmonary exam unremarkable    Pertinent  and/or Most Recent Results     Results from last 7 days   Lab Units 01/14/21  0349 01/13/21  0202 01/12/21  0214 01/11/21  2058 01/11/21  0410 01/10/21  0059 01/09/21  1810   WBC 10*3/mm3 11.30* 12.10* 13.80*  --  10.50 12.70* 9.50   HEMOGLOBIN g/dL 11.7* 13.1 13.0  --  12.5 11.0* 12.2   HEMATOCRIT % 35.9  40.4 41.1  --  38.2 34.4 37.2   PLATELETS 10*3/mm3 502* 508* 557*  --  519* 480* 581*   SODIUM mmol/L 134* 131* 135*  --  138 136 138   POTASSIUM mmol/L 3.6 4.7 4.1 3.7 4.0 3.5 3.4*   CHLORIDE mmol/L 96* 94* 94*  --  97* 103 102   CO2 mmol/L 29.0 30.0* 31.0*  --  33.0* 26.0 29.0   BUN mg/dL 10 9 10  --  8 8 9   CREATININE mg/dL 0.93 1.05* 0.90  --  0.76 0.57 0.65   GLUCOSE mg/dL 87 95 119*  --  109* 92 80   CALCIUM mg/dL 8.6 8.7 8.6  --  8.6 8.3* 9.0     Results from last 7 days   Lab Units 01/14/21  0349 01/13/21  0202 01/12/21  0214 01/11/21  0410 01/10/21  0059 01/09/21  1810   BILIRUBIN mg/dL 0.8 0.7 1.0 1.2 1.0 0.7   ALK PHOS U/L 203* 227* 245* 239* 166* 198*   ALT (SGPT) U/L 22 30 45* 56* 25 28   AST (SGOT) U/L 25 30 49* 95* 24 28   PROTIME Seconds  --   --   --   --   --  11.6   INR   --   --   --   --   --  1.06           Invalid input(s): TG, LDLCALC, LDLREALC  Results from last 7 days   Lab Units 01/10/21  0059 01/09/21  1810   TROPONIN T ng/mL <0.010 <0.010       Brief Urine Lab Results  (Last result in the past 365 days)      Color   Clarity   Blood   Leuk Est   Nitrite   Protein   CREAT   Urine HCG        01/09/21 1750 Dark Yellow Clear Negative Negative Negative Negative               Microbiology Results Abnormal     Procedure Component Value - Date/Time    COVID PRE-OP / PRE-PROCEDURE SCREENING ORDER (NO ISOLATION) - Swab, Nasopharynx [[id number]]  (Abnormal) Collected: 01/13/21 5427    Lab Status: Final result Specimen: Swab from Nasopharynx Updated: 01/13/21 1140    Narrative:      The following orders were created for panel order COVID PRE-OP / PRE-PROCEDURE SCREENING ORDER (NO ISOLATION) - Swab, Nasopharynx.  Procedure                               Abnormality         Status                     ---------                               -----------         ------                     COVID-19,CEPHEID,COR/LEIDA...[[id number]]  Abnormal            Final result                 Please view results for these  tests on the individual orders.    COVID-19,CEPHEID,COR/LEIDA/PAD IN-HOUSE(OR EMERGENT/ADD-ON),NP SWAB IN TRANSPORT MEDIA 3-4 HR TAT - Swab, Nasopharynx [[id number]]  (Abnormal) Collected: 01/13/21 0943    Lab Status: Final result Specimen: Swab from Nasopharynx Updated: 01/13/21 1140     COVID19 Detected    Narrative:      Fact sheet for providers: https://www.fda.gov/media/832039/download     Fact sheet for patients: https://www.fda.gov/media/447767/download          Ct Abdomen Pelvis Without Contrast    Result Date: 1/9/2021  Impression: 1.  Redemonstration of ventral body wall narrowneck hernia, with fluid, now containing a longer segment of mildly distended small bowel with fecaloid contents, query incarceration. There could be early obstruction. 2.  Slight improvement in large volume ascites. Evidence of portal hypertension. 3.  Splenectomy. Hysterectomy. Cholecystectomy. Surgical findings on the stomach. Electronically signed by:  Dina Duran  1/9/2021 7:11 PM    Ct Abdomen Pelvis Without Contrast    Result Date: 12/16/2020  Impression: 1.Cirrhosis and fatty infiltration of the liver with moderate amount of ascites. 2.Umbilical hernia containing ascites and a loop of small bowel. No significant small bowel dilatation is visualized to indicate obstruction at this time. Correlate for symptoms in this location. There is also a supraumbilical ventral hernia containing fluid. Hernias appear grossly similar to the prior exam. 3.Postoperative changes of gastric bypass, splenectomy, cystectomy, hysterectomy.     Electronically Signed By-Josué Gay MD On:12/16/2020 8:19 AM This report was finalized on [id number] by  Josué Gay MD.    Ct Abdomen Pelvis With Contrast    Result Date: 1/4/2021  Impression: 1. Cirrhotic liver morphology, portosystemic collateralization (including esophageal varices and hemorrhoids), cavernous transformation of the main portal vein and moderate ascites. Findings are compatible  with portal venous hypertension. 2. An umbilical hernia contains a short segment of nonobstructed small bowel and ascites. A supraumbilical ventral hernia also contains ascites. 3. Additional chronic and postoperative findings as described above. Electronically signed by:  Champ Gregory M.D.  1/4/2021 11:16 PM    Xr Chest 1 View    Result Date: 1/13/2021  Impression: No active disease  Electronically Signed By-Leonardo Luis MD On:1/13/2021 1:25 PM This report was finalized on [id number] by  Leonardo Luis MD.    Xr Chest 1 View    Result Date: 1/9/2021  Impression: Low lung volumes and chronic changes with no active disease.  Electronically Signed By-Randell Stevens MD On:1/9/2021 6:10 PM This report was finalized on [id number] by  Randell Stevens MD.    Xr Chest 1 View    Result Date: 12/16/2020  Impression: No radiographic findings of acute cardiopulmonary abnormality.  Electronically Signed By-Josué Gay MD On:12/16/2020 7:50 AM This report was finalized on [id number] by  Josué Gay MD.    Fl Small Bowel Follow Through Single-contrast    Result Date: 1/11/2021  Impression: 1. Oral contrast passes into the right colon between 90 minutes and 5.5 hours. (No imaging was done between the 90 minute and 5.5 hour images.) No small bowel dilatation is seen. 2. Evidence of prior gastric surgery.  Electronically Signed By-Casi Sandra MD On:1/11/2021 6:23 PM This report was finalized on [id number] by  Casi Sandra MD.    Xr Abdomen Kub    Result Date: 1/12/2021  Impression: 1. No small bowel obstruction. Slot 63 Electronically signed by:  Deepak Aponte M.D.  1/12/2021 1:38 AM      Results for orders placed during the hospital encounter of 08/23/19   Duplex Venous Lower Extremity - Right CAR    Narrative · Acute right lower extremity superficial thrombophlebitis noted in the   greater saphenous (above knee) and greater saphenous (below knee).  · All other right sided veins appeared normal.          Results for  orders placed during the hospital encounter of 08/23/19   Duplex Venous Lower Extremity - Right CAR    Narrative · Acute right lower extremity superficial thrombophlebitis noted in the   greater saphenous (above knee) and greater saphenous (below knee).  · All other right sided veins appeared normal.                       Test Results Pending at Discharge  Pending Labs     Order Current Status    Tissue Pathology Exam In process            Procedures Performed  Procedure(s):  ESOPHAGOGASTRODUODENOSCOPY WITH POLYPECTOMY X1 AND  BIOPSY X1.         Consults:   Consults     Date and Time Order Name Status Description    1/12/2021 1814 Inpatient Gastroenterology Consult      1/9/2021 2117 Surgery (on-call MD unless specified) Completed     1/9/2021 2117 Hospitalist (on-call MD unless specified) Completed     1/5/2021 0131 Hospitalist (on-call MD unless specified) Completed     12/16/2020 1011 Hospitalist (on-call MD unless specified) Completed             Discharge Details        Discharge Medications      New Medications      Instructions Start Date   dicyclomine 10 MG capsule  Commonly known as: Bentyl   10 mg, Oral, 4 Times Daily Before Meals & Nightly PRN      lidocaine 5 %  Commonly known as: LIDODERM   1 patch, Transdermal, Every 24 Hours Scheduled, Remove & Discard patch within 12 hours or as directed by MD   Start Date: January 15, 2021     pantoprazole 40 MG EC tablet  Commonly known as: PROTONIX   40 mg, Oral, 2 Times Daily Before Meals      polyethylene glycol 17 g packet  Commonly known as: MIRALAX   17 g, Oral, 2 Times Daily      sucralfate 1 g tablet  Commonly known as: CARAFATE   1 g, Oral, 4 Times Daily Before Meals & Nightly         Continue These Medications      Instructions Start Date   apixaban 5 MG tablet tablet  Commonly known as: ELIQUIS   5 mg, Oral, 2 Times Daily      bumetanide 2 MG tablet  Commonly known as: BUMEX   2 mg, Oral, Daily      levothyroxine 150 MCG tablet  Commonly known as:  SYNTHROID, LEVOTHROID   150 mcg, Oral, Daily      ondansetron ODT 8 MG disintegrating tablet  Commonly known as: ZOFRAN-ODT   8 mg, Translingual, Every 8 Hours PRN      oxyCODONE 10 MG tablet  Commonly known as: ROXICODONE   10 mg, Oral, Every 6 Hours      pregabalin 25 MG capsule  Commonly known as: LYRICA   25 mg, Oral, Every Night at Bedtime      riFAXIMin 550 MG tablet  Commonly known as: XIFAXAN   550 mg, Oral, Every 12 Hours Scheduled      spironolactone 100 MG tablet  Commonly known as: ALDACTONE   300 mg, Oral, Daily             Allergies   Allergen Reactions   • Compazine [Prochlorperazine Edisylate] Hives   • Contrast Dye Hives   • Iodinated Diagnostic Agents    • Iodine Hives   • Morphine Itching   • Nsaids Other (See Comments)     States I am not suppose to take them   • Hydrocodone Rash     Lortab elixir per pt         Discharge Disposition:      Diet:  Hospital:  Diet Order   Procedures   • Diet Cardiac; Healthy Heart         Discharge Activity:     As tolerated    CODE STATUS:    Code Status and Medical Interventions:   Ordered at: 01/09/21 2677     Code Status:    CPR     Medical Interventions (Level of Support Prior to Arrest):    Full         Follow-up Appointments  No future appointments.    GI  General surgery as needed  PCP next week        Condition on Discharge:      Stable      This patient has been examined wearing appropriate Personal Protective Equipment and discussed with hospital infection control department. 01/14/21      Electronically signed by Ever Motta MD, 01/14/21, 12:28 PM EST.      Time: I spent  45  minutes on this discharge activity which included face-to-face encounter with the patient/reviewing the data in the system/coordination of the care with the nursing staff as well as consultants/documentation/entering orders.      Electronically signed by Ever Motta MD at 01/14/21 9755

## 2021-01-16 ENCOUNTER — READMISSION MANAGEMENT (OUTPATIENT)
Dept: CALL CENTER | Facility: HOSPITAL | Age: 46
End: 2021-01-16

## 2021-01-16 NOTE — OUTREACH NOTE
COVID-19 Week 1 Survey      Responses   [facility] patient discharged from?  [facility]   Does the patient have one of the following disease processes/diagnoses(primary or secondary)?  COVID-19   COVID-19 underlying condition?  None   Call Number  Call 2   Week 1 Call successful?  No   Discharge diagnosis  Incarcerated hernia CARTER davis Laird, RN

## 2021-01-17 ENCOUNTER — READMISSION MANAGEMENT (OUTPATIENT)
Dept: CALL CENTER | Facility: HOSPITAL | Age: 46
End: 2021-01-17

## 2021-01-17 NOTE — OUTREACH NOTE
COVID-19 Week 1 Survey      Responses   [facility] patient discharged from?  [facility]   Does the patient have one of the following disease processes/diagnoses(primary or secondary)?  COVID-19   COVID-19 underlying condition?  None   Call Number  Call 3   Week 1 Call successful?  No   Discharge diagnosis  Incarcerated hernia CARTER davis Avila RN

## 2021-02-26 DIAGNOSIS — R18.8 OTHER ASCITES: Primary | ICD-10-CM

## 2021-02-26 RX ORDER — ALBUMIN (HUMAN) 12.5 G/50ML
12.5 SOLUTION INTRAVENOUS DAILY PRN
Status: CANCELLED | OUTPATIENT
Start: 2021-03-01

## 2021-02-26 RX ORDER — ALBUMIN (HUMAN) 12.5 G/50ML
25 SOLUTION INTRAVENOUS DAILY PRN
Status: CANCELLED | OUTPATIENT
Start: 2021-03-01

## 2021-03-01 ENCOUNTER — HOSPITAL ENCOUNTER (OUTPATIENT)
Dept: INFUSION THERAPY | Facility: HOSPITAL | Age: 46
Discharge: HOME | End: 2021-03-01
Admitting: INTERNAL MEDICINE

## 2021-03-01 VITALS
DIASTOLIC BLOOD PRESSURE: 68 MMHG | HEIGHT: 63 IN | SYSTOLIC BLOOD PRESSURE: 133 MMHG | RESPIRATION RATE: 14 BRPM | WEIGHT: 143 LBS | BODY MASS INDEX: 25.34 KG/M2 | HEART RATE: 85 BPM | TEMPERATURE: 96.8 F | OXYGEN SATURATION: 98 %

## 2021-03-01 DIAGNOSIS — R18.8 OTHER ASCITES: ICD-10-CM

## 2021-03-01 LAB
DEPRECATED RDW RBC AUTO: 53.4 FL (ref 37–54)
ERYTHROCYTE [DISTWIDTH] IN BLOOD BY AUTOMATED COUNT: 17.7 % (ref 12.3–15.4)
HCT VFR BLD AUTO: 37.2 % (ref 34–46.6)
HGB BLD-MCNC: 12 G/DL (ref 12–15.9)
INR PPP: 1.06 (ref 0.93–1.1)
MCH RBC QN AUTO: 28.1 PG (ref 26.6–33)
MCHC RBC AUTO-ENTMCNC: 32.2 G/DL (ref 31.5–35.7)
MCV RBC AUTO: 87.3 FL (ref 79–97)
PLATELET # BLD AUTO: 596 10*3/MM3 (ref 140–450)
PMV BLD AUTO: 8.8 FL (ref 6–12)
PROTHROMBIN TIME: 11.6 SECONDS (ref 9.6–11.7)
RBC # BLD AUTO: 4.27 10*6/MM3 (ref 3.77–5.28)
WBC # BLD AUTO: 8.3 10*3/MM3 (ref 3.4–10.8)

## 2021-03-01 PROCEDURE — 85610 PROTHROMBIN TIME: CPT | Performed by: PHYSICIAN ASSISTANT

## 2021-03-01 PROCEDURE — 76942 ECHO GUIDE FOR BIOPSY: CPT

## 2021-03-01 PROCEDURE — 49083 ABD PARACENTESIS W/IMAGING: CPT | Performed by: INTERNAL MEDICINE

## 2021-03-01 PROCEDURE — 85027 COMPLETE CBC AUTOMATED: CPT | Performed by: PHYSICIAN ASSISTANT

## 2021-03-01 RX ORDER — ALBUMIN (HUMAN) 12.5 G/50ML
12.5 SOLUTION INTRAVENOUS DAILY PRN
Status: DISCONTINUED | OUTPATIENT
Start: 2021-03-01 | End: 2021-03-03 | Stop reason: HOSPADM

## 2021-03-01 RX ORDER — ALBUMIN (HUMAN) 12.5 G/50ML
25 SOLUTION INTRAVENOUS DAILY PRN
Status: DISCONTINUED | OUTPATIENT
Start: 2021-03-01 | End: 2021-03-03 | Stop reason: HOSPADM

## 2021-03-01 NOTE — PROCEDURES
"    Procedure:Ultrasound guided Paracentesis    Consent: Informed    Vitals:    03/01/21 1344   Temp: 96.8 °F (36 °C)   Weight: 64.9 kg (143 lb)   Height: 160 cm (63\")       Anesthesia Provider: Eric Unger MD    Anesthesia type: Local infiltration with 2% plain lidocaine    Surgeon: Garth Unger MD    Assistant: none    Clinical findings of significance: Multiple septae and pockets intraperitoneally on ultrasound.      Procedure summary:    Allergies , labs and medications were reviewed. Patient was made to lie supine and the area of interest was imaged with ultrasound and fluid verified and marked. Area of interest was cleaned and draped in a sterile fashion. Subsequently local infiltration with Xylocaine. Trocar and cannula were advanced. Upon verification of the fluid, trocar was steadied and cannula advanced further. Trocar was drawn out. Cannula was connected to extension tubing and to the vacuum bottle. Subsequently the cannula was removed after completion of the procedure.       Findings: Clear straw yellow fluid was obtained.     Total volume drained: See nursing progress note for volume drained.    Lab Specimen sent: 0 ml    Complication: none    EBL: 0 ml    Post op condition: Stable. Albumin was given by protocol if needed.  If patient is intolerant to albumin then hetastarch is used per institutional preference.    Post op plan: Discharge back to the referring physician.    "

## 2021-03-01 NOTE — PROGRESS NOTES
PARACENTESIS    Time Arrived in Department: 1310      Consent: yes  Allergies have been Reviewed: yes      ID Band Verified: yes    Method: Ambulatory    Lab Results: Checked    Physician: [place]      Nurse: Geetha Mcmullen RN    IR Care Plan: Person Educated - Patient, Current Knowledge - Understands and Evaluation of Teaching - Verbalized Understanding      Neurological: Within Normal Limits    Skin: Flesh, Warm and Dry    Respiratory Status: Respirations even and enlabored    Room In: endo      Procedure: Paracentesis    Time Out Completed: yes    Time Out: 1415    Prep Time: 1420    Prep Site 1: Right Lateral Abdomen      Prep: Chlorhexidine and Sterile drape    Procedure Position: Right Side    Guidance: Ultrasound    Fluid Quality: Cloudy and Light Yellow    Procedure Note:         Intra Time 1:1425    Intra Assess 1:   LOC: Alert  Pain:  No Issues  Skin: Flesh, Warm and Dry  Respiratory Status:  Even and Unlabored  Intra Procedure Note 1:         Intra Time 2: 1455    Intra Assess 2:   LOC: Alert  Pain: No Issues  Skin: Flesh, Warm and Dry  Respiratory Status: Even and Unlabored  Intra Procedure Note 2:         Intra Time 3: 1515    Intra Assess 3:   LOC: Alert  Pain: No Issues  Skin: Flesh, Warm and Dry  Respiratory Status: Even and Unlabored  Intra Procedure Note 3:para completed                Post-Procedure Position: HOB Elevated    Dressing Site 1: 2x2 and Tegaderm    Post Procedure Status:  Alert and Oriented, returned to baseline    Specimen To Lab: no    Total Fluid Removed: 2.8 liters    Post Procedure Note:  2 % lido used. Pt tolerated well.        Report Given To:     Admit/Transfer To:     Transfer Time:     Discharge Time: 1525    Method: Ambulatory    O2 on Transfer: no    Accompanied By:  None    Clothes Changed:  Self    Discharged Location:  Routine to Home    Discharge Teaching:  Care of Dressing and PAtient

## 2021-03-07 ENCOUNTER — APPOINTMENT (OUTPATIENT)
Dept: CT IMAGING | Facility: HOSPITAL | Age: 46
End: 2021-03-07

## 2021-03-07 ENCOUNTER — HOSPITAL ENCOUNTER (OUTPATIENT)
Facility: HOSPITAL | Age: 46
Setting detail: OBSERVATION
Discharge: HOME | End: 2021-03-09
Attending: EMERGENCY MEDICINE | Admitting: INTERNAL MEDICINE

## 2021-03-07 DIAGNOSIS — R10.9 ABDOMINAL PAIN, UNSPECIFIED ABDOMINAL LOCATION: Primary | ICD-10-CM

## 2021-03-07 DIAGNOSIS — E87.6 HYPOKALEMIA: ICD-10-CM

## 2021-03-07 DIAGNOSIS — R18.8 OTHER ASCITES: ICD-10-CM

## 2021-03-07 PROBLEM — D64.9 ANEMIA: Chronic | Status: ACTIVE | Noted: 2021-03-07

## 2021-03-07 PROBLEM — D72.829 LEUKOCYTOSIS: Status: ACTIVE | Noted: 2021-03-07

## 2021-03-07 LAB
ALBUMIN SERPL-MCNC: 3 G/DL (ref 3.5–5.2)
ALBUMIN/GLOB SERPL: 0.8 G/DL
ALP SERPL-CCNC: 146 U/L (ref 39–117)
ALT SERPL W P-5'-P-CCNC: 9 U/L (ref 1–33)
ANION GAP SERPL CALCULATED.3IONS-SCNC: 9 MMOL/L (ref 5–15)
AST SERPL-CCNC: 19 U/L (ref 1–32)
BASOPHILS # BLD AUTO: 0.1 10*3/MM3 (ref 0–0.2)
BASOPHILS NFR BLD AUTO: 0.8 % (ref 0–1.5)
BILIRUB SERPL-MCNC: 0.5 MG/DL (ref 0–1.2)
BUN SERPL-MCNC: 12 MG/DL (ref 6–20)
BUN/CREAT SERPL: 18.2 (ref 7–25)
CALCIUM SPEC-SCNC: 8 MG/DL (ref 8.6–10.5)
CHLORIDE SERPL-SCNC: 100 MMOL/L (ref 98–107)
CO2 SERPL-SCNC: 28 MMOL/L (ref 22–29)
CREAT SERPL-MCNC: 0.66 MG/DL (ref 0.57–1)
DEPRECATED RDW RBC AUTO: 52.9 FL (ref 37–54)
EOSINOPHIL # BLD AUTO: 0.4 10*3/MM3 (ref 0–0.4)
EOSINOPHIL NFR BLD AUTO: 3.2 % (ref 0.3–6.2)
ERYTHROCYTE [DISTWIDTH] IN BLOOD BY AUTOMATED COUNT: 17.7 % (ref 12.3–15.4)
GFR SERPL CREATININE-BSD FRML MDRD: 96 ML/MIN/1.73
GLOBULIN UR ELPH-MCNC: 3.6 GM/DL
GLUCOSE SERPL-MCNC: 95 MG/DL (ref 65–99)
HCT VFR BLD AUTO: 33.7 % (ref 34–46.6)
HGB BLD-MCNC: 11.4 G/DL (ref 12–15.9)
LIPASE SERPL-CCNC: 13 U/L (ref 13–60)
LYMPHOCYTES # BLD AUTO: 4.6 10*3/MM3 (ref 0.7–3.1)
LYMPHOCYTES NFR BLD AUTO: 35.7 % (ref 19.6–45.3)
MCH RBC QN AUTO: 28.9 PG (ref 26.6–33)
MCHC RBC AUTO-ENTMCNC: 33.8 G/DL (ref 31.5–35.7)
MCV RBC AUTO: 85.5 FL (ref 79–97)
MONOCYTES # BLD AUTO: 0.9 10*3/MM3 (ref 0.1–0.9)
MONOCYTES NFR BLD AUTO: 6.6 % (ref 5–12)
NEUTROPHILS NFR BLD AUTO: 53.7 % (ref 42.7–76)
NEUTROPHILS NFR BLD AUTO: 7 10*3/MM3 (ref 1.7–7)
NRBC BLD AUTO-RTO: 0.1 /100 WBC (ref 0–0.2)
PLATELET # BLD AUTO: 744 10*3/MM3 (ref 140–450)
PMV BLD AUTO: 8.3 FL (ref 6–12)
POTASSIUM SERPL-SCNC: 2.9 MMOL/L (ref 3.5–5.2)
PROT SERPL-MCNC: 6.6 G/DL (ref 6–8.5)
RBC # BLD AUTO: 3.94 10*6/MM3 (ref 3.77–5.28)
SODIUM SERPL-SCNC: 137 MMOL/L (ref 136–145)
WBC # BLD AUTO: 12.9 10*3/MM3 (ref 3.4–10.8)

## 2021-03-07 PROCEDURE — 25010000002 HYDROMORPHONE PER 4 MG: Performed by: EMERGENCY MEDICINE

## 2021-03-07 PROCEDURE — 85025 COMPLETE CBC W/AUTO DIFF WBC: CPT | Performed by: EMERGENCY MEDICINE

## 2021-03-07 PROCEDURE — 84443 ASSAY THYROID STIM HORMONE: CPT | Performed by: PHYSICIAN ASSISTANT

## 2021-03-07 PROCEDURE — 96376 TX/PRO/DX INJ SAME DRUG ADON: CPT

## 2021-03-07 PROCEDURE — 93005 ELECTROCARDIOGRAM TRACING: CPT | Performed by: EMERGENCY MEDICINE

## 2021-03-07 PROCEDURE — 99285 EMERGENCY DEPT VISIT HI MDM: CPT

## 2021-03-07 PROCEDURE — 0 IOPAMIDOL PER 1 ML: Performed by: EMERGENCY MEDICINE

## 2021-03-07 PROCEDURE — 99219 PR INITIAL OBSERVATION CARE/DAY 50 MINUTES: CPT | Performed by: PHYSICIAN ASSISTANT

## 2021-03-07 PROCEDURE — 83690 ASSAY OF LIPASE: CPT | Performed by: EMERGENCY MEDICINE

## 2021-03-07 PROCEDURE — 84484 ASSAY OF TROPONIN QUANT: CPT | Performed by: PHYSICIAN ASSISTANT

## 2021-03-07 PROCEDURE — 25010000002 METHYLPREDNISOLONE PER 125 MG: Performed by: EMERGENCY MEDICINE

## 2021-03-07 PROCEDURE — 25010000002 DIPHENHYDRAMINE PER 50 MG: Performed by: EMERGENCY MEDICINE

## 2021-03-07 PROCEDURE — 74177 CT ABD & PELVIS W/CONTRAST: CPT

## 2021-03-07 PROCEDURE — 80053 COMPREHEN METABOLIC PANEL: CPT | Performed by: EMERGENCY MEDICINE

## 2021-03-07 PROCEDURE — 96375 TX/PRO/DX INJ NEW DRUG ADDON: CPT

## 2021-03-07 PROCEDURE — 93005 ELECTROCARDIOGRAM TRACING: CPT

## 2021-03-07 PROCEDURE — 25010000002 ONDANSETRON PER 1 MG: Performed by: EMERGENCY MEDICINE

## 2021-03-07 RX ORDER — DIPHENHYDRAMINE HYDROCHLORIDE 50 MG/ML
25 INJECTION INTRAMUSCULAR; INTRAVENOUS ONCE
Status: COMPLETED | OUTPATIENT
Start: 2021-03-07 | End: 2021-03-07

## 2021-03-07 RX ORDER — HYDROMORPHONE HCL 110MG/55ML
1 PATIENT CONTROLLED ANALGESIA SYRINGE INTRAVENOUS ONCE
Status: COMPLETED | OUTPATIENT
Start: 2021-03-07 | End: 2021-03-07

## 2021-03-07 RX ORDER — POTASSIUM CHLORIDE 7.45 MG/ML
10 INJECTION INTRAVENOUS
Status: DISCONTINUED | OUTPATIENT
Start: 2021-03-07 | End: 2021-03-07

## 2021-03-07 RX ORDER — SODIUM CHLORIDE 0.9 % (FLUSH) 0.9 %
10 SYRINGE (ML) INJECTION AS NEEDED
Status: DISCONTINUED | OUTPATIENT
Start: 2021-03-07 | End: 2021-03-09 | Stop reason: HOSPADM

## 2021-03-07 RX ORDER — POTASSIUM CHLORIDE 20 MEQ/1
40 TABLET, EXTENDED RELEASE ORAL ONCE
Status: COMPLETED | OUTPATIENT
Start: 2021-03-07 | End: 2021-03-08

## 2021-03-07 RX ORDER — METHYLPREDNISOLONE SODIUM SUCCINATE 125 MG/2ML
125 INJECTION, POWDER, LYOPHILIZED, FOR SOLUTION INTRAMUSCULAR; INTRAVENOUS ONCE
Status: COMPLETED | OUTPATIENT
Start: 2021-03-07 | End: 2021-03-07

## 2021-03-07 RX ORDER — ONDANSETRON 2 MG/ML
4 INJECTION INTRAMUSCULAR; INTRAVENOUS ONCE
Status: COMPLETED | OUTPATIENT
Start: 2021-03-07 | End: 2021-03-07

## 2021-03-07 RX ORDER — LEVOTHYROXINE SODIUM 137 UG/1
137 TABLET ORAL DAILY
COMMUNITY
End: 2021-07-13

## 2021-03-07 RX ADMIN — HYDROMORPHONE HYDROCHLORIDE 1 MG: 2 INJECTION, SOLUTION INTRAMUSCULAR; INTRAVENOUS; SUBCUTANEOUS at 22:28

## 2021-03-07 RX ADMIN — METHYLPREDNISOLONE SODIUM SUCCINATE 125 MG: 125 INJECTION, POWDER, FOR SOLUTION INTRAMUSCULAR; INTRAVENOUS at 21:00

## 2021-03-07 RX ADMIN — IOPAMIDOL 100 ML: 755 INJECTION, SOLUTION INTRAVENOUS at 22:07

## 2021-03-07 RX ADMIN — ONDANSETRON 4 MG: 2 INJECTION INTRAMUSCULAR; INTRAVENOUS at 21:00

## 2021-03-07 RX ADMIN — HYDROMORPHONE HYDROCHLORIDE 1 MG: 2 INJECTION, SOLUTION INTRAMUSCULAR; INTRAVENOUS; SUBCUTANEOUS at 20:59

## 2021-03-07 RX ADMIN — DIPHENHYDRAMINE HYDROCHLORIDE 25 MG: 50 INJECTION, SOLUTION INTRAMUSCULAR; INTRAVENOUS at 21:01

## 2021-03-08 ENCOUNTER — ON CAMPUS - OUTPATIENT (AMBULATORY)
Dept: URBAN - METROPOLITAN AREA HOSPITAL 85 | Facility: HOSPITAL | Age: 46
End: 2021-03-08
Payer: COMMERCIAL

## 2021-03-08 DIAGNOSIS — K74.60 UNSPECIFIED CIRRHOSIS OF LIVER: ICD-10-CM

## 2021-03-08 DIAGNOSIS — R11.2 NAUSEA WITH VOMITING, UNSPECIFIED: ICD-10-CM

## 2021-03-08 DIAGNOSIS — R18.8 OTHER ASCITES: ICD-10-CM

## 2021-03-08 DIAGNOSIS — D72.829 ELEVATED WHITE BLOOD CELL COUNT, UNSPECIFIED: ICD-10-CM

## 2021-03-08 DIAGNOSIS — K75.81 NONALCOHOLIC STEATOHEPATITIS (NASH): ICD-10-CM

## 2021-03-08 DIAGNOSIS — R10.84 GENERALIZED ABDOMINAL PAIN: ICD-10-CM

## 2021-03-08 LAB
ALBUMIN SERPL-MCNC: 2.6 G/DL (ref 3.5–5.2)
ALBUMIN/GLOB SERPL: 0.7 G/DL
ALP SERPL-CCNC: 137 U/L (ref 39–117)
ALPHA-FETOPROTEIN: 4.83 NG/ML (ref 0–8.3)
ALT SERPL W P-5'-P-CCNC: 8 U/L (ref 1–33)
ANION GAP SERPL CALCULATED.3IONS-SCNC: 11 MMOL/L (ref 5–15)
APTT PPP: 27.9 SECONDS (ref 24–31)
AST SERPL-CCNC: 31 U/L (ref 1–32)
BASOPHILS # BLD AUTO: 0 10*3/MM3 (ref 0–0.2)
BASOPHILS NFR BLD AUTO: 0.1 % (ref 0–1.5)
BILIRUB SERPL-MCNC: 0.5 MG/DL (ref 0–1.2)
BUN SERPL-MCNC: 11 MG/DL (ref 6–20)
BUN/CREAT SERPL: 14.7 (ref 7–25)
CALCIUM SPEC-SCNC: 8 MG/DL (ref 8.6–10.5)
CHLORIDE SERPL-SCNC: 98 MMOL/L (ref 98–107)
CO2 SERPL-SCNC: 25 MMOL/L (ref 22–29)
CREAT SERPL-MCNC: 0.75 MG/DL (ref 0.57–1)
DEPRECATED RDW RBC AUTO: 54.7 FL (ref 37–54)
EOSINOPHIL # BLD AUTO: 0 10*3/MM3 (ref 0–0.4)
EOSINOPHIL NFR BLD AUTO: 0 % (ref 0.3–6.2)
ERYTHROCYTE [DISTWIDTH] IN BLOOD BY AUTOMATED COUNT: 18.2 % (ref 12.3–15.4)
FOLATE SERPL-MCNC: 10.8 NG/ML (ref 4.78–24.2)
GFR SERPL CREATININE-BSD FRML MDRD: 83 ML/MIN/1.73
GLOBULIN UR ELPH-MCNC: 3.7 GM/DL
GLUCOSE SERPL-MCNC: 269 MG/DL (ref 65–99)
HCT VFR BLD AUTO: 32.3 % (ref 34–46.6)
HGB BLD-MCNC: 10.6 G/DL (ref 12–15.9)
INR PPP: 1.11 (ref 0.93–1.1)
IRON 24H UR-MRATE: 20 MCG/DL (ref 37–145)
IRON SATN MFR SERPL: 6 % (ref 20–50)
LYMPHOCYTES # BLD AUTO: 1.3 10*3/MM3 (ref 0.7–3.1)
LYMPHOCYTES NFR BLD AUTO: 12.1 % (ref 19.6–45.3)
MAGNESIUM SERPL-MCNC: 1.9 MG/DL (ref 1.6–2.6)
MCH RBC QN AUTO: 28.4 PG (ref 26.6–33)
MCHC RBC AUTO-ENTMCNC: 32.7 G/DL (ref 31.5–35.7)
MCV RBC AUTO: 86.9 FL (ref 79–97)
MONOCYTES # BLD AUTO: 0.1 10*3/MM3 (ref 0.1–0.9)
MONOCYTES NFR BLD AUTO: 1.3 % (ref 5–12)
NEUTROPHILS NFR BLD AUTO: 86.5 % (ref 42.7–76)
NEUTROPHILS NFR BLD AUTO: 9.5 10*3/MM3 (ref 1.7–7)
NRBC BLD AUTO-RTO: 0 /100 WBC (ref 0–0.2)
PLATELET # BLD AUTO: 732 10*3/MM3 (ref 140–450)
PMV BLD AUTO: 9.2 FL (ref 6–12)
POTASSIUM SERPL-SCNC: 3.5 MMOL/L (ref 3.5–5.2)
POTASSIUM SERPL-SCNC: 4.3 MMOL/L (ref 3.5–5.2)
PROT SERPL-MCNC: 6.3 G/DL (ref 6–8.5)
PROTHROMBIN TIME: 12.2 SECONDS (ref 9.6–11.7)
RBC # BLD AUTO: 3.72 10*6/MM3 (ref 3.77–5.28)
SARS-COV-2 ORF1AB RESP QL NAA+PROBE: NOT DETECTED
SODIUM SERPL-SCNC: 134 MMOL/L (ref 136–145)
TIBC SERPL-MCNC: 356 MCG/DL (ref 298–536)
TRANSFERRIN SERPL-MCNC: 239 MG/DL (ref 200–360)
TROPONIN T SERPL-MCNC: <0.01 NG/ML (ref 0–0.03)
TROPONIN T SERPL-MCNC: <0.01 NG/ML (ref 0–0.03)
TSH SERPL DL<=0.05 MIU/L-ACNC: 0.09 UIU/ML (ref 0.27–4.2)
VIT B12 BLD-MCNC: 764 PG/ML (ref 211–946)
WBC # BLD AUTO: 11 10*3/MM3 (ref 3.4–10.8)

## 2021-03-08 PROCEDURE — 99225 PR SBSQ OBSERVATION CARE/DAY 25 MINUTES: CPT | Performed by: INTERNAL MEDICINE

## 2021-03-08 PROCEDURE — 83540 ASSAY OF IRON: CPT | Performed by: PHYSICIAN ASSISTANT

## 2021-03-08 PROCEDURE — 96367 TX/PROPH/DG ADDL SEQ IV INF: CPT

## 2021-03-08 PROCEDURE — 85610 PROTHROMBIN TIME: CPT | Performed by: PHYSICIAN ASSISTANT

## 2021-03-08 PROCEDURE — U0004 COV-19 TEST NON-CDC HGH THRU: HCPCS | Performed by: EMERGENCY MEDICINE

## 2021-03-08 PROCEDURE — 82607 VITAMIN B-12: CPT | Performed by: PHYSICIAN ASSISTANT

## 2021-03-08 PROCEDURE — 85730 THROMBOPLASTIN TIME PARTIAL: CPT | Performed by: PHYSICIAN ASSISTANT

## 2021-03-08 PROCEDURE — 83735 ASSAY OF MAGNESIUM: CPT | Performed by: PHYSICIAN ASSISTANT

## 2021-03-08 PROCEDURE — 82105 ALPHA-FETOPROTEIN SERUM: CPT | Performed by: NURSE PRACTITIONER

## 2021-03-08 PROCEDURE — 85025 COMPLETE CBC W/AUTO DIFF WBC: CPT | Performed by: PHYSICIAN ASSISTANT

## 2021-03-08 PROCEDURE — 96365 THER/PROPH/DIAG IV INF INIT: CPT

## 2021-03-08 PROCEDURE — 84466 ASSAY OF TRANSFERRIN: CPT | Performed by: PHYSICIAN ASSISTANT

## 2021-03-08 PROCEDURE — C9803 HOPD COVID-19 SPEC COLLECT: HCPCS

## 2021-03-08 PROCEDURE — 84132 ASSAY OF SERUM POTASSIUM: CPT | Performed by: INTERNAL MEDICINE

## 2021-03-08 PROCEDURE — G0378 HOSPITAL OBSERVATION PER HR: HCPCS

## 2021-03-08 PROCEDURE — 25010000002 HYDROMORPHONE PER 4 MG: Performed by: INTERNAL MEDICINE

## 2021-03-08 PROCEDURE — 25010000002 ONDANSETRON PER 1 MG: Performed by: PHYSICIAN ASSISTANT

## 2021-03-08 PROCEDURE — 96366 THER/PROPH/DIAG IV INF ADDON: CPT

## 2021-03-08 PROCEDURE — 80053 COMPREHEN METABOLIC PANEL: CPT | Performed by: PHYSICIAN ASSISTANT

## 2021-03-08 PROCEDURE — 99243 OFF/OP CNSLTJ NEW/EST LOW 30: CPT | Performed by: NURSE PRACTITIONER

## 2021-03-08 PROCEDURE — 84484 ASSAY OF TROPONIN QUANT: CPT | Performed by: PHYSICIAN ASSISTANT

## 2021-03-08 PROCEDURE — 82746 ASSAY OF FOLIC ACID SERUM: CPT | Performed by: PHYSICIAN ASSISTANT

## 2021-03-08 PROCEDURE — 25010000002 CEFTRIAXONE PER 250 MG: Performed by: INTERNAL MEDICINE

## 2021-03-08 PROCEDURE — 96376 TX/PRO/DX INJ SAME DRUG ADON: CPT

## 2021-03-08 RX ORDER — OXYCODONE HYDROCHLORIDE 5 MG/1
10 TABLET ORAL EVERY 6 HOURS
Status: DISCONTINUED | OUTPATIENT
Start: 2021-03-08 | End: 2021-03-08

## 2021-03-08 RX ORDER — HYDROMORPHONE HCL 110MG/55ML
1 PATIENT CONTROLLED ANALGESIA SYRINGE INTRAVENOUS
Status: DISCONTINUED | OUTPATIENT
Start: 2021-03-08 | End: 2021-03-09 | Stop reason: HOSPADM

## 2021-03-08 RX ORDER — ACETAMINOPHEN 160 MG/5ML
650 SOLUTION ORAL EVERY 4 HOURS PRN
Status: DISCONTINUED | OUTPATIENT
Start: 2021-03-08 | End: 2021-03-09 | Stop reason: HOSPADM

## 2021-03-08 RX ORDER — ONDANSETRON 4 MG/1
4 TABLET, FILM COATED ORAL EVERY 6 HOURS PRN
Status: DISCONTINUED | OUTPATIENT
Start: 2021-03-08 | End: 2021-03-09 | Stop reason: HOSPADM

## 2021-03-08 RX ORDER — ONDANSETRON 2 MG/ML
4 INJECTION INTRAMUSCULAR; INTRAVENOUS EVERY 6 HOURS PRN
Status: DISCONTINUED | OUTPATIENT
Start: 2021-03-08 | End: 2021-03-09 | Stop reason: HOSPADM

## 2021-03-08 RX ORDER — DICYCLOMINE HYDROCHLORIDE 10 MG/1
10 CAPSULE ORAL
Status: DISCONTINUED | OUTPATIENT
Start: 2021-03-08 | End: 2021-03-09 | Stop reason: HOSPADM

## 2021-03-08 RX ORDER — ACETAMINOPHEN 650 MG/1
650 SUPPOSITORY RECTAL EVERY 4 HOURS PRN
Status: DISCONTINUED | OUTPATIENT
Start: 2021-03-08 | End: 2021-03-09 | Stop reason: HOSPADM

## 2021-03-08 RX ORDER — ALBUMIN (HUMAN) 12.5 G/50ML
75 SOLUTION INTRAVENOUS ONCE
Status: DISCONTINUED | OUTPATIENT
Start: 2021-03-08 | End: 2021-03-09 | Stop reason: HOSPADM

## 2021-03-08 RX ORDER — SUCRALFATE 1 G/1
1 TABLET ORAL
Status: DISCONTINUED | OUTPATIENT
Start: 2021-03-08 | End: 2021-03-09 | Stop reason: HOSPADM

## 2021-03-08 RX ORDER — CALCIUM GLUCONATE 20 MG/ML
1 INJECTION, SOLUTION INTRAVENOUS AS NEEDED
Status: DISCONTINUED | OUTPATIENT
Start: 2021-03-08 | End: 2021-03-09 | Stop reason: HOSPADM

## 2021-03-08 RX ORDER — ALBUMIN (HUMAN) 12.5 G/50ML
87.5 SOLUTION INTRAVENOUS ONCE
Status: DISCONTINUED | OUTPATIENT
Start: 2021-03-08 | End: 2021-03-09 | Stop reason: HOSPADM

## 2021-03-08 RX ORDER — CHOLECALCIFEROL (VITAMIN D3) 125 MCG
5 CAPSULE ORAL NIGHTLY PRN
Status: DISCONTINUED | OUTPATIENT
Start: 2021-03-08 | End: 2021-03-09 | Stop reason: HOSPADM

## 2021-03-08 RX ORDER — MAGNESIUM SULFATE HEPTAHYDRATE 40 MG/ML
4 INJECTION, SOLUTION INTRAVENOUS AS NEEDED
Status: DISCONTINUED | OUTPATIENT
Start: 2021-03-08 | End: 2021-03-09 | Stop reason: HOSPADM

## 2021-03-08 RX ORDER — ALBUMIN (HUMAN) 12.5 G/50ML
50 SOLUTION INTRAVENOUS ONCE
Status: DISCONTINUED | OUTPATIENT
Start: 2021-03-08 | End: 2021-03-09 | Stop reason: HOSPADM

## 2021-03-08 RX ORDER — POTASSIUM CHLORIDE 20 MEQ/1
40 TABLET, EXTENDED RELEASE ORAL AS NEEDED
Status: DISCONTINUED | OUTPATIENT
Start: 2021-03-08 | End: 2021-03-09 | Stop reason: HOSPADM

## 2021-03-08 RX ORDER — SPIRONOLACTONE 100 MG/1
300 TABLET, FILM COATED ORAL DAILY
Status: DISCONTINUED | OUTPATIENT
Start: 2021-03-08 | End: 2021-03-09 | Stop reason: HOSPADM

## 2021-03-08 RX ORDER — POTASSIUM CHLORIDE 1.5 G/1.77G
40 POWDER, FOR SOLUTION ORAL AS NEEDED
Status: DISCONTINUED | OUTPATIENT
Start: 2021-03-08 | End: 2021-03-09 | Stop reason: HOSPADM

## 2021-03-08 RX ORDER — MAGNESIUM SULFATE HEPTAHYDRATE 40 MG/ML
2 INJECTION, SOLUTION INTRAVENOUS AS NEEDED
Status: DISCONTINUED | OUTPATIENT
Start: 2021-03-08 | End: 2021-03-09 | Stop reason: HOSPADM

## 2021-03-08 RX ORDER — SODIUM CHLORIDE 0.9 % (FLUSH) 0.9 %
10 SYRINGE (ML) INJECTION AS NEEDED
Status: DISCONTINUED | OUTPATIENT
Start: 2021-03-08 | End: 2021-03-09 | Stop reason: HOSPADM

## 2021-03-08 RX ORDER — ACETAMINOPHEN 325 MG/1
650 TABLET ORAL EVERY 4 HOURS PRN
Status: DISCONTINUED | OUTPATIENT
Start: 2021-03-08 | End: 2021-03-09 | Stop reason: HOSPADM

## 2021-03-08 RX ORDER — ALBUMIN (HUMAN) 12.5 G/50ML
37.5 SOLUTION INTRAVENOUS ONCE
Status: DISCONTINUED | OUTPATIENT
Start: 2021-03-08 | End: 2021-03-09 | Stop reason: HOSPADM

## 2021-03-08 RX ORDER — NITROGLYCERIN 0.4 MG/1
0.4 TABLET SUBLINGUAL
Status: DISCONTINUED | OUTPATIENT
Start: 2021-03-08 | End: 2021-03-09 | Stop reason: HOSPADM

## 2021-03-08 RX ORDER — ALBUMIN (HUMAN) 12.5 G/50ML
112.5 SOLUTION INTRAVENOUS ONCE
Status: DISCONTINUED | OUTPATIENT
Start: 2021-03-08 | End: 2021-03-09 | Stop reason: HOSPADM

## 2021-03-08 RX ORDER — PANTOPRAZOLE SODIUM 40 MG/1
40 TABLET, DELAYED RELEASE ORAL
Status: DISCONTINUED | OUTPATIENT
Start: 2021-03-08 | End: 2021-03-09 | Stop reason: HOSPADM

## 2021-03-08 RX ORDER — BUMETANIDE 1 MG/1
2 TABLET ORAL DAILY
Status: DISCONTINUED | OUTPATIENT
Start: 2021-03-08 | End: 2021-03-09 | Stop reason: HOSPADM

## 2021-03-08 RX ORDER — OXYCODONE HYDROCHLORIDE 5 MG/1
10 TABLET ORAL EVERY 4 HOURS PRN
Status: DISCONTINUED | OUTPATIENT
Start: 2021-03-08 | End: 2021-03-09 | Stop reason: HOSPADM

## 2021-03-08 RX ORDER — ALBUMIN (HUMAN) 12.5 G/50ML
100 SOLUTION INTRAVENOUS ONCE
Status: DISCONTINUED | OUTPATIENT
Start: 2021-03-08 | End: 2021-03-09 | Stop reason: HOSPADM

## 2021-03-08 RX ORDER — SODIUM CHLORIDE 0.9 % (FLUSH) 0.9 %
10 SYRINGE (ML) INJECTION EVERY 12 HOURS SCHEDULED
Status: DISCONTINUED | OUTPATIENT
Start: 2021-03-08 | End: 2021-03-09 | Stop reason: HOSPADM

## 2021-03-08 RX ORDER — CALCIUM GLUCONATE 20 MG/ML
2 INJECTION, SOLUTION INTRAVENOUS AS NEEDED
Status: DISCONTINUED | OUTPATIENT
Start: 2021-03-08 | End: 2021-03-09 | Stop reason: HOSPADM

## 2021-03-08 RX ORDER — ALBUMIN (HUMAN) 12.5 G/50ML
62.5 SOLUTION INTRAVENOUS ONCE
Status: DISCONTINUED | OUTPATIENT
Start: 2021-03-08 | End: 2021-03-09 | Stop reason: HOSPADM

## 2021-03-08 RX ADMIN — OXYCODONE 10 MG: 5 TABLET ORAL at 13:56

## 2021-03-08 RX ADMIN — BUMETANIDE 2 MG: 1 TABLET ORAL at 18:02

## 2021-03-08 RX ADMIN — RIFAXIMIN 550 MG: 550 TABLET ORAL at 10:55

## 2021-03-08 RX ADMIN — OXYCODONE 10 MG: 5 TABLET ORAL at 01:40

## 2021-03-08 RX ADMIN — ONDANSETRON 4 MG: 2 INJECTION INTRAMUSCULAR; INTRAVENOUS at 20:23

## 2021-03-08 RX ADMIN — OXYCODONE 10 MG: 5 TABLET ORAL at 09:12

## 2021-03-08 RX ADMIN — HYDROMORPHONE HYDROCHLORIDE 1 MG: 2 INJECTION, SOLUTION INTRAMUSCULAR; INTRAVENOUS; SUBCUTANEOUS at 18:03

## 2021-03-08 RX ADMIN — HYDROMORPHONE HYDROCHLORIDE 1 MG: 2 INJECTION, SOLUTION INTRAMUSCULAR; INTRAVENOUS; SUBCUTANEOUS at 12:04

## 2021-03-08 RX ADMIN — Medication 10 ML: at 12:05

## 2021-03-08 RX ADMIN — RIFAXIMIN 550 MG: 550 TABLET ORAL at 20:23

## 2021-03-08 RX ADMIN — Medication 10 ML: at 18:01

## 2021-03-08 RX ADMIN — SUCRALFATE 1 G: 1 TABLET ORAL at 18:02

## 2021-03-08 RX ADMIN — OXYCODONE 10 MG: 5 TABLET ORAL at 19:39

## 2021-03-08 RX ADMIN — PANTOPRAZOLE SODIUM 40 MG: 40 TABLET, DELAYED RELEASE ORAL at 18:02

## 2021-03-08 RX ADMIN — HYDROMORPHONE HYDROCHLORIDE 1 MG: 2 INJECTION, SOLUTION INTRAMUSCULAR; INTRAVENOUS; SUBCUTANEOUS at 23:54

## 2021-03-08 RX ADMIN — SUCRALFATE 1 G: 1 TABLET ORAL at 20:23

## 2021-03-08 RX ADMIN — POTASSIUM CHLORIDE 40 MEQ: 1500 TABLET, EXTENDED RELEASE ORAL at 12:45

## 2021-03-08 RX ADMIN — DICYCLOMINE HYDROCHLORIDE 10 MG: 10 CAPSULE ORAL at 10:55

## 2021-03-08 RX ADMIN — Medication 10 ML: at 01:42

## 2021-03-08 RX ADMIN — Medication 10 ML: at 08:51

## 2021-03-08 RX ADMIN — POTASSIUM CHLORIDE 40 MEQ: 1500 TABLET, EXTENDED RELEASE ORAL at 08:52

## 2021-03-08 RX ADMIN — SPIRONOLACTONE 300 MG: 100 TABLET ORAL at 18:02

## 2021-03-08 RX ADMIN — LEVOTHYROXINE SODIUM 137 MCG: 0.03 TABLET ORAL at 08:52

## 2021-03-08 RX ADMIN — OXYCODONE 10 MG: 5 TABLET ORAL at 05:44

## 2021-03-08 RX ADMIN — Medication 10 ML: at 20:24

## 2021-03-08 RX ADMIN — DICYCLOMINE HYDROCHLORIDE 10 MG: 10 CAPSULE ORAL at 20:23

## 2021-03-08 RX ADMIN — POTASSIUM CHLORIDE 40 MEQ: 1500 TABLET, EXTENDED RELEASE ORAL at 05:45

## 2021-03-08 RX ADMIN — ONDANSETRON 4 MG: 2 INJECTION INTRAMUSCULAR; INTRAVENOUS at 12:18

## 2021-03-08 RX ADMIN — CEFTRIAXONE SODIUM 1 G: 1 INJECTION, POWDER, FOR SOLUTION INTRAMUSCULAR; INTRAVENOUS at 20:23

## 2021-03-08 RX ADMIN — POTASSIUM CHLORIDE 40 MEQ: 1500 TABLET, EXTENDED RELEASE ORAL at 00:03

## 2021-03-08 RX ADMIN — POTASSIUM CHLORIDE 40 MEQ: 1500 TABLET, EXTENDED RELEASE ORAL at 01:40

## 2021-03-08 NOTE — CONSULTS
GI CONSULT  NOTE:    Referring Provider: Dr. Brannon    Chief complaint: Cirrhosis, ascites     Subjective .     History of present illness: Lucinda Cope is a 45 y.o. female with history of CARTER cirrhosis complicated by hepatic encephalopathy, ascites, and nonbleeding esophageal varices who presents with complaints of abdominal pain and distention.  The patient also has a history of chronic portal vein thrombus and DVT on Eliquis, gastric sleeve, splenectomy, Alfonzo-en-Y gastric bypass, cholecystectomy, incisional hernia repair, and thyroid cancer s/p thyroidectomy.  The patient is well-known to our practice and has been followed in the past by Dr. Mckeon, but she has not been seen in the office since 2018.  She has recently been followed by  for her chronic liver disease and is seen there every 3 months.  She was last seen by our service during an inpatient admission in 1/2021 for complaints of epigastric pain.  EGD was performed at that time with results below.  The patient reports that she last had a paracentesis on 3/2.  However, she is now having rapid reaccumulation of ascites fluid along with generalized abdominal pain that radiates up into her chest and into her left arm.  She does describe severe epigastric pain that worsens with oral intake.  Denies any diarrhea.  No bright red blood per rectum or melena.  She does complain of some nausea/vomiting.  Denies any NSAID use.  States that she has been taking her pantoprazole as prescribed.  No recent fever.    Endo History:  1/2021 EGD (Dr. Cotton) -1 cm clean-based anastomotic ulcer, 2 cords of grade 1 esophageal varices, prior gastric bypass, 3 mm polyp in the antrum, gastritis (biopsy negative for H. Pylori)  10/2018 EGD (Dr. Mckeon) -Alfonzo-en-Y gastric bypass anatomy with patent anastomosis.    Past Medical History:  Past Medical History:   Diagnosis Date   • Abdominal pain    • Anemia    • Cancer (CMS/HCC)    • Cirrhosis of liver (CMS/HCC) 04/2017   •  Clot    • Deep venous thrombosis of right profunda femoris vein (CMS/HCC) 8/15/2019   • Depression    • Disease of thyroid gland    • Empyema of pleura (CMS/HCC)    • GERD (gastroesophageal reflux disease)    • Heartburn    • Hemorrhoids    • Hernia, ventral    • History of malignant neoplasm of thyroid    • History of transfusion    • Irregular menstrual cycle    • Migraine    • Parathyroid abnormality (CMS/HCC)    • Radiation    • Restless legs syndrome (RLS)    • Urge and stress incontinence    • Varicose veins        Past Surgical History:  Past Surgical History:   Procedure Laterality Date   • ABDOMINAL SURGERY     • BEDSIDE PARACENTESIS  3/22/2020        • BEDSIDE PARACENTESIS  4/19/2020        • BEDSIDE PARACENTESIS  3/1/2021        • CHOLECYSTECTOMY      Laparoscopic   • ENDOSCOPY N/A 3/17/2016    Procedure: ESOPHAGOGASTRODUODENOSCOPY WITH BALOON DILATATION 18-20MM WITH GASTRIC SLEEVE SEALANT;  Surgeon: Leonardo Ortiz Jr., MD;  Location: [facility] ENDOSCOPY;  Service:    • ENDOSCOPY N/A 3/14/2016    Procedure: esophagogastroduodenoscopy with fluoroscopy;  Surgeon: Greg Avila III, MD;  Location: [facility] ENDOSCOPY;  Service:    • ENDOSCOPY N/A 4/15/2016    Procedure: EGD WITH DILITATION AND STENT PLACEMENT dilation of pylorus ;  Surgeon: Leonardo Ortiz Jr., MD;  Location: [facility] ENDOSCOPY;  Service:    • ENDOSCOPY N/A 5/13/2016    Procedure: ESOPHAGOGASTRODUODENOSCOPY  W/ STENT;  Surgeon: Leonardo Ortiz Jr., MD;  Location: [facility] ENDOSCOPY;  Service:    • ENDOSCOPY N/A 1/13/2021    Procedure: ESOPHAGOGASTRODUODENOSCOPY WITH POLYPECTOMY X1 AND  BIOPSY X1.;  Surgeon: Bryson Cotton MD;  Location: [facility] ENDOSCOPY;  Service: Gastroenterology;  Laterality: N/A;  anastomotic ulcer, antral polyp, GASTRITIS   • GASTRECTOMY      Gastric Surgery for Morbid Obesity Laparoscopic Longitudinal Gastrectomy   • GASTRIC SLEEVE LAPAROSCOPIC     • HEMORRHOIDECTOMY     • HERNIA REPAIR     • LAPAROSCOPY REPAIR  HIATAL HERNIA     • LARYNGOSCOPY      Direct Laryngoscopy with Injection into Vocal Cords   • OTHER SURGICAL HISTORY Bilateral     Ear Pressure Equalization Tube, Insertion, Bilaterally   • PERIPHERALLY INSERTED CENTRAL CATHETER INSERTION     • SPLENECTOMY     • THYROID SURGERY     • TUBAL ABDOMINAL LIGATION         Social History:  Social History     Tobacco Use   • Smoking status: Never Smoker   • Smokeless tobacco: Never Used   Vaping Use   • Vaping Use: Never used   Substance Use Topics   • Alcohol use: No   • Drug use: No       Family History:  Family History   Problem Relation Age of Onset   • Stroke Father    • Diabetes Father         Diabetes Mellitus   • Hypertension Father    • Cancer Other        Medications:  Medications Prior to Admission   Medication Sig Dispense Refill Last Dose   • apixaban (ELIQUIS) 5 MG tablet tablet Take 5 mg by mouth 2 (Two) Times a Day.      • bumetanide (BUMEX) 2 MG tablet Take 2 mg by mouth Daily.      • dicyclomine (Bentyl) 10 MG capsule Take 1 capsule by mouth 4 (Four) Times a Day Before Meals & at Bedtime As Needed (Abdominal pain or colic). 20 capsule 0    • levothyroxine (SYNTHROID, LEVOTHROID) 137 MCG tablet Take 137 mcg by mouth Daily.      • ondansetron ODT (ZOFRAN-ODT) 8 MG disintegrating tablet Place 8 mg on the tongue Every 8 (Eight) Hours As Needed for Nausea or Vomiting.      • oxyCODONE (ROXICODONE) 10 MG tablet Take 10 mg by mouth Every 6 (Six) Hours.      • rifaximin (XIFAXAN) 550 MG tablet Take 550 mg by mouth Every 12 (Twelve) Hours.      • spironolactone (ALDACTONE) 100 MG tablet Take 300 mg by mouth Daily.          Scheduled Meds:albumin human, 37.5 g, Intravenous, Once   Or  albumin human, 50 g, Intravenous, Once   Or  albumin human, 62.5 g, Intravenous, Once   Or  albumin human, 75 g, Intravenous, Once   Or  albumin human, 87.5 g, Intravenous, Once   Or  albumin human, 100 g, Intravenous, Once   Or  albumin human, 112.5 g, Intravenous, Once  apixaban, 5  "mg, Oral, Q12H  bumetanide, 2 mg, Oral, Daily  levothyroxine, 137 mcg, Oral, Daily  riFAXIMin, 550 mg, Oral, Q12H  sodium chloride, 10 mL, Intravenous, Q12H  spironolactone, 300 mg, Oral, Daily      Continuous Infusions:   PRN Meds:.•  acetaminophen **OR** acetaminophen **OR** acetaminophen  •  Calcium Gluconate-NaCl **AND** calcium gluconate **AND** Calcium, Ionized  •  dicyclomine  •  HYDROmorphone  •  magnesium sulfate **OR** magnesium sulfate **OR** magnesium sulfate  •  melatonin  •  nitroglycerin  •  ondansetron **OR** ondansetron  •  oxyCODONE  •  potassium & sodium phosphates **OR** potassium & sodium phosphates  •  potassium chloride  •  potassium chloride  •  [COMPLETED] Insert peripheral IV **AND** sodium chloride  •  sodium chloride    ALLERGIES:  Compazine [prochlorperazine edisylate], Contrast dye, Iodinated diagnostic agents, Iodine, Morphine, Nsaids, and Hydrocodone    ROS:  The following systems were reviewed and negative;   Constitution:  No fevers, chills, no unintentional weight loss  Skin: no rash, no jaundice  Eyes:  No blurry vision, no eye pain  HENT:  No change in hearing or smell  Resp:  No dyspnea or cough  CV:  No chest pain or palpitations  :  No dysuria, hematuria  Musculoskeletal:  No leg cramps or arthralgias  Neuro:  No tremor, no numbness  Psych:  No depression or confusion    Objective     Vital Signs:   Vitals:    03/08/21 0113 03/08/21 0605 03/08/21 1100 03/08/21 1512   BP: 145/94 145/84 150/90 119/81   BP Location: Left arm Left arm Left arm Left arm   Patient Position: Lying Lying Lying Lying   Pulse: 72 64 75 90   Resp: 18 16 18 17   Temp: 98.2 °F (36.8 °C) 97.7 °F (36.5 °C) 97.6 °F (36.4 °C) 98.5 °F (36.9 °C)   TempSrc: Oral Oral Oral Oral   SpO2: 97% 98% 99% 90%   Weight: 59.9 kg (132 lb 0.9 oz)      Height: 160 cm (63\")          Physical Exam:       General Appearance:    Awake and alert, in no acute distress   Head:    Normocephalic, without obvious abnormality, " atraumatic   Throat:   No oral lesions, no thrush, oral mucosa moist   Lungs:     Respirations regular, even and unlabored   Chest Wall:    No abnormalities observed   Abdomen:     Soft, generalized tenderness, no rebound or guarding, moderate distention, large abdominal hernias   Rectal:     Deferred   Extremities:   Moves all extremities, no edema, no cyanosis   Pulses:   Pulses palpable and equal bilaterally   Skin:   No rash, no jaundice, normal palpation   Lymph nodes:   No cervical, supraclavicular or submandibular palpable adenopathy   Neurologic:   Cranial nerves 2 - 12 grossly intact, no asterixis       Results Review:   I reviewed the patient's labs and imaging.  CBC    Results from last 7 days   Lab Units 03/08/21  0259 03/07/21 2055   WBC 10*3/mm3 11.00* 12.90*   HEMOGLOBIN g/dL 10.6* 11.4*   PLATELETS 10*3/mm3 732* 744*     CMP   Results from last 7 days   Lab Units 03/08/21  0259 03/07/21 2055   SODIUM mmol/L 134* 137   POTASSIUM mmol/L 3.5 2.9*   CHLORIDE mmol/L 98 100   CO2 mmol/L 25.0 28.0   BUN mg/dL 11 12   CREATININE mg/dL 0.75 0.66   GLUCOSE mg/dL 269* 95   ALBUMIN g/dL 2.60* 3.00*   BILIRUBIN mg/dL 0.5 0.5   ALK PHOS U/L 137* 146*   AST (SGOT) U/L 31 19   ALT (SGPT) U/L 8 9   MAGNESIUM mg/dL 1.9  --    LIPASE U/L  --  13     Cr Clearance Estimated Creatinine Clearance: 87.7 mL/min (by C-G formula based on SCr of 0.75 mg/dL).  Coag   Results from last 7 days   Lab Units 03/08/21  0259   INR  1.11*   APTT seconds 27.9     HbA1C   Lab Results   Component Value Date    HGBA1C 5.26 05/27/2016    HGBA1C 5.6 05/12/2015     Blood Glucose No results found for: POCGLU  Infection     UA      Radiology(recent) CT Abdomen Pelvis With Contrast    Result Date: 3/7/2021  Impression: 1. Increased diffuse peritoneal ascites fluid, moderate to large volume overall. 2. 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 No focal hepatic lesion is seen. Stable intra and extrahepatic  biliary ductal fullness is nonspecific given prior cholecystectomy. 3. Right colonic wall thickening is nonspecific. This may reflect portal colopathy in the setting of portal hypertension, but infectious or inflammatory colitis are not excluded. 4. Unchanged appearance of ventral hernia containing fluid and nonobstructive bowel. Electronically signed by:  Josué Santillan M.D.  3/7/2021 9:12 PM         ASSESSMENT:  -Generalized abdominal pain -rule out SBP  -Ascites   -Nausea/vomiting  -Anastomotic ulcer -diagnosed on EGD in 1/2021  -CARTER cirrhosis complicated by hepatic encephalopathy, ascites, nonbleeding esophageal varices, and chronic portal vein thrombus  -Normocytic anemia  -Leukocytosis  -Portal vein thrombus/DVT on Eliquis  -History of gastric sleeve  -History of splenectomy  -History of Alfonzo-en-Y gastric bypass  -History of cholecystectomy  -History of incisional hernia repair  -History of thyroid cancer s/p thyroidectomy.    PLAN:  Patient presents with complaints of abdominal pain and distention.  Last paracentesis on 3/2.  She now complains of generalized abdominal pain.  Concern for possible SBP.  Wait for ascites fluid to be obtained prior to starting antibiotics.  CT abdomen/pelvis on admission does show moderate to large amount of ascites.  We will plan repeat paracentesis tomorrow.  Send fluid studies.  Replace albumin as needed.  Continue Bumex and spironolactone.  Epigastric pain after oral intake is likely due to anastomotic ulcer diagnosed on EGD in 1/2021.  Restart PPI.  Add Carafate.  Lipase is normal.  Check AFP.  MELD 2.   Advance to low-sodium diet as tolerated.  Antiemetics/analgesics as needed.  Supportive care.    I discussed the patients findings and my recommendations with the patient.  I will discuss the case with Dr. Johnson and change the plan accordingly.    We appreciate the referral.    Leatha Buckley, APRN  03/08/21  16:24 EST

## 2021-03-08 NOTE — ED PROVIDER NOTES
Subjective   Chief complaint: Abdominal pain    45-year-old female presents with abdominal pain.  Patient states pain started this morning and has gotten progressively worse.  She has had nausea and vomiting.  She states she had 2 bowel movements today which were normal.  Her abdominal pain is diffuse.  She also states it radiates into the left chest and left arm.  She denies any shortness of breath or diaphoresis.  She denies any fever.  No known sick contacts.  She states she has a abdominal wall hernia which she states is soft and unchanged.      History provided by:  Patient      Review of Systems   Constitutional: Negative for fever.   HENT: Negative for congestion and sore throat.    Eyes: Negative for redness.   Respiratory: Negative for cough and shortness of breath.    Cardiovascular: Positive for chest pain.   Gastrointestinal: Positive for abdominal pain, nausea and vomiting. Negative for diarrhea.   Genitourinary: Negative for dysuria.   Musculoskeletal: Negative for back pain.   Skin: Negative for rash.   Neurological: Negative for dizziness and headaches.   Psychiatric/Behavioral: Negative for confusion.       Past Medical History:   Diagnosis Date   • Abdominal pain    • Anemia    • Cancer (CMS/HCC)    • Cirrhosis of liver (CMS/HCC) 04/2017   • Clot    • Deep venous thrombosis of right profunda femoris vein (CMS/HCC) 8/15/2019   • Depression    • Disease of thyroid gland    • Empyema of pleura (CMS/HCC)    • GERD (gastroesophageal reflux disease)    • Heartburn    • Hemorrhoids    • Hernia, ventral    • History of malignant neoplasm of thyroid    • History of transfusion    • Irregular menstrual cycle    • Migraine    • Parathyroid abnormality (CMS/HCC)    • Radiation    • Restless legs syndrome (RLS)    • Urge and stress incontinence    • Varicose veins        Allergies   Allergen Reactions   • Compazine [Prochlorperazine Edisylate] Hives   • Contrast Dye Hives   • Iodinated Diagnostic Agents    •  Iodine Hives   • Morphine Itching   • Nsaids Other (See Comments)     States I am not suppose to take them   • Hydrocodone Rash     Lortab elixir per pt       Past Surgical History:   Procedure Laterality Date   • ABDOMINAL SURGERY     • BEDSIDE PARACENTESIS  3/22/2020        • BEDSIDE PARACENTESIS  4/19/2020        • BEDSIDE PARACENTESIS  3/1/2021        • CHOLECYSTECTOMY      Laparoscopic   • ENDOSCOPY N/A 3/17/2016    Procedure: ESOPHAGOGASTRODUODENOSCOPY WITH BALOON DILATATION 18-20MM WITH GASTRIC SLEEVE SEALANT;  Surgeon: Leonardo Ortiz Jr., MD;  Location: [facility] ENDOSCOPY;  Service:    • ENDOSCOPY N/A 3/14/2016    Procedure: esophagogastroduodenoscopy with fluoroscopy;  Surgeon: Greg Avila III, MD;  Location: [facility] ENDOSCOPY;  Service:    • ENDOSCOPY N/A 4/15/2016    Procedure: EGD WITH DILITATION AND STENT PLACEMENT dilation of pylorus ;  Surgeon: Leonardo Ortiz Jr., MD;  Location: [facility] ENDOSCOPY;  Service:    • ENDOSCOPY N/A 5/13/2016    Procedure: ESOPHAGOGASTRODUODENOSCOPY  W/ STENT;  Surgeon: Leonardo Ortiz Jr., MD;  Location: [facility] ENDOSCOPY;  Service:    • ENDOSCOPY N/A 1/13/2021    Procedure: ESOPHAGOGASTRODUODENOSCOPY WITH POLYPECTOMY X1 AND  BIOPSY X1.;  Surgeon: Bryson Cotton MD;  Location: [facility] ENDOSCOPY;  Service: Gastroenterology;  Laterality: N/A;  anastomotic ulcer, antral polyp, GASTRITIS   • GASTRECTOMY      Gastric Surgery for Morbid Obesity Laparoscopic Longitudinal Gastrectomy   • GASTRIC SLEEVE LAPAROSCOPIC     • HEMORRHOIDECTOMY     • HERNIA REPAIR     • LAPAROSCOPY REPAIR HIATAL HERNIA     • LARYNGOSCOPY      Direct Laryngoscopy with Injection into Vocal Cords   • OTHER SURGICAL HISTORY Bilateral     Ear Pressure Equalization Tube, Insertion, Bilaterally   • PERIPHERALLY INSERTED CENTRAL CATHETER INSERTION     • SPLENECTOMY     • THYROID SURGERY     • TUBAL ABDOMINAL LIGATION         Family History   Problem Relation Age of Onset   • Stroke Father    •  "Diabetes Father         Diabetes Mellitus   • Hypertension Father    • Cancer Other        Social History     Socioeconomic History   • Marital status: Single     Spouse name: Not on file   • Number of children: Not on file   • Years of education: Not on file   • Highest education level: Not on file   Tobacco Use   • Smoking status: Never Smoker   • Smokeless tobacco: Never Used   Vaping Use   • Vaping Use: Never used   Substance and Sexual Activity   • Alcohol use: No   • Drug use: No   • Sexual activity: Defer       /94 (BP Location: Left arm, Patient Position: Lying)   Pulse 83   Temp 98 °F (36.7 °C)   Resp 18   Ht 160 cm (63\")   Wt 61 kg (134 lb 7.7 oz)   LMP  (LMP Unknown)   SpO2 98%   BMI 23.82 kg/m²       Objective   Physical Exam  Vitals and nursing note reviewed.   Constitutional:       Appearance: She is well-developed.   HENT:      Head: Normocephalic and atraumatic.   Eyes:      Pupils: Pupils are equal, round, and reactive to light.   Cardiovascular:      Rate and Rhythm: Normal rate and regular rhythm.      Heart sounds: Normal heart sounds.   Pulmonary:      Effort: Pulmonary effort is normal. No respiratory distress.      Breath sounds: Normal breath sounds.   Abdominal:      General: Bowel sounds are normal.      Palpations: Abdomen is soft.      Tenderness: There is generalized abdominal tenderness. There is no guarding or rebound.      Hernia: A hernia is present. Hernia is present in the ventral area.   Skin:     General: Skin is warm and dry.   Neurological:      General: No focal deficit present.      Mental Status: She is alert and oriented to person, place, and time.         Procedures           ED Course      My interpretation of EKG shows sinus rhythm, rate of 83, no ST elevation     Results for orders placed or performed during the hospital encounter of 03/07/21   Comprehensive Metabolic Panel    Specimen: Blood   Result Value Ref Range    Glucose 95 65 - 99 mg/dL    BUN 12 6 " - 20 mg/dL    Creatinine 0.66 0.57 - 1.00 mg/dL    Sodium 137 136 - 145 mmol/L    Potassium 2.9 (L) 3.5 - 5.2 mmol/L    Chloride 100 98 - 107 mmol/L    CO2 28.0 22.0 - 29.0 mmol/L    Calcium 8.0 (L) 8.6 - 10.5 mg/dL    Total Protein 6.6 6.0 - 8.5 g/dL    Albumin 3.00 (L) 3.50 - 5.20 g/dL    ALT (SGPT) 9 1 - 33 U/L    AST (SGOT) 19 1 - 32 U/L    Alkaline Phosphatase 146 (H) 39 - 117 U/L    Total Bilirubin 0.5 0.0 - 1.2 mg/dL    eGFR Non African Amer 96 >60 mL/min/1.73    Globulin 3.6 gm/dL    A/G Ratio 0.8 g/dL    BUN/Creatinine Ratio 18.2 7.0 - 25.0    Anion Gap 9.0 5.0 - 15.0 mmol/L   Lipase    Specimen: Blood   Result Value Ref Range    Lipase 13 13 - 60 U/L   CBC Auto Differential    Specimen: Blood   Result Value Ref Range    WBC 12.90 (H) 3.40 - 10.80 10*3/mm3    RBC 3.94 3.77 - 5.28 10*6/mm3    Hemoglobin 11.4 (L) 12.0 - 15.9 g/dL    Hematocrit 33.7 (L) 34.0 - 46.6 %    MCV 85.5 79.0 - 97.0 fL    MCH 28.9 26.6 - 33.0 pg    MCHC 33.8 31.5 - 35.7 g/dL    RDW 17.7 (H) 12.3 - 15.4 %    RDW-SD 52.9 37.0 - 54.0 fl    MPV 8.3 6.0 - 12.0 fL    Platelets 744 (H) 140 - 450 10*3/mm3    Neutrophil % 53.7 42.7 - 76.0 %    Lymphocyte % 35.7 19.6 - 45.3 %    Monocyte % 6.6 5.0 - 12.0 %    Eosinophil % 3.2 0.3 - 6.2 %    Basophil % 0.8 0.0 - 1.5 %    Neutrophils, Absolute 7.00 1.70 - 7.00 10*3/mm3    Lymphocytes, Absolute 4.60 (H) 0.70 - 3.10 10*3/mm3    Monocytes, Absolute 0.90 0.10 - 0.90 10*3/mm3    Eosinophils, Absolute 0.40 0.00 - 0.40 10*3/mm3    Basophils, Absolute 0.10 0.00 - 0.20 10*3/mm3    nRBC 0.1 0.0 - 0.2 /100 WBC   ECG 12 Lead   Result Value Ref Range    QT Interval 426 ms           CT abdomen and pelvis:  IMPRESSION:  Impression:   1. Increased diffuse peritoneal ascites fluid, moderate to large volume overall.  2. 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 No focal hepatic lesion is seen. Stable intra and extrahepatic biliary ductal fullness is nonspecific given prior  cholecystectomy.  3. Right colonic wall thickening is nonspecific. This may reflect portal colopathy in the setting of portal hypertension, but infectious or inflammatory colitis are not excluded.  4. Unchanged appearance of ventral hernia containing fluid and nonobstructive bowel.     Electronically signed by:  Josué Santillan M.D.    3/7/2021 9:12 PM                           MDM   Patient had the above evaluation.  Results were discussed with the patient.  Labs significant for white blood cell count 12.9.  Potassium is low at 2.9.  She was given oral potassium replacement.  CT of the abdomen and pelvis is showing moderate to large volume ascites.  Patient did have persistent abdominal pain in the emergency room.  She was given 2 doses of Dilaudid IV.  She continues to have pain.  She will be admitted for further evaluation and possible paracentesis.      Final diagnoses:   Abdominal pain, unspecified abdominal location   Other ascites   Hypokalemia            Dominic Arevalo MD  03/07/21 1982

## 2021-03-08 NOTE — PLAN OF CARE
Goal Outcome Evaluation:  Plan of Care Reviewed With: patient  Progress: no change  Outcome Summary: New admission from ED with abdominal pain. Pain controlled at this time, VSS. Patient denies nausea. Awaiting paracentesis today. Will monitor.

## 2021-03-08 NOTE — H&P
[facility] Medicine Services      Patient Name: Lucinda Cope  : 1975  MRN: [id number]  Primary Care Physician: Melissa Iniguez APRN  Date of admission: 3/7/2021    Patient Care Team:  Melissa Iniguez APRN as PCP - General (Family Medicine)          Subjective   History Present Illness     Chief Complaint:   Chief Complaint   Patient presents with   • Abdominal Pain         Ms. Cope is a 45 y.o. female with past medical history of Ramon, hypothyroidism, thyroid cancer, DVT, GERD and chronic pain who presents to [facility] complaining of abdominal pain and distention.  Patient reports she had paracentesis on 3/2/2021 and initially seem to be doing well however distention has quickly returned and worsened since that time.  She notes that on the morning of 3/7/2021 her stomach became significantly more tense and she developed a stabbing pain in the anterior portion of her abdomen radiating to her back and left shoulder rated at 8/10 at its worst.  Patient does have a large ventral hernia which was recently evaluated at this facility however she was not felt to be an operative candidate due to underlying liver pathology.  Nausea with 5 episodes of nonbloody vomiting as well as occasional palpitations are reported on the day of presentation but she denies any other pain, dyspnea, cough or fever, syncope or near syncope or changes in bowel or bladder habits.  Patient does not smoke or drink alcohol.  She notes that her condition had been generally well controlled requiring a paracentesis less than every 6 months until recently when her ascites has begun returning much more quickly needing procedure within every 1 to 2 weeks.  She is currently being followed by GI at [facility] after being discharged from liver transplant clinic due to improving clinical status and not meeting criteria for inclusion on transplant list at that time.  She is requesting  possible evaluation or referral to local GI group for management.    In the ED patient found to have lab significant for potassium: 2.9, alk phos: 146, WBCs: 12.90 with increased absolute lymphocyte count noted on differential, hemoglobin: 11.4 with a normal MCV and MCHC.  Lipase: 13.  CT of abdomen pelvis shows increased diffuse peritoneal ascites with moderate to large volume noted overall.  Cavernous transformation of the portal vein suggesting chronic portal thrombosis is noted with no focal hepatic lesions seen.  Stable intra and extrahepatic biliary ductal fullness is noted along with prior cholecystectomy.  Right colonic wall thickening which is noted is nonspecific and may reflect portal colopathy though infectious and inflammatory colitis is noted is not excluded.  An unchanged appearance of ventral hernia containing fluid and nonobstructive bowel is also reported.  EKG shows sinus rhythm at 83 with some T wave inversion noted in V1 through V3.    History of Present Illness    Review of Systems   Constitutional: Negative.   HENT: Negative.    Eyes: Negative.    Cardiovascular: Negative.    Respiratory: Negative.    Endocrine: Negative.    Skin: Negative.    Musculoskeletal: Negative.    Gastrointestinal: Positive for bloating, abdominal pain, nausea and vomiting. Negative for hematemesis, hematochezia and melena.   Genitourinary: Negative.    Neurological: Negative.    Psychiatric/Behavioral: Negative.            Personal History     Past Medical History:   Past Medical History:   Diagnosis Date   • Abdominal pain    • Anemia    • Cancer (CMS/HCC)    • Cirrhosis of liver (CMS/HCC) 04/2017   • Clot    • Deep venous thrombosis of right profunda femoris vein (CMS/HCC) 8/15/2019   • Depression    • Disease of thyroid gland    • Empyema of pleura (CMS/HCC)    • GERD (gastroesophageal reflux disease)    • Heartburn    • Hemorrhoids    • Hernia, ventral    • History of malignant neoplasm of thyroid    • History of  transfusion    • Irregular menstrual cycle    • Migraine    • Parathyroid abnormality (CMS/HCC)    • Radiation    • Restless legs syndrome (RLS)    • Urge and stress incontinence    • Varicose veins        Surgical History:      Past Surgical History:   Procedure Laterality Date   • ABDOMINAL SURGERY     • BEDSIDE PARACENTESIS  3/22/2020        • BEDSIDE PARACENTESIS  4/19/2020        • BEDSIDE PARACENTESIS  3/1/2021        • CHOLECYSTECTOMY      Laparoscopic   • ENDOSCOPY N/A 3/17/2016    Procedure: ESOPHAGOGASTRODUODENOSCOPY WITH BALOON DILATATION 18-20MM WITH GASTRIC SLEEVE SEALANT;  Surgeon: Leonardo Ortiz Jr., MD;  Location: [facility] ENDOSCOPY;  Service:    • ENDOSCOPY N/A 3/14/2016    Procedure: esophagogastroduodenoscopy with fluoroscopy;  Surgeon: Greg Avila III, MD;  Location: [facility] ENDOSCOPY;  Service:    • ENDOSCOPY N/A 4/15/2016    Procedure: EGD WITH DILITATION AND STENT PLACEMENT dilation of pylorus ;  Surgeon: Leonardo Ortiz Jr., MD;  Location: [facility] ENDOSCOPY;  Service:    • ENDOSCOPY N/A 5/13/2016    Procedure: ESOPHAGOGASTRODUODENOSCOPY  W/ STENT;  Surgeon: Leonardo Ortiz Jr., MD;  Location: [facility] ENDOSCOPY;  Service:    • ENDOSCOPY N/A 1/13/2021    Procedure: ESOPHAGOGASTRODUODENOSCOPY WITH POLYPECTOMY X1 AND  BIOPSY X1.;  Surgeon: Bryson Cotton MD;  Location: [facility] ENDOSCOPY;  Service: Gastroenterology;  Laterality: N/A;  anastomotic ulcer, antral polyp, GASTRITIS   • GASTRECTOMY      Gastric Surgery for Morbid Obesity Laparoscopic Longitudinal Gastrectomy   • GASTRIC SLEEVE LAPAROSCOPIC     • HEMORRHOIDECTOMY     • HERNIA REPAIR     • LAPAROSCOPY REPAIR HIATAL HERNIA     • LARYNGOSCOPY      Direct Laryngoscopy with Injection into Vocal Cords   • OTHER SURGICAL HISTORY Bilateral     Ear Pressure Equalization Tube, Insertion, Bilaterally   • PERIPHERALLY INSERTED CENTRAL CATHETER INSERTION     • SPLENECTOMY     • THYROID SURGERY     • TUBAL ABDOMINAL LIGATION              Family History: family history includes Cancer in an other family member; Diabetes in her father; Hypertension in her father; Stroke in her father. Otherwise pertinent FHx was reviewed and unremarkable.     Social History:  reports that she has never smoked. She has never used smokeless tobacco. She reports that she does not drink alcohol and does not use drugs.      Medications:  Prior to Admission medications    Medication Sig Start Date End Date Taking? Authorizing Provider   apixaban (ELIQUIS) 5 MG tablet tablet Take 5 mg by mouth 2 (Two) Times a Day.    Justyn Erwin MD   bumetanide (BUMEX) 2 MG tablet Take 2 mg by mouth Daily.    Justyn Erwin MD   dicyclomine (Bentyl) 10 MG capsule Take 1 capsule by mouth 4 (Four) Times a Day Before Meals & at Bedtime As Needed (Abdominal pain or colic). 1/14/21   Ever Motta MD   levothyroxine (SYNTHROID, LEVOTHROID) 150 MCG tablet Take 150 mcg by mouth Daily.    Justyn Erwin MD   ondansetron ODT (ZOFRAN-ODT) 8 MG disintegrating tablet Place 8 mg on the tongue Every 8 (Eight) Hours As Needed for Nausea or Vomiting.    Justyn Erwin MD   oxyCODONE (ROXICODONE) 10 MG tablet Take 10 mg by mouth Every 6 (Six) Hours.    Justyn Erwin MD   pregabalin (LYRICA) 25 MG capsule Take 25 mg by mouth every night at bedtime.    Justyn Erwin MD   rifaximin (XIFAXAN) 550 MG tablet Take 550 mg by mouth Every 12 (Twelve) Hours.    Justyn Erwin MD   spironolactone (ALDACTONE) 100 MG tablet Take 300 mg by mouth Daily.    Justyn Erwin MD       Allergies:    Allergies   Allergen Reactions   • Compazine [Prochlorperazine Edisylate] Hives   • Contrast Dye Hives   • Iodinated Diagnostic Agents    • Iodine Hives   • Morphine Itching   • Nsaids Other (See Comments)     States I am not suppose to take them   • Hydrocodone Rash     Lortab elixir per pt       Objective   Objective     Vital Signs  Temp:   [98 °F (36.7 °C)] 98 °F (36.7 °C)  Heart Rate:  [79-90] 83  Resp:  [16-18] 18  BP: (130-142)/(83-94) 131/94  SpO2:  [98 %-100 %] 98 %  on   ;   Device (Oxygen Therapy): room air  Body mass index is 23.82 kg/m².    Physical Exam  Vitals reviewed.   Constitutional:       General: She is not in acute distress.     Appearance: Normal appearance. She is normal weight. She is not ill-appearing or toxic-appearing.   HENT:      Head: Normocephalic.      Right Ear: External ear normal.      Left Ear: External ear normal.      Nose: Nose normal.      Mouth/Throat:      Mouth: Mucous membranes are moist.   Eyes:      Extraocular Movements: Extraocular movements intact.   Cardiovascular:      Rate and Rhythm: Normal rate and regular rhythm.      Pulses: Normal pulses.      Heart sounds: Normal heart sounds.   Pulmonary:      Effort: Pulmonary effort is normal.      Breath sounds: Normal breath sounds.   Abdominal:      General: Bowel sounds are normal. There is distension.      Tenderness: There is abdominal tenderness.      Hernia: A hernia is present.   Musculoskeletal:         General: Normal range of motion.      Cervical back: Normal range of motion.   Skin:     General: Skin is warm and dry.   Neurological:      General: No focal deficit present.      Mental Status: She is alert and oriented to person, place, and time.   Psychiatric:         Mood and Affect: Mood normal.         Behavior: Behavior normal.         Thought Content: Thought content normal.         Judgment: Judgment normal.           Results Review:  I have personally reviewed most recent cardiac tracings, lab results and radiology images and interpretations and agree with findings, most notably: CMP, CBC, lipase, CT of abdomen and pelvis and EKG.    Results from last 7 days   Lab Units 03/07/21 2055 03/01/21  1313   WBC 10*3/mm3 12.90* 8.30   HEMOGLOBIN g/dL 11.4* 12.0   HEMATOCRIT % 33.7* 37.2   PLATELETS 10*3/mm3 744* 596*   INR   --  1.06     Results  from last 7 days   Lab Units 03/07/21  2055   SODIUM mmol/L 137   POTASSIUM mmol/L 2.9*   CHLORIDE mmol/L 100   CO2 mmol/L 28.0   BUN mg/dL 12   CREATININE mg/dL 0.66   GLUCOSE mg/dL 95   CALCIUM mg/dL 8.0*   ALT (SGPT) U/L 9   AST (SGOT) U/L 19     Estimated Creatinine Clearance: 101.5 mL/min (by C-G formula based on SCr of 0.66 mg/dL).  Brief Urine Lab Results  (Last result in the past 365 days)      Color   Clarity   Blood   Leuk Est   Nitrite   Protein   CREAT   Urine HCG        01/09/21 1750 Dark Yellow Clear Negative Negative Negative Negative               Microbiology Results (last 10 days)     ** No results found for the last 240 hours. **          ECG/EMG Results (most recent)     Procedure Component Value Units Date/Time    ECG 12 Lead [[id number]] Collected: 03/07/21 2000     Updated: 03/07/21 2002     QT Interval 426 ms     Narrative:      HEART RATE= 83  bpm  RR Interval= 728  ms  [place] Interval= 151  ms  P Horizontal Axis= -2  deg  P Front Axis= 20  deg  QRSD Interval= 103  ms  QT Interval= 426  ms  QRS Axis= 40  deg  T Wave Axis= -3  deg  - ABNORMAL ECG -  Sinus rhythm  Nonspecific T abnormalities, anterior leads  Electronically Signed By:   Date and Time of Study: 2021-03-07 20:00:11          Results for orders placed during the hospital encounter of 08/23/19    Duplex Venous Lower Extremity - Right CAR    Interpretation Summary  · Acute right lower extremity superficial thrombophlebitis noted in the greater saphenous (above knee) and greater saphenous (below knee).  · All other right sided veins appeared normal.          No radiology results for the last 7 days      Estimated Creatinine Clearance: 101.5 mL/min (by C-G formula based on SCr of 0.66 mg/dL).    Assessment/Plan   Assessment/Plan       Active Hospital Problems    Diagnosis  POA   • **Abdominal pain [R10.9]  Yes     Priority: High   • Leukocytosis [D72.829]  Unknown     Priority: High   • Hypokalemia [E87.6]  Yes     Priority: Medium   • CARTER  (nonalcoholic steatohepatitis) [K75.81]  Yes     Priority: Medium   • Anemia [D64.9]  Yes     Priority: Low   • History of DVT (deep vein thrombosis) [Z86.718]  Not Applicable     Priority: Low   • Chronic pain [G89.29]  Yes     Priority: Low   • Postoperative hypothyroidism [E89.0]  Yes     Priority: Low   • Gastroesophageal reflux disease [K21.9]  Yes     Priority: Low   • Thyroid cancer (CMS/HCC) [C73]  Yes     Priority: Low      Resolved Hospital Problems   No resolved problems to display.     Abdominal pain with distention in a patient with a history of Ramon  -Patient presents with worsening abdominal distention and pain, nausea and vomiting  -CT of abdomen pelvis shows increased diffuse peritoneal ascites with moderate to large volume noted overall.  Cavernous transformation of the portal vein suggesting chronic portal thrombosis is noted with no focal hepatic lesions seen.  Stable intra and extrahepatic biliary ductal fullness is noted along with prior cholecystectomy.  Right colonic wall thickening which is noted is nonspecific and may reflect portal colopathy though infectious and inflammatory colitis is noted is not excluded.  An unchanged appearance of ventral hernia containing fluid and nonobstructive bowel is also reported.  -Patient given 2 mg Dilaudid in the ED, will continue home oxycodone for now  -Zofran as needed for nausea  -Clear liquid diet advance as tolerated  -Check troponin  -Continue Xifaxan, Bumex and spironolactone  -Meld score: 6 (without inclusion of INR which is still pending)  -Paracentesis ordered    Leukocytosis  -WBCs: 12.90 with an increased absolute neutrophil count noted on differential but no obvious signs of infection, possibly reactive secondary to recent nausea and vomiting  -Check procalcitonin    Anemia  -Hemoglobin: 11.4 with a normal MCV and MCHC (patient has documented history of iron deficiency anemia as well as previous gastric sleeve  -Check iron profile, B12 and  folate  -Monitor CBC while admitted    Hypokalemia  -Potassium: 2.9, Replaced in ED  -Monitor while admitted, replacement protocol ordered    History of DVT  -Continue Eliquis    History of thyroid cancer status post thyroidectomy  -Check TSH  -Continue levothyroxine    Chronic pain  -Oxycodone (patient inspect shows regular oxycodone prescription until 2/17/2021 when medication changed to hydrocodone 10 mg however documented allergy to hydrocodone in ED.  We will continue previously tolerated oxycodone for now)    GERD  -PPI           VTE Prophylaxis -Eliquis  Mechanical Order History:     None      Pharmalogical Order History:     None          CODE STATUS: Full  There are no questions and answers to display.       I discussed the patient's findings and my recommendations with patient.      Signature:Electronically signed by Mateusz Kingston PA-C, 03/08/21, 12:54 AM EST.    [facility] Hospitalist Team

## 2021-03-08 NOTE — ED NOTES
Patient came to the ED with severe abdominal pain with some nausea and vomiting.  She reports that she hs had a BM today and pain is generalized all over.  Patient reports having a abdominal hernia for the last 2 years.       Gloria Garcia RN  03/07/21 2009

## 2021-03-08 NOTE — PROGRESS NOTES
"      [facility] Medicine Services Daily Progress Note      Hospitalist Team  LOS 0 days      Patient Care Team:  Melissa Iniguez APRN as PCP - General (Family Medicine)    Patient Location: 112/1    Subjective   Subjective     Chief Complaint / Subjective  Chief Complaint   Patient presents with   • Abdominal Pain     Brief Synopsis of Hospital Course/HPI  The patient has a history of Ramon cirrhosis following gastric sleeve bypass.  The patient was initially on the transplant list at  but recovered to a point that she was no longer considered a candidate for liver transplantation.  The patient presents now with an exacerbation of ascites and severe abdominal pain.      Date::      Review of Systems   Constitutional: Positive for malaise/fatigue and weight loss.        Patient did have a gastric sleeve done and suspect that much of the weight loss was related to this procedure.   HENT: Negative.    Eyes: Negative.    Cardiovascular: Negative.    Respiratory: Negative.    Endocrine: Negative.    Hematologic/Lymphatic: Negative.    Skin: Negative.    Musculoskeletal: Negative.    Gastrointestinal: Positive for bloating and abdominal pain.        The patient has abdominal distention which is at times painful.   Genitourinary: Negative.    Neurological: Negative.    Psychiatric/Behavioral: Negative.    Allergic/Immunologic: Negative.    All other systems reviewed and are negative.    Objective   Objective      Vital Signs  Temp:  [97.6 °F (36.4 °C)-98.2 °F (36.8 °C)] 97.6 °F (36.4 °C)  Heart Rate:  [64-90] 75  Resp:  [16-18] 18  BP: (130-150)/(83-96) 150/90  Oxygen Therapy  SpO2: 99 %  Pulse Oximetry Type: Intermittent  Device (Oxygen Therapy): room air  Flowsheet Rows      First Filed Value   Admission Height  160 cm (63\") Documented at 03/07/2021 1952   Admission Weight  61 kg (134 lb 7.7 oz) Documented at 03/07/2021 1952        Intake & Output (last 3 days)       03/05 0701 - 03/06 0700 03/06 " 0701 - 03/07 0700 03/07 0701 - 03/08 0700 03/08 0701 - 03/09 0700    P.O.   240 800    Total Intake(mL/kg)   240 (4) 800 (13.4)    Net   +240 +800            Urine Unmeasured Occurrence   1 x         Lines, Drains & Airways    Active LDAs     Name:   Placement date:   Placement time:   Site:   Days:    Peripheral IV 03/07/21 2059 Left Hand   03/07/21 2059    Hand   less than 1              Physical Exam:  Physical Exam  Vitals and nursing note reviewed.   Constitutional:       General: She is not in acute distress.     Appearance: Normal appearance. She is well-developed. She is not ill-appearing, toxic-appearing or diaphoretic.   HENT:      Head: Normocephalic and atraumatic.      Right Ear: Ear canal and external ear normal.      Left Ear: Ear canal and external ear normal.      Nose: Nose normal. No congestion or rhinorrhea.      Mouth/Throat:      Mouth: Mucous membranes are moist.      Pharynx: No oropharyngeal exudate.   Eyes:      General: No scleral icterus.        Right eye: No discharge.         Left eye: No discharge.      Extraocular Movements: Extraocular movements intact.      Conjunctiva/sclera: Conjunctivae normal.      Pupils: Pupils are equal, round, and reactive to light.   Neck:      Thyroid: No thyromegaly.      Vascular: No carotid bruit or JVD.      Trachea: No tracheal deviation.   Cardiovascular:      Rate and Rhythm: Normal rate and regular rhythm.      Pulses: Normal pulses.      Heart sounds: Normal heart sounds. No murmur. No friction rub. No gallop.    Pulmonary:      Effort: Pulmonary effort is normal. No respiratory distress.      Breath sounds: Normal breath sounds. No stridor. No wheezing, rhonchi or rales.   Chest:      Chest wall: No tenderness.   Abdominal:      General: Bowel sounds are normal. There is no distension.      Palpations: There is no mass.      Tenderness: There is no abdominal tenderness. There is no guarding or rebound.      Hernia: No hernia is present.       Comments: The patient's abdomen is distended.  There are several ventral hernias which are easily reducible.  The patient did not complain of abdominal pain when I was reducing them.  The patient has no palpable organomegaly or masses but the examination is obscured due to the patient's ascites.  She did not have any guarding or rebound tenderness noted.   Musculoskeletal:         General: No swelling, tenderness, deformity or signs of injury. Normal range of motion.      Cervical back: Normal range of motion and neck supple. No rigidity. No muscular tenderness.      Right lower leg: No edema.      Left lower leg: No edema.   Lymphadenopathy:      Cervical: No cervical adenopathy.   Skin:     General: Skin is warm and dry.      Coloration: Skin is not jaundiced or pale.      Findings: No bruising, erythema or rash.   Neurological:      General: No focal deficit present.      Mental Status: She is alert and oriented to person, place, and time. Mental status is at baseline.      Cranial Nerves: No cranial nerve deficit.      Sensory: No sensory deficit.      Motor: No weakness or abnormal muscle tone.      Coordination: Coordination normal.   Psychiatric:         Mood and Affect: Mood normal.         Behavior: Behavior normal.         Thought Content: Thought content normal.         Judgment: Judgment normal.       Procedures:  Paracentesis    Results Review:     I reviewed the patient's new clinical results.    Lab Results (last 24 hours)     Procedure Component Value Units Date/Time    COVID PRE-OP / PRE-PROCEDURE SCREENING ORDER (NO ISOLATION) - Swab, Nasopharynx [[id number]]  (Normal) Collected: 03/08/21 0107    Specimen: Swab from Nasopharynx Updated: 03/08/21 1432    Narrative:      The following orders were created for panel order COVID PRE-OP / PRE-PROCEDURE SCREENING ORDER (NO ISOLATION) - Swab, Nasopharynx.  Procedure                               Abnormality         Status                     ---------                                -----------         ------                     COVID-19,APTIMA PANTHER,...[[id number]]  Normal              Final result                 Please view results for these tests on the individual orders.    COVID-19,APTIMA PANTHER,[facility], NP/OP SWAB IN UTM/VTM/SALINE TRANSPORT MEDIA,24 HR TAT - Swab, Nasopharynx [[id number]]  (Normal) Collected: 03/08/21 0107    Specimen: Swab from Nasopharynx Updated: 03/08/21 1432     COVID19 Not Detected    Narrative:      Fact sheet for providers: https://www.fda.gov/media/967942/download     Fact sheet for patients: https://www.fda.gov/media/337509/download    Test performed by RT PCR.    Folate [[id number]]  (Normal) Collected: 03/08/21 0259    Specimen: Blood Updated: 03/08/21 1055     Folate 10.80 ng/mL     Narrative:      Results may be falsely increased if patient taking Biotin.      Vitamin B12 [[id number]]  (Normal) Collected: 03/08/21 0259    Specimen: Blood Updated: 03/08/21 1055     Vitamin B-12 764 pg/mL     Narrative:      Results may be falsely increased if patient taking Biotin.      Comprehensive Metabolic Panel [[id number]]  (Abnormal) Collected: 03/08/21 0259    Specimen: Blood Updated: 03/08/21 0358     Glucose 269 mg/dL      BUN 11 mg/dL      Creatinine 0.75 mg/dL      Sodium 134 mmol/L      Potassium 3.5 mmol/L      Chloride 98 mmol/L      CO2 25.0 mmol/L      Calcium 8.0 mg/dL      Total Protein 6.3 g/dL      Albumin 2.60 g/dL      ALT (SGPT) 8 U/L      AST (SGOT) 31 U/L      Alkaline Phosphatase 137 U/L      Total Bilirubin 0.5 mg/dL      eGFR Non African Amer 83 mL/min/1.73      Globulin 3.7 gm/dL      A/G Ratio 0.7 g/dL      BUN/Creatinine Ratio 14.7     Anion Gap 11.0 mmol/L     Narrative:      GFR Normal >60  Chronic Kidney Disease <60  Kidney Failure <15      Troponin [[id number]]  (Normal) Collected: 03/08/21 0259    Specimen: Blood Updated: 03/08/21 0358     Troponin T <0.010 ng/mL     Narrative:      Troponin T Reference  Range:  <= 0.03 ng/mL-   Negative for AMI  >0.03 ng/mL-     Abnormal for myocardial necrosis.  Clinicians would have to utilize clinical acumen, EKG, Troponin and serial changes to determine if it is an Acute Myocardial Infarction or myocardial injury due to an underlying chronic condition.       Results may be falsely decreased if patient taking Biotin.      Magnesium [[id number]]  (Normal) Collected: 03/08/21 0259    Specimen: Blood Updated: 03/08/21 0358     Magnesium 1.9 mg/dL     Iron Profile [[id number]]  (Abnormal) Collected: 03/08/21 0259    Specimen: Blood Updated: 03/08/21 0358     Iron 20 mcg/dL      Iron Saturation 6 %      Transferrin 239 mg/dL      TIBC 356 mcg/dL     aPTT [[id number]]  (Normal) Collected: 03/08/21 0259    Specimen: Blood Updated: 03/08/21 0340     PTT 27.9 seconds     Protime-INR [[id number]]  (Abnormal) Collected: 03/08/21 0259    Specimen: Blood Updated: 03/08/21 0340     Protime 12.2 Seconds      INR 1.11    CBC & Differential [[id number]]  (Abnormal) Collected: 03/08/21 0259    Specimen: Blood Updated: 03/08/21 0330    Narrative:      The following orders were created for panel order CBC & Differential.  Procedure                               Abnormality         Status                     ---------                               -----------         ------                     CBC Auto Differential[[id number]]        Abnormal            Final result                 Please view results for these tests on the individual orders.    CBC Auto Differential [[id number]]  (Abnormal) Collected: 03/08/21 0259    Specimen: Blood Updated: 03/08/21 0330     WBC 11.00 10*3/mm3      RBC 3.72 10*6/mm3      Hemoglobin 10.6 g/dL      Hematocrit 32.3 %      MCV 86.9 fL      MCH 28.4 pg      MCHC 32.7 g/dL      RDW 18.2 %      RDW-SD 54.7 fl      MPV 9.2 fL      Platelets 732 10*3/mm3      Neutrophil % 86.5 %      Lymphocyte % 12.1 %      Monocyte % 1.3 %      Eosinophil % 0.0 %      Basophil % 0.1 %       Neutrophils, Absolute 9.50 10*3/mm3      Lymphocytes, Absolute 1.30 10*3/mm3      Monocytes, Absolute 0.10 10*3/mm3      Eosinophils, Absolute 0.00 10*3/mm3      Basophils, Absolute 0.00 10*3/mm3      nRBC 0.0 /100 WBC     TSH [[id number]]  (Abnormal) Collected: 03/07/21 2055    Specimen: Blood Updated: 03/08/21 0153     TSH 0.085 uIU/mL     Troponin [[id number]]  (Normal) Collected: 03/07/21 2055    Specimen: Blood Updated: 03/08/21 0128     Troponin T <0.010 ng/mL     Narrative:      Troponin T Reference Range:  <= 0.03 ng/mL-   Negative for AMI  >0.03 ng/mL-     Abnormal for myocardial necrosis.  Clinicians would have to utilize clinical acumen, EKG, Troponin and serial changes to determine if it is an Acute Myocardial Infarction or myocardial injury due to an underlying chronic condition.       Results may be falsely decreased if patient taking Biotin.      Comprehensive Metabolic Panel [[id number]]  (Abnormal) Collected: 03/07/21 2055    Specimen: Blood Updated: 03/07/21 2119     Glucose 95 mg/dL      BUN 12 mg/dL      Creatinine 0.66 mg/dL      Sodium 137 mmol/L      Potassium 2.9 mmol/L      Chloride 100 mmol/L      CO2 28.0 mmol/L      Calcium 8.0 mg/dL      Total Protein 6.6 g/dL      Albumin 3.00 g/dL      ALT (SGPT) 9 U/L      AST (SGOT) 19 U/L      Alkaline Phosphatase 146 U/L      Total Bilirubin 0.5 mg/dL      eGFR Non African Amer 96 mL/min/1.73      Globulin 3.6 gm/dL      A/G Ratio 0.8 g/dL      BUN/Creatinine Ratio 18.2     Anion Gap 9.0 mmol/L     Narrative:      GFR Normal >60  Chronic Kidney Disease <60  Kidney Failure <15      Lipase [[id number]]  (Normal) Collected: 03/07/21 2055    Specimen: Blood Updated: 03/07/21 2119     Lipase 13 U/L     CBC & Differential [[id number]]  (Abnormal) Collected: 03/07/21 2055    Specimen: Blood Updated: 03/07/21 2101    Narrative:      The following orders were created for panel order CBC & Differential.  Procedure                               Abnormality          Status                     ---------                               -----------         ------                     CBC Auto Differential[[id number]]        Abnormal            Final result                 Please view results for these tests on the individual orders.    CBC Auto Differential [[id number]]  (Abnormal) Collected: 03/07/21 2055    Specimen: Blood Updated: 03/07/21 2101     WBC 12.90 10*3/mm3      RBC 3.94 10*6/mm3      Hemoglobin 11.4 g/dL      Hematocrit 33.7 %      MCV 85.5 fL      MCH 28.9 pg      MCHC 33.8 g/dL      RDW 17.7 %      RDW-SD 52.9 fl      MPV 8.3 fL      Platelets 744 10*3/mm3      Neutrophil % 53.7 %      Lymphocyte % 35.7 %      Monocyte % 6.6 %      Eosinophil % 3.2 %      Basophil % 0.8 %      Neutrophils, Absolute 7.00 10*3/mm3      Lymphocytes, Absolute 4.60 10*3/mm3      Monocytes, Absolute 0.90 10*3/mm3      Eosinophils, Absolute 0.40 10*3/mm3      Basophils, Absolute 0.10 10*3/mm3      nRBC 0.1 /100 WBC         No results found for: HGBA1C  Results from last 7 days   Lab Units 03/08/21  0259   INR  1.11*           Lab Results   Component Value Date    LIPASE 13 03/07/2021     Lab Results   Component Value Date    CHLPL 197 05/12/2015    TRIG 525 FLUID NO NORMAL VALUE ASCITES FLUID (H) 11/29/2017    HDL 46 05/12/2015     (H) 05/12/2015       Lab Results   Lab Value Date/Time    FINALDX  01/13/2021 1420     Specimen #1 (Stomach, biopsy):    Clinically polypoid gastric mucosa with reactive gastropathy and lamina propria congestion    No evidence of increased inflammation or H. pylori organisms     Specimen #2 (Stomach, body, biopsy):    Reactive gastropathy    No evidence of increased inflammation or H. pylori organisms     JPR/tkd       COMDX  01/13/2021 1420     Multiple deeper levels have been examined on specimen #1.    JPR/tkd          Microbiology Results (last 10 days)     Procedure Component Value - Date/Time    COVID PRE-OP / PRE-PROCEDURE SCREENING ORDER (NO  ISOLATION) - Swab, Nasopharynx [[id number]]  (Normal) Collected: 03/08/21 0107    Lab Status: Final result Specimen: Swab from Nasopharynx Updated: 03/08/21 1432    Narrative:      The following orders were created for panel order COVID PRE-OP / PRE-PROCEDURE SCREENING ORDER (NO ISOLATION) - Swab, Nasopharynx.  Procedure                               Abnormality         Status                     ---------                               -----------         ------                     COVID-19,APTIMA PANTHER,...[[id number]]  Normal              Final result                 Please view results for these tests on the individual orders.    COVID-19,APTIMA PANTHER,[facility], NP/OP SWAB IN UTM/VTM/SALINE TRANSPORT MEDIA,24 HR TAT - Swab, Nasopharynx [[id number]]  (Normal) Collected: 03/08/21 0107    Lab Status: Final result Specimen: Swab from Nasopharynx Updated: 03/08/21 1432     COVID19 Not Detected    Narrative:      Fact sheet for providers: https://www.fda.gov/media/662534/download     Fact sheet for patients: https://www.fda.gov/media/359906/download    Test performed by RT PCR.          ECG/EMG Results (most recent)     Procedure Component Value Units Date/Time    ECG 12 Lead [[id number]] Collected: 03/07/21 2000     Updated: 03/07/21 2002     QT Interval 426 ms     Narrative:      HEART RATE= 83  bpm  RR Interval= 728  ms  MN Interval= 151  ms  P Horizontal Axis= -2  deg  P Front Axis= 20  deg  QRSD Interval= 103  ms  QT Interval= 426  ms  QRS Axis= 40  deg  T Wave Axis= -3  deg  - ABNORMAL ECG -  Sinus rhythm  Nonspecific T abnormalities, anterior leads  Electronically Signed By:   Date and Time of Study: 2021-03-07 20:00:11          Results for orders placed during the hospital encounter of 08/23/19    Duplex Venous Lower Extremity - Right CAR    Interpretation Summary  · Acute right lower extremity superficial thrombophlebitis noted in the greater saphenous (above knee) and greater saphenous (below knee).  · All  other right sided veins appeared normal.          CT Abdomen Pelvis With Contrast    Result Date: 3/7/2021  Impression: 1. Increased diffuse peritoneal ascites fluid, moderate to large volume overall. 2. 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 No focal hepatic lesion is seen. Stable intra and extrahepatic biliary ductal fullness is nonspecific given prior cholecystectomy. 3. Right colonic wall thickening is nonspecific. This may reflect portal colopathy in the setting of portal hypertension, but infectious or inflammatory colitis are not excluded. 4. Unchanged appearance of ventral hernia containing fluid and nonobstructive bowel. Electronically signed by:  Josué Santillan M.D.  3/7/2021 9:12 PM          Xrays, labs reviewed personally by physician.    Medication Review:   I have reviewed the patient's current medication list      Scheduled Meds  albumin human, 37.5 g, Intravenous, Once   Or  albumin human, 50 g, Intravenous, Once   Or  albumin human, 62.5 g, Intravenous, Once   Or  albumin human, 75 g, Intravenous, Once   Or  albumin human, 87.5 g, Intravenous, Once   Or  albumin human, 100 g, Intravenous, Once   Or  albumin human, 112.5 g, Intravenous, Once  apixaban, 5 mg, Oral, Q12H  bumetanide, 2 mg, Oral, Daily  levothyroxine, 137 mcg, Oral, Daily  riFAXIMin, 550 mg, Oral, Q12H  sodium chloride, 10 mL, Intravenous, Q12H  spironolactone, 300 mg, Oral, Daily        Meds Infusions       Meds PRN  •  acetaminophen **OR** acetaminophen **OR** acetaminophen  •  Calcium Gluconate-NaCl **AND** calcium gluconate **AND** Calcium, Ionized  •  dicyclomine  •  HYDROmorphone  •  magnesium sulfate **OR** magnesium sulfate **OR** magnesium sulfate  •  melatonin  •  nitroglycerin  •  ondansetron **OR** ondansetron  •  oxyCODONE  •  potassium & sodium phosphates **OR** potassium & sodium phosphates  •  potassium chloride  •  potassium chloride  •  [COMPLETED] Insert peripheral IV **AND**  sodium chloride  •  sodium chloride        Assessment/Plan   Assessment/Plan     Active Hospital Problems    Diagnosis  POA   • **Abdominal pain [R10.9]  Yes   • CARTER (nonalcoholic steatohepatitis) [K75.81]  Yes     Priority: High   • Thyroid cancer (CMS/HCC) [C73]  Yes     Priority: High   • Hypokalemia [E87.6]  Yes   • Anemia [D64.9]  Yes   • Leukocytosis [D72.829]  Unknown   • History of DVT (deep vein thrombosis) [Z86.718]  Not Applicable   • Chronic pain [G89.29]  Yes   • Postoperative hypothyroidism [E89.0]  Yes   • Gastroesophageal reflux disease [K21.9]  Yes      Resolved Hospital Problems   No resolved problems to display.       MEDICAL DECISION MAKING COMPLEXITY BY PROBLEM:   1.  Carter cirrhosis  -The patient has been getting paracentesis approximately every 2 weeks.    -The patient has several ventral hernias from her gastric sleeve surgery.  -The patient has diffuse tenderness throughout her abdomen  -We will asked Dr. Peña need to do a paracentesis today and send the fluid for cell count and differential as well as for culture and sensitivity.       -The patient will be empirically placed on Rocephin after the paracentesis    2.  Abdominal pain  -The patient does have several ventral hernias but none of them are incarcerated and they are easily reducible  -Differential diagnosis of the patient's abdominal pain would be the parasitic fluid that is present in the abdomen and increasing size.  Also in the differential would be spontaneous bacterial peritonitis.       -Paracentesis sending the fluid for culture and cell count with differential       -Following paracentesis start the patient on IV Rocephin    3.  History of DVT  -Patient is on apixaban    4.  Leukocytosis  -Likely related to the abdominal pain.  -  VTE Prophylaxis -   Mechanical Order History:     None      Pharmalogical Order History:      Ordered     Dose Route Frequency Stop    03/08/21 0120  apixaban (ELIQUIS) tablet 5 mg      5 mg  PO Every 12 Hours Scheduled --              Code Status -   Code Status and Medical Interventions:   Ordered at: 03/08/21 0006     Code Status:    CPR     Medical Interventions (Level of Support Prior to Arrest):    Full     This patient has been examined wearing appropriate Personal Protective Equipment and discussed with hospital infection control department. 03/08/21    Discharge Planning  Likely home    Electronically signed by Jing Brannon MD, 03/08/21, 14:40 EST.  [facility] Hospitalist Team

## 2021-03-08 NOTE — PROGRESS NOTES
Discharge Planning Assessment   [facility]     Patient Name: Lucinda Cope  MRN: [id number]  Today's Date: 3/8/2021    Admit Date: 3/7/2021    Discharge Needs Assessment     Row Name 03/08/21 1540       Living Environment    Lives With  child(arely), adult    Current Living Arrangements  home/apartment/condo    Primary Care Provided by  self    Provides Primary Care For  no one    Quality of Family Relationships  unable to assess    Able to Return to Prior Arrangements  yes       Resource/Environmental Concerns    Resource/Environmental Concerns  none    Transportation Concerns  car, none       Transition Planning    Patient/Family Anticipates Transition to  home with family    Patient/Family Anticipated Services at Transition  none    Transportation Anticipated  family or friend will provide;car, drives self       Discharge Needs Assessment    Readmission Within the Last 30 Days  no previous admission in last 30 days    Equipment Currently Used at Home  none    Concerns to be Addressed  no discharge needs identified;denies needs/concerns at this time    Anticipated Changes Related to Illness  none    Equipment Needed After Discharge  none    Provided Post Acute Provider List?  Refused        Discharge Plan     Row Name 03/08/21 0972       Plan    Plan  DC Plan: Anticipate routine home, denies needs at this time.    Patient/Family in Agreement with Plan  yes    Plan Comments  CM met with patient at bedside to discuss dc planning. Patient reports she lives at home with her kids, does not use DME, declined need for DME/HH services, reports no trouble affording medications at this time, PCP confirmed- Melissa Iniguez, preferred pharmacy confirmed- [facility] in [place]. DC barriers: Scheduled to have a paracentesis performed today.     Demographic Summary     Row Name 03/08/21 1540       General Information    Admission Type  observation    Reason for Consult  discharge planning    Preferred Language  English      Used During This Interaction  no       Contact Information    Permission Granted to Share Info With          Functional Status     Row Name 03/08/21 1540       Functional Status    Usual Activity Tolerance  good    Current Activity Tolerance  good       Functional Status, IADL    Medications  independent    Meal Preparation  independent    Housekeeping  independent    Laundry  independent    Shopping  independent        Met with patient in room wearing PPE: mask/goggles.      Maintained distance greater than six feet and spent less than 15 minutes in the room.      Lindsey Nesbitt RN     Office Phone: [phone]  Office Cell: [phone]

## 2021-03-09 ENCOUNTER — ON CAMPUS - OUTPATIENT (AMBULATORY)
Dept: URBAN - METROPOLITAN AREA HOSPITAL 85 | Facility: HOSPITAL | Age: 46
End: 2021-03-09
Payer: COMMERCIAL

## 2021-03-09 VITALS
HEIGHT: 63 IN | BODY MASS INDEX: 23.4 KG/M2 | TEMPERATURE: 98.3 F | WEIGHT: 132.06 LBS | OXYGEN SATURATION: 98 % | SYSTOLIC BLOOD PRESSURE: 113 MMHG | HEART RATE: 95 BPM | DIASTOLIC BLOOD PRESSURE: 73 MMHG | RESPIRATION RATE: 16 BRPM

## 2021-03-09 DIAGNOSIS — K74.60 UNSPECIFIED CIRRHOSIS OF LIVER: ICD-10-CM

## 2021-03-09 DIAGNOSIS — R11.2 NAUSEA WITH VOMITING, UNSPECIFIED: ICD-10-CM

## 2021-03-09 DIAGNOSIS — R18.8 OTHER ASCITES: ICD-10-CM

## 2021-03-09 DIAGNOSIS — K75.81 NONALCOHOLIC STEATOHEPATITIS (NASH): ICD-10-CM

## 2021-03-09 DIAGNOSIS — R10.84 GENERALIZED ABDOMINAL PAIN: ICD-10-CM

## 2021-03-09 DIAGNOSIS — D72.829 ELEVATED WHITE BLOOD CELL COUNT, UNSPECIFIED: ICD-10-CM

## 2021-03-09 LAB
ALBUMIN SERPL-MCNC: 2.6 G/DL (ref 3.5–5.2)
ALBUMIN/GLOB SERPL: 0.7 G/DL
ALP SERPL-CCNC: 126 U/L (ref 39–117)
ALT SERPL W P-5'-P-CCNC: 7 U/L (ref 1–33)
ANION GAP SERPL CALCULATED.3IONS-SCNC: 7 MMOL/L (ref 5–15)
AST SERPL-CCNC: 15 U/L (ref 1–32)
BASOPHILS # BLD AUTO: 0.1 10*3/MM3 (ref 0–0.2)
BASOPHILS NFR BLD AUTO: 0.6 % (ref 0–1.5)
BILIRUB SERPL-MCNC: 0.4 MG/DL (ref 0–1.2)
BUN SERPL-MCNC: 10 MG/DL (ref 6–20)
BUN/CREAT SERPL: 11.6 (ref 7–25)
CALCIUM SPEC-SCNC: 7.9 MG/DL (ref 8.6–10.5)
CHLORIDE SERPL-SCNC: 98 MMOL/L (ref 98–107)
CO2 SERPL-SCNC: 29 MMOL/L (ref 22–29)
CREAT SERPL-MCNC: 0.86 MG/DL (ref 0.57–1)
DEPRECATED RDW RBC AUTO: 54.3 FL (ref 37–54)
EOSINOPHIL # BLD AUTO: 0.5 10*3/MM3 (ref 0–0.4)
EOSINOPHIL NFR BLD AUTO: 2.9 % (ref 0.3–6.2)
ERYTHROCYTE [DISTWIDTH] IN BLOOD BY AUTOMATED COUNT: 17.7 % (ref 12.3–15.4)
GFR SERPL CREATININE-BSD FRML MDRD: 71 ML/MIN/1.73
GLOBULIN UR ELPH-MCNC: 3.5 GM/DL
GLUCOSE SERPL-MCNC: 108 MG/DL (ref 65–99)
HCT VFR BLD AUTO: 29 % (ref 34–46.6)
HGB BLD-MCNC: 9.8 G/DL (ref 12–15.9)
INR PPP: 1.13 (ref 0.93–1.1)
LYMPHOCYTES # BLD AUTO: 5.2 10*3/MM3 (ref 0.7–3.1)
LYMPHOCYTES NFR BLD AUTO: 30.7 % (ref 19.6–45.3)
MAGNESIUM SERPL-MCNC: 1.3 MG/DL (ref 1.6–2.6)
MCH RBC QN AUTO: 29.4 PG (ref 26.6–33)
MCHC RBC AUTO-ENTMCNC: 33.9 G/DL (ref 31.5–35.7)
MCV RBC AUTO: 86.7 FL (ref 79–97)
MONOCYTES # BLD AUTO: 1.3 10*3/MM3 (ref 0.1–0.9)
MONOCYTES NFR BLD AUTO: 7.6 % (ref 5–12)
NEUTROPHILS NFR BLD AUTO: 58.2 % (ref 42.7–76)
NEUTROPHILS NFR BLD AUTO: 9.8 10*3/MM3 (ref 1.7–7)
NRBC BLD AUTO-RTO: 0.1 /100 WBC (ref 0–0.2)
PLATELET # BLD AUTO: 683 10*3/MM3 (ref 140–450)
PMV BLD AUTO: 8.7 FL (ref 6–12)
POTASSIUM SERPL-SCNC: 3.5 MMOL/L (ref 3.5–5.2)
POTASSIUM SERPL-SCNC: 4.1 MMOL/L (ref 3.5–5.2)
PROT SERPL-MCNC: 6.1 G/DL (ref 6–8.5)
PROTHROMBIN TIME: 12.4 SECONDS (ref 9.6–11.7)
QT INTERVAL: 426 MS
RBC # BLD AUTO: 3.35 10*6/MM3 (ref 3.77–5.28)
SODIUM SERPL-SCNC: 134 MMOL/L (ref 136–145)
WBC # BLD AUTO: 16.9 10*3/MM3 (ref 3.4–10.8)

## 2021-03-09 PROCEDURE — 84132 ASSAY OF SERUM POTASSIUM: CPT | Performed by: INTERNAL MEDICINE

## 2021-03-09 PROCEDURE — 85025 COMPLETE CBC W/AUTO DIFF WBC: CPT | Performed by: PHYSICIAN ASSISTANT

## 2021-03-09 PROCEDURE — 85610 PROTHROMBIN TIME: CPT | Performed by: NURSE PRACTITIONER

## 2021-03-09 PROCEDURE — 25010000002 CEFTRIAXONE PER 250 MG: Performed by: INTERNAL MEDICINE

## 2021-03-09 PROCEDURE — 25010000002 HYDROMORPHONE PER 4 MG: Performed by: INTERNAL MEDICINE

## 2021-03-09 PROCEDURE — 96376 TX/PRO/DX INJ SAME DRUG ADON: CPT

## 2021-03-09 PROCEDURE — 99212 OFFICE O/P EST SF 10 MIN: CPT | Performed by: NURSE PRACTITIONER

## 2021-03-09 PROCEDURE — 99217 PR OBSERVATION CARE DISCHARGE MANAGEMENT: CPT | Performed by: INTERNAL MEDICINE

## 2021-03-09 PROCEDURE — 25010000002 ONDANSETRON PER 1 MG: Performed by: PHYSICIAN ASSISTANT

## 2021-03-09 PROCEDURE — 80053 COMPREHEN METABOLIC PANEL: CPT | Performed by: PHYSICIAN ASSISTANT

## 2021-03-09 PROCEDURE — G0378 HOSPITAL OBSERVATION PER HR: HCPCS

## 2021-03-09 PROCEDURE — 83735 ASSAY OF MAGNESIUM: CPT | Performed by: PHYSICIAN ASSISTANT

## 2021-03-09 PROCEDURE — 25010000002 MAGNESIUM SULFATE 2 GM/50ML SOLUTION: Performed by: PHYSICIAN ASSISTANT

## 2021-03-09 PROCEDURE — 96366 THER/PROPH/DIAG IV INF ADDON: CPT

## 2021-03-09 RX ORDER — CEFDINIR 300 MG/1
300 CAPSULE ORAL 2 TIMES DAILY
Qty: 12 CAPSULE | Refills: 0 | Status: SHIPPED | OUTPATIENT
Start: 2021-03-09 | End: 2021-03-15

## 2021-03-09 RX ORDER — SUCRALFATE 1 G/1
1 TABLET ORAL
Qty: 120 TABLET | Refills: 0 | Status: SHIPPED | OUTPATIENT
Start: 2021-03-09

## 2021-03-09 RX ORDER — PANTOPRAZOLE SODIUM 40 MG/1
40 TABLET, DELAYED RELEASE ORAL
Qty: 60 TABLET | Refills: 0 | Status: SHIPPED | OUTPATIENT
Start: 2021-03-10

## 2021-03-09 RX ADMIN — SUCRALFATE 1 G: 1 TABLET ORAL at 16:43

## 2021-03-09 RX ADMIN — BUMETANIDE 2 MG: 1 TABLET ORAL at 11:23

## 2021-03-09 RX ADMIN — DICYCLOMINE HYDROCHLORIDE 10 MG: 10 CAPSULE ORAL at 16:43

## 2021-03-09 RX ADMIN — RIFAXIMIN 550 MG: 550 TABLET ORAL at 08:32

## 2021-03-09 RX ADMIN — LEVOTHYROXINE SODIUM 137 MCG: 0.03 TABLET ORAL at 08:31

## 2021-03-09 RX ADMIN — OXYCODONE 10 MG: 5 TABLET ORAL at 16:43

## 2021-03-09 RX ADMIN — CEFTRIAXONE SODIUM 1 G: 1 INJECTION, POWDER, FOR SOLUTION INTRAMUSCULAR; INTRAVENOUS at 16:56

## 2021-03-09 RX ADMIN — SUCRALFATE 1 G: 1 TABLET ORAL at 08:32

## 2021-03-09 RX ADMIN — MAGNESIUM SULFATE HEPTAHYDRATE 2 G: 40 INJECTION, SOLUTION INTRAVENOUS at 08:33

## 2021-03-09 RX ADMIN — OXYCODONE 10 MG: 5 TABLET ORAL at 05:44

## 2021-03-09 RX ADMIN — OXYCODONE 10 MG: 5 TABLET ORAL at 01:49

## 2021-03-09 RX ADMIN — PANTOPRAZOLE SODIUM 40 MG: 40 TABLET, DELAYED RELEASE ORAL at 08:31

## 2021-03-09 RX ADMIN — Medication 10 ML: at 08:32

## 2021-03-09 RX ADMIN — DICYCLOMINE HYDROCHLORIDE 10 MG: 10 CAPSULE ORAL at 08:31

## 2021-03-09 RX ADMIN — POTASSIUM CHLORIDE 40 MEQ: 1500 TABLET, EXTENDED RELEASE ORAL at 08:31

## 2021-03-09 RX ADMIN — SPIRONOLACTONE 300 MG: 100 TABLET ORAL at 11:22

## 2021-03-09 RX ADMIN — PANTOPRAZOLE SODIUM 40 MG: 40 TABLET, DELAYED RELEASE ORAL at 16:43

## 2021-03-09 RX ADMIN — MAGNESIUM SULFATE HEPTAHYDRATE 2 G: 40 INJECTION, SOLUTION INTRAVENOUS at 05:44

## 2021-03-09 RX ADMIN — SUCRALFATE 1 G: 1 TABLET ORAL at 13:41

## 2021-03-09 RX ADMIN — POTASSIUM CHLORIDE 40 MEQ: 1500 TABLET, EXTENDED RELEASE ORAL at 13:41

## 2021-03-09 RX ADMIN — APIXABAN 5 MG: 5 TABLET, FILM COATED ORAL at 11:22

## 2021-03-09 RX ADMIN — ONDANSETRON 4 MG: 2 INJECTION INTRAMUSCULAR; INTRAVENOUS at 13:41

## 2021-03-09 RX ADMIN — MAGNESIUM SULFATE HEPTAHYDRATE 2 G: 40 INJECTION, SOLUTION INTRAVENOUS at 11:22

## 2021-03-09 RX ADMIN — HYDROMORPHONE HYDROCHLORIDE 1 MG: 2 INJECTION, SOLUTION INTRAMUSCULAR; INTRAVENOUS; SUBCUTANEOUS at 08:32

## 2021-03-09 RX ADMIN — OXYCODONE 10 MG: 5 TABLET ORAL at 11:22

## 2021-03-09 NOTE — PROGRESS NOTES
" LOS: 0 days   Patient Care Team:  Melissa Iniguez APRN as PCP - General (Family Medicine)      Subjective     Interval History:     Subjective: Patient reports that she is feeling some better today.  States that she is \"starving\" and would like to advance diet.  She denies any difficulty overnight with liquids.  Continues to have some mild generalized abdominal pain.  Paracentesis planned for this morning, but bedside RN reports that no fluid was able to be drawn off of the patient.      ROS:   No chest pain, shortness of breath, or cough.        Medication Review:     Current Facility-Administered Medications:   •  acetaminophen (TYLENOL) tablet 650 mg, 650 mg, Oral, Q4H PRN **OR** acetaminophen (TYLENOL) 160 MG/5ML solution 650 mg, 650 mg, Oral, Q4H PRN **OR** acetaminophen (TYLENOL) suppository 650 mg, 650 mg, Rectal, Q4H PRN, Mateusz Kingston PA-C  •  albumin human 25 % IV SOLN 37.5 g, 37.5 g, Intravenous, Once **OR** albumin human 25 % IV SOLN 50 g, 50 g, Intravenous, Once **OR** albumin human 25 % IV SOLN 62.5 g, 62.5 g, Intravenous, Once **OR** albumin human 25 % IV SOLN 75 g, 75 g, Intravenous, Once **OR** albumin human 25 % IV SOLN 87.5 g, 87.5 g, Intravenous, Once **OR** albumin human 25 % IV SOLN 100 g, 100 g, Intravenous, Once **OR** albumin human 25 % IV SOLN 112.5 g, 112.5 g, Intravenous, Once, Mateusz Kingston PA-C  •  apixaban (ELIQUIS) tablet 5 mg, 5 mg, Oral, Q12H, Mateusz Kingston PA-C  •  bumetanide (BUMEX) tablet 2 mg, 2 mg, Oral, Daily, Mateusz Kingston PA-C, 2 mg at 03/08/21 1802  •  calcium gluconate 1g/50ml 0.675% NaCl IV SOLN, 1 g, Intravenous, PRN **AND** calcium gluconate 2-0.675 GM/100ML NACL IVPB, 2 g, Intravenous, PRN **AND** Calcium, Ionized, , , PRN, Mateusz Kingston PA-C  •  cefTRIAXone (ROCEPHIN) 1 g in sodium chloride 0.9 % 100 mL IVPB, 1 g, Intravenous, Q24H, Jing Brannon MD, Stopped at 03/08/21 2100  •  dicyclomine (BENTYL) capsule 10 mg, 10 mg, Oral, " 4x Daily AC & at Bedtime PRN, Mateusz Kingston PA-C, 10 mg at 03/09/21 0831  •  HYDROmorphone (DILAUDID) injection 1 mg, 1 mg, Intravenous, Q2H PRN, Jing Brannon MD, 1 mg at 03/09/21 0832  •  levothyroxine (SYNTHROID, LEVOTHROID) tablet 137 mcg, 137 mcg, Oral, Daily, Mateusz Kingston PA-C, 137 mcg at 03/09/21 0831  •  Magnesium Sulfate 2 gram Bolus, followed by 8 gram infusion (total Mg dose 10 grams)- Mg less than or equal to 1mg/dL, 2 g, Intravenous, PRN **OR** Magnesium Sulfate 2 gram / 50mL Infusion (GIVE X 3 BAGS TO EQUAL 6GM TOTAL DOSE) - Mg 1.1 - 1.5 mg/dl, 2 g, Intravenous, PRN, Stopped at 03/09/21 1030 **OR** Magnesium Sulfate 4 gram infusion- Mg 1.6-1.9 mg/dL, 4 g, Intravenous, PRN, Mateusz Kingston PA-C  •  melatonin tablet 5 mg, 5 mg, Oral, Nightly PRN, Mateusz Kingston PA-C  •  nitroglycerin (NITROSTAT) SL tablet 0.4 mg, 0.4 mg, Sublingual, Q5 Min PRN, Mateusz Kingston PA-C  •  ondansetron (ZOFRAN) tablet 4 mg, 4 mg, Oral, Q6H PRN **OR** ondansetron (ZOFRAN) injection 4 mg, 4 mg, Intravenous, Q6H PRN, Mateusz Kingston PA-C, 4 mg at 03/08/21 2023  •  oxyCODONE (ROXICODONE) immediate release tablet 10 mg, 10 mg, Oral, Q4H PRN, Mateusz Kingston PA-C, 10 mg at 03/09/21 0544  •  pantoprazole (PROTONIX) EC tablet 40 mg, 40 mg, Oral, BID AC, Leatha Buckley, APRN, 40 mg at 03/09/21 0831  •  potassium & sodium phosphates (PHOS-NAK) 280-160-250 MG packet - for Phosphorus less than 1.25 mg/dL, 2 packet, Oral, Q6H PRN **OR** potassium & sodium phosphates (PHOS-NAK) 280-160-250 MG packet - for Phosphorus 1.25 - 2.5 mg/dL, 2 packet, Oral, Q6H PRN, Mateusz Kingston PA-C  •  potassium chloride (K-DUR,KLOR-CON) CR tablet 40 mEq, 40 mEq, Oral, PRN, Mateusz Kingston PA-C, 40 mEq at 03/09/21 0831  •  potassium chloride (KLOR-CON) packet 40 mEq, 40 mEq, Oral, PRN, Mateusz Kingston PA-C  •  riFAXIMin (XIFAXAN) tablet 550 mg, 550 mg, Oral, Q12H, Mateusz Kingston PA-C, 550 mg at  03/09/21 0832  •  [COMPLETED] Insert peripheral IV, , , Once **AND** sodium chloride 0.9 % flush 10 mL, 10 mL, Intravenous, PRN, Mateusz Kingston PA-C  •  sodium chloride 0.9 % flush 10 mL, 10 mL, Intravenous, Q12H, Mateusz Kingston PA-C, 10 mL at 03/09/21 0832  •  sodium chloride 0.9 % flush 10 mL, 10 mL, Intravenous, PRN, Mateusz Kingston PA-C, 10 mL at 03/08/21 1801  •  spironolactone (ALDACTONE) tablet 300 mg, 300 mg, Oral, Daily, Mateusz Kingston PA-C, 300 mg at 03/08/21 1802  •  sucralfate (CARAFATE) tablet 1 g, 1 g, Oral, 4x Daily AC & at Bedtime, Leatha Buckley APRMAURICIO, 1 g at 03/09/21 0832      Objective     Vital Signs  Vitals:    03/08/21 1512 03/08/21 2256 03/09/21 0549 03/09/21 0627   BP: 119/81 112/74  125/72   BP Location: Left arm Left arm  Left arm   Patient Position: Lying Lying  Lying   Pulse: 90 87  78   Resp: 17 16  15   Temp: 98.5 °F (36.9 °C) 98.3 °F (36.8 °C)  98.1 °F (36.7 °C)   TempSrc: Oral Oral  Oral   SpO2: 90% 98%  97%   Weight:   59.9 kg (132 lb 0.9 oz)    Height:           Physical Exam:     General Appearance:    Awake and alert, in no acute distress   Head:    Normocephalic, without obvious abnormality   Eyes:          Conjunctivae normal, anicteric sclera   Throat:   No oral lesions, no thrush, oral mucosa moist   Neck:   No adenopathy, supple, no JVD   Lungs:     respirations regular, even and unlabored   Abdomen:     Soft, mild generalized tenderness, no rebound or guarding, moderate distention with large umbilical hernia   Rectal:     Deferred   Extremities:   No edema, no cyanosis   Skin:   No bruising or rash, no jaundice        Results Review:    CBC    Results from last 7 days   Lab Units 03/09/21  0406 03/08/21  0259 03/07/21  2055   WBC 10*3/mm3 16.90* 11.00* 12.90*   HEMOGLOBIN g/dL 9.8* 10.6* 11.4*   PLATELETS 10*3/mm3 683* 732* 744*     CMP   Results from last 7 days   Lab Units 03/09/21  0406 03/08/21  1728 03/08/21  0259 03/07/21 2055   SODIUM mmol/L  134*  --  134* 137   POTASSIUM mmol/L 3.5 4.3 3.5 2.9*   CHLORIDE mmol/L 98  --  98 100   CO2 mmol/L 29.0  --  25.0 28.0   BUN mg/dL 10  --  11 12   CREATININE mg/dL 0.86  --  0.75 0.66   GLUCOSE mg/dL 108*  --  269* 95   ALBUMIN g/dL 2.60*  --  2.60* 3.00*   BILIRUBIN mg/dL 0.4  --  0.5 0.5   ALK PHOS U/L 126*  --  137* 146*   AST (SGOT) U/L 15  --  31 19   ALT (SGPT) U/L 7  --  8 9   MAGNESIUM mg/dL 1.3*  --  1.9  --    LIPASE U/L  --   --   --  13     Cr Clearance Estimated Creatinine Clearance: 76.5 mL/min (by C-G formula based on SCr of 0.86 mg/dL).  Coag   Results from last 7 days   Lab Units 03/09/21  0406 03/08/21  0259   INR  1.13* 1.11*   APTT seconds  --  27.9     HbA1C   Lab Results   Component Value Date    HGBA1C 5.26 05/27/2016    HGBA1C 5.6 05/12/2015     Blood Glucose No results found for: POCGLU  Infection     UA      Radiology(recent) CT Abdomen Pelvis With Contrast    Result Date: 3/7/2021  Impression: 1. Increased diffuse peritoneal ascites fluid, moderate to large volume overall. 2. 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 No focal hepatic lesion is seen. Stable intra and extrahepatic biliary ductal fullness is nonspecific given prior cholecystectomy. 3. Right colonic wall thickening is nonspecific. This may reflect portal colopathy in the setting of portal hypertension, but infectious or inflammatory colitis are not excluded. 4. Unchanged appearance of ventral hernia containing fluid and nonobstructive bowel. Electronically signed by:  Josué Santillan M.D.  3/7/2021 9:12 PM         Assessment/Plan     ASSESSMENT:  -Generalized abdominal pain -rule out SBP  -Ascites   -Nausea/vomiting  -Anastomotic ulcer -diagnosed on EGD in 1/2021  -CARTER cirrhosis complicated by hepatic encephalopathy, ascites, nonbleeding esophageal varices, and chronic portal vein thrombus  -Normocytic anemia  -Leukocytosis  -Portal vein thrombus/DVT on Eliquis  -History of gastric  sleeve  -History of splenectomy  -History of Alfonzo-en-Y gastric bypass  -History of cholecystectomy  -History of incisional hernia repair  -History of thyroid cancer s/p thyroidectomy.     PLAN:  Patient reports that she is feeling some better today.  Paracentesis had been planned for this morning, but bedside RN reports that no fluid was able to be drawn off the patient's abdomen.  She has received IV Rocephin.  Would recommend continuing coverage with cephalosporin to cover possible SBP x14 days.  Continue Bumex and spironolactone.  Patient was able to tolerate clear liquid diet.  Reports that she would like to try food.  Epigastric pain after oral intake is likely due to anastomotic ulcer diagnosed on EGD in 1/2021.  Continue PPI and Carafate.  Antiemetics/analgesics as needed.  AFP is normal.  Patient is okay for discharge from GI standpoint with close outpatient follow-up with her GI physician at .      GRIFFIN Aranda  03/09/21  10:37 EST

## 2021-03-09 NOTE — PLAN OF CARE
Problem: Adult Inpatient Plan of Care  Goal: Plan of Care Review  Outcome: Ongoing, Progressing  Flowsheets  Taken 3/9/2021 0231 by Casi Arroyo RN  Progress: no change  Outcome Summary: pt will have a paracentesis in the am  VSS  Will continue to monitor  Taken 3/8/2021 1800 by Kathie Valencia RN  Plan of Care Reviewed With: patient  Goal: Patient-Specific Goal (Individualized)  Outcome: Ongoing, Progressing  Goal: Absence of Hospital-Acquired Illness or Injury  Outcome: Ongoing, Progressing  Intervention: Identify and Manage Fall Risk  Recent Flowsheet Documentation  Taken 3/8/2021 2305 by Casi Arroyo RN  Safety Promotion/Fall Prevention:   room organization consistent   safety round/check completed   nonskid shoes/slippers when out of bed   lighting adjusted   clutter free environment maintained   activity supervised   assistive device/personal items within reach  Taken 3/8/2021 1930 by Casi Arroyo RN  Safety Promotion/Fall Prevention:   safety round/check completed   room organization consistent   nonskid shoes/slippers when out of bed   lighting adjusted   clutter free environment maintained   assistive device/personal items within reach   activity supervised  Intervention: Prevent Skin Injury  Recent Flowsheet Documentation  Taken 3/8/2021 2305 by Casi Arroyo RN  Body Position: position changed independently  Taken 3/8/2021 1930 by Casi Arroyo RN  Body Position: position changed independently  Intervention: Prevent Infection  Recent Flowsheet Documentation  Taken 3/8/2021 2305 by Casi Arroyo RN  Infection Prevention:   visitors restricted/screened   single patient room provided   rest/sleep promoted   personal protective equipment utilized   hand hygiene promoted   equipment surfaces disinfected  Taken 3/8/2021 1930 by Casi Arroyo RN  Infection Prevention:   visitors restricted/screened   single patient room provided   rest/sleep promoted   personal  protective equipment utilized   hand hygiene promoted   equipment surfaces disinfected  Goal: Optimal Comfort and Wellbeing  Outcome: Ongoing, Progressing  Intervention: Provide Person-Centered Care  Recent Flowsheet Documentation  Taken 3/8/2021 1930 by Casi Arroyo RN  Trust Relationship/Rapport:   care explained   questions answered   questions encouraged   reassurance provided   thoughts/feelings acknowledged  Goal: Readiness for Transition of Care  Outcome: Ongoing, Progressing     Problem: Pain Acute  Goal: Optimal Pain Control  Outcome: Ongoing, Progressing  Intervention: Prevent or Manage Pain  Recent Flowsheet Documentation  Taken 3/8/2021 2305 by Casi Arroyo, RN  Sensory Stimulation Regulation:   auditory stimulation minimized   care clustered   lighting decreased   quiet environment promoted  Sleep/Rest Enhancement:   awakenings minimized   noise level reduced   regular sleep/rest pattern promoted  Taken 3/8/2021 1930 by Casi Arroyo, RN  Sensory Stimulation Regulation:   auditory stimulation minimized   care clustered   quiet environment promoted  Sleep/Rest Enhancement:   awakenings minimized   noise level reduced   regular sleep/rest pattern promoted  Intervention: Optimize Psychosocial Wellbeing  Recent Flowsheet Documentation  Taken 3/8/2021 1930 by Casi Arroyo, RN  Supportive Measures:   active listening utilized   self-care encouraged   self-reflection promoted   self-responsibility promoted     Problem: Fluid Volume Excess  Goal: Fluid Balance  Outcome: Ongoing, Progressing   Goal Outcome Evaluation:     Progress: no change  Outcome Summary: pt will have a paracentesis in the am  VSS  Will continue to monitor

## 2021-03-09 NOTE — DISCHARGE SUMMARY
[facility]  DISCHARGE SUMMARY        Prepared For PCP:  Melissa Iniguez APRN    Patient Name: Lucinda Cope  : 1975  MRN: [id number]      Date of Admission:   3/7/2021    Date of Discharge:  3/9/2021    Length of stay:  LOS: 0 days     Hospital Course     Presenting Problem:   Hypokalemia [E87.6]  Other ascites [R18.8]  Abdominal pain, unspecified abdominal location [R10.9]      Active Hospital Problems    Diagnosis  POA   • **Abdominal pain [R10.9]  Yes     Priority: High   • CARTER (nonalcoholic steatohepatitis) [K75.81]  Yes     Priority: High   • Thyroid cancer (CMS/HCC) [C73]  Yes     Priority: High   • Hypokalemia [E87.6]  Yes     Priority: Medium   • Leukocytosis [D72.829]  Unknown     Priority: Medium   • History of DVT (deep vein thrombosis) [Z86.718]  Not Applicable     Priority: Medium   • Chronic pain [G89.29]  Yes     Priority: Medium   • Postoperative hypothyroidism [E89.0]  Yes     Priority: Medium   • Gastroesophageal reflux disease [K21.9]  Yes     Priority: Medium   • Anemia [D64.9]  Yes      Resolved Hospital Problems   No resolved problems to display.     Hospital Course:  Lucinda Cope is a 45 y.o. female with a history of Carter cirrhosis.  The patient presented with increasing abdominal pain.  The patient was placed on Rocephin intravenously for presumed possible continuous bacterial peritonitis.  The patient did show some improvement.  An attempt at paracentesis was performed on 3/9/2021 and it unfortunately was a dry tap.  The patient has an appointment on 3/23/2021 to have a repeat paracentesis.  The patient will be keeping that appointment.  The patient was seen by gastroenterology.  They noted her leukocytosis but felt that she could be managed on an outpatient basis with a cephalosporin.  The patient was changed from Rocephin to Omnicef and her pain was controlled and she was to be discharged.    The patient is a full code at the time of  discharge.  The patient does have blood cultures that were not finalized at the time of discharge.  The patient's medications are as listed in that section of this report.  The patient should follow-up with gastroenterology in 1 to 2 weeks and with her pain management physician as per their instructions.  The patient should keep her appointment to have the paracentesis on 3/23/2021.  The patient should follow-up with her primary care provider in 1 week.  The patient is on a cardiac low-sodium diet.  The patient is discharged in satisfactory condition.            Recommendation for Outpatient Providers:     Reasons For Change In Medications and Indications for New Medications:    Day of Discharge     HPI:   Patient reports that she is feeling better.  That her abdominal pain is still present but is not as bad as it was on admission.  She has been cleared by GI for discharge.    Vital Signs:   Temp:  [98.1 °F (36.7 °C)-98.3 °F (36.8 °C)] 98.3 °F (36.8 °C)  Heart Rate:  [70-95] 95  Resp:  [14-16] 16  BP: (112-151)/(70-91) 113/73     Physical Exam:  Physical Exam  Vitals and nursing note reviewed.   Constitutional:       General: She is not in acute distress.     Appearance: Normal appearance. She is well-developed. She is not ill-appearing, toxic-appearing or diaphoretic.   HENT:      Head: Normocephalic and atraumatic.      Right Ear: Ear canal and external ear normal.      Left Ear: Ear canal and external ear normal.      Nose: Nose normal. No congestion or rhinorrhea.      Mouth/Throat:      Mouth: Mucous membranes are moist.      Pharynx: No oropharyngeal exudate.   Eyes:      General: No scleral icterus.        Right eye: No discharge.         Left eye: No discharge.      Extraocular Movements: Extraocular movements intact.      Conjunctiva/sclera: Conjunctivae normal.      Pupils: Pupils are equal, round, and reactive to light.   Neck:      Thyroid: No thyromegaly.      Vascular: No carotid bruit or JVD.       Trachea: No tracheal deviation.   Cardiovascular:      Rate and Rhythm: Normal rate and regular rhythm.      Pulses: Normal pulses.      Heart sounds: Normal heart sounds. No murmur. No friction rub. No gallop.    Pulmonary:      Effort: Pulmonary effort is normal. No respiratory distress.      Breath sounds: Normal breath sounds. No stridor. No wheezing, rhonchi or rales.   Chest:      Chest wall: No tenderness.   Abdominal:      General: Bowel sounds are normal. There is no distension.      Palpations: Abdomen is soft. There is no mass.      Tenderness: There is no abdominal tenderness. There is no guarding or rebound.      Hernia: No hernia is present.      Comments: The patient has 4 ventral hernias in all of which are reducible.  She does not have a fluid wave.  She does not have any tenderness to palpation and no masses organomegaly are seen.   Musculoskeletal:         General: No swelling, tenderness, deformity or signs of injury. Normal range of motion.      Cervical back: Normal range of motion and neck supple. No rigidity. No muscular tenderness.      Right lower leg: No edema.      Left lower leg: No edema.   Lymphadenopathy:      Cervical: No cervical adenopathy.   Skin:     General: Skin is warm and dry.      Coloration: Skin is not jaundiced or pale.      Findings: No bruising, erythema or rash.   Neurological:      General: No focal deficit present.      Mental Status: She is alert and oriented to person, place, and time. Mental status is at baseline.      Cranial Nerves: No cranial nerve deficit.      Sensory: No sensory deficit.      Motor: No weakness or abnormal muscle tone.      Coordination: Coordination normal.   Psychiatric:         Mood and Affect: Mood normal.         Behavior: Behavior normal.         Thought Content: Thought content normal.         Judgment: Judgment normal.         Pertinent  and/or Most Recent Results     Results from last 7 days   Lab Units 03/09/21  0406 03/08/21  2646  03/08/21 0259 03/07/21 2055   WBC 10*3/mm3 16.90*  --  11.00* 12.90*   HEMOGLOBIN g/dL 9.8*  --  10.6* 11.4*   HEMATOCRIT % 29.0*  --  32.3* 33.7*   PLATELETS 10*3/mm3 683*  --  732* 744*   SODIUM mmol/L 134*  --  134* 137   POTASSIUM mmol/L 3.5 4.3 3.5 2.9*   CHLORIDE mmol/L 98  --  98 100   CO2 mmol/L 29.0  --  25.0 28.0   BUN mg/dL 10  --  11 12   CREATININE mg/dL 0.86  --  0.75 0.66   GLUCOSE mg/dL 108*  --  269* 95   CALCIUM mg/dL 7.9*  --  8.0* 8.0*     Results from last 7 days   Lab Units 03/09/21  0406 03/08/21 0259 03/07/21 2055   BILIRUBIN mg/dL 0.4 0.5 0.5   ALK PHOS U/L 126* 137* 146*   ALT (SGPT) U/L 7 8 9   AST (SGOT) U/L 15 31 19   PROTIME Seconds 12.4* 12.2*  --    INR  1.13* 1.11*  --    APTT seconds  --  27.9  --            Invalid input(s): TG, LDLCALC, LDLREALC  Results from last 7 days   Lab Units 03/08/21  0259 03/07/21 2055   TSH uIU/mL  --  0.085*   TROPONIN T ng/mL <0.010 <0.010     Brief Urine Lab Results  (Last result in the past 365 days)      Color   Clarity   Blood   Leuk Est   Nitrite   Protein   CREAT   Urine HCG        01/09/21 1750 Dark Yellow Clear Negative Negative Negative Negative             Microbiology Results Abnormal     Procedure Component Value - Date/Time    COVID PRE-OP / PRE-PROCEDURE SCREENING ORDER (NO ISOLATION) - Swab, Nasopharynx [[id number]]  (Normal) Collected: 03/08/21 0107    Lab Status: Final result Specimen: Swab from Nasopharynx Updated: 03/08/21 1432    Narrative:      The following orders were created for panel order COVID PRE-OP / PRE-PROCEDURE SCREENING ORDER (NO ISOLATION) - Swab, Nasopharynx.  Procedure                               Abnormality         Status                     ---------                               -----------         ------                     COVID-19,APTIMA PANTHER,...[[id number]]  Normal              Final result                 Please view results for these tests on the individual orders.    COVID-19,APTIMA CHARLIE REAL  IN-HOUSE, NP/OP SWAB IN UTM/VTM/SALINE TRANSPORT MEDIA,24 HR TAT - Swab, Nasopharynx [[id number]]  (Normal) Collected: 03/08/21 0107    Lab Status: Final result Specimen: Swab from Nasopharynx Updated: 03/08/21 1432     COVID19 Not Detected    Narrative:      Fact sheet for providers: https://www.fda.gov/media/460182/download     Fact sheet for patients: https://www.fda.gov/media/165802/download    Test performed by RT PCR.        CT Abdomen Pelvis With Contrast    Result Date: 3/7/2021  Impression: Impression: 1. Increased diffuse peritoneal ascites fluid, moderate to large volume overall. 2. 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 No focal hepatic lesion is seen. Stable intra and extrahepatic biliary ductal fullness is nonspecific given prior cholecystectomy. 3. Right colonic wall thickening is nonspecific. This may reflect portal colopathy in the setting of portal hypertension, but infectious or inflammatory colitis are not excluded. 4. Unchanged appearance of ventral hernia containing fluid and nonobstructive bowel. Electronically signed by:  Josué Santillan M.D.  3/7/2021 9:12 PM    Results for orders placed during the hospital encounter of 08/23/19    Duplex Venous Lower Extremity - Right CAR    Interpretation Summary  · Acute right lower extremity superficial thrombophlebitis noted in the greater saphenous (above knee) and greater saphenous (below knee).  · All other right sided veins appeared normal.    Results for orders placed during the hospital encounter of 08/23/19    Duplex Venous Lower Extremity - Right CAR    Interpretation Summary  · Acute right lower extremity superficial thrombophlebitis noted in the greater saphenous (above knee) and greater saphenous (below knee).  · All other right sided veins appeared normal.    Test Results Pending at Discharge    Procedures Performed   Paracentesis on 3/9/2021  Consults:   Consults     Date and Time Order Name Status  Description    3/8/2021  3:08 PM Inpatient Gastroenterology Consult Completed     3/7/2021 11:25 PM Hospitalist (on-call MD unless specified) Completed         Discharge Details        Discharge Medications      New Medications      Instructions Start Date   cefdinir 300 MG capsule  Commonly known as: OMNICEF   300 mg, Oral, 2 Times Daily      pantoprazole 40 MG EC tablet  Commonly known as: PROTONIX   40 mg, Oral, 2 Times Daily Before Meals   Start Date: March 10, 2021     sucralfate 1 g tablet  Commonly known as: CARAFATE   1 g, Oral, 4 Times Daily Before Meals & Nightly         Continue These Medications      Instructions Start Date   apixaban 5 MG tablet tablet  Commonly known as: ELIQUIS   5 mg, Oral, 2 Times Daily      bumetanide 2 MG tablet  Commonly known as: BUMEX   2 mg, Oral, Daily      dicyclomine 10 MG capsule  Commonly known as: Bentyl   10 mg, Oral, 4 Times Daily Before Meals & Nightly PRN      levothyroxine 137 MCG tablet  Commonly known as: SYNTHROID, LEVOTHROID   137 mcg, Oral, Daily      ondansetron ODT 8 MG disintegrating tablet  Commonly known as: ZOFRAN-ODT   8 mg, Translingual, Every 8 Hours PRN      oxyCODONE 10 MG tablet  Commonly known as: ROXICODONE   10 mg, Oral, Every 6 Hours      riFAXIMin 550 MG tablet  Commonly known as: XIFAXAN   550 mg, Oral, Every 12 Hours Scheduled      spironolactone 100 MG tablet  Commonly known as: ALDACTONE   300 mg, Oral, Daily           Allergies   Allergen Reactions   • Compazine [Prochlorperazine Edisylate] Hives   • Contrast Dye Hives   • Iodinated Diagnostic Agents    • Iodine Hives   • Morphine Itching   • Nsaids Other (See Comments)     States I am not suppose to take them   • Hydrocodone Rash     Lortab elixir per pt     Discharge Disposition:  Home or Self Care    Diet:  Hospital:  Diet Order   Procedures   • Diet Cardiac; 2gm Na+     Discharge Activity:   Activity Instructions     Driving Restrictions      Type of Restriction: Driving    Driving  Restrictions: No Driving While Taking Narcotics        CODE STATUS:    Code Status and Medical Interventions:   Ordered at: 03/08/21 0006     Code Status:    CPR     Medical Interventions (Level of Support Prior to Arrest):    Full     Follow-up Appointments  Future Appointments   Date Time Provider Department Center   3/26/2021  1:00 PM ROOM 09 [facility]  [facility] None     Additional Instructions for the Follow-ups that You Need to Schedule     Discharge Follow-up with PCP   As directed       Currently Documented PCP:    Melissa Iniguez APRN    PCP Phone Number:    [phone]     Follow Up Details: follow up with primary care provider in one week         Discharge Follow-up with Specified Provider: Follow-up with GI according to their directions   As directed      To: Follow-up with GI according to their directions         Discharge Follow-up with Specified Provider: Keep your next appointment with pain management   As directed      To: Keep your next appointment with pain management             Condition on Discharge:    Stable    This patient has been examined wearing appropriate Personal Protective Equipment and discussed with hospital infection control department. 03/09/21    Electronically signed by Jing Brannon MD, 03/09/21, 5:15 PM EST.    Time: I spent  20  minutes on this discharge activity which included face-to-face encounter with the patient/reviewing the data in the system/coordination of the care with the nursing staff as well as consultants/documentation/entering orders.

## 2021-03-09 NOTE — NURSING NOTE
Pt arrived transferred from Observation unit for diagnostic paracentesis. Dr. Peña using ultrasound  Found small  pockets of fluid. One attempt made by Dr. Peña under sterile technique. Dr unable to obtain fluid and determined the pt didn't have enough fluid to justify second attempt. Pt agreed and tolerated well.

## 2021-03-09 NOTE — PLAN OF CARE
Goal Outcome Evaluation:  Plan of Care Reviewed With: patient  Progress: improving  Outcome Summary: pt complains of abdominal pain, pain controlled with roxicodone, dilaudid, bentyl. GI saw and resumed protonix and carafate. continue to monitor

## 2021-03-09 NOTE — PLAN OF CARE
Goal Outcome Evaluation:  Plan of Care Reviewed With: patient  Progress: improving  Outcome Summary: pt had a paracentesis this am with no fluid being able to be drained. GI has signed off, suggests pt go home on abx X14 days and follow up with her GI md at . continue to monitor.

## 2021-03-10 ENCOUNTER — READMISSION MANAGEMENT (OUTPATIENT)
Dept: CALL CENTER | Facility: HOSPITAL | Age: 46
End: 2021-03-10

## 2021-03-10 NOTE — OUTREACH NOTE
Prep Survey      Responses   [facility] facility patient discharged from?  [facility]   Is LACE score < 7 ?  No   Emergency Room discharge w/ pulse ox?  No   Eligibility  Readm Mgmt   Discharge diagnosis  Abdominal pain, CARTER, hypokalemia, ascites, bacterial peritonitis   Does the patient have one of the following disease processes/diagnoses(primary or secondary)?  Other   Does the patient have Home health ordered?  No   Is there a DME ordered?  No   Comments regarding appointments  Needs f/u scheduled   Medication alerts for this patient  Continue Eliquis.  Medications per AVS.   Prep survey completed?  Yes          Rachel Rivers RN

## 2021-03-12 ENCOUNTER — READMISSION MANAGEMENT (OUTPATIENT)
Dept: CALL CENTER | Facility: HOSPITAL | Age: 46
End: 2021-03-12

## 2021-03-12 NOTE — OUTREACH NOTE
Medical Week 1 Survey      Responses   [facility] patient discharged from?  [facility]   Does the patient have one of the following disease processes/diagnoses(primary or secondary)?  Other   Week 1 attempt successful?  No   Unsuccessful attempts  Attempt 1          Junie Goodman RN

## 2021-03-15 ENCOUNTER — READMISSION MANAGEMENT (OUTPATIENT)
Dept: CALL CENTER | Facility: HOSPITAL | Age: 46
End: 2021-03-15

## 2021-03-15 NOTE — OUTREACH NOTE
Medical Week 1 Survey      Responses   [facility] patient discharged from?  [facility]   Does the patient have one of the following disease processes/diagnoses(primary or secondary)?  Other   Week 1 attempt successful?  No   Unsuccessful attempts  Attempt 2          Cheryl Xiong LPN

## 2021-03-16 ENCOUNTER — READMISSION MANAGEMENT (OUTPATIENT)
Dept: CALL CENTER | Facility: HOSPITAL | Age: 46
End: 2021-03-16

## 2021-03-16 NOTE — OUTREACH NOTE
Medical Week 1 Survey      Responses   [facility] patient discharged from?  [facility]   Does the patient have one of the following disease processes/diagnoses(primary or secondary)?  Other   Week 1 attempt successful?  No   Unsuccessful attempts  Attempt 3          Cheryl Xiong LPN

## 2021-03-25 ENCOUNTER — READMISSION MANAGEMENT (OUTPATIENT)
Dept: CALL CENTER | Facility: HOSPITAL | Age: 46
End: 2021-03-25

## 2021-03-25 DIAGNOSIS — R18.8 OTHER ASCITES: Primary | ICD-10-CM

## 2021-03-25 RX ORDER — ALBUMIN (HUMAN) 12.5 G/50ML
12.5 SOLUTION INTRAVENOUS DAILY PRN
Status: CANCELLED | OUTPATIENT
Start: 2021-03-25

## 2021-03-25 RX ORDER — ALBUMIN (HUMAN) 12.5 G/50ML
25 SOLUTION INTRAVENOUS DAILY PRN
Status: CANCELLED | OUTPATIENT
Start: 2021-03-25

## 2021-03-26 ENCOUNTER — HOSPITAL ENCOUNTER (OUTPATIENT)
Dept: INFUSION THERAPY | Facility: HOSPITAL | Age: 46
Discharge: HOME | End: 2021-03-26
Admitting: HOSPITALIST

## 2021-03-26 VITALS
SYSTOLIC BLOOD PRESSURE: 123 MMHG | DIASTOLIC BLOOD PRESSURE: 75 MMHG | HEART RATE: 80 BPM | OXYGEN SATURATION: 98 % | RESPIRATION RATE: 16 BRPM | TEMPERATURE: 97.8 F

## 2021-03-26 DIAGNOSIS — R18.8 OTHER ASCITES: Primary | ICD-10-CM

## 2021-03-26 PROCEDURE — 76942 ECHO GUIDE FOR BIOPSY: CPT

## 2021-03-26 PROCEDURE — 49083 ABD PARACENTESIS W/IMAGING: CPT | Performed by: HOSPITALIST

## 2021-03-26 RX ORDER — ALBUMIN (HUMAN) 12.5 G/50ML
12.5 SOLUTION INTRAVENOUS DAILY PRN
Status: DISCONTINUED | OUTPATIENT
Start: 2021-03-26 | End: 2021-03-28 | Stop reason: HOSPADM

## 2021-03-26 RX ORDER — ALBUMIN (HUMAN) 12.5 G/50ML
25 SOLUTION INTRAVENOUS DAILY PRN
Status: DISCONTINUED | OUTPATIENT
Start: 2021-03-26 | End: 2021-03-28 | Stop reason: HOSPADM

## 2021-03-26 NOTE — PROCEDURES
Ultrasound guided paracentesis    Date/Time: 3/26/2021 2:25 PM  Performed by: Jessica Peña MD  Authorized by: Jessica Peña MD   Procedure purpose: therapeutic  Indications: abdominal discomfort secondary to ascites  Anesthesia: local infiltration    Anesthesia:  Local Anesthetic: lidocaine 1% without epinephrine  Preparation: Patient was prepped and draped in the usual sterile fashion.  Needle gauge: 20  Ultrasound guidance: yes  Puncture site: right lower quadrant  Fluid appearance: serous  Patient tolerance: patient tolerated the procedure well with no immediate complications  Comments: Please see nurses note for amount of fluid and albumin given.

## 2021-03-29 ENCOUNTER — APPOINTMENT (OUTPATIENT)
Dept: CT IMAGING | Facility: HOSPITAL | Age: 46
End: 2021-03-29

## 2021-03-29 ENCOUNTER — HOSPITAL ENCOUNTER (OUTPATIENT)
Facility: HOSPITAL | Age: 46
Setting detail: OBSERVATION
Discharge: HOME | End: 2021-03-31
Attending: EMERGENCY MEDICINE | Admitting: INTERNAL MEDICINE

## 2021-03-29 DIAGNOSIS — Z98.890 STATUS POST ABDOMINAL PARACENTESIS: ICD-10-CM

## 2021-03-29 DIAGNOSIS — R18.8 OTHER ASCITES: Primary | ICD-10-CM

## 2021-03-29 DIAGNOSIS — R10.84 GENERALIZED ABDOMINAL PAIN: ICD-10-CM

## 2021-03-29 DIAGNOSIS — M79.2 NEUROPATHIC PAIN: ICD-10-CM

## 2021-03-29 LAB
ALBUMIN SERPL-MCNC: 2.7 G/DL (ref 3.5–5.2)
ALBUMIN/GLOB SERPL: 0.8 G/DL
ALP SERPL-CCNC: 149 U/L (ref 39–117)
ALT SERPL W P-5'-P-CCNC: 12 U/L (ref 1–33)
ANION GAP SERPL CALCULATED.3IONS-SCNC: 9 MMOL/L (ref 5–15)
AST SERPL-CCNC: 27 U/L (ref 1–32)
BASOPHILS # BLD AUTO: 0.3 10*3/MM3 (ref 0–0.2)
BASOPHILS NFR BLD AUTO: 3 % (ref 0–1.5)
BILIRUB SERPL-MCNC: 0.5 MG/DL (ref 0–1.2)
BUN SERPL-MCNC: 5 MG/DL (ref 6–20)
BUN/CREAT SERPL: 8.6 (ref 7–25)
CALCIUM SPEC-SCNC: 7.9 MG/DL (ref 8.6–10.5)
CHLORIDE SERPL-SCNC: 103 MMOL/L (ref 98–107)
CO2 SERPL-SCNC: 26 MMOL/L (ref 22–29)
CREAT SERPL-MCNC: 0.58 MG/DL (ref 0.57–1)
DEPRECATED RDW RBC AUTO: 52.5 FL (ref 37–54)
EOSINOPHIL # BLD AUTO: 0.8 10*3/MM3 (ref 0–0.4)
EOSINOPHIL NFR BLD AUTO: 8.9 % (ref 0.3–6.2)
ERYTHROCYTE [DISTWIDTH] IN BLOOD BY AUTOMATED COUNT: 17.2 % (ref 12.3–15.4)
GFR SERPL CREATININE-BSD FRML MDRD: 111 ML/MIN/1.73
GLOBULIN UR ELPH-MCNC: 3.4 GM/DL
GLUCOSE SERPL-MCNC: 76 MG/DL (ref 65–99)
HCT VFR BLD AUTO: 29.8 % (ref 34–46.6)
HGB BLD-MCNC: 10 G/DL (ref 12–15.9)
LYMPHOCYTES # BLD AUTO: 4.1 10*3/MM3 (ref 0.7–3.1)
LYMPHOCYTES NFR BLD AUTO: 46.3 % (ref 19.6–45.3)
MCH RBC QN AUTO: 29.1 PG (ref 26.6–33)
MCHC RBC AUTO-ENTMCNC: 33.7 G/DL (ref 31.5–35.7)
MCV RBC AUTO: 86.3 FL (ref 79–97)
MONOCYTES # BLD AUTO: 1 10*3/MM3 (ref 0.1–0.9)
MONOCYTES NFR BLD AUTO: 10.9 % (ref 5–12)
NEUTROPHILS NFR BLD AUTO: 2.7 10*3/MM3 (ref 1.7–7)
NEUTROPHILS NFR BLD AUTO: 30.9 % (ref 42.7–76)
NRBC BLD AUTO-RTO: 0.2 /100 WBC (ref 0–0.2)
PLATELET # BLD AUTO: 614 10*3/MM3 (ref 140–450)
PMV BLD AUTO: 8.8 FL (ref 6–12)
POTASSIUM SERPL-SCNC: 2.8 MMOL/L (ref 3.5–5.2)
PROT SERPL-MCNC: 6.1 G/DL (ref 6–8.5)
RBC # BLD AUTO: 3.45 10*6/MM3 (ref 3.77–5.28)
SODIUM SERPL-SCNC: 138 MMOL/L (ref 136–145)
WBC # BLD AUTO: 8.9 10*3/MM3 (ref 3.4–10.8)

## 2021-03-29 PROCEDURE — 25010000002 HYDROMORPHONE PER 4 MG: Performed by: EMERGENCY MEDICINE

## 2021-03-29 PROCEDURE — 96374 THER/PROPH/DIAG INJ IV PUSH: CPT

## 2021-03-29 PROCEDURE — 25010000002 METHYLPREDNISOLONE PER 125 MG: Performed by: EMERGENCY MEDICINE

## 2021-03-29 PROCEDURE — 96375 TX/PRO/DX INJ NEW DRUG ADDON: CPT

## 2021-03-29 PROCEDURE — 0 IOPAMIDOL PER 1 ML: Performed by: EMERGENCY MEDICINE

## 2021-03-29 PROCEDURE — 25010000002 DIPHENHYDRAMINE PER 50 MG: Performed by: EMERGENCY MEDICINE

## 2021-03-29 PROCEDURE — 25010000002 ONDANSETRON PER 1 MG: Performed by: EMERGENCY MEDICINE

## 2021-03-29 PROCEDURE — 99284 EMERGENCY DEPT VISIT MOD MDM: CPT

## 2021-03-29 PROCEDURE — 99219 PR INITIAL OBSERVATION CARE/DAY 50 MINUTES: CPT | Performed by: NURSE PRACTITIONER

## 2021-03-29 PROCEDURE — 25010000002 CEFTRIAXONE PER 250 MG: Performed by: EMERGENCY MEDICINE

## 2021-03-29 PROCEDURE — 87040 BLOOD CULTURE FOR BACTERIA: CPT | Performed by: EMERGENCY MEDICINE

## 2021-03-29 PROCEDURE — 83880 ASSAY OF NATRIURETIC PEPTIDE: CPT | Performed by: NURSE PRACTITIONER

## 2021-03-29 PROCEDURE — 80053 COMPREHEN METABOLIC PANEL: CPT | Performed by: EMERGENCY MEDICINE

## 2021-03-29 PROCEDURE — 85025 COMPLETE CBC W/AUTO DIFF WBC: CPT | Performed by: EMERGENCY MEDICINE

## 2021-03-29 PROCEDURE — 74177 CT ABD & PELVIS W/CONTRAST: CPT

## 2021-03-29 RX ORDER — HYDROMORPHONE HCL 110MG/55ML
0.5 PATIENT CONTROLLED ANALGESIA SYRINGE INTRAVENOUS ONCE
Status: COMPLETED | OUTPATIENT
Start: 2021-03-29 | End: 2021-03-29

## 2021-03-29 RX ORDER — ONDANSETRON 2 MG/ML
4 INJECTION INTRAMUSCULAR; INTRAVENOUS ONCE
Status: COMPLETED | OUTPATIENT
Start: 2021-03-29 | End: 2021-03-29

## 2021-03-29 RX ORDER — SODIUM CHLORIDE 0.9 % (FLUSH) 0.9 %
10 SYRINGE (ML) INJECTION AS NEEDED
Status: DISCONTINUED | OUTPATIENT
Start: 2021-03-29 | End: 2021-03-31 | Stop reason: HOSPADM

## 2021-03-29 RX ORDER — OXYCODONE HYDROCHLORIDE 5 MG/1
10 TABLET ORAL EVERY 4 HOURS PRN
COMMUNITY
End: 2021-07-13

## 2021-03-29 RX ORDER — DIPHENHYDRAMINE HYDROCHLORIDE 50 MG/ML
25 INJECTION INTRAMUSCULAR; INTRAVENOUS ONCE
Status: COMPLETED | OUTPATIENT
Start: 2021-03-29 | End: 2021-03-29

## 2021-03-29 RX ORDER — METHYLPREDNISOLONE SODIUM SUCCINATE 125 MG/2ML
125 INJECTION, POWDER, LYOPHILIZED, FOR SOLUTION INTRAMUSCULAR; INTRAVENOUS ONCE
Status: COMPLETED | OUTPATIENT
Start: 2021-03-29 | End: 2021-03-29

## 2021-03-29 RX ADMIN — WATER 1 G: 100 INJECTION, SOLUTION INTRAVENOUS at 23:58

## 2021-03-29 RX ADMIN — METHYLPREDNISOLONE SODIUM SUCCINATE 125 MG: 125 INJECTION, POWDER, FOR SOLUTION INTRAMUSCULAR; INTRAVENOUS at 21:27

## 2021-03-29 RX ADMIN — HYDROMORPHONE HYDROCHLORIDE 0.5 MG: 2 INJECTION, SOLUTION INTRAMUSCULAR; INTRAVENOUS; SUBCUTANEOUS at 22:42

## 2021-03-29 RX ADMIN — DIPHENHYDRAMINE HYDROCHLORIDE 25 MG: 50 INJECTION, SOLUTION INTRAMUSCULAR; INTRAVENOUS at 21:28

## 2021-03-29 RX ADMIN — ONDANSETRON 4 MG: 2 INJECTION INTRAMUSCULAR; INTRAVENOUS at 22:42

## 2021-03-29 RX ADMIN — IOPAMIDOL 100 ML: 755 INJECTION, SOLUTION INTRAVENOUS at 22:19

## 2021-03-30 ENCOUNTER — ON CAMPUS - OUTPATIENT (AMBULATORY)
Dept: URBAN - METROPOLITAN AREA HOSPITAL 85 | Facility: HOSPITAL | Age: 46
End: 2021-03-30
Payer: COMMERCIAL

## 2021-03-30 ENCOUNTER — APPOINTMENT (OUTPATIENT)
Dept: INFUSION THERAPY | Facility: HOSPITAL | Age: 46
End: 2021-03-30

## 2021-03-30 ENCOUNTER — READMISSION MANAGEMENT (OUTPATIENT)
Dept: CALL CENTER | Facility: HOSPITAL | Age: 46
End: 2021-03-30

## 2021-03-30 DIAGNOSIS — D50.9 IRON DEFICIENCY ANEMIA, UNSPECIFIED: ICD-10-CM

## 2021-03-30 DIAGNOSIS — R18.8 OTHER ASCITES: ICD-10-CM

## 2021-03-30 DIAGNOSIS — R10.11 RIGHT UPPER QUADRANT PAIN: ICD-10-CM

## 2021-03-30 DIAGNOSIS — K74.60 UNSPECIFIED CIRRHOSIS OF LIVER: ICD-10-CM

## 2021-03-30 DIAGNOSIS — K72.90 HEPATIC FAILURE, UNSPECIFIED WITHOUT COMA: ICD-10-CM

## 2021-03-30 DIAGNOSIS — R10.31 RIGHT LOWER QUADRANT PAIN: ICD-10-CM

## 2021-03-30 PROBLEM — Z85.850 HISTORY OF THYROID CANCER: Status: ACTIVE | Noted: 2021-03-30

## 2021-03-30 LAB
ACANTHOCYTES BLD QL SMEAR: ABNORMAL
ALBUMIN FLD-MCNC: <0.4 G/DL
AMMONIA BLD-SCNC: 34 UMOL/L (ref 11–51)
ANION GAP SERPL CALCULATED.3IONS-SCNC: 10 MMOL/L (ref 5–15)
ANION GAP SERPL CALCULATED.3IONS-SCNC: 10 MMOL/L (ref 5–15)
ANION GAP SERPL CALCULATED.3IONS-SCNC: 11 MMOL/L (ref 5–15)
ANION GAP SERPL CALCULATED.3IONS-SCNC: 9 MMOL/L (ref 5–15)
ANION GAP SERPL CALCULATED.3IONS-SCNC: 9 MMOL/L (ref 5–15)
ANISOCYTOSIS BLD QL: ABNORMAL
APPEARANCE FLD: ABNORMAL
BASOPHILS # BLD MANUAL: 0.18 10*3/MM3 (ref 0–0.2)
BASOPHILS NFR BLD AUTO: 4 % (ref 0–1.5)
BUN SERPL-MCNC: 4 MG/DL (ref 6–20)
BUN SERPL-MCNC: 5 MG/DL (ref 6–20)
BUN SERPL-MCNC: 6 MG/DL (ref 6–20)
BUN/CREAT SERPL: 6.3 (ref 7–25)
BUN/CREAT SERPL: 7 (ref 7–25)
BUN/CREAT SERPL: 7.6 (ref 7–25)
BUN/CREAT SERPL: 7.7 (ref 7–25)
BUN/CREAT SERPL: 8.6 (ref 7–25)
BURR CELLS BLD QL SMEAR: ABNORMAL
CALCIUM SPEC-SCNC: 8.3 MG/DL (ref 8.6–10.5)
CALCIUM SPEC-SCNC: 8.4 MG/DL (ref 8.6–10.5)
CALCIUM SPEC-SCNC: 8.4 MG/DL (ref 8.6–10.5)
CALCIUM SPEC-SCNC: 8.6 MG/DL (ref 8.6–10.5)
CALCIUM SPEC-SCNC: 9 MG/DL (ref 8.6–10.5)
CHLORIDE SERPL-SCNC: 100 MMOL/L (ref 98–107)
CHLORIDE SERPL-SCNC: 103 MMOL/L (ref 98–107)
CHLORIDE SERPL-SCNC: 105 MMOL/L (ref 98–107)
CHLORIDE SERPL-SCNC: 99 MMOL/L (ref 98–107)
CHLORIDE SERPL-SCNC: 99 MMOL/L (ref 98–107)
CO2 SERPL-SCNC: 24 MMOL/L (ref 22–29)
CO2 SERPL-SCNC: 25 MMOL/L (ref 22–29)
CO2 SERPL-SCNC: 25 MMOL/L (ref 22–29)
CO2 SERPL-SCNC: 26 MMOL/L (ref 22–29)
CO2 SERPL-SCNC: 27 MMOL/L (ref 22–29)
COLOR FLD: ABNORMAL
CREAT SERPL-MCNC: 0.64 MG/DL (ref 0.57–1)
CREAT SERPL-MCNC: 0.65 MG/DL (ref 0.57–1)
CREAT SERPL-MCNC: 0.66 MG/DL (ref 0.57–1)
CREAT SERPL-MCNC: 0.7 MG/DL (ref 0.57–1)
CREAT SERPL-MCNC: 0.71 MG/DL (ref 0.57–1)
DACRYOCYTES BLD QL SMEAR: ABNORMAL
DEPRECATED RDW RBC AUTO: 51.2 FL (ref 37–54)
EOSINOPHIL # BLD MANUAL: 0.05 10*3/MM3 (ref 0–0.4)
EOSINOPHIL NFR BLD MANUAL: 1 % (ref 0.3–6.2)
ERYTHROCYTE [DISTWIDTH] IN BLOOD BY AUTOMATED COUNT: 17.1 % (ref 12.3–15.4)
GFR SERPL CREATININE-BSD FRML MDRD: 88 ML/MIN/1.73
GFR SERPL CREATININE-BSD FRML MDRD: 90 ML/MIN/1.73
GFR SERPL CREATININE-BSD FRML MDRD: 96 ML/MIN/1.73
GFR SERPL CREATININE-BSD FRML MDRD: 98 ML/MIN/1.73
GFR SERPL CREATININE-BSD FRML MDRD: 99 ML/MIN/1.73
GIANT PLATELETS: ABNORMAL
GLUCOSE SERPL-MCNC: 148 MG/DL (ref 65–99)
GLUCOSE SERPL-MCNC: 150 MG/DL (ref 65–99)
GLUCOSE SERPL-MCNC: 191 MG/DL (ref 65–99)
GLUCOSE SERPL-MCNC: 90 MG/DL (ref 65–99)
GLUCOSE SERPL-MCNC: 95 MG/DL (ref 65–99)
HCT VFR BLD AUTO: 28.9 % (ref 34–46.6)
HGB BLD-MCNC: 9.6 G/DL (ref 12–15.9)
LARGE PLATELETS: ABNORMAL
LDH FLD-CCNC: <31 U/L
LYMPHOCYTES # BLD MANUAL: 0.74 10*3/MM3 (ref 0.7–3.1)
LYMPHOCYTES NFR BLD MANUAL: 16 % (ref 19.6–45.3)
LYMPHOCYTES NFR FLD MANUAL: 60 %
MCH RBC QN AUTO: 28.2 PG (ref 26.6–33)
MCHC RBC AUTO-ENTMCNC: 33.1 G/DL (ref 31.5–35.7)
MCV RBC AUTO: 85.3 FL (ref 79–97)
MESOTHL CELL NFR FLD MANUAL: 33 %
METHOD: ABNORMAL
MICROCYTES BLD QL: ABNORMAL
MONOCYTES NFR FLD: 4 %
NEUTROPHILS # BLD AUTO: 3.63 10*3/MM3 (ref 1.7–7)
NEUTROPHILS NFR BLD MANUAL: 77 % (ref 42.7–76)
NEUTROPHILS NFR FLD MANUAL: 3 %
NEUTS BAND NFR BLD MANUAL: 2 % (ref 0–5)
NT-PROBNP SERPL-MCNC: 58.5 PG/ML (ref 0–450)
NUC CELL # FLD: 8 /MM3
PLATELET # BLD AUTO: 564 10*3/MM3 (ref 140–450)
PMV BLD AUTO: 7.8 FL (ref 6–12)
POIKILOCYTOSIS BLD QL SMEAR: ABNORMAL
POLYCHROMASIA BLD QL SMEAR: ABNORMAL
POTASSIUM SERPL-SCNC: 2.9 MMOL/L (ref 3.5–5.2)
POTASSIUM SERPL-SCNC: 3.1 MMOL/L (ref 3.5–5.2)
POTASSIUM SERPL-SCNC: 3.6 MMOL/L (ref 3.5–5.2)
POTASSIUM SERPL-SCNC: 3.7 MMOL/L (ref 3.5–5.2)
POTASSIUM SERPL-SCNC: 3.9 MMOL/L (ref 3.5–5.2)
RBC # BLD AUTO: 3.39 10*6/MM3 (ref 3.77–5.28)
RBC # FLD AUTO: 9 /MM3
SCAN SLIDE: NORMAL
SMALL PLATELETS BLD QL SMEAR: ABNORMAL
SODIUM SERPL-SCNC: 135 MMOL/L (ref 136–145)
SODIUM SERPL-SCNC: 135 MMOL/L (ref 136–145)
SODIUM SERPL-SCNC: 136 MMOL/L (ref 136–145)
SODIUM SERPL-SCNC: 138 MMOL/L (ref 136–145)
SODIUM SERPL-SCNC: 138 MMOL/L (ref 136–145)
TARGETS BLD QL SMEAR: ABNORMAL
WBC # BLD AUTO: 4.6 10*3/MM3 (ref 3.4–10.8)
WBC MORPH BLD: NORMAL

## 2021-03-30 PROCEDURE — 82140 ASSAY OF AMMONIA: CPT | Performed by: NURSE PRACTITIONER

## 2021-03-30 PROCEDURE — 88108 CYTOPATH CONCENTRATE TECH: CPT | Performed by: FAMILY MEDICINE

## 2021-03-30 PROCEDURE — 87070 CULTURE OTHR SPECIMN AEROBIC: CPT | Performed by: FAMILY MEDICINE

## 2021-03-30 PROCEDURE — 83615 LACTATE (LD) (LDH) ENZYME: CPT | Performed by: FAMILY MEDICINE

## 2021-03-30 PROCEDURE — 82042 OTHER SOURCE ALBUMIN QUAN EA: CPT | Performed by: FAMILY MEDICINE

## 2021-03-30 PROCEDURE — 87205 SMEAR GRAM STAIN: CPT | Performed by: FAMILY MEDICINE

## 2021-03-30 PROCEDURE — 99226 PR SBSQ OBSERVATION CARE/DAY 35 MINUTES: CPT | Performed by: FAMILY MEDICINE

## 2021-03-30 PROCEDURE — 96376 TX/PRO/DX INJ SAME DRUG ADON: CPT

## 2021-03-30 PROCEDURE — G0378 HOSPITAL OBSERVATION PER HR: HCPCS

## 2021-03-30 PROCEDURE — 25010000002 ONDANSETRON PER 1 MG: Performed by: EMERGENCY MEDICINE

## 2021-03-30 PROCEDURE — 49083 ABD PARACENTESIS W/IMAGING: CPT | Performed by: INTERNAL MEDICINE

## 2021-03-30 PROCEDURE — 82247 BILIRUBIN TOTAL: CPT | Performed by: FAMILY MEDICINE

## 2021-03-30 PROCEDURE — 63710000001 ONDANSETRON PER 8 MG: Performed by: NURSE PRACTITIONER

## 2021-03-30 PROCEDURE — 80048 BASIC METABOLIC PNL TOTAL CA: CPT | Performed by: NURSE PRACTITIONER

## 2021-03-30 PROCEDURE — 25010000002 ONDANSETRON PER 1 MG: Performed by: NURSE PRACTITIONER

## 2021-03-30 PROCEDURE — 76942 ECHO GUIDE FOR BIOPSY: CPT

## 2021-03-30 PROCEDURE — 99213 OFFICE O/P EST LOW 20 MIN: CPT | Performed by: NURSE PRACTITIONER

## 2021-03-30 PROCEDURE — 89051 BODY FLUID CELL COUNT: CPT | Performed by: FAMILY MEDICINE

## 2021-03-30 PROCEDURE — 25010000002 CEFTRIAXONE PER 250 MG: Performed by: FAMILY MEDICINE

## 2021-03-30 PROCEDURE — 85025 COMPLETE CBC W/AUTO DIFF WBC: CPT | Performed by: NURSE PRACTITIONER

## 2021-03-30 PROCEDURE — 87075 CULTR BACTERIA EXCEPT BLOOD: CPT | Performed by: FAMILY MEDICINE

## 2021-03-30 PROCEDURE — 25010000002 HYDROMORPHONE PER 4 MG: Performed by: EMERGENCY MEDICINE

## 2021-03-30 RX ORDER — ONDANSETRON 4 MG/1
4 TABLET, FILM COATED ORAL EVERY 6 HOURS PRN
Status: DISCONTINUED | OUTPATIENT
Start: 2021-03-30 | End: 2021-03-31 | Stop reason: HOSPADM

## 2021-03-30 RX ORDER — MAGNESIUM SULFATE HEPTAHYDRATE 40 MG/ML
2 INJECTION, SOLUTION INTRAVENOUS AS NEEDED
Status: DISCONTINUED | OUTPATIENT
Start: 2021-03-30 | End: 2021-03-31 | Stop reason: HOSPADM

## 2021-03-30 RX ORDER — PANTOPRAZOLE SODIUM 40 MG/1
40 TABLET, DELAYED RELEASE ORAL
Status: DISCONTINUED | OUTPATIENT
Start: 2021-03-30 | End: 2021-03-31 | Stop reason: HOSPADM

## 2021-03-30 RX ORDER — BUMETANIDE 1 MG/1
2 TABLET ORAL DAILY
Status: DISCONTINUED | OUTPATIENT
Start: 2021-03-30 | End: 2021-03-30

## 2021-03-30 RX ORDER — SODIUM CHLORIDE 0.9 % (FLUSH) 0.9 %
10 SYRINGE (ML) INJECTION EVERY 12 HOURS SCHEDULED
Status: DISCONTINUED | OUTPATIENT
Start: 2021-03-30 | End: 2021-03-31 | Stop reason: HOSPADM

## 2021-03-30 RX ORDER — CHOLECALCIFEROL (VITAMIN D3) 125 MCG
5 CAPSULE ORAL NIGHTLY PRN
Status: DISCONTINUED | OUTPATIENT
Start: 2021-03-30 | End: 2021-03-31 | Stop reason: HOSPADM

## 2021-03-30 RX ORDER — ALUMINA, MAGNESIA, AND SIMETHICONE 2400; 2400; 240 MG/30ML; MG/30ML; MG/30ML
15 SUSPENSION ORAL EVERY 6 HOURS PRN
Status: DISCONTINUED | OUTPATIENT
Start: 2021-03-30 | End: 2021-03-31 | Stop reason: HOSPADM

## 2021-03-30 RX ORDER — ACETAMINOPHEN 160 MG/5ML
650 SOLUTION ORAL EVERY 4 HOURS PRN
Status: DISCONTINUED | OUTPATIENT
Start: 2021-03-30 | End: 2021-03-31 | Stop reason: HOSPADM

## 2021-03-30 RX ORDER — OXYCODONE HYDROCHLORIDE 5 MG/1
10 TABLET ORAL EVERY 4 HOURS PRN
Status: DISCONTINUED | OUTPATIENT
Start: 2021-03-30 | End: 2021-03-31 | Stop reason: HOSPADM

## 2021-03-30 RX ORDER — BISACODYL 10 MG
10 SUPPOSITORY, RECTAL RECTAL DAILY PRN
Status: DISCONTINUED | OUTPATIENT
Start: 2021-03-30 | End: 2021-03-31 | Stop reason: HOSPADM

## 2021-03-30 RX ORDER — POTASSIUM CHLORIDE 20 MEQ/1
40 TABLET, EXTENDED RELEASE ORAL AS NEEDED
Status: DISCONTINUED | OUTPATIENT
Start: 2021-03-30 | End: 2021-03-31 | Stop reason: HOSPADM

## 2021-03-30 RX ORDER — ACETAMINOPHEN 325 MG/1
650 TABLET ORAL EVERY 4 HOURS PRN
Status: DISCONTINUED | OUTPATIENT
Start: 2021-03-30 | End: 2021-03-31 | Stop reason: HOSPADM

## 2021-03-30 RX ORDER — SUCRALFATE 1 G/1
1 TABLET ORAL
Status: DISCONTINUED | OUTPATIENT
Start: 2021-03-30 | End: 2021-03-31 | Stop reason: HOSPADM

## 2021-03-30 RX ORDER — ONDANSETRON 2 MG/ML
4 INJECTION INTRAMUSCULAR; INTRAVENOUS ONCE
Status: COMPLETED | OUTPATIENT
Start: 2021-03-30 | End: 2021-03-30

## 2021-03-30 RX ORDER — MAGNESIUM SULFATE 1 G/100ML
1 INJECTION INTRAVENOUS AS NEEDED
Status: DISCONTINUED | OUTPATIENT
Start: 2021-03-30 | End: 2021-03-31 | Stop reason: HOSPADM

## 2021-03-30 RX ORDER — ACETAMINOPHEN 650 MG/1
650 SUPPOSITORY RECTAL EVERY 4 HOURS PRN
Status: DISCONTINUED | OUTPATIENT
Start: 2021-03-30 | End: 2021-03-31 | Stop reason: HOSPADM

## 2021-03-30 RX ORDER — SODIUM CHLORIDE 0.9 % (FLUSH) 0.9 %
10 SYRINGE (ML) INJECTION AS NEEDED
Status: DISCONTINUED | OUTPATIENT
Start: 2021-03-30 | End: 2021-03-31 | Stop reason: HOSPADM

## 2021-03-30 RX ORDER — DICYCLOMINE HYDROCHLORIDE 10 MG/1
10 CAPSULE ORAL
Status: DISCONTINUED | OUTPATIENT
Start: 2021-03-30 | End: 2021-03-31 | Stop reason: HOSPADM

## 2021-03-30 RX ORDER — ONDANSETRON 2 MG/ML
4 INJECTION INTRAMUSCULAR; INTRAVENOUS EVERY 6 HOURS PRN
Status: DISCONTINUED | OUTPATIENT
Start: 2021-03-30 | End: 2021-03-31 | Stop reason: HOSPADM

## 2021-03-30 RX ORDER — SPIRONOLACTONE 100 MG/1
300 TABLET, FILM COATED ORAL DAILY
Status: DISCONTINUED | OUTPATIENT
Start: 2021-03-30 | End: 2021-03-31 | Stop reason: HOSPADM

## 2021-03-30 RX ORDER — OXYCODONE HYDROCHLORIDE 5 MG/1
7.5 TABLET ORAL EVERY 4 HOURS PRN
Status: DISCONTINUED | OUTPATIENT
Start: 2021-03-30 | End: 2021-03-30

## 2021-03-30 RX ORDER — NITROGLYCERIN 0.4 MG/1
0.4 TABLET SUBLINGUAL
Status: DISCONTINUED | OUTPATIENT
Start: 2021-03-30 | End: 2021-03-31 | Stop reason: HOSPADM

## 2021-03-30 RX ORDER — HYDROMORPHONE HCL 110MG/55ML
0.5 PATIENT CONTROLLED ANALGESIA SYRINGE INTRAVENOUS ONCE
Status: COMPLETED | OUTPATIENT
Start: 2021-03-30 | End: 2021-03-30

## 2021-03-30 RX ADMIN — OXYCODONE 10 MG: 5 TABLET ORAL at 06:17

## 2021-03-30 RX ADMIN — METRONIDAZOLE 500 MG: 500 INJECTION, SOLUTION INTRAVENOUS at 19:05

## 2021-03-30 RX ADMIN — POTASSIUM CHLORIDE 40 MEQ: 1500 TABLET, EXTENDED RELEASE ORAL at 05:07

## 2021-03-30 RX ADMIN — OXYCODONE 10 MG: 5 TABLET ORAL at 02:04

## 2021-03-30 RX ADMIN — POTASSIUM CHLORIDE 40 MEQ: 1500 TABLET, EXTENDED RELEASE ORAL at 13:14

## 2021-03-30 RX ADMIN — Medication 10 ML: at 20:16

## 2021-03-30 RX ADMIN — POTASSIUM CHLORIDE 40 MEQ: 1500 TABLET, EXTENDED RELEASE ORAL at 08:50

## 2021-03-30 RX ADMIN — ONDANSETRON 4 MG: 2 INJECTION INTRAMUSCULAR; INTRAVENOUS at 00:14

## 2021-03-30 RX ADMIN — RIFAXIMIN 550 MG: 550 TABLET ORAL at 08:50

## 2021-03-30 RX ADMIN — KETAMINE HYDROCHLORIDE 17 MG: 50 INJECTION, SOLUTION INTRAMUSCULAR; INTRAVENOUS at 12:13

## 2021-03-30 RX ADMIN — OXYCODONE 10 MG: 5 TABLET ORAL at 10:01

## 2021-03-30 RX ADMIN — PANTOPRAZOLE SODIUM 40 MG: 40 TABLET, DELAYED RELEASE ORAL at 16:40

## 2021-03-30 RX ADMIN — METRONIDAZOLE 500 MG: 500 INJECTION, SOLUTION INTRAVENOUS at 13:14

## 2021-03-30 RX ADMIN — CEFTRIAXONE SODIUM 2 G: 2 INJECTION, POWDER, FOR SOLUTION INTRAMUSCULAR; INTRAVENOUS at 11:35

## 2021-03-30 RX ADMIN — OXYCODONE 10 MG: 5 TABLET ORAL at 19:05

## 2021-03-30 RX ADMIN — SUCRALFATE 1 G: 1 TABLET ORAL at 20:15

## 2021-03-30 RX ADMIN — OXYCODONE 10 MG: 5 TABLET ORAL at 23:03

## 2021-03-30 RX ADMIN — LEVOTHYROXINE SODIUM 137 MCG: 0.03 TABLET ORAL at 08:50

## 2021-03-30 RX ADMIN — PANTOPRAZOLE SODIUM 40 MG: 40 TABLET, DELAYED RELEASE ORAL at 06:16

## 2021-03-30 RX ADMIN — OXYCODONE 10 MG: 5 TABLET ORAL at 14:57

## 2021-03-30 RX ADMIN — SUCRALFATE 1 G: 1 TABLET ORAL at 06:17

## 2021-03-30 RX ADMIN — SUCRALFATE 1 G: 1 TABLET ORAL at 08:50

## 2021-03-30 RX ADMIN — Medication 10 ML: at 08:50

## 2021-03-30 RX ADMIN — Medication 10 ML: at 00:57

## 2021-03-30 RX ADMIN — SUCRALFATE 1 G: 1 TABLET ORAL at 16:40

## 2021-03-30 RX ADMIN — RIFAXIMIN 550 MG: 550 TABLET ORAL at 20:15

## 2021-03-30 RX ADMIN — HYDROMORPHONE HYDROCHLORIDE 0.5 MG: 2 INJECTION, SOLUTION INTRAMUSCULAR; INTRAVENOUS; SUBCUTANEOUS at 00:14

## 2021-03-30 RX ADMIN — SUCRALFATE 1 G: 1 TABLET ORAL at 11:37

## 2021-03-30 RX ADMIN — ONDANSETRON 4 MG: 2 INJECTION INTRAMUSCULAR; INTRAVENOUS at 19:05

## 2021-03-30 RX ADMIN — ONDANSETRON HYDROCHLORIDE 4 MG: 4 TABLET, FILM COATED ORAL at 12:19

## 2021-03-30 NOTE — OUTREACH NOTE
Medical Week 3 Survey      Responses   [facility] patient discharged from?  [facility]   Does the patient have one of the following disease processes/diagnoses(primary or secondary)?  Other   Week 3 attempt successful?  No   Unsuccessful attempts  Attempt 1   Revoke  Readmitted          Tiffany Wilson RN

## 2021-03-31 ENCOUNTER — ON CAMPUS - OUTPATIENT (AMBULATORY)
Dept: URBAN - METROPOLITAN AREA HOSPITAL 85 | Facility: HOSPITAL | Age: 46
End: 2021-03-31
Payer: COMMERCIAL

## 2021-03-31 VITALS
BODY MASS INDEX: 25.47 KG/M2 | SYSTOLIC BLOOD PRESSURE: 113 MMHG | HEIGHT: 63 IN | DIASTOLIC BLOOD PRESSURE: 67 MMHG | OXYGEN SATURATION: 100 % | WEIGHT: 143.74 LBS | TEMPERATURE: 98 F | RESPIRATION RATE: 12 BRPM | HEART RATE: 65 BPM

## 2021-03-31 DIAGNOSIS — K74.60 UNSPECIFIED CIRRHOSIS OF LIVER: ICD-10-CM

## 2021-03-31 DIAGNOSIS — R14.0 ABDOMINAL DISTENSION (GASEOUS): ICD-10-CM

## 2021-03-31 DIAGNOSIS — R18.8 OTHER ASCITES: ICD-10-CM

## 2021-03-31 DIAGNOSIS — K72.90 HEPATIC FAILURE, UNSPECIFIED WITHOUT COMA: ICD-10-CM

## 2021-03-31 DIAGNOSIS — D50.9 IRON DEFICIENCY ANEMIA, UNSPECIFIED: ICD-10-CM

## 2021-03-31 DIAGNOSIS — R10.31 RIGHT LOWER QUADRANT PAIN: ICD-10-CM

## 2021-03-31 LAB
ALBUMIN SERPL-MCNC: 2.2 G/DL (ref 3.5–5.2)
ALBUMIN/GLOB SERPL: 0.8 G/DL
ALP SERPL-CCNC: 127 U/L (ref 39–117)
ALT SERPL W P-5'-P-CCNC: 9 U/L (ref 1–33)
AMMONIA BLD-SCNC: 63 UMOL/L (ref 11–51)
ANION GAP SERPL CALCULATED.3IONS-SCNC: 10 MMOL/L (ref 5–15)
ANION GAP SERPL CALCULATED.3IONS-SCNC: 7 MMOL/L (ref 5–15)
ANION GAP SERPL CALCULATED.3IONS-SCNC: 7 MMOL/L (ref 5–15)
AST SERPL-CCNC: 20 U/L (ref 1–32)
BASOPHILS # BLD AUTO: 0.1 10*3/MM3 (ref 0–0.2)
BASOPHILS NFR BLD AUTO: 1.2 % (ref 0–1.5)
BILIRUB SERPL-MCNC: 0.4 MG/DL (ref 0–1.2)
BUN SERPL-MCNC: 6 MG/DL (ref 6–20)
BUN SERPL-MCNC: 7 MG/DL (ref 6–20)
BUN SERPL-MCNC: 7 MG/DL (ref 6–20)
BUN/CREAT SERPL: 10.4 (ref 7–25)
BUN/CREAT SERPL: 10.8 (ref 7–25)
BUN/CREAT SERPL: 9.2 (ref 7–25)
CALCIUM SPEC-SCNC: 7.7 MG/DL (ref 8.6–10.5)
CALCIUM SPEC-SCNC: 7.9 MG/DL (ref 8.6–10.5)
CALCIUM SPEC-SCNC: 8.1 MG/DL (ref 8.6–10.5)
CHLORIDE SERPL-SCNC: 104 MMOL/L (ref 98–107)
CHLORIDE SERPL-SCNC: 105 MMOL/L (ref 98–107)
CHLORIDE SERPL-SCNC: 105 MMOL/L (ref 98–107)
CO2 SERPL-SCNC: 24 MMOL/L (ref 22–29)
CO2 SERPL-SCNC: 27 MMOL/L (ref 22–29)
CO2 SERPL-SCNC: 27 MMOL/L (ref 22–29)
CREAT SERPL-MCNC: 0.65 MG/DL (ref 0.57–1)
CREAT SERPL-MCNC: 0.65 MG/DL (ref 0.57–1)
CREAT SERPL-MCNC: 0.67 MG/DL (ref 0.57–1)
DEPRECATED RDW RBC AUTO: 50.8 FL (ref 37–54)
EOSINOPHIL # BLD AUTO: 0.2 10*3/MM3 (ref 0–0.4)
EOSINOPHIL NFR BLD AUTO: 2 % (ref 0.3–6.2)
ERYTHROCYTE [DISTWIDTH] IN BLOOD BY AUTOMATED COUNT: 17.1 % (ref 12.3–15.4)
GFR SERPL CREATININE-BSD FRML MDRD: 94 ML/MIN/1.73
GFR SERPL CREATININE-BSD FRML MDRD: 98 ML/MIN/1.73
GFR SERPL CREATININE-BSD FRML MDRD: 98 ML/MIN/1.73
GLOBULIN UR ELPH-MCNC: 2.8 GM/DL
GLUCOSE SERPL-MCNC: 107 MG/DL (ref 65–99)
GLUCOSE SERPL-MCNC: 123 MG/DL (ref 65–99)
GLUCOSE SERPL-MCNC: 91 MG/DL (ref 65–99)
HCT VFR BLD AUTO: 27 % (ref 34–46.6)
HGB BLD-MCNC: 8.9 G/DL (ref 12–15.9)
INR PPP: 1.12 (ref 0.93–1.1)
LAB AP CASE REPORT: NORMAL
LYMPHOCYTES # BLD AUTO: 3.4 10*3/MM3 (ref 0.7–3.1)
LYMPHOCYTES NFR BLD AUTO: 34.1 % (ref 19.6–45.3)
MAGNESIUM SERPL-MCNC: 1.9 MG/DL (ref 1.6–2.6)
MCH RBC QN AUTO: 28.6 PG (ref 26.6–33)
MCHC RBC AUTO-ENTMCNC: 33.1 G/DL (ref 31.5–35.7)
MCV RBC AUTO: 86.4 FL (ref 79–97)
MONOCYTES # BLD AUTO: 0.8 10*3/MM3 (ref 0.1–0.9)
MONOCYTES NFR BLD AUTO: 7.7 % (ref 5–12)
NEUTROPHILS NFR BLD AUTO: 5.5 10*3/MM3 (ref 1.7–7)
NEUTROPHILS NFR BLD AUTO: 55 % (ref 42.7–76)
NRBC BLD AUTO-RTO: 0.1 /100 WBC (ref 0–0.2)
PATH REPORT.FINAL DX SPEC: NORMAL
PATH REPORT.GROSS SPEC: NORMAL
PLATELET # BLD AUTO: 469 10*3/MM3 (ref 140–450)
PMV BLD AUTO: 7.9 FL (ref 6–12)
POTASSIUM SERPL-SCNC: 3.6 MMOL/L (ref 3.5–5.2)
POTASSIUM SERPL-SCNC: 3.8 MMOL/L (ref 3.5–5.2)
POTASSIUM SERPL-SCNC: 3.9 MMOL/L (ref 3.5–5.2)
PROT SERPL-MCNC: 5 G/DL (ref 6–8.5)
PROTHROMBIN TIME: 12.3 SECONDS (ref 9.6–11.7)
RBC # BLD AUTO: 3.12 10*6/MM3 (ref 3.77–5.28)
SODIUM SERPL-SCNC: 138 MMOL/L (ref 136–145)
SODIUM SERPL-SCNC: 139 MMOL/L (ref 136–145)
SODIUM SERPL-SCNC: 139 MMOL/L (ref 136–145)
WBC # BLD AUTO: 10 10*3/MM3 (ref 3.4–10.8)

## 2021-03-31 PROCEDURE — G0378 HOSPITAL OBSERVATION PER HR: HCPCS

## 2021-03-31 PROCEDURE — 82140 ASSAY OF AMMONIA: CPT | Performed by: NURSE PRACTITIONER

## 2021-03-31 PROCEDURE — 99217 PR OBSERVATION CARE DISCHARGE MANAGEMENT: CPT | Performed by: FAMILY MEDICINE

## 2021-03-31 PROCEDURE — 80053 COMPREHEN METABOLIC PANEL: CPT | Performed by: NURSE PRACTITIONER

## 2021-03-31 PROCEDURE — 99213 OFFICE O/P EST LOW 20 MIN: CPT | Performed by: INTERNAL MEDICINE

## 2021-03-31 PROCEDURE — 80048 BASIC METABOLIC PNL TOTAL CA: CPT | Performed by: NURSE PRACTITIONER

## 2021-03-31 PROCEDURE — 83735 ASSAY OF MAGNESIUM: CPT | Performed by: NURSE PRACTITIONER

## 2021-03-31 PROCEDURE — 85610 PROTHROMBIN TIME: CPT | Performed by: NURSE PRACTITIONER

## 2021-03-31 PROCEDURE — 85025 COMPLETE CBC W/AUTO DIFF WBC: CPT | Performed by: NURSE PRACTITIONER

## 2021-03-31 RX ORDER — GABAPENTIN 100 MG/1
200 CAPSULE ORAL EVERY 12 HOURS SCHEDULED
Qty: 120 CAPSULE | Refills: 0 | Status: SHIPPED | OUTPATIENT
Start: 2021-03-31 | End: 2021-07-13

## 2021-03-31 RX ORDER — METRONIDAZOLE 500 MG/1
500 TABLET ORAL EVERY 6 HOURS SCHEDULED
Qty: 40 TABLET | Refills: 0 | Status: SHIPPED | OUTPATIENT
Start: 2021-03-31 | End: 2021-04-10

## 2021-03-31 RX ORDER — CEFUROXIME AXETIL 500 MG/1
500 TABLET ORAL EVERY 12 HOURS SCHEDULED
Qty: 19 TABLET | Refills: 0 | Status: SHIPPED | OUTPATIENT
Start: 2021-03-31 | End: 2021-04-10

## 2021-03-31 RX ORDER — METRONIDAZOLE 500 MG/1
500 TABLET ORAL EVERY 6 HOURS SCHEDULED
Status: DISCONTINUED | OUTPATIENT
Start: 2021-03-31 | End: 2021-03-31 | Stop reason: HOSPADM

## 2021-03-31 RX ORDER — CEFUROXIME AXETIL 500 MG/1
500 TABLET ORAL EVERY 12 HOURS SCHEDULED
Status: DISCONTINUED | OUTPATIENT
Start: 2021-03-31 | End: 2021-03-31 | Stop reason: HOSPADM

## 2021-03-31 RX ORDER — GABAPENTIN 100 MG/1
200 CAPSULE ORAL EVERY 12 HOURS SCHEDULED
Status: DISCONTINUED | OUTPATIENT
Start: 2021-03-31 | End: 2021-03-31 | Stop reason: HOSPADM

## 2021-03-31 RX ADMIN — GABAPENTIN 200 MG: 100 CAPSULE ORAL at 09:28

## 2021-03-31 RX ADMIN — METRONIDAZOLE 500 MG: 500 INJECTION, SOLUTION INTRAVENOUS at 03:01

## 2021-03-31 RX ADMIN — LEVOTHYROXINE SODIUM 137 MCG: 0.03 TABLET ORAL at 07:27

## 2021-03-31 RX ADMIN — SUCRALFATE 1 G: 1 TABLET ORAL at 07:25

## 2021-03-31 RX ADMIN — RIFAXIMIN 550 MG: 550 TABLET ORAL at 07:26

## 2021-03-31 RX ADMIN — OXYCODONE 10 MG: 5 TABLET ORAL at 07:26

## 2021-03-31 RX ADMIN — PANTOPRAZOLE SODIUM 40 MG: 40 TABLET, DELAYED RELEASE ORAL at 07:25

## 2021-03-31 RX ADMIN — OXYCODONE 10 MG: 5 TABLET ORAL at 03:01

## 2021-03-31 RX ADMIN — CEFUROXIME AXETIL 500 MG: 500 TABLET ORAL at 09:28

## 2021-04-01 ENCOUNTER — READMISSION MANAGEMENT (OUTPATIENT)
Dept: CALL CENTER | Facility: HOSPITAL | Age: 46
End: 2021-04-01

## 2021-04-01 LAB — BILIRUB FLD-MCNC: <0.2 MG/DL

## 2021-04-01 NOTE — PROGRESS NOTES
Case Management Discharge Note      Final Note: Routine discharge home    Provided Post Acute Provider List?: N/A  Provided Post Acute Provider Quality & Resource List?: N/A    Selected Continued Care - Discharged on 3/31/2021 Admission date: 3/29/2021 - Discharge disposition: Home or Self Care                 Final Discharge Disposition Code: 01 - home or self-care

## 2021-04-01 NOTE — OUTREACH NOTE
Prep Survey      Responses   [facility] facility patient discharged from?  [facility]   Is LACE score < 7 ?  No   Emergency Room discharge w/ pulse ox?  No   Eligibility  Readm Mgmt   Discharge diagnosis  Abdominal painColitis   Does the patient have one of the following disease processes/diagnoses(primary or secondary)?  Other   Does the patient have Home health ordered?  No   Is there a DME ordered?  No   Prep survey completed?  Yes          Lu Laird RN

## 2021-04-03 LAB
BACTERIA SPEC AEROBE CULT: NORMAL
BACTERIA SPEC AEROBE CULT: NORMAL

## 2021-04-04 LAB
BACTERIA FLD CULT: NORMAL
BACTERIA SPEC ANAEROBE CULT: NORMAL
GRAM STN SPEC: NORMAL
GRAM STN SPEC: NORMAL

## 2021-04-05 ENCOUNTER — READMISSION MANAGEMENT (OUTPATIENT)
Dept: CALL CENTER | Facility: HOSPITAL | Age: 46
End: 2021-04-05

## 2021-04-05 NOTE — OUTREACH NOTE
Medical Week 1 Survey      Responses   [facility] patient discharged from?  [facility]   Does the patient have one of the following disease processes/diagnoses(primary or secondary)?  Other   Week 1 attempt successful?  No   Unsuccessful attempts  Attempt 1 [ATTEMPTED TO PATIENT'S AND MOTHER'S PHONES WITH NO ANSWER.]          Cheryl Xiong LPN

## 2021-04-06 ENCOUNTER — READMISSION MANAGEMENT (OUTPATIENT)
Dept: CALL CENTER | Facility: HOSPITAL | Age: 46
End: 2021-04-06

## 2021-04-06 NOTE — OUTREACH NOTE
Medical Week 1 Survey      Responses   [facility] patient discharged from?  [facility]   Does the patient have one of the following disease processes/diagnoses(primary or secondary)?  Other   Week 1 attempt successful?  No   Unsuccessful attempts  Attempt 2          Cheryl Xiong LPN

## 2021-04-09 ENCOUNTER — READMISSION MANAGEMENT (OUTPATIENT)
Dept: CALL CENTER | Facility: HOSPITAL | Age: 46
End: 2021-04-09

## 2021-04-09 NOTE — OUTREACH NOTE
Medical Week 2 Survey      Responses   [facility] [place] patient discharged from?  [facility]   Does the patient have one of the following disease processes/diagnoses(primary or secondary)?  Other   Week 2 attempt successful?  No   Unsuccessful attempts  Attempt 1          Cheryl Xiong LPN

## 2021-04-12 ENCOUNTER — READMISSION MANAGEMENT (OUTPATIENT)
Dept: CALL CENTER | Facility: HOSPITAL | Age: 46
End: 2021-04-12

## 2021-04-20 ENCOUNTER — READMISSION MANAGEMENT (OUTPATIENT)
Dept: CALL CENTER | Facility: HOSPITAL | Age: 46
End: 2021-04-20

## 2021-04-20 NOTE — OUTREACH NOTE
Medical Week 3 Survey      Responses   [facility] [place] patient discharged from?  [facility]   Does the patient have one of the following disease processes/diagnoses(primary or secondary)?  Other   Week 3 attempt successful?  No   Unsuccessful attempts  Attempt 2          Cheryl Xiong LPN

## 2021-04-23 ENCOUNTER — APPOINTMENT (OUTPATIENT)
Dept: INFUSION THERAPY | Facility: HOSPITAL | Age: 46
End: 2021-04-23

## 2021-05-13 DIAGNOSIS — R18.8 OTHER ASCITES: Primary | ICD-10-CM

## 2021-05-13 RX ORDER — ALBUMIN (HUMAN) 12.5 G/50ML
12.5 SOLUTION INTRAVENOUS DAILY PRN
Status: CANCELLED | OUTPATIENT
Start: 2021-05-14

## 2021-05-13 RX ORDER — ALBUMIN (HUMAN) 12.5 G/50ML
25 SOLUTION INTRAVENOUS DAILY PRN
Status: CANCELLED | OUTPATIENT
Start: 2021-05-14

## 2021-05-14 ENCOUNTER — APPOINTMENT (OUTPATIENT)
Dept: INFUSION THERAPY | Facility: HOSPITAL | Age: 46
End: 2021-05-14

## 2021-05-14 ENCOUNTER — TELEPHONE (OUTPATIENT)
Dept: INFUSION THERAPY | Facility: HOSPITAL | Age: 46
End: 2021-05-14

## 2021-05-15 VITALS
DIASTOLIC BLOOD PRESSURE: 68 MMHG | WEIGHT: 139 LBS | BODY MASS INDEX: 24.63 KG/M2 | HEIGHT: 63 IN | SYSTOLIC BLOOD PRESSURE: 113 MMHG

## 2021-05-15 VITALS
WEIGHT: 137.25 LBS | SYSTOLIC BLOOD PRESSURE: 138 MMHG | DIASTOLIC BLOOD PRESSURE: 75 MMHG | BODY MASS INDEX: 24.32 KG/M2 | HEIGHT: 63 IN

## 2021-05-15 VITALS — HEIGHT: 63 IN | OXYGEN SATURATION: 98 % | BODY MASS INDEX: 23.92 KG/M2 | HEART RATE: 82 BPM | WEIGHT: 135 LBS

## 2021-05-16 VITALS
DIASTOLIC BLOOD PRESSURE: 75 MMHG | WEIGHT: 136.37 LBS | BODY MASS INDEX: 24.16 KG/M2 | HEIGHT: 63 IN | SYSTOLIC BLOOD PRESSURE: 127 MMHG

## 2021-07-13 ENCOUNTER — HOSPITAL ENCOUNTER (OUTPATIENT)
Facility: HOSPITAL | Age: 46
Setting detail: OBSERVATION
Discharge: HOME | End: 2021-07-15
Attending: EMERGENCY MEDICINE | Admitting: INTERNAL MEDICINE

## 2021-07-13 DIAGNOSIS — G47.30 SLEEP APNEA, UNSPECIFIED TYPE: ICD-10-CM

## 2021-07-13 DIAGNOSIS — R18.8 OTHER ASCITES: ICD-10-CM

## 2021-07-13 DIAGNOSIS — R10.10 PAIN OF UPPER ABDOMEN: Primary | ICD-10-CM

## 2021-07-13 DIAGNOSIS — K21.00 GASTROESOPHAGEAL REFLUX DISEASE WITH ESOPHAGITIS WITHOUT HEMORRHAGE: ICD-10-CM

## 2021-07-13 PROBLEM — K76.82 ENCEPHALOPATHY, PORTAL SYSTEMIC (HCC): Status: ACTIVE | Noted: 2017-05-09

## 2021-07-13 PROBLEM — I85.00 ESOPHAGEAL VARICES (HCC): Status: ACTIVE | Noted: 2017-07-21

## 2021-07-13 PROBLEM — I26.99 PULMONARY EMBOLISM (HCC): Status: ACTIVE | Noted: 2020-10-27

## 2021-07-13 PROBLEM — I10 BENIGN ESSENTIAL HYPERTENSION: Status: ACTIVE | Noted: 2021-07-13

## 2021-07-13 LAB
ALBUMIN SERPL-MCNC: 2.8 G/DL (ref 3.5–5.2)
ALBUMIN/GLOB SERPL: 0.9 G/DL
ALP SERPL-CCNC: 155 U/L (ref 39–117)
ALT SERPL W P-5'-P-CCNC: 17 U/L (ref 1–33)
ANION GAP SERPL CALCULATED.3IONS-SCNC: 6 MMOL/L (ref 5–15)
ANISOCYTOSIS BLD QL: ABNORMAL
AST SERPL-CCNC: 35 U/L (ref 1–32)
BASOPHILS # BLD MANUAL: 0.37 10*3/MM3 (ref 0–0.2)
BASOPHILS NFR BLD AUTO: 6 % (ref 0–1.5)
BILIRUB SERPL-MCNC: 0.9 MG/DL (ref 0–1.2)
BUN SERPL-MCNC: 10 MG/DL (ref 6–20)
BUN/CREAT SERPL: 14.5 (ref 7–25)
BURR CELLS BLD QL SMEAR: ABNORMAL
CALCIUM SPEC-SCNC: 7.6 MG/DL (ref 8.6–10.5)
CHLORIDE SERPL-SCNC: 107 MMOL/L (ref 98–107)
CO2 SERPL-SCNC: 25 MMOL/L (ref 22–29)
CREAT SERPL-MCNC: 0.69 MG/DL (ref 0.57–1)
DEPRECATED RDW RBC AUTO: 60.4 FL (ref 37–54)
EOSINOPHIL # BLD MANUAL: 0.43 10*3/MM3 (ref 0–0.4)
EOSINOPHIL NFR BLD MANUAL: 7 % (ref 0.3–6.2)
ERYTHROCYTE [DISTWIDTH] IN BLOOD BY AUTOMATED COUNT: 22.2 % (ref 12.3–15.4)
GFR SERPL CREATININE-BSD FRML MDRD: 91 ML/MIN/1.73
GLOBULIN UR ELPH-MCNC: 3.1 GM/DL
GLUCOSE SERPL-MCNC: 74 MG/DL (ref 65–99)
HCT VFR BLD AUTO: 31.3 % (ref 34–46.6)
HGB BLD-MCNC: 10 G/DL (ref 12–15.9)
LIPASE SERPL-CCNC: 21 U/L (ref 13–60)
LYMPHOCYTES # BLD MANUAL: 1.77 10*3/MM3 (ref 0.7–3.1)
LYMPHOCYTES NFR BLD MANUAL: 29 % (ref 19.6–45.3)
LYMPHOCYTES NFR BLD MANUAL: 8 % (ref 5–12)
MCH RBC QN AUTO: 24.4 PG (ref 26.6–33)
MCHC RBC AUTO-ENTMCNC: 31.8 G/DL (ref 31.5–35.7)
MCV RBC AUTO: 76.7 FL (ref 79–97)
MONOCYTES # BLD AUTO: 0.49 10*3/MM3 (ref 0.1–0.9)
NEUTROPHILS # BLD AUTO: 3.05 10*3/MM3 (ref 1.7–7)
NEUTROPHILS NFR BLD MANUAL: 50 % (ref 42.7–76)
PATHOLOGY REVIEW: YES
PLATELET # BLD AUTO: 618 10*3/MM3 (ref 140–450)
PMV BLD AUTO: 8.4 FL (ref 6–12)
POIKILOCYTOSIS BLD QL SMEAR: ABNORMAL
POTASSIUM SERPL-SCNC: 4 MMOL/L (ref 3.5–5.2)
PROT SERPL-MCNC: 5.9 G/DL (ref 6–8.5)
RBC # BLD AUTO: 4.08 10*6/MM3 (ref 3.77–5.28)
SARS-COV-2 RNA PNL SPEC NAA+PROBE: NOT DETECTED
SCAN SLIDE: NORMAL
SMALL PLATELETS BLD QL SMEAR: ABNORMAL
SODIUM SERPL-SCNC: 138 MMOL/L (ref 136–145)
TARGETS BLD QL SMEAR: ABNORMAL
WBC # BLD AUTO: 6.1 10*3/MM3 (ref 3.4–10.8)
WBC MORPH BLD: NORMAL

## 2021-07-13 PROCEDURE — 80053 COMPREHEN METABOLIC PANEL: CPT | Performed by: EMERGENCY MEDICINE

## 2021-07-13 PROCEDURE — C9803 HOPD COVID-19 SPEC COLLECT: HCPCS

## 2021-07-13 PROCEDURE — 25010000002 ONDANSETRON PER 1 MG: Performed by: EMERGENCY MEDICINE

## 2021-07-13 PROCEDURE — 96376 TX/PRO/DX INJ SAME DRUG ADON: CPT

## 2021-07-13 PROCEDURE — 83735 ASSAY OF MAGNESIUM: CPT | Performed by: NURSE PRACTITIONER

## 2021-07-13 PROCEDURE — 87635 SARS-COV-2 COVID-19 AMP PRB: CPT | Performed by: EMERGENCY MEDICINE

## 2021-07-13 PROCEDURE — 85007 BL SMEAR W/DIFF WBC COUNT: CPT | Performed by: EMERGENCY MEDICINE

## 2021-07-13 PROCEDURE — 83690 ASSAY OF LIPASE: CPT | Performed by: EMERGENCY MEDICINE

## 2021-07-13 PROCEDURE — 96375 TX/PRO/DX INJ NEW DRUG ADDON: CPT

## 2021-07-13 PROCEDURE — 85025 COMPLETE CBC W/AUTO DIFF WBC: CPT | Performed by: EMERGENCY MEDICINE

## 2021-07-13 PROCEDURE — 25010000002 HYDROMORPHONE PER 4 MG: Performed by: EMERGENCY MEDICINE

## 2021-07-13 PROCEDURE — 99284 EMERGENCY DEPT VISIT MOD MDM: CPT

## 2021-07-13 PROCEDURE — 99219 PR INITIAL OBSERVATION CARE/DAY 50 MINUTES: CPT | Performed by: NURSE PRACTITIONER

## 2021-07-13 PROCEDURE — G0378 HOSPITAL OBSERVATION PER HR: HCPCS

## 2021-07-13 RX ORDER — HYDROMORPHONE HCL 110MG/55ML
0.5 PATIENT CONTROLLED ANALGESIA SYRINGE INTRAVENOUS ONCE
Status: COMPLETED | OUTPATIENT
Start: 2021-07-13 | End: 2021-07-13

## 2021-07-13 RX ORDER — PANTOPRAZOLE SODIUM 40 MG/1
40 TABLET, DELAYED RELEASE ORAL
Status: DISCONTINUED | OUTPATIENT
Start: 2021-07-14 | End: 2021-07-15 | Stop reason: HOSPADM

## 2021-07-13 RX ORDER — SODIUM CHLORIDE 0.9 % (FLUSH) 0.9 %
10 SYRINGE (ML) INJECTION AS NEEDED
Status: DISCONTINUED | OUTPATIENT
Start: 2021-07-13 | End: 2021-07-15 | Stop reason: HOSPADM

## 2021-07-13 RX ORDER — ALUMINA, MAGNESIA, AND SIMETHICONE 2400; 2400; 240 MG/30ML; MG/30ML; MG/30ML
15 SUSPENSION ORAL EVERY 6 HOURS PRN
Status: DISCONTINUED | OUTPATIENT
Start: 2021-07-13 | End: 2021-07-15 | Stop reason: HOSPADM

## 2021-07-13 RX ORDER — ALBUMIN (HUMAN) 12.5 G/50ML
75 SOLUTION INTRAVENOUS ONCE
Status: DISCONTINUED | OUTPATIENT
Start: 2021-07-13 | End: 2021-07-15 | Stop reason: HOSPADM

## 2021-07-13 RX ORDER — ALBUMIN (HUMAN) 12.5 G/50ML
112.5 SOLUTION INTRAVENOUS ONCE
Status: DISCONTINUED | OUTPATIENT
Start: 2021-07-13 | End: 2021-07-15 | Stop reason: HOSPADM

## 2021-07-13 RX ORDER — ALBUMIN (HUMAN) 12.5 G/50ML
100 SOLUTION INTRAVENOUS ONCE
Status: DISCONTINUED | OUTPATIENT
Start: 2021-07-13 | End: 2021-07-15 | Stop reason: HOSPADM

## 2021-07-13 RX ORDER — CHOLECALCIFEROL (VITAMIN D3) 125 MCG
5 CAPSULE ORAL NIGHTLY PRN
Status: DISCONTINUED | OUTPATIENT
Start: 2021-07-13 | End: 2021-07-15 | Stop reason: HOSPADM

## 2021-07-13 RX ORDER — ALBUMIN (HUMAN) 12.5 G/50ML
62.5 SOLUTION INTRAVENOUS ONCE
Status: DISCONTINUED | OUTPATIENT
Start: 2021-07-13 | End: 2021-07-15 | Stop reason: HOSPADM

## 2021-07-13 RX ORDER — SPIRONOLACTONE 100 MG/1
300 TABLET, FILM COATED ORAL DAILY
Status: DISCONTINUED | OUTPATIENT
Start: 2021-07-14 | End: 2021-07-15 | Stop reason: HOSPADM

## 2021-07-13 RX ORDER — ONDANSETRON 4 MG/1
8 TABLET, ORALLY DISINTEGRATING ORAL EVERY 8 HOURS PRN
Status: DISCONTINUED | OUTPATIENT
Start: 2021-07-13 | End: 2021-07-15 | Stop reason: HOSPADM

## 2021-07-13 RX ORDER — HYDROMORPHONE HCL 110MG/55ML
1 PATIENT CONTROLLED ANALGESIA SYRINGE INTRAVENOUS
Status: DISCONTINUED | OUTPATIENT
Start: 2021-07-13 | End: 2021-07-15

## 2021-07-13 RX ORDER — SUCRALFATE 1 G/1
1 TABLET ORAL
Status: DISCONTINUED | OUTPATIENT
Start: 2021-07-14 | End: 2021-07-15 | Stop reason: HOSPADM

## 2021-07-13 RX ORDER — ONDANSETRON 2 MG/ML
4 INJECTION INTRAMUSCULAR; INTRAVENOUS EVERY 6 HOURS PRN
Status: DISCONTINUED | OUTPATIENT
Start: 2021-07-13 | End: 2021-07-15 | Stop reason: HOSPADM

## 2021-07-13 RX ORDER — ONDANSETRON 4 MG/1
4 TABLET, FILM COATED ORAL EVERY 6 HOURS PRN
Status: DISCONTINUED | OUTPATIENT
Start: 2021-07-13 | End: 2021-07-15 | Stop reason: HOSPADM

## 2021-07-13 RX ORDER — BUMETANIDE 1 MG/1
2 TABLET ORAL DAILY
Status: DISCONTINUED | OUTPATIENT
Start: 2021-07-14 | End: 2021-07-15 | Stop reason: HOSPADM

## 2021-07-13 RX ORDER — GABAPENTIN 400 MG/1
400 CAPSULE ORAL 3 TIMES DAILY
Status: DISCONTINUED | OUTPATIENT
Start: 2021-07-13 | End: 2021-07-15 | Stop reason: HOSPADM

## 2021-07-13 RX ORDER — OXYCODONE HYDROCHLORIDE 10 MG/1
10 TABLET ORAL EVERY 6 HOURS PRN
COMMUNITY
End: 2021-08-25

## 2021-07-13 RX ORDER — MAGNESIUM SULFATE 1 G/100ML
1 INJECTION INTRAVENOUS AS NEEDED
Status: DISCONTINUED | OUTPATIENT
Start: 2021-07-13 | End: 2021-07-15 | Stop reason: HOSPADM

## 2021-07-13 RX ORDER — NITROGLYCERIN 0.4 MG/1
0.4 TABLET SUBLINGUAL
Status: DISCONTINUED | OUTPATIENT
Start: 2021-07-13 | End: 2021-07-15 | Stop reason: HOSPADM

## 2021-07-13 RX ORDER — ALBUMIN (HUMAN) 12.5 G/50ML
50 SOLUTION INTRAVENOUS ONCE
Status: DISCONTINUED | OUTPATIENT
Start: 2021-07-13 | End: 2021-07-15 | Stop reason: HOSPADM

## 2021-07-13 RX ORDER — ALBUMIN (HUMAN) 12.5 G/50ML
37.5 SOLUTION INTRAVENOUS ONCE
Status: DISCONTINUED | OUTPATIENT
Start: 2021-07-13 | End: 2021-07-15 | Stop reason: HOSPADM

## 2021-07-13 RX ORDER — ONDANSETRON 2 MG/ML
4 INJECTION INTRAMUSCULAR; INTRAVENOUS ONCE
Status: COMPLETED | OUTPATIENT
Start: 2021-07-13 | End: 2021-07-13

## 2021-07-13 RX ORDER — GABAPENTIN 400 MG/1
400 CAPSULE ORAL 3 TIMES DAILY
COMMUNITY

## 2021-07-13 RX ORDER — SODIUM CHLORIDE 0.9 % (FLUSH) 0.9 %
10 SYRINGE (ML) INJECTION EVERY 12 HOURS SCHEDULED
Status: DISCONTINUED | OUTPATIENT
Start: 2021-07-13 | End: 2021-07-15 | Stop reason: HOSPADM

## 2021-07-13 RX ORDER — POTASSIUM CHLORIDE 20 MEQ/1
40 TABLET, EXTENDED RELEASE ORAL AS NEEDED
Status: DISCONTINUED | OUTPATIENT
Start: 2021-07-13 | End: 2021-07-15 | Stop reason: HOSPADM

## 2021-07-13 RX ORDER — ALBUMIN (HUMAN) 12.5 G/50ML
87.5 SOLUTION INTRAVENOUS ONCE
Status: DISCONTINUED | OUTPATIENT
Start: 2021-07-13 | End: 2021-07-15 | Stop reason: HOSPADM

## 2021-07-13 RX ORDER — DICYCLOMINE HYDROCHLORIDE 10 MG/1
10 CAPSULE ORAL
Status: DISCONTINUED | OUTPATIENT
Start: 2021-07-13 | End: 2021-07-15 | Stop reason: HOSPADM

## 2021-07-13 RX ORDER — LEVOTHYROXINE SODIUM 0.12 MG/1
125 TABLET ORAL
COMMUNITY

## 2021-07-13 RX ORDER — MAGNESIUM SULFATE HEPTAHYDRATE 40 MG/ML
2 INJECTION, SOLUTION INTRAVENOUS AS NEEDED
Status: DISCONTINUED | OUTPATIENT
Start: 2021-07-13 | End: 2021-07-15 | Stop reason: HOSPADM

## 2021-07-13 RX ORDER — OXYCODONE HYDROCHLORIDE 5 MG/1
10 TABLET ORAL EVERY 6 HOURS PRN
Status: DISCONTINUED | OUTPATIENT
Start: 2021-07-13 | End: 2021-07-14

## 2021-07-13 RX ORDER — LEVOTHYROXINE SODIUM 0.12 MG/1
125 TABLET ORAL DAILY
Status: DISCONTINUED | OUTPATIENT
Start: 2021-07-14 | End: 2021-07-15 | Stop reason: HOSPADM

## 2021-07-13 RX ADMIN — HYDROMORPHONE HYDROCHLORIDE 0.5 MG: 2 INJECTION, SOLUTION INTRAMUSCULAR; INTRAVENOUS; SUBCUTANEOUS at 21:49

## 2021-07-13 RX ADMIN — HYDROMORPHONE HYDROCHLORIDE 0.5 MG: 2 INJECTION, SOLUTION INTRAMUSCULAR; INTRAVENOUS; SUBCUTANEOUS at 19:58

## 2021-07-13 RX ADMIN — ONDANSETRON 4 MG: 2 INJECTION INTRAMUSCULAR; INTRAVENOUS at 19:58

## 2021-07-14 ENCOUNTER — APPOINTMENT (OUTPATIENT)
Dept: CT IMAGING | Facility: HOSPITAL | Age: 46
End: 2021-07-14

## 2021-07-14 PROBLEM — N81.10 VAGINAL PROLAPSE: Status: ACTIVE | Noted: 2021-07-14

## 2021-07-14 LAB
ALBUMIN FLD-MCNC: 0.4 G/DL
ALBUMIN FLD-MCNC: 0.4 G/DL
AMYLASE FLD-CCNC: 16 U/L
ANION GAP SERPL CALCULATED.3IONS-SCNC: 10 MMOL/L (ref 5–15)
ANISOCYTOSIS BLD QL: ABNORMAL
BASOPHILS # BLD MANUAL: 0.2 10*3/MM3 (ref 0–0.2)
BASOPHILS NFR BLD AUTO: 2 % (ref 0–1.5)
BUN SERPL-MCNC: 10 MG/DL (ref 6–20)
BUN/CREAT SERPL: 14.5 (ref 7–25)
BURR CELLS BLD QL SMEAR: ABNORMAL
CALCIUM SPEC-SCNC: 7.6 MG/DL (ref 8.6–10.5)
CHLORIDE SERPL-SCNC: 104 MMOL/L (ref 98–107)
CO2 SERPL-SCNC: 23 MMOL/L (ref 22–29)
CREAT SERPL-MCNC: 0.69 MG/DL (ref 0.57–1)
DEPRECATED RDW RBC AUTO: 59.5 FL (ref 37–54)
EOSINOPHIL # BLD MANUAL: 0.2 10*3/MM3 (ref 0–0.4)
EOSINOPHIL NFR BLD MANUAL: 2 % (ref 0.3–6.2)
ERYTHROCYTE [DISTWIDTH] IN BLOOD BY AUTOMATED COUNT: 22.2 % (ref 12.3–15.4)
GFR SERPL CREATININE-BSD FRML MDRD: 91 ML/MIN/1.73
GLUCOSE SERPL-MCNC: 82 MG/DL (ref 65–99)
HCT VFR BLD AUTO: 32.6 % (ref 34–46.6)
HGB BLD-MCNC: 10.4 G/DL (ref 12–15.9)
INR PPP: 1.23 (ref 2–3)
LDH FLD-CCNC: 34 U/L
LDH FLD-CCNC: 38 U/L
LYMPHOCYTES # BLD MANUAL: 3.63 10*3/MM3 (ref 0.7–3.1)
LYMPHOCYTES NFR BLD MANUAL: 2 % (ref 5–12)
LYMPHOCYTES NFR BLD MANUAL: 36 % (ref 19.6–45.3)
MAGNESIUM SERPL-MCNC: 1.8 MG/DL (ref 1.6–2.6)
MCH RBC QN AUTO: 24.4 PG (ref 26.6–33)
MCHC RBC AUTO-ENTMCNC: 31.8 G/DL (ref 31.5–35.7)
MCV RBC AUTO: 76.9 FL (ref 79–97)
MONOCYTES # BLD AUTO: 0.2 10*3/MM3 (ref 0.1–0.9)
NEUTROPHILS # BLD AUTO: 5.59 10*3/MM3 (ref 1.7–7)
NEUTROPHILS NFR BLD MANUAL: 57 % (ref 42.7–76)
PLAT MORPH BLD: NORMAL
PLATELET # BLD AUTO: 597 10*3/MM3 (ref 140–450)
PMV BLD AUTO: 8.5 FL (ref 6–12)
POIKILOCYTOSIS BLD QL SMEAR: ABNORMAL
POTASSIUM SERPL-SCNC: 3.4 MMOL/L (ref 3.5–5.2)
PROT FLD-MCNC: <1 G/DL
PROTHROMBIN TIME: 13.4 SECONDS (ref 19.4–28.5)
RBC # BLD AUTO: 4.24 10*6/MM3 (ref 3.77–5.28)
SCAN SLIDE: NORMAL
SODIUM SERPL-SCNC: 137 MMOL/L (ref 136–145)
TARGETS BLD QL SMEAR: ABNORMAL
VARIANT LYMPHS NFR BLD MANUAL: 1 % (ref 0–5)
WBC # BLD AUTO: 9.8 10*3/MM3 (ref 3.4–10.8)
WBC MORPH BLD: NORMAL

## 2021-07-14 PROCEDURE — 93005 ELECTROCARDIOGRAM TRACING: CPT | Performed by: NURSE PRACTITIONER

## 2021-07-14 PROCEDURE — 36415 COLL VENOUS BLD VENIPUNCTURE: CPT | Performed by: NURSE PRACTITIONER

## 2021-07-14 PROCEDURE — 84157 ASSAY OF PROTEIN OTHER: CPT | Performed by: INTERNAL MEDICINE

## 2021-07-14 PROCEDURE — 99226 PR SBSQ OBSERVATION CARE/DAY 35 MINUTES: CPT | Performed by: INTERNAL MEDICINE

## 2021-07-14 PROCEDURE — P9047 ALBUMIN (HUMAN), 25%, 50ML: HCPCS | Performed by: INTERNAL MEDICINE

## 2021-07-14 PROCEDURE — G0378 HOSPITAL OBSERVATION PER HR: HCPCS

## 2021-07-14 PROCEDURE — 87102 FUNGUS ISOLATION CULTURE: CPT | Performed by: INTERNAL MEDICINE

## 2021-07-14 PROCEDURE — 82042 OTHER SOURCE ALBUMIN QUAN EA: CPT | Performed by: NURSE PRACTITIONER

## 2021-07-14 PROCEDURE — 85610 PROTHROMBIN TIME: CPT | Performed by: NURSE PRACTITIONER

## 2021-07-14 PROCEDURE — 85007 BL SMEAR W/DIFF WBC COUNT: CPT | Performed by: NURSE PRACTITIONER

## 2021-07-14 PROCEDURE — 49083 ABD PARACENTESIS W/IMAGING: CPT | Performed by: HOSPITALIST

## 2021-07-14 PROCEDURE — 93010 ELECTROCARDIOGRAM REPORT: CPT | Performed by: INTERNAL MEDICINE

## 2021-07-14 PROCEDURE — 74176 CT ABD & PELVIS W/O CONTRAST: CPT

## 2021-07-14 PROCEDURE — 83615 LACTATE (LD) (LDH) ENZYME: CPT | Performed by: INTERNAL MEDICINE

## 2021-07-14 PROCEDURE — 80048 BASIC METABOLIC PNL TOTAL CA: CPT | Performed by: NURSE PRACTITIONER

## 2021-07-14 PROCEDURE — 82150 ASSAY OF AMYLASE: CPT | Performed by: INTERNAL MEDICINE

## 2021-07-14 PROCEDURE — 96376 TX/PRO/DX INJ SAME DRUG ADON: CPT

## 2021-07-14 PROCEDURE — 87205 SMEAR GRAM STAIN: CPT | Performed by: INTERNAL MEDICINE

## 2021-07-14 PROCEDURE — 25010000002 ONDANSETRON PER 1 MG: Performed by: NURSE PRACTITIONER

## 2021-07-14 PROCEDURE — 85025 COMPLETE CBC W/AUTO DIFF WBC: CPT | Performed by: NURSE PRACTITIONER

## 2021-07-14 PROCEDURE — 96365 THER/PROPH/DIAG IV INF INIT: CPT

## 2021-07-14 PROCEDURE — 25010000002 ALBUMIN HUMAN 25% PER 50 ML: Performed by: INTERNAL MEDICINE

## 2021-07-14 PROCEDURE — 82247 BILIRUBIN TOTAL: CPT | Performed by: NURSE PRACTITIONER

## 2021-07-14 PROCEDURE — 88108 CYTOPATH CONCENTRATE TECH: CPT | Performed by: INTERNAL MEDICINE

## 2021-07-14 PROCEDURE — 89051 BODY FLUID CELL COUNT: CPT | Performed by: INTERNAL MEDICINE

## 2021-07-14 PROCEDURE — 87070 CULTURE OTHR SPECIMN AEROBIC: CPT | Performed by: INTERNAL MEDICINE

## 2021-07-14 PROCEDURE — 82042 OTHER SOURCE ALBUMIN QUAN EA: CPT | Performed by: INTERNAL MEDICINE

## 2021-07-14 PROCEDURE — 83615 LACTATE (LD) (LDH) ENZYME: CPT | Performed by: NURSE PRACTITIONER

## 2021-07-14 RX ORDER — ALBUMIN (HUMAN) 12.5 G/50ML
50 SOLUTION INTRAVENOUS ONCE
Status: COMPLETED | OUTPATIENT
Start: 2021-07-14 | End: 2021-07-14

## 2021-07-14 RX ORDER — ALBUMIN (HUMAN) 12.5 G/50ML
75 SOLUTION INTRAVENOUS ONCE
Status: COMPLETED | OUTPATIENT
Start: 2021-07-14 | End: 2021-07-14

## 2021-07-14 RX ORDER — ALBUMIN (HUMAN) 12.5 G/50ML
62.5 SOLUTION INTRAVENOUS ONCE
Status: COMPLETED | OUTPATIENT
Start: 2021-07-14 | End: 2021-07-14

## 2021-07-14 RX ORDER — ALBUMIN (HUMAN) 12.5 G/50ML
87.5 SOLUTION INTRAVENOUS ONCE
Status: COMPLETED | OUTPATIENT
Start: 2021-07-14 | End: 2021-07-14

## 2021-07-14 RX ORDER — ALBUMIN (HUMAN) 12.5 G/50ML
112.5 SOLUTION INTRAVENOUS ONCE
Status: COMPLETED | OUTPATIENT
Start: 2021-07-14 | End: 2021-07-14

## 2021-07-14 RX ORDER — OXYCODONE HYDROCHLORIDE 5 MG/1
10 TABLET ORAL EVERY 4 HOURS PRN
Status: DISCONTINUED | OUTPATIENT
Start: 2021-07-14 | End: 2021-07-15 | Stop reason: HOSPADM

## 2021-07-14 RX ORDER — ALBUMIN (HUMAN) 12.5 G/50ML
37.5 SOLUTION INTRAVENOUS ONCE
Status: COMPLETED | OUTPATIENT
Start: 2021-07-14 | End: 2021-07-14

## 2021-07-14 RX ORDER — ALBUMIN (HUMAN) 12.5 G/50ML
100 SOLUTION INTRAVENOUS ONCE
Status: COMPLETED | OUTPATIENT
Start: 2021-07-14 | End: 2021-07-14

## 2021-07-14 RX ADMIN — SUCRALFATE 1 G: 1 TABLET ORAL at 16:30

## 2021-07-14 RX ADMIN — OXYCODONE HYDROCHLORIDE 10 MG: 5 TABLET ORAL at 17:18

## 2021-07-14 RX ADMIN — RIFAXIMIN 550 MG: 550 TABLET ORAL at 08:34

## 2021-07-14 RX ADMIN — ONDANSETRON 4 MG: 2 INJECTION INTRAMUSCULAR; INTRAVENOUS at 17:56

## 2021-07-14 RX ADMIN — Medication 10 ML: at 08:34

## 2021-07-14 RX ADMIN — RIFAXIMIN 550 MG: 550 TABLET ORAL at 20:52

## 2021-07-14 RX ADMIN — GABAPENTIN 400 MG: 400 CAPSULE ORAL at 08:34

## 2021-07-14 RX ADMIN — GABAPENTIN 400 MG: 400 CAPSULE ORAL at 01:02

## 2021-07-14 RX ADMIN — GABAPENTIN 400 MG: 400 CAPSULE ORAL at 20:52

## 2021-07-14 RX ADMIN — GABAPENTIN 400 MG: 400 CAPSULE ORAL at 16:29

## 2021-07-14 RX ADMIN — OXYCODONE HYDROCHLORIDE 10 MG: 5 TABLET ORAL at 13:11

## 2021-07-14 RX ADMIN — SUCRALFATE 1 G: 1 TABLET ORAL at 12:18

## 2021-07-14 RX ADMIN — ONDANSETRON 4 MG: 2 INJECTION INTRAMUSCULAR; INTRAVENOUS at 05:09

## 2021-07-14 RX ADMIN — DICYCLOMINE HYDROCHLORIDE 10 MG: 10 CAPSULE ORAL at 01:02

## 2021-07-14 RX ADMIN — Medication 10 ML: at 20:52

## 2021-07-14 RX ADMIN — ALBUMIN HUMAN 37.5 G: 0.25 SOLUTION INTRAVENOUS at 16:22

## 2021-07-14 RX ADMIN — OXYCODONE HYDROCHLORIDE 10 MG: 5 TABLET ORAL at 21:36

## 2021-07-14 RX ADMIN — OXYCODONE HYDROCHLORIDE 10 MG: 5 TABLET ORAL at 05:04

## 2021-07-14 RX ADMIN — POTASSIUM CHLORIDE 40 MEQ: 1500 TABLET, EXTENDED RELEASE ORAL at 12:18

## 2021-07-14 RX ADMIN — POTASSIUM CHLORIDE 40 MEQ: 1500 TABLET, EXTENDED RELEASE ORAL at 19:30

## 2021-07-14 RX ADMIN — OXYCODONE HYDROCHLORIDE 10 MG: 5 TABLET ORAL at 01:02

## 2021-07-14 RX ADMIN — Medication 10 ML: at 01:03

## 2021-07-14 RX ADMIN — LEVOTHYROXINE SODIUM 125 MCG: 0.12 TABLET ORAL at 08:34

## 2021-07-14 RX ADMIN — SUCRALFATE 1 G: 1 TABLET ORAL at 20:51

## 2021-07-14 RX ADMIN — OXYCODONE HYDROCHLORIDE 10 MG: 5 TABLET ORAL at 09:22

## 2021-07-14 RX ADMIN — PANTOPRAZOLE SODIUM 40 MG: 40 TABLET, DELAYED RELEASE ORAL at 16:30

## 2021-07-14 RX ADMIN — SUCRALFATE 1 G: 1 TABLET ORAL at 08:34

## 2021-07-14 RX ADMIN — PANTOPRAZOLE SODIUM 40 MG: 40 TABLET, DELAYED RELEASE ORAL at 08:34

## 2021-07-14 NOTE — PROGRESS NOTES
[facility] Medicine Services Daily Progress Note    Patient Name: Lucinda Cope  : 1975  MRN: [id number]  Primary Care Physician:  Melissa Iniguez APRN  Date of admission: 2021      Subjective      Chief Complaint:   Abdominal distention      HPI   Lucinda Cope is a 45 y.o. female W/PMH of nonalcoholic steatohepatitis, GERD, thyroid cancer, portal vein thrombus, s/p thyroidectomy with postoperative hypothyroidism, HTN, DVT, chronic pain, ALEJO who presents to [facility] ED due to abdominal pain.  Patient states severe stabbing 10/10 abdominal pain has progressively worsened over the last 24 hours, no aggravating or alleviating factors noted.  Patient reports nausea, vomiting, dyspnea.  Patient denies chills, fever, diarrhea, tachycardia.  Patient also reports vaginal prolapse has returned, patient states in 2019 she had a hysterectomy with pelvic floor reconstruction.  As pain did not improve she decided to go to [facility] ED.           Upon arrival to the ED vitals 98.2, HR 87, RR 17, /98, O2 sat 100% on room air.  Labs notable for glucose 74, , K4.0, CO2 25, creatinine 0.69, BUN 7, calcium 7.6, ALT 17, AST 35, alkaline phosphatase 155, total bili 0.9, magnesium 1.8.  WBC 6.1, Hgb 10.0, platelets 618, neutrophils 50.  Patient treated with IV Dilaudid, IV Zofran the ED.    Patient Reports    Patient still complaining of abdominal distention.  She has significant anterior ventral hernia that has been no signs of incarceration or obstruction based on symptoms and examination.  She is awaiting paracentesis today.  Last time she took a laxative yesterday morning.  She denies active bleeding  She follows with transplant team and [facility] but had been meld score has improved and now she is not on transplant list.  Denies any fever or chills.  Reports diarrhea 2 days ago          *    Review of Systems   All other systems reviewed and are  negative.         Objective      Vitals:   Temp:  [97.8 °F (36.6 °C)-98.4 °F (36.9 °C)] 97.8 °F (36.6 °C)  Heart Rate:  [55-86] 55  Resp:  [14-18] 16  BP: (127-148)/(63-98) 140/84    Physical Exam  Vitals and nursing note reviewed.   Constitutional:       General: She is not in acute distress.     Appearance: Normal appearance. She is well-developed. She is not ill-appearing, toxic-appearing or diaphoretic.   HENT:      Head: Normocephalic and atraumatic.      Right Ear: Ear canal and external ear normal.      Left Ear: Ear canal and external ear normal.      Nose: Nose normal. No congestion or rhinorrhea.      Mouth/Throat:      Mouth: Mucous membranes are moist.      Pharynx: No oropharyngeal exudate.   Eyes:      General: No scleral icterus.        Right eye: No discharge.         Left eye: No discharge.      Extraocular Movements: Extraocular movements intact.      Conjunctiva/sclera: Conjunctivae normal.      Pupils: Pupils are equal, round, and reactive to light.   Neck:      Thyroid: No thyromegaly.      Vascular: No carotid bruit or JVD.      Trachea: No tracheal deviation.   Cardiovascular:      Rate and Rhythm: Normal rate and regular rhythm.      Pulses: Normal pulses.      Heart sounds: Normal heart sounds. No murmur heard.   No friction rub. No gallop.    Pulmonary:      Effort: Pulmonary effort is normal. No respiratory distress.      Breath sounds: Normal breath sounds. No stridor. No wheezing, rhonchi or rales.   Chest:      Chest wall: No tenderness.   Abdominal:      General: Bowel sounds are normal. There is no distension.      Palpations: Abdomen is soft. There is no mass.      Tenderness: There is no abdominal tenderness. There is no guarding or rebound.      Hernia: No hernia is present.   Musculoskeletal:         General: No swelling, tenderness, deformity or signs of injury. Normal range of motion.      Cervical back: Normal range of motion and neck supple. No rigidity. No muscular  tenderness.      Right lower leg: No edema.      Left lower leg: No edema.   Lymphadenopathy:      Cervical: No cervical adenopathy.   Skin:     General: Skin is warm and dry.      Coloration: Skin is not jaundiced or pale.      Findings: No bruising, erythema or rash.   Neurological:      General: No focal deficit present.      Mental Status: She is alert and oriented to person, place, and time. Mental status is at baseline.      Cranial Nerves: No cranial nerve deficit.      Sensory: No sensory deficit.      Motor: No weakness or abnormal muscle tone.      Coordination: Coordination normal.   Psychiatric:         Mood and Affect: Mood normal.         Behavior: Behavior normal.         Thought Content: Thought content normal.         Judgment: Judgment normal.             Result Review    Result Review:  I have personally reviewed the results from the time of this admission to 7/14/2021 09:09 EDT and agree with these findings:  [x]  Laboratory  [x]  Microbiology  [x]  Radiology  []  EKG/Telemetry   []  Cardiology/Vascular   [x]  Pathology  []  Old records  []  Other:  Most notable findings include: *Abnormal CT scan         Assessment/Plan      Brief Patient Summary:  Lucinda Cope is a 45 y.o. female who had abdominal distention and discomfort with ascites    Active Hospital Problems:  Active Hospital Problems    Diagnosis    • Vaginal prolapse    • Benign essential hypertension    • History of thyroid cancer    • Abdominal distention    • History of DVT (deep vein thrombosis)    • Chronic pain    • CARTER (nonalcoholic steatohepatitis)    • Portal vein thrombosis    • Postoperative hypothyroidism    • Gastroesophageal reflux disease    • Sleep apnea      Plan:      Abdominal distention:  -Unclear patient follows up to specialists as an outpatient  -Patient reports last paracentesis was performed at this facility several months prior  -Suspect paracentesis required due to ascites  -CT abdomen pelvis without contrast  ordered  1. Large volume abdominal and pelvic ascites, increased since 3/29/2021.  2. Large complex umbilical hernia is significantly distended with  ascites fluid. Hernia sac is larger than on the prior exam.  3. Surgical changes of the stomach. Cholecystectomy. Presumed  hysterectomy. Splenectomy.  4. Severe diffuse hepatic steatosis.  5. FINDINGS compatible with chronic portal vein thrombosis and cavernous  transformation, seen to better advantage on previous postcontrast CT  imaging.   -Bedside paracentesis with fluid analysis  -Hold Eliquis for procedure in a.m.  -N.p.o. after midnight     Vaginal prolapse:  -Consult to gynecology ordered     CARTER:  -Continue home medication Bumex 2 mg p.o. daily  -Continue home medication Aldactone 300 mg p.o. daily  -Continue home medication Xifaxan 550 mg p.o. every 12 hours  -Continue home medication Zofran 8 mg p.o. every 8 hours for nausea  -GI diet     GERD:  -Continue home medication Protonix 40 mg p.o. daily  -Continue home medication Bentyl 10 mg p.o. 4 times daily     History of thyroid cancer/status post thyroidectomy/thyroidism:  -Continue home medication levothyroxine 125 mcg p.o. daily     Portal vein thrombosis:  -Hold Eliquis for probable paracentesis in the a.m.     Benign essential hypertension:  -Encourage lifestyle modifications  -Low-sodium diet   -Vital signs every 4     Chronic pain:  -Continue home medication Neurontin 400 mg p.o. 3 times daily  -Continue home medication Roxicodone 10 mg every 4 as needed for severe pain  -Inspect verified     History of thyroid cancer:  -Continue to monitor     ALEJO:  -O2 as needed to keep sats greater than 90%          DVT prophylaxis:  Mechanical DVT prophylaxis orders are present.    CODE STATUS:    Level Of Support Discussed With: Patient  Code Status: CPR  Medical Interventions (Level of Support Prior to Arrest): Full    Disposition:  I expect patient to be discharged *Home    Electronically signed by Ever Motta  MD, 07/14/21, 09:09 EDT.  [facility] Hospitalist Team

## 2021-07-14 NOTE — ED PROVIDER NOTES
Subjective   History of Present Illness  Context: 45-year-old female presents with abdominal pain.  She states it started to become more severe yesterday.  She has had no fever.  She has had nausea but no vomiting.  She has had a little bit of diarrhea.  She has history of Ramon and is has required paracentesis.  She also has history of abdominal wall hernias.  She has had no cough or shortness of breath.  Location: Abdomen  Quality: Pain  Duration: 2 days  Timing: Constant  Severity: Moderate   Modifying factors: History of Ramon with ascites and abdominal wall hernias  Associated signs and symptoms:    Review of Systems   Constitutional: Negative for fever and unexpected weight change.   HENT: Negative for congestion and sore throat.    Eyes: Negative for pain and visual disturbance.   Respiratory: Negative for cough and shortness of breath.    Cardiovascular: Negative for chest pain and leg swelling.   Gastrointestinal: Positive for abdominal distention, abdominal pain, diarrhea and nausea. Negative for vomiting.   Genitourinary: Negative for dysuria and urgency.        She has had history of previous hysterectomy.  She states she has bulging in vaginal area again.   Musculoskeletal: Negative for back pain.   Skin: Negative for rash.   Neurological: Negative for dizziness, weakness and headaches.   Psychiatric/Behavioral: Negative for confusion. Decreased concentration: SPdispo.       Past Medical History:   Diagnosis Date   • Abdominal pain    • Anemia    • Cancer (CMS/HCC)    • Cirrhosis of liver (CMS/HCC) 04/2017   • Clot    • Deep venous thrombosis of right profunda femoris vein (CMS/HCC) 8/15/2019   • Depression    • Disease of thyroid gland    • Empyema of pleura (CMS/HCC)    • GERD (gastroesophageal reflux disease)    • Heartburn    • Hemorrhoids    • Hernia, ventral    • History of malignant neoplasm of thyroid    • History of transfusion    • Irregular menstrual cycle    • Migraine    • Parathyroid  abnormality (CMS/HCC)    • Radiation    • Restless legs syndrome (RLS)    • Urge and stress incontinence    • Varicose veins        Allergies   Allergen Reactions   • Compazine [Prochlorperazine Edisylate] Hives   • Contrast Dye Hives   • Iodinated Diagnostic Agents    • Iodine Hives   • Morphine Itching   • Nsaids Other (See Comments)     States I am not suppose to take them   • Hydrocodone Rash     Lortab elixir per pt       Past Surgical History:   Procedure Laterality Date   • ABDOMINAL SURGERY     • BEDSIDE PARACENTESIS  3/22/2020        • BEDSIDE PARACENTESIS  4/19/2020        • BEDSIDE PARACENTESIS  3/1/2021        • BEDSIDE PARACENTESIS  3/30/2021        • CHOLECYSTECTOMY      Laparoscopic   • ENDOSCOPY N/A 3/17/2016    Procedure: ESOPHAGOGASTRODUODENOSCOPY WITH BALOON DILATATION 18-20MM WITH GASTRIC SLEEVE SEALANT;  Surgeon: Leonardo Ortiz Jr., MD;  Location: [facility] ENDOSCOPY;  Service:    • ENDOSCOPY N/A 3/14/2016    Procedure: esophagogastroduodenoscopy with fluoroscopy;  Surgeon: Greg Avila III, MD;  Location: [facility] ENDOSCOPY;  Service:    • ENDOSCOPY N/A 4/15/2016    Procedure: EGD WITH DILITATION AND STENT PLACEMENT dilation of pylorus ;  Surgeon: Leonardo Ortiz Jr., MD;  Location: [facility] ENDOSCOPY;  Service:    • ENDOSCOPY N/A 5/13/2016    Procedure: ESOPHAGOGASTRODUODENOSCOPY  W/ STENT;  Surgeon: Leonardo Ortiz Jr., MD;  Location: [facility] ENDOSCOPY;  Service:    • ENDOSCOPY N/A 1/13/2021    Procedure: ESOPHAGOGASTRODUODENOSCOPY WITH POLYPECTOMY X1 AND  BIOPSY X1.;  Surgeon: Bryson Cotton MD;  Location: [facility] ENDOSCOPY;  Service: Gastroenterology;  Laterality: N/A;  anastomotic ulcer, antral polyp, GASTRITIS   • GASTRECTOMY      Gastric Surgery for Morbid Obesity Laparoscopic Longitudinal Gastrectomy   • GASTRIC SLEEVE LAPAROSCOPIC     • HEMORRHOIDECTOMY     • HERNIA REPAIR     • LAPAROSCOPY REPAIR HIATAL HERNIA     • LARYNGOSCOPY      Direct Laryngoscopy with Injection into  Vocal Cords   • OTHER SURGICAL HISTORY Bilateral     Ear Pressure Equalization Tube, Insertion, Bilaterally   • PERIPHERALLY INSERTED CENTRAL CATHETER INSERTION     • SPLENECTOMY     • THYROID SURGERY     • TUBAL ABDOMINAL LIGATION         Family History   Problem Relation Age of Onset   • Stroke Father    • Diabetes Father         Diabetes Mellitus   • Hypertension Father    • Cancer Other        Social History     Socioeconomic History   • Marital status: Single     Spouse name: Not on file   • Number of children: Not on file   • Years of education: Not on file   • Highest education level: Not on file   Tobacco Use   • Smoking status: Never Smoker   • Smokeless tobacco: Never Used   Vaping Use   • Vaping Use: Never used   Substance and Sexual Activity   • Alcohol use: No   • Drug use: No   • Sexual activity: Defer     Prior to Admission medications    Medication Sig Start Date End Date Taking? Authorizing Provider   apixaban (ELIQUIS) 5 MG tablet tablet Take 5 mg by mouth 2 (Two) Times a Day.    Justyn Erwin MD   bumetanide (BUMEX) 2 MG tablet Take 2 mg by mouth Daily.    Justyn Erwin MD   dicyclomine (Bentyl) 10 MG capsule Take 1 capsule by mouth 4 (Four) Times a Day Before Meals & at Bedtime As Needed (Abdominal pain or colic). 1/14/21   Ever Motta MD   gabapentin (NEURONTIN) 100 MG capsule Take 2 capsules by mouth Every 12 (Twelve) Hours for 30 days. 3/31/21 4/30/21  Ariel Diaz MD   levothyroxine (SYNTHROID, LEVOTHROID) 137 MCG tablet Take 137 mcg by mouth Daily.    Justyn Erwin MD   ondansetron ODT (ZOFRAN-ODT) 8 MG disintegrating tablet Place 8 mg on the tongue Every 8 (Eight) Hours As Needed for Nausea or Vomiting.    Justyn Erwin MD   oxyCODONE (ROXICODONE) 5 MG immediate release tablet Take 10 mg by mouth Every 4 (Four) Hours As Needed for Moderate Pain . 4-6 hprn     Justyn Erwin MD   pantoprazole (PROTONIX) 40 MG EC tablet  Take 1 tablet by mouth 2 (Two) Times a Day Before Meals. 3/10/21   Jing Brannon MD   rifaximin (XIFAXAN) 550 MG tablet Take 550 mg by mouth Every 12 (Twelve) Hours.    ProviderJustyn MD   spironolactone (ALDACTONE) 100 MG tablet Take 300 mg by mouth Daily.    ProviderJustyn MD   sucralfate (CARAFATE) 1 g tablet Take 1 tablet by mouth 4 (Four) Times a Day Before Meals & at Bedtime. 3/9/21   Jing Brannon MD           Objective   Physical Exam  45-year-old female awake alert.  Generally well-developed well-nourished.  Pupils equal round at light.  Sclera nonicteric oropharynx mucous membranes moist neck supple chest clear cardiovascular a rhythm abdomen distended positive ascites.  She has multiple ventral hernias.  these are nonincarcerated.  She has no involuntary guarding or rebound.  Legs without tenderness edema.  Procedures           ED Course      Results for orders placed or performed during the hospital encounter of 07/13/21   Comprehensive Metabolic Panel    Specimen: Blood   Result Value Ref Range    Glucose 74 65 - 99 mg/dL    BUN 10 6 - 20 mg/dL    Creatinine 0.69 0.57 - 1.00 mg/dL    Sodium 138 136 - 145 mmol/L    Potassium 4.0 3.5 - 5.2 mmol/L    Chloride 107 98 - 107 mmol/L    CO2 25.0 22.0 - 29.0 mmol/L    Calcium 7.6 (L) 8.6 - 10.5 mg/dL    Total Protein 5.9 (L) 6.0 - 8.5 g/dL    Albumin 2.80 (L) 3.50 - 5.20 g/dL    ALT (SGPT) 17 1 - 33 U/L    AST (SGOT) 35 (H) 1 - 32 U/L    Alkaline Phosphatase 155 (H) 39 - 117 U/L    Total Bilirubin 0.9 0.0 - 1.2 mg/dL    eGFR Non African Amer 91 >60 mL/min/1.73    Globulin 3.1 gm/dL    A/G Ratio 0.9 g/dL    BUN/Creatinine Ratio 14.5 7.0 - 25.0    Anion Gap 6.0 5.0 - 15.0 mmol/L   Lipase    Specimen: Blood   Result Value Ref Range    Lipase 21 13 - 60 U/L   CBC Auto Differential    Specimen: Blood   Result Value Ref Range    WBC 6.10 3.40 - 10.80 10*3/mm3    RBC 4.08 3.77 - 5.28 10*6/mm3    Hemoglobin 10.0 (L) 12.0 - 15.9 g/dL    Hematocrit 31.3  "(L) 34.0 - 46.6 %    MCV 76.7 (L) 79.0 - 97.0 fL    MCH 24.4 (L) 26.6 - 33.0 pg    MCHC 31.8 31.5 - 35.7 g/dL    RDW 22.2 (H) 12.3 - 15.4 %    RDW-SD 60.4 (H) 37.0 - 54.0 fl    MPV 8.4 6.0 - 12.0 fL    Platelets 618 (H) 140 - 450 10*3/mm3   Scan Slide    Specimen: Blood   Result Value Ref Range    Scan Slide     Manual Differential    Specimen: Blood   Result Value Ref Range    Neutrophil % 50.0 42.7 - 76.0 %    Lymphocyte % 29.0 19.6 - 45.3 %    Monocyte % 8.0 5.0 - 12.0 %    Eosinophil % 7.0 (H) 0.3 - 6.2 %    Basophil % 6.0 (H) 0.0 - 1.5 %    Neutrophils Absolute 3.05 1.70 - 7.00 10*3/mm3    Lymphocytes Absolute 1.77 0.70 - 3.10 10*3/mm3    Monocytes Absolute 0.49 0.10 - 0.90 10*3/mm3    Eosinophils Absolute 0.43 (H) 0.00 - 0.40 10*3/mm3    Basophils Absolute 0.37 (H) 0.00 - 0.20 10*3/mm3    Anisocytosis Slight/1+ None Seen    Crenated RBC's Mod/2+ None Seen    Poikilocytes Mod/2+ None Seen    Target Cells Mod/2+ None Seen    WBC Morphology Normal Normal    Platelet Estimate Increased Normal   Path Consult Reflex    Specimen: Blood   Result Value Ref Range    Pathology Review Yes      No radiology results for the last day  Medications   sodium chloride 0.9 % flush 10 mL (has no administration in time range)   HYDROmorphone (DILAUDID) injection 0.5 mg (0.5 mg Intravenous Given 7/13/21 1958)   ondansetron (ZOFRAN) injection 4 mg (4 mg Intravenous Given 7/13/21 1958)   HYDROmorphone (DILAUDID) injection 0.5 mg (0.5 mg Intravenous Given 7/13/21 2149)     /75   Pulse 86   Temp 98.2 °F (36.8 °C) (Oral)   Resp 18   Ht 160 cm (63\")   Wt 66.6 kg (146 lb 13.2 oz)   LMP  (Exact Date)   SpO2 100%   Breastfeeding No   BMI 26.01 kg/m²                                        MDM  .Chart review: Patient is noted to have had multiple abdominal surgeries gastric sleeve splenectomy Alfonzo-en-Y gastric bypass cholecystectomy incisional hernia repair.  She is also noted to have chronic portal vein thrombus.  She has not a " TIPS candidate due to portal vein thrombosis which is chronic.  Comorbidity: As per past history  Differential: Ascites, hernia, peritonitis,  My EKG interpretation:   Lab: Comprehensive metabolic panel calcium 7.6 total protein 5.9 albumin 2.8 CBC white count 6.1 with hemoglobin 10 platelet count of 618 with 50 segs 7 eosinophils no bands on differential lipase normal at 21  Radiology:   Discussion/treatment: Patient IV placed.  She was given Zofran and Dilaudid for her pain.  Patient's findings were discussed with her.  She was discussed with the nurse practitioner the hospitalist.  She was brought in for observation paracentesis in a.m.  Patient was evaluated using appropriate PPE      Final diagnoses:   Pain of upper abdomen   Other ascites       ED Disposition  ED Disposition     ED Disposition Condition Comment    Decision to Admit  Level of Care: Med/Surg [1]   Diagnosis: Pain of upper abdomen [142006]   Admitting Physician: OLGA MCFARLANE [[id number]]            No follow-up provider specified.       Medication List      No changes were made to your prescriptions during this visit.          Bryson Vines MD  07/13/21 [id number]

## 2021-07-14 NOTE — H&P
[facility] Medicine Services      Patient Name: Lucinda Cope  : 1975  MRN: [id number]  Primary Care Physician:  Melissa Iniguez APRN  Date of admission: 2021      Subjective        Chief Complaint: Abdominal pain        History of Present Illness:       Lucinda Cope is a 45 y.o. female W/PMH of nonalcoholic steatohepatitis, GERD, thyroid cancer, portal vein thrombus, s/p thyroidectomy with postoperative hypothyroidism, HTN, DVT, chronic pain, ALEJO who presents to [facility] ED due to abdominal pain.  Patient states severe stabbing 10/10 abdominal pain has progressively worsened over the last 24 hours, no aggravating or alleviating factors noted.  Patient reports nausea, vomiting, dyspnea.  Patient denies chills, fever, diarrhea, tachycardia.  Patient also reports vaginal prolapse has returned, patient states in 2019 she had a hysterectomy with pelvic floor reconstruction.  As pain did not improve she decided to go to [facility] ED.        Upon arrival to the ED vitals 98.2, HR 87, RR 17, /98, O2 sat 100% on room air.  Labs notable for glucose 74, , K4.0, CO2 25, creatinine 0.69, BUN 7, calcium 7.6, ALT 17, AST 35, alkaline phosphatase 155, total bili 0.9, magnesium 1.8.  WBC 6.1, Hgb 10.0, platelets 618, neutrophils 50.  Patient treated with IV Dilaudid, IV Zofran the ED.      Review of Systems   Constitutional: Positive for decreased appetite and malaise/fatigue.   HENT: Negative.    Eyes: Negative.    Cardiovascular: Positive for dyspnea on exertion.   Respiratory: Positive for shortness of breath.    Endocrine: Negative.    Hematologic/Lymphatic: Negative.    Skin: Positive for color change and dry skin.   Musculoskeletal: Positive for back pain, joint pain, joint swelling and muscle cramps.   Gastrointestinal: Positive for bloating, abdominal pain, nausea and vomiting.   Genitourinary: Negative for dysuria and flank pain.        Vaginal  prolapse   Neurological: Positive for weakness.   Psychiatric/Behavioral: Negative.    Allergic/Immunologic: Negative.    All other systems reviewed and are negative.       Personal History     Past Medical History:   Diagnosis Date   • Abdominal pain    • Anemia    • Cancer (CMS/HCC)    • Cirrhosis of liver (CMS/HCC) 04/2017   • Clot    • Deep venous thrombosis of right profunda femoris vein (CMS/HCC) 8/15/2019   • Depression    • Disease of thyroid gland    • Empyema of pleura (CMS/HCC)    • GERD (gastroesophageal reflux disease)    • Heartburn    • Hemorrhoids    • Hernia, ventral    • History of malignant neoplasm of thyroid    • History of transfusion    • Irregular menstrual cycle    • Migraine    • Parathyroid abnormality (CMS/HCC)    • Radiation    • Restless legs syndrome (RLS)    • Urge and stress incontinence    • Varicose veins        Past Surgical History:   Procedure Laterality Date   • ABDOMINAL SURGERY     • BEDSIDE PARACENTESIS  3/22/2020        • BEDSIDE PARACENTESIS  4/19/2020        • BEDSIDE PARACENTESIS  3/1/2021        • BEDSIDE PARACENTESIS  3/30/2021        • CHOLECYSTECTOMY      Laparoscopic   • ENDOSCOPY N/A 3/17/2016    Procedure: ESOPHAGOGASTRODUODENOSCOPY WITH BALOON DILATATION 18-20MM WITH GASTRIC SLEEVE SEALANT;  Surgeon: Leonardo Ortiz Jr., MD;  Location: [facility];  Service:    • ENDOSCOPY N/A 3/14/2016    Procedure: esophagogastroduodenoscopy with fluoroscopy;  Surgeon: Greg Avila III, MD;  Location: [facility] ENDOSCOPY;  Service:    • ENDOSCOPY N/A 4/15/2016    Procedure: EGD WITH DILITATION AND STENT PLACEMENT dilation of pylorus ;  Surgeon: Leonardo Ortiz Jr., MD;  Location: [facility] ENDOSCOPY;  Service:    • ENDOSCOPY N/A 5/13/2016    Procedure: ESOPHAGOGASTRODUODENOSCOPY  W/ STENT;  Surgeon: Leonardo Ortiz Jr., MD;  Location: [facility] ENDOSCOPY;  Service:    • ENDOSCOPY N/A 1/13/2021    Procedure: ESOPHAGOGASTRODUODENOSCOPY WITH POLYPECTOMY X1 AND  BIOPSY  X1.;  Surgeon: Bryson Cotton MD;  Location: [facility] ENDOSCOPY;  Service: Gastroenterology;  Laterality: N/A;  anastomotic ulcer, antral polyp, GASTRITIS   • GASTRECTOMY      Gastric Surgery for Morbid Obesity Laparoscopic Longitudinal Gastrectomy   • GASTRIC SLEEVE LAPAROSCOPIC     • HEMORRHOIDECTOMY     • HERNIA REPAIR     • LAPAROSCOPY REPAIR HIATAL HERNIA     • LARYNGOSCOPY      Direct Laryngoscopy with Injection into Vocal Cords   • OTHER SURGICAL HISTORY Bilateral     Ear Pressure Equalization Tube, Insertion, Bilaterally   • PERIPHERALLY INSERTED CENTRAL CATHETER INSERTION     • SPLENECTOMY     • THYROID SURGERY     • TUBAL ABDOMINAL LIGATION         Family History: family history includes Cancer in an other family member; Diabetes in her father; Hypertension in her father; Stroke in her father. Otherwise pertinent FHx was reviewed and not pertinent to current issue.    Social History:  reports that she has never smoked. She has never used smokeless tobacco. She reports that she does not drink alcohol and does not use drugs.    Home Medications:  Prior to Admission Medications     Prescriptions Last Dose Informant Patient Reported? Taking?    apixaban (ELIQUIS) 5 MG tablet tablet   Yes No    Take 5 mg by mouth 2 (Two) Times a Day.    bumetanide (BUMEX) 2 MG tablet  Self Yes No    Take 2 mg by mouth Daily.    dicyclomine (Bentyl) 10 MG capsule   No No    Take 1 capsule by mouth 4 (Four) Times a Day Before Meals & at Bedtime As Needed (Abdominal pain or colic).    gabapentin (NEURONTIN) 100 MG capsule   No No    Take 2 capsules by mouth Every 12 (Twelve) Hours for 30 days.    levothyroxine (SYNTHROID, LEVOTHROID) 137 MCG tablet   Yes No    Take 137 mcg by mouth Daily.    ondansetron ODT (ZOFRAN-ODT) 8 MG disintegrating tablet   Yes No    Place 8 mg on the tongue Every 8 (Eight) Hours As Needed for Nausea or Vomiting.    oxyCODONE (ROXICODONE) 5 MG immediate release tablet   Yes No    Take 10 mg by mouth Every  4 (Four) Hours As Needed for Moderate Pain . 4-6 hprn     pantoprazole (PROTONIX) 40 MG EC tablet   No No    Take 1 tablet by mouth 2 (Two) Times a Day Before Meals.    rifaximin (XIFAXAN) 550 MG tablet  Self Yes No    Take 550 mg by mouth Every 12 (Twelve) Hours.    spironolactone (ALDACTONE) 100 MG tablet  Self Yes No    Take 300 mg by mouth Daily.    sucralfate (CARAFATE) 1 g tablet   No No    Take 1 tablet by mouth 4 (Four) Times a Day Before Meals & at Bedtime.            Allergies:  Allergies   Allergen Reactions   • Compazine [Prochlorperazine Edisylate] Hives   • Contrast Dye Hives   • Iodinated Diagnostic Agents    • Iodine Hives   • Morphine Itching   • Nsaids Other (See Comments)     States I am not suppose to take them   • Hydrocodone Rash     Lortab elixir per pt       Objective      Vitals:   Temp:  [98.2 °F (36.8 °C)-98.4 °F (36.9 °C)] 98.3 °F (36.8 °C)  Heart Rate:  [59-86] 61  Resp:  [14-18] 14  BP: (127-148)/(63-98) 127/77    Physical Exam  Vitals and nursing note reviewed.   Constitutional:       Appearance: She is ill-appearing.   HENT:      Head: Normocephalic and atraumatic.      Right Ear: External ear normal.      Left Ear: External ear normal.      Nose: Nose normal.      Mouth/Throat:      Mouth: Mucous membranes are moist.   Eyes:      General: Scleral icterus present.   Cardiovascular:      Rate and Rhythm: Normal rate and regular rhythm.      Pulses: Normal pulses.      Heart sounds: Normal heart sounds.   Pulmonary:      Effort: Tachypnea present.      Breath sounds: Normal air entry. Examination of the right-lower field reveals decreased breath sounds. Examination of the left-lower field reveals decreased breath sounds. Decreased breath sounds present.   Abdominal:      General: Abdomen is protuberant. Bowel sounds are decreased. There is distension.      Palpations: There is fluid wave.      Tenderness: There is generalized abdominal tenderness.   Genitourinary:     Vagina: Prolapsed  vaginal walls present.   Musculoskeletal:      Cervical back: Normal range of motion.      Right lower leg: Edema present.      Left lower leg: Edema present.   Skin:     General: Skin is warm and dry.      Coloration: Skin is jaundiced.      Findings: Ecchymosis present.   Neurological:      Mental Status: She is alert and oriented to person, place, and time. Mental status is at baseline.   Psychiatric:         Attention and Perception: Attention and perception normal.         Mood and Affect: Mood and affect normal.         Speech: Speech normal.         Behavior: Behavior normal. Behavior is cooperative.         Thought Content: Thought content normal.         Cognition and Memory: Cognition and memory normal.         Judgment: Judgment normal.          Result Review    Result Review:  I have personally reviewed the results from the time of this admission to 7/14/2021 07:11 EDT and agree with these findings:  [x]  Laboratory  [x]  Microbiology  [x]  Radiology  [x]  EKG/Telemetry   []  Cardiology/Vascular   []  Pathology  []  Old records  []  Other:        Assessment/Plan        Active Hospital Problems:  Active Hospital Problems    Diagnosis    • Abdominal distention    • Benign essential hypertension    • CARTER (nonalcoholic steatohepatitis)    • Portal vein thrombosis    • Postoperative hypothyroidism    • Gastroesophageal reflux disease    • History of thyroid cancer    • History of DVT (deep vein thrombosis)    • Chronic pain    • Sleep apnea      Plan:     Abdominal distention:  -Unclear patient follows up to specialists as an outpatient  -Patient reports last paracentesis was performed at this facility several months prior  -Suspect paracentesis required due to ascites  -CT abdomen pelvis without contrast ordered  -Consult for paracentesis ordered with Dr. Ever Motta  -Elysia Stokes for procedure in a.m.  -N.p.o. after midnight    Vaginal prolapse:  -Consult to gynecology ordered    CARTER:  -Continue home  medication Bumex 2 mg p.o. daily  -Continue home medication Aldactone 300 mg p.o. daily  -Continue home medication Xifaxan 550 mg p.o. every 12 hours  -Continue home medication Zofran 8 mg p.o. every 8 hours for nausea  -GI diet    GERD:  -Continue home medication Protonix 40 mg p.o. daily  -Continue home medication Bentyl 10 mg p.o. 4 times daily    History of thyroid cancer/status post thyroidectomy/thyroidism:  -Continue home medication levothyroxine 125 mcg p.o. daily    Portal vein thrombosis:  -Hold Eliquis for probable paracentesis in the a.m.    Benign essential hypertension:  -Encourage lifestyle modifications  -Low-sodium diet   -Vital signs every 4    Chronic pain:  -Continue home medication Neurontin 400 mg p.o. 3 times daily  -Continue home medication Roxicodone 10 mg every 4 as needed for severe pain  -Inspect verified    History of thyroid cancer:  -Continue to monitor    ALEJO:  -O2 as needed to keep sats greater than 90%      DVT prophylaxis:  Mechanical DVT prophylaxis orders are present.    CODE STATUS:    Level Of Support Discussed With: Patient  Code Status: CPR  Medical Interventions (Level of Support Prior to Arrest): Full    Admission Status:  I believe this patient meets observation status.    Signature: Electronically signed by GRIFFIN Guevara, 07/14/21, 7:24 AM EDT.

## 2021-07-14 NOTE — NURSING NOTE
"Called #[phone] , hospitalist paged regarding pt. Request for Oxycodone to be ordered q 4 hrs prn.  She reports \"definentely cannot go more than 4 hours without oxycodone\"  Pt. Educated on pain med Dilaudid ordered, she responds \" it only last a short time\" \" I need long acting pain med.  "

## 2021-07-14 NOTE — NURSING NOTE
PARACENTESIS    Time Arrived in Department: 1315      Consent: yes  Allergies have been Reviewed: yes      ID Band Verified: yes    Method: Wheelchair    Lab Results: Checked and MD Notified     Physician: Dr. Peña      Nurse: Chioma Landa RN    IR Care Plan: Person Educated - Patient, Current Knowledge - Understands, Learning Barrier - None, Readiness to Learn - Acceptance, Teaching Topic - Procedure and Evaluation of Teaching - Verbalized Understanding      Neurological: Within Normal Limits    Skin: Flesh, Warm and Dry    Respiratory Status: Respirations even and enlabored/unlabored    Room In: ambulatory care      Procedure: Paracentesis    Time Out Completed: yes    Time Out: 1400    Prep Time: 1405    Prep Site 1: Left sided abdomen     Prep: Chlorhexidine and Sterile drape    Procedure Position: Left Side    Guidance: Ultrasound    Fluid Quality: Cloudy and Light Yellow    Procedure Note: Paracentesis began at 1410        Intra Time 1:1430    Intra Assess 1:   LOC: Alert  Pain:  No Issues  Skin: Flesh, Warm and Dry  Respiratory Status:  Even and Unlabored  Intra Procedure Note 1:         Intra Time 2:1445    Intra Assess 2:   LOC: Alert  Pain: No Issues  Skin: Flesh, Warm and Dry  Respiratory Status: Even and Unlabored  Intra Procedure Note 2:         Intra Time 3: 1500    Intra Assess 3:   LOC: Alert  Pain: No Issues  Skin: Flesh, Warm and Dry  Respiratory Status: Even and Unlabored  Intra Procedure Note 3:para completed                Post-Procedure Position: Supine and HOB Elevated    Dressing Site 1: 2x2, 4x4 and Tegaderm    Post Procedure Status:  Alert and Oriented, returned to baseline, Dressing Dry/ Intact and IV documented (see flowsheet)    Specimen To Lab: yes    Total Fluid Removed: 4.8 liters    Post Procedure Note:  Patient tolerated procedure well        Report Given To: Lincoln JOHNSON;let her know patient needs 37.5 grams of albumin IV    Admit/Transfer To: return to room 374    Transfer  Time: 1530    Discharge Time: return to room 374    Method: Wheelchair    O2 on Transfer: no    Accompanied By:  Nurse    Clothes Changed:  Self    Discharged Location:  Admit;return to room 374    Discharge Teaching:  Inpatient and PAtient

## 2021-07-14 NOTE — CASE MANAGEMENT/SOCIAL WORK
Discharge Planning Assessment  [facility]     Patient Name: Lucinda Cope  MRN: [id number]  Today's Date: 7/14/2021    Admit Date: 7/13/2021    Discharge Needs Assessment     Row Name 07/14/21 1459       Living Environment    Lives With  child(arely), adult    Name(s) of Who Lives With Patient  Jonnie Ace    Current Living Arrangements  home/apartment/condo    Primary Care Provided by  self    Provides Primary Care For  no one    Family Caregiver if Needed  child(arely), adult    Able to Return to Prior Arrangements  yes       Resource/Environmental Concerns    Resource/Environmental Concerns  none    Transportation Concerns  car, none       Transition Planning    Patient/Family Anticipates Transition to  home with family    Patient/Family Anticipated Services at Transition  none    Transportation Anticipated  family or friend will provide       Discharge Needs Assessment    Readmission Within the Last 30 Days  no previous admission in last 30 days    Equipment Currently Used at Home  none    Concerns to be Addressed  denies needs/concerns at this time    Anticipated Changes Related to Illness  none    Equipment Needed After Discharge  none    Discharge Coordination/Progress  DC Plan: Return home, drove self to [facility].        Discharge Plan     Row Name 07/14/21 7561       Plan    Plan  DC Plan: Return home, drove self to [facility].    Plan Comments  Pt has regularly scheduled outpt paracentesis scheduled per [facility], however this visit is earlier than expected. Pharmacy [facility].        Continued Care and Services - Admitted Since 7/13/2021    Coordination has not been started for this encounter.         Demographic Summary     Row Name 07/14/21 1456       General Information    Admission Type  observation    Arrived From  emergency department    Referral Source  admission list    Reason for Consult  discharge planning    Preferred Language  English     Used During This Interaction  no     General Information Comments  Spoke to pt in room.        Functional Status     Row Name 07/14/21 1459       Functional Status    Usual Activity Tolerance  good    Current Activity Tolerance  moderate       Functional Status, IADL    Medications  independent    Meal Preparation  independent    Housekeeping  independent    Laundry  independent    Shopping  independent       Mental Status    General Appearance WDL  WDL       Mental Status Summary    Recent Changes in Mental Status/Cognitive Functioning  no changes        Met with patient in room wearing PPE: mask, goggles.      Maintained distance greater than six feet and spent less than 15 minutes in the room.               Beto Dunaway RN

## 2021-07-14 NOTE — PROCEDURES
"Diagnostic, Therapeutic Bedside Paracentesis Without  Radiology    Date/Time: 7/14/2021 2:21 PM  Performed by: Jessica Peña MD  Authorized by: Ever Motta MD   Consent: Verbal consent obtained. Written consent obtained.  Risks and benefits: risks, benefits and alternatives were discussed  Consent given by: patient  Patient understanding: patient states understanding of the procedure being performed  Patient consent: the patient's understanding of the procedure matches consent given  Procedure consent: procedure consent matches procedure scheduled  Relevant documents: relevant documents present and verified  Test results: test results available and properly labeled  Site marked: the operative site was marked  Imaging studies: imaging studies available  Required items: required blood products, implants, devices, and special equipment available  Patient identity confirmed: verbally with patient  Time out: Immediately prior to procedure a \"time out\" was called to verify the correct patient, procedure, equipment, support staff and site/side marked as required.  Procedure purpose: therapeutic  Indications: abdominal discomfort secondary to ascites  Anesthesia: local infiltration    Anesthesia:  Local Anesthetic: lidocaine 1% without epinephrine  Preparation: Patient was prepped and draped in the usual sterile fashion.  Needle gauge: 20  Ultrasound guidance: yes  Puncture site: right lower quadrant  Fluid appearance: serous  Dressing: 4x4 sterile gauze  Patient tolerance: patient tolerated the procedure well with no immediate complications  Comments: Please see nurses note for amount of fluid and albumin given.        "

## 2021-07-14 NOTE — PLAN OF CARE
Goal Outcome Evaluation:  Pt. Currently resting comfortably, has received oxycodone x1 dose so far, for complaints of abd pain.  No concerns at this time. Pt. To have paracentesis at bedside today 7/14/2021, consent has been checked and verified by ALEX Enriquez.

## 2021-07-14 NOTE — CONSULTS
46 yo  with history of prolapse.  S/p TVH and pelvic floor suspension in 2019 in [place].  Has felt like her issues were reoccurring , but now that she has had a paracentetics her symptoms have lessened. Denies urinary or Bowel issues.  Last pap smear was in 2019.    Abd- soft, no R/q    Vaginal Prolase- Unable to evaluate properly in hospital setting.  Needs out patient visit.  Given information about office.   Also given names of Urogynecologist that may be better suited to address her ongoing symptoms.

## 2021-07-14 NOTE — NURSING NOTE
Attempt to notify OB/GYN of [facility] # [phone], unable to leave message, only if a true emergency.  Office hours 8 am to 5 p.m.  Will notify oncoming nurse of need for notification , regarding vaginal buldge.

## 2021-07-14 NOTE — NURSING NOTE
Consent for paracentesis completed and signed and placed in front of pt. Chart.  Pt. Verbalized understanding of procedure.  Current NPO.  Pt. Also advised of Oxycodone changed to q 4 hrs prn per her request

## 2021-07-15 ENCOUNTER — APPOINTMENT (OUTPATIENT)
Dept: CARDIOLOGY | Facility: HOSPITAL | Age: 46
End: 2021-07-15

## 2021-07-15 VITALS
BODY MASS INDEX: 25.78 KG/M2 | OXYGEN SATURATION: 98 % | RESPIRATION RATE: 16 BRPM | TEMPERATURE: 98.6 F | SYSTOLIC BLOOD PRESSURE: 111 MMHG | HEART RATE: 74 BPM | DIASTOLIC BLOOD PRESSURE: 72 MMHG | HEIGHT: 63 IN | WEIGHT: 145.5 LBS

## 2021-07-15 PROBLEM — R14.0 ABDOMINAL DISTENTION: Status: RESOLVED | Noted: 2021-01-05 | Resolved: 2021-07-15

## 2021-07-15 LAB
APPEARANCE FLD: ABNORMAL
BH CV LOW VAS RIGHT GREATER SAPH AK VESSEL: 1
BH CV LOW VAS RIGHT VARICOSITY AK VESSEL: 1
BH CV LOWER VASCULAR LEFT COMMON FEMORAL AUGMENT: NORMAL
BH CV LOWER VASCULAR LEFT COMMON FEMORAL COMPETENT: NORMAL
BH CV LOWER VASCULAR LEFT COMMON FEMORAL COMPRESS: NORMAL
BH CV LOWER VASCULAR LEFT COMMON FEMORAL PHASIC: NORMAL
BH CV LOWER VASCULAR LEFT COMMON FEMORAL SPONT: NORMAL
BH CV LOWER VASCULAR LEFT DISTAL FEMORAL COMPRESS: NORMAL
BH CV LOWER VASCULAR LEFT GASTRONEMIUS COMPRESS: NORMAL
BH CV LOWER VASCULAR LEFT GREATER SAPH AK COMPRESS: NORMAL
BH CV LOWER VASCULAR LEFT GREATER SAPH BK COMPRESS: NORMAL
BH CV LOWER VASCULAR LEFT LESSER SAPH COMPRESS: NORMAL
BH CV LOWER VASCULAR LEFT MID FEMORAL AUGMENT: NORMAL
BH CV LOWER VASCULAR LEFT MID FEMORAL COMPETENT: NORMAL
BH CV LOWER VASCULAR LEFT MID FEMORAL COMPRESS: NORMAL
BH CV LOWER VASCULAR LEFT MID FEMORAL PHASIC: NORMAL
BH CV LOWER VASCULAR LEFT MID FEMORAL SPONT: NORMAL
BH CV LOWER VASCULAR LEFT PERONEAL COMPRESS: NORMAL
BH CV LOWER VASCULAR LEFT POPLITEAL AUGMENT: NORMAL
BH CV LOWER VASCULAR LEFT POPLITEAL COMPETENT: NORMAL
BH CV LOWER VASCULAR LEFT POPLITEAL COMPRESS: NORMAL
BH CV LOWER VASCULAR LEFT POPLITEAL PHASIC: NORMAL
BH CV LOWER VASCULAR LEFT POPLITEAL SPONT: NORMAL
BH CV LOWER VASCULAR LEFT POSTERIOR TIBIAL COMPRESS: NORMAL
BH CV LOWER VASCULAR LEFT PROXIMAL FEMORAL COMPRESS: NORMAL
BH CV LOWER VASCULAR LEFT SAPHENOFEMORAL JUNCTION COMPRESS: NORMAL
BH CV LOWER VASCULAR RIGHT COMMON FEMORAL AUGMENT: NORMAL
BH CV LOWER VASCULAR RIGHT COMMON FEMORAL COMPETENT: NORMAL
BH CV LOWER VASCULAR RIGHT COMMON FEMORAL COMPRESS: NORMAL
BH CV LOWER VASCULAR RIGHT COMMON FEMORAL PHASIC: NORMAL
BH CV LOWER VASCULAR RIGHT COMMON FEMORAL SPONT: NORMAL
BH CV LOWER VASCULAR RIGHT DISTAL FEMORAL COMPRESS: NORMAL
BH CV LOWER VASCULAR RIGHT GASTRONEMIUS COMPRESS: NORMAL
BH CV LOWER VASCULAR RIGHT GREATER SAPH AK COMPRESS: NORMAL
BH CV LOWER VASCULAR RIGHT GREATER SAPH AK THROMBUS: NORMAL
BH CV LOWER VASCULAR RIGHT GREATER SAPH BK COMPRESS: NORMAL
BH CV LOWER VASCULAR RIGHT LESSER SAPH COMPRESS: NORMAL
BH CV LOWER VASCULAR RIGHT MID FEMORAL AUGMENT: NORMAL
BH CV LOWER VASCULAR RIGHT MID FEMORAL COMPETENT: NORMAL
BH CV LOWER VASCULAR RIGHT MID FEMORAL COMPRESS: NORMAL
BH CV LOWER VASCULAR RIGHT MID FEMORAL PHASIC: NORMAL
BH CV LOWER VASCULAR RIGHT MID FEMORAL SPONT: NORMAL
BH CV LOWER VASCULAR RIGHT PERONEAL COMPRESS: NORMAL
BH CV LOWER VASCULAR RIGHT POPLITEAL AUGMENT: NORMAL
BH CV LOWER VASCULAR RIGHT POPLITEAL COMPETENT: NORMAL
BH CV LOWER VASCULAR RIGHT POPLITEAL COMPRESS: NORMAL
BH CV LOWER VASCULAR RIGHT POPLITEAL PHASIC: NORMAL
BH CV LOWER VASCULAR RIGHT POPLITEAL SPONT: NORMAL
BH CV LOWER VASCULAR RIGHT POSTERIOR TIBIAL COMPRESS: NORMAL
BH CV LOWER VASCULAR RIGHT PROXIMAL FEMORAL COMPRESS: NORMAL
BH CV LOWER VASCULAR RIGHT SAPHENOFEMORAL JUNCTION COMPRESS: NORMAL
BH CV LOWER VASCULAR RIGHT VARICOSITY AK COMPRESS: NORMAL
BH CV LOWER VASCULAR RIGHT VARICOSITY AK THROMBUS: NORMAL
COLOR FLD: YELLOW
LAB AP CASE REPORT: NORMAL
LYMPHOCYTES NFR FLD MANUAL: 44 %
MAGNESIUM SERPL-MCNC: 1.8 MG/DL (ref 1.6–2.6)
MAXIMAL PREDICTED HEART RATE: 173 BPM
MESOTHL CELL NFR FLD MANUAL: 44 %
METHOD: ABNORMAL
MONOCYTES NFR FLD: 12 %
NUC CELL # FLD: 29 /MM3
PATH REPORT.FINAL DX SPEC: NORMAL
POTASSIUM SERPL-SCNC: 3.7 MMOL/L (ref 3.5–5.2)
STRESS TARGET HR: 147 BPM

## 2021-07-15 PROCEDURE — 63710000001 ONDANSETRON PER 8 MG: Performed by: NURSE PRACTITIONER

## 2021-07-15 PROCEDURE — 84132 ASSAY OF SERUM POTASSIUM: CPT | Performed by: NURSE PRACTITIONER

## 2021-07-15 PROCEDURE — 25010000002 ONDANSETRON PER 1 MG: Performed by: NURSE PRACTITIONER

## 2021-07-15 PROCEDURE — G0378 HOSPITAL OBSERVATION PER HR: HCPCS

## 2021-07-15 PROCEDURE — 36415 COLL VENOUS BLD VENIPUNCTURE: CPT | Performed by: NURSE PRACTITIONER

## 2021-07-15 PROCEDURE — 83735 ASSAY OF MAGNESIUM: CPT | Performed by: NURSE PRACTITIONER

## 2021-07-15 PROCEDURE — 96376 TX/PRO/DX INJ SAME DRUG ADON: CPT

## 2021-07-15 PROCEDURE — 99217 PR OBSERVATION CARE DISCHARGE MANAGEMENT: CPT | Performed by: INTERNAL MEDICINE

## 2021-07-15 PROCEDURE — 93970 EXTREMITY STUDY: CPT

## 2021-07-15 RX ADMIN — OXYCODONE HYDROCHLORIDE 10 MG: 5 TABLET ORAL at 10:58

## 2021-07-15 RX ADMIN — OXYCODONE HYDROCHLORIDE 10 MG: 5 TABLET ORAL at 06:27

## 2021-07-15 RX ADMIN — LEVOTHYROXINE SODIUM 125 MCG: 0.12 TABLET ORAL at 08:31

## 2021-07-15 RX ADMIN — ONDANSETRON 4 MG: 2 INJECTION INTRAMUSCULAR; INTRAVENOUS at 10:58

## 2021-07-15 RX ADMIN — RIFAXIMIN 550 MG: 550 TABLET ORAL at 08:30

## 2021-07-15 RX ADMIN — OXYCODONE HYDROCHLORIDE 10 MG: 5 TABLET ORAL at 01:50

## 2021-07-15 RX ADMIN — PANTOPRAZOLE SODIUM 40 MG: 40 TABLET, DELAYED RELEASE ORAL at 08:30

## 2021-07-15 RX ADMIN — SPIRONOLACTONE 300 MG: 100 TABLET ORAL at 08:30

## 2021-07-15 RX ADMIN — Medication 10 ML: at 08:30

## 2021-07-15 RX ADMIN — SUCRALFATE 1 G: 1 TABLET ORAL at 10:58

## 2021-07-15 RX ADMIN — ONDANSETRON HYDROCHLORIDE 4 MG: 4 TABLET, FILM COATED ORAL at 01:50

## 2021-07-15 RX ADMIN — BUMETANIDE 2 MG: 1 TABLET ORAL at 08:30

## 2021-07-15 RX ADMIN — GABAPENTIN 400 MG: 400 CAPSULE ORAL at 08:31

## 2021-07-15 RX ADMIN — SUCRALFATE 1 G: 1 TABLET ORAL at 08:30

## 2021-07-15 RX ADMIN — APIXABAN 5 MG: 5 TABLET, FILM COATED ORAL at 08:31

## 2021-07-15 NOTE — PLAN OF CARE
Goal Outcome Evaluation:  Plan of Care Reviewed With: patient        Progress: improving    Pt. Has continued to do well this shift, is continuing with use of Norco for pain q 4 hours, and zofran every 6 hours.  Pt. Is friendly, cooperative. And voices feeling better since having paracentesis done yesterday .  Discharge plans to go home today.

## 2021-07-15 NOTE — DISCHARGE SUMMARY
[facility] Medicine Services  DISCHARGE SUMMARY        Prepared For PCP:  Melissa Iniguez APRN    Patient Name: Lucinda Cope  : 1975  MRN: [id number]      Date of Admission:   2021    Date of Discharge:  7/15/2021    Length of stay:  LOS: 0 days     Hospital Course     Presenting Problem:   Other ascites [R18.8]  Pain of upper abdomen [R10.10]      Active Hospital Problems    Diagnosis  POA   • Vaginal prolapse [N81.10]  Yes   • Benign essential hypertension [I10]  Yes   • History of thyroid cancer [Z85.850]  Not Applicable   • History of DVT (deep vein thrombosis) [Z86.718]  Not Applicable   • Chronic pain [G89.29]  Yes   • CARTER (nonalcoholic steatohepatitis) [K75.81]  Yes   • Portal vein thrombosis [I81]  Yes   • Postoperative hypothyroidism [E89.0]  Yes   • Gastroesophageal reflux disease [K21.9]  Yes   • Sleep apnea [G47.30]  Yes      Resolved Hospital Problems    Diagnosis Date Resolved POA   • Abdominal distention [R14.0] 07/15/2021 Yes           Hospital Course:  Lucinda Cope is a 45 y.o. female *      HPI   Lucinda Cope is a 45 y.o. female W/PMH of nonalcoholic steatohepatitis, GERD, thyroid cancer, portal vein thrombus, s/p thyroidectomy with postoperative hypothyroidism, HTN, DVT, chronic pain, ALEJO who presents to [facility] ED due to abdominal pain.  Patient states severe stabbing 10/10 abdominal pain has progressively worsened over the last 24 hours, no aggravating or alleviating factors noted.  Patient reports nausea, vomiting, dyspnea.  Patient denies chills, fever, diarrhea, tachycardia.  Patient also reports vaginal prolapse has returned, patient states in 2019 she had a hysterectomy with pelvic floor reconstruction.  As pain did not improve she decided to go to [facility] ED.           Upon arrival to the ED vitals 98.2, HR 87, RR 17, /98, O2 sat 100% on room air.  Labs notable for glucose 74, , K4.0, CO2 25, creatinine  0.69, BUN 7, calcium 7.6, ALT 17, AST 35, alkaline phosphatase 155, total bili 0.9, magnesium 1.8.  WBC 6.1, Hgb 10.0, platelets 618, neutrophils 50.  Patient treated with IV Dilaudid, IV Zofran the ED.     Patient Reports  7/14  Patient still complaining of abdominal distention.  She has significant anterior ventral hernia that has been no signs of incarceration or obstruction based on symptoms and examination.  She is awaiting paracentesis today.  Last time she took a laxative yesterday morning.  She denies active bleeding  She follows with transplant team and [place] but had been meld score has improved and now she is not on transplant list.  Denies any fever or chills.  Reports diarrhea 2 days ago              *  7/15  Patient is complaint of right thigh pain and tenderness and that she seems to have superficial thrombophlebitis with prominent palpable venous cords and confirmed with ultrasoundStill showing superficial thrombophlebitis in the varicosities above the knee in addition to great saphenous vein subacute thrombophlebitis noted but no evidence of DVT.  Patient was maintained on her regular anticoagulants with Eliquis and she seems to be consistent with her/compliant with her medications.  She has described that she follows with GI service at [place] and hematologist in [place] as well.  Local measures advised for superficial thrombophlebitis in addition to her anticoagulation.  Local Voltaren gel was initiated here to be used as needed  Patient is fluid analysis from paracentesis shows no evidence of SBP and no organisms onGram stain and culture that has been negative so far but awaiting final report on cultures including fungal culture.  Patient had persistent ventral hernia without complications.  She had chronic venous thrombosis involvingPortal vein with cavernous transformation noted but no acute findings noted on CT scan  The amount of fluid removed during paracentesis was relatively smaller  than expected only 4.8 L removed.  Patient feels comfortable going home today and follow-up with her specialist at [place].          Recommendation for Outpatient Providers:     Follow-up with GI at [place]  Follow-up with hematologist at [place]  Right upper extremity elevation and warm compresses for superficial thrombophlebitis        Reasons For Change In Medications and Indications for New Medications:        Day of Discharge     HPI:       Vital Signs:   Temp:  [97.7 °F (36.5 °C)-98.6 °F (37 °C)] 98.6 °F (37 °C)  Heart Rate:  [65-88] 74  Resp:  [12-19] 16  BP: (107-137)/(66-85) 111/72     Physical Exam:  Physical Exam  Right medial thigh varicosities with tender venous cords but no signs of collection or drainage.    Pertinent  and/or Most Recent Results     Results from last 7 days   Lab Units 07/15/21  0651 07/14/21  0150 07/13/21  2043 07/13/21  1934   WBC 10*3/mm3  --  9.80  --  6.10   HEMOGLOBIN g/dL  --  10.4*  --  10.0*   HEMATOCRIT %  --  32.6*  --  31.3*   PLATELETS 10*3/mm3  --  597*  --  618*   SODIUM mmol/L  --  137 138  --    POTASSIUM mmol/L 3.7 3.4* 4.0  --    CHLORIDE mmol/L  --  104 107  --    CO2 mmol/L  --  23.0 25.0  --    BUN mg/dL  --  10 10  --    CREATININE mg/dL  --  0.69 0.69  --    GLUCOSE mg/dL  --  82 74  --    CALCIUM mg/dL  --  7.6* 7.6*  --      Results from last 7 days   Lab Units 07/14/21  0150 07/13/21  2043   BILIRUBIN mg/dL  --  0.9   ALK PHOS U/L  --  155*   ALT (SGPT) U/L  --  17   AST (SGOT) U/L  --  35*   PROTIME Seconds 13.4*  --    INR  1.23*  --            Invalid input(s): TG, LDLCALC, LDLREALC        Brief Urine Lab Results  (Last result in the past 365 days)      Color   Clarity   Blood   Leuk Est   Nitrite   Protein   CREAT   Urine HCG        01/09/21 1750 Dark Yellow Clear Negative Negative Negative Negative               Microbiology Results Abnormal     Procedure Component Value - Date/Time    Body Fluid Culture - Body Fluid, Peritoneum [[id number]]  Collected: 07/14/21 1408    Lab Status: Preliminary result Specimen: Body Fluid from Peritoneum Updated: 07/15/21 0729     Body Fluid Culture No growth     Gram Stain Occasional WBCs per low power field      No organisms seen    COVID PRE-OP / PRE-PROCEDURE SCREENING ORDER (NO ISOLATION) - Swab, Nasopharynx [[id number]]  (Normal) Collected: 07/13/21 2223    Lab Status: Final result Specimen: Swab from Nasopharynx Updated: 07/13/21 2303    Narrative:      The following orders were created for panel order COVID PRE-OP / PRE-PROCEDURE SCREENING ORDER (NO ISOLATION) - Swab, Nasopharynx.  Procedure                               Abnormality         Status                     ---------                               -----------         ------                     COVID-19,CEPHEID,COR/LEIDA...[[id number]]  Normal              Final result                 Please view results for these tests on the individual orders.    COVID-19,CEPHEID,COR/LEIDA/PAD/ALYSIA IN-HOUSE(OR EMERGENT/ADD-ON),NP SWAB IN TRANSPORT MEDIA 3-4 HR TAT, RT-PCR - Swab, Nasopharynx [[id number]]  (Normal) Collected: 07/13/21 2223    Lab Status: Final result Specimen: Swab from Nasopharynx Updated: 07/13/21 2303     COVID19 Not Detected    Narrative:      Fact sheet for providers: https://www.fda.gov/media/678989/download     Fact sheet for patients: https://www.fda.gov/media/663831/download  Fact sheet for providers: https://www.fda.gov/media/741562/download    Fact sheet for patients: https://www.fda.gov/media/483677/download    Test performed by PCR.          CT Abdomen Pelvis Without Contrast    Result Date: 7/14/2021  Impression:  1. Large volume abdominal and pelvic ascites, increased since 3/29/2021. 2. Large complex umbilical hernia is significantly distended with ascites fluid. Hernia sac is larger than on the prior exam. 3. Surgical changes of the stomach. Cholecystectomy. Presumed hysterectomy. Splenectomy. 4. Severe diffuse hepatic steatosis. 5. FINDINGS  compatible with chronic portal vein thrombosis and cavernous transformation, seen to better advantage on previous postcontrast CT imaging.   Electronically Signed By-Mariangel Nolan MD On:7/14/2021 8:15 AM This report was finalized on [id number] by  Mariangel Nolan MD.      Results for orders placed during the hospital encounter of 07/13/21    Duplex Venous Lower Extremity - Bilateral CAR    Interpretation Summary  · Sub-acute right lower extremity superficial thrombophlebitis noted in the greater saphenous (above knee).  · Acute right lower extremity superficial thrombophlebitis noted in the varicosity (above knee).  · All other veins appeared normal bilaterally.      Results for orders placed during the hospital encounter of 07/13/21    Duplex Venous Lower Extremity - Bilateral CAR    Interpretation Summary  · Sub-acute right lower extremity superficial thrombophlebitis noted in the greater saphenous (above knee).  · Acute right lower extremity superficial thrombophlebitis noted in the varicosity (above knee).  · All other veins appeared normal bilaterally.                  Test Results Pending at Discharge  Pending Labs     Order Current Status    Bilirubin, Body Fluid - Peritoneal Fluid, Peritoneum In process    Fungus Culture - Body Fluid, Peritoneum In process    Non-gynecologic Cytology In process    Body Fluid Culture - Body Fluid, Peritoneum Preliminary result            Procedures Performed    07/14 1420 Diagnostic, Therapeutic Bedside Paracentesis Without  Radiology      Consults:   Consults     Date and Time Order Name Status Description    7/13/2021 11:15 PM Inpatient Obstetrics / Gynecology Consult      7/13/2021  9:53 PM Hospitalist (on-call MD unless specified) Completed             Discharge Details        Discharge Medications      Continue These Medications      Instructions Start Date   apixaban 5 MG tablet tablet  Commonly known as: ELIQUIS   5 mg, Oral, 2 Times Daily      bumetanide 2 MG  tablet  Commonly known as: BUMEX   2 mg, Oral, Daily      dicyclomine 10 MG capsule  Commonly known as: Bentyl   10 mg, Oral, 4 Times Daily Before Meals & Nightly PRN      gabapentin 400 MG capsule  Commonly known as: NEURONTIN   400 mg, Oral, 3 Times Daily      levothyroxine 125 MCG tablet  Commonly known as: SYNTHROID, LEVOTHROID   125 mcg, Oral, Daily      ondansetron ODT 8 MG disintegrating tablet  Commonly known as: ZOFRAN-ODT   8 mg, Translingual, Every 8 Hours PRN      oxyCODONE 10 MG tablet  Commonly known as: ROXICODONE   10 mg, Oral, Every 6 Hours PRN      pantoprazole 40 MG EC tablet  Commonly known as: PROTONIX   40 mg, Oral, 2 Times Daily Before Meals      riFAXIMin 550 MG tablet  Commonly known as: XIFAXAN   550 mg, Oral, Every 12 Hours Scheduled      spironolactone 100 MG tablet  Commonly known as: ALDACTONE   300 mg, Oral, Daily      sucralfate 1 g tablet  Commonly known as: CARAFATE   1 g, Oral, 4 Times Daily Before Meals & Nightly             Allergies   Allergen Reactions   • Compazine [Prochlorperazine Edisylate] Hives   • Contrast Dye Hives   • Iodinated Diagnostic Agents    • Iodine Hives   • Morphine Itching   • Nsaids Other (See Comments)     States I am not suppose to take them   • Hydrocodone Rash     Lortab elixir per pt         Discharge Disposition:  Home or Self Care    Diet:  Hospital:  Diet Order   Procedures   • Diet Regular         Discharge Activity:       Elevate right upper extremity as tolerated  Avoid excess excessive dangling right lower extremity to avoid edema    CODE STATUS:    Code Status and Medical Interventions:   Ordered at: 07/13/21 9498     Level Of Support Discussed With:    Patient     Code Status:    CPR     Medical Interventions (Level of Support Prior to Arrest):    Full         Follow-up Appointments  No future appointments.  As outlined above        Condition on Discharge:      Stable      This patient has been examined wearing appropriate Personal Protective  Equipment and discussed with hospital infection control department. 07/15/21      Electronically signed by Ever Motta MD, 07/15/21, 2:12 PM EDT.      Time: I spent 35 minutes on this discharge activity which included face-to-face encounter with the patient/reviewing the data in the system/coordination of the care with the nursing staff as well as consultants/documentation/entering orders.

## 2021-07-15 NOTE — NURSING NOTE
Return call from Dr. Juarez, new order to resume Eliquis 5 mg bid (as previously taken at home)  Med was on hold, due to abdominal paracentesis done yesterday. Pt. Discharge plans to go home today.

## 2021-07-16 ENCOUNTER — READMISSION MANAGEMENT (OUTPATIENT)
Dept: CALL CENTER | Facility: HOSPITAL | Age: 46
End: 2021-07-16

## 2021-07-16 LAB
LAB AP CASE REPORT: NORMAL
PATH REPORT.FINAL DX SPEC: NORMAL
PATH REPORT.GROSS SPEC: NORMAL

## 2021-07-16 NOTE — CASE MANAGEMENT/SOCIAL WORK
Discharge Planning Assessment   [facility]     Patient Name: Lucinda Cope  MRN: [id number]  Today's Date: 7/16/2021    Admit Date: 7/13/2021          Plan    Final Discharge Disposition Code  01 - home or self-care    Final Note  return home       Carol naegele rn  Case management  Office number [phone]  Cell phone [phone]

## 2021-07-16 NOTE — OUTREACH NOTE
Prep Survey      Responses   [facility] facility patient discharged from?  [facility]   Is LACE score < 7 ?  No   Emergency Room discharge w/ pulse ox?  No   Eligibility  Readm Mgmt   Discharge diagnosis  Abdominal distention    Does the patient have one of the following disease processes/diagnoses(primary or secondary)?  Other   Does the patient have Home health ordered?  No   Is there a DME ordered?  No   Prep survey completed?  Yes          Gina Rosario RN

## 2021-07-17 LAB
BACTERIA FLD CULT: NORMAL
BILIRUB FLD-MCNC: <0.2 MG/DL
GRAM STN SPEC: NORMAL
GRAM STN SPEC: NORMAL

## 2021-07-19 LAB — QT INTERVAL: 489 MS

## 2021-07-20 ENCOUNTER — READMISSION MANAGEMENT (OUTPATIENT)
Dept: CALL CENTER | Facility: HOSPITAL | Age: 46
End: 2021-07-20

## 2021-07-20 NOTE — OUTREACH NOTE
Medical Week 1 Survey      Responses   [facility] patient discharged from?  [facility]   Does the patient have one of the following disease processes/diagnoses(primary or secondary)?  Other   Week 1 attempt successful?  No   Unsuccessful attempts  Attempt 1          Juanis Chang LPN

## 2021-07-21 ENCOUNTER — READMISSION MANAGEMENT (OUTPATIENT)
Dept: CALL CENTER | Facility: HOSPITAL | Age: 46
End: 2021-07-21

## 2021-07-21 NOTE — OUTREACH NOTE
Medical Week 1 Survey      Responses   [facility] patient discharged from?  [facility]   Does the patient have one of the following disease processes/diagnoses(primary or secondary)?  Other   Week 1 attempt successful?  No   Unsuccessful attempts  Attempt 2          Cheryl Xiong LPN

## 2021-07-23 ENCOUNTER — READMISSION MANAGEMENT (OUTPATIENT)
Dept: CALL CENTER | Facility: HOSPITAL | Age: 46
End: 2021-07-23

## 2021-07-23 NOTE — OUTREACH NOTE
Medical Week 2 Survey      Responses   [facility] patient discharged from?  [facility]   Does the patient have one of the following disease processes/diagnoses(primary or secondary)?  Other   Week 2 attempt successful?  No   Unsuccessful attempts  Attempt 1          Cheryl Xiong LPN

## 2021-07-27 ENCOUNTER — READMISSION MANAGEMENT (OUTPATIENT)
Dept: CALL CENTER | Facility: HOSPITAL | Age: 46
End: 2021-07-27

## 2021-07-27 NOTE — OUTREACH NOTE
Medical Week 2 Survey      Responses   [facility] patient discharged from?  [facility]   Does the patient have one of the following disease processes/diagnoses(primary or secondary)?  Other   Week 2 attempt successful?  No   Unsuccessful attempts  Attempt 2          Cheryl Xiong LPN  
per anesthesia

## 2021-08-04 ENCOUNTER — READMISSION MANAGEMENT (OUTPATIENT)
Dept: CALL CENTER | Facility: HOSPITAL | Age: 46
End: 2021-08-04

## 2021-08-04 NOTE — OUTREACH NOTE
Medical Week 3 Survey      Responses   [facility] patient discharged from?  [facility]   Does the patient have one of the following disease processes/diagnoses(primary or secondary)?  Other   Week 3 attempt successful?  No   Unsuccessful attempts  Attempt 1          Cheryl Xiong LPN

## 2021-08-09 ENCOUNTER — READMISSION MANAGEMENT (OUTPATIENT)
Dept: CALL CENTER | Facility: HOSPITAL | Age: 46
End: 2021-08-09

## 2021-08-09 NOTE — OUTREACH NOTE
Medical Week 3 Survey      Responses   [facility] patient discharged from?  [facility]   Does the patient have one of the following disease processes/diagnoses(primary or secondary)?  Other   Week 3 attempt successful?  No   Unsuccessful attempts  Attempt 2          Amelia Paulino RN

## 2021-08-11 LAB — FUNGUS WND CULT: NORMAL

## 2021-08-25 ENCOUNTER — APPOINTMENT (OUTPATIENT)
Dept: CT IMAGING | Facility: HOSPITAL | Age: 46
End: 2021-08-25

## 2021-08-25 ENCOUNTER — HOSPITAL ENCOUNTER (OUTPATIENT)
Facility: HOSPITAL | Age: 46
Setting detail: OBSERVATION
Discharge: HOME | End: 2021-08-26
Attending: EMERGENCY MEDICINE | Admitting: INTERNAL MEDICINE

## 2021-08-25 DIAGNOSIS — E87.70 HYPERVOLEMIA, UNSPECIFIED HYPERVOLEMIA TYPE: Primary | ICD-10-CM

## 2021-08-25 DIAGNOSIS — R33.9 URINARY RETENTION: ICD-10-CM

## 2021-08-25 PROBLEM — Z86.718 HISTORY OF DVT (DEEP VEIN THROMBOSIS): Status: ACTIVE | Noted: 2021-01-05

## 2021-08-25 PROBLEM — R60.0 BILATERAL LEG EDEMA: Status: ACTIVE | Noted: 2021-08-06

## 2021-08-25 PROBLEM — K74.60 LIVER CIRRHOSIS SECONDARY TO NASH (HCC): Chronic | Status: ACTIVE | Noted: 2017-04-01

## 2021-08-25 PROBLEM — I10 BENIGN ESSENTIAL HYPERTENSION: Chronic | Status: ACTIVE | Noted: 2021-07-13

## 2021-08-25 PROBLEM — N81.10 VAGINAL PROLAPSE: Chronic | Status: ACTIVE | Noted: 2021-07-14

## 2021-08-25 PROBLEM — E55.9 VITAMIN D DEFICIENCY: Status: ACTIVE | Noted: 2021-01-21

## 2021-08-25 PROBLEM — I85.00 ESOPHAGEAL VARICES (HCC): Chronic | Status: ACTIVE | Noted: 2017-07-21

## 2021-08-25 PROBLEM — R18.8 ABDOMINAL ASCITES: Status: ACTIVE | Noted: 2021-08-25

## 2021-08-25 PROBLEM — K76.82 ENCEPHALOPATHY, PORTAL SYSTEMIC: Status: RESOLVED | Noted: 2017-05-09 | Resolved: 2021-08-25

## 2021-08-25 PROBLEM — R10.84 GENERALIZED ABDOMINAL PAIN: Status: ACTIVE | Noted: 2021-08-25

## 2021-08-25 PROBLEM — D72.829 LEUKOCYTOSIS: Status: RESOLVED | Noted: 2021-03-07 | Resolved: 2021-08-25

## 2021-08-25 PROBLEM — R60.0 BILATERAL LOWER EXTREMITY EDEMA: Status: ACTIVE | Noted: 2021-08-25

## 2021-08-25 PROBLEM — E05.80 IATROGENIC HYPERTHYROIDISM: Status: ACTIVE | Noted: 2021-01-21

## 2021-08-25 PROBLEM — E87.6 HYPOKALEMIA: Status: ACTIVE | Noted: 2021-08-25

## 2021-08-25 PROBLEM — R82.90 ABNORMAL URINALYSIS: Status: ACTIVE | Noted: 2021-08-25

## 2021-08-25 PROBLEM — R07.89 CHEST PAIN, ATYPICAL: Status: RESOLVED | Noted: 2019-07-28 | Resolved: 2021-08-25

## 2021-08-25 PROBLEM — R06.00 DYSPNEA: Status: RESOLVED | Noted: 2020-09-16 | Resolved: 2021-08-25

## 2021-08-25 PROBLEM — N13.30 HYDROURETERONEPHROSIS: Status: ACTIVE | Noted: 2021-08-25

## 2021-08-25 PROBLEM — K75.81 LIVER CIRRHOSIS SECONDARY TO NASH (HCC): Chronic | Status: ACTIVE | Noted: 2017-04-01

## 2021-08-25 PROBLEM — R33.8 ACUTE URINARY RETENTION: Status: ACTIVE | Noted: 2021-08-25

## 2021-08-25 LAB
ALBUMIN SERPL-MCNC: 3 G/DL (ref 3.5–5.2)
ALBUMIN/GLOB SERPL: 0.7 G/DL
ALP SERPL-CCNC: 162 U/L (ref 39–117)
ALT SERPL W P-5'-P-CCNC: 16 U/L (ref 1–33)
ANION GAP SERPL CALCULATED.3IONS-SCNC: 7 MMOL/L (ref 5–15)
ANISOCYTOSIS BLD QL: ABNORMAL
AST SERPL-CCNC: 32 U/L (ref 1–32)
BACTERIA UR QL AUTO: ABNORMAL /HPF
BASOPHILS # BLD MANUAL: 0.28 10*3/MM3 (ref 0–0.2)
BASOPHILS NFR BLD AUTO: 4 % (ref 0–1.5)
BILIRUB SERPL-MCNC: 0.7 MG/DL (ref 0–1.2)
BILIRUB UR QL STRIP: ABNORMAL
BILIRUB UR QL STRIP: NEGATIVE
BUN SERPL-MCNC: 9 MG/DL (ref 6–20)
BUN/CREAT SERPL: 12.3 (ref 7–25)
CALCIUM SPEC-SCNC: 8.4 MG/DL (ref 8.6–10.5)
CHLORIDE SERPL-SCNC: 103 MMOL/L (ref 98–107)
CLARITY UR: ABNORMAL
CLARITY UR: CLEAR
CO2 SERPL-SCNC: 28 MMOL/L (ref 22–29)
COD CRY URNS QL: ABNORMAL /HPF
COLOR UR: ABNORMAL
COLOR UR: YELLOW
CREAT SERPL-MCNC: 0.73 MG/DL (ref 0.57–1)
DEPRECATED RDW RBC AUTO: 59.5 FL (ref 37–54)
EOSINOPHIL # BLD MANUAL: 0.92 10*3/MM3 (ref 0–0.4)
EOSINOPHIL NFR BLD MANUAL: 13 % (ref 0.3–6.2)
ERYTHROCYTE [DISTWIDTH] IN BLOOD BY AUTOMATED COUNT: 21.1 % (ref 12.3–15.4)
GFR SERPL CREATININE-BSD FRML MDRD: 85 ML/MIN/1.73
GLOBULIN UR ELPH-MCNC: 4.2 GM/DL
GLUCOSE SERPL-MCNC: 85 MG/DL (ref 65–99)
GLUCOSE UR STRIP-MCNC: NEGATIVE MG/DL
GLUCOSE UR STRIP-MCNC: NEGATIVE MG/DL
HCT VFR BLD AUTO: 32.8 % (ref 34–46.6)
HGB BLD-MCNC: 10.5 G/DL (ref 12–15.9)
HGB UR QL STRIP.AUTO: NEGATIVE
HGB UR QL STRIP.AUTO: NEGATIVE
HYALINE CASTS UR QL AUTO: ABNORMAL /LPF
INR PPP: 1.07 (ref 0.93–1.1)
KETONES UR QL STRIP: NEGATIVE
KETONES UR QL STRIP: NEGATIVE
LARGE PLATELETS: ABNORMAL
LEUKOCYTE ESTERASE UR QL STRIP.AUTO: ABNORMAL
LEUKOCYTE ESTERASE UR QL STRIP.AUTO: NEGATIVE
LIPASE SERPL-CCNC: 13 U/L (ref 13–60)
LYMPHOCYTES # BLD MANUAL: 2.27 10*3/MM3 (ref 0.7–3.1)
LYMPHOCYTES NFR BLD MANUAL: 32 % (ref 19.6–45.3)
LYMPHOCYTES NFR BLD MANUAL: 4 % (ref 5–12)
MCH RBC QN AUTO: 26 PG (ref 26.6–33)
MCHC RBC AUTO-ENTMCNC: 31.9 G/DL (ref 31.5–35.7)
MCV RBC AUTO: 81.3 FL (ref 79–97)
MONOCYTES # BLD AUTO: 0.28 10*3/MM3 (ref 0.1–0.9)
MUCOUS THREADS URNS QL MICRO: ABNORMAL /HPF
NEUTROPHILS # BLD AUTO: 3.34 10*3/MM3 (ref 1.7–7)
NEUTROPHILS NFR BLD MANUAL: 46 % (ref 42.7–76)
NEUTS BAND NFR BLD MANUAL: 1 % (ref 0–5)
NITRITE UR QL STRIP: NEGATIVE
NITRITE UR QL STRIP: NEGATIVE
PH UR STRIP.AUTO: 6 [PH] (ref 5–8)
PH UR STRIP.AUTO: 7 [PH] (ref 5–8)
PLATELET # BLD AUTO: 542 10*3/MM3 (ref 140–450)
PMV BLD AUTO: 8.9 FL (ref 6–12)
POIKILOCYTOSIS BLD QL SMEAR: ABNORMAL
POTASSIUM SERPL-SCNC: 3.4 MMOL/L (ref 3.5–5.2)
PROCALCITONIN SERPL-MCNC: 0.07 NG/ML (ref 0–0.25)
PROT SERPL-MCNC: 7.2 G/DL (ref 6–8.5)
PROT UR QL STRIP: ABNORMAL
PROT UR QL STRIP: NEGATIVE
PROTHROMBIN TIME: 11.8 SECONDS (ref 9.6–11.7)
RBC # BLD AUTO: 4.04 10*6/MM3 (ref 3.77–5.28)
RBC # UR: ABNORMAL /HPF
REF LAB TEST METHOD: ABNORMAL
SARS-COV-2 RNA PNL SPEC NAA+PROBE: NOT DETECTED
SCAN SLIDE: NORMAL
SMALL PLATELETS BLD QL SMEAR: ABNORMAL
SODIUM SERPL-SCNC: 138 MMOL/L (ref 136–145)
SP GR UR STRIP: 1.03 (ref 1–1.03)
SP GR UR STRIP: <=1.005 (ref 1–1.03)
SQUAMOUS #/AREA URNS HPF: ABNORMAL /HPF
TARGETS BLD QL SMEAR: ABNORMAL
UROBILINOGEN UR QL STRIP: ABNORMAL
UROBILINOGEN UR QL STRIP: NORMAL
WBC # BLD AUTO: 7.1 10*3/MM3 (ref 3.4–10.8)
WBC MORPH BLD: NORMAL
WBC UR QL AUTO: ABNORMAL /HPF
WHOLE BLOOD HOLD SPECIMEN: NORMAL

## 2021-08-25 PROCEDURE — C9803 HOPD COVID-19 SPEC COLLECT: HCPCS

## 2021-08-25 PROCEDURE — 84145 PROCALCITONIN (PCT): CPT | Performed by: NURSE PRACTITIONER

## 2021-08-25 PROCEDURE — 25010000002 ONDANSETRON PER 1 MG: Performed by: EMERGENCY MEDICINE

## 2021-08-25 PROCEDURE — 85007 BL SMEAR W/DIFF WBC COUNT: CPT

## 2021-08-25 PROCEDURE — 83690 ASSAY OF LIPASE: CPT

## 2021-08-25 PROCEDURE — G0378 HOSPITAL OBSERVATION PER HR: HCPCS

## 2021-08-25 PROCEDURE — 25010000002 FUROSEMIDE PER 20 MG: Performed by: EMERGENCY MEDICINE

## 2021-08-25 PROCEDURE — 85610 PROTHROMBIN TIME: CPT | Performed by: NURSE PRACTITIONER

## 2021-08-25 PROCEDURE — 51702 INSERT TEMP BLADDER CATH: CPT

## 2021-08-25 PROCEDURE — 25010000002 ONDANSETRON PER 1 MG: Performed by: NURSE PRACTITIONER

## 2021-08-25 PROCEDURE — 96374 THER/PROPH/DIAG INJ IV PUSH: CPT

## 2021-08-25 PROCEDURE — 85025 COMPLETE CBC W/AUTO DIFF WBC: CPT

## 2021-08-25 PROCEDURE — 80053 COMPREHEN METABOLIC PANEL: CPT

## 2021-08-25 PROCEDURE — 74176 CT ABD & PELVIS W/O CONTRAST: CPT

## 2021-08-25 PROCEDURE — 96376 TX/PRO/DX INJ SAME DRUG ADON: CPT

## 2021-08-25 PROCEDURE — 25010000002 HYDROMORPHONE PER 4 MG: Performed by: INTERNAL MEDICINE

## 2021-08-25 PROCEDURE — 81001 URINALYSIS AUTO W/SCOPE: CPT

## 2021-08-25 PROCEDURE — 96375 TX/PRO/DX INJ NEW DRUG ADDON: CPT

## 2021-08-25 PROCEDURE — 81003 URINALYSIS AUTO W/O SCOPE: CPT | Performed by: NURSE PRACTITIONER

## 2021-08-25 PROCEDURE — 99284 EMERGENCY DEPT VISIT MOD MDM: CPT

## 2021-08-25 PROCEDURE — 25010000002 HYDROMORPHONE PER 4 MG: Performed by: EMERGENCY MEDICINE

## 2021-08-25 PROCEDURE — 99220 PR INITIAL OBSERVATION CARE/DAY 70 MINUTES: CPT | Performed by: INTERNAL MEDICINE

## 2021-08-25 PROCEDURE — 87635 SARS-COV-2 COVID-19 AMP PRB: CPT

## 2021-08-25 RX ORDER — ONDANSETRON 2 MG/ML
4 INJECTION INTRAMUSCULAR; INTRAVENOUS ONCE
Status: COMPLETED | OUTPATIENT
Start: 2021-08-25 | End: 2021-08-25

## 2021-08-25 RX ORDER — MAGNESIUM SULFATE HEPTAHYDRATE 40 MG/ML
2 INJECTION, SOLUTION INTRAVENOUS AS NEEDED
Status: DISCONTINUED | OUTPATIENT
Start: 2021-08-25 | End: 2021-08-26 | Stop reason: HOSPADM

## 2021-08-25 RX ORDER — BUPRENORPHINE HYDROCHLORIDE AND NALOXONE HYDROCHLORIDE DIHYDRATE 8; 2 MG/1; MG/1
2 TABLET SUBLINGUAL DAILY
COMMUNITY

## 2021-08-25 RX ORDER — MAGNESIUM SULFATE 1 G/100ML
1 INJECTION INTRAVENOUS AS NEEDED
Status: DISCONTINUED | OUTPATIENT
Start: 2021-08-25 | End: 2021-08-26 | Stop reason: HOSPADM

## 2021-08-25 RX ORDER — ACETAMINOPHEN 650 MG/1
650 SUPPOSITORY RECTAL EVERY 4 HOURS PRN
Status: DISCONTINUED | OUTPATIENT
Start: 2021-08-25 | End: 2021-08-26 | Stop reason: HOSPADM

## 2021-08-25 RX ORDER — ALBUMIN (HUMAN) 12.5 G/50ML
50 SOLUTION INTRAVENOUS ONCE
Status: DISCONTINUED | OUTPATIENT
Start: 2021-08-25 | End: 2021-08-26

## 2021-08-25 RX ORDER — PANTOPRAZOLE SODIUM 40 MG/1
40 TABLET, DELAYED RELEASE ORAL
Status: DISCONTINUED | OUTPATIENT
Start: 2021-08-25 | End: 2021-08-26 | Stop reason: HOSPADM

## 2021-08-25 RX ORDER — SODIUM CHLORIDE 0.9 % (FLUSH) 0.9 %
10 SYRINGE (ML) INJECTION AS NEEDED
Status: DISCONTINUED | OUTPATIENT
Start: 2021-08-25 | End: 2021-08-26 | Stop reason: HOSPADM

## 2021-08-25 RX ORDER — ALBUMIN (HUMAN) 12.5 G/50ML
112.5 SOLUTION INTRAVENOUS ONCE
Status: DISCONTINUED | OUTPATIENT
Start: 2021-08-25 | End: 2021-08-26

## 2021-08-25 RX ORDER — ACETAMINOPHEN 160 MG/5ML
650 SOLUTION ORAL EVERY 4 HOURS PRN
Status: DISCONTINUED | OUTPATIENT
Start: 2021-08-25 | End: 2021-08-26 | Stop reason: HOSPADM

## 2021-08-25 RX ORDER — LEVOTHYROXINE SODIUM 0.12 MG/1
125 TABLET ORAL
Status: DISCONTINUED | OUTPATIENT
Start: 2021-08-26 | End: 2021-08-26 | Stop reason: HOSPADM

## 2021-08-25 RX ORDER — ONDANSETRON 2 MG/ML
4 INJECTION INTRAMUSCULAR; INTRAVENOUS EVERY 6 HOURS PRN
Status: DISCONTINUED | OUTPATIENT
Start: 2021-08-25 | End: 2021-08-26 | Stop reason: HOSPADM

## 2021-08-25 RX ORDER — POTASSIUM CHLORIDE 20 MEQ/1
40 TABLET, EXTENDED RELEASE ORAL AS NEEDED
Status: DISCONTINUED | OUTPATIENT
Start: 2021-08-25 | End: 2021-08-26 | Stop reason: HOSPADM

## 2021-08-25 RX ORDER — ESTRADIOL 0.1 MG/G
1 CREAM VAGINAL 3 TIMES WEEKLY
COMMUNITY

## 2021-08-25 RX ORDER — BUMETANIDE 0.25 MG/ML
2 INJECTION INTRAMUSCULAR; INTRAVENOUS EVERY 12 HOURS
Status: DISCONTINUED | OUTPATIENT
Start: 2021-08-25 | End: 2021-08-26 | Stop reason: HOSPADM

## 2021-08-25 RX ORDER — GABAPENTIN 400 MG/1
400 CAPSULE ORAL 3 TIMES DAILY
Status: DISCONTINUED | OUTPATIENT
Start: 2021-08-25 | End: 2021-08-26 | Stop reason: HOSPADM

## 2021-08-25 RX ORDER — ALBUMIN (HUMAN) 12.5 G/50ML
100 SOLUTION INTRAVENOUS ONCE
Status: DISCONTINUED | OUTPATIENT
Start: 2021-08-25 | End: 2021-08-26

## 2021-08-25 RX ORDER — NITROGLYCERIN 0.4 MG/1
0.4 TABLET SUBLINGUAL
Status: DISCONTINUED | OUTPATIENT
Start: 2021-08-25 | End: 2021-08-26 | Stop reason: HOSPADM

## 2021-08-25 RX ORDER — ALBUMIN (HUMAN) 12.5 G/50ML
37.5 SOLUTION INTRAVENOUS ONCE
Status: DISCONTINUED | OUTPATIENT
Start: 2021-08-25 | End: 2021-08-26

## 2021-08-25 RX ORDER — POTASSIUM CHLORIDE 20 MEQ/1
40 TABLET, EXTENDED RELEASE ORAL
Status: DISCONTINUED | OUTPATIENT
Start: 2021-08-25 | End: 2021-08-26 | Stop reason: HOSPADM

## 2021-08-25 RX ORDER — SPIRONOLACTONE 100 MG/1
400 TABLET, FILM COATED ORAL DAILY
Status: DISCONTINUED | OUTPATIENT
Start: 2021-08-25 | End: 2021-08-26 | Stop reason: HOSPADM

## 2021-08-25 RX ORDER — DICYCLOMINE HYDROCHLORIDE 10 MG/1
10 CAPSULE ORAL
Status: DISCONTINUED | OUTPATIENT
Start: 2021-08-25 | End: 2021-08-26 | Stop reason: HOSPADM

## 2021-08-25 RX ORDER — ALBUMIN (HUMAN) 12.5 G/50ML
87.5 SOLUTION INTRAVENOUS ONCE
Status: DISCONTINUED | OUTPATIENT
Start: 2021-08-25 | End: 2021-08-26

## 2021-08-25 RX ORDER — SODIUM CHLORIDE 0.9 % (FLUSH) 0.9 %
10 SYRINGE (ML) INJECTION EVERY 12 HOURS SCHEDULED
Status: DISCONTINUED | OUTPATIENT
Start: 2021-08-25 | End: 2021-08-26 | Stop reason: HOSPADM

## 2021-08-25 RX ORDER — FUROSEMIDE 10 MG/ML
40 INJECTION INTRAMUSCULAR; INTRAVENOUS ONCE
Status: COMPLETED | OUTPATIENT
Start: 2021-08-25 | End: 2021-08-25

## 2021-08-25 RX ORDER — CHOLECALCIFEROL (VITAMIN D3) 125 MCG
5 CAPSULE ORAL NIGHTLY PRN
Status: DISCONTINUED | OUTPATIENT
Start: 2021-08-25 | End: 2021-08-26 | Stop reason: HOSPADM

## 2021-08-25 RX ORDER — ALBUMIN (HUMAN) 12.5 G/50ML
75 SOLUTION INTRAVENOUS ONCE
Status: DISCONTINUED | OUTPATIENT
Start: 2021-08-25 | End: 2021-08-26

## 2021-08-25 RX ORDER — SUCRALFATE 1 G/1
1 TABLET ORAL
Status: DISCONTINUED | OUTPATIENT
Start: 2021-08-25 | End: 2021-08-26 | Stop reason: HOSPADM

## 2021-08-25 RX ORDER — ALUMINA, MAGNESIA, AND SIMETHICONE 2400; 2400; 240 MG/30ML; MG/30ML; MG/30ML
15 SUSPENSION ORAL EVERY 6 HOURS PRN
Status: DISCONTINUED | OUTPATIENT
Start: 2021-08-25 | End: 2021-08-26 | Stop reason: HOSPADM

## 2021-08-25 RX ORDER — POTASSIUM CHLORIDE 20 MEQ/1
40 TABLET, EXTENDED RELEASE ORAL 2 TIMES DAILY
COMMUNITY

## 2021-08-25 RX ORDER — HYDROMORPHONE HCL 110MG/55ML
0.5 PATIENT CONTROLLED ANALGESIA SYRINGE INTRAVENOUS ONCE
Status: COMPLETED | OUTPATIENT
Start: 2021-08-25 | End: 2021-08-25

## 2021-08-25 RX ORDER — ONDANSETRON 4 MG/1
4 TABLET, FILM COATED ORAL EVERY 6 HOURS PRN
Status: DISCONTINUED | OUTPATIENT
Start: 2021-08-25 | End: 2021-08-26 | Stop reason: HOSPADM

## 2021-08-25 RX ORDER — ACETAMINOPHEN 325 MG/1
650 TABLET ORAL EVERY 4 HOURS PRN
Status: DISCONTINUED | OUTPATIENT
Start: 2021-08-25 | End: 2021-08-26 | Stop reason: HOSPADM

## 2021-08-25 RX ORDER — ALBUMIN (HUMAN) 12.5 G/50ML
62.5 SOLUTION INTRAVENOUS ONCE
Status: DISCONTINUED | OUTPATIENT
Start: 2021-08-25 | End: 2021-08-26

## 2021-08-25 RX ORDER — HYDROMORPHONE HCL 110MG/55ML
0.5 PATIENT CONTROLLED ANALGESIA SYRINGE INTRAVENOUS EVERY 4 HOURS PRN
Status: DISCONTINUED | OUTPATIENT
Start: 2021-08-25 | End: 2021-08-26

## 2021-08-25 RX ADMIN — Medication 10 ML: at 21:09

## 2021-08-25 RX ADMIN — HYDROMORPHONE HYDROCHLORIDE 0.5 MG: 2 INJECTION, SOLUTION INTRAMUSCULAR; INTRAVENOUS; SUBCUTANEOUS at 16:16

## 2021-08-25 RX ADMIN — HYDROMORPHONE HYDROCHLORIDE 0.5 MG: 2 INJECTION, SOLUTION INTRAMUSCULAR; INTRAVENOUS; SUBCUTANEOUS at 21:09

## 2021-08-25 RX ADMIN — DICYCLOMINE HYDROCHLORIDE 10 MG: 10 CAPSULE ORAL at 16:07

## 2021-08-25 RX ADMIN — KETAMINE HYDROCHLORIDE 20 MG: 50 INJECTION, SOLUTION INTRAMUSCULAR; INTRAVENOUS at 09:03

## 2021-08-25 RX ADMIN — ONDANSETRON 4 MG: 2 INJECTION INTRAMUSCULAR; INTRAVENOUS at 06:45

## 2021-08-25 RX ADMIN — FUROSEMIDE 40 MG: 10 INJECTION, SOLUTION INTRAMUSCULAR; INTRAVENOUS at 06:45

## 2021-08-25 RX ADMIN — PANTOPRAZOLE SODIUM 40 MG: 40 TABLET, DELAYED RELEASE ORAL at 16:07

## 2021-08-25 RX ADMIN — ONDANSETRON 4 MG: 2 INJECTION INTRAMUSCULAR; INTRAVENOUS at 16:06

## 2021-08-25 RX ADMIN — GABAPENTIN 400 MG: 400 CAPSULE ORAL at 21:09

## 2021-08-25 RX ADMIN — Medication 10 ML: at 09:03

## 2021-08-25 RX ADMIN — HYDROMORPHONE HYDROCHLORIDE 0.5 MG: 2 INJECTION, SOLUTION INTRAMUSCULAR; INTRAVENOUS; SUBCUTANEOUS at 06:45

## 2021-08-25 RX ADMIN — BUMETANIDE 2 MG: 0.25 INJECTION INTRAMUSCULAR; INTRAVENOUS at 18:03

## 2021-08-25 RX ADMIN — PANTOPRAZOLE SODIUM 40 MG: 40 TABLET, DELAYED RELEASE ORAL at 16:36

## 2021-08-25 RX ADMIN — SPIRONOLACTONE 400 MG: 100 TABLET ORAL at 18:03

## 2021-08-25 RX ADMIN — SUCRALFATE 1 G: 1 TABLET ORAL at 21:09

## 2021-08-25 RX ADMIN — SUCRALFATE 1 G: 1 TABLET ORAL at 16:16

## 2021-08-25 RX ADMIN — POTASSIUM CHLORIDE 40 MEQ: 1500 TABLET, EXTENDED RELEASE ORAL at 16:37

## 2021-08-25 RX ADMIN — GABAPENTIN 400 MG: 400 CAPSULE ORAL at 16:07

## 2021-08-25 NOTE — H&P
[facility] Medicine Services      Patient Name: Lucinda Cpoe  : 1975  MRN: [id number]  Primary Care Physician:  Melissa Iniguez APRN  Date of admission: 2021      Subjective      Chief Complaint:  Leg edema, abdominal pain    History of Present Illness: Lucinda Cope is a 45 y.o. female with a history of CARTER cirrhosis, esophageal varices, splenectomy, gastric sleeve, chronic pain, thyroid cancer status post thyroidectomy, post-surgical hypothyroidism, GERD, and recurrent vaginal prolapse who presented to [facility] ED on 2021 complaining of increased leg edema and abdominal pain. The patient reports compliance with her home diuretics, but states the swelling is getting worse. She states her legs are also painful and her skin feels stretched thin. She denies any fever or chills. She denies any chest pain or shortness of breath. She states it has been several months since her last paracentesis. She states she is followed by GI at [facility].     Workup in the ER revealed anasarca. The patient was given Lasix 40 mg IV. She was also given Dilaudid and Zofran. Her CT abd/pelvis revealed bilateral hydroureteronephrosis secondary to bladder distention. A Mckinney catheter was inserted. The patient was admitted to the Hospitalist team for further treatment.       Review of Systems   Constitutional: Negative for chills and fever.   Cardiovascular: Positive for leg swelling. Negative for chest pain.   Respiratory: Negative for shortness of breath.    Gastrointestinal: Positive for abdominal pain. Negative for nausea and vomiting.   All other systems reviewed and are negative.       Personal History     Past Medical History:   Diagnosis Date   • Abdominal pain    • Abscess, subdiaphragmatic (CMS/HCC) 2016   • Anemia    • Deep venous thrombosis of right profunda femoris vein (CMS/HCC) 8/15/2019   • Depression    • Elevated factor VIII level 2019   • Empyema of  pleura (CMS/HCC)    • Encephalopathy, portal systemic (CMS/HCC) 5/9/2017   • Esophageal varices (CMS/HCC) 7/21/2017   • Gastric leak 5/29/2016   • GERD (gastroesophageal reflux disease)    • H/O thyroidectomy 6/17/2016   • Hemorrhoids    • Hernia, ventral    • History of morbid obesity 3/3/2016   • History of transfusion    • Hypercoagulable state (CMS/HCC) 8/24/2019   • Irregular menstrual cycle    • Liver cirrhosis secondary to CARTER (CMS/HCC) 04/2017   • Lupus anticoagulant positive 8/24/2019   • Migraine    • Parathyroid abnormality (CMS/HCC)    • Portal vein thrombosis 3/3/2016   • Post-surgical hypothyroidism    • Pulmonary embolism (CMS/HCC) 10/27/2020   • Radiation    • Restless legs syndrome (RLS)    • S/P laparoscopic sleeve gastrectomy 6/17/2016   • S/P splenectomy 6/17/2016   • Thyroid cancer (CMS/HCC)    • Thyroid cancer (CMS/HCC) 6/26/2015   • Urge and stress incontinence    • Vaginal prolapse 7/14/2021   • Varicose veins    • Ventral hernia 4/18/2020   • Vitamin D deficiency 1/21/2021       Past Surgical History:   Procedure Laterality Date   • ABDOMINAL SURGERY     • BEDSIDE PARACENTESIS  3/22/2020        • BEDSIDE PARACENTESIS  4/19/2020        • BEDSIDE PARACENTESIS  3/1/2021        • BEDSIDE PARACENTESIS  3/30/2021        • CHOLECYSTECTOMY      Laparoscopic   • ENDOSCOPY N/A 3/17/2016    Procedure: ESOPHAGOGASTRODUODENOSCOPY WITH BALOON DILATATION 18-20MM WITH GASTRIC SLEEVE SEALANT;  Surgeon: Leonardo Ortiz Jr., MD;  Location: [facility] ENDOSCOPY;  Service:    • ENDOSCOPY N/A 3/14/2016    Procedure: esophagogastroduodenoscopy with fluoroscopy;  Surgeon: Greg Avila III, MD;  Location: [facility] ENDOSCOPY;  Service:    • ENDOSCOPY N/A 4/15/2016    Procedure: EGD WITH DILITATION AND STENT PLACEMENT dilation of pylorus ;  Surgeon: Leonardo Ortiz Jr., MD;  Location: [facility] ENDOSCOPY;  Service:    • ENDOSCOPY N/A 5/13/2016    Procedure: ESOPHAGOGASTRODUODENOSCOPY  W/ STENT;  Surgeon: Leonardo Robledo  Diana Parada MD;  Location: [facility] ENDOSCOPY;  Service:    • ENDOSCOPY N/A 1/13/2021    Procedure: ESOPHAGOGASTRODUODENOSCOPY WITH POLYPECTOMY X1 AND  BIOPSY X1.;  Surgeon: Bryson Cotton MD;  Location: [facility] ENDOSCOPY;  Service: Gastroenterology;  Laterality: N/A;  anastomotic ulcer, antral polyp, GASTRITIS   • GASTRECTOMY      Gastric Surgery for Morbid Obesity Laparoscopic Longitudinal Gastrectomy   • GASTRIC SLEEVE LAPAROSCOPIC     • HEMORRHOIDECTOMY     • HERNIA REPAIR     • LAPAROSCOPY REPAIR HIATAL HERNIA     • LARYNGOSCOPY      Direct Laryngoscopy with Injection into Vocal Cords   • OTHER SURGICAL HISTORY Bilateral     Ear Pressure Equalization Tube, Insertion, Bilaterally   • PERIPHERALLY INSERTED CENTRAL CATHETER INSERTION     • SPLENECTOMY     • THYROID SURGERY     • TUBAL ABDOMINAL LIGATION         Family History: family history includes Cancer in an other family member; Diabetes in her father; Hypertension in her father; Stroke in her father. Otherwise pertinent FHx was reviewed and not pertinent to current issue.    Social History:  reports that she has never smoked. She has never used smokeless tobacco. She reports that she does not drink alcohol and does not use drugs.    Home Medications:  Prior to Admission Medications     Prescriptions Last Dose Informant Patient Reported? Taking?    apixaban (ELIQUIS) 5 MG tablet tablet   Yes No    Take 5 mg by mouth 2 (Two) Times a Day.    bumetanide (BUMEX) 2 MG tablet  Self Yes No    Take 2 mg by mouth Daily.    dicyclomine (Bentyl) 10 MG capsule   No No    Take 1 capsule by mouth 4 (Four) Times a Day Before Meals & at Bedtime As Needed (Abdominal pain or colic).    gabapentin (NEURONTIN) 400 MG capsule   Yes No    Take 400 mg by mouth 3 (Three) Times a Day.    levothyroxine (SYNTHROID, LEVOTHROID) 125 MCG tablet   Yes No    Take 125 mcg by mouth Daily.    ondansetron ODT (ZOFRAN-ODT) 8 MG disintegrating tablet   Yes No    Place 8 mg on the tongue Every 8  (Eight) Hours As Needed for Nausea or Vomiting.    oxyCODONE (ROXICODONE) 10 MG tablet   Yes No    Take 10 mg by mouth Every 6 (Six) Hours As Needed for Moderate Pain .    pantoprazole (PROTONIX) 40 MG EC tablet   No No    Take 1 tablet by mouth 2 (Two) Times a Day Before Meals.    rifaximin (XIFAXAN) 550 MG tablet  Self Yes No    Take 550 mg by mouth Every 12 (Twelve) Hours.    spironolactone (ALDACTONE) 100 MG tablet  Self Yes No    Take 300 mg by mouth Daily.    sucralfate (CARAFATE) 1 g tablet   No No    Take 1 tablet by mouth 4 (Four) Times a Day Before Meals & at Bedtime.            Allergies:  Allergies   Allergen Reactions   • Contrast Dye Hives   • Iodinated Diagnostic Agents Hives   • Iodine Hives   • Hydrocodone-Acetaminophen Hives and Itching   • Morphine Itching, Hives and Rash   • Red Dye Itching, Other (See Comments) and Unknown - High Severity   • Compazine [Prochlorperazine Edisylate] Hives   • Hydrocodone Rash     Lortab elixir per pt   • Ketorolac Tromethamine Unknown - Low Severity   • Nsaids Other (See Comments)     States I am not suppose to take them  Cannot have due to gastric sleeve       Objective      Vitals:   Temp:  [98.6 °F (37 °C)] 98.6 °F (37 °C)  Heart Rate:  [53-77] 53  Resp:  [16] 16  BP: (102-144)/(63-93) 102/63    Physical Exam  Vitals reviewed.   HENT:      Head: Normocephalic.   Eyes:      Extraocular Movements: Extraocular movements intact.      Conjunctiva/sclera: Conjunctivae normal.      Pupils: Pupils are equal, round, and reactive to light.   Cardiovascular:      Rate and Rhythm: Regular rhythm. Bradycardia present.      Pulses: Normal pulses.      Heart sounds: Normal heart sounds.   Pulmonary:      Effort: Pulmonary effort is normal.      Breath sounds: Normal breath sounds.   Abdominal:      General: Bowel sounds are decreased. There is distension.      Tenderness: There is abdominal tenderness.      Hernia: A hernia is present.   Musculoskeletal:      Cervical back:  Normal range of motion.      Right lower le+ Pitting Edema present.      Left lower le+ Pitting Edema present.   Skin:     General: Skin is warm and dry.      Capillary Refill: Capillary refill takes less than 2 seconds.   Neurological:      Mental Status: She is alert and oriented to person, place, and time.   Psychiatric:         Mood and Affect: Mood normal.         Behavior: Behavior normal.          Result Review    Result Review:  I have personally reviewed the results from the time of this admission to 2021 11:43 EDT and agree with these findings:  [x]  Laboratory  [x]  Microbiology  [x]  Radiology  []  EKG/Telemetry   []  Cardiology/Vascular   []  Pathology  [x]  Old records  []  Other:    Most notable findings include:   hgb 10.5, Plt 542, K 3.4, alk phos 162, albumin 3.00    CT abd/pelvis:  1. Slight increase in size of a large complex umbilical hernia as described above with multi septated fluid/ascites as well as a loop of small bowel. No bowel distention to suggest obstruction and no CT signs of bowel ischemia at this time.   2. Evidence of multiple previous abdominal surgeries as previously described.  3. There is now mild bilateral hydroureteronephrosis possibly due to bladder distention. No renal or ureteral stones.      Assessment/Plan        Active Hospital Problems:  Active Hospital Problems    Diagnosis    • **Bilateral lower extremity edema    • Generalized abdominal pain    • Hypokalemia    • Abdominal ascites    • Hydroureteronephrosis, bilateral secondary to bladder distention    • Acute urinary retention secondary to vaginal prolapse    • Umbilical hernia    • Vaginal prolapse    • History of thyroid cancer    • Anemia    • Chronic pain    • History of DVT (deep vein thrombosis)    • Ventral hernia    • Esophageal varices (CMS/HCC)    • Liver cirrhosis secondary to CARTER (CMS/HCC)    • Gastroesophageal reflux disease    • Postoperative hypothyroidism    • Overweight (BMI  25.0-29.9)      Plan:     Bilateral lower extremity edema, Abdominal ascites  Liver cirrhosis secondary to CARTER   History of Esophageal varices   -given Lasix 40 mg IV in ER; hold PO Bumex and start Bumex 2 mg IV q12h  -continue home spironolactone   -monitor I&Os and daily weight  -2 g Na diet  -therapeutic paracentesis ordered, hold Eliquis for now  -followed as outpatient by GI at [facility]    Generalized abdominal pain  -likely secondary to above  -PRN analgesia     Hydroureteronephrosis, bilateral secondary to bladder distention  Acute urinary retention secondary to vaginal prolapse  Vaginal prolapse  -patient reports previous surgery to repair vaginal prolapse, but states it has recurred  -she states she has an upcoming follow-up appointment with Uro-GYN in [place]   -Mckinney catheter inserted in ER    Hypokalemia, mild  -replace per protocol and monitor     Ventral hernia  Umbilical hernia  -chronic    Gastroesophageal reflux disease  -continue PPI and Carafate     History of thyroid cancer, Postoperative hypothyroidism  -continue levothyroxine     Overweight (BMI 25.0-29.9)  -BMI 25.85 on admission   -h/o gastric sleeve     History of DVT/PE  -hold Eliquis for paracentesis     Chronic pain  -hold Suboxone while on PRN analgesia   -continue gabapentin and Bentyl     Anemia, chronic  -stable, monitor         DVT prophylaxis:  Medical DVT prophylaxis orders are present.    CODE STATUS:    Code Status: CPR  Medical Interventions (Level of Support Prior to Arrest): Full    Admission Status:  I believe this patient meets observation status.    I discussed the patient's findings and my recommendations with patient.    This patient has been examined wearing appropriate Personal Protective Equipment. 08/25/21      Signature: Electronically signed by GRIFFIN Betancourt, 08/25/21, 4:12 PM EDT.    I have reviewed the notes, assessments, and/or procedures performed by GRIFFIN Betancourt, I concur with her/his  documentation of Lucinda Cope.  Patient seen and examined agree with above, is a 45-year-old white female with multiple medical problems, presented to the ER with complaints of increased leg edema and abdominal pain.  Patient report compliance with her home diuretics but states the swelling is getting worse.  She states that her legs are also painful as her skin feels stretched thin.  She denies any fever chills.  Denies any chest pain or shortness of breath.  On exam this is a 45-year-old female in no acute distress, her neck is supple, lungs are clear to auscultation, heart regular rhythm normal S1-S2, the abdomen is distended, she has a large hernia umbilical and abdominal wall, she has ascites, some tendernes to palpation, diffusely extremities bilateral extremity edema, neurological exam no focal deficit, assessment and plan #1 bilateral lower extremity edema, abdominal ascites, liver cirrhosis secondary to Ramon.  History of esophageal varices.  Continue spironolactone, daily weight, 2 g sodium diet, lactulose GI consulted, hold Eliquis for now.  #2. Hydroureteronephrosis bilateral secondary to bladder distention.  Acute urinary retention secondary to vaginal prolapse.  Mckinney catheter inserted.  Follow-up with OB/GYN and laxative.  #3. Hypokalemia, monitor and replace.  Electronically signed by Javier Cardoso MD, 08/25/21, 5:57 PM EDT.

## 2021-08-25 NOTE — ED NOTES
Mckinney cath inserted, patient tolerated well and some relief after insertion.      Ana Laura Carrion RN  08/25/21 0847

## 2021-08-25 NOTE — ED PROVIDER NOTES
Subjective   Chief complaint: Patient is a pleasant 45-year-old.  She has Ramon.  She states that for the last week she has had increasing swelling in both lower extremities.  She has been taking her diuretics as she is supposed to.  This is been getting worse and she is never had it this bad before.  Now she is having some discomfort in her abdomen and back.  Its not swelling is much as it normally does.  She states the swelling is more in her lower legs but there is significant increasing pain in her back.  In her abdomen.  She cannot describe the pain.  It just hurts constantly.  Diffuse generalized discomfort.    Context: As above    Duration: 1 week    Timing: Persistent    Severity: Pain is a 7    Associated Symptoms: Negative except as noted above.  Appropriate PPE was used.        PCP:  LMP: Hysterectomy          Review of Systems   Constitutional: Negative for fever.   HENT: Negative.    Eyes: Negative.    Respiratory: Negative.    Cardiovascular: Negative for chest pain.   Gastrointestinal: Positive for abdominal pain.   Genitourinary: Positive for difficulty urinating.   Musculoskeletal: Negative.    Skin: Negative.    Neurological: Negative.    Psychiatric/Behavioral: Negative.    All other systems reviewed and are negative.      Past Medical History:   Diagnosis Date   • Abdominal pain    • Anemia    • Cancer (CMS/HCC)    • Cirrhosis of liver (CMS/HCC) 04/2017   • Clot    • Deep venous thrombosis of right profunda femoris vein (CMS/HCC) 8/15/2019   • Depression    • Disease of thyroid gland    • Empyema of pleura (CMS/HCC)    • GERD (gastroesophageal reflux disease)    • Heartburn    • Hemorrhoids    • Hernia, ventral    • History of malignant neoplasm of thyroid    • History of transfusion    • Irregular menstrual cycle    • Migraine    • Parathyroid abnormality (CMS/HCC)    • Radiation    • Restless legs syndrome (RLS)    • Urge and stress incontinence    • Varicose veins        Allergies   Allergen  Reactions   • Compazine [Prochlorperazine Edisylate] Hives   • Contrast Dye Hives   • Iodinated Diagnostic Agents    • Iodine Hives   • Morphine Itching   • Nsaids Other (See Comments)     States I am not suppose to take them   • Hydrocodone Rash     Lortab elixir per pt       Past Surgical History:   Procedure Laterality Date   • ABDOMINAL SURGERY     • BEDSIDE PARACENTESIS  3/22/2020        • BEDSIDE PARACENTESIS  4/19/2020        • BEDSIDE PARACENTESIS  3/1/2021        • BEDSIDE PARACENTESIS  3/30/2021        • CHOLECYSTECTOMY      Laparoscopic   • ENDOSCOPY N/A 3/17/2016    Procedure: ESOPHAGOGASTRODUODENOSCOPY WITH BALOON DILATATION 18-20MM WITH GASTRIC SLEEVE SEALANT;  Surgeon: Leonardo Ortiz Jr., MD;  Location: [facility] ENDOSCOPY;  Service:    • ENDOSCOPY N/A 3/14/2016    Procedure: esophagogastroduodenoscopy with fluoroscopy;  Surgeon: Greg Avila III, MD;  Location: [facility] ENDOSCOPY;  Service:    • ENDOSCOPY N/A 4/15/2016    Procedure: EGD WITH DILITATION AND STENT PLACEMENT dilation of pylorus ;  Surgeon: Leonardo Ortiz Jr., MD;  Location: [facility] ENDOSCOPY;  Service:    • ENDOSCOPY N/A 5/13/2016    Procedure: ESOPHAGOGASTRODUODENOSCOPY  W/ STENT;  Surgeon: Leonardo Ortiz Jr., MD;  Location: [facility] ENDOSCOPY;  Service:    • ENDOSCOPY N/A 1/13/2021    Procedure: ESOPHAGOGASTRODUODENOSCOPY WITH POLYPECTOMY X1 AND  BIOPSY X1.;  Surgeon: Bryson Cotton MD;  Location: [facility] ENDOSCOPY;  Service: Gastroenterology;  Laterality: N/A;  anastomotic ulcer, antral polyp, GASTRITIS   • GASTRECTOMY      Gastric Surgery for Morbid Obesity Laparoscopic Longitudinal Gastrectomy   • GASTRIC SLEEVE LAPAROSCOPIC     • HEMORRHOIDECTOMY     • HERNIA REPAIR     • LAPAROSCOPY REPAIR HIATAL HERNIA     • LARYNGOSCOPY      Direct Laryngoscopy with Injection into Vocal Cords   • OTHER SURGICAL HISTORY Bilateral     Ear Pressure Equalization Tube, Insertion, Bilaterally   • PERIPHERALLY INSERTED CENTRAL CATHETER  INSERTION     • SPLENECTOMY     • THYROID SURGERY     • TUBAL ABDOMINAL LIGATION         Family History   Problem Relation Age of Onset   • Stroke Father    • Diabetes Father         Diabetes Mellitus   • Hypertension Father    • Cancer Other        Social History     Socioeconomic History   • Marital status: Single     Spouse name: Not on file   • Number of children: Not on file   • Years of education: Not on file   • Highest education level: Not on file   Tobacco Use   • Smoking status: Never Smoker   • Smokeless tobacco: Never Used   Vaping Use   • Vaping Use: Never used   Substance and Sexual Activity   • Alcohol use: No   • Drug use: No   • Sexual activity: Defer           Objective   Physical Exam  Vitals and nursing note reviewed.   Constitutional:       Appearance: She is well-developed.   HENT:      Head: Normocephalic and atraumatic.   Cardiovascular:      Rate and Rhythm: Normal rate and regular rhythm.      Heart sounds: Normal heart sounds.   Pulmonary:      Effort: Pulmonary effort is normal.      Breath sounds: Normal breath sounds.   Abdominal:      General: Abdomen is protuberant.      Tenderness: There is generalized abdominal tenderness.      Comments: She has a reducible hernia.  Ventral.   Musculoskeletal:         General: Swelling and tenderness present.      Right lower leg: Edema present.      Left lower leg: Edema present.   Skin:     General: Skin is warm and dry.      Capillary Refill: Capillary refill takes less than 2 seconds.   Neurological:      General: No focal deficit present.      Mental Status: She is alert and oriented to person, place, and time.   Psychiatric:         Mood and Affect: Mood normal.         Behavior: Behavior normal.         Procedures           ED Course           Results for orders placed or performed during the hospital encounter of 08/25/21   Comprehensive Metabolic Panel    Specimen: Blood   Result Value Ref Range    Glucose 85 65 - 99 mg/dL    BUN 9 6 - 20  mg/dL    Creatinine 0.73 0.57 - 1.00 mg/dL    Sodium 138 136 - 145 mmol/L    Potassium 3.4 (L) 3.5 - 5.2 mmol/L    Chloride 103 98 - 107 mmol/L    CO2 28.0 22.0 - 29.0 mmol/L    Calcium 8.4 (L) 8.6 - 10.5 mg/dL    Total Protein 7.2 6.0 - 8.5 g/dL    Albumin 3.00 (L) 3.50 - 5.20 g/dL    ALT (SGPT) 16 1 - 33 U/L    AST (SGOT) 32 1 - 32 U/L    Alkaline Phosphatase 162 (H) 39 - 117 U/L    Total Bilirubin 0.7 0.0 - 1.2 mg/dL    eGFR Non African Amer 85 >60 mL/min/1.73    Globulin 4.2 gm/dL    A/G Ratio 0.7 g/dL    BUN/Creatinine Ratio 12.3 7.0 - 25.0    Anion Gap 7.0 5.0 - 15.0 mmol/L   Lipase    Specimen: Blood   Result Value Ref Range    Lipase 13 13 - 60 U/L   Urinalysis With Microscopic If Indicated (No Culture) - Urine, Clean Catch    Specimen: Urine, Clean Catch   Result Value Ref Range    Color, UA Dark Yellow (A) Yellow, Straw    Appearance, UA Cloudy (A) Clear    pH, UA 6.0 5.0 - 8.0    Specific Gravity, UA 1.033 (H) 1.005 - 1.030    Glucose, UA Negative Negative    Ketones, UA Negative Negative    Bilirubin, UA Small (1+) (A) Negative    Blood, UA Negative Negative    Protein, UA Trace (A) Negative    Leuk Esterase, UA Small (1+) (A) Negative    Nitrite, UA Negative Negative    Urobilinogen, UA 1.0 E.U./dL 0.2 - 1.0 E.U./dL   CBC Auto Differential    Specimen: Blood   Result Value Ref Range    WBC 7.10 3.40 - 10.80 10*3/mm3    RBC 4.04 3.77 - 5.28 10*6/mm3    Hemoglobin 10.5 (L) 12.0 - 15.9 g/dL    Hematocrit 32.8 (L) 34.0 - 46.6 %    MCV 81.3 79.0 - 97.0 fL    MCH 26.0 (L) 26.6 - 33.0 pg    MCHC 31.9 31.5 - 35.7 g/dL    RDW 21.1 (H) 12.3 - 15.4 %    RDW-SD 59.5 (H) 37.0 - 54.0 fl    MPV 8.9 6.0 - 12.0 fL    Platelets 542 (H) 140 - 450 10*3/mm3   Scan Slide    Specimen: Blood   Result Value Ref Range    Scan Slide     Urinalysis, Microscopic Only - Urine, Clean Catch    Specimen: Urine, Clean Catch   Result Value Ref Range    RBC, UA 6-12 (A) None Seen /HPF    WBC, UA 6-12 (A) None Seen /HPF    Bacteria, UA  Trace (A) None Seen /HPF    Squamous Epithelial Cells, UA 3-6 (A) None Seen, 0-2 /HPF    Hyaline Casts, UA None Seen None Seen /LPF    Calcium Oxalate Crystals, UA Small/1+ None Seen /HPF    Mucus, UA Moderate/2+ (A) None Seen, Trace /HPF    Methodology Manual Light Microscopy    Manual Differential    Specimen: Blood   Result Value Ref Range    Neutrophil % 46.0 42.7 - 76.0 %    Lymphocyte % 32.0 19.6 - 45.3 %    Monocyte % 4.0 (L) 5.0 - 12.0 %    Eosinophil % 13.0 (H) 0.3 - 6.2 %    Basophil % 4.0 (H) 0.0 - 1.5 %    Bands %  1.0 0.0 - 5.0 %    Neutrophils Absolute 3.34 1.70 - 7.00 10*3/mm3    Lymphocytes Absolute 2.27 0.70 - 3.10 10*3/mm3    Monocytes Absolute 0.28 0.10 - 0.90 10*3/mm3    Eosinophils Absolute 0.92 (H) 0.00 - 0.40 10*3/mm3    Basophils Absolute 0.28 (H) 0.00 - 0.20 10*3/mm3    Anisocytosis Slight/1+ None Seen    Poikilocytes Slight/1+ None Seen    Target Cells Slight/1+ None Seen    WBC Morphology Normal Normal    Platelet Estimate Increased Normal    Large Platelets Slight/1+ None Seen   Light Blue Top   Result Value Ref Range    Extra Tube hold for add-on      CT Abdomen Pelvis Without Contrast    Result Date: 8/25/2021   1. Slight increase in size of a large complex umbilical hernia as described above with multi septated fluid/ascites as well as a loop of small bowel. No bowel distention to suggest obstruction and no CT signs of bowel ischemia at this time. 2. Evidence of multiple previous abdominal surgeries as previously described. 3. There is now mild bilateral hydroureteronephrosis possibly due to bladder distention. No renal or ureteral stones.  Electronically Signed By-Lincoln Ely DO On:8/25/2021 7:43 AM This report was finalized on [id number] by  Lincoln Ely DO.                                    MDM  Number of Diagnoses or Management Options  Hypervolemia, unspecified hypervolemia type  Urinary retention  Diagnosis management comments: Patient has significant mount of fluid  overload.  She is on multiple diuretics.  With her cirrhosis she has increased ascites.  She has bilateral hydronephrosis.  She does have a known vaginal prolapse.  This was examined with chaperone present.  Reducible.  We will diurese the patient with her fluid overload in the hospital.         Amount and/or Complexity of Data Reviewed  Clinical lab tests: reviewed  Tests in the radiology section of CPT®: reviewed  Discussion of test results with the performing providers: yes  Discuss the patient with other providers: yes  Independent visualization of images, tracings, or specimens: yes    Patient Progress  Patient progress: stable      Final diagnoses:   None   Fluid overload  Urinary retention  Vaginal prolapse    ED Disposition  ED Disposition     None          No follow-up provider specified.       Medication List      No changes were made to your prescriptions during this visit.          Blayne Perales,   08/25/21 0759

## 2021-08-26 ENCOUNTER — ON CAMPUS - OUTPATIENT (AMBULATORY)
Dept: URBAN - METROPOLITAN AREA HOSPITAL 85 | Facility: HOSPITAL | Age: 46
End: 2021-08-26
Payer: COMMERCIAL

## 2021-08-26 VITALS
HEIGHT: 63 IN | BODY MASS INDEX: 25.59 KG/M2 | SYSTOLIC BLOOD PRESSURE: 105 MMHG | HEART RATE: 75 BPM | WEIGHT: 144.4 LBS | OXYGEN SATURATION: 98 % | RESPIRATION RATE: 18 BRPM | DIASTOLIC BLOOD PRESSURE: 66 MMHG | TEMPERATURE: 98.4 F

## 2021-08-26 DIAGNOSIS — Z98.84 BARIATRIC SURGERY STATUS: ICD-10-CM

## 2021-08-26 DIAGNOSIS — R18.8 OTHER ASCITES: ICD-10-CM

## 2021-08-26 DIAGNOSIS — K75.81 NONALCOHOLIC STEATOHEPATITIS (NASH): ICD-10-CM

## 2021-08-26 DIAGNOSIS — R60.0 LOCALIZED EDEMA: ICD-10-CM

## 2021-08-26 DIAGNOSIS — D64.9 ANEMIA, UNSPECIFIED: ICD-10-CM

## 2021-08-26 DIAGNOSIS — I81 PORTAL VEIN THROMBOSIS: ICD-10-CM

## 2021-08-26 DIAGNOSIS — K72.90 HEPATIC FAILURE, UNSPECIFIED WITHOUT COMA: ICD-10-CM

## 2021-08-26 DIAGNOSIS — R10.84 GENERALIZED ABDOMINAL PAIN: ICD-10-CM

## 2021-08-26 DIAGNOSIS — I85.00 ESOPHAGEAL VARICES WITHOUT BLEEDING: ICD-10-CM

## 2021-08-26 DIAGNOSIS — I82.409 ACUTE EMBOLISM AND THROMBOSIS OF UNSPECIFIED DEEP VEINS OF U: ICD-10-CM

## 2021-08-26 DIAGNOSIS — K42.9 UMBILICAL HERNIA WITHOUT OBSTRUCTION OR GANGRENE: ICD-10-CM

## 2021-08-26 DIAGNOSIS — K74.60 UNSPECIFIED CIRRHOSIS OF LIVER: ICD-10-CM

## 2021-08-26 LAB
ALBUMIN SERPL-MCNC: 2.5 G/DL (ref 3.5–5.2)
ALBUMIN/GLOB SERPL: 0.7 G/DL
ALP SERPL-CCNC: 128 U/L (ref 39–117)
ALT SERPL W P-5'-P-CCNC: 12 U/L (ref 1–33)
ANION GAP SERPL CALCULATED.3IONS-SCNC: 7 MMOL/L (ref 5–15)
AST SERPL-CCNC: 26 U/L (ref 1–32)
BILIRUB SERPL-MCNC: 0.7 MG/DL (ref 0–1.2)
BUN SERPL-MCNC: 8 MG/DL (ref 6–20)
BUN/CREAT SERPL: 7.8 (ref 7–25)
CALCIUM SPEC-SCNC: 8.3 MG/DL (ref 8.6–10.5)
CHLORIDE SERPL-SCNC: 103 MMOL/L (ref 98–107)
CO2 SERPL-SCNC: 31 MMOL/L (ref 22–29)
CREAT SERPL-MCNC: 1.02 MG/DL (ref 0.57–1)
DEPRECATED RDW RBC AUTO: 59.1 FL (ref 37–54)
ERYTHROCYTE [DISTWIDTH] IN BLOOD BY AUTOMATED COUNT: 20.9 % (ref 12.3–15.4)
GFR SERPL CREATININE-BSD FRML MDRD: 58 ML/MIN/1.73
GLOBULIN UR ELPH-MCNC: 3.4 GM/DL
GLUCOSE SERPL-MCNC: 114 MG/DL (ref 65–99)
HCT VFR BLD AUTO: 30.8 % (ref 34–46.6)
HGB BLD-MCNC: 9.8 G/DL (ref 12–15.9)
MAGNESIUM SERPL-MCNC: 1.5 MG/DL (ref 1.6–2.6)
MCH RBC QN AUTO: 25.9 PG (ref 26.6–33)
MCHC RBC AUTO-ENTMCNC: 31.9 G/DL (ref 31.5–35.7)
MCV RBC AUTO: 81.3 FL (ref 79–97)
PLATELET # BLD AUTO: 496 10*3/MM3 (ref 140–450)
PMV BLD AUTO: 8.4 FL (ref 6–12)
POTASSIUM SERPL-SCNC: 3.6 MMOL/L (ref 3.5–5.2)
PROT SERPL-MCNC: 5.9 G/DL (ref 6–8.5)
RBC # BLD AUTO: 3.79 10*6/MM3 (ref 3.77–5.28)
SODIUM SERPL-SCNC: 141 MMOL/L (ref 136–145)
WBC # BLD AUTO: 4.9 10*3/MM3 (ref 3.4–10.8)

## 2021-08-26 PROCEDURE — 25010000002 MAGNESIUM SULFATE 2 GM/50ML SOLUTION: Performed by: NURSE PRACTITIONER

## 2021-08-26 PROCEDURE — 25010000002 HYDROMORPHONE PER 4 MG: Performed by: INTERNAL MEDICINE

## 2021-08-26 PROCEDURE — 96376 TX/PRO/DX INJ SAME DRUG ADON: CPT

## 2021-08-26 PROCEDURE — 99253 IP/OBS CNSLTJ NEW/EST LOW 45: CPT | Performed by: NURSE PRACTITIONER

## 2021-08-26 PROCEDURE — G0378 HOSPITAL OBSERVATION PER HR: HCPCS

## 2021-08-26 PROCEDURE — 80053 COMPREHEN METABOLIC PANEL: CPT | Performed by: NURSE PRACTITIONER

## 2021-08-26 PROCEDURE — 99217 PR OBSERVATION CARE DISCHARGE MANAGEMENT: CPT | Performed by: INTERNAL MEDICINE

## 2021-08-26 PROCEDURE — 25010000002 ONDANSETRON PER 1 MG: Performed by: NURSE PRACTITIONER

## 2021-08-26 PROCEDURE — 36415 COLL VENOUS BLD VENIPUNCTURE: CPT | Performed by: NURSE PRACTITIONER

## 2021-08-26 PROCEDURE — 85027 COMPLETE CBC AUTOMATED: CPT | Performed by: NURSE PRACTITIONER

## 2021-08-26 PROCEDURE — 83735 ASSAY OF MAGNESIUM: CPT | Performed by: NURSE PRACTITIONER

## 2021-08-26 RX ORDER — OXYCODONE HYDROCHLORIDE 5 MG/1
10 TABLET ORAL EVERY 6 HOURS PRN
Status: DISCONTINUED | OUTPATIENT
Start: 2021-08-26 | End: 2021-08-26 | Stop reason: HOSPADM

## 2021-08-26 RX ADMIN — OXYCODONE 10 MG: 5 TABLET ORAL at 08:56

## 2021-08-26 RX ADMIN — POTASSIUM CHLORIDE 40 MEQ: 1500 TABLET, EXTENDED RELEASE ORAL at 08:56

## 2021-08-26 RX ADMIN — MAGNESIUM SULFATE HEPTAHYDRATE 2 G: 2 INJECTION, SOLUTION INTRAVENOUS at 08:57

## 2021-08-26 RX ADMIN — BUMETANIDE 2 MG: 0.25 INJECTION INTRAMUSCULAR; INTRAVENOUS at 06:06

## 2021-08-26 RX ADMIN — Medication 10 ML: at 08:57

## 2021-08-26 RX ADMIN — SPIRONOLACTONE 400 MG: 100 TABLET ORAL at 08:56

## 2021-08-26 RX ADMIN — ONDANSETRON 4 MG: 2 INJECTION INTRAMUSCULAR; INTRAVENOUS at 13:46

## 2021-08-26 RX ADMIN — OXYCODONE 10 MG: 5 TABLET ORAL at 15:15

## 2021-08-26 RX ADMIN — GABAPENTIN 400 MG: 400 CAPSULE ORAL at 08:56

## 2021-08-26 RX ADMIN — HYDROMORPHONE HYDROCHLORIDE 0.5 MG: 2 INJECTION, SOLUTION INTRAMUSCULAR; INTRAVENOUS; SUBCUTANEOUS at 03:35

## 2021-08-26 RX ADMIN — LEVOTHYROXINE SODIUM 125 MCG: 125 TABLET ORAL at 06:06

## 2021-08-26 RX ADMIN — SUCRALFATE 1 G: 1 TABLET ORAL at 08:56

## 2021-08-26 RX ADMIN — PANTOPRAZOLE SODIUM 40 MG: 40 TABLET, DELAYED RELEASE ORAL at 08:56

## 2021-08-26 RX ADMIN — SUCRALFATE 1 G: 1 TABLET ORAL at 12:18

## 2021-08-26 NOTE — CONSULTS
GI CONSULT  NOTE:    Referring Provider:  Dr. Cardoso    Chief complaint: Cirrhosis, ascites     Subjective .     History of present illness: Lucinda Cope is a pleasant 45 y.o. female with history of CARTER cirrhosis complicated by hepatic encephalopathy, ascites, and nonbleeding esophageal varices who presents with complaints of abdominal pain and lower extremity edema. The patient also has a history of chronic portal vein thrombus and DVT on Eliquis, gastric sleeve, splenectomy, Alfonzo-en-Y gastric bypass, cholecystectomy, incisional hernia repair, and thyroid cancer s/p thyroidectomy.  The patient is well-known to our practice and has been followed in the past by Dr. Mckeon, but she has not been seen in the office since 2018.  She has recently been followed by  for her chronic liver disease and is seen there every 3 months.  The patient was last seen by our service during an inpatient admission in 3/2021 for complaints of abdominal pain and persistent leakage from her paracentesis site.  She now presents with complaints of abdominal pain and lower extremity edema.  CT abdomen/pelvis was performed on admission and did show multi septated fluid/ascites along with mild bilateral hydroureteronephrosis.  The patient reports that she has been struggling with recurrent vaginal prolapse which is causing her to have difficulty with urination.  Mckinney catheter has been placed.  She does continue to complain of generalized abdominal pain, but states that this is no worse than normal.  Her pain is sharp in nature.  She is unable to identify any exacerbating or alleviating factors.  She has been moving her bowels regularly without bright red blood per rectum or melena.  No nausea/vomiting, heartburn, or dysphagia.  Of note, the patient reports that she has an upcoming appointment with uro-GYN in the near future for possible pessary placement.      Endo History:  1/2021 EGD (Dr. Cotton) -1 cm clean-based anastomotic ulcer, 2  cords of grade 1 esophageal varices, prior gastric bypass, 3 mm polyp in the antrum, gastritis (biopsy negative for H. Pylori)  10/2018 EGD (Dr. Mckeon) -Alfonzo-en-Y gastric bypass anatomy with patent anastomosis.    Past Medical History:  Past Medical History:   Diagnosis Date   • Abdominal pain    • Abscess, subdiaphragmatic (CMS/HCC) 6/17/2016   • Anemia    • Deep venous thrombosis of right profunda femoris vein (CMS/HCC) 8/15/2019   • Depression    • Elevated factor VIII level 8/24/2019   • Empyema of pleura (CMS/HCC)    • Encephalopathy, portal systemic (CMS/HCC) 5/9/2017   • Esophageal varices (CMS/HCC) 7/21/2017   • Gastric leak 5/29/2016   • GERD (gastroesophageal reflux disease)    • H/O thyroidectomy 6/17/2016   • Hemorrhoids    • Hernia, ventral    • History of morbid obesity 3/3/2016   • History of transfusion    • Hypercoagulable state (CMS/HCC) 8/24/2019   • Irregular menstrual cycle    • Liver cirrhosis secondary to CARTER (CMS/HCC) 04/2017   • Lupus anticoagulant positive 8/24/2019   • Migraine    • Parathyroid abnormality (CMS/HCC)    • Portal vein thrombosis 3/3/2016   • Post-surgical hypothyroidism    • Pulmonary embolism (CMS/HCC) 10/27/2020   • Radiation    • Restless legs syndrome (RLS)    • S/P laparoscopic sleeve gastrectomy 6/17/2016   • S/P splenectomy 6/17/2016   • Thyroid cancer (CMS/HCC)    • Thyroid cancer (CMS/HCC) 6/26/2015   • Urge and stress incontinence    • Vaginal prolapse 7/14/2021   • Varicose veins    • Ventral hernia 4/18/2020   • Vitamin D deficiency 1/21/2021       Past Surgical History:  Past Surgical History:   Procedure Laterality Date   • ABDOMINAL SURGERY     • BEDSIDE PARACENTESIS  3/22/2020        • BEDSIDE PARACENTESIS  4/19/2020        • BEDSIDE PARACENTESIS  3/1/2021        • BEDSIDE PARACENTESIS  3/30/2021        • CHOLECYSTECTOMY      Laparoscopic   • ENDOSCOPY N/A 3/17/2016    Procedure: ESOPHAGOGASTRODUODENOSCOPY WITH BALOON DILATATION 18-20MM WITH GASTRIC SLEEVE  SEALANT;  Surgeon: Leonardo Ortiz Jr., MD;  Location: [facility] ENDOSCOPY;  Service:    • ENDOSCOPY N/A 3/14/2016    Procedure: esophagogastroduodenoscopy with fluoroscopy;  Surgeon: Greg Avila III, MD;  Location: [facility] ENDOSCOPY;  Service:    • ENDOSCOPY N/A 4/15/2016    Procedure: EGD WITH DILITATION AND STENT PLACEMENT dilation of pylorus ;  Surgeon: Leonardo Ortiz Jr., MD;  Location: [facility] ENDOSCOPY;  Service:    • ENDOSCOPY N/A 5/13/2016    Procedure: ESOPHAGOGASTRODUODENOSCOPY  W/ STENT;  Surgeon: Leonardo Ortiz Jr., MD;  Location: [facility] ENDOSCOPY;  Service:    • ENDOSCOPY N/A 1/13/2021    Procedure: ESOPHAGOGASTRODUODENOSCOPY WITH POLYPECTOMY X1 AND  BIOPSY X1.;  Surgeon: Bryson Cotton MD;  Location: [facility] ENDOSCOPY;  Service: Gastroenterology;  Laterality: N/A;  anastomotic ulcer, antral polyp, GASTRITIS   • GASTRECTOMY      Gastric Surgery for Morbid Obesity Laparoscopic Longitudinal Gastrectomy   • GASTRIC SLEEVE LAPAROSCOPIC     • HEMORRHOIDECTOMY     • HERNIA REPAIR     • LAPAROSCOPY REPAIR HIATAL HERNIA     • LARYNGOSCOPY      Direct Laryngoscopy with Injection into Vocal Cords   • OTHER SURGICAL HISTORY Bilateral     Ear Pressure Equalization Tube, Insertion, Bilaterally   • PERIPHERALLY INSERTED CENTRAL CATHETER INSERTION     • SPLENECTOMY     • THYROID SURGERY     • TUBAL ABDOMINAL LIGATION         Social History:  Social History     Tobacco Use   • Smoking status: Never Smoker   • Smokeless tobacco: Never Used   Vaping Use   • Vaping Use: Never used   Substance Use Topics   • Alcohol use: No   • Drug use: No       Family History:  Family History   Problem Relation Age of Onset   • Stroke Father    • Diabetes Father         Diabetes Mellitus   • Hypertension Father    • Cancer Other        Medications:  Medications Prior to Admission   Medication Sig Dispense Refill Last Dose   • apixaban (ELIQUIS) 5 MG tablet tablet Take 5 mg by mouth 2 (Two) Times a Day.   8/24/2021 at Unknown  time   • bumetanide (BUMEX) 2 MG tablet Take 4 mg by mouth Daily.   8/24/2021 at Unknown time   • buprenorphine-naloxone (SUBOXONE) 8-2 MG per SL tablet Place 2 tablets under the tongue Daily.   8/24/2021 at Unknown time   • dicyclomine (Bentyl) 10 MG capsule Take 1 capsule by mouth 4 (Four) Times a Day Before Meals & at Bedtime As Needed (Abdominal pain or colic). 20 capsule 0 8/24/2021 at Unknown time   • estradiol (ESTRACE) 0.1 MG/GM vaginal cream Insert 1 g into the vagina 3 (Three) Times a Week. Monday Wednesday Friday   Past Week at Unknown time   • gabapentin (NEURONTIN) 400 MG capsule Take 400 mg by mouth 3 (Three) Times a Day.   8/24/2021 at Unknown time   • levothyroxine (SYNTHROID, LEVOTHROID) 125 MCG tablet Take 125 mcg by mouth Every Morning.   8/24/2021 at Unknown time   • ondansetron ODT (ZOFRAN-ODT) 8 MG disintegrating tablet Place 8 mg on the tongue Every 8 (Eight) Hours As Needed for Nausea or Vomiting.      • pantoprazole (PROTONIX) 40 MG EC tablet Take 1 tablet by mouth 2 (Two) Times a Day Before Meals. 60 tablet 0 8/24/2021 at Unknown time   • potassium chloride (K-DUR,KLOR-CON) 20 MEQ CR tablet Take 40 mEq by mouth 2 (Two) Times a Day.   8/24/2021 at Unknown time   • spironolactone (ALDACTONE) 100 MG tablet Take 400 mg by mouth Daily.   8/24/2021 at Unknown time   • sucralfate (CARAFATE) 1 g tablet Take 1 tablet by mouth 4 (Four) Times a Day Before Meals & at Bedtime. 120 tablet 0 8/24/2021 at Unknown time       Scheduled Meds:bumetanide, 2 mg, Intravenous, Q12H  enoxaparin, 40 mg, Subcutaneous, Daily  gabapentin, 400 mg, Oral, TID  levothyroxine, 125 mcg, Oral, Q AM  pantoprazole, 40 mg, Oral, BID AC  potassium chloride, 40 mEq, Oral, Daily With Breakfast & Dinner  sodium chloride, 10 mL, Intravenous, Q12H  spironolactone, 400 mg, Oral, Daily  sucralfate, 1 g, Oral, 4x Daily AC & at Bedtime      Continuous Infusions:   PRN Meds:.•  acetaminophen **OR** acetaminophen **OR** acetaminophen  •   "aluminum-magnesium hydroxide-simethicone  •  dicyclomine  •  magnesium sulfate **OR** magnesium sulfate in D5W 1g/100mL (PREMIX)  •  melatonin  •  nitroglycerin  •  ondansetron **OR** ondansetron  •  oxyCODONE  •  potassium chloride  •  sodium chloride  •  sodium chloride    ALLERGIES:  Contrast dye, Iodinated diagnostic agents, Iodine, Hydrocodone-acetaminophen, Morphine, Red dye, Compazine [prochlorperazine edisylate], Hydrocodone, Ketorolac tromethamine, and Nsaids    ROS:  The following systems were reviewed and negative;   Constitution:  No fevers, chills, no unintentional weight loss  Skin: no rash, no jaundice  Eyes:  No blurry vision, no eye pain  HENT:  No change in hearing or smell  Resp:  No dyspnea or cough  CV:  No chest pain or palpitations  :  No dysuria, hematuria  Musculoskeletal:  No leg cramps or arthralgias  Neuro:  No tremor, no numbness  Psych:  No depression or confusion    Objective     Vital Signs:   Vitals:    08/25/21 1346 08/25/21 1827 08/25/21 1927 08/26/21 0327   BP: 122/72 122/73 110/72 100/64   BP Location:  Left arm Left arm Left arm   Patient Position:  Lying Lying Lying   Pulse: 56 58 71 71   Resp:  16 18 18   Temp:  98.1 °F (36.7 °C) 98.2 °F (36.8 °C) 98 °F (36.7 °C)   TempSrc:  Oral Oral    SpO2: 99% 100% 100% 95%   Weight:  66.2 kg (145 lb 15.1 oz)  65.5 kg (144 lb 6.4 oz)   Height:  160 cm (62.99\")         Physical Exam:       General Appearance:    Awake and alert, in no acute distress   Head:    Normocephalic, without obvious abnormality, atraumatic   Throat:   No oral lesions, no thrush, oral mucosa moist   Lungs:     Respirations regular, even and unlabored   Chest Wall:    No abnormalities observed   Abdomen:     Soft, generalized tenderness, no rebound or guarding, mild distention, large umbilical hernia   Rectal:     Deferred   Extremities:   Moves all extremities, 1+ pitting edema to the bilateral lower extremities, no cyanosis   Pulses:   Pulses palpable and equal " bilaterally   Skin:   No rash, no jaundice, normal palpation   Lymph nodes:   No cervical, supraclavicular or submandibular palpable adenopathy   Neurologic:   Cranial nerves 2 - 12 grossly intact, no asterixis       Results Review:   I reviewed the patient's labs and imaging.  CBC    Results from last 7 days   Lab Units 08/26/21  0330 08/25/21  0328   WBC 10*3/mm3 4.90 7.10   HEMOGLOBIN g/dL 9.8* 10.5*   PLATELETS 10*3/mm3 496* 542*     CMP   Results from last 7 days   Lab Units 08/26/21  0330 08/25/21  0328   SODIUM mmol/L 141 138   POTASSIUM mmol/L 3.6 3.4*   CHLORIDE mmol/L 103 103   CO2 mmol/L 31.0* 28.0   BUN mg/dL 8 9   CREATININE mg/dL 1.02* 0.73   GLUCOSE mg/dL 114* 85   ALBUMIN g/dL 2.50* 3.00*   BILIRUBIN mg/dL 0.7 0.7   ALK PHOS U/L 128* 162*   AST (SGOT) U/L 26 32   ALT (SGPT) U/L 12 16   MAGNESIUM mg/dL 1.5*  --    LIPASE U/L  --  13     Cr Clearance Estimated Creatinine Clearance: 62 mL/min (A) (by C-G formula based on SCr of 1.02 mg/dL (H)).  Coag   Results from last 7 days   Lab Units 08/25/21  0328   INR  1.07     HbA1C   Lab Results   Component Value Date    HGBA1C 5.26 05/27/2016    HGBA1C 5.6 05/12/2015     Blood Glucose No results found for: POCGLU  Infection   Results from last 7 days   Lab Units 08/25/21  0328   PROCALCITONIN ng/mL 0.07     UA    Results from last 7 days   Lab Units 08/25/21  0909 08/25/21  0354 08/25/21  0354   NITRITE UA  Negative   < > Negative   WBC UA /HPF  --   --  6-12*   BACTERIA UA /HPF  --   --  Trace*   SQUAM EPITHEL UA /HPF  --   --  3-6*    < > = values in this interval not displayed.     Radiology(recent) CT Abdomen Pelvis Without Contrast    Result Date: 8/25/2021   1. Slight increase in size of a large complex umbilical hernia as described above with multi septated fluid/ascites as well as a loop of small bowel. No bowel distention to suggest obstruction and no CT signs of bowel ischemia at this time. 2. Evidence of multiple previous abdominal surgeries as  previously described. 3. There is now mild bilateral hydroureteronephrosis possibly due to bladder distention. No renal or ureteral stones.  Electronically Signed By-Lincoln Ely DO On:8/25/2021 7:43 AM This report was finalized on [id number] by  Lincoln Ely DO.         ASSESSMENT:   -Ascites  -Generalized abdominal pain  -Normocytic anemia  -Bilateral hydroureteronephrosis/vaginal prolapse  -Large umbilical hernia  -Bilateral lower extremity edema  -CARTER cirrhosis complicated by hepatic encephalopathy, ascites, and nonbleeding esophageal varices  -Chronic portal vein thrombus and DVT on Eliquis  -History of gastric sleeve  -History of splenectomy  -History of Alfonzo-en-Y gastric bypass  -History of cholecystectomy  -History of incisional hernia repair  -History of thyroid cancer s/p thyroidectomy    PLAN:  Patient presents with complaints of abdominal pain and bilateral lower extremity edema.  CT abdomen/pelvis on admission does show a slight increase in size of umbilical hernia with multi septated fluid/ascites along with mild bilateral hydroureteronephrosis.  Mckinney catheter has been placed.  Patient reports that she has a follow-up with uro-GYN in the near future as an outpatient for possible pessary placement.  GI has been asked to consult due to history of cirrhosis and ascites.  We will cancel paracentesis as there is likely not enough ascites fluid to drain.  Patient is in agreement.  Continue diuretics.  2 g sodium diet.  MELD-Na 7.  Patient can follow-up with her GI physician at  following discharge.  Not much else to add from a GI standpoint as an inpatient at this time.      I discussed the patients findings and my recommendations with the patient.  I will discuss case with Dr. Scott and change the plan accordingly.    We appreciate the referral.    Electronically signed by GRIFFIN Aranda, 08/26/21, 7:33 AM EDT.

## 2021-08-26 NOTE — CASE MANAGEMENT/SOCIAL WORK
Continued Stay Note  SARAH Galdamez     Patient Name: Lucinda Cope  MRN: [id number]  Today's Date: 8/26/2021    Admit Date: 8/25/2021    Discharge Plan     Row Name 08/26/21 1325       Plan    Plan  Anticipate routine home    Final Discharge Disposition Code  01 - home or self-care    Final Note  Discharge home prior to CM assessment              Anny Cartagena RN

## 2021-08-26 NOTE — NURSING NOTE
Patient woke with complaints of pain. I gave her the PRN Dilaudid and then she tells me that she would rather have oxycodone because it lasts longer. I explained that I would have to call the doctor before I could change any orders. I called and spoke with Dr. Juarez and he stated the dilaudid could be discontinued and I could order the oxy.

## 2021-08-26 NOTE — PLAN OF CARE
Goal Outcome Evaluation:               Patient resting abed, no distress noted. Patient has orders for a paracentesis today. Patient aware and has had it done before so is ok with having it done. Will continue to monitor

## 2021-08-26 NOTE — DISCHARGE SUMMARY
[facility]  DISCHARGE SUMMARY    Patient Name: Lucinda Cope  : 1975  MRN: [id number]    Date of Admission: 2021  Date of Discharge:  21  Primary Care Physician: Melissa Iniguez APRN      Presenting Problem:   Urinary retention [R33.9]  Hypervolemia, unspecified hypervolemia type [E87.70]    Active and Resolved Hospital Problems:  Active Hospital Problems    Diagnosis POA   • **Bilateral lower extremity edema [R60.0] Yes   • Generalized abdominal pain [R10.84] Yes   • Hypokalemia [E87.6] Yes   • Abdominal ascites [R18.8] Yes   • Hydroureteronephrosis, bilateral secondary to bladder distention [N13.30] Yes   • Acute urinary retention secondary to vaginal prolapse [R33.8] Yes   • Umbilical hernia [K42.9] Yes   • Vaginal prolapse [N81.10] Yes   • History of thyroid cancer [Z85.850] Not Applicable   • Anemia [D64.9] Yes   • Chronic pain [G89.29] Yes   • History of DVT (deep vein thrombosis) [Z86.718] Not Applicable   • Ventral hernia [K43.9] Yes   • Esophageal varices (CMS/HCC) [I85.00] Yes   • 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     Generalized abdominal pain  -likely secondary to above  -PRN analgesia      Hydroureteronephrosis, bilateral secondary to bladder distention  Acute urinary retention secondary to vaginal prolapse  Vaginal prolapse  -patient reports previous surgery to repair vaginal prolapse, but states it has recurred  -she states she has an upcoming follow-up appointment with Uro-GYN in [place]   -Mckinney catheter inserted in ER     Hypokalemia, mild  -replace per protocol and monitor      Ventral hernia  Umbilical hernia  -chronic     Gastroesophageal reflux disease  -continue PPI and Carafate      History  of thyroid cancer, Postoperative hypothyroidism  -continue levothyroxine      Overweight (BMI 25.0-29.9)  -BMI 25.85 on admission   -h/o gastric sleeve      History of DVT/PE  -hold Eliquis for paracentesis      Chronic pain  -hold Suboxone while on PRN analgesia   -continue gabapentin and Bentyl      Anemia, chronic  -stable, monitor     Hospital Course     Hospital Course:  Lucinda Cope is a 45 y.o. female with a history of CARTER cirrhosis, esophageal varices, splenectomy, gastric sleeve, chronic pain, thyroid cancer status post thyroidectomy, post-surgical hypothyroidism, GERD, and recurrent vaginal prolapse who presented to [facility] ED on 8/25/2021 complaining of increased leg edema and abdominal pain. The patient reports compliance with her home diuretics, but states the swelling is getting worse. She states her legs are also painful and her skin feels stretched thin. She denies any fever or chills. She denies any chest pain or shortness of breath. She states it has been several months since her last paracentesis. She states she is followed by GI at [facility].      Workup in the ER revealed anasarca. The patient was given Lasix 40 mg IV. She was also given Dilaudid and Zofran. Her CT abd/pelvis revealed bilateral hydroureteronephrosis secondary to bladder distention. A Mckinney catheter was inserted. The patient was admitted to the Hospitalist team for further treatment.      8/26/21 doing better today, will dc home.    DISCHARGE Follow Up Recommendations for labs and diagnostics: cmp/cbc      Reasons For Change In Medications and Indications for New Medications:      Day of Discharge     Vital Signs:  Temp:  [98 °F (36.7 °C)-98.4 °F (36.9 °C)] 98.4 °F (36.9 °C)  Heart Rate:  [50-75] 75  Resp:  [16-18] 18  BP: (100-125)/(64-76) 105/66      Physical Exam  Vitals and nursing note reviewed.   Constitutional:       General: She is not in acute distress.     Appearance: Normal appearance. She is  well-developed. She is not toxic-appearing.   HENT:      Head: Normocephalic and atraumatic.      Nose: Nose normal. No congestion or rhinorrhea.      Mouth/Throat:      Mouth: Mucous membranes are moist.      Pharynx: No oropharyngeal exudate.   Eyes:      General: No scleral icterus.        Right eye: No discharge.         Left eye: No discharge.      Extraocular Movements: Extraocular movements intact.      Conjunctiva/sclera: Conjunctivae normal.      Pupils: Pupils are equal, round, and reactive to light.   Neck:      Thyroid: No thyromegaly.      Vascular: No carotid bruit or JVD.      Trachea: No tracheal deviation.   Cardiovascular:      Rate and Rhythm: Normal rate and regular rhythm.      Pulses: Normal pulses.      Heart sounds: Normal heart sounds. No murmur heard.   No friction rub. No gallop.    Pulmonary:      Effort: Pulmonary effort is normal. No respiratory distress.      Breath sounds: Normal breath sounds. No stridor.   Abdominal:      General: Bowel sounds are normal. There is no distension.      Palpations: Abdomen is soft.      Tenderness: There is no abdominal tenderness. There is no guarding.      Comments: Umbilical hernia   Musculoskeletal:         General: No swelling. Normal range of motion.      Cervical back: Normal range of motion and neck supple. No rigidity. No muscular tenderness.   Lymphadenopathy:      Cervical: No cervical adenopathy.   Skin:     General: Skin is warm and dry.      Coloration: Skin is not jaundiced.   Neurological:      General: No focal deficit present.      Mental Status: She is alert and oriented to person, place, and time. Mental status is at baseline.      Cranial Nerves: No cranial nerve deficit.      Sensory: No sensory deficit.      Motor: No abnormal muscle tone.   Psychiatric:         Mood and Affect: Mood normal.         Behavior: Behavior normal.         Thought Content: Thought content normal.         Judgment: Judgment normal.           Pertinent   and/or Most Recent Results     LAB RESULTS:      Lab 08/26/21  0330 08/25/21  0328   WBC 4.90 7.10   HEMOGLOBIN 9.8* 10.5*   HEMATOCRIT 30.8* 32.8*   PLATELETS 496* 542*   NEUTROS ABS  --  3.34   EOS ABS  --  0.92*   MCV 81.3 81.3   PROCALCITONIN  --  0.07   PROTIME  --  11.8*         Lab 08/26/21  0330 08/25/21  0328   SODIUM 141 138   POTASSIUM 3.6 3.4*   CHLORIDE 103 103   CO2 31.0* 28.0   ANION GAP 7.0 7.0   BUN 8 9   CREATININE 1.02* 0.73   GLUCOSE 114* 85   CALCIUM 8.3* 8.4*   MAGNESIUM 1.5*  --          Lab 08/26/21  0330 08/25/21  0328   TOTAL PROTEIN 5.9* 7.2   ALBUMIN 2.50* 3.00*   GLOBULIN 3.4 4.2   ALT (SGPT) 12 16   AST (SGOT) 26 32   BILIRUBIN 0.7 0.7   ALK PHOS 128* 162*   LIPASE  --  13         Lab 08/25/21  0328   PROTIME 11.8*   INR 1.07                 Brief Urine Lab Results  (Last result in the past 365 days)      Color   Clarity   Blood   Leuk Est   Nitrite   Protein   CREAT   Urine HCG        08/25/21 0909 Yellow Clear Negative Negative Negative Negative             Microbiology Results (last 10 days)     Procedure Component Value - Date/Time    COVID PRE-OP / PRE-PROCEDURE SCREENING ORDER (NO ISOLATION) - Swab, Nasopharynx [[id number]]  (Normal) Collected: 08/25/21 0959    Lab Status: Final result Specimen: Swab from Nasopharynx Updated: 08/25/21 1037    Narrative:      The following orders were created for panel order COVID PRE-OP / PRE-PROCEDURE SCREENING ORDER (NO ISOLATION) - Swab, Nasopharynx.  Procedure                               Abnormality         Status                     ---------                               -----------         ------                     COVID-19,CEPHEID/SYBIL/BD...[[id number]]  Normal              Final result                 Please view results for these tests on the individual orders.    COVID-19,CEPHEID/SYBIL/BDMAX,COR/LEIDA/PAD/ALYSIA IN-HOUSE(OR EMERGENT/ADD-ON),NP SWAB IN TRANSPORT MEDIA 3-4 HR TAT, RT-PCR - Swab, Nasopharynx [[id number]]  (Normal) Collected:  08/25/21 0959    Lab Status: Final result Specimen: Swab from Nasopharynx Updated: 08/25/21 1037     COVID19 Not Detected    Narrative:      Fact sheet for providers: https://www.fda.gov/media/583240/download     Fact sheet for patients: https://www.fda.gov/media/744826/download  Fact sheet for providers: https://www.fda.gov/media/141965/download    Fact sheet for patients: https://www.[employer].gov/media/065166/download    Test performed by PCR.          CT Abdomen Pelvis Without Contrast    Result Date: 8/25/2021  Impression:  1. Slight increase in size of a large complex umbilical hernia as described above with multi septated fluid/ascites as well as a loop of small bowel. No bowel distention to suggest obstruction and no CT signs of bowel ischemia at this time. 2. Evidence of multiple previous abdominal surgeries as previously described. 3. There is now mild bilateral hydroureteronephrosis possibly due to bladder distention. No renal or ureteral stones.  Electronically Signed By-Lincoln Ely DO On:8/25/2021 7:43 AM This report was finalized on [id number] by  Lincoln Ely DO.      Results for orders placed during the hospital encounter of 07/13/21    Duplex Venous Lower Extremity - Bilateral CAR    Interpretation Summary  · Sub-acute right lower extremity superficial thrombophlebitis noted in the greater saphenous (above knee).  · Acute right lower extremity superficial thrombophlebitis noted in the varicosity (above knee).  · All other veins appeared normal bilaterally.      Results for orders placed during the hospital encounter of 07/13/21    Duplex Venous Lower Extremity - Bilateral CAR    Interpretation Summary  · Sub-acute right lower extremity superficial thrombophlebitis noted in the greater saphenous (above knee).  · Acute right lower extremity superficial thrombophlebitis noted in the varicosity (above knee).  · All other veins appeared normal bilaterally.          Labs Pending at  Discharge:      Procedures Performed           Consults:   Consults     Date and Time Order Name Status Description    8/25/2021  4:20 PM Inpatient Gastroenterology Consult Completed             Discharge Details        Discharge Medications      Continue These Medications      Instructions Start Date   apixaban 5 MG tablet tablet  Commonly known as: ELIQUIS   5 mg, Oral, 2 Times Daily      bumetanide 2 MG tablet  Commonly known as: BUMEX   4 mg, Oral, Daily      buprenorphine-naloxone 8-2 MG per SL tablet  Commonly known as: SUBOXONE   2 tablets, Sublingual, Daily      dicyclomine 10 MG capsule  Commonly known as: Bentyl   10 mg, Oral, 4 Times Daily Before Meals & Nightly PRN      estradiol 0.1 MG/GM vaginal cream  Commonly known as: ESTRACE   1 g, Vaginal, 3 Times Weekly, Monday Wednesday Friday       gabapentin 400 MG capsule  Commonly known as: NEURONTIN   400 mg, Oral, 3 Times Daily      levothyroxine 125 MCG tablet  Commonly known as: SYNTHROID, LEVOTHROID   125 mcg, Oral, Every Early Morning      ondansetron ODT 8 MG disintegrating tablet  Commonly known as: ZOFRAN-ODT   8 mg, Translingual, Every 8 Hours PRN      pantoprazole 40 MG EC tablet  Commonly known as: PROTONIX   40 mg, Oral, 2 Times Daily Before Meals      potassium chloride 20 MEQ CR tablet  Commonly known as: K-DUR,KLOR-CON   40 mEq, Oral, 2 Times Daily      spironolactone 100 MG tablet  Commonly known as: ALDACTONE   400 mg, Oral, Daily      sucralfate 1 g tablet  Commonly known as: CARAFATE   1 g, Oral, 4 Times Daily Before Meals & Nightly             Allergies   Allergen Reactions   • Contrast Dye Hives   • Iodinated Diagnostic Agents Hives   • Iodine Hives   • Hydrocodone-Acetaminophen Hives and Itching   • Morphine Itching, Hives and Rash   • Red Dye Itching, Other (See Comments) and Unknown - High Severity   • Compazine [Prochlorperazine Edisylate] Hives   • Hydrocodone Rash     Lortab elixir per pt   • Ketorolac Tromethamine Unknown - Low  Severity   • Nsaids Other (See Comments)     States I am not suppose to take them  Cannot have due to gastric sleeve         Discharge Disposition:   Home or Self Care    Diet:  Hospital:  Diet Order   Procedures   • Diet Cardiac; 2gm Na+         Discharge Activity:   Activity Instructions     Gradually Increase Activity Until at Pre-Hospitalization Level              CODE STATUS:  Code Status and Medical Interventions:   Ordered at: 08/25/21 0828     Code Status:    CPR     Medical Interventions (Level of Support Prior to Arrest):    Full         No future appointments.    Additional Instructions for the Follow-ups that You Need to Schedule     Discharge Follow-up with PCP   As directed       Currently Documented PCP:    Melissa Iniguez APRN    PCP Phone Number:    [phone]     Follow Up Details: 1 week               Time spent on Discharge including face to face service:  35 minutes    This patient has been examined wearing appropriate Personal Protective Equipment and discussed with rn. 08/26/21      Signature: Electronically signed by Javier Cardoso MD, 08/26/21, 12:41 PM EDT.

## 2021-08-27 ENCOUNTER — READMISSION MANAGEMENT (OUTPATIENT)
Dept: CALL CENTER | Facility: HOSPITAL | Age: 46
End: 2021-08-27

## 2021-08-27 NOTE — OUTREACH NOTE
Prep Survey      Responses   [facility] facility patient discharged from?  [facility]   Is LACE score < 7 ?  No   Emergency Room discharge w/ pulse ox?  No   Eligibility  Readm Mgmt   Discharge diagnosis  Bilateral lower extremity edema   Does the patient have one of the following disease processes/diagnoses(primary or secondary)?  Other   Does the patient have Home health ordered?  No   Is there a DME ordered?  No   Comments regarding appointments  Appt needed   General alerts for this patient  HX: CARTER cirrhosis, splenectomy, thyroid CA   Prep survey completed?  Yes          Soumya Salazar RN

## 2021-09-01 ENCOUNTER — READMISSION MANAGEMENT (OUTPATIENT)
Dept: CALL CENTER | Facility: HOSPITAL | Age: 46
End: 2021-09-01

## 2021-09-01 NOTE — OUTREACH NOTE
Medical Week 1 Survey      Responses   [facility] patient discharged from?  [facility]   Does the patient have one of the following disease processes/diagnoses(primary or secondary)?  Other   Week 1 attempt successful?  No   Unsuccessful attempts  Attempt 1          Juanis Syed LPN

## 2021-09-08 ENCOUNTER — READMISSION MANAGEMENT (OUTPATIENT)
Dept: CALL CENTER | Facility: HOSPITAL | Age: 46
End: 2021-09-08

## 2021-09-08 NOTE — OUTREACH NOTE
Medical Week 2 Survey      Responses   [facility] patient discharged from?  [facility]   Does the patient have one of the following disease processes/diagnoses(primary or secondary)?  Other   Week 2 attempt successful?  No   Unsuccessful attempts  Attempt 1          Martha Augustine RN

## 2021-10-01 DIAGNOSIS — R18.8 OTHER ASCITES: Primary | ICD-10-CM

## 2021-10-01 RX ORDER — ALBUMIN (HUMAN) 12.5 G/50ML
25 SOLUTION INTRAVENOUS DAILY PRN
Status: CANCELLED | OUTPATIENT
Start: 2021-10-04

## 2021-10-01 RX ORDER — ALBUMIN (HUMAN) 12.5 G/50ML
12.5 SOLUTION INTRAVENOUS DAILY PRN
Status: CANCELLED | OUTPATIENT
Start: 2021-10-04

## 2021-10-04 ENCOUNTER — APPOINTMENT (OUTPATIENT)
Dept: INFUSION THERAPY | Facility: HOSPITAL | Age: 46
End: 2021-10-04

## 2022-05-08 NOTE — OUTREACH NOTE
"Medical Week 2 Survey      Responses   [facility] patient discharged from?  [facility]   Does the patient have one of the following disease processes/diagnoses(primary or secondary)?  Other   Week 2 attempt successful?  Yes   Call start time  1036   Discharge diagnosis  Abdominal pain, CARTER, hypokalemia, ascites, bacterial peritonitis   Call end time  1037   Is patient permission given to speak with other caregiver?  Yes   List who call center can speak with  JORGE MORE   Person spoke with today (if not patient) and relationship  JORGE MORE- MOTHER   Week 2 Call Completed?  Yes   Wrap up additional comments  UNABLE TO REACH PATIENT. SPOKE WITH MOTHER. MOTHER STATES, \"I'M NOT SURE HOW SHE IS DOING BECAUSE I DON'T LIVE WITH HER.\" \"I ASSUME SHE IS DOING OKAY BECAUSE SHE WENT BACK TO WORK.\"           Cheryl Xiong, [place]  "
Statement Selected

## 2023-01-17 ENCOUNTER — TRANSCRIBE ORDERS (OUTPATIENT)
Dept: ADMINISTRATIVE | Facility: HOSPITAL | Age: 48
End: 2023-01-17
Payer: COMMERCIAL

## 2023-01-17 DIAGNOSIS — Z12.31 SCREENING MAMMOGRAM FOR HIGH-RISK PATIENT: Primary | ICD-10-CM

## 2023-01-20 ENCOUNTER — LAB (OUTPATIENT)
Dept: LAB | Facility: HOSPITAL | Age: 48
End: 2023-01-20
Payer: COMMERCIAL

## 2023-01-20 ENCOUNTER — TRANSCRIBE ORDERS (OUTPATIENT)
Dept: LAB | Facility: HOSPITAL | Age: 48
End: 2023-01-20
Payer: COMMERCIAL

## 2023-01-20 DIAGNOSIS — Z79.899 ENCOUNTER FOR LONG-TERM (CURRENT) USE OF OTHER MEDICATIONS: Primary | ICD-10-CM

## 2023-01-20 DIAGNOSIS — Z79.899 ENCOUNTER FOR LONG-TERM (CURRENT) USE OF OTHER MEDICATIONS: ICD-10-CM

## 2023-01-20 LAB
ALBUMIN SERPL-MCNC: 3 G/DL (ref 3.5–5.2)
ALBUMIN/GLOB SERPL: 0.8 G/DL
ALP SERPL-CCNC: 148 U/L (ref 39–117)
ALT SERPL W P-5'-P-CCNC: 13 U/L (ref 1–33)
ANION GAP SERPL CALCULATED.3IONS-SCNC: 8.6 MMOL/L (ref 5–15)
AST SERPL-CCNC: 28 U/L (ref 1–32)
BILIRUB SERPL-MCNC: 1.4 MG/DL (ref 0–1.2)
BUN SERPL-MCNC: 7 MG/DL (ref 6–20)
BUN/CREAT SERPL: 8.5 (ref 7–25)
CALCIUM SPEC-SCNC: 8.4 MG/DL (ref 8.6–10.5)
CHLORIDE SERPL-SCNC: 104 MMOL/L (ref 98–107)
CHOLEST SERPL-MCNC: 137 MG/DL (ref 0–200)
CO2 SERPL-SCNC: 24.4 MMOL/L (ref 22–29)
CREAT SERPL-MCNC: 0.82 MG/DL (ref 0.57–1)
DEPRECATED RDW RBC AUTO: 45.5 FL (ref 37–54)
EGFRCR SERPLBLD CKD-EPI 2021: 87.8 ML/MIN/1.73
ERYTHROCYTE [DISTWIDTH] IN BLOOD BY AUTOMATED COUNT: 17.4 % (ref 12.3–15.4)
GLOBULIN UR ELPH-MCNC: 3.9 GM/DL
GLUCOSE SERPL-MCNC: 81 MG/DL (ref 65–99)
HBA1C MFR BLD: 5.2 % (ref 4.8–5.6)
HCT VFR BLD AUTO: 27.3 % (ref 34–46.6)
HDLC SERPL-MCNC: 44 MG/DL (ref 40–60)
HGB BLD-MCNC: 8.2 G/DL (ref 12–15.9)
IRON 24H UR-MRATE: 19 MCG/DL (ref 37–145)
LDLC SERPL CALC-MCNC: 84 MG/DL (ref 0–100)
LDLC/HDLC SERPL: 1.95 {RATIO}
MAGNESIUM SERPL-MCNC: 1.8 MG/DL (ref 1.6–2.6)
MCH RBC QN AUTO: 21.9 PG (ref 26.6–33)
MCHC RBC AUTO-ENTMCNC: 30 G/DL (ref 31.5–35.7)
MCV RBC AUTO: 72.8 FL (ref 79–97)
NRBC BLD AUTO-RTO: 0.2 /100 WBC (ref 0–0.2)
PLATELET # BLD AUTO: 444 10*3/MM3 (ref 140–450)
PMV BLD AUTO: 11.3 FL (ref 6–12)
POTASSIUM SERPL-SCNC: 3.8 MMOL/L (ref 3.5–5.2)
PROT SERPL-MCNC: 6.9 G/DL (ref 6–8.5)
RBC # BLD AUTO: 3.75 10*6/MM3 (ref 3.77–5.28)
SODIUM SERPL-SCNC: 137 MMOL/L (ref 136–145)
T3 SERPL-MCNC: 70.8 NG/DL (ref 80–200)
T4 SERPL-MCNC: 6.12 MCG/DL (ref 4.5–11.7)
TRIGL SERPL-MCNC: 37 MG/DL (ref 0–150)
TSH SERPL DL<=0.05 MIU/L-ACNC: 33.5 UIU/ML (ref 0.27–4.2)
VLDLC SERPL-MCNC: 9 MG/DL (ref 5–40)
WBC NRBC COR # BLD: 5.22 10*3/MM3 (ref 3.4–10.8)

## 2023-01-20 PROCEDURE — 83735 ASSAY OF MAGNESIUM: CPT

## 2023-01-20 PROCEDURE — 36415 COLL VENOUS BLD VENIPUNCTURE: CPT

## 2023-01-20 PROCEDURE — 83540 ASSAY OF IRON: CPT

## 2023-01-20 PROCEDURE — 84436 ASSAY OF TOTAL THYROXINE: CPT

## 2023-01-20 PROCEDURE — 82746 ASSAY OF FOLIC ACID SERUM: CPT

## 2023-01-20 PROCEDURE — 80061 LIPID PANEL: CPT

## 2023-01-20 PROCEDURE — 84480 ASSAY TRIIODOTHYRONINE (T3): CPT

## 2023-01-20 PROCEDURE — 82306 VITAMIN D 25 HYDROXY: CPT

## 2023-01-20 PROCEDURE — 84482 T3 REVERSE: CPT

## 2023-01-20 PROCEDURE — 85007 BL SMEAR W/DIFF WBC COUNT: CPT

## 2023-01-20 PROCEDURE — 82607 VITAMIN B-12: CPT

## 2023-01-20 PROCEDURE — 83036 HEMOGLOBIN GLYCOSYLATED A1C: CPT

## 2023-01-20 PROCEDURE — 80050 GENERAL HEALTH PANEL: CPT

## 2023-01-21 LAB
25(OH)D3 SERPL-MCNC: <6 NG/ML (ref 30–100)
ANISOCYTOSIS BLD QL: ABNORMAL
EOSINOPHIL # BLD MANUAL: 0.68 10*3/MM3 (ref 0–0.4)
EOSINOPHIL NFR BLD MANUAL: 13 % (ref 0.3–6.2)
FOLATE SERPL-MCNC: 8.5 NG/ML (ref 4.78–24.2)
LYMPHOCYTES # BLD MANUAL: 2.4 10*3/MM3 (ref 0.7–3.1)
LYMPHOCYTES NFR BLD MANUAL: 7 % (ref 5–12)
MICROCYTES BLD QL: ABNORMAL
MONOCYTES # BLD: 0.37 10*3/MM3 (ref 0.1–0.9)
NEUTROPHILS # BLD AUTO: 1.77 10*3/MM3 (ref 1.7–7)
NEUTROPHILS NFR BLD MANUAL: 34 % (ref 42.7–76)
PLAT MORPH BLD: NORMAL
POIKILOCYTOSIS BLD QL SMEAR: ABNORMAL
SCHISTOCYTES BLD QL SMEAR: ABNORMAL
TARGETS BLD QL SMEAR: ABNORMAL
VARIANT LYMPHS NFR BLD MANUAL: 46 % (ref 19.6–45.3)
VIT B12 BLD-MCNC: 376 PG/ML (ref 211–946)
WBC MORPH BLD: NORMAL

## 2023-01-24 LAB — T3REVERSE SERPL-MCNC: 19.7 NG/DL (ref 9.2–24.1)

## 2023-02-24 ENCOUNTER — HOSPITAL ENCOUNTER (OUTPATIENT)
Dept: MAMMOGRAPHY | Facility: HOSPITAL | Age: 48
Discharge: HOME | End: 2023-02-24
Admitting: NURSE PRACTITIONER
Payer: COMMERCIAL

## 2023-02-24 DIAGNOSIS — Z12.31 SCREENING MAMMOGRAM FOR HIGH-RISK PATIENT: ICD-10-CM

## 2023-02-24 PROCEDURE — 77067 SCR MAMMO BI INCL CAD: CPT

## 2023-02-24 PROCEDURE — 77063 BREAST TOMOSYNTHESIS BI: CPT

## 2023-03-31 ENCOUNTER — LAB (OUTPATIENT)
Dept: LAB | Facility: HOSPITAL | Age: 48
End: 2023-03-31
Payer: COMMERCIAL

## 2023-03-31 ENCOUNTER — TRANSCRIBE ORDERS (OUTPATIENT)
Dept: ADMINISTRATIVE | Facility: HOSPITAL | Age: 48
End: 2023-03-31
Payer: COMMERCIAL

## 2023-03-31 DIAGNOSIS — E03.9 HYPOTHYROIDISM, UNSPECIFIED TYPE: Primary | ICD-10-CM

## 2023-03-31 DIAGNOSIS — E03.9 HYPOTHYROIDISM, UNSPECIFIED TYPE: ICD-10-CM

## 2023-03-31 LAB
T3 SERPL-MCNC: 165 NG/DL (ref 80–200)
T4 FREE SERPL-MCNC: 1.99 NG/DL (ref 0.93–1.7)
TSH SERPL DL<=0.05 MIU/L-ACNC: 0.01 UIU/ML (ref 0.27–4.2)

## 2023-03-31 PROCEDURE — 84439 ASSAY OF FREE THYROXINE: CPT

## 2023-03-31 PROCEDURE — 36415 COLL VENOUS BLD VENIPUNCTURE: CPT

## 2023-03-31 PROCEDURE — 84443 ASSAY THYROID STIM HORMONE: CPT

## 2023-03-31 PROCEDURE — 84480 ASSAY TRIIODOTHYRONINE (T3): CPT

## 2023-03-31 PROCEDURE — 84482 T3 REVERSE: CPT

## 2023-04-04 LAB — T3REVERSE SERPL-MCNC: 35 NG/DL (ref 9.2–24.1)

## 2023-10-17 ENCOUNTER — HOSPITAL ENCOUNTER (EMERGENCY)
Facility: HOSPITAL | Age: 48
Discharge: HOME | End: 2023-10-17
Attending: EMERGENCY MEDICINE | Admitting: EMERGENCY MEDICINE
Payer: COMMERCIAL

## 2023-10-17 ENCOUNTER — APPOINTMENT (OUTPATIENT)
Dept: CT IMAGING | Facility: HOSPITAL | Age: 48
End: 2023-10-17
Payer: COMMERCIAL

## 2023-10-17 VITALS
OXYGEN SATURATION: 99 % | HEART RATE: 70 BPM | RESPIRATION RATE: 16 BRPM | WEIGHT: 138.01 LBS | HEIGHT: 63 IN | BODY MASS INDEX: 24.45 KG/M2 | TEMPERATURE: 98.2 F | SYSTOLIC BLOOD PRESSURE: 120 MMHG | DIASTOLIC BLOOD PRESSURE: 72 MMHG

## 2023-10-17 DIAGNOSIS — R10.9 ABDOMINAL PAIN, UNSPECIFIED ABDOMINAL LOCATION: Primary | ICD-10-CM

## 2023-10-17 LAB
ALBUMIN SERPL-MCNC: 3.3 G/DL (ref 3.5–5.2)
ALBUMIN/GLOB SERPL: 1 G/DL
ALP SERPL-CCNC: 174 U/L (ref 39–117)
ALT SERPL W P-5'-P-CCNC: 14 U/L (ref 1–33)
ANION GAP SERPL CALCULATED.3IONS-SCNC: 7 MMOL/L (ref 5–15)
AST SERPL-CCNC: 26 U/L (ref 1–32)
BACTERIA UR QL AUTO: ABNORMAL /HPF
BASOPHILS # BLD MANUAL: 0.36 10*3/MM3 (ref 0–0.2)
BASOPHILS NFR BLD MANUAL: 4 % (ref 0–1.5)
BILIRUB SERPL-MCNC: 1.2 MG/DL (ref 0–1.2)
BILIRUB UR QL STRIP: ABNORMAL
BUN SERPL-MCNC: 8 MG/DL (ref 6–20)
BUN/CREAT SERPL: 9.6 (ref 7–25)
CALCIUM SPEC-SCNC: 8.8 MG/DL (ref 8.6–10.5)
CHLORIDE SERPL-SCNC: 106 MMOL/L (ref 98–107)
CLARITY UR: CLEAR
CLUMPED PLATELETS: PRESENT
CO2 SERPL-SCNC: 26 MMOL/L (ref 22–29)
COLOR UR: YELLOW
CREAT SERPL-MCNC: 0.83 MG/DL (ref 0.57–1)
DEPRECATED RDW RBC AUTO: 56.9 FL (ref 37–54)
EGFRCR SERPLBLD CKD-EPI 2021: 86 ML/MIN/1.73
EOSINOPHIL # BLD MANUAL: 0.45 10*3/MM3 (ref 0–0.4)
EOSINOPHIL NFR BLD MANUAL: 5 % (ref 0.3–6.2)
ERYTHROCYTE [DISTWIDTH] IN BLOOD BY AUTOMATED COUNT: 19.5 % (ref 12.3–15.4)
GLOBULIN UR ELPH-MCNC: 3.4 GM/DL
GLUCOSE SERPL-MCNC: 91 MG/DL (ref 65–99)
GLUCOSE UR STRIP-MCNC: NEGATIVE MG/DL
HCT VFR BLD AUTO: 37 % (ref 34–46.6)
HGB BLD-MCNC: 11.7 G/DL (ref 12–15.9)
HGB UR QL STRIP.AUTO: NEGATIVE
HOLD SPECIMEN: NORMAL
HYALINE CASTS UR QL AUTO: ABNORMAL /LPF
KETONES UR QL STRIP: NEGATIVE
LEUKOCYTE ESTERASE UR QL STRIP.AUTO: ABNORMAL
LIPASE SERPL-CCNC: 14 U/L (ref 13–60)
LYMPHOCYTES # BLD MANUAL: 5.58 10*3/MM3 (ref 0.7–3.1)
LYMPHOCYTES NFR BLD MANUAL: 4 % (ref 5–12)
MCH RBC QN AUTO: 25 PG (ref 26.6–33)
MCHC RBC AUTO-ENTMCNC: 31.6 G/DL (ref 31.5–35.7)
MCV RBC AUTO: 79 FL (ref 79–97)
MONOCYTES # BLD: 0.36 10*3/MM3 (ref 0.1–0.9)
NEUTROPHILS # BLD AUTO: 2.25 10*3/MM3 (ref 1.7–7)
NEUTROPHILS NFR BLD MANUAL: 25 % (ref 42.7–76)
NITRITE UR QL STRIP: NEGATIVE
PH UR STRIP.AUTO: 5.5 [PH] (ref 5–8)
PLATELET # BLD AUTO: 480 10*3/MM3 (ref 140–450)
PMV BLD AUTO: 9.1 FL (ref 6–12)
POTASSIUM SERPL-SCNC: 4.3 MMOL/L (ref 3.5–5.2)
PROT SERPL-MCNC: 6.7 G/DL (ref 6–8.5)
PROT UR QL STRIP: ABNORMAL
RBC # BLD AUTO: 4.69 10*6/MM3 (ref 3.77–5.28)
RBC # UR STRIP: ABNORMAL /HPF
RBC MORPH BLD: NORMAL
REF LAB TEST METHOD: ABNORMAL
SCAN SLIDE: NORMAL
SODIUM SERPL-SCNC: 139 MMOL/L (ref 136–145)
SP GR UR STRIP: 1.04 (ref 1–1.03)
SQUAMOUS #/AREA URNS HPF: ABNORMAL /HPF
TROPONIN T SERPL HS-MCNC: <6 NG/L
UROBILINOGEN UR QL STRIP: ABNORMAL
VARIANT LYMPHS NFR BLD MANUAL: 4 % (ref 0–5)
VARIANT LYMPHS NFR BLD MANUAL: 58 % (ref 19.6–45.3)
WBC # UR STRIP: ABNORMAL /HPF
WBC MORPH BLD: NORMAL
WBC NRBC COR # BLD: 9 10*3/MM3 (ref 3.4–10.8)
WHOLE BLOOD HOLD COAG: NORMAL
WHOLE BLOOD HOLD SPECIMEN: NORMAL

## 2023-10-17 PROCEDURE — 93005 ELECTROCARDIOGRAM TRACING: CPT | Performed by: EMERGENCY MEDICINE

## 2023-10-17 PROCEDURE — 84484 ASSAY OF TROPONIN QUANT: CPT | Performed by: EMERGENCY MEDICINE

## 2023-10-17 PROCEDURE — 85007 BL SMEAR W/DIFF WBC COUNT: CPT | Performed by: EMERGENCY MEDICINE

## 2023-10-17 PROCEDURE — 80053 COMPREHEN METABOLIC PANEL: CPT | Performed by: EMERGENCY MEDICINE

## 2023-10-17 PROCEDURE — 36415 COLL VENOUS BLD VENIPUNCTURE: CPT

## 2023-10-17 PROCEDURE — 25010000002 HYDROMORPHONE 1 MG/ML SOLUTION: Performed by: EMERGENCY MEDICINE

## 2023-10-17 PROCEDURE — 99284 EMERGENCY DEPT VISIT MOD MDM: CPT

## 2023-10-17 PROCEDURE — 81001 URINALYSIS AUTO W/SCOPE: CPT | Performed by: EMERGENCY MEDICINE

## 2023-10-17 PROCEDURE — 74176 CT ABD & PELVIS W/O CONTRAST: CPT

## 2023-10-17 PROCEDURE — 83690 ASSAY OF LIPASE: CPT | Performed by: EMERGENCY MEDICINE

## 2023-10-17 PROCEDURE — 96376 TX/PRO/DX INJ SAME DRUG ADON: CPT

## 2023-10-17 PROCEDURE — 85025 COMPLETE CBC W/AUTO DIFF WBC: CPT | Performed by: EMERGENCY MEDICINE

## 2023-10-17 PROCEDURE — 96374 THER/PROPH/DIAG INJ IV PUSH: CPT

## 2023-10-17 RX ORDER — SODIUM CHLORIDE 0.9 % (FLUSH) 0.9 %
10 SYRINGE (ML) INJECTION AS NEEDED
Status: DISCONTINUED | OUTPATIENT
Start: 2023-10-17 | End: 2023-10-18 | Stop reason: HOSPADM

## 2023-10-17 RX ADMIN — HYDROMORPHONE HYDROCHLORIDE 0.5 MG: 1 INJECTION, SOLUTION INTRAMUSCULAR; INTRAVENOUS; SUBCUTANEOUS at 19:48

## 2023-10-17 RX ADMIN — HYDROMORPHONE HYDROCHLORIDE 0.5 MG: 1 INJECTION, SOLUTION INTRAMUSCULAR; INTRAVENOUS; SUBCUTANEOUS at 22:17

## 2023-10-18 LAB
QT INTERVAL: 428 MS
QTC INTERVAL: 469 MS

## 2023-10-18 NOTE — ED PROVIDER NOTES
Subjective   History of Present Illness  48-year-old female with history of abdominal pain, cirrhosis, presents for abdominal pain.  Mainly in epigastric and left upper quadrant radiating to back.  Going on for couple days.  Has not seen any of her GI physicians in quite some time.  Most recently following with a specialist at [facility].  No vomiting or diarrhea.  No urinary symptoms.  No fever or chills.  Review of Systems  See HPI.  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    Empyema of pleura     Encephalopathy, portal systemic 5/9/2017    Esophageal varices 7/21/2017    Gastric leak 5/29/2016    GERD (gastroesophageal reflux disease)     H/O thyroidectomy 6/17/2016    Hemorrhoids     Hernia, ventral     History of morbid obesity 3/3/2016    History of transfusion     Hypercoagulable state 8/24/2019    Irregular menstrual cycle     Liver cirrhosis secondary to CARTER 04/2017    Lupus anticoagulant positive 8/24/2019    Migraine     Parathyroid abnormality     Portal vein thrombosis 3/3/2016    Post-surgical hypothyroidism     Pulmonary embolism 10/27/2020    Radiation     Restless legs syndrome (RLS)     S/P laparoscopic sleeve gastrectomy 6/17/2016    S/P splenectomy 6/17/2016    Thyroid cancer     Thyroid cancer 6/26/2015    Urge and stress incontinence     Vaginal prolapse 7/14/2021    Varicose veins     Ventral hernia 4/18/2020    Vitamin D deficiency 1/21/2021       Allergies   Allergen Reactions    Contrast Dye (Echo Or Unknown Ct/Mr) Hives    Iodinated Contrast Media Hives    Iodine Hives    Hydrocodone-Acetaminophen Hives and Itching    Morphine Itching, Hives and Rash    Red Dye Itching, Other (See Comments) and Unknown - High Severity    Compazine [Prochlorperazine Edisylate] Hives    Hydrocodone Rash      Lortab elixir per pt    Ketorolac Tromethamine Unknown - Low Severity    Nsaids Other (See Comments)     States I am not suppose to take them  Cannot have due to gastric sleeve       Past Surgical History:   Procedure Laterality Date    ABDOMINAL SURGERY      BEDSIDE PARACENTESIS  3/22/2020         BEDSIDE PARACENTESIS  4/19/2020         BEDSIDE PARACENTESIS  3/1/2021         BEDSIDE PARACENTESIS  3/30/2021         CHOLECYSTECTOMY      Laparoscopic    ENDOSCOPY N/A 3/17/2016    Procedure: ESOPHAGOGASTRODUODENOSCOPY WITH BALOON DILATATION 18-20MM WITH GASTRIC SLEEVE SEALANT;  Surgeon: Leonardo Ortiz Jr., MD;  Location: [facility] ENDOSCOPY;  Service:     ENDOSCOPY N/A 3/14/2016    Procedure: esophagogastroduodenoscopy with fluoroscopy;  Surgeon: Greg Avila III, MD;  Location: [facility] ENDOSCOPY;  Service:     ENDOSCOPY N/A 4/15/2016    Procedure: EGD WITH DILITATION AND STENT PLACEMENT dilation of pylorus ;  Surgeon: Leonardo Ortiz Jr., MD;  Location: [facility] ENDOSCOPY;  Service:     ENDOSCOPY N/A 5/13/2016    Procedure: ESOPHAGOGASTRODUODENOSCOPY  W/ STENT;  Surgeon: Leonardo Ortiz Jr., MD;  Location: [facility] ENDOSCOPY;  Service:     ENDOSCOPY N/A 1/13/2021    Procedure: ESOPHAGOGASTRODUODENOSCOPY WITH POLYPECTOMY X1 AND  BIOPSY X1.;  Surgeon: Bryson Cotton MD;  Location: [facility] ENDOSCOPY;  Service: Gastroenterology;  Laterality: N/A;  anastomotic ulcer, antral polyp, GASTRITIS    GASTRECTOMY      Gastric Surgery for Morbid Obesity Laparoscopic Longitudinal Gastrectomy    GASTRIC SLEEVE LAPAROSCOPIC      HEMORRHOIDECTOMY      HERNIA REPAIR      LAPAROSCOPY REPAIR HIATAL HERNIA      LARYNGOSCOPY      Direct Laryngoscopy with Injection into Vocal Cords    OTHER SURGICAL HISTORY Bilateral     Ear Pressure Equalization Tube, Insertion, Bilaterally    PERIPHERALLY INSERTED CENTRAL CATHETER INSERTION      SPLENECTOMY      THYROID SURGERY      TUBAL ABDOMINAL LIGATION         Family History   Problem Relation  "Age of Onset    Stroke Father     Diabetes Father         Diabetes Mellitus    Hypertension Father     Breast cancer Maternal Grandmother     Cancer Other        Social History     Socioeconomic History    Marital status: Single   Tobacco Use    Smoking status: Never    Smokeless tobacco: Never   Vaping Use    Vaping Use: Never used   Substance and Sexual Activity    Alcohol use: No    Drug use: No    Sexual activity: Defer           Objective   Physical Exam  Constitutional:  No acute distress.  Head:  Atraumatic.  Normocephalic.   Eyes:  No scleral icterus. Normal conjunctivae  ENT:  Moist mucosa.  No nasal discharge present.  Cardiovascular:  Well perfused.  Equal pulses.  Regular rate.  Normal capillary refill.    Pulmonary/Chest:  No respiratory distress.  Airway patent.  No tachypnea.  No accessory muscle usage.    Abdominal:  Nondistended.  Left upper quadrant tenderness palpation without rebound or guarding..   Extremities:  No peripheral edema.  No Deformity  Skin:  Warm, dry  Neurological:  Alert, awake, and appropriate.  Normal speech.      Procedures           ED Course      /72   Pulse 70   Temp 98.2 °F (36.8 °C)   Resp 16   Ht 160 cm (63\")   Wt 62.6 kg (138 lb 0.1 oz)   LMP  (LMP Unknown)   SpO2 99%   BMI 24.45 kg/m²   Labs Reviewed   COMPREHENSIVE METABOLIC PANEL - Abnormal; Notable for the following components:       Result Value    Albumin 3.3 (*)     Alkaline Phosphatase 174 (*)     All other components within normal limits    Narrative:     GFR Normal >60  Chronic Kidney Disease <60  Kidney Failure <15     URINALYSIS W/ MICROSCOPIC IF INDICATED (NO CULTURE) - Abnormal; Notable for the following components:    Specific Gravity, UA 1.036 (*)     Bilirubin, UA Small (1+) (*)     Protein, UA Trace (*)     Leuk Esterase, UA Trace (*)     All other components within normal limits   CBC WITH AUTO DIFFERENTIAL - Abnormal; Notable for the following components:    Hemoglobin 11.7 (*)     MCH " 25.0 (*)     RDW 19.5 (*)     RDW-SD 56.9 (*)     Platelets 480 (*)     All other components within normal limits    Narrative:     The previously reported component NRBC is no longer being reported. Previous result was 0.3 /100 WBC (Reference Range: 0.0-0.2 /100 WBC) on 10/17/2023 at 1952 EDT.   URINALYSIS, MICROSCOPIC ONLY - Abnormal; Notable for the following components:    WBC, UA 3-5 (*)     Bacteria, UA Trace (*)     Squamous Epithelial Cells, UA 3-6 (*)     All other components within normal limits   MANUAL DIFFERENTIAL - Abnormal; Notable for the following components:    Neutrophil % 25.0 (*)     Lymphocyte % 58.0 (*)     Monocyte % 4.0 (*)     Basophil % 4.0 (*)     Lymphocytes Absolute 5.58 (*)     Eosinophils Absolute 0.45 (*)     Basophils Absolute 0.36 (*)     All other components within normal limits   LIPASE - Normal   SINGLE HSTROPONIN T - Normal    Narrative:     High Sensitive Troponin T Reference Range:  <10.0 ng/L- Negative Female for AMI  <15.0 ng/L- Negative Male for AMI  >=10 - Abnormal Female indicating possible myocardial injury.  >=15 - Abnormal Male indicating possible myocardial injury.   Clinicians would have to utilize clinical acumen, EKG, Troponin, and serial changes to determine if it is an Acute Myocardial Infarction or myocardial injury due to an underlying chronic condition.        SCAN SLIDE   CBC AND DIFFERENTIAL    Narrative:     The following orders were created for panel order CBC & Differential.  Procedure                               Abnormality         Status                     ---------                               -----------         ------                     CBC Auto Differential[[id number]]        Abnormal            Final result               Scan Slide[[id number]]                                       Final result                 Please view results for these tests on the individual orders.   EXTRA TUBES    Narrative:     The following orders were created for panel  order Extra Tubes.  Procedure                               Abnormality         Status                     ---------                               -----------         ------                     Lavender Top[[id number]]                                     Final result               Gold Top - SST[[id number]]                                   Final result               Light Blue Top[[id number]]                                   Final result                 Please view results for these tests on the individual orders.   LAVENDER TOP   GOLD TOP - SST   LIGHT BLUE TOP     Medications   sodium chloride 0.9 % flush 10 mL (has no administration in time range)   HYDROmorphone (DILAUDID) injection 0.5 mg (0.5 mg Intravenous Given 10/17/23 1948)   HYDROmorphone (DILAUDID) injection 0.5 mg (0.5 mg Intravenous Given 10/17/23 2217)     CT Abdomen Pelvis Without Contrast    Result Date: 10/17/2023  Impression: No evidence of new acute process in the abdomen or pelvis on this noncontrast exam. Similar multiple chronic/persistent findings including hepatic steatosis, small volume loculated ascites, mild bilateral hydronephrosis, fat-containing umbilical hernia, and multiorgan postsurgical changes, as above. Additionally, a prior contrasted exam  demonstrated portal vein thrombosis with cavernous transformation, though this cannot be seen on this noncontrast exam. Electronically Signed: Isak Hong MD  10/17/2023 8:53 PM EDT  Workstation ID: [id number]                                        Medical Decision Making  Problems Addressed:  Abdominal pain, unspecified abdominal location: complicated acute illness or injury    Amount and/or Complexity of Data Reviewed  Labs: ordered.  Radiology: ordered.  ECG/medicine tests: ordered.    Risk  Prescription drug management.    EKG interpretation:, 1935, heart rate 72, normal sinus rhythm, normal axis, normal intervals, no acute ischemic changes    My interpretation of CT concerning for small  volume diffuse ascites.  See above for radiology interpretation.    Patient with reassuring labs, nontoxic-appearing.  Pain improved here with IV pain medications.  CT unremarkable on radiology read.  Reassuring vital signs.  Rich DC.    Final diagnoses:   Abdominal pain, unspecified abdominal location       ED Disposition  ED Disposition       ED Disposition   Discharge    Condition   Stable    Comment   --               Zoe River, APRN  [address]  [address]  [place] [address]  [phone]    In 3 days           Medication List      No changes were made to your prescriptions during this visit.            Dg Fernandez MD  10/17/23 [id number]

## 2023-10-23 ENCOUNTER — HOSPITAL ENCOUNTER (OUTPATIENT)
Facility: HOSPITAL | Age: 48
Setting detail: OBSERVATION
Discharge: HOME | End: 2023-10-25
Attending: EMERGENCY MEDICINE | Admitting: EMERGENCY MEDICINE
Payer: COMMERCIAL

## 2023-10-23 DIAGNOSIS — R10.84 GENERALIZED ABDOMINAL PAIN: Primary | ICD-10-CM

## 2023-10-23 DIAGNOSIS — R10.13 EPIGASTRIC PAIN: ICD-10-CM

## 2023-10-23 LAB
BACTERIA UR QL AUTO: ABNORMAL /HPF
BILIRUB UR QL STRIP: ABNORMAL
CLARITY UR: CLEAR
COLOR UR: YELLOW
GLUCOSE UR STRIP-MCNC: NEGATIVE MG/DL
HGB UR QL STRIP.AUTO: NEGATIVE
HYALINE CASTS UR QL AUTO: ABNORMAL /LPF
KETONES UR QL STRIP: ABNORMAL
LEUKOCYTE ESTERASE UR QL STRIP.AUTO: ABNORMAL
LIPASE SERPL-CCNC: 30 U/L (ref 13–60)
NITRITE UR QL STRIP: NEGATIVE
PH UR STRIP.AUTO: <=5 [PH] (ref 5–8)
PROT UR QL STRIP: ABNORMAL
RBC # UR STRIP: ABNORMAL /HPF
REF LAB TEST METHOD: ABNORMAL
SP GR UR STRIP: 1.04 (ref 1–1.03)
SQUAMOUS #/AREA URNS HPF: ABNORMAL /HPF
UROBILINOGEN UR QL STRIP: ABNORMAL
WBC # UR STRIP: ABNORMAL /HPF
WHOLE BLOOD HOLD COAG: NORMAL
WHOLE BLOOD HOLD SPECIMEN: NORMAL

## 2023-10-23 PROCEDURE — 81001 URINALYSIS AUTO W/SCOPE: CPT | Performed by: PHYSICIAN ASSISTANT

## 2023-10-23 PROCEDURE — 83690 ASSAY OF LIPASE: CPT | Performed by: PHYSICIAN ASSISTANT

## 2023-10-23 PROCEDURE — 99284 EMERGENCY DEPT VISIT MOD MDM: CPT

## 2023-10-23 RX ORDER — ONDANSETRON 2 MG/ML
4 INJECTION INTRAMUSCULAR; INTRAVENOUS ONCE
Status: COMPLETED | OUTPATIENT
Start: 2023-10-23 | End: 2023-10-24

## 2023-10-23 RX ORDER — SODIUM CHLORIDE 0.9 % (FLUSH) 0.9 %
10 SYRINGE (ML) INJECTION AS NEEDED
Status: DISCONTINUED | OUTPATIENT
Start: 2023-10-23 | End: 2023-10-25 | Stop reason: HOSPADM

## 2023-10-24 ENCOUNTER — ANESTHESIA (OUTPATIENT)
Dept: GASTROENTEROLOGY | Facility: HOSPITAL | Age: 48
End: 2023-10-24
Payer: COMMERCIAL

## 2023-10-24 ENCOUNTER — ANESTHESIA EVENT (OUTPATIENT)
Dept: GASTROENTEROLOGY | Facility: HOSPITAL | Age: 48
End: 2023-10-24
Payer: COMMERCIAL

## 2023-10-24 ENCOUNTER — INPATIENT HOSPITAL (AMBULATORY)
Dept: URBAN - METROPOLITAN AREA HOSPITAL 84 | Facility: HOSPITAL | Age: 48
End: 2023-10-24

## 2023-10-24 DIAGNOSIS — K25.9 GASTRIC ULCER, UNSPECIFIED AS ACUTE OR CHRONIC, WITHOUT HEMO: ICD-10-CM

## 2023-10-24 DIAGNOSIS — R10.13 EPIGASTRIC PAIN: ICD-10-CM

## 2023-10-24 PROBLEM — R10.9 ABDOMINAL PAIN: Status: ACTIVE | Noted: 2023-10-24

## 2023-10-24 LAB
ALBUMIN SERPL-MCNC: 3.4 G/DL (ref 3.5–5.2)
ALBUMIN/GLOB SERPL: 1 G/DL
ALP SERPL-CCNC: 173 U/L (ref 39–117)
ALT SERPL W P-5'-P-CCNC: 9 U/L (ref 1–33)
ANION GAP SERPL CALCULATED.3IONS-SCNC: 6 MMOL/L (ref 5–15)
ANION GAP SERPL CALCULATED.3IONS-SCNC: 7 MMOL/L (ref 5–15)
ANISOCYTOSIS BLD QL: ABNORMAL
ANISOCYTOSIS BLD QL: ABNORMAL
AST SERPL-CCNC: 23 U/L (ref 1–32)
BASOPHILS # BLD MANUAL: 0.32 10*3/MM3 (ref 0–0.2)
BASOPHILS # BLD MANUAL: 0.57 10*3/MM3 (ref 0–0.2)
BASOPHILS NFR BLD MANUAL: 4 % (ref 0–1.5)
BASOPHILS NFR BLD MANUAL: 4 % (ref 0–1.5)
BILIRUB SERPL-MCNC: 1.2 MG/DL (ref 0–1.2)
BUN SERPL-MCNC: 10 MG/DL (ref 6–20)
BUN SERPL-MCNC: 9 MG/DL (ref 6–20)
BUN/CREAT SERPL: 11.1 (ref 7–25)
BUN/CREAT SERPL: 13 (ref 7–25)
C3 FRG RBC-MCNC: ABNORMAL
C3 FRG RBC-MCNC: ABNORMAL
CALCIUM SPEC-SCNC: 8.4 MG/DL (ref 8.6–10.5)
CALCIUM SPEC-SCNC: 8.7 MG/DL (ref 8.6–10.5)
CHLORIDE SERPL-SCNC: 105 MMOL/L (ref 98–107)
CHLORIDE SERPL-SCNC: 105 MMOL/L (ref 98–107)
CO2 SERPL-SCNC: 27 MMOL/L (ref 22–29)
CO2 SERPL-SCNC: 27 MMOL/L (ref 22–29)
CREAT SERPL-MCNC: 0.69 MG/DL (ref 0.57–1)
CREAT SERPL-MCNC: 0.9 MG/DL (ref 0.57–1)
DEPRECATED RDW RBC AUTO: 57.3 FL (ref 37–54)
DEPRECATED RDW RBC AUTO: 57.3 FL (ref 37–54)
EGFRCR SERPLBLD CKD-EPI 2021: 105.9 ML/MIN/1.73
EGFRCR SERPLBLD CKD-EPI 2021: 78 ML/MIN/1.73
EOSINOPHIL # BLD MANUAL: 0.24 10*3/MM3 (ref 0–0.4)
EOSINOPHIL # BLD MANUAL: 0.71 10*3/MM3 (ref 0–0.4)
EOSINOPHIL NFR BLD MANUAL: 3 % (ref 0.3–6.2)
EOSINOPHIL NFR BLD MANUAL: 5 % (ref 0.3–6.2)
ERYTHROCYTE [DISTWIDTH] IN BLOOD BY AUTOMATED COUNT: 19.6 % (ref 12.3–15.4)
ERYTHROCYTE [DISTWIDTH] IN BLOOD BY AUTOMATED COUNT: 19.7 % (ref 12.3–15.4)
GIANT PLATELETS: ABNORMAL
GLOBULIN UR ELPH-MCNC: 3.4 GM/DL
GLUCOSE SERPL-MCNC: 132 MG/DL (ref 65–99)
GLUCOSE SERPL-MCNC: 92 MG/DL (ref 65–99)
HCT VFR BLD AUTO: 33.2 % (ref 34–46.6)
HCT VFR BLD AUTO: 35.4 % (ref 34–46.6)
HGB BLD-MCNC: 10.3 G/DL (ref 12–15.9)
HGB BLD-MCNC: 11.1 G/DL (ref 12–15.9)
LYMPHOCYTES # BLD MANUAL: 4.54 10*3/MM3 (ref 0.7–3.1)
LYMPHOCYTES # BLD MANUAL: 4.98 10*3/MM3 (ref 0.7–3.1)
LYMPHOCYTES NFR BLD MANUAL: 3 % (ref 5–12)
LYMPHOCYTES NFR BLD MANUAL: 6 % (ref 5–12)
MCH RBC QN AUTO: 24.5 PG (ref 26.6–33)
MCH RBC QN AUTO: 24.6 PG (ref 26.6–33)
MCHC RBC AUTO-ENTMCNC: 31.1 G/DL (ref 31.5–35.7)
MCHC RBC AUTO-ENTMCNC: 31.3 G/DL (ref 31.5–35.7)
MCV RBC AUTO: 78.7 FL (ref 79–97)
MCV RBC AUTO: 79 FL (ref 79–97)
MONOCYTES # BLD: 0.24 10*3/MM3 (ref 0.1–0.9)
MONOCYTES # BLD: 0.85 10*3/MM3 (ref 0.1–0.9)
NEUTROPHILS # BLD AUTO: 2.13 10*3/MM3 (ref 1.7–7)
NEUTROPHILS # BLD AUTO: 7.53 10*3/MM3 (ref 1.7–7)
NEUTROPHILS NFR BLD MANUAL: 27 % (ref 42.7–76)
NEUTROPHILS NFR BLD MANUAL: 52 % (ref 42.7–76)
NEUTS BAND NFR BLD MANUAL: 1 % (ref 0–5)
PLATELET # BLD AUTO: 458 10*3/MM3 (ref 140–450)
PLATELET # BLD AUTO: 487 10*3/MM3 (ref 140–450)
PMV BLD AUTO: 8.6 FL (ref 6–12)
PMV BLD AUTO: 9.3 FL (ref 6–12)
POTASSIUM SERPL-SCNC: 3.7 MMOL/L (ref 3.5–5.2)
POTASSIUM SERPL-SCNC: 4.1 MMOL/L (ref 3.5–5.2)
PROT SERPL-MCNC: 6.8 G/DL (ref 6–8.5)
RBC # BLD AUTO: 4.2 10*6/MM3 (ref 3.77–5.28)
RBC # BLD AUTO: 4.49 10*6/MM3 (ref 3.77–5.28)
SCAN SLIDE: NORMAL
SCAN SLIDE: NORMAL
SMALL PLATELETS BLD QL SMEAR: ABNORMAL
SMALL PLATELETS BLD QL SMEAR: ABNORMAL
SODIUM SERPL-SCNC: 138 MMOL/L (ref 136–145)
SODIUM SERPL-SCNC: 139 MMOL/L (ref 136–145)
TARGETS BLD QL SMEAR: ABNORMAL
TARGETS BLD QL SMEAR: ABNORMAL
TOXIC GRANULATION: ABNORMAL
TOXIC GRANULATION: ABNORMAL
VARIANT LYMPHS NFR BLD MANUAL: 2 % (ref 0–5)
VARIANT LYMPHS NFR BLD MANUAL: 32 % (ref 19.6–45.3)
VARIANT LYMPHS NFR BLD MANUAL: 61 % (ref 19.6–45.3)
WBC NRBC COR # BLD: 14.2 10*3/MM3 (ref 3.4–10.8)
WBC NRBC COR # BLD: 7.9 10*3/MM3 (ref 3.4–10.8)

## 2023-10-24 PROCEDURE — 25010000002 ONDANSETRON PER 1 MG: Performed by: INTERNAL MEDICINE

## 2023-10-24 PROCEDURE — 85025 COMPLETE CBC W/AUTO DIFF WBC: CPT | Performed by: PHYSICIAN ASSISTANT

## 2023-10-24 PROCEDURE — G0378 HOSPITAL OBSERVATION PER HR: HCPCS

## 2023-10-24 PROCEDURE — 85025 COMPLETE CBC W/AUTO DIFF WBC: CPT | Performed by: EMERGENCY MEDICINE

## 2023-10-24 PROCEDURE — 43235 EGD DIAGNOSTIC BRUSH WASH: CPT | Performed by: INTERNAL MEDICINE

## 2023-10-24 PROCEDURE — 25810000003 SODIUM CHLORIDE 0.9 % SOLUTION: Performed by: ANESTHESIOLOGIST ASSISTANT

## 2023-10-24 PROCEDURE — 25010000002 HYDROMORPHONE 1 MG/ML SOLUTION: Performed by: NURSE PRACTITIONER

## 2023-10-24 PROCEDURE — 96376 TX/PRO/DX INJ SAME DRUG ADON: CPT

## 2023-10-24 PROCEDURE — 25010000002 ONDANSETRON PER 1 MG: Performed by: NURSE PRACTITIONER

## 2023-10-24 PROCEDURE — 85007 BL SMEAR W/DIFF WBC COUNT: CPT | Performed by: EMERGENCY MEDICINE

## 2023-10-24 PROCEDURE — 25010000002 PROPOFOL 10 MG/ML EMULSION: Performed by: ANESTHESIOLOGIST ASSISTANT

## 2023-10-24 PROCEDURE — C1751 CATH, INF, PER/CENT/MIDLINE: HCPCS

## 2023-10-24 PROCEDURE — 80053 COMPREHEN METABOLIC PANEL: CPT | Performed by: PHYSICIAN ASSISTANT

## 2023-10-24 PROCEDURE — 96375 TX/PRO/DX INJ NEW DRUG ADDON: CPT

## 2023-10-24 PROCEDURE — 85007 BL SMEAR W/DIFF WBC COUNT: CPT | Performed by: PHYSICIAN ASSISTANT

## 2023-10-24 PROCEDURE — 25010000002 HYDROMORPHONE 1 MG/ML SOLUTION: Performed by: EMERGENCY MEDICINE

## 2023-10-24 PROCEDURE — 96374 THER/PROPH/DIAG INJ IV PUSH: CPT

## 2023-10-24 RX ORDER — MISOPROSTOL 200 UG/1
200 TABLET ORAL 2 TIMES DAILY
Status: DISCONTINUED | OUTPATIENT
Start: 2023-10-24 | End: 2023-10-25 | Stop reason: HOSPADM

## 2023-10-24 RX ORDER — SPIRONOLACTONE 100 MG/1
400 TABLET, FILM COATED ORAL DAILY PRN
Status: DISCONTINUED | OUTPATIENT
Start: 2023-10-24 | End: 2023-10-25 | Stop reason: HOSPADM

## 2023-10-24 RX ORDER — BUMETANIDE 1 MG/1
4 TABLET ORAL DAILY PRN
Status: DISCONTINUED | OUTPATIENT
Start: 2023-10-24 | End: 2023-10-25 | Stop reason: HOSPADM

## 2023-10-24 RX ORDER — SPIRONOLACTONE 100 MG/1
400 TABLET, FILM COATED ORAL DAILY
Status: DISCONTINUED | OUTPATIENT
Start: 2023-10-24 | End: 2023-10-24

## 2023-10-24 RX ORDER — PANTOPRAZOLE SODIUM 40 MG/1
40 TABLET, DELAYED RELEASE ORAL
Status: DISCONTINUED | OUTPATIENT
Start: 2023-10-24 | End: 2023-10-25 | Stop reason: HOSPADM

## 2023-10-24 RX ORDER — ONDANSETRON 2 MG/ML
4 INJECTION INTRAMUSCULAR; INTRAVENOUS EVERY 6 HOURS PRN
Status: DISCONTINUED | OUTPATIENT
Start: 2023-10-24 | End: 2023-10-25 | Stop reason: HOSPADM

## 2023-10-24 RX ORDER — SODIUM CHLORIDE 0.9 % (FLUSH) 0.9 %
10 SYRINGE (ML) INJECTION EVERY 12 HOURS SCHEDULED
Status: DISCONTINUED | OUTPATIENT
Start: 2023-10-24 | End: 2023-10-25 | Stop reason: HOSPADM

## 2023-10-24 RX ORDER — ONDANSETRON 2 MG/ML
4 INJECTION INTRAMUSCULAR; INTRAVENOUS EVERY 6 HOURS PRN
Status: DISCONTINUED | OUTPATIENT
Start: 2023-10-24 | End: 2023-10-24

## 2023-10-24 RX ORDER — LIOTHYRONINE SODIUM 5 UG/1
5 TABLET ORAL DAILY
Status: DISCONTINUED | OUTPATIENT
Start: 2023-10-24 | End: 2023-10-25 | Stop reason: HOSPADM

## 2023-10-24 RX ORDER — ONDANSETRON 2 MG/ML
4 INJECTION INTRAMUSCULAR; INTRAVENOUS EVERY 6 HOURS PRN
Status: DISCONTINUED | OUTPATIENT
Start: 2023-10-24 | End: 2023-10-24 | Stop reason: SDUPTHER

## 2023-10-24 RX ORDER — SODIUM CHLORIDE 0.9 % (FLUSH) 0.9 %
10 SYRINGE (ML) INJECTION AS NEEDED
Status: DISCONTINUED | OUTPATIENT
Start: 2023-10-24 | End: 2023-10-25 | Stop reason: HOSPADM

## 2023-10-24 RX ORDER — SODIUM CHLORIDE 9 MG/ML
40 INJECTION, SOLUTION INTRAVENOUS AS NEEDED
Status: DISCONTINUED | OUTPATIENT
Start: 2023-10-24 | End: 2023-10-25 | Stop reason: HOSPADM

## 2023-10-24 RX ORDER — SUCRALFATE 1 G/1
1 TABLET ORAL
Status: DISCONTINUED | OUTPATIENT
Start: 2023-10-24 | End: 2023-10-25 | Stop reason: HOSPADM

## 2023-10-24 RX ORDER — SODIUM CHLORIDE 9 MG/ML
INJECTION, SOLUTION INTRAVENOUS CONTINUOUS PRN
Status: DISCONTINUED | OUTPATIENT
Start: 2023-10-24 | End: 2023-10-24 | Stop reason: SURG

## 2023-10-24 RX ORDER — NITROGLYCERIN 0.4 MG/1
0.4 TABLET SUBLINGUAL
Status: DISCONTINUED | OUTPATIENT
Start: 2023-10-24 | End: 2023-10-25 | Stop reason: HOSPADM

## 2023-10-24 RX ORDER — ONDANSETRON 4 MG/1
4 TABLET, FILM COATED ORAL EVERY 6 HOURS PRN
Status: DISCONTINUED | OUTPATIENT
Start: 2023-10-24 | End: 2023-10-24 | Stop reason: SDUPTHER

## 2023-10-24 RX ORDER — CHOLECALCIFEROL (VITAMIN D3) 125 MCG
5 CAPSULE ORAL NIGHTLY PRN
Status: DISCONTINUED | OUTPATIENT
Start: 2023-10-24 | End: 2023-10-25 | Stop reason: HOSPADM

## 2023-10-24 RX ORDER — LEVOTHYROXINE SODIUM 0.12 MG/1
125 TABLET ORAL
Status: DISCONTINUED | OUTPATIENT
Start: 2023-10-24 | End: 2023-10-25 | Stop reason: HOSPADM

## 2023-10-24 RX ORDER — LIOTHYRONINE SODIUM 5 UG/1
5 TABLET ORAL DAILY
COMMUNITY

## 2023-10-24 RX ORDER — ONDANSETRON 4 MG/1
4 TABLET, FILM COATED ORAL EVERY 6 HOURS PRN
Status: DISCONTINUED | OUTPATIENT
Start: 2023-10-24 | End: 2023-10-25 | Stop reason: HOSPADM

## 2023-10-24 RX ORDER — BUMETANIDE 1 MG/1
4 TABLET ORAL DAILY
Status: DISCONTINUED | OUTPATIENT
Start: 2023-10-24 | End: 2023-10-24

## 2023-10-24 RX ADMIN — HYDROMORPHONE HYDROCHLORIDE 1 MG: 1 INJECTION, SOLUTION INTRAMUSCULAR; INTRAVENOUS; SUBCUTANEOUS at 02:57

## 2023-10-24 RX ADMIN — Medication 10 ML: at 21:11

## 2023-10-24 RX ADMIN — PROPOFOL 200 MCG/KG/MIN: 10 INJECTION, EMULSION INTRAVENOUS at 13:04

## 2023-10-24 RX ADMIN — HYDROMORPHONE HYDROCHLORIDE 0.5 MG: 1 INJECTION, SOLUTION INTRAMUSCULAR; INTRAVENOUS; SUBCUTANEOUS at 01:50

## 2023-10-24 RX ADMIN — SODIUM CHLORIDE: 9 INJECTION, SOLUTION INTRAVENOUS at 13:05

## 2023-10-24 RX ADMIN — PANTOPRAZOLE SODIUM 40 MG: 40 TABLET, DELAYED RELEASE ORAL at 14:26

## 2023-10-24 RX ADMIN — HYDROMORPHONE HYDROCHLORIDE 1 MG: 1 INJECTION, SOLUTION INTRAMUSCULAR; INTRAVENOUS; SUBCUTANEOUS at 14:25

## 2023-10-24 RX ADMIN — Medication 10 ML: at 01:49

## 2023-10-24 RX ADMIN — LIOTHYRONINE SODIUM 5 MCG: 5 TABLET ORAL at 08:15

## 2023-10-24 RX ADMIN — LEVOTHYROXINE SODIUM 125 MCG: 0.12 TABLET ORAL at 08:15

## 2023-10-24 RX ADMIN — HYDROMORPHONE HYDROCHLORIDE 0.5 MG: 1 INJECTION, SOLUTION INTRAMUSCULAR; INTRAVENOUS; SUBCUTANEOUS at 00:17

## 2023-10-24 RX ADMIN — PANTOPRAZOLE SODIUM 40 MG: 40 TABLET, DELAYED RELEASE ORAL at 18:48

## 2023-10-24 RX ADMIN — ONDANSETRON 4 MG: 2 INJECTION INTRAMUSCULAR; INTRAVENOUS at 00:16

## 2023-10-24 RX ADMIN — Medication 10 ML: at 08:17

## 2023-10-24 RX ADMIN — ONDANSETRON 4 MG: 2 INJECTION INTRAMUSCULAR; INTRAVENOUS at 23:08

## 2023-10-24 RX ADMIN — Medication 10 ML: at 21:15

## 2023-10-24 RX ADMIN — Medication 10 ML: at 08:13

## 2023-10-24 RX ADMIN — HYDROMORPHONE HYDROCHLORIDE 0.5 MG: 1 INJECTION, SOLUTION INTRAMUSCULAR; INTRAVENOUS; SUBCUTANEOUS at 19:14

## 2023-10-24 RX ADMIN — HYDROMORPHONE HYDROCHLORIDE 0.5 MG: 1 INJECTION, SOLUTION INTRAMUSCULAR; INTRAVENOUS; SUBCUTANEOUS at 23:04

## 2023-10-24 RX ADMIN — SUCRALFATE 1 G: 1 TABLET ORAL at 18:42

## 2023-10-24 RX ADMIN — MISOPROSTOL 200 MCG: 200 TABLET ORAL at 21:11

## 2023-10-24 RX ADMIN — HYDROMORPHONE HYDROCHLORIDE 1 MG: 1 INJECTION, SOLUTION INTRAMUSCULAR; INTRAVENOUS; SUBCUTANEOUS at 08:12

## 2023-10-24 NOTE — CASE MANAGEMENT/SOCIAL WORK
Continued Stay Note  [facility]     Patient Name: Lucinda Cope  MRN: [id number]  Today's Date: 10/24/2023    Admit Date: 10/23/2023    Plan: Return home   Discharge Plan       Row Name 10/24/23 1324       Plan    Plan Return home    Patient/Family in Agreement with Plan yes    Plan Comments Barriers: GI following and EGD today. CM attempted to interview but out of room for EGD. Has PCP listed. CM will follow up if appropriate                    Phone communication or documentation only - no physical contact with patient or family.   Geetha MEDEROS,RN Case Manager  [facility]  Phone: Desk- [phone] cell- [phone]

## 2023-10-24 NOTE — OP NOTE
ESOPHAGOGASTRODUODENOSCOPY Procedure Report    Patient Name:  Lucinda Cope  YOB: 1975    Date of Surgery:  10/24/2023     Pre-Op Diagnosis:  Epigastric pain [R10.13]    Post-Op Diagnosis:  1.  Anastomotic ulcer  2.  Status post Alfonzo-en-Y gastric bypass       Procedure/CPT® Codes:      Procedure(s):  ESOPHAGOGASTRODUODENOSCOPY    Staff:  Surgeon(s):  Hossein Delgadillo MD      Anesthesia: Monitored Anesthesia Care    Description of Procedure:  A description of the procedure as well as risks, benefits and alternative methods were explained to the patient who voiced understanding and signed the corresponding consent form. A physical exam was performed and vital signs were monitored throughout the procedure.    After informed consent was obtained, the patient was placed in the left lateral decubitus position and sedated as above.  The Olympus video gastroscope was inserted into the oropharynx and the esophagus was intubated without difficulty.  Examination of the esophagus, stomach, pylorus, duodenal bulb, and second portion of the duodenum was performed without difficulty. The scope was then retroflexed and the fundus was visualized. The procedure was not difficult and there were no immediate complications.  There was no blood loss.    Findings:  Esophagus: Possible single column of esophageal varix which was not banded today.  No reflux changes.  No stricture or hiatal hernia.  Stomach: Evidence of prior Alfonzo-en-Y gastric bypass.  At the 3 o'clock position there was an ulcer with friability and bleeding.  The ulcer measured approximately 1 cm in diameter.  No stigmata of bleeding and no blood noted in the upper GI tract.  Duodenum: Normal    Impression:  1.  Anastomotic ulcer    Recommendations:  1.  Misoprostol 200 mcg twice daily  2.  Carafate 1 g twice daily  3.  Full liquid diet and advance to regular diet as tolerated  4.  Okay for discharge home when tolerating diet  5.  We will see as  needed  6.  Follow-up in my office in 3 to 4 months to reassess symptoms      Hossein Delgadillo MD     Date: 10/24/2023    Time: 13:13 EDT      .

## 2023-10-24 NOTE — ANESTHESIA PREPROCEDURE EVALUATION
Anesthesia Evaluation     Patient summary reviewed and Nursing notes reviewed   NPO Solid Status: > 8 hours  NPO Liquid Status: > 8 hours           Airway   Mallampati: II  TM distance: >3 FB  Neck ROM: full  No difficulty expected  Dental - normal exam     Pulmonary    (+) pulmonary embolism,sleep apnea  Cardiovascular         Neuro/Psych  (+) headaches, psychiatric history  GI/Hepatic/Renal/Endo    (+) GERD, hepatitis, liver disease cirrhosis, renal disease-, thyroid problem     Musculoskeletal     Abdominal    Substance History      OB/GYN          Other      history of cancer    ROS/Med Hx Other:  Left Ventricle: The left ventricle is small. There is normal left   ventricular myocardial thickness and mass. The left ventricular systolic   function is normal.The LVEF is visually estimated at 55 - 60%. The   diastolic function is normal.The left ventricular filling pressure is   normal.   · Right Ventricle: Right ventricle size is normal. Normal RV systolic   function.   · Valves: No significant valvular stenosis or regurgitation.   ·  Pericardium: No pericardial effusion.                   Anesthesia Plan    ASA 3     general     intravenous induction     Anesthetic plan, risks, benefits, and alternatives have been provided, discussed and informed consent has been obtained with: patient.    Plan discussed with CRNA.    CODE STATUS:    Level Of Support Discussed With: Patient  Code Status (Patient has no pulse and is not breathing): CPR (Attempt to Resuscitate)  Medical Interventions (Patient has pulse or is breathing): Full Support

## 2023-10-24 NOTE — H&P
"[facility] H&P    Patient Name: Lucinda Cope  : 1975  MRN: [id number]  Primary Care Physician: Zoe River APRN  Date of admission: 10/23/2023     Patient Care Team:  Zoe River APRN as PCP - General (Family Medicine)          Subjective   History Present Illness     Chief Complaint:   Chief Complaint   Patient presents with    Abdominal Pain     Abdominal Pain     Abdominal Pain        ED 10/23/2023  Due to significant overcrowding in the emergency department patient was initially seen and evaluated in triage.  Provider in triage recommended patient placement in the treatment area to initiate therapy and movement to an ER bed as soon as possible.     Provider in Triage Note  .  Patient is a 48-year-old female who presents with worsening generalized abdominal pain over the last several days.  She reports she is unsure what the problem is.  Nothing seems to make it better or worse it is constant and radiates to the back does not report any nausea or vomiting currently.  Reports its 8 out of 10 pain.        History of Present Illness  I interviewed the patient.  She does have a history of gastric sleeve, with multiple complications in .     Observation 10/24/2023  Patient agrees with HPI noted above including abdominal pain radiating to back since 10/12.  She stated she was seen in the ED and had CT which was unremarkable and was discharged.  She has had pain which she describes as acute on chronic since 10/12.  Reports history of gastric sleeve in 2016 with multiple complications including perforated spleen and has seen pain management in the past for chronic abdominal pain.  Currently rates pain as 7/10.    Observation 10/24/2023 pm  Discussed with patient GIs recommendation to advance her diet and DC home tonight if she tolerates her diet.  Patient told provider that she would prefer to stay overnight.  Tried full liquid diet but told RN that she had to \"force\" herself to eat. "     Review of Systems   Gastrointestinal:  Positive for abdominal pain.     Review of Systems   Constitutional: Negative for fever.   Gastrointestinal:  Positive for abdominal pain and nausea. Negative for constipation, diarrhea and vomiting.   Genitourinary:  Positive for flank pain. Negative for dysuria and hematuria.   All other systems reviewed and are negative.      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    Empyema of pleura     Encephalopathy, portal systemic 5/9/2017    Esophageal varices 7/21/2017    Gastric leak 5/29/2016    GERD (gastroesophageal reflux disease)     H/O thyroidectomy 6/17/2016    Hemorrhoids     Hernia, ventral     History of morbid obesity 3/3/2016    History of transfusion     Hypercoagulable state 8/24/2019    Irregular menstrual cycle     Liver cirrhosis secondary to CARTER 04/2017    Lupus anticoagulant positive 8/24/2019    Migraine     Parathyroid abnormality     Portal vein thrombosis 3/3/2016    Post-surgical hypothyroidism     Pulmonary embolism 10/27/2020    Radiation     Restless legs syndrome (RLS)     S/P laparoscopic sleeve gastrectomy 6/17/2016    S/P splenectomy 6/17/2016    Thyroid cancer     Thyroid cancer 6/26/2015    Urge and stress incontinence     Vaginal prolapse 7/14/2021    Varicose veins     Ventral hernia 4/18/2020    Vitamin D deficiency 1/21/2021       Surgical History:      Past Surgical History:   Procedure Laterality Date    ABDOMINAL SURGERY      BEDSIDE PARACENTESIS  3/22/2020         BEDSIDE PARACENTESIS  4/19/2020         BEDSIDE PARACENTESIS  3/1/2021         BEDSIDE PARACENTESIS  3/30/2021         CHOLECYSTECTOMY      Laparoscopic    ENDOSCOPY N/A 3/17/2016    Procedure: ESOPHAGOGASTRODUODENOSCOPY WITH BALOON DILATATION  18-20MM WITH GASTRIC SLEEVE SEALANT;  Surgeon: Leonardo Ortiz Jr., MD;  Location: [facility] ENDOSCOPY;  Service:     ENDOSCOPY N/A 3/14/2016    Procedure: esophagogastroduodenoscopy with fluoroscopy;  Surgeon: Greg Avila III, MD;  Location[facility] END[facility]COPY;  Service:     ENDOSCOPY N/A 4/15/2016    Procedure: EGD WITH DILITATION AND STENT PLACEMENT dilation of pylorus ;  Surgeon: Leonardo Ortiz Jr., MD;  [facility] END[facility];  Service:     ENDOSCOPY N/A 5/13/2016    Procedure: ESOPHAGOGASTRODUODENOSCOPY  W/ STENT;  Surgeon: Leonardo Ortiz Jr., MD;  Location: [facility] ENDOSCOPY;  Service:     ENDOSCOPY N/A 1/13/2021    Procedure: ESOPHAGOGASTRODUODENOSCOPY WITH POLYPECTOMY X1 AND  BIOPSY X1.;  Surgeon: Bryson Cotton MD;  Location: [facility] ENDOSCOPY;  Service: Gastroenterology;  Laterality: N/A;  anastomotic ulcer, antral polyp, GASTRITIS    GASTRECTOMY      Gastric Surgery for Morbid Obesity Laparoscopic Longitudinal Gastrectomy    GASTRIC SLEEVE LAPAROSCOPIC      HEMORRHOIDECTOMY      HERNIA REPAIR      LAPAROSCOPY REPAIR HIATAL HERNIA      LARYNGOSCOPY      Direct Laryngoscopy with Injection into Vocal Cords    OTHER SURGICAL HISTORY Bilateral     Ear Pressure Equalization Tube, Insertion, Bilaterally    PERIPHERALLY INSERTED CENTRAL CATHETER INSERTION      SPLENECTOMY      THYROID SURGERY      TUBAL ABDOMINAL LIGATION             Family History: family history includes Breast cancer in her maternal grandmother; Cancer in an other family member; Diabetes in her father; Hypertension in her father; Stroke in her father. Otherwise pertinent FHx was reviewed and unremarkable.     Social History:  reports that she has never smoked. She has never used smokeless tobacco. She reports that she does not drink alcohol and does not use drugs.      Medications:  Prior to Admission medications    Medication Sig Start Date End Date Taking? Authorizing Provider   apixaban (ELIQUIS) 5 MG tablet tablet Take 5 mg  by mouth 2 (Two) Times a Day.    Justyn Erwin MD   bumetanide (BUMEX) 2 MG tablet Take 4 mg by mouth Daily.    Justyn Erwin MD   estradiol (ESTRACE) 0.1 MG/GM vaginal cream Insert 1 g into the vagina 3 (Three) Times a Week. Monday Wednesday Friday    Justyn Erwin MD   levothyroxine (SYNTHROID, LEVOTHROID) 125 MCG tablet Take 125 mcg by mouth Every Morning.    Justyn Erwin MD   liothyronine (CYTOMEL) 5 MCG tablet Take 1 tablet by mouth Daily.    Justyn Erwin MD   ondansetron ODT (ZOFRAN-ODT) 8 MG disintegrating tablet Place 8 mg on the tongue Every 8 (Eight) Hours As Needed for Nausea or Vomiting.    Justyn Erwin MD   potassium chloride (K-DUR,KLOR-CON) 20 MEQ CR tablet Take 40 mEq by mouth 2 (Two) Times a Day.    Justyn Erwin MD   spironolactone (ALDACTONE) 100 MG tablet Take 400 mg by mouth Daily.    Justyn Erwin MD   buprenorphine-naloxone (SUBOXONE) 8-2 MG per SL tablet Place 2 tablets under the tongue Daily.  10/24/23  Justyn Erwin MD   dicyclomine (Bentyl) 10 MG capsule Take 1 capsule by mouth 4 (Four) Times a Day Before Meals & at Bedtime As Needed (Abdominal pain or colic). 1/14/21 10/24/23  Ever Motta MD   gabapentin (NEURONTIN) 400 MG capsule Take 400 mg by mouth 3 (Three) Times a Day.  10/24/23  Justyn Erwin MD   pantoprazole (PROTONIX) 40 MG EC tablet Take 1 tablet by mouth 2 (Two) Times a Day Before Meals. 3/10/21 10/24/23  Jing Brannon MD   sucralfate (CARAFATE) 1 g tablet Take 1 tablet by mouth 4 (Four) Times a Day Before Meals & at Bedtime. 3/9/21 10/24/23  Jing Brannon MD       Allergies:    Allergies   Allergen Reactions    Contrast Dye (Echo Or Unknown Ct/Mr) Hives    Iodinated Contrast Media Hives    Iodine Hives    Hydrocodone-Acetaminophen Hives and Itching    Morphine Itching, Hives and Rash    Red Dye Itching, Other (See Comments) and Unknown - High Severity    Compazine  [Prochlorperazine Edisylate] Hives    Hydrocodone Rash     Lortab elixir per pt    Ketorolac Tromethamine Unknown - Low Severity    Nsaids Other (See Comments)     States I am not suppose to take them  Cannot have due to gastric sleeve       Objective   Objective     Vital Signs  Temp:  [97.9 °F (36.6 °C)-98.6 °F (37 °C)] 98.6 °F (37 °C)  Heart Rate:  [50-95] 67  Resp:  [13-18] 16  BP: ()/(43-93) 101/52  SpO2:  [96 %-100 %] 97 %  on   ;   Device (Oxygen Therapy): room air  Body mass index is 24.25 kg/m².    Physical Exam  Vitals and nursing note reviewed.   Constitutional:       Appearance: Normal appearance.   HENT:      Head: Normocephalic and atraumatic.      Right Ear: External ear normal.      Left Ear: External ear normal.      Nose: Nose normal.      Mouth/Throat:      Mouth: Mucous membranes are moist.      Pharynx: Oropharynx is clear.   Eyes:      Extraocular Movements: Extraocular movements intact.   Cardiovascular:      Rate and Rhythm: Normal rate and regular rhythm.      Pulses: Normal pulses.      Heart sounds: Normal heart sounds.   Pulmonary:      Effort: Pulmonary effort is normal.      Breath sounds: Normal breath sounds.   Abdominal:      General: Abdomen is flat. Bowel sounds are normal.      Palpations: Abdomen is soft.      Tenderness: There is abdominal tenderness.   Musculoskeletal:         General: Normal range of motion.      Cervical back: Normal range of motion.   Skin:     General: Skin is warm.   Neurological:      General: No focal deficit present.      Mental Status: She is alert and oriented to person, place, and time.   Psychiatric:         Mood and Affect: Mood normal.         Behavior: Behavior normal.         Results Review:  I have personally reviewed most recent lab results and radiology images and interpretations and agree with findings, most notably: CBC, CMP, UA, CT abdomen pelvis.    Results from last 7 days   Lab Units 10/24/23  0253   WBC 10*3/mm3 14.20*    HEMOGLOBIN g/dL 10.3*   HEMATOCRIT % 33.2*   PLATELETS 10*3/mm3 458*     Results from last 7 days   Lab Units 10/24/23  0253 10/24/23  0015 10/17/23  2042   SODIUM mmol/L 139 138 139   POTASSIUM mmol/L 4.1 3.7 4.3   CHLORIDE mmol/L 105 105 106   CO2 mmol/L 27.0 27.0 26.0   BUN mg/dL 9 10 8   CREATININE mg/dL 0.69 0.90 0.83   GLUCOSE mg/dL 92 132* 91   CALCIUM mg/dL 8.4* 8.7 8.8   ALK PHOS U/L  --  173* 174*   ALT (SGPT) U/L  --  9 14   AST (SGOT) U/L  --  23 26   HSTROP T ng/L  --   --  <6     Estimated Creatinine Clearance: 95.6 mL/min (by C-G formula based on SCr of 0.69 mg/dL).  Brief Urine Lab Results  (Last result in the past 365 days)        Color   Clarity   Blood   Leuk Est   Nitrite   Protein   CREAT   Urine HCG        10/23/23 2137 Yellow   Clear   Negative   Trace   Negative   Trace                   Microbiology Results (last 10 days)       ** No results found for the last 240 hours. **            ECG/EMG Results (most recent)       Procedure Component Value Units Date/Time    SCANNED - TELEMETRY   [[id number]] Resulted: 10/23/23     Updated: 10/24/23 0518            Results for orders placed during the hospital encounter of 07/13/21    Duplex Venous Lower Extremity - Bilateral CAR    Interpretation Summary  · Sub-acute right lower extremity superficial thrombophlebitis noted in the greater saphenous (above knee).  · Acute right lower extremity superficial thrombophlebitis noted in the varicosity (above knee).  · All other veins appeared normal bilaterally.          CT Abdomen Pelvis Without Contrast    Result Date: 10/17/2023  Impression: No evidence of new acute process in the abdomen or pelvis on this noncontrast exam. Similar multiple chronic/persistent findings including hepatic steatosis, small volume loculated ascites, mild bilateral hydronephrosis, fat-containing umbilical hernia, and multiorgan postsurgical changes, as above. Additionally, a prior contrasted exam  demonstrated portal vein  thrombosis with cavernous transformation, though this cannot be seen on this noncontrast exam. Electronically Signed: Isak Hong MD  10/17/2023 8:53 PM EDT  Workstation ID: [id number]       Estimated Creatinine Clearance: 95.6 mL/min (by C-G formula based on SCr of 0.69 mg/dL).    Assessment & Plan   Assessment/Plan       Active Hospital Problems    Diagnosis  POA    **Abdominal pain [R10.9]  Yes      Resolved Hospital Problems   No resolved problems to display.          Abdominal pain        Lab Results   Component Value Date     WBC 14.20 (H) 10/24/2023     AST 23 10/24/2023     ALT 9 10/24/2023     ALKPHOS 173 (H) 10/24/2023     BILITOT 1.2 10/24/2023     LIPASE 30 10/23/2023   -CMP generally unremarkable  -CBC showed WBC 14.20  -CT of abdomen and pelvis: From 10/17/2023 showed no evidence of acute process in the abdomen.  Similar multiple chronic/persistent findings including hepatic steatosis, and small volume of ascites, and mild hydronephrosis, and umbilical hernia and multi organ postsurgical changes.  -In the ED patient given Dilaudid and Zofran  -GI consulted and performed the EGD which showed anastomotic ulcer. Recommendations included misoprostol and carafate. GI also recommended to advance diet and dc when tolerating diet  -Patient requested to stay overnight  -Discussed with patient intent to DC in a.m.       History of ascites  -Continue Bumex and spironolactone as needed     Hypothyroidism  -Continue Synthroid          VTE Prophylaxis -   Mechanical Order History:        Ordered        10/24/23 0713  Place Sequential Compression Device  Once            10/24/23 0713  Maintain Sequential Compression Device  Continuous            10/24/23 0132  Place Sequential Compression Device  Once            10/24/23 0132  Maintain Sequential Compression Device  Continuous                          Pharmalogical Order History:       None            CODE STATUS:    Code Status and Medical Interventions:   Ordered  at: 10/24/23 0713     Level Of Support Discussed With:    Patient     Code Status (Patient has no pulse and is not breathing):    CPR (Attempt to Resuscitate)     Medical Interventions (Patient has pulse or is breathing):    Full Support       This patient has been examined wearing personal protective equipment.     I discussed the patient's findings and my recommendations with patient and nursing staff.      Signature:Electronically signed by GRIFFIN Vinson, 10/24/23, 4:29 PM EDT.

## 2023-10-24 NOTE — PLAN OF CARE
Goal Outcome Evaluation:                  Outcome Evaluation: 47 y/o female patient presents for abdominal pain. EGD done today. Pt have been taking Dilaudid for pain, see MAR. Diet advanced to GI diet, low residue. Will continue to monitor overnight.

## 2023-10-24 NOTE — CONSULTS
GI CONSULT  NOTE:    Referring Provider:  Dr. Werner    Chief complaint: Abdominal pain    Subjective .     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  She presented to the hospital yesterday with complaints of abdominal pain that started on October 12, 2023.  It is more in the upper abdomen.  She cannot identify any specific triggers for this.  She does not take NSAIDs.  She has not been on any antacid medications.  Her weight has been stable.      Endo History:  1/2021 EGD by Dr. Cotton -1 cm clean-based anastomotic ulcer, 2 cords of grade 1 esophageal varices, prior gastric bypass, antral polyp, and gastritis.    Past Medical History:  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    Empyema of pleura     Encephalopathy, portal systemic 5/9/2017    Esophageal varices 7/21/2017    Gastric leak 5/29/2016    GERD (gastroesophageal reflux disease)     H/O thyroidectomy 6/17/2016    Hemorrhoids     Hernia, ventral     History of morbid obesity 3/3/2016    History of transfusion     Hypercoagulable state 8/24/2019    Irregular menstrual cycle     Liver cirrhosis secondary to CARTER 04/2017    Lupus anticoagulant positive 8/24/2019    Migraine     Parathyroid abnormality     Portal vein thrombosis 3/3/2016    Post-surgical hypothyroidism     Pulmonary embolism 10/27/2020    Radiation     Restless legs syndrome (RLS)     S/P laparoscopic sleeve gastrectomy 6/17/2016    S/P splenectomy 6/17/2016    Thyroid cancer      Thyroid cancer 6/26/2015    Urge and stress incontinence     Vaginal prolapse 7/14/2021    Varicose veins     Ventral hernia 4/18/2020    Vitamin D deficiency 1/21/2021       Past Surgical History:  Past Surgical History:   Procedure Laterality Date    ABDOMINAL SURGERY      BEDSIDE PARACENTESIS  3/22/2020         BEDSIDE PARACENTESIS  4/19/2020         BEDSIDE PARACENTESIS  3/1/2021         BEDSIDE PARACENTESIS  3/30/2021         CHOLECYSTECTOMY      Laparoscopic    ENDOSCOPY N/A 3/17/2016    Procedure: ESOPHAGOGASTRODUODENOSCOPY WITH BALOON DILATATION 18-20MM WITH GASTRIC SLEEVE SEALANT;  Surgeon: Leonardo Ortiz Jr., MD;  Location: [facility] ENDOSCOPY;  Service:     ENDOSCOPY N/A 3/14/2016    Procedure: esophagogastroduodenoscopy with fluoroscopy;  Surgeon: Greg Avila III, MD;  Location: [facility] ENDOSCOPY;  Service:     ENDOSCOPY N/A 4/15/2016    Procedure: EGD WITH DILITATION AND STENT PLACEMENT dilation of pylorus ;  Surgeon: Leonardo Ortiz Jr., MD;  Location: [facility] ENDOSCOPY;  Service:     ENDOSCOPY N/A 5/13/2016    Procedure: ESOPHAGOGASTRODUODENOSCOPY  W/ STENT;  Surgeon: Leonardo Ortiz Jr., MD;  Location: [facility] ENDOSCOPY;  Service:     ENDOSCOPY N/A 1/13/2021    Procedure: ESOPHAGOGASTRODUODENOSCOPY WITH POLYPECTOMY X1 AND  BIOPSY X1.;  Surgeon: Bryson Cotton MD;  Location: [facility] ENDOSCOPY;  Service: Gastroenterology;  Laterality: N/A;  anastomotic ulcer, antral polyp, GASTRITIS    GASTRECTOMY      Gastric Surgery for Morbid Obesity Laparoscopic Longitudinal Gastrectomy    GASTRIC SLEEVE LAPAROSCOPIC      HEMORRHOIDECTOMY      HERNIA REPAIR      LAPAROSCOPY REPAIR HIATAL HERNIA      LARYNGOSCOPY      Direct Laryngoscopy with Injection into Vocal Cords    OTHER SURGICAL HISTORY Bilateral     Ear Pressure Equalization Tube, Insertion, Bilaterally    PERIPHERALLY INSERTED CENTRAL CATHETER INSERTION      SPLENECTOMY      THYROID SURGERY      TUBAL ABDOMINAL LIGATION         Social  History:  Social History     Tobacco Use    Smoking status: Never    Smokeless tobacco: Never   Vaping Use    Vaping Use: Never used   Substance Use Topics    Alcohol use: No    Drug use: No       Family History:  Family History   Problem Relation Age of Onset    Stroke Father     Diabetes Father         Diabetes Mellitus    Hypertension Father     Breast cancer Maternal Grandmother     Cancer Other        Medications:  Medications Prior to Admission   Medication Sig Dispense Refill Last Dose    apixaban (ELIQUIS) 5 MG tablet tablet Take 5 mg by mouth 2 (Two) Times a Day.       bumetanide (BUMEX) 2 MG tablet Take 4 mg by mouth Daily.       estradiol (ESTRACE) 0.1 MG/GM vaginal cream Insert 1 g into the vagina 3 (Three) Times a Week. Monday Wednesday Friday       levothyroxine (SYNTHROID, LEVOTHROID) 125 MCG tablet Take 125 mcg by mouth Every Morning.       liothyronine (CYTOMEL) 5 MCG tablet Take 1 tablet by mouth Daily.       ondansetron ODT (ZOFRAN-ODT) 8 MG disintegrating tablet Place 8 mg on the tongue Every 8 (Eight) Hours As Needed for Nausea or Vomiting.       potassium chloride (K-DUR,KLOR-CON) 20 MEQ CR tablet Take 40 mEq by mouth 2 (Two) Times a Day.       spironolactone (ALDACTONE) 100 MG tablet Take 400 mg by mouth Daily.          Scheduled Meds:bumetanide, 4 mg, Oral, Daily  levothyroxine, 125 mcg, Oral, Q AM  liothyronine, 5 mcg, Oral, Daily  sodium chloride, 10 mL, Intravenous, Q12H  sodium chloride, 10 mL, Intravenous, Q12H  spironolactone, 400 mg, Oral, Daily      Continuous Infusions:   PRN Meds:.  HYDROmorphone    melatonin    nitroglycerin    ondansetron **OR** ondansetron    sodium chloride    sodium chloride    sodium chloride    sodium chloride    sodium chloride    ALLERGIES:  Contrast dye (echo or unknown ct/mr), Iodinated contrast media, Iodine, Hydrocodone-acetaminophen, Morphine, Red dye, Compazine [prochlorperazine edisylate], Hydrocodone, Ketorolac tromethamine, and  "Nsaids    ROS:  Review of Systems   Constitutional:  Negative for chills and fever.   Respiratory:  Negative for cough and shortness of breath.    Cardiovascular:  Negative for chest pain and palpitations.   Gastrointestinal:  Positive for abdominal pain and nausea. Negative for vomiting.   Musculoskeletal:  Positive for arthralgias.   Neurological:  Negative for dizziness and weakness.   Psychiatric/Behavioral:  Negative for agitation and confusion.        Objective     Vital Signs:   Visit Vitals  /73 (BP Location: Right arm, Patient Position: Lying)   Pulse 60   Temp 97.9 °F (36.6 °C) (Oral)   Resp 16   Ht 160 cm (63\")   Wt 62.1 kg (136 lb 14.5 oz)   LMP  (LMP Unknown)   SpO2 97%   BMI 24.25 kg/m²       Physical Exam:      General Appearance:    Awake and alert, in no acute distress   Head:    Normocephalic, without obvious abnormality, atraumatic   Eyes:            Conjunctivae normal, anicteric sclera   Ears:    Ears appear intact with no abnormalities noted   Throat:   No oral lesions, no thrush, oral mucosa moist   Neck:   No adenopathy, supple, no thyromegaly, no JVD   Lungs:     Respirations regular, even and unlabored       Chest Wall:    No abnormalities observed   Abdomen:     Soft, upper abdominal tenderness, no rebound or guarding, non-distended, umbilical hernia   Rectal:     Deferred   Extremities:   Moves all extremities well, no edema, no cyanosis, no  redness   Pulses:   Pulses palpable and equal bilaterally   Skin:   No bleeding, bruising or rash, no jaundice   Lymph nodes:   No palpable adenopathy   Neurologic:   Sensation intact       Results Review:   I reviewed the patient's labs and imaging.  CBC  Results from last 7 days   Lab Units 10/24/23  0253 10/24/23  0015 10/17/23  1944   RBC 10*6/mm3 4.20 4.49 4.69   WBC 10*3/mm3 14.20* 7.90 9.00   HEMOGLOBIN g/dL 10.3* 11.1* 11.7*   PLATELETS 10*3/mm3 458* 487* 480*       CMP  Results from last 7 days   Lab Units 10/24/23  0253 10/24/23  0015 " 10/17/23  2042   SODIUM mmol/L 139 138 139   POTASSIUM mmol/L 4.1 3.7 4.3   CHLORIDE mmol/L 105 105 106   CO2 mmol/L 27.0 27.0 26.0   BUN mg/dL 9 10 8   CREATININE mg/dL 0.69 0.90 0.83   GLUCOSE mg/dL 92 132* 91   ALBUMIN g/dL  --  3.4* 3.3*   BILIRUBIN mg/dL  --  1.2 1.2   ALK PHOS U/L  --  173* 174*   AST (SGOT) U/L  --  23 26   ALT (SGPT) U/L  --  9 14       Amylase and Lipase  Results from last 7 days   Lab Units 10/23/23  2123 10/17/23  2042   LIPASE U/L 30 14       CRP         Imaging Results (Last 24 Hours)       ** No results found for the last 24 hours. **              ASSESSMENT AND PLAN:  48-year-old female with extensive past medical/surgical history as well as Ramon cirrhosis presented to the hospital on 10/23/2023 with complaints of upper abdominal pain for the past couple weeks and has history of marginal ulcer with previous gastric bypass surgery.    -Upper abdominal pain  -History of marginal ulcer  -Normocytic anemia  -Leukocytosis  -Elevated alk phos  -History of gastric sleeve followed by Alfonzo-en-Y gastric bypass with multiple surgeries   -Chronic portal vein thrombus and DVT on Eliquis  -History of cholecystectomy and splenectomy  -Ramon cirrhosis that has been complicated by hepatic encephalopathy, ascites, and nonbleeding esophageal varices    Principal Problem:    Abdominal pain     Plan:  48-year-old female presented to the hospital yesterday with complaints of upper abdominal pain with no identifiable triggers.  She has history of multiple surgeries with gastric sleeve followed by gastric bypass.  She also has history of marginal ulcer.  Hemoglobin is 10.3.  Platelets 458.  WBC 14.2.  Lipase normal.  Alk phos 173 otherwise CMP unremarkable.  Consider her symptoms related to ulcer disease.  Patient will remain n.p.o. and we will proceed with EGD today.  Start PPI twice daily and continue supportive care.  We will check INR to calculate MELD score.  Also check AFP.    I discussed the patients  findings and my recommendations with the patient.  Kalani Lock, APRN  10/24/23  08:59 EDT

## 2023-10-24 NOTE — ANESTHESIA POSTPROCEDURE EVALUATION
Patient: Lucinda Cope    Procedure Summary       Date: 10/24/23 Room / Location: [facility] ENDOSCOPY 1 / [facility] ENDOSCOPY    Anesthesia Start: 1303 Anesthesia Stop: 1312    Procedure: ESOPHAGOGASTRODUODENOSCOPY Diagnosis:       Epigastric pain      (Epigastric pain [R10.13])    Surgeons: Hossein Delgadillo MD Provider: Abdiaziz Spears MD    Anesthesia Type: general ASA Status: 3            Anesthesia Type: general    Vitals  Vitals Value Taken Time   /62 10/24/23 1335   Temp     Pulse 73 10/24/23 1335   Resp 16 10/24/23 1320   SpO2 100 % 10/24/23 1335   Vitals shown include unfiled device data.        Post Anesthesia Care and Evaluation    Patient location during evaluation: PACU  Patient participation: complete - patient participated  Level of consciousness: awake  Pain scale: See nurse's notes for pain score.  Pain management: adequate    Airway patency: patent  Anesthetic complications: No anesthetic complications  PONV Status: none  Cardiovascular status: acceptable  Respiratory status: acceptable and spontaneous ventilation  Hydration status: acceptable    Comments: Patient seen and examined postoperatively; vital signs stable; SpO2 greater than or equal to 90%; cardiopulmonary status stable; nausea/vomiting adequately controlled; pain adequately controlled; no apparent anesthesia complications; patient discharged from anesthesia care when discharge criteria were met

## 2023-10-24 NOTE — ED PROVIDER NOTES
Subjective Due to significant overcrowding in the emergency department patient was initially seen and evaluated in triage.  Provider in triage recommended patient placement in the treatment area to initiate therapy and movement to an ER bed as soon as possible.    Provider in Triage Note  .  Patient is a 48-year-old female who presents with worsening generalized abdominal pain over the last several days.  She reports she is unsure what the problem is.  Nothing seems to make it better or worse it is constant and radiates to the back does not report any nausea or vomiting currently.  Reports its 8 out of 10 pain.      History of Present Illness  I interviewed the patient.  She does have a history of gastric sleeve, with multiple complications in 2015.        Review of Systems   Constitutional:  Negative for chills and fever.   Respiratory:  Negative for shortness of breath.    Cardiovascular:  Negative for chest pain, palpitations and leg swelling.   Gastrointestinal:  Positive for abdominal pain, nausea and vomiting.   Genitourinary:  Negative for dysuria.   Musculoskeletal:  Negative for back pain and neck pain.   Skin:  Negative for color change and rash.   Neurological:  Negative for dizziness, syncope, weakness and light-headedness.       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    Empyema of pleura     Encephalopathy, portal systemic 5/9/2017    Esophageal varices 7/21/2017    Gastric leak 5/29/2016    GERD (gastroesophageal reflux disease)     H/O thyroidectomy 6/17/2016    Hemorrhoids     Hernia, ventral     History of morbid obesity 3/3/2016    History of transfusion     Hypercoagulable state 8/24/2019    Irregular menstrual cycle     Liver cirrhosis secondary to CARTER 04/2017    Lupus anticoagulant positive 8/24/2019     Migraine     Parathyroid abnormality     Portal vein thrombosis 3/3/2016    Post-surgical hypothyroidism     Pulmonary embolism 10/27/2020    Radiation     Restless legs syndrome (RLS)     S/P laparoscopic sleeve gastrectomy 6/17/2016    S/P splenectomy 6/17/2016    Thyroid cancer     Thyroid cancer 6/26/2015    Urge and stress incontinence     Vaginal prolapse 7/14/2021    Varicose veins     Ventral hernia 4/18/2020    Vitamin D deficiency 1/21/2021       Allergies   Allergen Reactions    Contrast Dye (Echo Or Unknown Ct/Mr) Hives    Iodinated Contrast Media Hives    Iodine Hives    Hydrocodone-Acetaminophen Hives and Itching    Morphine Itching, Hives and Rash    Red Dye Itching, Other (See Comments) and Unknown - High Severity    Compazine [Prochlorperazine Edisylate] Hives    Hydrocodone Rash     Lortab elixir per pt    Ketorolac Tromethamine Unknown - Low Severity    Nsaids Other (See Comments)     States I am not suppose to take them  Cannot have due to gastric sleeve       Past Surgical History:   Procedure Laterality Date    ABDOMINAL SURGERY      BEDSIDE PARACENTESIS  3/22/2020         BEDSIDE PARACENTESIS  4/19/2020         BEDSIDE PARACENTESIS  3/1/2021         BEDSIDE PARACENTESIS  3/30/2021         CHOLECYSTECTOMY      Laparoscopic    ENDOSCOPY N/A 3/17/2016    Procedure: ESOPHAGOGASTRODUODENOSCOPY WITH BALOON DILATATION 18-20MM WITH GASTRIC SLEEVE SEALANT;  Surgeon: Leonardo Ortiz Jr., MD;  Location: [facility] ENDOSCOPY;  Service:     ENDOSCOPY N/A 3/14/2016    Procedure: esophagogastroduodenoscopy with fluoroscopy;  Surgeon: Greg Avila III, MD;  Location: [facility] ENDOSCOPY;  Service:     ENDOSCOPY N/A 4/15/2016    Procedure: EGD WITH DILITATION AND STENT PLACEMENT dilation of pylorus ;  Surgeon: Leonardo Ortiz Jr., MD;  Location: [facility] ENDOSCOPY;  Service:     ENDOSCOPY N/A 5/13/2016    Procedure: ESOPHAGOGASTRODUODENOSCOPY  W/ STENT;  Surgeon: Leonardo Ortiz Jr., MD;  Location: [facility]  ENDOSCOPY;  Service:     ENDOSCOPY N/A 1/13/2021    Procedure: ESOPHAGOGASTRODUODENOSCOPY WITH POLYPECTOMY X1 AND  BIOPSY X1.;  Surgeon: Bryson Cotton MD;  Location: [facility] ENDOSCOPY;  Service: Gastroenterology;  Laterality: N/A;  anastomotic ulcer, antral polyp, GASTRITIS    GASTRECTOMY      Gastric Surgery for Morbid Obesity Laparoscopic Longitudinal Gastrectomy    GASTRIC SLEEVE LAPAROSCOPIC      HEMORRHOIDECTOMY      HERNIA REPAIR      LAPAROSCOPY REPAIR HIATAL HERNIA      LARYNGOSCOPY      Direct Laryngoscopy with Injection into Vocal Cords    OTHER SURGICAL HISTORY Bilateral     Ear Pressure Equalization Tube, Insertion, Bilaterally    PERIPHERALLY INSERTED CENTRAL CATHETER INSERTION      SPLENECTOMY      THYROID SURGERY      TUBAL ABDOMINAL LIGATION         Family History   Problem Relation Age of Onset    Stroke Father     Diabetes Father         Diabetes Mellitus    Hypertension Father     Breast cancer Maternal Grandmother     Cancer Other        Social History     Socioeconomic History    Marital status: Single   Tobacco Use    Smoking status: Never    Smokeless tobacco: Never   Vaping Use    Vaping Use: Never used   Substance and Sexual Activity    Alcohol use: No    Drug use: No    Sexual activity: Defer           Objective   Physical Exam  Vitals and nursing note reviewed.   Constitutional:       Appearance: She is well-developed.   HENT:      Head: Normocephalic and atraumatic.      Mouth/Throat:      Mouth: Mucous membranes are moist.      Pharynx: Oropharynx is clear.   Eyes:      Extraocular Movements: Extraocular movements intact.   Cardiovascular:      Rate and Rhythm: Normal rate and regular rhythm.      Heart sounds: No murmur heard.     No friction rub. No gallop.   Pulmonary:      Effort: Pulmonary effort is normal.      Breath sounds: Normal breath sounds.   Abdominal:      Palpations: Abdomen is soft.   Neurological:      Mental Status: She is alert.         Procedures           ED  "Course  ED Course as of 10/24/23 0145   Mon Oct 23, 2023   0245 Delay in labs and IV placement. [LB]      ED Course User Index  [LB] Kalani Sim APRN      /78   Pulse 53   Temp 98.5 °F (36.9 °C) (Oral)   Resp 16   Ht 160 cm (63\")   Wt 62.2 kg (137 lb 2 oz)   LMP  (LMP Unknown)   SpO2 100%   BMI 24.29 kg/m²   Medications   sodium chloride 0.9 % flush 10 mL (has no administration in time range)   HYDROmorphone (DILAUDID) injection 0.5 mg (has no administration in time range)   sodium chloride 0.9 % flush 10 mL (has no administration in time range)   sodium chloride 0.9 % flush 10 mL (has no administration in time range)   sodium chloride 0.9 % infusion 40 mL (has no administration in time range)   ondansetron (ZOFRAN) injection 4 mg (has no administration in time range)   melatonin tablet 5 mg (has no administration in time range)   HYDROmorphone (DILAUDID) injection 0.5 mg (0.5 mg Intravenous Given 10/24/23 0017)   ondansetron (ZOFRAN) injection 4 mg (4 mg Intravenous Given 10/24/23 0016)     No radiology results for the last day  Lab Results (last 24 hours)       Procedure Component Value Units Date/Time    Lipase [[id number]]  (Normal) Collected: 10/23/23 2123    Specimen: Blood Updated: 10/23/23 2201     Lipase 30 U/L     Urinalysis With Microscopic If Indicated (No Culture) - Urine, Clean Catch [[id number]]  (Abnormal) Collected: 10/23/23 2137    Specimen: Urine, Clean Catch Updated: 10/23/23 2227     Color, UA Yellow     Appearance, UA Clear     pH, UA <=5.0     Specific Gravity, UA 1.041     Glucose, UA Negative     Ketones, UA Trace     Bilirubin, UA Small (1+)     Comment: Confirmation testing is unavailable.  A serum bilirubin is recommended for further assessment.        Blood, UA Negative     Protein, UA Trace     Leuk Esterase, UA Trace     Nitrite, UA Negative     Urobilinogen, UA 1.0 E.U./dL    Urinalysis, Microscopic Only - Urine, Clean Catch [[id number]]  (Abnormal) Collected: " 10/23/23 2137    Specimen: Urine, Clean Catch Updated: 10/23/23 2227     RBC, UA 0-2 /HPF      WBC, UA 0-2 /HPF      Bacteria, UA 1+ /HPF      Squamous Epithelial Cells, UA 3-6 /HPF      Hyaline Casts, UA 0-2 /LPF      Methodology Automated Microscopy    CBC & Differential [[id number]]  (Abnormal) Collected: 10/24/23 0015    Specimen: Blood Updated: 10/24/23 0101    Narrative:      The following orders were created for panel order CBC & Differential.  Procedure                               Abnormality         Status                     ---------                               -----------         ------                     CBC Auto Differential[[id number]]        Abnormal            Final result               Scan Slide[[id number]]                                       Final result                 Please view results for these tests on the individual orders.    Comprehensive Metabolic Panel [[id number]]  (Abnormal) Collected: 10/24/23 0015    Specimen: Blood Updated: 10/24/23 0039     Glucose 132 mg/dL      BUN 10 mg/dL      Creatinine 0.90 mg/dL      Sodium 138 mmol/L      Potassium 3.7 mmol/L      Chloride 105 mmol/L      CO2 27.0 mmol/L      Calcium 8.7 mg/dL      Total Protein 6.8 g/dL      Albumin 3.4 g/dL      ALT (SGPT) 9 U/L      AST (SGOT) 23 U/L      Alkaline Phosphatase 173 U/L      Total Bilirubin 1.2 mg/dL      Globulin 3.4 gm/dL      A/G Ratio 1.0 g/dL      BUN/Creatinine Ratio 11.1     Anion Gap 6.0 mmol/L      eGFR 78.0 mL/min/1.73     Narrative:      GFR Normal >60  Chronic Kidney Disease <60  Kidney Failure <15      CBC Auto Differential [[id number]]  (Abnormal) Collected: 10/24/23 0015    Specimen: Blood Updated: 10/24/23 0101     WBC 7.90 10*3/mm3      RBC 4.49 10*6/mm3      Hemoglobin 11.1 g/dL      Hematocrit 35.4 %      MCV 78.7 fL      MCH 24.6 pg      MCHC 31.3 g/dL      RDW 19.6 %      RDW-SD 57.3 fl      MPV 8.6 fL      Platelets 487 10*3/mm3     Narrative:      The previously reported component  NRBC is no longer being reported. Previous result was 0.2 /100 WBC (Reference Range: 0.0-0.2 /100 WBC) on 10/24/2023 at 0034 EDT.    Scan Slide [[id number]] Collected: 10/24/23 0015    Specimen: Blood Updated: 10/24/23 0101     Scan Slide --     Comment: See Manual Differential Results       Manual Differential [[id number]]  (Abnormal) Collected: 10/24/23 0015    Specimen: Blood Updated: 10/24/23 0101     Neutrophil % 27.0 %      Lymphocyte % 61.0 %      Monocyte % 3.0 %      Eosinophil % 3.0 %      Basophil % 4.0 %      Atypical Lymphocyte % 2.0 %      Neutrophils Absolute 2.13 10*3/mm3      Lymphocytes Absolute 4.98 10*3/mm3      Monocytes Absolute 0.24 10*3/mm3      Eosinophils Absolute 0.24 10*3/mm3      Basophils Absolute 0.32 10*3/mm3      Anisocytosis Slight/1+     RBC Fragments Slight/1+     Target Cells Slight/1+     Toxic Granulation Slight/1+     Platelet Estimate Increased     Giant Platelets Slight/1+                                               Medical Decision Making  Chart review: CT abdomen pelvis from visit 10/17/2023.  Patient is a 48-year-old female with a history of gastric sleeve, multiple complications related to surgery, in 2015, she presented to the ED with abdominal pain.  She is been having pain over the past 12 days.  She was previously seen here in the ED and had work-up.  Was discharged home.  She reports the pain has not improved since her ED visit on 10/17/2023.  Differentiall diagnoses considered for patient presentation, this list is not all inclusive of diagnoses considered: Obstruction, constipation, ureterolithiasis.  Patient had IV established, blood work obtained, was placed on appropriate monitoring, underwent the above ED exam and evaluation, notable for, UA notable for trace excites, 36 g cells, plus bacteria.  Patient having urinary symptoms.  We will treat as asymptomatic bacteriuria.  Labs reviewed above.  Patient recently had CT scan 6 days ago, without acute findings.   Will not repeat today.  Will place patient in ED observation with gastroenterology consult, pain control overnight.      Problems Addressed:  Generalized abdominal pain: complicated acute illness or injury    Amount and/or Complexity of Data Reviewed  Labs: ordered.  ECG/medicine tests: ordered.    Risk  OTC drugs.  Prescription drug management.  Decision regarding hospitalization.        Final diagnoses:   Generalized abdominal pain       ED Disposition  ED Disposition       ED Disposition   Decision to Admit    Condition   --    Comment   --               No follow-up provider specified.       Medication List      No changes were made to your prescriptions during this visit.            Kalani Sim, APRN  10/24/23 0145

## 2023-10-24 NOTE — ED NOTES
2 nurses tried to begin an IV. Pt states she typically needs an ultrasound. Labs that were collected were still sent.

## 2023-10-25 VITALS
OXYGEN SATURATION: 97 % | HEIGHT: 63 IN | TEMPERATURE: 98.1 F | BODY MASS INDEX: 24.26 KG/M2 | WEIGHT: 136.91 LBS | SYSTOLIC BLOOD PRESSURE: 101 MMHG | DIASTOLIC BLOOD PRESSURE: 60 MMHG | HEART RATE: 55 BPM | RESPIRATION RATE: 16 BRPM

## 2023-10-25 LAB
ALBUMIN SERPL-MCNC: 3.1 G/DL (ref 3.5–5.2)
ALBUMIN/GLOB SERPL: 1 G/DL
ALP SERPL-CCNC: 216 U/L (ref 39–117)
ALPHA-FETOPROTEIN: 7.04 NG/ML (ref 0–8.3)
ALT SERPL W P-5'-P-CCNC: 27 U/L (ref 1–33)
ANION GAP SERPL CALCULATED.3IONS-SCNC: 6 MMOL/L (ref 5–15)
AST SERPL-CCNC: 66 U/L (ref 1–32)
BASOPHILS # BLD MANUAL: 0.1 10*3/MM3 (ref 0–0.2)
BASOPHILS NFR BLD MANUAL: 1 % (ref 0–1.5)
BILIRUB SERPL-MCNC: 1.4 MG/DL (ref 0–1.2)
BUN SERPL-MCNC: 12 MG/DL (ref 6–20)
BUN/CREAT SERPL: 13.5 (ref 7–25)
C3 FRG RBC-MCNC: ABNORMAL
CALCIUM SPEC-SCNC: 8.4 MG/DL (ref 8.6–10.5)
CHLORIDE SERPL-SCNC: 101 MMOL/L (ref 98–107)
CO2 SERPL-SCNC: 29 MMOL/L (ref 22–29)
CREAT SERPL-MCNC: 0.89 MG/DL (ref 0.57–1)
DEPRECATED RDW RBC AUTO: 56 FL (ref 37–54)
EGFRCR SERPLBLD CKD-EPI 2021: 79.1 ML/MIN/1.73
EOSINOPHIL # BLD MANUAL: 1.08 10*3/MM3 (ref 0–0.4)
EOSINOPHIL NFR BLD MANUAL: 11 % (ref 0.3–6.2)
ERYTHROCYTE [DISTWIDTH] IN BLOOD BY AUTOMATED COUNT: 19.9 % (ref 12.3–15.4)
GLOBULIN UR ELPH-MCNC: 3.1 GM/DL
GLUCOSE SERPL-MCNC: 90 MG/DL (ref 65–99)
HCT VFR BLD AUTO: 36.1 % (ref 34–46.6)
HGB BLD-MCNC: 11.2 G/DL (ref 12–15.9)
INR PPP: 1.18 (ref 0.93–1.1)
LYMPHOCYTES # BLD MANUAL: 5.59 10*3/MM3 (ref 0.7–3.1)
LYMPHOCYTES NFR BLD MANUAL: 3 % (ref 5–12)
MCH RBC QN AUTO: 25 PG (ref 26.6–33)
MCHC RBC AUTO-ENTMCNC: 31.1 G/DL (ref 31.5–35.7)
MCV RBC AUTO: 80.4 FL (ref 79–97)
MONOCYTES # BLD: 0.29 10*3/MM3 (ref 0.1–0.9)
NEUTROPHILS # BLD AUTO: 2.74 10*3/MM3 (ref 1.7–7)
NEUTROPHILS NFR BLD MANUAL: 28 % (ref 42.7–76)
PLAT MORPH BLD: NORMAL
PLATELET # BLD AUTO: 421 10*3/MM3 (ref 140–450)
PMV BLD AUTO: 9.5 FL (ref 6–12)
POIKILOCYTOSIS BLD QL SMEAR: ABNORMAL
POTASSIUM SERPL-SCNC: 3.8 MMOL/L (ref 3.5–5.2)
PROT SERPL-MCNC: 6.2 G/DL (ref 6–8.5)
PROTHROMBIN TIME: 12.7 SECONDS (ref 9.6–11.7)
RBC # BLD AUTO: 4.49 10*6/MM3 (ref 3.77–5.28)
SCAN SLIDE: NORMAL
SODIUM SERPL-SCNC: 136 MMOL/L (ref 136–145)
TARGETS BLD QL SMEAR: ABNORMAL
VARIANT LYMPHS NFR BLD MANUAL: 57 % (ref 19.6–45.3)
WBC MORPH BLD: NORMAL
WBC NRBC COR # BLD: 9.8 10*3/MM3 (ref 3.4–10.8)

## 2023-10-25 PROCEDURE — 85007 BL SMEAR W/DIFF WBC COUNT: CPT | Performed by: INTERNAL MEDICINE

## 2023-10-25 PROCEDURE — 82105 ALPHA-FETOPROTEIN SERUM: CPT | Performed by: INTERNAL MEDICINE

## 2023-10-25 PROCEDURE — G0378 HOSPITAL OBSERVATION PER HR: HCPCS

## 2023-10-25 PROCEDURE — 25010000002 HYDROMORPHONE 1 MG/ML SOLUTION: Performed by: NURSE PRACTITIONER

## 2023-10-25 PROCEDURE — 85025 COMPLETE CBC W/AUTO DIFF WBC: CPT | Performed by: INTERNAL MEDICINE

## 2023-10-25 PROCEDURE — 85610 PROTHROMBIN TIME: CPT | Performed by: INTERNAL MEDICINE

## 2023-10-25 PROCEDURE — 25010000002 ONDANSETRON PER 1 MG: Performed by: INTERNAL MEDICINE

## 2023-10-25 PROCEDURE — 80053 COMPREHEN METABOLIC PANEL: CPT | Performed by: INTERNAL MEDICINE

## 2023-10-25 RX ORDER — PANTOPRAZOLE SODIUM 40 MG/1
40 TABLET, DELAYED RELEASE ORAL
Qty: 60 TABLET | Refills: 3 | Status: SHIPPED | OUTPATIENT
Start: 2023-10-25

## 2023-10-25 RX ORDER — ONDANSETRON 4 MG/1
4 TABLET, ORALLY DISINTEGRATING ORAL EVERY 8 HOURS PRN
Qty: 15 TABLET | Refills: 0 | Status: SHIPPED | OUTPATIENT
Start: 2023-10-25

## 2023-10-25 RX ORDER — MISOPROSTOL 200 UG/1
200 TABLET ORAL 2 TIMES DAILY
Qty: 60 TABLET | Refills: 3 | Status: SHIPPED | OUTPATIENT
Start: 2023-10-25

## 2023-10-25 RX ORDER — SUCRALFATE 1 G/1
1 TABLET ORAL
Qty: 60 TABLET | Refills: 3 | Status: SHIPPED | OUTPATIENT
Start: 2023-10-25

## 2023-10-25 RX ADMIN — PANTOPRAZOLE SODIUM 40 MG: 40 TABLET, DELAYED RELEASE ORAL at 07:49

## 2023-10-25 RX ADMIN — ONDANSETRON 4 MG: 2 INJECTION INTRAMUSCULAR; INTRAVENOUS at 09:13

## 2023-10-25 RX ADMIN — SUCRALFATE 1 G: 1 TABLET ORAL at 07:49

## 2023-10-25 RX ADMIN — LEVOTHYROXINE SODIUM 125 MCG: 0.12 TABLET ORAL at 05:28

## 2023-10-25 RX ADMIN — HYDROMORPHONE HYDROCHLORIDE 0.5 MG: 1 INJECTION, SOLUTION INTRAMUSCULAR; INTRAVENOUS; SUBCUTANEOUS at 03:00

## 2023-10-25 RX ADMIN — Medication 10 ML: at 09:13

## 2023-10-25 RX ADMIN — MISOPROSTOL 200 MCG: 200 TABLET ORAL at 11:02

## 2023-10-25 RX ADMIN — HYDROMORPHONE HYDROCHLORIDE 0.5 MG: 1 INJECTION, SOLUTION INTRAMUSCULAR; INTRAVENOUS; SUBCUTANEOUS at 07:49

## 2023-10-25 RX ADMIN — LIOTHYRONINE SODIUM 5 MCG: 5 TABLET ORAL at 09:13

## 2023-10-25 NOTE — PLAN OF CARE
Problem: Adult Inpatient Plan of Care  Goal: Plan of Care Review  Outcome: Met  Flowsheets (Taken 10/25/2023 1056)  Progress: improving  Plan of Care Reviewed With: patient  Outcome Evaluation: Patient is ready for discharge  Goal: Patient-Specific Goal (Individualized)  Outcome: Met  Goal: Absence of Hospital-Acquired Illness or Injury  Outcome: Met  Intervention: Identify and Manage Fall Risk  Recent Flowsheet Documentation  Taken 10/25/2023 1000 by Thelma Acuna RN  Safety Promotion/Fall Prevention: safety round/check completed  Taken 10/25/2023 0801 by Thelma Acuna RN  Safety Promotion/Fall Prevention: safety round/check completed  Intervention: Prevent Skin Injury  Recent Flowsheet Documentation  Taken 10/25/2023 0801 by Thelma Acuna RN  Body Position: position changed independently  Intervention: Prevent and Manage VTE (Venous Thromboembolism) Risk  Recent Flowsheet Documentation  Taken 10/25/2023 0801 by Thelma Acuna RN  Activity Management: ambulated in room  Range of Motion: active ROM (range of motion) encouraged  Intervention: Prevent Infection  Recent Flowsheet Documentation  Taken 10/25/2023 0801 by Thelma Acuna RN  Infection Prevention:   rest/sleep promoted   hand hygiene promoted   single patient room provided  Goal: Optimal Comfort and Wellbeing  Outcome: Met  Intervention: Monitor Pain and Promote Comfort  Recent Flowsheet Documentation  Taken 10/25/2023 0801 by Thelma Acuna RN  Pain Management Interventions:   see MAR   quiet environment facilitated  Taken 10/25/2023 0749 by Thelma Acuna RN  Pain Management Interventions:   care clustered   see MAR   quiet environment facilitated  Goal: Readiness for Transition of Care  Outcome: Met     Problem: Nausea and Vomiting  Goal: Fluid and Electrolyte Balance  Outcome: Met  Intervention: Prevent and Manage Nausea and Vomiting  Recent Flowsheet Documentation  Taken 10/25/2023 0943 by  Thelma Acuna, RN  Nausea/Vomiting Interventions: see MAR   Goal Outcome Evaluation:  Plan of Care Reviewed With: patient        Progress: improving  Outcome Evaluation: Patient is ready for discharge

## 2023-10-25 NOTE — PLAN OF CARE
Goal Outcome Evaluation:              Outcome Evaluation: controlled pts pain with iv dilaudid throughout the night, pt slept throughout the night, should be discharging home today

## 2023-10-25 NOTE — DISCHARGE SUMMARY
"[facility]    [place]    CHIEF COMPLAINT:     Abdominal pain    HISTORY OF PRESENT ILLNESS:    HPI      Patient is a 48-year-old female who presents with worsening generalized abdominal pain over the last several days.  She reports she is unsure what the problem is.  Nothing seems to make it better or worse it is constant and radiates to the back does not report any nausea or vomiting currently.  Reports its 8 out of 10 pain     Observation 10/24/2023  Patient agrees with HPI noted above including abdominal pain radiating to back since 10/12.  She stated she was seen in the ED and had CT which was unremarkable and was discharged.  She has had pain which she describes as acute on chronic since 10/12.  Reports history of gastric sleeve in 2016 with multiple complications including perforated spleen and has seen pain management in the past for chronic abdominal pain.  Currently rates pain as 7/10.     Observation 10/24/2023 pm  Discussed with patient GIs recommendation to advance her diet and DC home tonight if she tolerates her diet.  Patient told provider that she would prefer to stay overnight.  Tried full liquid diet but told RN that she had to \"force\" herself to eat.     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    Empyema of pleura     Encephalopathy, portal systemic 5/9/2017    Esophageal varices 7/21/2017    Gastric leak 5/29/2016    GERD (gastroesophageal reflux disease)     H/O thyroidectomy 6/17/2016    Hemorrhoids     Hernia, ventral     History of morbid obesity 3/3/2016    History of transfusion     Hypercoagulable state 8/24/2019    Irregular menstrual cycle     Liver cirrhosis secondary to CARTER 04/2017    Lupus anticoagulant positive 8/24/2019    Migraine     Parathyroid abnormality  "    Portal vein thrombosis 3/3/2016    Post-surgical hypothyroidism     Pulmonary embolism 10/27/2020    Radiation     Restless legs syndrome (RLS)     S/P laparoscopic sleeve gastrectomy 6/17/2016    S/P splenectomy 6/17/2016    Thyroid cancer     Thyroid cancer 6/26/2015    Urge and stress incontinence     Vaginal prolapse 7/14/2021    Varicose veins     Ventral hernia 4/18/2020    Vitamin D deficiency 1/21/2021     Past Surgical History:   Procedure Laterality Date    ABDOMINAL SURGERY      BEDSIDE PARACENTESIS  3/22/2020         BEDSIDE PARACENTESIS  4/19/2020         BEDSIDE PARACENTESIS  3/1/2021         BEDSIDE PARACENTESIS  3/30/2021         CHOLECYSTECTOMY      Laparoscopic    ENDOSCOPY N/A 3/17/2016    Procedure: ESOPHAGOGASTRODUODENOSCOPY WITH BALOON DILATATION 18-20MM WITH GASTRIC SLEEVE SEALANT;  Surgeon: Leonardo Ortiz Jr., MD;  Location: [facility] ENDOSCOPY;  Service:     ENDOSCOPY N/A 3/14/2016    Procedure: esophagogastroduodenoscopy with fluoroscopy;  Surgeon: Greg Avila III, MD;  Location: [facility] ENDOSCOPY;  Service:     ENDOSCOPY N/A 4/15/2016    Procedure: EGD WITH DILITATION AND STENT PLACEMENT dilation of pylorus ;  Surgeon: Leonardo Ortiz Jr., MD;  Location: [facility] ENDOSCOPY;  Service:     ENDOSCOPY N/A 5/13/2016    Procedure: ESOPHAGOGASTRODUODENOSCOPY  W/ STENT;  Surgeon: Leonardo Ortiz Jr., MD;  Location: [facility] ENDOSCOPY;  Service:     ENDOSCOPY N/A 1/13/2021    Procedure: ESOPHAGOGASTRODUODENOSCOPY WITH POLYPECTOMY X1 AND  BIOPSY X1.;  Surgeon: Bryson Cotton MD;  Location: [facility] ENDOSCOPY;  Service: Gastroenterology;  Laterality: N/A;  anastomotic ulcer, antral polyp, GASTRITIS    GASTRECTOMY      Gastric Surgery for Morbid Obesity Laparoscopic Longitudinal Gastrectomy    GASTRIC SLEEVE LAPAROSCOPIC      HEMORRHOIDECTOMY      HERNIA REPAIR      LAPAROSCOPY REPAIR HIATAL HERNIA      LARYNGOSCOPY      Direct Laryngoscopy with Injection into Vocal Cords    OTHER SURGICAL  HISTORY Bilateral     Ear Pressure Equalization Tube, Insertion, Bilaterally    PERIPHERALLY INSERTED CENTRAL CATHETER INSERTION      SPLENECTOMY      THYROID SURGERY      TUBAL ABDOMINAL LIGATION       Family History   Problem Relation Age of Onset    Stroke Father     Diabetes Father         Diabetes Mellitus    Hypertension Father     Breast cancer Maternal Grandmother     Cancer Other      Social History     Tobacco Use    Smoking status: Never    Smokeless tobacco: Never   Vaping Use    Vaping Use: Never used   Substance Use Topics    Alcohol use: No    Drug use: No     Medications Prior to Admission   Medication Sig Dispense Refill Last Dose    apixaban (ELIQUIS) 5 MG tablet tablet Take 5 mg by mouth 2 (Two) Times a Day.       bumetanide (BUMEX) 2 MG tablet Take 4 mg by mouth Daily.       estradiol (ESTRACE) 0.1 MG/GM vaginal cream Insert 1 g into the vagina 3 (Three) Times a Week. Monday Wednesday Friday       levothyroxine (SYNTHROID, LEVOTHROID) 125 MCG tablet Take 125 mcg by mouth Every Morning.       liothyronine (CYTOMEL) 5 MCG tablet Take 1 tablet by mouth Daily.       ondansetron ODT (ZOFRAN-ODT) 8 MG disintegrating tablet Place 8 mg on the tongue Every 8 (Eight) Hours As Needed for Nausea or Vomiting.       potassium chloride (K-DUR,KLOR-CON) 20 MEQ CR tablet Take 40 mEq by mouth 2 (Two) Times a Day.       spironolactone (ALDACTONE) 100 MG tablet Take 400 mg by mouth Daily.        Allergies:  Contrast dye (echo or unknown ct/mr), Iodinated contrast media, Iodine, Hydrocodone-acetaminophen, Morphine, Red dye, Compazine [prochlorperazine edisylate], Hydrocodone, Ketorolac tromethamine, and Nsaids      There is no immunization history on file for this patient.        REVIEW OF SYSTEMS:    ROS  Constitutional: Negative for fever.   Gastrointestinal:  Positive for abdominal pain and nausea. Negative for constipation, diarrhea and vomiting.   Genitourinary:  Positive for flank pain. Negative for dysuria and  hematuria.   All other systems reviewed and are negative.    Vital Signs  Temp:  [98.1 °F (36.7 °C)-98.6 °F (37 °C)] 98.1 °F (36.7 °C)  Heart Rate:  [50-80] 55  Resp:  [13-18] 16  BP: ()/(43-75) 101/60          Physical Exam:  Physical Exam    Constitutional:       Appearance: Normal appearance.   HENT:      Head: Normocephalic and atraumatic.      Right Ear: External ear normal.      Left Ear: External ear normal.      Nose: Nose normal.      Mouth/Throat:      Mouth: Mucous membranes are moist.      Pharynx: Oropharynx is clear.   Eyes:      Extraocular Movements: Extraocular movements intact.   Cardiovascular:      Rate and Rhythm: Normal rate and regular rhythm.      Pulses: Normal pulses.      Heart sounds: Normal heart sounds.   Pulmonary:      Effort: Pulmonary effort is normal.      Breath sounds: Normal breath sounds.   Abdominal:      General: Abdomen is flat. Bowel sounds are normal.      Palpations: Abdomen is soft.      Tenderness: There is abdominal tenderness.   Musculoskeletal:         General: Normal range of motion.      Cervical back: Normal range of motion.   Skin:     General: Skin is warm.   Neurological:      General: No focal deficit present.      Mental Status: She is alert and oriented to person, place, and time.   Psychiatric:         Mood and Affect: Mood normal.         Behavior: Behavior normal.     Emotional Behavior:    wnl   Debilities:   None    Results Review:    I reviewed the patient's new clinical results.  Lab Results (most recent)       Procedure Component Value Units Date/Time    CBC & Differential [[id number]]  (Abnormal) Collected: 10/25/23 0542    Specimen: Blood Updated: 10/25/23 0706    Narrative:      The following orders were created for panel order CBC & Differential.  Procedure                               Abnormality         Status                     ---------                               -----------         ------                     CBC Auto  Differential[[id number]]        Abnormal            Final result               Scan Slide[[id number]]                                       Final result                 Please view results for these tests on the individual orders.    CBC Auto Differential [[id number]]  (Abnormal) Collected: 10/25/23 0542    Specimen: Blood Updated: 10/25/23 0706     WBC 9.80 10*3/mm3      RBC 4.49 10*6/mm3      Hemoglobin 11.2 g/dL      Hematocrit 36.1 %      MCV 80.4 fL      MCH 25.0 pg      MCHC 31.1 g/dL      RDW 19.9 %      RDW-SD 56.0 fl      MPV 9.5 fL      Platelets 421 10*3/mm3     Narrative:      The previously reported component NRBC is no longer being reported. Previous result was 0.2 /100 WBC (Reference Range: 0.0-0.2 /100 WBC) on 10/25/2023 at 0643 EDT.    Scan Slide [[id number]] Collected: 10/25/23 0542    Specimen: Blood Updated: 10/25/23 0706     Scan Slide --     Comment: See Manual Differential Results       Manual Differential [[id number]]  (Abnormal) Collected: 10/25/23 0542    Specimen: Blood Updated: 10/25/23 0706     Neutrophil % 28.0 %      Lymphocyte % 57.0 %      Monocyte % 3.0 %      Eosinophil % 11.0 %      Basophil % 1.0 %      Neutrophils Absolute 2.74 10*3/mm3      Lymphocytes Absolute 5.59 10*3/mm3      Monocytes Absolute 0.29 10*3/mm3      Eosinophils Absolute 1.08 10*3/mm3      Basophils Absolute 0.10 10*3/mm3      Poikilocytes Slight/1+     RBC Fragments Slight/1+     Target Cells Slight/1+     WBC Morphology Normal     Platelet Morphology Normal    Comprehensive Metabolic Panel [[id number]]  (Abnormal) Collected: 10/25/23 0542    Specimen: Blood Updated: 10/25/23 0702     Glucose 90 mg/dL      BUN 12 mg/dL      Creatinine 0.89 mg/dL      Sodium 136 mmol/L      Potassium 3.8 mmol/L      Comment: Slight hemolysis detected by analyzer. Results may be affected.        Chloride 101 mmol/L      CO2 29.0 mmol/L      Calcium 8.4 mg/dL      Total Protein 6.2 g/dL      Albumin 3.1 g/dL      ALT (SGPT) 27 U/L       AST (SGOT) 66 U/L      Alkaline Phosphatase 216 U/L      Total Bilirubin 1.4 mg/dL      Globulin 3.1 gm/dL      A/G Ratio 1.0 g/dL      BUN/Creatinine Ratio 13.5     Anion Gap 6.0 mmol/L      eGFR 79.1 mL/min/1.73     Narrative:      GFR Normal >60  Chronic Kidney Disease <60  Kidney Failure <15      Protime-INR [[id number]]  (Abnormal) Collected: 10/25/23 0542    Specimen: Blood Updated: 10/25/23 0646     Protime 12.7 Seconds      INR 1.18    AFP Tumor Marker [[id number]] Collected: 10/25/23 0542    Specimen: Blood Updated: 10/25/23 0632    CBC Auto Differential [[id number]]  (Abnormal) Collected: 10/24/23 0253    Specimen: Blood Updated: 10/24/23 0501     WBC 14.20 10*3/mm3      RBC 4.20 10*6/mm3      Hemoglobin 10.3 g/dL      Hematocrit 33.2 %      MCV 79.0 fL      MCH 24.5 pg      MCHC 31.1 g/dL      RDW 19.7 %      RDW-SD 57.3 fl      MPV 9.3 fL      Platelets 458 10*3/mm3     Narrative:      The previously reported component NRBC is no longer being reported. Previous result was 0.1 /100 WBC (Reference Range: 0.0-0.2 /100 WBC) on 10/24/2023 at 0326 EDT.    Scan Slide [[id number]] Collected: 10/24/23 0253    Specimen: Blood Updated: 10/24/23 0501     Scan Slide --     Comment: See Manual Differential Results       Manual Differential [[id number]]  (Abnormal) Collected: 10/24/23 0253    Specimen: Blood Updated: 10/24/23 0501     Neutrophil % 52.0 %      Lymphocyte % 32.0 %      Monocyte % 6.0 %      Eosinophil % 5.0 %      Basophil % 4.0 %      Bands %  1.0 %      Neutrophils Absolute 7.53 10*3/mm3      Lymphocytes Absolute 4.54 10*3/mm3      Monocytes Absolute 0.85 10*3/mm3      Eosinophils Absolute 0.71 10*3/mm3      Basophils Absolute 0.57 10*3/mm3      Anisocytosis Slight/1+     RBC Fragments Slight/1+     Target Cells Slight/1+     Toxic Granulation Slight/1+     Platelet Estimate Increased    Basic Metabolic Panel [[id number]]  (Abnormal) Collected: 10/24/23 0253    Specimen: Blood Updated: 10/24/23 0401      Glucose 92 mg/dL      BUN 9 mg/dL      Creatinine 0.69 mg/dL      Sodium 139 mmol/L      Potassium 4.1 mmol/L      Chloride 105 mmol/L      CO2 27.0 mmol/L      Calcium 8.4 mg/dL      BUN/Creatinine Ratio 13.0     Anion Gap 7.0 mmol/L      eGFR 105.9 mL/min/1.73     Narrative:      GFR Normal >60  Chronic Kidney Disease <60  Kidney Failure <15      CBC & Differential [[id number]]  (Abnormal) Collected: 10/24/23 0015    Specimen: Blood Updated: 10/24/23 0101    Narrative:      The following orders were created for panel order CBC & Differential.  Procedure                               Abnormality         Status                     ---------                               -----------         ------                     CBC Auto Differential[[id number]]        Abnormal            Final result               Scan Slide[[id number]]                                       Final result                 Please view results for these tests on the individual orders.    Comprehensive Metabolic Panel [[id number]]  (Abnormal) Collected: 10/24/23 0015    Specimen: Blood Updated: 10/24/23 0039     Glucose 132 mg/dL      BUN 10 mg/dL      Creatinine 0.90 mg/dL      Sodium 138 mmol/L      Potassium 3.7 mmol/L      Chloride 105 mmol/L      CO2 27.0 mmol/L      Calcium 8.7 mg/dL      Total Protein 6.8 g/dL      Albumin 3.4 g/dL      ALT (SGPT) 9 U/L      AST (SGOT) 23 U/L      Alkaline Phosphatase 173 U/L      Total Bilirubin 1.2 mg/dL      Globulin 3.4 gm/dL      A/G Ratio 1.0 g/dL      BUN/Creatinine Ratio 11.1     Anion Gap 6.0 mmol/L      eGFR 78.0 mL/min/1.73     Narrative:      GFR Normal >60  Chronic Kidney Disease <60  Kidney Failure <15      [place] Draw [[id number]] Collected: 10/23/23 2123    Specimen: Blood Updated: 10/23/23 2232    Narrative:      The following orders were created for panel order [place] Draw.  Procedure                               Abnormality         Status                     ---------                                -----------         ------                     Green Top (Gel)[[id number]]                                  Final result               Lavender Top[[id number]]                                     Final result               Gold Top - SST[[id number]]                                   Final result               Light Blue Top[[id number]]                                   Final result                 Please view results for these tests on the individual orders.    Lavender Top [[id number]] Collected: 10/23/23 2123    Specimen: Blood Updated: 10/23/23 2232     Extra Tube hold for add-on     Comment: Auto resulted       Light Blue Top [[id number]] Collected: 10/23/23 2123    Specimen: Blood Updated: 10/23/23 2232     Extra Tube Hold for add-ons.     Comment: Auto resulted       Urinalysis With Microscopic If Indicated (No Culture) - Urine, Clean Catch [[id number]]  (Abnormal) Collected: 10/23/23 2137    Specimen: Urine, Clean Catch Updated: 10/23/23 2227     Color, UA Yellow     Appearance, UA Clear     pH, UA <=5.0     Specific Gravity, UA 1.041     Glucose, UA Negative     Ketones, UA Trace     Bilirubin, UA Small (1+)     Comment: Confirmation testing is unavailable.  A serum bilirubin is recommended for further assessment.        Blood, UA Negative     Protein, UA Trace     Leuk Esterase, UA Trace     Nitrite, UA Negative     Urobilinogen, UA 1.0 E.U./dL    Urinalysis, Microscopic Only - Urine, Clean Catch [[id number]]  (Abnormal) Collected: 10/23/23 2137    Specimen: Urine, Clean Catch Updated: 10/23/23 2227     RBC, UA 0-2 /HPF      WBC, UA 0-2 /HPF      Bacteria, UA 1+ /HPF      Squamous Epithelial Cells, UA 3-6 /HPF      Hyaline Casts, UA 0-2 /LPF      Methodology Automated Microscopy    Green Top (Gel) [[id number]] Collected: 10/23/23 2123    Specimen: Blood Updated: 10/23/23 2204    Gold Top - SST [[id number]] Collected: 10/23/23 2123    Specimen: Blood Updated: 10/23/23 2204    Lipase [[id number]]  (Normal)  Collected: 10/23/23 2123    Specimen: Blood Updated: 10/23/23 2201     Lipase 30 U/L             Imaging Results (Most Recent)       None          reviewed    ECG/EMG Results (most recent)       Procedure Component Value Units Date/Time    SCANNED - TELEMETRY   [[id number]] Resulted: 10/23/23     Updated: 10/24/23 0518    SCANNED - TELEMETRY   [[id number]] Resulted: 10/23/23     Updated: 10/25/23 0049          reviewed    Results for orders placed during the hospital encounter of 07/13/21    Duplex Venous Lower Extremity - Bilateral CAR    Interpretation Summary  · Sub-acute right lower extremity superficial thrombophlebitis noted in the greater saphenous (above knee).  · Acute right lower extremity superficial thrombophlebitis noted in the varicosity (above knee).  · All other veins appeared normal bilaterally.          Microbiology Results (last 10 days)       ** No results found for the last 240 hours. **            Assessment & Plan     Abdominal pain     Abdominal pain            Lab Results   Component Value Date     WBC 14.20 (H) 10/24/2023     AST 23 10/24/2023     ALT 9 10/24/2023     ALKPHOS 173 (H) 10/24/2023     BILITOT 1.2 10/24/2023     LIPASE 30 10/23/2023   -CMP generally unremarkable  -CBC showed WBC 14.20  -CT of abdomen and pelvis: From 10/17/2023 showed no evidence of acute process in the abdomen.  Similar multiple chronic/persistent findings including hepatic steatosis, and small volume of ascites, and mild hydronephrosis, and umbilical hernia and multi organ postsurgical changes.  -In the ED patient given Dilaudid and Zofran  -GI consulted and performed the EGD which showed anastomotic ulcer. Recommendations included misoprostol and carafate. GI also recommended to advance diet and dc when tolerating diet  -Patient requested to stay overnight          History of ascites  -Continue Bumex and spironolactone as needed     Hypothyroidism  -Continue Synthroid      I discussed the patients findings and  my recommendations with patient and nursing staff.     Discharge Diagnosis:      Abdominal pain      Hospital Course  Patient is a 48 y.o. female presented with abdominal pain. ER evaluated pt and admitted to observation.  WBC was 14 on admission, 9 today. Alk phos elevated. Ct abdomen showing no acute processes. Fatty liver disease, umbilical hernia and small vol ascites. GI consulted and egd performed. EGD showed anastomotic ulcer and gi recommended cytotec and carafate. Pt to follow up as outpt with them in 3-4 months. Discharge discussed with pt and she is agreeable to plan. Instructed pt to return to er if symptoms reoccur or worsen.      Past Medical History:     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    Empyema of pleura     Encephalopathy, portal systemic 5/9/2017    Esophageal varices 7/21/2017    Gastric leak 5/29/2016    GERD (gastroesophageal reflux disease)     H/O thyroidectomy 6/17/2016    Hemorrhoids     Hernia, ventral     History of morbid obesity 3/3/2016    History of transfusion     Hypercoagulable state 8/24/2019    Irregular menstrual cycle     Liver cirrhosis secondary to CARTER 04/2017    Lupus anticoagulant positive 8/24/2019    Migraine     Parathyroid abnormality     Portal vein thrombosis 3/3/2016    Post-surgical hypothyroidism     Pulmonary embolism 10/27/2020    Radiation     Restless legs syndrome (RLS)     S/P laparoscopic sleeve gastrectomy 6/17/2016    S/P splenectomy 6/17/2016    Thyroid cancer     Thyroid cancer 6/26/2015    Urge and stress incontinence     Vaginal prolapse 7/14/2021    Varicose veins     Ventral hernia 4/18/2020    Vitamin D deficiency 1/21/2021       Past Surgical History:     Past Surgical History:   Procedure Laterality Date    ABDOMINAL SURGERY      BEDSIDE PARACENTESIS   3/22/2020         BEDSIDE PARACENTESIS  4/19/2020         BEDSIDE PARACENTESIS  3/1/2021         BEDSIDE PARACENTESIS  3/30/2021         CHOLECYSTECTOMY      Laparoscopic    ENDOSCOPY N/A 3/17/2016    Procedure: ESOPHAGOGASTRODUODENOSCOPY WITH BALOON DILATATION 18-20MM WITH GASTRIC SLEEVE SEALANT;  Surgeon: Leonardo Ortiz Jr., MD;  Location: [facility] ENDOSCOPY;  Service:     ENDOSCOPY N/A 3/14/2016    Procedure: esophagogastroduodenoscopy with fluoroscopy;  Surgeon: Greg Avila III, MD;  Location: [facility] ENDOSCOPY;  Service:     ENDOSCOPY N/A 4/15/2016    Procedure: EGD WITH DILITATION AND STENT PLACEMENT dilation of pylorus ;  Surgeon: Leonardo Ortiz Jr., MD;  Location: [facility] ENDOSCOPY;  Service:     ENDOSCOPY N/A 5/13/2016    Procedure: ESOPHAGOGASTRODUODENOSCOPY  W/ STENT;  Surgeon: Leonardo Ortiz Jr., MD;  Location: [facility] ENDOSCOPY;  Service:     ENDOSCOPY N/A 1/13/2021    Procedure: ESOPHAGOGASTRODUODENOSCOPY WITH POLYPECTOMY X1 AND  BIOPSY X1.;  Surgeon: Bryson Cotton MD;  Location: [facility] ENDOSCOPY;  Service: Gastroenterology;  Laterality: N/A;  anastomotic ulcer, antral polyp, GASTRITIS    GASTRECTOMY      Gastric Surgery for Morbid Obesity Laparoscopic Longitudinal Gastrectomy    GASTRIC SLEEVE LAPAROSCOPIC      HEMORRHOIDECTOMY      HERNIA REPAIR      LAPAROSCOPY REPAIR HIATAL HERNIA      LARYNGOSCOPY      Direct Laryngoscopy with Injection into Vocal Cords    OTHER SURGICAL HISTORY Bilateral     Ear Pressure Equalization Tube, Insertion, Bilaterally    PERIPHERALLY INSERTED CENTRAL CATHETER INSERTION      SPLENECTOMY      THYROID SURGERY      TUBAL ABDOMINAL LIGATION         Social History:   Social History     Socioeconomic History    Marital status: Single   Tobacco Use    Smoking status: Never    Smokeless tobacco: Never   Vaping Use    Vaping Use: Never used   Substance and Sexual Activity    Alcohol use: No    Drug use: No    Sexual activity: Defer       Procedures  Performed    Procedure(s):  ESOPHAGOGASTRODUODENOSCOPY  -------------------  10/24 1303 [facility] ENDOSCOPY    Consults:   Consults       Date and Time Order Name Status Description    10/24/2023  1:32 AM Inpatient Gastroenterology Consult Completed             Condition on Discharge:     Stable    Discharge Disposition      Discharge Medications     Discharge Medications        ASK your doctor about these medications        Instructions Start Date   apixaban 5 MG tablet tablet  Commonly known as: ELIQUIS   5 mg, Oral, 2 Times Daily      bumetanide 2 MG tablet  Commonly known as: BUMEX   4 mg, Oral, Daily      estradiol 0.1 MG/GM vaginal cream  Commonly known as: ESTRACE   1 g, Vaginal, 3 Times Weekly, Monday Wednesday Friday       levothyroxine 125 MCG tablet  Commonly known as: SYNTHROID, LEVOTHROID   125 mcg, Oral, Every Early Morning      liothyronine 5 MCG tablet  Commonly known as: CYTOMEL   5 mcg, Oral, Daily      ondansetron ODT 8 MG disintegrating tablet  Commonly known as: ZOFRAN-ODT   8 mg, Translingual, Every 8 Hours PRN      potassium chloride 20 MEQ CR tablet  Commonly known as: K-DUR,KLOR-CON   40 mEq, Oral, 2 Times Daily      spironolactone 100 MG tablet  Commonly known as: ALDACTONE   400 mg, Oral, Daily               Discharge Diet:     Activity at Discharge:     Follow-up Appointments  No future appointments.      Test Results Pending at Discharge  Pending Labs       Order Current Status    AFP Tumor Marker In process             Risk for Readmission (LACE) Score: 9 (10/25/2023  6:00 AM)      Less Than 30 minutes spent in discharge activities for this patient    Marianna Fernandez PA-C  10/25/23  07:35 EDT

## 2023-10-26 ENCOUNTER — READMISSION MANAGEMENT (OUTPATIENT)
Dept: CALL CENTER | Facility: HOSPITAL | Age: 48
End: 2023-10-26
Payer: COMMERCIAL

## 2023-10-26 NOTE — OUTREACH NOTE
Prep Survey      Flowsheet Row Responses   [facility] facility patient discharged from? [facility]   Is LACE score < 7 ? No   Eligibility Readm Mgmt   Discharge diagnosis Epigastric pain, s/p EGD   Does the patient have one of the following disease processes/diagnoses(primary or secondary)? Other   Does the patient have Home health ordered? No   Is there a DME ordered? No   Prep survey completed? Yes            Padmini SANCHEZ - Registered Nurse

## 2023-11-08 ENCOUNTER — HOSPITAL ENCOUNTER (INPATIENT)
Facility: HOSPITAL | Age: 48
LOS: 4 days | Discharge: HOME | DRG: 391 | End: 2023-11-13
Attending: EMERGENCY MEDICINE | Admitting: EMERGENCY MEDICINE
Payer: COMMERCIAL

## 2023-11-08 DIAGNOSIS — R18.8 OTHER ASCITES: ICD-10-CM

## 2023-11-08 DIAGNOSIS — R10.13 EPIGASTRIC PAIN: ICD-10-CM

## 2023-11-08 DIAGNOSIS — R10.84 GENERALIZED ABDOMINAL PAIN: Primary | ICD-10-CM

## 2023-11-08 PROCEDURE — 99285 EMERGENCY DEPT VISIT HI MDM: CPT

## 2023-11-08 RX ORDER — ONDANSETRON 2 MG/ML
4 INJECTION INTRAMUSCULAR; INTRAVENOUS ONCE
Status: COMPLETED | OUTPATIENT
Start: 2023-11-09 | End: 2023-11-09

## 2023-11-08 RX ORDER — FENTANYL CITRATE 50 UG/ML
25 INJECTION, SOLUTION INTRAMUSCULAR; INTRAVENOUS ONCE
Status: COMPLETED | OUTPATIENT
Start: 2023-11-09 | End: 2023-11-09

## 2023-11-08 RX ORDER — SODIUM CHLORIDE 0.9 % (FLUSH) 0.9 %
10 SYRINGE (ML) INJECTION AS NEEDED
Status: DISCONTINUED | OUTPATIENT
Start: 2023-11-08 | End: 2023-11-13 | Stop reason: HOSPADM

## 2023-11-09 ENCOUNTER — INPATIENT HOSPITAL (AMBULATORY)
Dept: URBAN - METROPOLITAN AREA HOSPITAL 84 | Facility: HOSPITAL | Age: 48
End: 2023-11-09
Payer: COMMERCIAL

## 2023-11-09 ENCOUNTER — READMISSION MANAGEMENT (OUTPATIENT)
Dept: CALL CENTER | Facility: HOSPITAL | Age: 48
End: 2023-11-09
Payer: COMMERCIAL

## 2023-11-09 ENCOUNTER — APPOINTMENT (OUTPATIENT)
Dept: ULTRASOUND IMAGING | Facility: HOSPITAL | Age: 48
DRG: 391 | End: 2023-11-09
Payer: COMMERCIAL

## 2023-11-09 ENCOUNTER — APPOINTMENT (OUTPATIENT)
Dept: CT IMAGING | Facility: HOSPITAL | Age: 48
DRG: 391 | End: 2023-11-09
Payer: COMMERCIAL

## 2023-11-09 DIAGNOSIS — R10.10 UPPER ABDOMINAL PAIN, UNSPECIFIED: ICD-10-CM

## 2023-11-09 DIAGNOSIS — D72.829 ELEVATED WHITE BLOOD CELL COUNT, UNSPECIFIED: ICD-10-CM

## 2023-11-09 DIAGNOSIS — K75.81 NONALCOHOLIC STEATOHEPATITIS (NASH): ICD-10-CM

## 2023-11-09 DIAGNOSIS — R14.0 ABDOMINAL DISTENSION (GASEOUS): ICD-10-CM

## 2023-11-09 DIAGNOSIS — R74.8 ABNORMAL LEVELS OF OTHER SERUM ENZYMES: ICD-10-CM

## 2023-11-09 DIAGNOSIS — Z98.84 BARIATRIC SURGERY STATUS: ICD-10-CM

## 2023-11-09 DIAGNOSIS — Z98.0 INTESTINAL BYPASS AND ANASTOMOSIS STATUS: ICD-10-CM

## 2023-11-09 DIAGNOSIS — D64.9 ANEMIA, UNSPECIFIED: ICD-10-CM

## 2023-11-09 DIAGNOSIS — K42.9 UMBILICAL HERNIA WITHOUT OBSTRUCTION OR GANGRENE: ICD-10-CM

## 2023-11-09 DIAGNOSIS — R11.2 NAUSEA WITH VOMITING, UNSPECIFIED: ICD-10-CM

## 2023-11-09 LAB
ALBUMIN SERPL-MCNC: 3.7 G/DL (ref 3.5–5.2)
ALBUMIN/GLOB SERPL: 1.2 G/DL
ALP SERPL-CCNC: 159 U/L (ref 39–117)
ALT SERPL W P-5'-P-CCNC: 18 U/L (ref 1–33)
ANION GAP SERPL CALCULATED.3IONS-SCNC: 11 MMOL/L (ref 5–15)
ANION GAP SERPL CALCULATED.3IONS-SCNC: 9 MMOL/L (ref 5–15)
ANISOCYTOSIS BLD QL: ABNORMAL
APTT PPP: 27.1 SECONDS (ref 61–76.5)
AST SERPL-CCNC: 29 U/L (ref 1–32)
BACTERIA UR QL AUTO: ABNORMAL /HPF
BASOPHILS # BLD AUTO: 0.2 10*3/MM3 (ref 0–0.2)
BASOPHILS # BLD MANUAL: 0.12 10*3/MM3 (ref 0–0.2)
BASOPHILS NFR BLD AUTO: 2.2 % (ref 0–1.5)
BASOPHILS NFR BLD MANUAL: 1 % (ref 0–1.5)
BILIRUB SERPL-MCNC: 0.9 MG/DL (ref 0–1.2)
BILIRUB UR QL STRIP: NEGATIVE
BUN SERPL-MCNC: 11 MG/DL (ref 6–20)
BUN SERPL-MCNC: 17 MG/DL (ref 6–20)
BUN/CREAT SERPL: 17.5 (ref 7–25)
BUN/CREAT SERPL: 23.6 (ref 7–25)
C3 FRG RBC-MCNC: ABNORMAL
CALCIUM SPEC-SCNC: 9.1 MG/DL (ref 8.6–10.5)
CALCIUM SPEC-SCNC: 9.1 MG/DL (ref 8.6–10.5)
CHLORIDE SERPL-SCNC: 102 MMOL/L (ref 98–107)
CHLORIDE SERPL-SCNC: 105 MMOL/L (ref 98–107)
CLARITY UR: CLEAR
CO2 SERPL-SCNC: 25 MMOL/L (ref 22–29)
CO2 SERPL-SCNC: 25 MMOL/L (ref 22–29)
COLOR UR: YELLOW
CREAT SERPL-MCNC: 0.63 MG/DL (ref 0.57–1)
CREAT SERPL-MCNC: 0.72 MG/DL (ref 0.57–1)
DEPRECATED RDW RBC AUTO: 59.5 FL (ref 37–54)
DEPRECATED RDW RBC AUTO: 61.3 FL (ref 37–54)
EGFRCR SERPLBLD CKD-EPI 2021: 102 ML/MIN/1.73
EGFRCR SERPLBLD CKD-EPI 2021: 108.2 ML/MIN/1.73
EOSINOPHIL # BLD AUTO: 0.3 10*3/MM3 (ref 0–0.4)
EOSINOPHIL # BLD MANUAL: 0.12 10*3/MM3 (ref 0–0.4)
EOSINOPHIL NFR BLD AUTO: 2.5 % (ref 0.3–6.2)
EOSINOPHIL NFR BLD MANUAL: 1 % (ref 0.3–6.2)
ERYTHROCYTE [DISTWIDTH] IN BLOOD BY AUTOMATED COUNT: 21 % (ref 12.3–15.4)
ERYTHROCYTE [DISTWIDTH] IN BLOOD BY AUTOMATED COUNT: 21.7 % (ref 12.3–15.4)
GEN 5 2HR TROPONIN T REFLEX: <6 NG/L
GLOBULIN UR ELPH-MCNC: 3.2 GM/DL
GLUCOSE SERPL-MCNC: 105 MG/DL (ref 65–99)
GLUCOSE SERPL-MCNC: 97 MG/DL (ref 65–99)
GLUCOSE UR STRIP-MCNC: NEGATIVE MG/DL
HCT VFR BLD AUTO: 32.3 % (ref 34–46.6)
HCT VFR BLD AUTO: 35.9 % (ref 34–46.6)
HGB BLD-MCNC: 10 G/DL (ref 12–15.9)
HGB BLD-MCNC: 11.3 G/DL (ref 12–15.9)
HGB UR QL STRIP.AUTO: NEGATIVE
HYALINE CASTS UR QL AUTO: ABNORMAL /LPF
INR PPP: 1.05 (ref 0.93–1.1)
KETONES UR QL STRIP: ABNORMAL
LEUKOCYTE ESTERASE UR QL STRIP.AUTO: ABNORMAL
LIPASE SERPL-CCNC: 34 U/L (ref 13–60)
LYMPHOCYTES # BLD AUTO: 5.5 10*3/MM3 (ref 0.7–3.1)
LYMPHOCYTES # BLD MANUAL: 4.25 10*3/MM3 (ref 0.7–3.1)
LYMPHOCYTES NFR BLD AUTO: 50 % (ref 19.6–45.3)
LYMPHOCYTES NFR BLD MANUAL: 4 % (ref 5–12)
MCH RBC QN AUTO: 25.3 PG (ref 26.6–33)
MCH RBC QN AUTO: 26 PG (ref 26.6–33)
MCHC RBC AUTO-ENTMCNC: 31 G/DL (ref 31.5–35.7)
MCHC RBC AUTO-ENTMCNC: 31.5 G/DL (ref 31.5–35.7)
MCV RBC AUTO: 81.7 FL (ref 79–97)
MCV RBC AUTO: 82.6 FL (ref 79–97)
MICROCYTES BLD QL: ABNORMAL
MONOCYTES # BLD AUTO: 0.6 10*3/MM3 (ref 0.1–0.9)
MONOCYTES # BLD: 0.47 10*3/MM3 (ref 0.1–0.9)
MONOCYTES NFR BLD AUTO: 5.5 % (ref 5–12)
NEUTROPHILS # BLD AUTO: 6.84 10*3/MM3 (ref 1.7–7)
NEUTROPHILS NFR BLD AUTO: 39.8 % (ref 42.7–76)
NEUTROPHILS NFR BLD AUTO: 4.4 10*3/MM3 (ref 1.7–7)
NEUTROPHILS NFR BLD MANUAL: 58 % (ref 42.7–76)
NITRITE UR QL STRIP: NEGATIVE
NRBC BLD AUTO-RTO: 0.1 /100 WBC (ref 0–0.2)
PH UR STRIP.AUTO: 5.5 [PH] (ref 5–8)
PLATELET # BLD AUTO: 613 10*3/MM3 (ref 140–450)
PLATELET # BLD AUTO: 634 10*3/MM3 (ref 140–450)
PMV BLD AUTO: 8.1 FL (ref 6–12)
PMV BLD AUTO: 8.6 FL (ref 6–12)
POIKILOCYTOSIS BLD QL SMEAR: ABNORMAL
POTASSIUM SERPL-SCNC: 3.5 MMOL/L (ref 3.5–5.2)
POTASSIUM SERPL-SCNC: 3.6 MMOL/L (ref 3.5–5.2)
POTASSIUM SERPL-SCNC: 3.6 MMOL/L (ref 3.5–5.2)
PROT SERPL-MCNC: 6.9 G/DL (ref 6–8.5)
PROT UR QL STRIP: NEGATIVE
PROTHROMBIN TIME: 11.4 SECONDS (ref 9.6–11.7)
RBC # BLD AUTO: 3.95 10*6/MM3 (ref 3.77–5.28)
RBC # BLD AUTO: 4.35 10*6/MM3 (ref 3.77–5.28)
RBC # UR STRIP: ABNORMAL /HPF
REF LAB TEST METHOD: ABNORMAL
SCAN SLIDE: NORMAL
SMALL PLATELETS BLD QL SMEAR: ABNORMAL
SODIUM SERPL-SCNC: 138 MMOL/L (ref 136–145)
SODIUM SERPL-SCNC: 139 MMOL/L (ref 136–145)
SP GR UR STRIP: 1.04 (ref 1–1.03)
SQUAMOUS #/AREA URNS HPF: ABNORMAL /HPF
TARGETS BLD QL SMEAR: ABNORMAL
TROPONIN T DELTA: NORMAL
TROPONIN T SERPL HS-MCNC: <6 NG/L
UROBILINOGEN UR QL STRIP: ABNORMAL
VARIANT LYMPHS NFR BLD MANUAL: 36 % (ref 19.6–45.3)
WBC # UR STRIP: ABNORMAL /HPF
WBC MORPH BLD: NORMAL
WBC NRBC COR # BLD: 11 10*3/MM3 (ref 3.4–10.8)
WBC NRBC COR # BLD: 11.8 10*3/MM3 (ref 3.4–10.8)

## 2023-11-09 PROCEDURE — G0378 HOSPITAL OBSERVATION PER HR: HCPCS

## 2023-11-09 PROCEDURE — 76705 ECHO EXAM OF ABDOMEN: CPT

## 2023-11-09 PROCEDURE — 84132 ASSAY OF SERUM POTASSIUM: CPT | Performed by: PHYSICIAN ASSISTANT

## 2023-11-09 PROCEDURE — 25010000002 ONDANSETRON PER 1 MG: Performed by: EMERGENCY MEDICINE

## 2023-11-09 PROCEDURE — 84484 ASSAY OF TROPONIN QUANT: CPT | Performed by: PHYSICIAN ASSISTANT

## 2023-11-09 PROCEDURE — 81001 URINALYSIS AUTO W/SCOPE: CPT | Performed by: EMERGENCY MEDICINE

## 2023-11-09 PROCEDURE — 85025 COMPLETE CBC W/AUTO DIFF WBC: CPT | Performed by: EMERGENCY MEDICINE

## 2023-11-09 PROCEDURE — 25810000003 SODIUM CHLORIDE 0.9 % SOLUTION: Performed by: EMERGENCY MEDICINE

## 2023-11-09 PROCEDURE — 74176 CT ABD & PELVIS W/O CONTRAST: CPT

## 2023-11-09 PROCEDURE — 85730 THROMBOPLASTIN TIME PARTIAL: CPT | Performed by: EMERGENCY MEDICINE

## 2023-11-09 PROCEDURE — 99222 1ST HOSP IP/OBS MODERATE 55: CPT | Performed by: NURSE PRACTITIONER

## 2023-11-09 PROCEDURE — 25010000002 METOCLOPRAMIDE PER 10 MG: Performed by: NURSE PRACTITIONER

## 2023-11-09 PROCEDURE — 25010000002 HYDROMORPHONE 1 MG/ML SOLUTION: Performed by: EMERGENCY MEDICINE

## 2023-11-09 PROCEDURE — 85610 PROTHROMBIN TIME: CPT | Performed by: EMERGENCY MEDICINE

## 2023-11-09 PROCEDURE — 36415 COLL VENOUS BLD VENIPUNCTURE: CPT | Performed by: EMERGENCY MEDICINE

## 2023-11-09 PROCEDURE — 80053 COMPREHEN METABOLIC PANEL: CPT | Performed by: EMERGENCY MEDICINE

## 2023-11-09 PROCEDURE — 25010000002 HYDROMORPHONE 1 MG/ML SOLUTION: Performed by: PHYSICIAN ASSISTANT

## 2023-11-09 PROCEDURE — 83690 ASSAY OF LIPASE: CPT | Performed by: EMERGENCY MEDICINE

## 2023-11-09 PROCEDURE — 25010000002 FENTANYL CITRATE (PF) 50 MCG/ML SOLUTION: Performed by: EMERGENCY MEDICINE

## 2023-11-09 PROCEDURE — 85007 BL SMEAR W/DIFF WBC COUNT: CPT | Performed by: EMERGENCY MEDICINE

## 2023-11-09 RX ORDER — POTASSIUM CHLORIDE 20 MEQ/1
40 TABLET, EXTENDED RELEASE ORAL EVERY 4 HOURS
Status: CANCELLED | OUTPATIENT
Start: 2023-11-09 | End: 2023-11-10

## 2023-11-09 RX ORDER — PANTOPRAZOLE SODIUM 40 MG/1
40 TABLET, DELAYED RELEASE ORAL
Status: DISCONTINUED | OUTPATIENT
Start: 2023-11-09 | End: 2023-11-13 | Stop reason: HOSPADM

## 2023-11-09 RX ORDER — BUMETANIDE 1 MG/1
4 TABLET ORAL DAILY
Status: DISCONTINUED | OUTPATIENT
Start: 2023-11-09 | End: 2023-11-13 | Stop reason: HOSPADM

## 2023-11-09 RX ORDER — SODIUM CHLORIDE 9 MG/ML
40 INJECTION, SOLUTION INTRAVENOUS AS NEEDED
Status: DISCONTINUED | OUTPATIENT
Start: 2023-11-09 | End: 2023-11-13 | Stop reason: HOSPADM

## 2023-11-09 RX ORDER — MISOPROSTOL 200 UG/1
200 TABLET ORAL 2 TIMES DAILY
Status: DISCONTINUED | OUTPATIENT
Start: 2023-11-09 | End: 2023-11-13 | Stop reason: HOSPADM

## 2023-11-09 RX ORDER — SODIUM CHLORIDE 9 MG/ML
75 INJECTION, SOLUTION INTRAVENOUS CONTINUOUS
Status: DISCONTINUED | OUTPATIENT
Start: 2023-11-09 | End: 2023-11-09

## 2023-11-09 RX ORDER — LIOTHYRONINE SODIUM 5 UG/1
5 TABLET ORAL
Status: DISCONTINUED | OUTPATIENT
Start: 2023-11-09 | End: 2023-11-13 | Stop reason: HOSPADM

## 2023-11-09 RX ORDER — CHOLECALCIFEROL (VITAMIN D3) 125 MCG
5 CAPSULE ORAL NIGHTLY PRN
Status: DISCONTINUED | OUTPATIENT
Start: 2023-11-09 | End: 2023-11-13 | Stop reason: HOSPADM

## 2023-11-09 RX ORDER — POTASSIUM CHLORIDE 20 MEQ/1
40 TABLET, EXTENDED RELEASE ORAL 2 TIMES DAILY
Status: DISCONTINUED | OUTPATIENT
Start: 2023-11-09 | End: 2023-11-13 | Stop reason: HOSPADM

## 2023-11-09 RX ORDER — ONDANSETRON 2 MG/ML
4 INJECTION INTRAMUSCULAR; INTRAVENOUS EVERY 6 HOURS PRN
Status: DISCONTINUED | OUTPATIENT
Start: 2023-11-09 | End: 2023-11-13 | Stop reason: HOSPADM

## 2023-11-09 RX ORDER — SODIUM CHLORIDE 0.9 % (FLUSH) 0.9 %
10 SYRINGE (ML) INJECTION AS NEEDED
Status: DISCONTINUED | OUTPATIENT
Start: 2023-11-09 | End: 2023-11-13 | Stop reason: HOSPADM

## 2023-11-09 RX ORDER — SODIUM CHLORIDE 0.9 % (FLUSH) 0.9 %
10 SYRINGE (ML) INJECTION EVERY 12 HOURS SCHEDULED
Status: DISCONTINUED | OUTPATIENT
Start: 2023-11-09 | End: 2023-11-13 | Stop reason: HOSPADM

## 2023-11-09 RX ORDER — SUCRALFATE 1 G/1
1 TABLET ORAL
Status: DISCONTINUED | OUTPATIENT
Start: 2023-11-09 | End: 2023-11-13 | Stop reason: HOSPADM

## 2023-11-09 RX ORDER — METOCLOPRAMIDE HYDROCHLORIDE 5 MG/ML
5 INJECTION INTRAMUSCULAR; INTRAVENOUS
Status: DISCONTINUED | OUTPATIENT
Start: 2023-11-09 | End: 2023-11-13 | Stop reason: HOSPADM

## 2023-11-09 RX ORDER — POTASSIUM CHLORIDE 20 MEQ/1
40 TABLET, EXTENDED RELEASE ORAL EVERY 4 HOURS
Status: DISPENSED | OUTPATIENT
Start: 2023-11-09 | End: 2023-11-10

## 2023-11-09 RX ORDER — SPIRONOLACTONE 100 MG/1
400 TABLET, FILM COATED ORAL DAILY
Status: DISCONTINUED | OUTPATIENT
Start: 2023-11-09 | End: 2023-11-13 | Stop reason: HOSPADM

## 2023-11-09 RX ADMIN — APIXABAN 5 MG: 5 TABLET, FILM COATED ORAL at 21:18

## 2023-11-09 RX ADMIN — Medication 10 ML: at 08:45

## 2023-11-09 RX ADMIN — Medication 10 ML: at 16:49

## 2023-11-09 RX ADMIN — Medication 10 ML: at 10:20

## 2023-11-09 RX ADMIN — HYDROMORPHONE HYDROCHLORIDE 1 MG: 1 INJECTION, SOLUTION INTRAMUSCULAR; INTRAVENOUS; SUBCUTANEOUS at 13:13

## 2023-11-09 RX ADMIN — Medication 10 ML: at 21:22

## 2023-11-09 RX ADMIN — MISOPROSTOL 200 MCG: 200 TABLET ORAL at 21:18

## 2023-11-09 RX ADMIN — POTASSIUM CHLORIDE 40 MEQ: 1500 TABLET, EXTENDED RELEASE ORAL at 21:18

## 2023-11-09 RX ADMIN — BUMETANIDE 4 MG: 1 TABLET ORAL at 08:44

## 2023-11-09 RX ADMIN — SUCRALFATE 1 G: 1 TABLET ORAL at 08:44

## 2023-11-09 RX ADMIN — Medication 10 ML: at 19:16

## 2023-11-09 RX ADMIN — POTASSIUM CHLORIDE 40 MEQ: 1500 TABLET, EXTENDED RELEASE ORAL at 08:44

## 2023-11-09 RX ADMIN — PANTOPRAZOLE SODIUM 40 MG: 40 TABLET, DELAYED RELEASE ORAL at 16:50

## 2023-11-09 RX ADMIN — SODIUM CHLORIDE 75 ML/HR: 9 INJECTION, SOLUTION INTRAVENOUS at 07:21

## 2023-11-09 RX ADMIN — Medication 10 ML: at 15:49

## 2023-11-09 RX ADMIN — HYDROMORPHONE HYDROCHLORIDE 1 MG: 1 INJECTION, SOLUTION INTRAMUSCULAR; INTRAVENOUS; SUBCUTANEOUS at 21:18

## 2023-11-09 RX ADMIN — HYDROMORPHONE HYDROCHLORIDE 0.5 MG: 1 INJECTION, SOLUTION INTRAMUSCULAR; INTRAVENOUS; SUBCUTANEOUS at 03:00

## 2023-11-09 RX ADMIN — ONDANSETRON 4 MG: 2 INJECTION INTRAMUSCULAR; INTRAVENOUS at 19:16

## 2023-11-09 RX ADMIN — Medication 10 ML: at 07:21

## 2023-11-09 RX ADMIN — HYDROMORPHONE HYDROCHLORIDE 1 MG: 1 INJECTION, SOLUTION INTRAMUSCULAR; INTRAVENOUS; SUBCUTANEOUS at 10:20

## 2023-11-09 RX ADMIN — HYDROMORPHONE HYDROCHLORIDE 1 MG: 1 INJECTION, SOLUTION INTRAMUSCULAR; INTRAVENOUS; SUBCUTANEOUS at 15:49

## 2023-11-09 RX ADMIN — MISOPROSTOL 200 MCG: 200 TABLET ORAL at 08:44

## 2023-11-09 RX ADMIN — LIOTHYRONINE SODIUM 5 MCG: 5 TABLET ORAL at 08:44

## 2023-11-09 RX ADMIN — FENTANYL CITRATE 25 MCG: 50 INJECTION, SOLUTION INTRAMUSCULAR; INTRAVENOUS at 00:47

## 2023-11-09 RX ADMIN — ONDANSETRON 4 MG: 2 INJECTION INTRAMUSCULAR; INTRAVENOUS at 13:13

## 2023-11-09 RX ADMIN — METOCLOPRAMIDE 5 MG: 5 INJECTION, SOLUTION INTRAMUSCULAR; INTRAVENOUS at 16:49

## 2023-11-09 RX ADMIN — SUCRALFATE 1 G: 1 TABLET ORAL at 16:50

## 2023-11-09 RX ADMIN — SPIRONOLACTONE 400 MG: 100 TABLET ORAL at 08:44

## 2023-11-09 RX ADMIN — HYDROMORPHONE HYDROCHLORIDE 1 MG: 1 INJECTION, SOLUTION INTRAMUSCULAR; INTRAVENOUS; SUBCUTANEOUS at 07:21

## 2023-11-09 RX ADMIN — PANTOPRAZOLE SODIUM 40 MG: 40 TABLET, DELAYED RELEASE ORAL at 08:44

## 2023-11-09 RX ADMIN — Medication 5 MG: at 21:18

## 2023-11-09 RX ADMIN — ONDANSETRON 4 MG: 2 INJECTION INTRAMUSCULAR; INTRAVENOUS at 07:21

## 2023-11-09 RX ADMIN — ONDANSETRON 4 MG: 2 INJECTION INTRAMUSCULAR; INTRAVENOUS at 00:47

## 2023-11-09 RX ADMIN — Medication 10 ML: at 13:13

## 2023-11-09 RX ADMIN — HYDROMORPHONE HYDROCHLORIDE 1 MG: 1 INJECTION, SOLUTION INTRAMUSCULAR; INTRAVENOUS; SUBCUTANEOUS at 19:16

## 2023-11-09 NOTE — PLAN OF CARE
Goal Outcome Evaluation:      A/O x4 with Pains to generalized ABD radiating to back and nausea at times-PRN's given and noted effective. IVF's started this am. Awaiting on GI consult. This nurse held eliquis this am, for GI consult incase needs to do procedure

## 2023-11-09 NOTE — OUTREACH NOTE
Medical Week 3 Survey      Flowsheet Row Responses   [facility] facility patient discharged from? [facility]   Does the patient have one of the following disease processes/diagnoses(primary or secondary)? Other   Week 3 attempt successful? No   Unsuccessful attempts Attempt 1   Revoke Readmitted            Anastasiya LUCERO - Registered Nurse

## 2023-11-09 NOTE — CASE MANAGEMENT/SOCIAL WORK
Discharge Planning Assessment   [facility]     Patient Name: Lucinda Cope  MRN: [id number]  Today's Date: 11/9/2023    Admit Date: 11/8/2023    Plan: Home w/ family   Discharge Needs Assessment       Row Name 11/09/23 1021       Living Environment    People in Home child(arely), adult;grandchild(arely)    Name(s) of People in Home daughter Sheyla and grandchild    Current Living Arrangements home    Potentially Unsafe Housing Conditions none    Primary Care Provided by self    Provides Primary Care For no one    Family Caregiver if Needed child(arely), adult    Family Caregiver Names clive Carrion    Quality of Family Relationships helpful;involved;supportive    Able to Return to Prior Arrangements yes       Resource/Environmental Concerns    Resource/Environmental Concerns none    Transportation Concerns none       Transition Planning    Patient/Family Anticipates Transition to home with family    Patient/Family Anticipated Services at Transition none    Transportation Anticipated car, drives self       Discharge Needs Assessment    Readmission Within the Last 30 Days no previous admission in last 30 days    Equipment Currently Used at Home none    Concerns to be Addressed denies needs/concerns at this time    Anticipated Changes Related to Illness none    Provided Post Acute Provider List? N/A    Provided Post Acute Provider Quality & Resource List? N/A                   Discharge Plan       Row Name 11/09/23 1023       Plan    Plan Home w/ family    Plan Comments CM met with patient at the bedside. Confirmed PCP, insurance, and pharmacy. Patient denies any difficulty affording medications. Patient is not current with any HHC/OPPT/OT services. Patient lives at home with daughter and grandchild, is IADLS, and drives self. Patient will drive herself home at discharge. DC Barriers: GI following, IVF, IV pain and nausea medicine.                  Continued Care and Services - Admitted Since 11/8/2023    Coordination has  not been started for this encounter.       Expected Discharge Date and Time       Expected Discharge Date Expected Discharge Time    Nov 11, 2023            Demographic Summary       Row Name 11/09/23 1021       General Information    Admission Type observation    Arrived From emergency department    Referral Source admission list    Reason for Consult care coordination/care conference;discharge planning    Preferred Language English       Contact Information    Permission Granted to Share Info With     Contact Information Obtained for                    Functional Status       Row Name 11/09/23 1021       Functional Status    Usual Activity Tolerance good    Current Activity Tolerance good       Functional Status, IADL    Medications independent    Meal Preparation independent    Housekeeping independent    Laundry independent    Shopping independent                Zane Espinosa RN     Cell number [phone]  Office number [phone]

## 2023-11-09 NOTE — ED PROVIDER NOTES
Subjective   History of Present Illness  Chief complaint abdominal pain swelling    History of present illness this is a 48-year-old female who has had a history in the past of gastric bypass surgery several years ago in [place] that had some complications that led to a splenectomy she reports also has a history of nonalcoholic cirrhosis complains of several week history of worsening abdominal pain.  She states she was in the hospital couple weeks ago she had endoscopy which showed an ulcer and started on some medicine for that which she states she continues to have pain and now swelling and she is worried that she has ascites and needs to be drained.  No fever chills been noted no vomiting no vomiting blood no black or bloody stool no dysuria frequency no injury no chest pain neck arm or jaw pain is moderate degree constant nothing really seem to trigger nothing makes it better or worse.      Review of Systems   Constitutional:  Negative for chills and fever.   Respiratory:  Negative for chest tightness and shortness of breath.    Cardiovascular:  Negative for chest pain and palpitations.   Gastrointestinal:  Positive for abdominal distention, abdominal pain and nausea. Negative for blood in stool and vomiting.   Genitourinary:  Negative for difficulty urinating and dysuria.   Musculoskeletal:  Negative for back pain and neck pain.   Neurological:  Negative for dizziness and light-headedness.       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    Empyema of pleura     Encephalopathy, portal systemic 5/9/2017    Esophageal varices 7/21/2017    Gastric leak 5/29/2016    GERD (gastroesophageal reflux disease)     H/O thyroidectomy 6/17/2016    Hemorrhoids     Hernia, ventral     History of morbid obesity 3/3/2016    History of  transfusion     Hypercoagulable state 8/24/2019    Irregular menstrual cycle     Liver cirrhosis secondary to CARTER 04/2017    Lupus anticoagulant positive 8/24/2019    Migraine     Parathyroid abnormality     Portal vein thrombosis 3/3/2016    Post-surgical hypothyroidism     Pulmonary embolism 10/27/2020    Radiation     Restless legs syndrome (RLS)     S/P laparoscopic sleeve gastrectomy 6/17/2016    S/P splenectomy 6/17/2016    Thyroid cancer     Thyroid cancer 6/26/2015    Urge and stress incontinence     Vaginal prolapse 7/14/2021    Varicose veins     Ventral hernia 4/18/2020    Vitamin D deficiency 1/21/2021       Allergies   Allergen Reactions    Contrast Dye (Echo Or Unknown Ct/Mr) Hives    Iodinated Contrast Media Hives    Iodine Hives    Hydrocodone-Acetaminophen Hives and Itching    Morphine Itching, Hives and Rash    Red Dye Itching, Other (See Comments) and Unknown - High Severity    Compazine [Prochlorperazine Edisylate] Hives    Hydrocodone Rash     Lortab elixir per pt    Ketorolac Tromethamine Unknown - Low Severity    Nsaids Other (See Comments)     States I am not suppose to take them  Cannot have due to gastric sleeve       Past Surgical History:   Procedure Laterality Date    ABDOMINAL SURGERY      BEDSIDE PARACENTESIS  3/22/2020         BEDSIDE PARACENTESIS  4/19/2020         BEDSIDE PARACENTESIS  3/1/2021         BEDSIDE PARACENTESIS  3/30/2021         CHOLECYSTECTOMY      Laparoscopic    ENDOSCOPY N/A 3/17/2016    Procedure: ESOPHAGOGASTRODUODENOSCOPY WITH BALOON DILATATION 18-20MM WITH GASTRIC SLEEVE SEALANT;  Surgeon: Leonardo Ortiz Jr., MD;  Location: [facility] ENDOSCOPY;  Service:     ENDOSCOPY N/A 3/14/2016    Procedure: esophagogastroduodenoscopy with fluoroscopy;  Surgeon: Greg Avila III, MD;  Location: [facility] ENDOSCOPY;  Service:     ENDOSCOPY N/A 4/15/2016    Procedure: EGD WITH DILITATION AND STENT PLACEMENT dilation of pylorus ;  Surgeon: Leonardo Ortiz Jr., MD;   Location:  [facility] ENDOSCOPY;  Service:     ENDOSCOPY N/A 5/13/2016    Procedure: ESOPHAGOGASTRODUODENOSCOPY  W/ STENT;  Surgeon: Leonardo Ortiz Jr., MD;  Location:  [facility] ENDOSCOPY;  Service:     ENDOSCOPY N/A 1/13/2021    Procedure: ESOPHAGOGASTRODUODENOSCOPY WITH POLYPECTOMY X1 AND  BIOPSY X1.;  Surgeon: Bryson Cotton MD;  Location: [facility] ENDOSCOPY;  Service: Gastroenterology;  Laterality: N/A;  anastomotic ulcer, antral polyp, GASTRITIS    ENDOSCOPY N/A 10/24/2023    Procedure: ESOPHAGOGASTRODUODENOSCOPY;  Surgeon: Hossein Delgadillo MD;  Location: [facility] ENDOSCOPY;  Service: Gastroenterology;  Laterality: N/A;  Post: anastomotic ulcer    GASTRECTOMY      Gastric Surgery for Morbid Obesity Laparoscopic Longitudinal Gastrectomy    GASTRIC SLEEVE LAPAROSCOPIC      HEMORRHOIDECTOMY      HERNIA REPAIR      LAPAROSCOPY REPAIR HIATAL HERNIA      LARYNGOSCOPY      Direct Laryngoscopy with Injection into Vocal Cords    OTHER SURGICAL HISTORY Bilateral     Ear Pressure Equalization Tube, Insertion, Bilaterally    PERIPHERALLY INSERTED CENTRAL CATHETER INSERTION      SPLENECTOMY      THYROID SURGERY      TUBAL ABDOMINAL LIGATION         Family History   Problem Relation Age of Onset    Stroke Father     Diabetes Father         Diabetes Mellitus    Hypertension Father     Breast cancer Maternal Grandmother     Cancer Other        Social History     Socioeconomic History    Marital status: Single   Tobacco Use    Smoking status: Never    Smokeless tobacco: Never   Vaping Use    Vaping Use: Never used   Substance and Sexual Activity    Alcohol use: No    Drug use: No    Sexual activity: Defer     Prior to Admission medications    Medication Sig Start Date End Date Taking? Authorizing Provider   apixaban (ELIQUIS) 5 MG tablet tablet Take 5 mg by mouth 2 (Two) Times a Day.    ProviderJustyn MD   bumetanide (BUMEX) 2 MG tablet Take 4 mg by mouth Daily.    ProviderJustyn MD   estradiol (ESTRACE) 0.1 MG/GM  vaginal cream Insert 1 g into the vagina 3 (Three) Times a Week. Monday Wednesday Friday    Justyn Erwin MD   levothyroxine (SYNTHROID, LEVOTHROID) 125 MCG tablet Take 125 mcg by mouth Every Morning.    Justyn Erwin MD   liothyronine (CYTOMEL) 5 MCG tablet Take 1 tablet by mouth Daily.    Justyn Erwin MD   miSOPROStol (CYTOTEC) 200 MCG tablet Take 1 tablet by mouth 2 (Two) Times a Day. 10/25/23   Marianna Fernandez PA-C   ondansetron ODT (ZOFRAN-ODT) 4 MG disintegrating tablet Place 1 tablet on the tongue Every 8 (Eight) Hours As Needed for Nausea or Vomiting. 10/25/23   Marianna Fernandez PA-C   ondansetron ODT (ZOFRAN-ODT) 8 MG disintegrating tablet Place 8 mg on the tongue Every 8 (Eight) Hours As Needed for Nausea or Vomiting.    Justyn Erwin MD   pantoprazole (PROTONIX) 40 MG EC tablet Take 1 tablet by mouth 2 (Two) Times a Day Before Meals. 10/25/23   Marianna Fernandez PA-C   potassium chloride (K-DUR,KLOR-CON) 20 MEQ CR tablet Take 40 mEq by mouth 2 (Two) Times a Day.    Justyn Erwin MD   spironolactone (ALDACTONE) 100 MG tablet Take 400 mg by mouth Daily.    Justyn Erwin MD   sucralfate (CARAFATE) 1 g tablet Take 1 tablet by mouth 2 (Two) Times a Day Before Meals. 10/25/23   Marianna Fernandez PA-C          Objective   Physical Exam  Constitutional this is a 48-year-old female awake alert chronically ill-appearing but nontoxic-appearing HEENT extraocular muscles are intact pupils equal round reactive sclera clear neck supple no adenopathy no JVD no bruits no meningeal signs lungs clear no retractions back no CVA tenderness heart regular without murmur abdomen soft has some mild diffuse tenderness but no rebound no guarding good bowel sounds no peritoneal findings or pulsatile masses I do not really appreciate any large amount of ascites.  Extremities pulses equal upper and lower extremities trace edema no cords or Homans' sign no evidence of DVT skin warm and  dry without rashes or cellulitic changes neurologic awake alert and follows commands monitorings normal without focal weakness  Procedures           ED Course      Results for orders placed or performed during the hospital encounter of 11/08/23   Comprehensive Metabolic Panel    Specimen: Blood   Result Value Ref Range    Glucose 105 (H) 65 - 99 mg/dL    BUN 17 6 - 20 mg/dL    Creatinine 0.72 0.57 - 1.00 mg/dL    Sodium 139 136 - 145 mmol/L    Potassium 3.6 3.5 - 5.2 mmol/L    Chloride 105 98 - 107 mmol/L    CO2 25.0 22.0 - 29.0 mmol/L    Calcium 9.1 8.6 - 10.5 mg/dL    Total Protein 6.9 6.0 - 8.5 g/dL    Albumin 3.7 3.5 - 5.2 g/dL    ALT (SGPT) 18 1 - 33 U/L    AST (SGOT) 29 1 - 32 U/L    Alkaline Phosphatase 159 (H) 39 - 117 U/L    Total Bilirubin 0.9 0.0 - 1.2 mg/dL    Globulin 3.2 gm/dL    A/G Ratio 1.2 g/dL    BUN/Creatinine Ratio 23.6 7.0 - 25.0    Anion Gap 9.0 5.0 - 15.0 mmol/L    eGFR 102.0 >60.0 mL/min/1.73   Lipase    Specimen: Blood   Result Value Ref Range    Lipase 34 13 - 60 U/L   Protime-INR    Specimen: Blood   Result Value Ref Range    Protime 11.4 9.6 - 11.7 Seconds    INR 1.05 0.93 - 1.10   aPTT    Specimen: Blood   Result Value Ref Range    PTT 27.1 (L) 61.0 - 76.5 seconds   Urinalysis With Microscopic If Indicated (No Culture) - Urine, Clean Catch    Specimen: Urine, Clean Catch   Result Value Ref Range    Color, UA Yellow Yellow, Straw    Appearance, UA Clear Clear    pH, UA 5.5 5.0 - 8.0    Specific Gravity, UA 1.041 (H) 1.005 - 1.030    Glucose, UA Negative Negative    Ketones, UA Trace (A) Negative    Bilirubin, UA Negative Negative    Blood, UA Negative Negative    Protein, UA Negative Negative    Leuk Esterase, UA Trace (A) Negative    Nitrite, UA Negative Negative    Urobilinogen, UA 1.0 E.U./dL 0.2 - 1.0 E.U./dL   CBC Auto Differential    Specimen: Blood   Result Value Ref Range    WBC 11.00 (H) 3.40 - 10.80 10*3/mm3    RBC 3.95 3.77 - 5.28 10*6/mm3    Hemoglobin 10.0 (L) 12.0 - 15.9  g/dL    Hematocrit 32.3 (L) 34.0 - 46.6 %    MCV 81.7 79.0 - 97.0 fL    MCH 25.3 (L) 26.6 - 33.0 pg    MCHC 31.0 (L) 31.5 - 35.7 g/dL    RDW 21.0 (H) 12.3 - 15.4 %    RDW-SD 59.5 (H) 37.0 - 54.0 fl    MPV 8.1 6.0 - 12.0 fL    Platelets 613 (H) 140 - 450 10*3/mm3    Neutrophil % 39.8 (L) 42.7 - 76.0 %    Lymphocyte % 50.0 (H) 19.6 - 45.3 %    Monocyte % 5.5 5.0 - 12.0 %    Eosinophil % 2.5 0.3 - 6.2 %    Basophil % 2.2 (H) 0.0 - 1.5 %    Neutrophils, Absolute 4.40 1.70 - 7.00 10*3/mm3    Lymphocytes, Absolute 5.50 (H) 0.70 - 3.10 10*3/mm3    Monocytes, Absolute 0.60 0.10 - 0.90 10*3/mm3    Eosinophils, Absolute 0.30 0.00 - 0.40 10*3/mm3    Basophils, Absolute 0.20 0.00 - 0.20 10*3/mm3    nRBC 0.1 0.0 - 0.2 /100 WBC   Urinalysis, Microscopic Only - Urine, Clean Catch    Specimen: Urine, Clean Catch   Result Value Ref Range    RBC, UA 0-2 None Seen, 0-2 /HPF    WBC, UA 6-10 (A) None Seen, 0-2 /HPF    Bacteria, UA 1+ (A) None Seen /HPF    Squamous Epithelial Cells, UA 7-12 (A) None Seen, 0-2 /HPF    Hyaline Casts, UA 0-2 None Seen /LPF    Methodology Automated Microscopy      CT Abdomen Pelvis Without Contrast    Result Date: 11/9/2023  Impression: 1.No significant change. Small to moderate amount of ascites present throughout the abdomen and pelvis, similar as compared to the previous study. No acute intra-abdominal or intrapelvic process identified. 2.Ancillary findings as described above. Electronically Signed: Muriel Gil MD  11/9/2023 1:19 AM EST  Workstation ID: [id number]   Medications   sodium chloride 0.9 % flush 10 mL (has no administration in time range)   HYDROmorphone (DILAUDID) injection 0.5 mg (has no administration in time range)   fentaNYL citrate (PF) (SUBLIMAZE) injection 25 mcg (25 mcg Intravenous Given 11/9/23 0047)   ondansetron (ZOFRAN) injection 4 mg (4 mg Intravenous Given 11/9/23 0047)                                            Medical Decision Making  Medical decision making.  IV established  monitor placed my review of sinus rhythm given fentanyl 25 mics IV.  Labs obtained independent reviewed by me comprehensive metabolic profile unremarkable other than alk phos of 159 coags unremarkable.  Lipase unremarkable urine negative CBC unremarkable and white count 11,000 hemoglobin of 10 platelets of 613,000.  CT abdomen pelvis obtained read by radiology report reviewed by me no significant change small to moderate amount of ascites present throughout the abdomen similar to previous study no acute findings were noted per radiology report.  Patient required additional pain medication was given Dilaudid 0.5 mg IV.  Was soft with some mild tenderness but no rebound no guarding good bowel sounds no peritoneal findings I do not see evidence of an aneurysm or dissection I do not see evidence of ischemic bowel see evidence of bowel obstruction diverticulitis pancreatitis consider spontaneous bacterial peritonitis although the ascites really has not changed significantly from the previous no other evidence of an acute surgical etiology based on the history and physical clinical findings although this is not a complete list of possibilities.  We talked about the findings to be placed in observation to have GI consultation from her gastroenterologist.  Record reviewed she was just discharged 2 weeks ago after having an ulcer found on endoscopy.  Placed on Carafate and some acid proton pump inhibitors.  The patient admitted to observation for further care stable unremarkable course    Problems Addressed:  Generalized abdominal pain: complicated acute illness or injury  Other ascites: complicated acute illness or injury    Amount and/or Complexity of Data Reviewed  Labs: ordered. Decision-making details documented in ED Course.  Radiology: ordered.  ECG/medicine tests: ordered.    Risk  Prescription drug management.  Decision regarding hospitalization.        Final diagnoses:   Generalized abdominal pain   Other ascites        ED Disposition  ED Disposition       ED Disposition   Decision to Admit    Condition   --    Comment   --               No follow-up provider specified.       Medication List      No changes were made to your prescriptions during this visit.            Leonardo Werner MD  11/09/23 0605       Leonardo Werner MD  11/16/23 8233

## 2023-11-09 NOTE — H&P
[facility] H&P    Patient Name: Lucinda Cope  : 1975  MRN: [id number]  Primary Care Physician: Austin Mcfadden MD  Date of admission: 2023     Patient Care Team:  Austin Mcfadden MD as PCP - General (Family Medicine)          Subjective   History Present Illness     Chief Complaint:   Chief Complaint   Patient presents with    Abdominal Pain         History of Present Illness  Obtained from admitting physician HPI on 2023:  History of present illness this is a 48-year-old female who has had a history in the past of gastric bypass surgery several years ago in [place] that had some complications that led to a splenectomy she reports also has a history of nonalcoholic cirrhosis complains of several week history of worsening abdominal pain.  She states she was in the hospital couple weeks ago she had endoscopy which showed an ulcer and started on some medicine for that which she states she continues to have pain and now swelling and she is worried that she has ascites and needs to be drained.  No fever chills been noted no vomiting no vomiting blood no black or bloody stool no dysuria frequency no injury no chest pain neck arm or jaw pain is moderate degree constant nothing really seem to trigger nothing makes it better or worse.     23:  Patient confirms the HPI noted above including recent increase in her baseline abdominal pain as well as some transient distention with pain worsening over the past 3 days located in the mid abdomen radiating outward and occasionally up to the left side of her chest.  She has recently had evaluation with GI and underwent EGD on 10/25/2023.  She notes pain had been severe at times and that they found signs of an ulcer on previous scope.  She had felt as if her distention had been increasing though this does seem to have improved today.        Review of Systems   Constitutional: Negative.   HENT: Negative.     Eyes: Negative.    Cardiovascular:  Negative.    Respiratory: Negative.     Skin: Negative.    Musculoskeletal: Negative.    Gastrointestinal:  Positive for bloating and abdominal pain.   Genitourinary: Negative.    Neurological: Negative.    Psychiatric/Behavioral: Negative.             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    Empyema of pleura     Encephalopathy, portal systemic 5/9/2017    Esophageal varices 7/21/2017    Gastric leak 5/29/2016    GERD (gastroesophageal reflux disease)     H/O thyroidectomy 6/17/2016    Hemorrhoids     Hernia, ventral     History of morbid obesity 3/3/2016    History of transfusion     Hypercoagulable state 8/24/2019    Irregular menstrual cycle     Liver cirrhosis secondary to CARTER 04/2017    Lupus anticoagulant positive 8/24/2019    Migraine     Parathyroid abnormality     Portal vein thrombosis 3/3/2016    Post-surgical hypothyroidism     Pulmonary embolism 10/27/2020    Radiation     Restless legs syndrome (RLS)     S/P laparoscopic sleeve gastrectomy 6/17/2016    S/P splenectomy 6/17/2016    Thyroid cancer     Thyroid cancer 6/26/2015    Urge and stress incontinence     Vaginal prolapse 7/14/2021    Varicose veins     Ventral hernia 4/18/2020    Vitamin D deficiency 1/21/2021       Surgical History:      Past Surgical History:   Procedure Laterality Date    ABDOMINAL SURGERY      BEDSIDE PARACENTESIS  3/22/2020         BEDSIDE PARACENTESIS  4/19/2020         BEDSIDE PARACENTESIS  3/1/2021         BEDSIDE PARACENTESIS  3/30/2021         CHOLECYSTECTOMY      Laparoscopic    ENDOSCOPY N/A 3/17/2016    Procedure: ESOPHAGOGASTRODUODENOSCOPY WITH BALOON DILATATION 18-20MM WITH GASTRIC SLEEVE SEALANT;  Surgeon: Leonardo Ortiz Jr., MD;  Location: [facility] ENDOSCOPY;  Service:      ENDOSCOPY N/A 3/14/2016    Procedure: esophagogastroduodenoscopy with fluoroscopy;  Surgeon: Greg Avila III, MD;  Location: [facility] ENDOSCOPY;  Service:     ENDOSCOPY N/A 4/15/2016    Procedure: EGD WITH DILITATION AND STENT PLACEMENT dilation of pylorus ;  Surgeon: Leonardo Ortiz Jr., MD;  Location: [facility] ENDOSCOPY;  Service:     ENDOSCOPY N/A 5/13/2016    Procedure: ESOPHAGOGASTRODUODENOSCOPY  W/ STENT;  Surgeon: Leonardo Ortiz Jr., MD;  Location: [facility] ENDOSCOPY;  Service:     ENDOSCOPY N/A 1/13/2021    Procedure: ESOPHAGOGASTRODUODENOSCOPY WITH POLYPECTOMY X1 AND  BIOPSY X1.;  Surgeon: Bryson Cotton MD;  Location: [facility] ENDOSCOPY;  Service: Gastroenterology;  Laterality: N/A;  anastomotic ulcer, antral polyp, GASTRITIS    ENDOSCOPY N/A 10/24/2023    Procedure: ESOPHAGOGASTRODUODENOSCOPY;  Surgeon: Hossein Delgadillo MD;  Location: [facility] ENDOSCOPY;  Service: Gastroenterology;  Laterality: N/A;  Post: anastomotic ulcer    GASTRECTOMY      Gastric Surgery for Morbid Obesity Laparoscopic Longitudinal Gastrectomy    GASTRIC SLEEVE LAPAROSCOPIC      HEMORRHOIDECTOMY      HERNIA REPAIR      LAPAROSCOPY REPAIR HIATAL HERNIA      LARYNGOSCOPY      Direct Laryngoscopy with Injection into Vocal Cords    OTHER SURGICAL HISTORY Bilateral     Ear Pressure Equalization Tube, Insertion, Bilaterally    PERIPHERALLY INSERTED CENTRAL CATHETER INSERTION      SPLENECTOMY      THYROID SURGERY      TUBAL ABDOMINAL LIGATION             Family History: family history includes Breast cancer in her maternal grandmother; Cancer in an other family member; Diabetes in her father; Hypertension in her father; Stroke in her father. Otherwise pertinent FHx was reviewed and unremarkable.     Social History:  reports that she has never smoked. She has never been exposed to tobacco smoke. She has never used smokeless tobacco. She reports that she does not drink alcohol and does not use drugs.      Medications:  Prior to  Admission medications    Medication Sig Start Date End Date Taking? Authorizing Provider   apixaban (ELIQUIS) 5 MG tablet tablet Take 1 tablet by mouth 2 (Two) Times a Day.   Yes Justyn Erwin MD   bumetanide (BUMEX) 2 MG tablet Take 2 tablets by mouth Daily.   Yes Justyn Erwin MD   estradiol (ESTRACE) 0.1 MG/GM vaginal cream Insert 1 g into the vagina 3 (Three) Times a Week. Monday Wednesday Friday   Yes Justyn Erwin MD   levothyroxine (SYNTHROID, LEVOTHROID) 125 MCG tablet Take 1 tablet by mouth Every Morning.   Yes Justyn Erwin MD   liothyronine (CYTOMEL) 5 MCG tablet Take 1 tablet by mouth Daily.   Yes Jsutyn Erwin MD   miSOPROStol (CYTOTEC) 200 MCG tablet Take 1 tablet by mouth 2 (Two) Times a Day. 10/25/23  Yes Marianna Fernandez PA-C   ondansetron ODT (ZOFRAN-ODT) 4 MG disintegrating tablet Place 1 tablet on the tongue Every 8 (Eight) Hours As Needed for Nausea or Vomiting. 10/25/23  Yes Marianna Fernandez PA-C   ondansetron ODT (ZOFRAN-ODT) 8 MG disintegrating tablet Place 1 tablet on the tongue Every 8 (Eight) Hours As Needed for Nausea or Vomiting.   Yes Justyn Erwin MD   pantoprazole (PROTONIX) 40 MG EC tablet Take 1 tablet by mouth 2 (Two) Times a Day Before Meals. 10/25/23  Yes Marianna Fernandez PA-C   potassium chloride (K-DUR,KLOR-CON) 20 MEQ CR tablet Take 2 tablets by mouth 2 (Two) Times a Day.   Yes Justyn Erwin MD   spironolactone (ALDACTONE) 100 MG tablet Take 4 tablets by mouth Daily.   Yes Justyn Erwin MD   sucralfate (CARAFATE) 1 g tablet Take 1 tablet by mouth 2 (Two) Times a Day Before Meals. 10/25/23  Yes Marianna Fernandez PA-C       Allergies:    Allergies   Allergen Reactions    Contrast Dye (Echo Or Unknown Ct/Mr) Hives    Iodinated Contrast Media Hives    Iodine Hives    Hydrocodone-Acetaminophen Hives and Itching    Morphine Itching, Hives and Rash    Red Dye Itching, Other (See Comments) and Unknown - High Severity     Compazine [Prochlorperazine Edisylate] Hives    Hydrocodone Rash     Lortab elixir per pt    Ketorolac Tromethamine Unknown - Low Severity    Nsaids Other (See Comments)     States I am not suppose to take them  Cannot have due to gastric sleeve       Objective   Objective     Vital Signs  Temp:  [98 °F (36.7 °C)-98.9 °F (37.2 °C)] 98 °F (36.7 °C)  Heart Rate:  [75-99] 75  Resp:  [16-17] 16  BP: (132-157)/(79-94) 132/89  SpO2:  [96 %-100 %] 96 %  on   ;   Device (Oxygen Therapy): room air  Body mass index is 25.35 kg/m².    Physical Exam  Vitals reviewed.   Constitutional:       General: She is not in acute distress.     Appearance: Normal appearance. She is normal weight. She is not ill-appearing, toxic-appearing or diaphoretic.   HENT:      Head: Normocephalic.      Right Ear: External ear normal.      Left Ear: External ear normal.      Nose: Nose normal.      Mouth/Throat:      Mouth: Mucous membranes are moist.   Eyes:      Extraocular Movements: Extraocular movements intact.   Cardiovascular:      Rate and Rhythm: Normal rate and regular rhythm.      Pulses: Normal pulses.   Pulmonary:      Effort: Pulmonary effort is normal.      Breath sounds: Normal breath sounds.   Abdominal:      General: Bowel sounds are normal. There is no distension.      Palpations: Abdomen is soft.      Tenderness: There is abdominal tenderness.      Hernia: A hernia is present.   Musculoskeletal:      Cervical back: Normal range of motion.      Right lower leg: No edema.      Left lower leg: No edema.   Skin:     General: Skin is warm and dry.      Capillary Refill: Capillary refill takes less than 2 seconds.   Neurological:      General: No focal deficit present.      Mental Status: She is alert.   Psychiatric:         Mood and Affect: Mood normal.         Behavior: Behavior normal.         Thought Content: Thought content normal.         Judgment: Judgment normal.           Results Review:  I have personally reviewed most recent  cardiac tracings, lab results, and radiology images and interpretations and agree with findings, most notably: CMP, CBC, CT of abdomen and pelvis and ultrasound of liver.    Results from last 7 days   Lab Units 11/09/23  1003 11/09/23  0049   WBC 10*3/mm3 11.80* 11.00*   HEMOGLOBIN g/dL 11.3* 10.0*   HEMATOCRIT % 35.9 32.3*   PLATELETS 10*3/mm3 634* 613*   INR   --  1.05     Results from last 7 days   Lab Units 11/09/23  1003 11/09/23  0049   SODIUM mmol/L 138 139   POTASSIUM mmol/L 3.5 3.6   CHLORIDE mmol/L 102 105   CO2 mmol/L 25.0 25.0   BUN mg/dL 11 17   CREATININE mg/dL 0.63 0.72   GLUCOSE mg/dL 97 105*   CALCIUM mg/dL 9.1 9.1   ALK PHOS U/L  --  159*   ALT (SGPT) U/L  --  18   AST (SGOT) U/L  --  29   HSTROP T ng/L <6 <6     Estimated Creatinine Clearance: 96.8 mL/min (by C-G formula based on SCr of 0.63 mg/dL).  Brief Urine Lab Results  (Last result in the past 365 days)        Color   Clarity   Blood   Leuk Est   Nitrite   Protein   CREAT   Urine HCG        11/09/23 0035 Yellow   Clear   Negative   Trace   Negative   Negative                   Microbiology Results (last 10 days)       ** No results found for the last 240 hours. **            ECG/EMG Results (most recent)       None            Results for orders placed during the hospital encounter of 07/13/21    Duplex Venous Lower Extremity - Bilateral CAR    Interpretation Summary  · Sub-acute right lower extremity superficial thrombophlebitis noted in the greater saphenous (above knee).  · Acute right lower extremity superficial thrombophlebitis noted in the varicosity (above knee).  · All other veins appeared normal bilaterally.          US Liver    Result Date: 11/9/2023  Impression: Findings compatible with hepatic steatosis. Electronically Signed: Edna Finney MD  11/9/2023 2:03 PM CST  Workstation ID: [id number]    CT Abdomen Pelvis Without Contrast    Result Date: 11/9/2023  Impression: 1.No significant change. Small to moderate amount of ascites  present throughout the abdomen and pelvis, similar as compared to the previous study. No acute intra-abdominal or intrapelvic process identified. 2.Ancillary findings as described above. Electronically Signed: Muriel Gil MD  11/9/2023 1:19 AM EST  Workstation ID: [id number]       Estimated Creatinine Clearance: 96.8 mL/min (by C-G formula based on SCr of 0.63 mg/dL).    Assessment & Plan   Assessment/Plan       Active Hospital Problems    Diagnosis  POA    **Abdominal pain [R10.9]  Yes      Resolved Hospital Problems   No resolved problems to display.       Abdominal pain with a history of cirrhosis and Ramon  Lab Results   Component Value Date    WBC 11.80 (H) 11/09/2023    AST 29 11/09/2023    ALT 18 11/09/2023    ALKPHOS 159 (H) 11/09/2023    BILITOT 0.9 11/09/2023    LIPASE 34 11/09/2023   -Recent alpha-fetoprotein: 7.04  -CT of abdomen and pelvis: Showed no significant change with small to moderate amount of ascites throughout the abdomen and pelvis similar to previous study  -In the ED patient was given Dilaudid, fentanyl and Zofran  -GI was consulted who ordered ultrasound of liver which showed hepatic steatosis and recommend small bowel follow-through as well as plan for consideration of CT of abdomen with contrast following possible premedication.  -2 g sodium diet  -Continue Bumex and spironolactone  -Continue Eliquis with history of portal vein thrombosis    GERD  -PPI and Cytotec  -Hypothyroidism  -Cytomel            VTE Prophylaxis -   Mechanical Order History:        Ordered        11/09/23 0456  Place Sequential Compression Device  Once            11/09/23 0456  Maintain Sequential Compression Device  Continuous                          Pharmalogical Order History:        Ordered     Dose Route Frequency Stop    11/09/23 0631  apixaban (ELIQUIS) tablet 5 mg         5 mg PO Every 12 Hours Scheduled --                    CODE STATUS:    Code Status and Medical Interventions:   Ordered at: 11/09/23 1120      Level Of Support Discussed With:    Patient     Code Status (Patient has no pulse and is not breathing):    CPR (Attempt to Resuscitate)     Medical Interventions (Patient has pulse or is breathing):    Full Support       This patient has been examined wearing personal protective equipment.     I discussed the patient's findings and my recommendations with patient and nursing staff.      Signature:Electronically signed by Mateusz Kingston PA-C, 11/09/23, 4:01 PM EST.

## 2023-11-09 NOTE — CONSULTS
GI CONSULT  NOTE:    Referring Provider:  Dr. Werner    Chief complaint: Abdominal pain    Subjective .     History of present illness: Patient is a 48-year-old female with history of gastric sleeve originally followed by Alfonzo-en-Y gastric bypass and multiple surgeries in 2015, chronic portal vein thrombus and DVT on Eliquis, anastomotic ulcer, cholecystectomy, splenectomy, and Ramon cirrhosis that has been complicated by hepatic encephalopathy, ascites, and nonbleeding esophageal varices.  She had been doing well and had not been seen in our office in a while.  She was seen here last month by Dr. Delgadillo and had EGD with anastomotic ulcer.  She has been taking PPI twice daily, Carafate, and Misoprostol at home.  She reports that her pain continues.  It began on 10/12/2023.  She cannot identify any specific triggers.  She has noticed more swelling but after receiving increased dose of diuretics this morning the swelling has improved.  She has been urinating a lot and her abdomen is less swollen.  Her pain is more so in the upper abdomen and radiates to her back and chest.  This pain is almost constant along with nausea and fullness.  She reports some vomiting.  No hematemesis.  Denies fevers or chills.  She reports loose stools since being on the Misoprostol.  She denies any bright red blood per rectum or melena.  She normally takes Bumex 2 mg twice daily and Aldactone 300 mg daily.  Has not required a paracentesis in a couple years.  In the past she has been on narcotics with pain management for chronic abdominal pain.      Endo History:  10/24/2023 EGD by Dr. Delgadillo -1 cm anastomotic ulcer with no stigmata of bleeding.    1/2021 EGD by Dr. Cotton -1 cm clean-based anastomotic ulcer, 2 cords of grade 1 esophageal varices, prior gastric bypass, antral polyp, and gastritis.     No previous colonoscopy.    Past Medical History:  Past Medical History:   Diagnosis Date    Abdominal pain     Abscess, subdiaphragmatic  6/17/2016    Anemia     Deep venous thrombosis of right profunda femoris vein 8/15/2019    Depression     Elevated factor VIII level 8/24/2019    Empyema of pleura     Encephalopathy, portal systemic 5/9/2017    Esophageal varices 7/21/2017    Gastric leak 5/29/2016    GERD (gastroesophageal reflux disease)     H/O thyroidectomy 6/17/2016    Hemorrhoids     Hernia, ventral     History of morbid obesity 3/3/2016    History of transfusion     Hypercoagulable state 8/24/2019    Irregular menstrual cycle     Liver cirrhosis secondary to CARTER 04/2017    Lupus anticoagulant positive 8/24/2019    Migraine     Parathyroid abnormality     Portal vein thrombosis 3/3/2016    Post-surgical hypothyroidism     Pulmonary embolism 10/27/2020    Radiation     Restless legs syndrome (RLS)     S/P laparoscopic sleeve gastrectomy 6/17/2016    S/P splenectomy 6/17/2016    Thyroid cancer     Thyroid cancer 6/26/2015    Urge and stress incontinence     Vaginal prolapse 7/14/2021    Varicose veins     Ventral hernia 4/18/2020    Vitamin D deficiency 1/21/2021       Past Surgical History:  Past Surgical History:   Procedure Laterality Date    ABDOMINAL SURGERY      BEDSIDE PARACENTESIS  3/22/2020         BEDSIDE PARACENTESIS  4/19/2020         BEDSIDE PARACENTESIS  3/1/2021         BEDSIDE PARACENTESIS  3/30/2021         CHOLECYSTECTOMY      Laparoscopic    ENDOSCOPY N/A 3/17/2016    Procedure: ESOPHAGOGASTRODUODENOSCOPY WITH BALOON DILATATION 18-20MM WITH GASTRIC SLEEVE SEALANT;  Surgeon: Leonardo Ortiz Jr., MD;  Location: [facility] ENDOSCOPY;  Service:     ENDOSCOPY N/A 3/14/2016    Procedure: esophagogastroduodenoscopy with fluoroscopy;  Surgeon: Greg Avlia III, MD;  Location: [facility] ENDOSCOPY;  Service:     ENDOSCOPY N/A 4/15/2016    Procedure: EGD WITH DILITATION AND STENT PLACEMENT dilation of pylorus ;  Surgeon: Leonardo Ortiz Jr., MD;  Location: [facility] ENDOSCOPY;  Service:     ENDOSCOPY N/A 5/13/2016    Procedure:  ESOPHAGOGASTRODUODENOSCOPY  W/ STENT;  Surgeon: Leonardo Ortiz Jr., MD;  Location: [facility] ENDOSCOPY;  Service:     ENDOSCOPY N/A 1/13/2021    Procedure: ESOPHAGOGASTRODUODENOSCOPY WITH POLYPECTOMY X1 AND  BIOPSY X1.;  Surgeon: Bryson Cotton MD;  Location: [facility] ENDOSCOPY;  Service: Gastroenterology;  Laterality: N/A;  anastomotic ulcer, antral polyp, GASTRITIS    ENDOSCOPY N/A 10/24/2023    Procedure: ESOPHAGOGASTRODUODENOSCOPY;  Surgeon: Hossein Delgadillo MD;  Location: [facility] ENDOSCOPY;  Service: Gastroenterology;  Laterality: N/A;  Post: anastomotic ulcer    GASTRECTOMY      Gastric Surgery for Morbid Obesity Laparoscopic Longitudinal Gastrectomy    GASTRIC SLEEVE LAPAROSCOPIC      HEMORRHOIDECTOMY      HERNIA REPAIR      LAPAROSCOPY REPAIR HIATAL HERNIA      LARYNGOSCOPY      Direct Laryngoscopy with Injection into Vocal Cords    OTHER SURGICAL HISTORY Bilateral     Ear Pressure Equalization Tube, Insertion, Bilaterally    PERIPHERALLY INSERTED CENTRAL CATHETER INSERTION      SPLENECTOMY      THYROID SURGERY      TUBAL ABDOMINAL LIGATION         Social History:  Social History     Tobacco Use    Smoking status: Never     Passive exposure: Never    Smokeless tobacco: Never   Vaping Use    Vaping Use: Never used   Substance Use Topics    Alcohol use: No    Drug use: No       Family History:  Family History   Problem Relation Age of Onset    Stroke Father     Diabetes Father         Diabetes Mellitus    Hypertension Father     Breast cancer Maternal Grandmother     Cancer Other        Medications:  Medications Prior to Admission   Medication Sig Dispense Refill Last Dose    apixaban (ELIQUIS) 5 MG tablet tablet Take 1 tablet by mouth 2 (Two) Times a Day.   11/8/2023    bumetanide (BUMEX) 2 MG tablet Take 2 tablets by mouth Daily.   Past Month    estradiol (ESTRACE) 0.1 MG/GM vaginal cream Insert 1 g into the vagina 3 (Three) Times a Week. Monday Wednesday Friday   Past Week    levothyroxine (SYNTHROID,  LEVOTHROID) 125 MCG tablet Take 1 tablet by mouth Every Morning.   11/8/2023    liothyronine (CYTOMEL) 5 MCG tablet Take 1 tablet by mouth Daily.   11/8/2023    miSOPROStol (CYTOTEC) 200 MCG tablet Take 1 tablet by mouth 2 (Two) Times a Day. 60 tablet 3 11/8/2023    ondansetron ODT (ZOFRAN-ODT) 4 MG disintegrating tablet Place 1 tablet on the tongue Every 8 (Eight) Hours As Needed for Nausea or Vomiting. 15 tablet 0 11/8/2023    ondansetron ODT (ZOFRAN-ODT) 8 MG disintegrating tablet Place 1 tablet on the tongue Every 8 (Eight) Hours As Needed for Nausea or Vomiting.   11/8/2023    pantoprazole (PROTONIX) 40 MG EC tablet Take 1 tablet by mouth 2 (Two) Times a Day Before Meals. 60 tablet 3 11/8/2023    potassium chloride (K-DUR,KLOR-CON) 20 MEQ CR tablet Take 2 tablets by mouth 2 (Two) Times a Day.   Past Month    spironolactone (ALDACTONE) 100 MG tablet Take 4 tablets by mouth Daily.   Past Month    sucralfate (CARAFATE) 1 g tablet Take 1 tablet by mouth 2 (Two) Times a Day Before Meals. 60 tablet 3 11/8/2023       Scheduled Meds:apixaban, 5 mg, Oral, Q12H  bumetanide, 4 mg, Oral, Daily  liothyronine, 5 mcg, Oral, Q AM  metoclopramide, 5 mg, Intravenous, TID AC  miSOPROStol, 200 mcg, Oral, BID  pantoprazole, 40 mg, Oral, BID AC  potassium chloride, 40 mEq, Oral, BID  sodium chloride, 10 mL, Intravenous, Q12H  spironolactone, 400 mg, Oral, Daily  sucralfate, 1 g, Oral, BID AC      Continuous Infusions:   PRN Meds:.  HYDROmorphone    melatonin    ondansetron    [COMPLETED] Insert Peripheral IV **AND** sodium chloride    sodium chloride    sodium chloride    ALLERGIES:  Contrast dye (echo or unknown ct/mr), Iodinated contrast media, Iodine, Hydrocodone-acetaminophen, Morphine, Red dye, Compazine [prochlorperazine edisylate], Hydrocodone, Ketorolac tromethamine, and Nsaids    ROS:  Review of Systems   Constitutional:  Negative for chills and fever.   Respiratory:  Negative for cough and shortness of breath.   "  Cardiovascular:  Negative for chest pain.   Gastrointestinal:  Positive for abdominal distention, abdominal pain, nausea and vomiting. Negative for blood in stool.   Musculoskeletal:  Positive for arthralgias and back pain.   Neurological:  Positive for weakness. Negative for dizziness.   Psychiatric/Behavioral:  Negative for agitation and confusion.        Objective     Vital Signs:   Visit Vitals  /94 (BP Location: Left arm, Patient Position: Lying)   Pulse 75   Temp 98.9 °F (37.2 °C) (Oral)   Resp 16   Ht 160 cm (63\")   Wt 64.9 kg (143 lb 1.3 oz)   LMP  (LMP Unknown)   SpO2 100%   BMI 25.35 kg/m²       Physical Exam:      General Appearance:    Awake and alert, in no acute distress   Head:    Normocephalic, without obvious abnormality, atraumatic   Eyes:            Conjunctivae normal, anicteric sclera   Ears:    Ears appear intact with no abnormalities noted   Throat:   No oral lesions, no thrush, oral mucosa moist       Lungs:     Respirations regular, even and unlabored       Chest Wall:    No abnormalities observed   Abdomen:     Soft, generalized tenderness, no rebound or guarding, minimal distention, umbilical hernia that is reducible   Rectal:     Deferred   Extremities:   Moves all extremities well, no edema, no cyanosis, no  redness   Pulses:   Pulses palpable and equal bilaterally   Skin:   No bleeding, bruising or rash, no jaundice   Lymph nodes:   No palpable adenopathy   Neurologic:   Sensation intact       Results Review:   I reviewed the patient's labs and imaging.  CBC  Results from last 7 days   Lab Units 11/09/23  1003 11/09/23  0049   RBC 10*6/mm3 4.35 3.95   WBC 10*3/mm3 11.80* 11.00*   HEMOGLOBIN g/dL 11.3* 10.0*   PLATELETS 10*3/mm3 634* 613*       CMP  Results from last 7 days   Lab Units 11/09/23  1003 11/09/23  0049   SODIUM mmol/L 138 139   POTASSIUM mmol/L 3.5 3.6   CHLORIDE mmol/L 102 105   CO2 mmol/L 25.0 25.0   BUN mg/dL 11 17   CREATININE mg/dL 0.63 0.72   GLUCOSE mg/dL 97 " 105*   ALBUMIN g/dL  --  3.7   BILIRUBIN mg/dL  --  0.9   ALK PHOS U/L  --  159*   AST (SGOT) U/L  --  29   ALT (SGPT) U/L  --  18       Amylase and Lipase  Results from last 7 days   Lab Units 11/09/23  0049   LIPASE U/L 34       CRP         Imaging Results (Last 24 Hours)       Procedure Component Value Units Date/Time    CT Abdomen Pelvis Without Contrast [[id number]] Collected: 11/09/23 0116     Updated: 11/09/23 0121    Narrative:      CT ABDOMEN PELVIS WO CONTRAST    Date of Exam: 11/9/2023 1:00 AM EST    Indication: Abdominal pain.    Comparison: 10/17/2023.    Technique: Axial CT images were obtained of the abdomen and pelvis without the administration of contrast. Sagittal and coronal reconstructions were performed.  Automated exposure control and iterative reconstruction methods were used.      Findings:  Lung Bases:     The visualized lung bases and lower mediastinal structures are unremarkable.    Limited evaluation of the solid organs due to lack of intravenous contrast.    Liver:  Liver is normal in size and CT density. No focal lesions.    Biliary/Gallbladder:    The gallbladder has been resected. The biliary tree is nondilated.    Spleen:  The spleen appears to have been resected..    Pancreas:    Pancreas is normal. There is no evidence of pancreatic mass or peripancreatic fluid.    Kidneys:    Kidneys are normal in size. There are no stones or hydronephrosis.    Adrenals:    Adrenal glands are unremarkable.    Retroperitoneal/Lymph Nodes/Vasculature:    No retroperitoneal adenopathy is identified.    Gastrointestinal/Mesentery:    The bowel loops are non-dilated without wall thickening or mass. The appendix is not well visualized.. No evidence of obstruction. No free air. No mesenteric fluid collections identified. Small to moderate amount of free fluid present throughout the   abdomen and pelvis, similar as compared to the previous study. Ventral wall hernia containing bowel and mesenteric fat  present, unchanged compared to the previous study. No evidence of obstruction. No significant inflammatory changes. No significant   stool burden identified. Postsurgical changes are seen within the stomach related to previous gastric bypass.    Bladder:    The bladder is normal.    Genital:     Unremarkable. Pessary device in place.          Bony Structures:     Visualized bony structures are consistent with the patient's age.        Impression:      Impression:  1.No significant change. Small to moderate amount of ascites present throughout the abdomen and pelvis, similar as compared to the previous study. No acute intra-abdominal or intrapelvic process identified.  2.Ancillary findings as described above.        Electronically Signed: Muriel Gil MD    11/9/2023 1:19 AM EST    Workstation ID: [id number]              ASSESSMENT AND PLAN:  48-year-old female with Carter cirrhosis and complicated past surgical history with Alfonzo-en-Y anatomy presented to the hospital on 11/8/2023 with continued abdominal pain.  Was recently here last month and had EGD.    -Upper abdominal pain  -Nausea/vomiting  -Anastomotic ulcer  -Ascites  -CARTER cirrhosis that has been complicated by ascites, hepatic encephalopathy, and nonbleeding esophageal varices  -Mild normocytic anemia  -Leukocytosis  -Elevated alk phos  -History of gastric sleeve followed by Alfonzo-en-Y gastric bypass with multiple surgeries   -Chronic portal vein thrombus and DVT on Eliquis  -History of cholecystectomy and splenectomy  -Carter cirrhosis that has been complicated by hepatic encephalopathy, ascites, and nonbleeding esophageal varices    Principal Problem:    Abdominal pain     Plan:  48-year-old female presented to the hospital yesterday with continued abdominal pain and nausea/vomiting.  She was recently here a couple weeks ago and underwent EGD with anastomotic ulcer.  She has been on PPI twice daily, Carafate, and Misoprostol but she reports no change to her pain  that started on 10/12/2023.  She has noticed increased abdominal swelling and noncontrasted CT suggests small to moderate ascites.  However, she was given higher dose diuretics this morning and has been urinating more frequently.  Her abdominal swelling has improved.  She remains on diuretics and creatinine is normal at 0.63.  WBC 11.8.  Hemoglobin 11.3.  Platelets 634.  Alk phos 159 otherwise liver enzymes normal.  Lipase normal.  She complains of significant fullness after she eats.  Consider postsurgical gastroparesis contributing.  We will start low-dose Reglan.  Proceed with right upper quadrant ultrasound with Dopplers.  Recent AFP was normal.  All of her imaging has been noncontrasted due to allergy to contrast, but she tolerates this when premedicated so we did discuss considering contrasted CT after premeds.  2 g sodium diet.  Would also recommend outpatient colonoscopy.  MELD sodium score is 7.    I discussed the patients findings and my recommendations with the patient.  Kalani Lock, APRN  11/09/23  13:44 EST

## 2023-11-10 ENCOUNTER — INPATIENT HOSPITAL (AMBULATORY)
Dept: URBAN - METROPOLITAN AREA HOSPITAL 84 | Facility: HOSPITAL | Age: 48
End: 2023-11-10
Payer: COMMERCIAL

## 2023-11-10 ENCOUNTER — APPOINTMENT (OUTPATIENT)
Dept: MRI IMAGING | Facility: HOSPITAL | Age: 48
DRG: 391 | End: 2023-11-10
Payer: COMMERCIAL

## 2023-11-10 DIAGNOSIS — Z98.0 INTESTINAL BYPASS AND ANASTOMOSIS STATUS: ICD-10-CM

## 2023-11-10 DIAGNOSIS — R74.8 ABNORMAL LEVELS OF OTHER SERUM ENZYMES: ICD-10-CM

## 2023-11-10 DIAGNOSIS — Z98.84 BARIATRIC SURGERY STATUS: ICD-10-CM

## 2023-11-10 DIAGNOSIS — D64.9 ANEMIA, UNSPECIFIED: ICD-10-CM

## 2023-11-10 DIAGNOSIS — D72.829 ELEVATED WHITE BLOOD CELL COUNT, UNSPECIFIED: ICD-10-CM

## 2023-11-10 DIAGNOSIS — K75.81 NONALCOHOLIC STEATOHEPATITIS (NASH): ICD-10-CM

## 2023-11-10 DIAGNOSIS — R10.13 EPIGASTRIC PAIN: ICD-10-CM

## 2023-11-10 LAB
ALBUMIN SERPL-MCNC: 3.6 G/DL (ref 3.5–5.2)
ALBUMIN/GLOB SERPL: 1 G/DL
ALP SERPL-CCNC: 274 U/L (ref 39–117)
ALT SERPL W P-5'-P-CCNC: 139 U/L (ref 1–33)
ANION GAP SERPL CALCULATED.3IONS-SCNC: 9 MMOL/L (ref 5–15)
ANISOCYTOSIS BLD QL: ABNORMAL
AST SERPL-CCNC: 235 U/L (ref 1–32)
BASOPHILS # BLD MANUAL: 0.38 10*3/MM3 (ref 0–0.2)
BASOPHILS NFR BLD MANUAL: 3 % (ref 0–1.5)
BILIRUB SERPL-MCNC: 1.9 MG/DL (ref 0–1.2)
BUN SERPL-MCNC: 14 MG/DL (ref 6–20)
BUN/CREAT SERPL: 16.9 (ref 7–25)
C3 FRG RBC-MCNC: ABNORMAL
CALCIUM SPEC-SCNC: 8.8 MG/DL (ref 8.6–10.5)
CHLORIDE SERPL-SCNC: 97 MMOL/L (ref 98–107)
CO2 SERPL-SCNC: 28 MMOL/L (ref 22–29)
CREAT SERPL-MCNC: 0.83 MG/DL (ref 0.57–1)
DEPRECATED RDW RBC AUTO: 61.3 FL (ref 37–54)
EGFRCR SERPLBLD CKD-EPI 2021: 86 ML/MIN/1.73
EOSINOPHIL # BLD MANUAL: 0.13 10*3/MM3 (ref 0–0.4)
EOSINOPHIL NFR BLD MANUAL: 1 % (ref 0.3–6.2)
ERYTHROCYTE [DISTWIDTH] IN BLOOD BY AUTOMATED COUNT: 21.1 % (ref 12.3–15.4)
GIANT PLATELETS: ABNORMAL
GLOBULIN UR ELPH-MCNC: 3.6 GM/DL
GLUCOSE SERPL-MCNC: 107 MG/DL (ref 65–99)
HCT VFR BLD AUTO: 32.5 % (ref 34–46.6)
HGB BLD-MCNC: 10.6 G/DL (ref 12–15.9)
INR PPP: 1.15 (ref 0.93–1.1)
LYMPHOCYTES # BLD MANUAL: 3.38 10*3/MM3 (ref 0.7–3.1)
LYMPHOCYTES NFR BLD MANUAL: 5 % (ref 5–12)
MCH RBC QN AUTO: 26 PG (ref 26.6–33)
MCHC RBC AUTO-ENTMCNC: 32.6 G/DL (ref 31.5–35.7)
MCV RBC AUTO: 79.8 FL (ref 79–97)
METAMYELOCYTES NFR BLD MANUAL: 1 % (ref 0–0)
MONOCYTES # BLD: 0.63 10*3/MM3 (ref 0.1–0.9)
NEUTROPHILS # BLD AUTO: 7.88 10*3/MM3 (ref 1.7–7)
NEUTROPHILS NFR BLD MANUAL: 62 % (ref 42.7–76)
NEUTS BAND NFR BLD MANUAL: 1 % (ref 0–5)
PLATELET # BLD AUTO: 605 10*3/MM3 (ref 140–450)
PMV BLD AUTO: 8 FL (ref 6–12)
POIKILOCYTOSIS BLD QL SMEAR: ABNORMAL
POTASSIUM SERPL-SCNC: 4.2 MMOL/L (ref 3.5–5.2)
PROT SERPL-MCNC: 7.2 G/DL (ref 6–8.5)
PROTHROMBIN TIME: 12.4 SECONDS (ref 9.6–11.7)
RBC # BLD AUTO: 4.07 10*6/MM3 (ref 3.77–5.28)
SCAN SLIDE: NORMAL
SODIUM SERPL-SCNC: 134 MMOL/L (ref 136–145)
TARGETS BLD QL SMEAR: ABNORMAL
VARIANT LYMPHS NFR BLD MANUAL: 27 % (ref 19.6–45.3)
WBC MORPH BLD: NORMAL
WBC NRBC COR # BLD: 12.5 10*3/MM3 (ref 3.4–10.8)

## 2023-11-10 PROCEDURE — 25010000002 HYDROMORPHONE 1 MG/ML SOLUTION: Performed by: PHYSICIAN ASSISTANT

## 2023-11-10 PROCEDURE — 99232 SBSQ HOSP IP/OBS MODERATE 35: CPT | Performed by: NURSE PRACTITIONER

## 2023-11-10 PROCEDURE — 25010000002 GADOTERIDOL PER 1 ML: Performed by: EMERGENCY MEDICINE

## 2023-11-10 PROCEDURE — 85610 PROTHROMBIN TIME: CPT | Performed by: NURSE PRACTITIONER

## 2023-11-10 PROCEDURE — 85007 BL SMEAR W/DIFF WBC COUNT: CPT | Performed by: NURSE PRACTITIONER

## 2023-11-10 PROCEDURE — 25010000002 METOCLOPRAMIDE PER 10 MG: Performed by: NURSE PRACTITIONER

## 2023-11-10 PROCEDURE — 80053 COMPREHEN METABOLIC PANEL: CPT | Performed by: NURSE PRACTITIONER

## 2023-11-10 PROCEDURE — 25010000002 ONDANSETRON PER 1 MG: Performed by: EMERGENCY MEDICINE

## 2023-11-10 PROCEDURE — A9579 GAD-BASE MR CONTRAST NOS,1ML: HCPCS | Performed by: EMERGENCY MEDICINE

## 2023-11-10 PROCEDURE — 74183 MRI ABD W/O CNTR FLWD CNTR: CPT

## 2023-11-10 PROCEDURE — 25010000002 DIPHENHYDRAMINE PER 50 MG: Performed by: NURSE PRACTITIONER

## 2023-11-10 PROCEDURE — 85025 COMPLETE CBC W/AUTO DIFF WBC: CPT | Performed by: NURSE PRACTITIONER

## 2023-11-10 RX ORDER — DIPHENHYDRAMINE HYDROCHLORIDE 50 MG/ML
12.5 INJECTION INTRAMUSCULAR; INTRAVENOUS ONCE
Status: COMPLETED | OUTPATIENT
Start: 2023-11-10 | End: 2023-11-10

## 2023-11-10 RX ADMIN — POTASSIUM CHLORIDE 40 MEQ: 1500 TABLET, EXTENDED RELEASE ORAL at 02:24

## 2023-11-10 RX ADMIN — HYDROMORPHONE HYDROCHLORIDE 1 MG: 1 INJECTION, SOLUTION INTRAMUSCULAR; INTRAVENOUS; SUBCUTANEOUS at 00:40

## 2023-11-10 RX ADMIN — POTASSIUM CHLORIDE 40 MEQ: 1500 TABLET, EXTENDED RELEASE ORAL at 21:43

## 2023-11-10 RX ADMIN — GADOTERIDOL 20 ML: 279.3 INJECTION, SOLUTION INTRAVENOUS at 13:24

## 2023-11-10 RX ADMIN — HYDROMORPHONE HYDROCHLORIDE 1 MG: 1 INJECTION, SOLUTION INTRAMUSCULAR; INTRAVENOUS; SUBCUTANEOUS at 08:18

## 2023-11-10 RX ADMIN — METOCLOPRAMIDE 5 MG: 5 INJECTION, SOLUTION INTRAMUSCULAR; INTRAVENOUS at 12:05

## 2023-11-10 RX ADMIN — Medication 10 ML: at 21:44

## 2023-11-10 RX ADMIN — HYDROMORPHONE HYDROCHLORIDE 1 MG: 1 INJECTION, SOLUTION INTRAMUSCULAR; INTRAVENOUS; SUBCUTANEOUS at 12:05

## 2023-11-10 RX ADMIN — ONDANSETRON 4 MG: 2 INJECTION INTRAMUSCULAR; INTRAVENOUS at 08:18

## 2023-11-10 RX ADMIN — MISOPROSTOL 200 MCG: 200 TABLET ORAL at 21:43

## 2023-11-10 RX ADMIN — MISOPROSTOL 200 MCG: 200 TABLET ORAL at 16:29

## 2023-11-10 RX ADMIN — Medication 10 ML: at 12:06

## 2023-11-10 RX ADMIN — DIPHENHYDRAMINE HYDROCHLORIDE 12.5 MG: 50 INJECTION, SOLUTION INTRAMUSCULAR; INTRAVENOUS at 13:37

## 2023-11-10 RX ADMIN — BUMETANIDE 2 MG: 1 TABLET ORAL at 16:25

## 2023-11-10 RX ADMIN — METOCLOPRAMIDE 5 MG: 5 INJECTION, SOLUTION INTRAMUSCULAR; INTRAVENOUS at 18:40

## 2023-11-10 RX ADMIN — ONDANSETRON 4 MG: 2 INJECTION INTRAMUSCULAR; INTRAVENOUS at 02:24

## 2023-11-10 RX ADMIN — APIXABAN 5 MG: 5 TABLET, FILM COATED ORAL at 21:43

## 2023-11-10 RX ADMIN — PANTOPRAZOLE SODIUM 40 MG: 40 TABLET, DELAYED RELEASE ORAL at 18:40

## 2023-11-10 RX ADMIN — ONDANSETRON 4 MG: 2 INJECTION INTRAMUSCULAR; INTRAVENOUS at 15:18

## 2023-11-10 RX ADMIN — LIOTHYRONINE SODIUM 5 MCG: 5 TABLET ORAL at 05:24

## 2023-11-10 RX ADMIN — HYDROMORPHONE HYDROCHLORIDE 1 MG: 1 INJECTION, SOLUTION INTRAMUSCULAR; INTRAVENOUS; SUBCUTANEOUS at 05:24

## 2023-11-10 RX ADMIN — APIXABAN 5 MG: 5 TABLET, FILM COATED ORAL at 16:25

## 2023-11-10 RX ADMIN — SPIRONOLACTONE 300 MG: 100 TABLET ORAL at 16:26

## 2023-11-10 RX ADMIN — POTASSIUM CHLORIDE 40 MEQ: 1500 TABLET, EXTENDED RELEASE ORAL at 16:26

## 2023-11-10 RX ADMIN — HYDROMORPHONE HYDROCHLORIDE 1 MG: 1 INJECTION, SOLUTION INTRAMUSCULAR; INTRAVENOUS; SUBCUTANEOUS at 02:24

## 2023-11-10 RX ADMIN — SUCRALFATE 1 G: 1 TABLET ORAL at 18:40

## 2023-11-10 RX ADMIN — HYDROMORPHONE HYDROCHLORIDE 1 MG: 1 INJECTION, SOLUTION INTRAMUSCULAR; INTRAVENOUS; SUBCUTANEOUS at 18:40

## 2023-11-10 RX ADMIN — HYDROMORPHONE HYDROCHLORIDE 1 MG: 1 INJECTION, SOLUTION INTRAMUSCULAR; INTRAVENOUS; SUBCUTANEOUS at 21:43

## 2023-11-10 RX ADMIN — HYDROMORPHONE HYDROCHLORIDE 1 MG: 1 INJECTION, SOLUTION INTRAMUSCULAR; INTRAVENOUS; SUBCUTANEOUS at 15:18

## 2023-11-10 NOTE — PROGRESS NOTES
[facility] Observation Unit Progress Note     Patient Name: Lucinda Cope  : 1975  MRN: [id number]  Primary Care Physician: Austin Mcfadden MD  Date of admission: 2023     Patient Care Team:  Austin Mcfadden MD as PCP - General (Family Medicine)          Subjective   History Present Illness     Chief Complaint:   Chief Complaint   Patient presents with    Abdominal Pain     Abdominal Pain     Ms. Cope is a 48 y.o.  presents to [facility] complaining of abdominal pain       History of Present Illness  Obtained from admitting physician HPI on 2023:  History of present illness this is a 48-year-old female who has had a history in the past of gastric bypass surgery several years ago in [place] that had some complications that led to a splenectomy she reports also has a history of nonalcoholic cirrhosis complains of several week history of worsening abdominal pain.  She states she was in the hospital couple weeks ago she had endoscopy which showed an ulcer and started on some medicine for that which she states she continues to have pain and now swelling and she is worried that she has ascites and needs to be drained.  No fever chills been noted no vomiting no vomiting blood no black or bloody stool no dysuria frequency no injury no chest pain neck arm or jaw pain is moderate degree constant nothing really seem to trigger nothing makes it better or worse.     23:  Patient confirms the HPI noted above including recent increase in her baseline abdominal pain as well as some transient distention with pain worsening over the past 3 days located in the mid abdomen radiating outward and occasionally up to the left side of her chest.  She has recently had evaluation with GI and underwent EGD on 10/25/2023.  She notes pain had been severe at times and that they found signs of an ulcer on previous scope.  She had felt as if her distention had been increasing though this does seem to have  improved today.    11/10/23: Patient reports recent increase in abdominal pain that is worse in the past few days.  Patient denies chest pain, dyspnea, vomiting, fever or syncope. MRCP complete with monitoring of liver enzymes overnight.       Review of Systems   Constitutional: Positive for decreased appetite and malaise/fatigue.   HENT: Negative.     Eyes: Negative.    Cardiovascular: Negative.    Respiratory: Negative.     Endocrine: Negative.    Hematologic/Lymphatic: Negative.    Skin: Negative.    Musculoskeletal: Negative.    Gastrointestinal:  Positive for abdominal pain and nausea.   Genitourinary: Negative.    Neurological:  Positive for weakness.   Psychiatric/Behavioral: Negative.     Allergic/Immunologic: Negative.            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    Empyema of pleura     Encephalopathy, portal systemic 5/9/2017    Esophageal varices 7/21/2017    Gastric leak 5/29/2016    GERD (gastroesophageal reflux disease)     H/O thyroidectomy 6/17/2016    Hemorrhoids     Hernia, ventral     History of morbid obesity 3/3/2016    History of transfusion     Hypercoagulable state 8/24/2019    Irregular menstrual cycle     Liver cirrhosis secondary to CARTER 04/2017    Lupus anticoagulant positive 8/24/2019    Migraine     Parathyroid abnormality     Portal vein thrombosis 3/3/2016    Post-surgical hypothyroidism     Pulmonary embolism 10/27/2020    Radiation     Restless legs syndrome (RLS)     S/P laparoscopic sleeve gastrectomy 6/17/2016    S/P splenectomy 6/17/2016    Thyroid cancer     Thyroid cancer 6/26/2015    Urge and stress incontinence     Vaginal prolapse 7/14/2021    Varicose veins     Ventral hernia 4/18/2020    Vitamin D deficiency 1/21/2021       Surgical  History:      Past Surgical History:   Procedure Laterality Date    ABDOMINAL SURGERY      BEDSIDE PARACENTESIS  3/22/2020         BEDSIDE PARACENTESIS  4/19/2020         BEDSIDE PARACENTESIS  3/1/2021         BEDSIDE PARACENTESIS  3/30/2021         CHOLECYSTECTOMY      Laparoscopic    ENDOSCOPY N/A 3/17/2016    Procedure: ESOPHAGOGASTRODUODENOSCOPY WITH BALOON DILATATION 18-20MM WITH GASTRIC SLEEVE SEALANT;  Surgeon: Leonardo Ortiz Jr., MD;  Location: [facility] ENDOSCOPY;  Service:     ENDOSCOPY N/A 3/14/2016    Procedure: esophagogastroduodenoscopy with fluoroscopy;  Surgeon: Greg Avila III, MD;  Location: [facility] ENDOSCOPY;  Service:     ENDOSCOPY N/A 4/15/2016    Procedure: EGD WITH DILITATION AND STENT PLACEMENT dilation of pylorus ;  Surgeon: Leonardo Ortiz Jr., MD;  Location: [facility] ENDOSCOPY;  Service:     ENDOSCOPY N/A 5/13/2016    Procedure: ESOPHAGOGASTRODUODENOSCOPY  W/ STENT;  Surgeon: Leonardo Ortiz Jr., MD;  Location: [facility] ENDOSCOPY;  Service:     ENDOSCOPY N/A 1/13/2021    Procedure: ESOPHAGOGASTRODUODENOSCOPY WITH POLYPECTOMY X1 AND  BIOPSY X1.;  Surgeon: Bryson Cotton MD;  Location: [facility] ENDOSCOPY;  Service: Gastroenterology;  Laterality: N/A;  anastomotic ulcer, antral polyp, GASTRITIS    ENDOSCOPY N/A 10/24/2023    Procedure: ESOPHAGOGASTRODUODENOSCOPY;  Surgeon: Hossein Delgadillo MD;  Location: [facility] ENDOSCOPY;  Service: Gastroenterology;  Laterality: N/A;  Post: anastomotic ulcer    GASTRECTOMY      Gastric Surgery for Morbid Obesity Laparoscopic Longitudinal Gastrectomy    GASTRIC SLEEVE LAPAROSCOPIC      HEMORRHOIDECTOMY      HERNIA REPAIR      LAPAROSCOPY REPAIR HIATAL HERNIA      LARYNGOSCOPY      Direct Laryngoscopy with Injection into Vocal Cords    OTHER SURGICAL HISTORY Bilateral     Ear Pressure Equalization Tube, Insertion, Bilaterally    PERIPHERALLY INSERTED CENTRAL CATHETER INSERTION      SPLENECTOMY      THYROID SURGERY      TUBAL ABDOMINAL LIGATION              Family History: family history includes Breast cancer in her maternal grandmother; Cancer in an other family member; Diabetes in her father; Hypertension in her father; Stroke in her father. Otherwise pertinent FHx was reviewed and unremarkable.     Social History:  reports that she has never smoked. She has never been exposed to tobacco smoke. She has never used smokeless tobacco. She reports that she does not drink alcohol and does not use drugs.      Medications:  Prior to Admission medications    Medication Sig Start Date End Date Taking? Authorizing Provider   apixaban (ELIQUIS) 5 MG tablet tablet Take 1 tablet by mouth 2 (Two) Times a Day.   Yes Justyn Erwin MD   bumetanide (BUMEX) 2 MG tablet Take 2 tablets by mouth Daily.   Yes Justyn Erwin MD   estradiol (ESTRACE) 0.1 MG/GM vaginal cream Insert 1 g into the vagina 3 (Three) Times a Week. Monday Wednesday Friday   Yes Justyn Erwin MD   levothyroxine (SYNTHROID, LEVOTHROID) 125 MCG tablet Take 1 tablet by mouth Every Morning.   Yes Justyn Erwin MD   liothyronine (CYTOMEL) 5 MCG tablet Take 1 tablet by mouth Daily.   Yes Justyn Erwin MD   miSOPROStol (CYTOTEC) 200 MCG tablet Take 1 tablet by mouth 2 (Two) Times a Day. 10/25/23  Yes Marianna Fernandez PA-C   ondansetron ODT (ZOFRAN-ODT) 4 MG disintegrating tablet Place 1 tablet on the tongue Every 8 (Eight) Hours As Needed for Nausea or Vomiting. 10/25/23  Yes Marianna Fernandez PA-C   ondansetron ODT (ZOFRAN-ODT) 8 MG disintegrating tablet Place 1 tablet on the tongue Every 8 (Eight) Hours As Needed for Nausea or Vomiting.   Yes Justyn Erwin MD   pantoprazole (PROTONIX) 40 MG EC tablet Take 1 tablet by mouth 2 (Two) Times a Day Before Meals. 10/25/23  Yes Marianna Fernandez PA-C   potassium chloride (K-DUR,KLOR-CON) 20 MEQ CR tablet Take 2 tablets by mouth 2 (Two) Times a Day.   Yes Justyn Erwin MD   spironolactone (ALDACTONE) 100 MG tablet  Take 4 tablets by mouth Daily.   Yes Provider, MD Justyn   sucralfate (CARAFATE) 1 g tablet Take 1 tablet by mouth 2 (Two) Times a Day Before Meals. 10/25/23  Yes Marianna Fernandez PA-C       Allergies:    Allergies   Allergen Reactions    Contrast Dye (Echo Or Unknown Ct/Mr) Hives    Iodinated Contrast Media Hives    Iodine Hives    Hydrocodone-Acetaminophen Hives and Itching    Morphine Itching, Hives and Rash    Red Dye Itching, Other (See Comments) and Unknown - High Severity    Compazine [Prochlorperazine Edisylate] Hives    Hydrocodone Rash     Lortab elixir per pt    Ketorolac Tromethamine Unknown - Low Severity    Nsaids Other (See Comments)     States I am not suppose to take them  Cannot have due to gastric sleeve       Objective   Objective     Vital Signs  Temp:  [97.8 °F (36.6 °C)-98.3 °F (36.8 °C)] 98.2 °F (36.8 °C)  Heart Rate:  [65-82] 65  Resp:  [15-19] 16  BP: (112-148)/(69-89) 128/76  SpO2:  [97 %-100 %] 97 %  on   ;   Device (Oxygen Therapy): room air  Body mass index is 25.35 kg/m².    Physical Exam  Vitals and nursing note reviewed.   Constitutional:       Appearance: Normal appearance.   HENT:      Head: Normocephalic and atraumatic.      Right Ear: External ear normal.      Left Ear: External ear normal.      Nose: Nose normal.      Mouth/Throat:      Pharynx: Oropharynx is clear.   Eyes:      Extraocular Movements: Extraocular movements intact.      Conjunctiva/sclera: Conjunctivae normal.      Pupils: Pupils are equal, round, and reactive to light.   Cardiovascular:      Rate and Rhythm: Normal rate and regular rhythm.      Pulses: Normal pulses.      Heart sounds: Normal heart sounds.   Pulmonary:      Effort: Pulmonary effort is normal.      Breath sounds: Normal breath sounds.   Abdominal:      General: There is distension.      Tenderness: There is abdominal tenderness.   Musculoskeletal:         General: Normal range of motion.      Cervical back: Normal range of motion.   Skin:      General: Skin is warm.      Capillary Refill: Capillary refill takes less than 2 seconds.      Findings: Rash present. Rash is urticarial.   Neurological:      Mental Status: She is alert and oriented to person, place, and time.   Psychiatric:         Mood and Affect: Mood normal.         Behavior: Behavior normal.         Thought Content: Thought content normal.         Judgment: Judgment normal.           Results Review:  I have personally reviewed most recent cardiac tracings, lab results, microbiology results, and radiology images and interpretations and agree with findings, most notably: CBC, CMP, CT abdomen pelvis, MRCP.    Results from last 7 days   Lab Units 11/10/23  0537   WBC 10*3/mm3 12.50*   HEMOGLOBIN g/dL 10.6*   HEMATOCRIT % 32.5*   PLATELETS 10*3/mm3 605*   INR  1.15*     Results from last 7 days   Lab Units 11/10/23  0537 11/09/23  1943 11/09/23  1003 11/09/23  0049   SODIUM mmol/L 134*  --  138 139   POTASSIUM mmol/L 4.2   < > 3.5 3.6   CHLORIDE mmol/L 97*  --  102 105   CO2 mmol/L 28.0  --  25.0 25.0   BUN mg/dL 14  --  11 17   CREATININE mg/dL 0.83  --  0.63 0.72   GLUCOSE mg/dL 107*  --  97 105*   CALCIUM mg/dL 8.8  --  9.1 9.1   ALK PHOS U/L 274*  --   --  159*   ALT (SGPT) U/L 139*  --   --  18   AST (SGOT) U/L 235*  --   --  29   HSTROP T ng/L  --   --  <6 <6    < > = values in this interval not displayed.     Estimated Creatinine Clearance: 73.5 mL/min (by C-G formula based on SCr of 0.83 mg/dL).  Brief Urine Lab Results  (Last result in the past 365 days)        Color   Clarity   Blood   Leuk Est   Nitrite   Protein   CREAT   Urine HCG        11/09/23 0035 Yellow   Clear   Negative   Trace   Negative   Negative                   Microbiology Results (last 10 days)       ** No results found for the last 240 hours. **            ECG/EMG Results (most recent)       Procedure Component Value Units Date/Time    SCANNED - TELEMETRY   [[id number]] Resulted: 11/08/23     Updated: 11/10/23 0409     SCANNED - TELEMETRY   [[id number]] Resulted: 11/08/23     Updated: 11/10/23 0409    SCANNED - TELEMETRY   [[id number]] Resulted: 11/08/23     Updated: 11/10/23 0409    SCANNED - TELEMETRY   [[id number]] Resulted: 11/08/23     Updated: 11/10/23 0444    SCANNED - TELEMETRY   [[id number]] Resulted: 11/08/23     Updated: 11/10/23 0444            Results for orders placed during the hospital encounter of 07/13/21    Duplex Venous Lower Extremity - Bilateral CAR    Interpretation Summary  · Sub-acute right lower extremity superficial thrombophlebitis noted in the greater saphenous (above knee).  · Acute right lower extremity superficial thrombophlebitis noted in the varicosity (above knee).  · All other veins appeared normal bilaterally.          MRI abdomen w wo contrast mrcp    Result Date: 11/10/2023  Impression: 1. No choledocholithiasis. Common bile duct dilated to 10 mm which may relate to postcholecystectomy state. 2. Hepatic steatosis. 3. Moderate ascites with central mesenteric edema unchanged from prior CT. 4. Cavernous transformation of portal vein. 5. Postsurgical changes of gastric bypass, splenectomy and cholecystectomy. 6. Umbilical hernia containing a nonobstructed small bowel loop partially imaged. Electronically Signed: Blu Mancilla MD  11/10/2023 2:45 PM EST  Workstation ID: [id number]    US Liver    Result Date: 11/9/2023  Impression: Findings compatible with hepatic steatosis. Electronically Signed: Edna Finney MD  11/9/2023 2:03 PM CST  Workstation ID: [id number]    CT Abdomen Pelvis Without Contrast    Result Date: 11/9/2023  Impression: 1.No significant change. Small to moderate amount of ascites present throughout the abdomen and pelvis, similar as compared to the previous study. No acute intra-abdominal or intrapelvic process identified. 2.Ancillary findings as described above. Electronically Signed: Muriel Gil MD  11/9/2023 1:19 AM EST  Workstation ID: [id number]       Estimated Creatinine  Clearance: 73.5 mL/min (by C-G formula based on SCr of 0.83 mg/dL).    Assessment & Plan   Assessment/Plan       Active Hospital Problems    Diagnosis  POA    **Abdominal pain [R10.9]  Yes      Resolved Hospital Problems   No resolved problems to display.     Abdominal pain with a history of cirrhosis and Ramon        Lab Results   Component Value Date     WBC 11.80 (H) 11/09/2023     AST 29 11/09/2023     ALT 18 11/09/2023     ALKPHOS 159 (H) 11/09/2023     BILITOT 0.9 11/09/2023     LIPASE 34 11/09/2023   -Trend LFTs  -Recent alpha-fetoprotein: 7.04  -CT of abdomen and pelvis: Showed no significant change with small to moderate amount of ascites throughout the abdomen and pelvis similar to previous study  -In the ED patient was given Dilaudid, fentanyl and Zofran  -GI was consulted who ordered ultrasound of liver which showed hepatic steatosis  -2 g sodium diet  -Continue Bumex and spironolactone  -Continue Eliquis with history of portal vein thrombosis  -MRCP pending   -GI consulted      GERD  -PPI and Cytotec  -Hypothyroidism  -Cytomel      VTE Prophylaxis -   Mechanical Order History:        Ordered        11/09/23 0456  Place Sequential Compression Device  Once            11/09/23 0456  Maintain Sequential Compression Device  Continuous                          Pharmalogical Order History:        Ordered     Dose Route Frequency Stop    11/09/23 0631  apixaban (ELIQUIS) tablet 5 mg         5 mg PO Every 12 Hours Scheduled --                    CODE STATUS:    Code Status and Medical Interventions:   Ordered at: 11/09/23 1126     Level Of Support Discussed With:    Patient     Code Status (Patient has no pulse and is not breathing):    CPR (Attempt to Resuscitate)     Medical Interventions (Patient has pulse or is breathing):    Full Support       This patient has been examined wearing personal protective equipment.     I discussed the patient's findings and my recommendations with patient, family, nursing  staff, primary care team, and consulting provider.      Signature:Electronically signed by GRIFFIN Colin, 11/10/23, 3:34 PM EST.          I spent 35 minutes caring for Lucinda on this date of service. This time includes time spent by me in the following activities: reviewing tests, obtaining and/or reviewing a separately obtained history, performing a medically appropriate examination and/or evaluation, counseling and educating the patient/family/caregiver, ordering medications, tests, or procedures, referring and communicating with other health care professionals, documenting information in the medical record, independently interpreting results and communicating that information with the patient/family/caregiver, and care coordination.

## 2023-11-10 NOTE — CONSULTS
[facility]   Consult Note    Patient Name: Lucinda Cope  : 1975  MRN: [id number]  Primary Care Physician:  Austin Mcfadden MD  Date of admission: 2023  Service Date and time: 11/10/23 17:16 EST  Subjective   Subjective     Chief Complaint: Abdominal pain    HPI:    Lucinda Cope is a 48 y.o. female past medical history significant for history of gastric bypass surgery several years back complicated by splenectomy history of Carter who presented to the hospital initially on 2023 with chief complaint of having abdominal pain, she stated that in the hospital couple weeks back she had an endoscopy which showed an ulcer, admitted and evaluated by gastroenterology, and MRCP was done earlier today and showed no choledocholithiasis, patient is tolerating her diet.      Review of Systems   All systems were reviewed and negative except for: HPI    Personal History     Past Medical History:   Diagnosis Date    Abdominal pain     Abscess, subdiaphragmatic 2016    Anemia     Deep venous thrombosis of right profunda femoris vein 8/15/2019    Depression     Elevated factor VIII level 2019    Empyema of pleura     Encephalopathy, portal systemic 2017    Esophageal varices 2017    Gastric leak 2016    GERD (gastroesophageal reflux disease)     H/O thyroidectomy 2016    Hemorrhoids     Hernia, ventral     History of morbid obesity 3/3/2016    History of transfusion     Hypercoagulable state 2019    Irregular menstrual cycle     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    Lupus anticoagulant positive 2019    Migraine     Parathyroid abnormality     Portal vein thrombosis 3/3/2016    Post-surgical hypothyroidism     Pulmonary embolism 10/27/2020    Radiation     Restless legs syndrome (RLS)     S/P laparoscopic sleeve gastrectomy 2016    S/P splenectomy 2016    Thyroid cancer     Thyroid cancer 2015    Urge and stress incontinence     Vaginal prolapse  7/14/2021    Varicose veins     Ventral hernia 4/18/2020    Vitamin D deficiency 1/21/2021       Past Surgical History:   Procedure Laterality Date    ABDOMINAL SURGERY      BEDSIDE PARACENTESIS  3/22/2020         BEDSIDE PARACENTESIS  4/19/2020         BEDSIDE PARACENTESIS  3/1/2021         BEDSIDE PARACENTESIS  3/30/2021         CHOLECYSTECTOMY      Laparoscopic    ENDOSCOPY N/A 3/17/2016    Procedure: ESOPHAGOGASTRODUODENOSCOPY WITH BALOON DILATATION 18-20MM WITH GASTRIC SLEEVE SEALANT;  Surgeon: Leonardo Ortiz Jr., MD;  Location: [facility] ENDOSCOPY;  Service:     ENDOSCOPY N/A 3/14/2016    Procedure: esophagogastroduodenoscopy with fluoroscopy;  Surgeon: Greg Avila III, MD;  Location: [facility] ENDOSCOPY;  Service:     ENDOSCOPY N/A 4/15/2016    Procedure: EGD WITH DILITATION AND STENT PLACEMENT dilation of pylorus ;  Surgeon: Leonardo Ortiz Jr., MD;  Location: [facility] ENDOSCOPY;  Service:     ENDOSCOPY N/A 5/13/2016    Procedure: ESOPHAGOGASTRODUODENOSCOPY  W/ STENT;  Surgeon: Leonardo Ortiz Jr., MD;  Location: [facility] ENDOSCOPY;  Service:     ENDOSCOPY N/A 1/13/2021    Procedure: ESOPHAGOGASTRODUODENOSCOPY WITH POLYPECTOMY X1 AND  BIOPSY X1.;  Surgeon: Bryson Cotton MD;  Location: [facility] ENDOSCOPY;  Service: Gastroenterology;  Laterality: N/A;  anastomotic ulcer, antral polyp, GASTRITIS    ENDOSCOPY N/A 10/24/2023    Procedure: ESOPHAGOGASTRODUODENOSCOPY;  Surgeon: Hossein Delgadillo MD;  Location: [facility] ENDOSCOPY;  Service: Gastroenterology;  Laterality: N/A;  Post: anastomotic ulcer    GASTRECTOMY      Gastric Surgery for Morbid Obesity Laparoscopic Longitudinal Gastrectomy    GASTRIC SLEEVE LAPAROSCOPIC      HEMORRHOIDECTOMY      HERNIA REPAIR      LAPAROSCOPY REPAIR HIATAL HERNIA      LARYNGOSCOPY      Direct Laryngoscopy with Injection into Vocal Cords    OTHER SURGICAL HISTORY Bilateral     Ear Pressure Equalization Tube, Insertion, Bilaterally    PERIPHERALLY INSERTED CENTRAL CATHETER  INSERTION      SPLENECTOMY      THYROID SURGERY      TUBAL ABDOMINAL LIGATION         Family History: family history includes Breast cancer in her maternal grandmother; Cancer in an other family member; Diabetes in her father; Hypertension in her father; Stroke in her father. Otherwise pertinent FHx was reviewed and not pertinent to current issue.    Social History:  reports that she has never smoked. She has never been exposed to tobacco smoke. She has never used smokeless tobacco. She reports that she does not drink alcohol and does not use drugs.    Home Medications:  apixaban, bumetanide, estradiol, levothyroxine, liothyronine, miSOPROStol, ondansetron ODT, pantoprazole, potassium chloride, spironolactone, and sucralfate      Allergies:  Allergies   Allergen Reactions    Contrast Dye (Echo Or Unknown Ct/Mr) Hives    Iodinated Contrast Media Hives    Iodine Hives    Hydrocodone-Acetaminophen Hives and Itching    Morphine Itching, Hives and Rash    Red Dye Itching, Other (See Comments) and Unknown - High Severity    Compazine [Prochlorperazine Edisylate] Hives    Hydrocodone Rash     Lortab elixir per pt    Ketorolac Tromethamine Unknown - Low Severity    Nsaids Other (See Comments)     States I am not suppose to take them  Cannot have due to gastric sleeve       Objective   Objective     Vitals:   Temp:  [97.8 °F (36.6 °C)-98.3 °F (36.8 °C)] 98.2 °F (36.8 °C)  Heart Rate:  [65-82] 65  Resp:  [15-19] 16  BP: (112-148)/(69-89) 128/76  Physical Exam    Constitutional: Awake, alert in no distress at time of examination.   Eyes: PERRLA, sclerae anicteric, no conjunctival injection   HENT: NCAT, mucous membranes moist   Neck: Supple, no thyromegaly, no lymphadenopathy, trachea midline   Respiratory: Clear to auscultation bilaterally, nonlabored respirations    Cardiovascular: RRR, no murmurs, rubs, or gallops, palpable pedal pulses bilaterally   Gastrointestinal: Positive bowel sounds, soft, nontender,  nondistended   Musculoskeletal: No bilateral ankle edema, no clubbing or cyanosis to extremities   Psychiatric: Appropriate affect, cooperative   Neurologic: Oriented x 3, strength symmetric in all extremities, Cranial Nerves grossly intact to confrontation, speech clear   Skin: No rashes     Result Review    Result Review:  I have personally reviewed the results from the time of this admission to 11/10/2023 17:16 EST and agree with these findings:  [x]  Laboratory list / accordion  []  Microbiology  [x]  Radiology  []  EKG/Telemetry   []  Cardiology/Vascular   []  Pathology  []  Old records  []  Other:  Most notable findings include: MRCP showed    1. No choledocholithiasis. Common bile duct dilated to 10 mm which may relate to postcholecystectomy state.   2. Hepatic steatosis.   3. Moderate ascites with central mesenteric edema unchanged from prior CT.   4. Cavernous transformation of portal vein.   5. Postsurgical changes of gastric bypass, splenectomy and cholecystectomy.   6. Umbilical hernia containing a nonobstructed small bowel loop partially imaged     Was 107 sodium 134, potassium 4.2, chloride 97, CO2 28, , , alkaline phosphatase 274, total bilirubin 1.5, globulin 3.6, PT/INR 12.41.15 WBC count 12.5        Assessment & Plan   Assessment / Plan       Active Hospital Problems:  Active Hospital Problems    Diagnosis     **Abdominal pain    This is 48-year-old lady who presented to the hospital with  1.  Abdominal pain  2.  History of CARTER and ascites  3.  History of gastric sleeve      Plan: Patient is tolerating her diet, currently on analgesics and antiemetics, GI has been consulted MRCP was done and showed no choledocholithiasis.        DVT prophylaxis:  Medical and mechanical DVT prophylaxis orders are present.    CODE STATUS:    Level Of Support Discussed With: Patient  Code Status (Patient has no pulse and is not breathing): CPR (Attempt to Resuscitate)  Medical Interventions (Patient has  pulse or is breathing): Full Support    Admission Status:  I believe this patient meets inpatient status.    Saw Harris MD

## 2023-11-10 NOTE — CONSULTS
LOS: 0 days   Patient Care Team:  Austin Mcfadden MD as PCP - General (Family Medicine)      Subjective     Interval History: SBFT has not yet been done.  LFTs are now elevated.    Subjective: Still having epigastric pain today.  Liver enzymes are noted to be elevated with , , alk phos 274, TB 1.9.  Hgb is 10.6 and WBC 12.5.      ROS:   Review of Systems   Gastrointestinal:  Positive for abdominal pain.   All other systems reviewed and are negative.         Medication Review:     Current Facility-Administered Medications:     apixaban (ELIQUIS) tablet 5 mg, 5 mg, Oral, Q12H, Mateusz Kingston PA-C, 5 mg at 11/09/23 2118    bumetanide (BUMEX) tablet 4 mg, 4 mg, Oral, Daily, Mateusz Kingston PA-C, 4 mg at 11/09/23 0844    HYDROmorphone (DILAUDID) injection 1 mg, 1 mg, Intravenous, Q2H PRN, Mateusz Kingston PA-C, 1 mg at 11/10/23 0818    liothyronine (CYTOMEL) tablet 5 mcg, 5 mcg, Oral, Q AM, Mateusz Kingston PA-C, 5 mcg at 11/10/23 0524    melatonin tablet 5 mg, 5 mg, Oral, Nightly PRN, Leonardo Werner MD, 5 mg at 11/09/23 2118    metoclopramide (REGLAN) injection 5 mg, 5 mg, Intravenous, TID CAROLINE, Kalani Lock, APRN, 5 mg at 11/09/23 1649    miSOPROStol (CYTOTEC) tablet 200 mcg, 200 mcg, Oral, BID, Mateusz Kingston PA-C, 200 mcg at 11/09/23 2118    ondansetron (ZOFRAN) injection 4 mg, 4 mg, Intravenous, Q6H PRN, Leonardo Werner MD, 4 mg at 11/10/23 0818    pantoprazole (PROTONIX) EC tablet 40 mg, 40 mg, Oral, BID AC, Mateusz Kingston PA-C, 40 mg at 11/09/23 1650    potassium chloride (K-DUR,KLOR-CON) CR tablet 40 mEq, 40 mEq, Oral, BID, Mateusz Kingston PA-C, 40 mEq at 11/09/23 2118    Potassium Replacement - Follow Nurse / BPA Driven Protocol, , Does not apply, PRN, Mateusz Kingston PA-C    [COMPLETED] Insert Peripheral IV, , , Once **AND** sodium chloride 0.9 % flush 10 mL, 10 mL, Intravenous, PRN, Leonardo Werner MD, 10 mL at 11/09/23 1549    sodium chloride 0.9 %  flush 10 mL, 10 mL, Intravenous, Q12H, Leonardo Werner MD, 10 mL at 11/09/23 2122    sodium chloride 0.9 % flush 10 mL, 10 mL, Intravenous, PRN, Leonardo Werner MD, 10 mL at 11/09/23 1916    sodium chloride 0.9 % infusion 40 mL, 40 mL, Intravenous, PRN, Leonardo Werner MD    spironolactone (ALDACTONE) tablet 400 mg, 400 mg, Oral, Daily, Mateusz Kingston PA-C, 400 mg at 11/09/23 0844    sucralfate (CARAFATE) tablet 1 g, 1 g, Oral, BID AC, Mateusz Kingston PA-C, 1 g at 11/09/23 1650      Objective     Vital Signs  Vitals:    11/09/23 1419 11/09/23 2330 11/10/23 0246 11/10/23 0625   BP: 132/89 148/89 116/69 112/78   BP Location: Left arm Left arm Left arm Left arm   Patient Position: Lying Lying Lying Lying   Pulse:  80 82 77   Resp: 16 19 17 15   Temp: 98 °F (36.7 °C) 97.8 °F (36.6 °C) 98 °F (36.7 °C) 98.3 °F (36.8 °C)   TempSrc: Oral Oral Temporal Temporal   SpO2: 96% 100% 99% 99%   Weight:       Height:           Physical Exam: Ambulating in room    General Appearance:    Awake and alert, in no acute distress   Head:    Normocephalic, without obvious abnormality   Eyes:          Conjunctivae normal, anicteric sclera   Throat:   No oral lesions, no thrush, oral mucosa moist   Neck:   No adenopathy, supple, no JVD   Lungs:     Respirations regular, even and unlabored   Abdomen:     Soft, epigastric tenderness, non-distended, no rebound or guarding, no hepatosplenomegaly   Rectal:     Deferred   Extremities:   No edema, no cyanosis, moves equally bilaterally   Skin:   No bruising, no rash, no jaundice        Results Review:    CBC  Results from last 7 days   Lab Units 11/10/23  0537 11/09/23  1003 11/09/23  0049   RBC 10*6/mm3 4.07 4.35 3.95   WBC 10*3/mm3 12.50* 11.80* 11.00*   HEMOGLOBIN g/dL 10.6* 11.3* 10.0*   PLATELETS 10*3/mm3 605* 634* 613*       CMP  Results from last 7 days   Lab Units 11/10/23  0537 11/09/23  1943 11/09/23  1003 11/09/23  0049   SODIUM mmol/L 134*  --  138 139   POTASSIUM mmol/L 4.2  3.6 3.5 3.6   CHLORIDE mmol/L 97*  --  102 105   CO2 mmol/L 28.0  --  25.0 25.0   BUN mg/dL 14  --  11 17   CREATININE mg/dL 0.83  --  0.63 0.72   GLUCOSE mg/dL 107*  --  97 105*   ALBUMIN g/dL 3.6  --   --  3.7   BILIRUBIN mg/dL 1.9*  --   --  0.9   ALK PHOS U/L 274*  --   --  159*   AST (SGOT) U/L 235*  --   --  29   ALT (SGPT) U/L 139*  --   --  18       Amylase and Lipase  Results from last 7 days   Lab Units 11/09/23  0049   LIPASE U/L 34       CRP         Imaging Results (Last 24 Hours)       Procedure Component Value Units Date/Time    US Liver [[id number]] Collected: 11/09/23 1500     Updated: 11/09/23 1505    Narrative:      US LIVER    Date of Exam: 11/9/2023 1:27 PM CST    Indication: cirrhosis, ascites, history PVT.    Comparison: No comparisons available.    Technique: Grayscale and color Doppler ultrasound evaluation of the right upper quadrant was performed.      Findings:  LIVER: The liver demonstrates increased parenchymal echogenicity. No focal lesions identified. Normal flow is present within the hepatic vasculature.     GALLBLADDER: The gallbladder is surgically absent.            Impression:      Impression:  Findings compatible with hepatic steatosis.        Electronically Signed: Edna Finney MD    11/9/2023 2:03 PM CST    Workstation ID: [id number]              ASSESSMENT:  48-year-old female with Carter cirrhosis and complicated past surgical history with Alfonzo-en-Y anatomy presented to the hospital on 11/8/2023 with continued abdominal pain.  Was recently here last month and had EGD.  Now has elevated liver enzymes.     -Upper abdominal pain  -Nausea/vomiting  -Elevated liver enzymes  -Anastomotic ulcer  -Ascites  -CARTER cirrhosis that has been complicated by ascites, hepatic encephalopathy, and nonbleeding esophageal varices  -Mild normocytic anemia  -Leukocytosis  -Elevated alk phos  -History of gastric sleeve followed by Alfonzo-en-Y gastric bypass with multiple surgeries   -Chronic portal  vein thrombus and DVT on Eliquis  -History of cholecystectomy and splenectomy  -Ramon cirrhosis that has been complicated by hepatic encephalopathy, ascites, and nonbleeding esophageal varices         PLAN:  Liver enzymes are elevated today so we will do MRCP.  Cancel small bowel series.  Monitor CBC and CMP.  2 g sodium restriction diet.  Continue PPI twice daily, Carafate, and misoprostol.  Recommend outpatient colonoscopy.  GI will follow.        Megan Dean, GRIFFIN  11/10/23  09:03 EST

## 2023-11-10 NOTE — CASE MANAGEMENT/SOCIAL WORK
Continued Stay Note  SARAH Galdamez     Patient Name: Lucinda Cope  MRN: [id number]  Today's Date: 11/10/2023    Admit Date: 11/8/2023    Plan: Home w/ family   Discharge Plan       Row Name 11/10/23 0853       Plan    Plan Home w/ family    Plan Comments DC barriers: patient NPO, SBFT and MRI of abdomen ordered.                Zane Espinosa RN     Cell number [phone]  Office number [phone]      no

## 2023-11-10 NOTE — PLAN OF CARE
Problem: Adult Inpatient Plan of Care  Goal: Plan of Care Review  Outcome: Ongoing, Progressing  Goal: Patient-Specific Goal (Individualized)  Outcome: Ongoing, Progressing  Goal: Absence of Hospital-Acquired Illness or Injury  Outcome: Ongoing, Progressing  Intervention: Identify and Manage Fall Risk  Recent Flowsheet Documentation  Taken 11/9/2023 2242 by Abby Cervantes RN  Safety Promotion/Fall Prevention: safety round/check completed  Taken 11/9/2023 2005 by Abby Cervantes RN  Safety Promotion/Fall Prevention: safety round/check completed  Intervention: Prevent Skin Injury  Recent Flowsheet Documentation  Taken 11/9/2023 2005 by Abby Cervantes RN  Body Position: supine  Intervention: Prevent and Manage VTE (Venous Thromboembolism) Risk  Recent Flowsheet Documentation  Taken 11/9/2023 2005 by Abby Cervantes RN  Activity Management:   ambulated in room   up ad filippo  Range of Motion: active ROM (range of motion) encouraged  Goal: Optimal Comfort and Wellbeing  Outcome: Ongoing, Progressing  Intervention: Provide Person-Centered Care  Recent Flowsheet Documentation  Taken 11/9/2023 2005 by Abby Cervantes RN  Trust Relationship/Rapport:   care explained   choices provided   questions answered   questions encouraged  Goal: Readiness for Transition of Care  Outcome: Ongoing, Progressing     Problem: Pain Acute  Goal: Acceptable Pain Control and Functional Ability  Outcome: Ongoing, Progressing  Intervention: Prevent or Manage Pain  Recent Flowsheet Documentation  Taken 11/9/2023 2005 by Abby Cervantes RN  Sensory Stimulation Regulation: care clustered  Medication Review/Management: medications reviewed  Intervention: Optimize Psychosocial Wellbeing  Recent Flowsheet Documentation  Taken 11/9/2023 2005 by Abby Cervantes RN  Supportive Measures: self-care encouraged  Diversional Activities:   television   smartphone     Problem: Nausea and Vomiting  Goal: Fluid and  Electrolyte Balance  Outcome: Ongoing, Progressing     Problem: Electrolyte Imbalance  Goal: Electrolyte Balance  Outcome: Ongoing, Progressing   Goal Outcome Evaluation:      Patient resting quietly in bed at this time, c/o pain controlled with PRN medication and non-pharm interventions with positive results. Patient on Potassium replacement protocol with additional PO potassium given this shift for K 3.6, tolerating well, safety maintained call light in reach.

## 2023-11-11 ENCOUNTER — INPATIENT HOSPITAL (AMBULATORY)
Dept: URBAN - METROPOLITAN AREA HOSPITAL 84 | Facility: HOSPITAL | Age: 48
End: 2023-11-11
Payer: COMMERCIAL

## 2023-11-11 DIAGNOSIS — R74.8 ABNORMAL LEVELS OF OTHER SERUM ENZYMES: ICD-10-CM

## 2023-11-11 DIAGNOSIS — Z98.84 BARIATRIC SURGERY STATUS: ICD-10-CM

## 2023-11-11 DIAGNOSIS — R18.8 OTHER ASCITES: ICD-10-CM

## 2023-11-11 DIAGNOSIS — R10.10 UPPER ABDOMINAL PAIN, UNSPECIFIED: ICD-10-CM

## 2023-11-11 DIAGNOSIS — K75.81 NONALCOHOLIC STEATOHEPATITIS (NASH): ICD-10-CM

## 2023-11-11 DIAGNOSIS — D64.9 ANEMIA, UNSPECIFIED: ICD-10-CM

## 2023-11-11 DIAGNOSIS — K74.69 OTHER CIRRHOSIS OF LIVER: ICD-10-CM

## 2023-11-11 DIAGNOSIS — I85.00 ESOPHAGEAL VARICES WITHOUT BLEEDING: ICD-10-CM

## 2023-11-11 DIAGNOSIS — K76.82 HEPATIC ENCEPHALOPATHY: ICD-10-CM

## 2023-11-11 DIAGNOSIS — D72.829 ELEVATED WHITE BLOOD CELL COUNT, UNSPECIFIED: ICD-10-CM

## 2023-11-11 DIAGNOSIS — K28.9 GASTROJEJUNAL ULCER, UNSPECIFIED AS ACUTE OR CHRONIC, WITHOU: ICD-10-CM

## 2023-11-11 DIAGNOSIS — R11.2 NAUSEA WITH VOMITING, UNSPECIFIED: ICD-10-CM

## 2023-11-11 LAB
ALBUMIN SERPL-MCNC: 3.7 G/DL (ref 3.5–5.2)
ALBUMIN/GLOB SERPL: 1 G/DL
ALP SERPL-CCNC: 278 U/L (ref 39–117)
ALT SERPL W P-5'-P-CCNC: 100 U/L (ref 1–33)
ANION GAP SERPL CALCULATED.3IONS-SCNC: 12 MMOL/L (ref 5–15)
ANISOCYTOSIS BLD QL: ABNORMAL
AST SERPL-CCNC: 107 U/L (ref 1–32)
BASOPHILS # BLD MANUAL: 0.38 10*3/MM3 (ref 0–0.2)
BASOPHILS NFR BLD MANUAL: 3 % (ref 0–1.5)
BILIRUB SERPL-MCNC: 1.6 MG/DL (ref 0–1.2)
BUN SERPL-MCNC: 15 MG/DL (ref 6–20)
BUN/CREAT SERPL: 14.4 (ref 7–25)
C3 FRG RBC-MCNC: ABNORMAL
CALCIUM SPEC-SCNC: 9.1 MG/DL (ref 8.6–10.5)
CHLORIDE SERPL-SCNC: 93 MMOL/L (ref 98–107)
CO2 SERPL-SCNC: 29 MMOL/L (ref 22–29)
CREAT SERPL-MCNC: 1.04 MG/DL (ref 0.57–1)
DEPRECATED RDW RBC AUTO: 59.1 FL (ref 37–54)
EGFRCR SERPLBLD CKD-EPI 2021: 65.6 ML/MIN/1.73
EOSINOPHIL # BLD MANUAL: 0.5 10*3/MM3 (ref 0–0.4)
EOSINOPHIL NFR BLD MANUAL: 4 % (ref 0.3–6.2)
ERYTHROCYTE [DISTWIDTH] IN BLOOD BY AUTOMATED COUNT: 20.9 % (ref 12.3–15.4)
GIANT PLATELETS: ABNORMAL
GLOBULIN UR ELPH-MCNC: 3.7 GM/DL
GLUCOSE SERPL-MCNC: 141 MG/DL (ref 65–99)
HCT VFR BLD AUTO: 35.6 % (ref 34–46.6)
HGB BLD-MCNC: 11.2 G/DL (ref 12–15.9)
LYMPHOCYTES # BLD MANUAL: 3.38 10*3/MM3 (ref 0.7–3.1)
LYMPHOCYTES NFR BLD MANUAL: 3 % (ref 5–12)
MCH RBC QN AUTO: 25.9 PG (ref 26.6–33)
MCHC RBC AUTO-ENTMCNC: 31.5 G/DL (ref 31.5–35.7)
MCV RBC AUTO: 82.2 FL (ref 79–97)
MONOCYTES # BLD: 0.38 10*3/MM3 (ref 0.1–0.9)
NEUTROPHILS # BLD AUTO: 7.88 10*3/MM3 (ref 1.7–7)
NEUTROPHILS NFR BLD MANUAL: 63 % (ref 42.7–76)
PLATELET # BLD AUTO: 634 10*3/MM3 (ref 140–450)
PMV BLD AUTO: 8.5 FL (ref 6–12)
POIKILOCYTOSIS BLD QL SMEAR: ABNORMAL
POTASSIUM SERPL-SCNC: 5.1 MMOL/L (ref 3.5–5.2)
POTASSIUM SERPL-SCNC: 5.1 MMOL/L (ref 3.5–5.2)
PROT SERPL-MCNC: 7.4 G/DL (ref 6–8.5)
RBC # BLD AUTO: 4.33 10*6/MM3 (ref 3.77–5.28)
SODIUM SERPL-SCNC: 134 MMOL/L (ref 136–145)
TARGETS BLD QL SMEAR: ABNORMAL
VARIANT LYMPHS NFR BLD MANUAL: 1 % (ref 0–5)
VARIANT LYMPHS NFR BLD MANUAL: 26 % (ref 19.6–45.3)
WBC MORPH BLD: NORMAL
WBC NRBC COR # BLD: 12.5 10*3/MM3 (ref 3.4–10.8)

## 2023-11-11 PROCEDURE — 25010000002 HYDROMORPHONE 1 MG/ML SOLUTION

## 2023-11-11 PROCEDURE — 84132 ASSAY OF SERUM POTASSIUM: CPT | Performed by: INTERNAL MEDICINE

## 2023-11-11 PROCEDURE — 85025 COMPLETE CBC W/AUTO DIFF WBC: CPT | Performed by: NURSE PRACTITIONER

## 2023-11-11 PROCEDURE — 80053 COMPREHEN METABOLIC PANEL: CPT | Performed by: NURSE PRACTITIONER

## 2023-11-11 PROCEDURE — 25010000002 ONDANSETRON PER 1 MG: Performed by: EMERGENCY MEDICINE

## 2023-11-11 PROCEDURE — 25010000002 HYDROMORPHONE 1 MG/ML SOLUTION: Performed by: PHYSICIAN ASSISTANT

## 2023-11-11 PROCEDURE — 99232 SBSQ HOSP IP/OBS MODERATE 35: CPT | Performed by: NURSE PRACTITIONER

## 2023-11-11 PROCEDURE — 25010000002 METOCLOPRAMIDE PER 10 MG: Performed by: NURSE PRACTITIONER

## 2023-11-11 PROCEDURE — 85007 BL SMEAR W/DIFF WBC COUNT: CPT | Performed by: NURSE PRACTITIONER

## 2023-11-11 RX ORDER — SORBITOL SOLUTION 70 %
50 SOLUTION, ORAL MISCELLANEOUS ONCE
Status: COMPLETED | OUTPATIENT
Start: 2023-11-11 | End: 2023-11-11

## 2023-11-11 RX ORDER — POLYETHYLENE GLYCOL 3350 17 G/17G
17 POWDER, FOR SOLUTION ORAL 2 TIMES DAILY
Status: DISCONTINUED | OUTPATIENT
Start: 2023-11-11 | End: 2023-11-13 | Stop reason: HOSPADM

## 2023-11-11 RX ORDER — LEVOTHYROXINE SODIUM 0.12 MG/1
125 TABLET ORAL
Status: DISCONTINUED | OUTPATIENT
Start: 2023-11-12 | End: 2023-11-13 | Stop reason: HOSPADM

## 2023-11-11 RX ORDER — TRAMADOL HYDROCHLORIDE 50 MG/1
50 TABLET ORAL EVERY 4 HOURS PRN
Status: DISCONTINUED | OUTPATIENT
Start: 2023-11-11 | End: 2023-11-13 | Stop reason: HOSPADM

## 2023-11-11 RX ADMIN — METOCLOPRAMIDE 5 MG: 5 INJECTION, SOLUTION INTRAMUSCULAR; INTRAVENOUS at 12:57

## 2023-11-11 RX ADMIN — POTASSIUM CHLORIDE 40 MEQ: 1500 TABLET, EXTENDED RELEASE ORAL at 20:11

## 2023-11-11 RX ADMIN — HYDROMORPHONE HYDROCHLORIDE 0.5 MG: 1 INJECTION, SOLUTION INTRAMUSCULAR; INTRAVENOUS; SUBCUTANEOUS at 12:57

## 2023-11-11 RX ADMIN — TRAMADOL HYDROCHLORIDE 50 MG: 50 TABLET, COATED ORAL at 08:49

## 2023-11-11 RX ADMIN — SPIRONOLACTONE 400 MG: 100 TABLET ORAL at 08:26

## 2023-11-11 RX ADMIN — HYDROMORPHONE HYDROCHLORIDE 1 MG: 1 INJECTION, SOLUTION INTRAMUSCULAR; INTRAVENOUS; SUBCUTANEOUS at 00:35

## 2023-11-11 RX ADMIN — TRAMADOL HYDROCHLORIDE 50 MG: 50 TABLET, COATED ORAL at 03:44

## 2023-11-11 RX ADMIN — HYOSCYAMINE SULFATE 125 MCG: 0.12 TABLET ORAL; SUBLINGUAL at 20:11

## 2023-11-11 RX ADMIN — POTASSIUM CHLORIDE 40 MEQ: 1500 TABLET, EXTENDED RELEASE ORAL at 08:26

## 2023-11-11 RX ADMIN — POLYETHYLENE GLYCOL 3350 17 G: 17 POWDER, FOR SOLUTION ORAL at 12:57

## 2023-11-11 RX ADMIN — LIOTHYRONINE SODIUM 5 MCG: 5 TABLET ORAL at 08:25

## 2023-11-11 RX ADMIN — SUCRALFATE 1 G: 1 TABLET ORAL at 16:50

## 2023-11-11 RX ADMIN — PANTOPRAZOLE SODIUM 40 MG: 40 TABLET, DELAYED RELEASE ORAL at 16:47

## 2023-11-11 RX ADMIN — MISOPROSTOL 200 MCG: 200 TABLET ORAL at 08:25

## 2023-11-11 RX ADMIN — PANTOPRAZOLE SODIUM 40 MG: 40 TABLET, DELAYED RELEASE ORAL at 08:26

## 2023-11-11 RX ADMIN — METOCLOPRAMIDE 5 MG: 5 INJECTION, SOLUTION INTRAMUSCULAR; INTRAVENOUS at 08:27

## 2023-11-11 RX ADMIN — TRAMADOL HYDROCHLORIDE 50 MG: 50 TABLET, COATED ORAL at 18:23

## 2023-11-11 RX ADMIN — HYOSCYAMINE SULFATE 125 MCG: 0.12 TABLET ORAL; SUBLINGUAL at 16:09

## 2023-11-11 RX ADMIN — ONDANSETRON 4 MG: 2 INJECTION INTRAMUSCULAR; INTRAVENOUS at 20:16

## 2023-11-11 RX ADMIN — HYDROMORPHONE HYDROCHLORIDE 0.5 MG: 1 INJECTION, SOLUTION INTRAMUSCULAR; INTRAVENOUS; SUBCUTANEOUS at 16:09

## 2023-11-11 RX ADMIN — APIXABAN 5 MG: 5 TABLET, FILM COATED ORAL at 20:11

## 2023-11-11 RX ADMIN — APIXABAN 5 MG: 5 TABLET, FILM COATED ORAL at 08:26

## 2023-11-11 RX ADMIN — SORBITOL SOLUTION (BULK) 50 ML: 70 SOLUTION at 12:58

## 2023-11-11 RX ADMIN — SUCRALFATE 1 G: 1 TABLET ORAL at 08:26

## 2023-11-11 RX ADMIN — BUMETANIDE 4 MG: 1 TABLET ORAL at 08:25

## 2023-11-11 RX ADMIN — ONDANSETRON 4 MG: 2 INJECTION INTRAMUSCULAR; INTRAVENOUS at 08:49

## 2023-11-11 RX ADMIN — METOCLOPRAMIDE 5 MG: 5 INJECTION, SOLUTION INTRAMUSCULAR; INTRAVENOUS at 16:48

## 2023-11-11 RX ADMIN — TRAMADOL HYDROCHLORIDE 50 MG: 50 TABLET, COATED ORAL at 23:29

## 2023-11-11 RX ADMIN — MISOPROSTOL 200 MCG: 200 TABLET ORAL at 20:10

## 2023-11-11 RX ADMIN — ONDANSETRON 4 MG: 2 INJECTION INTRAMUSCULAR; INTRAVENOUS at 14:21

## 2023-11-11 RX ADMIN — HYDROMORPHONE HYDROCHLORIDE 0.5 MG: 1 INJECTION, SOLUTION INTRAMUSCULAR; INTRAVENOUS; SUBCUTANEOUS at 20:16

## 2023-11-11 RX ADMIN — Medication 10 ML: at 20:12

## 2023-11-11 RX ADMIN — Medication 10 ML: at 08:28

## 2023-11-11 NOTE — PROGRESS NOTES
LOS: 1 day   Patient Care Team:  Austin Mcfadden MD as PCP - General (Family Medicine)      Subjective   Still having some Right sided abdominal pain    Interval History:   Labs: Sodium 134, potassium 5.1, creatinine 1.04.  TB 1.6 (1.9), alk phos 278 (274),  (235),  (139).  WBCs 12.5 and stable, hemoglobin 11.2, platelets 634.     MRCP 11/10/2023: No choledocholithiasis.  CBD 10 mm and post Jeanie state.  Hepatic steatosis.  Moderate ascites with central mesenteric edema unchanged from CT.  Cavernous transformation of portal vein.  Umbilical hernia containing a nonobstructing small bowel loop partially imaged.    She was able to eat some toast and yogurt this morning.  Complains of tightness in the right upper quadrant.  Has had some nausea but no vomiting.  Denies any GERD symptoms or esophageal burning.  Last bowel movement was Wednesday, 11/8/2023, states she normally has bowel movement every day without any laxatives or stool softeners at home.    ROS:   Constipation right upper quadrant abdominal pain.  No chest pain, shortness of breath, or cough.         Medication Review:     Current Facility-Administered Medications:     apixaban (ELIQUIS) tablet 5 mg, 5 mg, Oral, Q12H, Mateusz Kingston PA-C, 5 mg at 11/11/23 0826    bumetanide (BUMEX) tablet 4 mg, 4 mg, Oral, Daily, Mateusz Kingston PA-C, 4 mg at 11/11/23 0825    HYDROmorphone (DILAUDID) injection 0.5 mg, 0.5 mg, Intravenous, Q3H PRN, Gogo Moreira APRN    liothyronine (CYTOMEL) tablet 5 mcg, 5 mcg, Oral, Q AM, Mateusz Kingston PA-C, 5 mcg at 11/11/23 0825    melatonin tablet 5 mg, 5 mg, Oral, Nightly PRN, Leonardo Werner MD, 5 mg at 11/09/23 2118    metoclopramide (REGLAN) injection 5 mg, 5 mg, Intravenous, TID AC, Kalani Lock APRN, 5 mg at 11/11/23 0827    miSOPROStol (CYTOTEC) tablet 200 mcg, 200 mcg, Oral, BID, Mateusz Kingston PA-C, 200 mcg at 11/11/23 0825    ondansetron (ZOFRAN) injection 4 mg, 4 mg,  Intravenous, Q6H PRN, Leonardo Werner MD, 4 mg at 11/11/23 0849    pantoprazole (PROTONIX) EC tablet 40 mg, 40 mg, Oral, BID CAROLINE, Mateusz Kingston PA-C, 40 mg at 11/11/23 0826    potassium chloride (K-DUR,KLOR-CON) CR tablet 40 mEq, 40 mEq, Oral, BID, Mateusz Kingston PA-C, 40 mEq at 11/11/23 0826    Potassium Replacement - Follow Nurse / BPA Driven Protocol, , Does not apply, PRN, Mateusz Kingston PA-C    [COMPLETED] Insert Peripheral IV, , , Once **AND** sodium chloride 0.9 % flush 10 mL, 10 mL, Intravenous, PRN, Leonardo Werner MD, 10 mL at 11/09/23 1549    sodium chloride 0.9 % flush 10 mL, 10 mL, Intravenous, Q12H, Leonardo Werner MD, 10 mL at 11/11/23 0828    sodium chloride 0.9 % flush 10 mL, 10 mL, Intravenous, PRN, Leonardo Werner MD, 10 mL at 11/09/23 1916    sodium chloride 0.9 % infusion 40 mL, 40 mL, Intravenous, PRN, Leonardo Werner MD    spironolactone (ALDACTONE) tablet 400 mg, 400 mg, Oral, Daily, Mateusz Kingston PA-C, 400 mg at 11/11/23 0826    sucralfate (CARAFATE) tablet 1 g, 1 g, Oral, BID AC, Mateusz Kingston PA-C, 1 g at 11/11/23 0826    traMADol (ULTRAM) tablet 50 mg, 50 mg, Oral, Q4H PRN, Gogo Moreira APRN, 50 mg at 11/11/23 0849      Objective resting in the hospital bed.  NAD.  No family present.    Vital Signs  Temp:  [97.3 °F (36.3 °C)-98.6 °F (37 °C)] 97.3 °F (36.3 °C)  Heart Rate:  [] 90  Resp:  [15-19] 19  BP: (116-136)/(76-93) 125/90  Physical Exam:    General Appearance:    Awake and alert, in no acute distress   Head:    Normocephalic, without obvious abnormality   Eyes:          Conjunctivae normal, anicteric sclerae   Ears:    Hearing intact   Throat:   No oral lesions, no thrush, oral mucosa moist   Neck:   No adenopathy, supple, no JVD   Lungs:     respirations regular, even and unlabored       Abdomen:     soft, right upper quadrant tender, no rebound or guarding, non-distended, no hepatosplenomegaly   Rectal:     Deferred   Extremities:   No  "edema, no cyanosis, no redness   Skin:   No bleeding, bruising or rash, no jaundice   Neurologic:   Cranial nerves 2 - 12 grossly intact, no asterixis, sensation   intact        Results Review:    CBC    Results from last 7 days   Lab Units 11/11/23  0411 11/10/23  0537 11/09/23  1003 11/09/23  0049   WBC 10*3/mm3 12.50* 12.50* 11.80* 11.00*   HEMOGLOBIN g/dL 11.2* 10.6* 11.3* 10.0*   PLATELETS 10*3/mm3 634* 605* 634* 613*     CMP   Results from last 7 days   Lab Units 11/11/23  0411 11/10/23  0537 11/09/23  1943 11/09/23  1003 11/09/23  0049   SODIUM mmol/L 134* 134*  --  138 139   POTASSIUM mmol/L 5.1 4.2 3.6 3.5 3.6   CHLORIDE mmol/L 93* 97*  --  102 105   CO2 mmol/L 29.0 28.0  --  25.0 25.0   BUN mg/dL 15 14  --  11 17   CREATININE mg/dL 1.04* 0.83  --  0.63 0.72   GLUCOSE mg/dL 141* 107*  --  97 105*   ALBUMIN g/dL 3.7 3.6  --   --  3.7   BILIRUBIN mg/dL 1.6* 1.9*  --   --  0.9   ALK PHOS U/L 278* 274*  --   --  159*   AST (SGOT) U/L 107* 235*  --   --  29   ALT (SGPT) U/L 100* 139*  --   --  18   LIPASE U/L  --   --   --   --  34     Cr Clearance Estimated Creatinine Clearance: 58.6 mL/min (A) (by C-G formula based on SCr of 1.04 mg/dL (H)).  Coag   Results from last 7 days   Lab Units 11/10/23  0537 11/09/23  0049   INR  1.15* 1.05   APTT seconds  --  27.1*     HbA1C   Lab Results   Component Value Date    HGBA1C 5.20 01/20/2023    HGBA1C 5.26 05/27/2016    HGBA1C 5.6 05/12/2015     Blood Glucose No results found for: \"POCGLU\"  Infection     UA    Results from last 7 days   Lab Units 11/09/23  0035   NITRITE UA  Negative   WBC UA /HPF 6-10*   BACTERIA UA /HPF 1+*   SQUAM EPITHEL UA /HPF 7-12*     Radiology(recent) MRI abdomen w wo contrast mrcp    Result Date: 11/10/2023  Impression: 1. No choledocholithiasis. Common bile duct dilated to 10 mm which may relate to postcholecystectomy state. 2. Hepatic steatosis. 3. Moderate ascites with central mesenteric edema unchanged from prior CT. 4. Cavernous " transformation of portal vein. 5. Postsurgical changes of gastric bypass, splenectomy and cholecystectomy. 6. Umbilical hernia containing a nonobstructed small bowel loop partially imaged. Electronically Signed: Blu Mancilla MD  11/10/2023 2:45 PM EST  Workstation ID: [id number]    US Liver    Result Date: 11/9/2023  Impression: Findings compatible with hepatic steatosis. Electronically Signed: Edna Finney MD  11/9/2023 2:03 PM CST  Workstation ID: [id number]           Assessment & Plan   48-year-old female with Ramon cirrhosis and complicated past surgical history with Alfonzo-en-Y anatomy presented to the hospital on 11/8/2023 with continued abdominal pain.  Was recently here last month and had EGD.  Now has elevated liver enzymes.     -Upper abdominal pain  -Nausea/vomiting  -Elevated liver enzymes  -Anastomotic ulcer  -Ascites  -RAMON cirrhosis that has been complicated by ascites, hepatic encephalopathy, and nonbleeding esophageal varices  -Mild normocytic anemia  -Leukocytosis  -Elevated alk phos  -History of gastric sleeve followed by Alfonzo-en-Y gastric bypass with multiple surgeries   -Chronic portal vein thrombus and DVT on Eliquis  -History of cholecystectomy and splenectomy  -Ramon cirrhosis that has been complicated by hepatic encephalopathy, ascites, and nonbleeding esophageal varices        PLAN:  LFTs are coming down today.  MRCP did not show signs of choledocholithiasis.    Small bowel series was canceled yesterday.    Has not had a bowel movement since Wednesday, 11/8/2023.  Will give laxatives and started on bowel regimen.  She is complaining of some right upper quadrant abdominal pain may be related to constipation.  We will give Levsin as patient has a red dye allergy and evidently dicyclomine is contraindicated.  2 g sodium restriction diet.  Continue PPI twice daily, Carafate, and misoprostol.  Recommend outpatient colonoscopy.    Carlee Peoples, APRN  11/11/23  10:19 EST

## 2023-11-11 NOTE — PLAN OF CARE
Goal Outcome Evaluation:  Plan of Care Reviewed With: patient        Progress: no change  Outcome Evaluation: Patient continues to complain of abdominal pain and nausea. Patient is recieving IV pain and nausea meds. GI following. Patient to possibly discharge home tomorrow and follow up outpatient.

## 2023-11-11 NOTE — NURSING NOTE
Spoke with MD Tidwell, who stated that it was ok to give zofran early at this time, per pt's request.

## 2023-11-11 NOTE — PROGRESS NOTES
[facility] Medicine Services   Daily Progress Note    Patient Name: Lucinda Cope  : 1975  MRN: [id number]  Primary Care Physician:  Austin Mcfadden MD  Date of admission: 2023        Subjective      Chief Complaint: abd pain    Patient seen and examined this am, still with 6/10 RUQ pain , did not eat much. LFTs still up but trending down    ROS   12 point ROS is negative except as in HPI      Objective      Vitals:   Temp:  [97.3 °F (36.3 °C)-98.6 °F (37 °C)] 97.5 °F (36.4 °C)  Heart Rate:  [] 78  Resp:  [15-19] 16  BP: (116-136)/(76-93) 118/84    Physical Exam     Constitutional: Awake, alert in no distress at time of examination.              Eyes: PERRLA, sclerae anicteric, no conjunctival injection              HENT: NCAT, mucous membranes moist              Neck: Supple, no thyromegaly, no lymphadenopathy, trachea midline              Respiratory: Clear to auscultation bilaterally, nonlabored respirations               Cardiovascular: RRR, no murmurs, rubs, or gallops, palpable pedal pulses bilaterally              Gastrointestinal: Positive bowel sounds, soft, nontender, nondistended              Musculoskeletal: No bilateral ankle edema, no clubbing or cyanosis to extremities              Psychiatric: Appropriate affect, cooperative              Neurologic: Oriented x 3, strength symmetric in all extremities, Cranial Nerves grossly intact to confrontation, speech clear              Skin: No rashes             Result Review    Result Review:  I have personally reviewed the results from the time of this admission to 2023 11:36 EST and agree with these findings:  [x]  Laboratory  []  Microbiology  [x]  Radiology  []  EKG/Telemetry   []  Cardiology/Vascular   []  Pathology  []  Old records  []  Other:        Assessment & Plan      Brief Patient Summary:  Lucinda Cope is a 48 y.o. female past medical history significant for history of gastric bypass surgery several years  back complicated by splenectomy history of Carter who presented to the hospital initially on 11/9/2023 with chief complaint of having abdominal pain, she stated that in the hospital couple weeks back she had an endoscopy which showed an ulcer, admitted and evaluated by gastroenterology, and MRCP was done earlier today and showed no choledocholithiasis, patient is tolerating her diet. Being followed by GI       apixaban, 5 mg, Oral, Q12H  bumetanide, 4 mg, Oral, Daily  liothyronine, 5 mcg, Oral, Q AM  metoclopramide, 5 mg, Intravenous, TID AC  miSOPROStol, 200 mcg, Oral, BID  pantoprazole, 40 mg, Oral, BID AC  potassium chloride, 40 mEq, Oral, BID  sodium chloride, 10 mL, Intravenous, Q12H  spironolactone, 400 mg, Oral, Daily  sucralfate, 1 g, Oral, BID AC             Active Hospital Problems:  Active Hospital Problems    Diagnosis     **Abdominal pain      Plan:     This is 48-year-old lady who presented to the hospital with  1.  Abdominal pain  2.  History of CARTER and ascites  3.  History of gastric sleeve        Plan: Patient is tolerating her diet, currently on analgesics and antiemetics, GI is following  MRCP was done and showed no choledocholithiasis.         DVT prophylaxis:  Medical and mechanical DVT prophylaxis orders are present.    CODE STATUS:    Level Of Support Discussed With: Patient  Code Status (Patient has no pulse and is not breathing): CPR (Attempt to Resuscitate)  Medical Interventions (Patient has pulse or is breathing): Full Support      Disposition:  I expect patient to be discharged in 1-2 days.    Electronically signed by Alia Matt MD, 11/11/23, 11:36 [facility].  [facility] Hospitalist Team

## 2023-11-12 LAB
ALBUMIN SERPL-MCNC: 3.7 G/DL (ref 3.5–5.2)
ALBUMIN/GLOB SERPL: 0.9 G/DL
ALP SERPL-CCNC: 271 U/L (ref 39–117)
ALT SERPL W P-5'-P-CCNC: 75 U/L (ref 1–33)
ANION GAP SERPL CALCULATED.3IONS-SCNC: 8 MMOL/L (ref 5–15)
AST SERPL-CCNC: 49 U/L (ref 1–32)
BILIRUB SERPL-MCNC: 1.3 MG/DL (ref 0–1.2)
BUN SERPL-MCNC: 17 MG/DL (ref 6–20)
BUN/CREAT SERPL: 17 (ref 7–25)
CALCIUM SPEC-SCNC: 9.5 MG/DL (ref 8.6–10.5)
CHLORIDE SERPL-SCNC: 93 MMOL/L (ref 98–107)
CO2 SERPL-SCNC: 28 MMOL/L (ref 22–29)
CREAT SERPL-MCNC: 1 MG/DL (ref 0.57–1)
CRP SERPL-MCNC: 0.65 MG/DL (ref 0–0.5)
DEPRECATED RDW RBC AUTO: 60.4 FL (ref 37–54)
EGFRCR SERPLBLD CKD-EPI 2021: 68.8 ML/MIN/1.73
ERYTHROCYTE [DISTWIDTH] IN BLOOD BY AUTOMATED COUNT: 20.9 % (ref 12.3–15.4)
GLOBULIN UR ELPH-MCNC: 4.1 GM/DL
GLUCOSE SERPL-MCNC: 175 MG/DL (ref 65–99)
HCT VFR BLD AUTO: 36.4 % (ref 34–46.6)
HGB BLD-MCNC: 11.8 G/DL (ref 12–15.9)
MCH RBC QN AUTO: 25.9 PG (ref 26.6–33)
MCHC RBC AUTO-ENTMCNC: 32.5 G/DL (ref 31.5–35.7)
MCV RBC AUTO: 79.7 FL (ref 79–97)
PLATELET # BLD AUTO: 659 10*3/MM3 (ref 140–450)
PMV BLD AUTO: 8.2 FL (ref 6–12)
POTASSIUM SERPL-SCNC: 5.1 MMOL/L (ref 3.5–5.2)
PROT SERPL-MCNC: 7.8 G/DL (ref 6–8.5)
RBC # BLD AUTO: 4.56 10*6/MM3 (ref 3.77–5.28)
SODIUM SERPL-SCNC: 129 MMOL/L (ref 136–145)
WBC NRBC COR # BLD: 11.1 10*3/MM3 (ref 3.4–10.8)

## 2023-11-12 PROCEDURE — 86140 C-REACTIVE PROTEIN: CPT | Performed by: NURSE PRACTITIONER

## 2023-11-12 PROCEDURE — 80053 COMPREHEN METABOLIC PANEL: CPT | Performed by: NURSE PRACTITIONER

## 2023-11-12 PROCEDURE — 85027 COMPLETE CBC AUTOMATED: CPT | Performed by: NURSE PRACTITIONER

## 2023-11-12 PROCEDURE — 25010000002 METOCLOPRAMIDE PER 10 MG: Performed by: NURSE PRACTITIONER

## 2023-11-12 PROCEDURE — 99232 SBSQ HOSP IP/OBS MODERATE 35: CPT | Performed by: NURSE PRACTITIONER

## 2023-11-12 PROCEDURE — 25010000002 ONDANSETRON PER 1 MG: Performed by: EMERGENCY MEDICINE

## 2023-11-12 RX ORDER — SIMETHICONE 80 MG
80 TABLET,CHEWABLE ORAL EVERY 6 HOURS PRN
Qty: 10 TABLET | Refills: 0 | Status: SHIPPED | OUTPATIENT
Start: 2023-11-12

## 2023-11-12 RX ORDER — SIMETHICONE 80 MG
80 TABLET,CHEWABLE ORAL
Status: DISCONTINUED | OUTPATIENT
Start: 2023-11-12 | End: 2023-11-13 | Stop reason: HOSPADM

## 2023-11-12 RX ORDER — POLYETHYLENE GLYCOL 3350 17 G/17G
17 POWDER, FOR SOLUTION ORAL 2 TIMES DAILY
Qty: 60 PACKET | Refills: 0 | Status: SHIPPED | OUTPATIENT
Start: 2023-11-12

## 2023-11-12 RX ORDER — TRAMADOL HYDROCHLORIDE 50 MG/1
50 TABLET ORAL EVERY 4 HOURS PRN
Qty: 10 TABLET | Refills: 0 | Status: SHIPPED | OUTPATIENT
Start: 2023-11-12 | End: 2023-11-18

## 2023-11-12 RX ADMIN — POTASSIUM CHLORIDE 40 MEQ: 1500 TABLET, EXTENDED RELEASE ORAL at 07:51

## 2023-11-12 RX ADMIN — Medication 10 ML: at 08:04

## 2023-11-12 RX ADMIN — TRAMADOL HYDROCHLORIDE 50 MG: 50 TABLET, COATED ORAL at 12:05

## 2023-11-12 RX ADMIN — HYOSCYAMINE SULFATE 125 MCG: 0.12 TABLET ORAL; SUBLINGUAL at 07:51

## 2023-11-12 RX ADMIN — LIOTHYRONINE SODIUM 5 MCG: 5 TABLET ORAL at 05:11

## 2023-11-12 RX ADMIN — METOCLOPRAMIDE 5 MG: 5 INJECTION, SOLUTION INTRAMUSCULAR; INTRAVENOUS at 12:05

## 2023-11-12 RX ADMIN — POTASSIUM CHLORIDE 40 MEQ: 1500 TABLET, EXTENDED RELEASE ORAL at 20:16

## 2023-11-12 RX ADMIN — Medication 10 ML: at 20:18

## 2023-11-12 RX ADMIN — METOCLOPRAMIDE 5 MG: 5 INJECTION, SOLUTION INTRAMUSCULAR; INTRAVENOUS at 16:20

## 2023-11-12 RX ADMIN — MISOPROSTOL 200 MCG: 200 TABLET ORAL at 20:16

## 2023-11-12 RX ADMIN — HYOSCYAMINE SULFATE 125 MCG: 0.12 TABLET ORAL; SUBLINGUAL at 16:20

## 2023-11-12 RX ADMIN — TRAMADOL HYDROCHLORIDE 50 MG: 50 TABLET, COATED ORAL at 07:52

## 2023-11-12 RX ADMIN — LEVOTHYROXINE SODIUM 125 MCG: 0.12 TABLET ORAL at 05:11

## 2023-11-12 RX ADMIN — SUCRALFATE 1 G: 1 TABLET ORAL at 16:19

## 2023-11-12 RX ADMIN — MISOPROSTOL 200 MCG: 200 TABLET ORAL at 07:53

## 2023-11-12 RX ADMIN — TRAMADOL HYDROCHLORIDE 50 MG: 50 TABLET, COATED ORAL at 20:16

## 2023-11-12 RX ADMIN — TRAMADOL HYDROCHLORIDE 50 MG: 50 TABLET, COATED ORAL at 03:20

## 2023-11-12 RX ADMIN — ONDANSETRON 4 MG: 2 INJECTION INTRAMUSCULAR; INTRAVENOUS at 08:40

## 2023-11-12 RX ADMIN — ONDANSETRON 4 MG: 2 INJECTION INTRAMUSCULAR; INTRAVENOUS at 16:20

## 2023-11-12 RX ADMIN — PANTOPRAZOLE SODIUM 40 MG: 40 TABLET, DELAYED RELEASE ORAL at 16:19

## 2023-11-12 RX ADMIN — APIXABAN 5 MG: 5 TABLET, FILM COATED ORAL at 07:53

## 2023-11-12 RX ADMIN — APIXABAN 5 MG: 5 TABLET, FILM COATED ORAL at 20:16

## 2023-11-12 RX ADMIN — PANTOPRAZOLE SODIUM 40 MG: 40 TABLET, DELAYED RELEASE ORAL at 07:50

## 2023-11-12 RX ADMIN — SIMETHICONE 80 MG: 80 TABLET, CHEWABLE ORAL at 20:16

## 2023-11-12 RX ADMIN — TRAMADOL HYDROCHLORIDE 50 MG: 50 TABLET, COATED ORAL at 16:19

## 2023-11-12 RX ADMIN — HYOSCYAMINE SULFATE 125 MCG: 0.12 TABLET ORAL; SUBLINGUAL at 20:16

## 2023-11-12 RX ADMIN — METOCLOPRAMIDE 5 MG: 5 INJECTION, SOLUTION INTRAMUSCULAR; INTRAVENOUS at 07:53

## 2023-11-12 RX ADMIN — SUCRALFATE 1 G: 1 TABLET ORAL at 07:50

## 2023-11-12 NOTE — PLAN OF CARE
Problem: Adult Inpatient Plan of Care  Goal: Plan of Care Review  Outcome: Ongoing, Progressing  Goal: Patient-Specific Goal (Individualized)  Outcome: Ongoing, Progressing  Goal: Absence of Hospital-Acquired Illness or Injury  Outcome: Ongoing, Progressing  Intervention: Identify and Manage Fall Risk  Recent Flowsheet Documentation  Taken 11/11/2023 2151 by Jason Palma RN  Safety Promotion/Fall Prevention: safety round/check completed  Taken 11/11/2023 1957 by Jason Palma RN  Safety Promotion/Fall Prevention: safety round/check completed  Intervention: Prevent Skin Injury  Recent Flowsheet Documentation  Taken 11/11/2023 2151 by Jason Palma RN  Body Position: position changed independently  Taken 11/11/2023 1957 by Jason Palma RN  Body Position: position changed independently  Intervention: Prevent and Manage VTE (Venous Thromboembolism) Risk  Recent Flowsheet Documentation  Taken 11/11/2023 1957 by Jason Palma RN  Activity Management: up ad filippo  Range of Motion: active ROM (range of motion) encouraged  Intervention: Prevent Infection  Recent Flowsheet Documentation  Taken 11/11/2023 2151 by Jason Palma RN  Infection Prevention: rest/sleep promoted  Taken 11/11/2023 1957 by Jason Palma RN  Infection Prevention: hand hygiene promoted  Goal: Optimal Comfort and Wellbeing  Outcome: Ongoing, Progressing  Goal: Readiness for Transition of Care  Outcome: Ongoing, Progressing   Goal Outcome Evaluation:

## 2023-11-12 NOTE — PROGRESS NOTES
LOS: 2 days   Patient Care Team:  Austin Mcfadden MD as PCP - General (Family Medicine)      Subjective   Still having some Right sided abdominal pain.  4 bowel movements yesterday.  None today.  Decreased bloating.    Interval History:   Labs: Sodium 129 (134), creatinine 1.  Total bilirubin 1.3 (1.6), alk phos 271 (278), AST 49 (107), ALT 75 (100).  CRP 0.65.  WBCs 11.1 (12.5), hemoglobin 11.8, platelets 659.  Patient is feeling somewhat better today after having multiple bowel movements yesterday.  Patient was able to eat toast and turkey gill this morning.    ROS:   Bloating, right upper quadrant abdominal pain  No chest pain, shortness of breath, or cough.         Medication Review:     Current Facility-Administered Medications:     apixaban (ELIQUIS) tablet 5 mg, 5 mg, Oral, Q12H, Mateusz Kingston PA-C, 5 mg at 11/12/23 0753    bumetanide (BUMEX) tablet 4 mg, 4 mg, Oral, Daily, Mateusz Kingston PA-C, 4 mg at 11/11/23 0825    hyoscyamine (LEVSIN) SL tablet 125 mcg, 125 mcg, Sublingual, TID, Carlee Peoples APRN, 125 mcg at 11/12/23 1620    levothyroxine (SYNTHROID, LEVOTHROID) tablet 125 mcg, 125 mcg, Oral, Q AM, Alia Matt MD, 125 mcg at 11/12/23 0511    liothyronine (CYTOMEL) tablet 5 mcg, 5 mcg, Oral, Q AM, Mateusz Kingston PA-C, 5 mcg at 11/12/23 0511    melatonin tablet 5 mg, 5 mg, Oral, Nightly PRN, Leonardo Werner MD, 5 mg at 11/09/23 2118    metoclopramide (REGLAN) injection 5 mg, 5 mg, Intravenous, TID Hayde VELAZQUEZ Lauren M, APRN, 5 mg at 11/12/23 1620    miSOPROStol (CYTOTEC) tablet 200 mcg, 200 mcg, Oral, BID, Mateusz Kingston PA-C, 200 mcg at 11/12/23 0753    ondansetron (ZOFRAN) injection 4 mg, 4 mg, Intravenous, Q6H PRN, Leonardo Werner MD, 4 mg at 11/12/23 1620    pantoprazole (PROTONIX) EC tablet 40 mg, 40 mg, Oral, BID AC, Mateusz Kingston PA-C, 40 mg at 11/12/23 1619    polyethylene glycol (MIRALAX) packet 17 g, 17 g, Oral, BID, Carlee Peoples APRN, 17 g at 11/11/23  1257    potassium chloride (K-DUR,KLOR-CON) CR tablet 40 mEq, 40 mEq, Oral, BID, Mateusz Kingston PA-C, 40 mEq at 11/12/23 0751    Potassium Replacement - Follow Nurse / BPA Driven Protocol, , Does not apply, PRN, Mateusz Kingston PA-C    [COMPLETED] Insert Peripheral IV, , , Once **AND** sodium chloride 0.9 % flush 10 mL, 10 mL, Intravenous, PRN, Leonardo Werner MD, 10 mL at 11/09/23 1549    sodium chloride 0.9 % flush 10 mL, 10 mL, Intravenous, Q12H, Leonardo Werner MD, 10 mL at 11/12/23 0804    sodium chloride 0.9 % flush 10 mL, 10 mL, Intravenous, PRN, Leonardo Werner MD, 10 mL at 11/09/23 1916    sodium chloride 0.9 % infusion 40 mL, 40 mL, Intravenous, PRN, Leonardo Werner MD    spironolactone (ALDACTONE) tablet 400 mg, 400 mg, Oral, Daily, Mateusz Kingston PA-C, 400 mg at 11/11/23 0826    sucralfate (CARAFATE) tablet 1 g, 1 g, Oral, BID AC, Mateusz Kingston PA-C, 1 g at 11/12/23 1619    traMADol (ULTRAM) tablet 50 mg, 50 mg, Oral, Q4H PRN, Gogo Moreira APRN, 50 mg at 11/12/23 1619      Objective resting in the hospital bed.  NAD.  No family present.    Vital Signs  Temp:  [97.8 °F (36.6 °C)-98.3 °F (36.8 °C)] 98.2 °F (36.8 °C)  Heart Rate:  [] 72  Resp:  [15-16] 16  BP: (119-138)/(73-94) 120/80  Physical Exam:    General Appearance:    Awake and alert, in no acute distress   Head:    Normocephalic, without obvious abnormality   Eyes:          Conjunctivae normal, anicteric sclerae   Ears:    Hearing intact   Throat:   No oral lesions, no thrush, oral mucosa moist   Neck:   No adenopathy, supple, no JVD   Lungs:     respirations regular, even and unlabored       Abdomen:     soft, right upper quadrant tender, no rebound or guarding, non-distended, no hepatosplenomegaly   Rectal:     Deferred   Extremities:   No edema, no cyanosis, no redness   Skin:   No bleeding, bruising or rash, no jaundice   Neurologic:   Cranial nerves 2 - 12 grossly intact, no asterixis, sensation   intact  "       Results Review:    CBC    Results from last 7 days   Lab Units 11/12/23  0433 11/11/23  0411 11/10/23  0537 11/09/23  1003 11/09/23  0049   WBC 10*3/mm3 11.10* 12.50* 12.50* 11.80* 11.00*   HEMOGLOBIN g/dL 11.8* 11.2* 10.6* 11.3* 10.0*   PLATELETS 10*3/mm3 659* 634* 605* 634* 613*     CMP   Results from last 7 days   Lab Units 11/12/23  0433 11/11/23  1948 11/11/23  0411 11/10/23  0537 11/09/23  1943 11/09/23  1003 11/09/23  0049   SODIUM mmol/L 129*  --  134* 134*  --  138 139   POTASSIUM mmol/L 5.1 5.1 5.1 4.2 3.6 3.5 3.6   CHLORIDE mmol/L 93*  --  93* 97*  --  102 105   CO2 mmol/L 28.0  --  29.0 28.0  --  25.0 25.0   BUN mg/dL 17  --  15 14  --  11 17   CREATININE mg/dL 1.00  --  1.04* 0.83  --  0.63 0.72   GLUCOSE mg/dL 175*  --  141* 107*  --  97 105*   ALBUMIN g/dL 3.7  --  3.7 3.6  --   --  3.7   BILIRUBIN mg/dL 1.3*  --  1.6* 1.9*  --   --  0.9   ALK PHOS U/L 271*  --  278* 274*  --   --  159*   AST (SGOT) U/L 49*  --  107* 235*  --   --  29   ALT (SGPT) U/L 75*  --  100* 139*  --   --  18   LIPASE U/L  --   --   --   --   --   --  34     Cr Clearance Estimated Creatinine Clearance: 61 mL/min (by C-G formula based on SCr of 1 mg/dL).  Coag   Results from last 7 days   Lab Units 11/10/23  0537 11/09/23  0049   INR  1.15* 1.05   APTT seconds  --  27.1*     HbA1C   Lab Results   Component Value Date    HGBA1C 5.20 01/20/2023    HGBA1C 5.26 05/27/2016    HGBA1C 5.6 05/12/2015     Blood Glucose No results found for: \"POCGLU\"  Infection     UA    Results from last 7 days   Lab Units 11/09/23  0035   NITRITE UA  Negative   WBC UA /HPF 6-10*   BACTERIA UA /HPF 1+*   SQUAM EPITHEL UA /HPF 7-12*     Radiology(recent) No radiology results for the last day          Assessment & Plan   48-year-old female with Ramon cirrhosis and complicated past surgical history with Alfonzo-en-Y anatomy presented to the hospital on 11/8/2023 with continued abdominal pain.  Was recently here last month and had EGD.  Now has elevated " liver enzymes.     -Upper abdominal pain  -Nausea/vomiting  -Elevated liver enzymes  -Anastomotic ulcer  -Ascites  -CARTER cirrhosis that has been complicated by ascites, hepatic encephalopathy, and nonbleeding esophageal varices  -Mild normocytic anemia  -Leukocytosis  -Elevated alk phos  -History of gastric sleeve followed by Alfonzo-en-Y gastric bypass with multiple surgeries   -Chronic portal vein thrombus and DVT on Eliquis  -History of cholecystectomy and splenectomy  -Carter cirrhosis that has been complicated by hepatic encephalopathy, ascites, and nonbleeding esophageal varices        PLAN:  LFTs continue to decrease.    4 bowel movements yesterday after bowel purge.  Feeling somewhat better.  Is having some bloating though.  We will add some simethicone.  Continue Levsin as she cannot take dicyclomine due to red dye allergy  Continue daily bowel regimen, PPI twice daily, Carafate, and misoprostol.  Recommend outpatient colonoscopy.  Hopefully home tomorrow.    Carlee Peoples, APRN  11/12/23  16:42 EST

## 2023-11-12 NOTE — DISCHARGE SUMMARY
[facility] Medicine Services  Discharge Summary    Date of Service: 23  Patient Name: Lucinda Cope  : 1975  MRN: [id number]    Date of Admission: 2023  Discharge Diagnosis: Abdominal pain suspect mostly due to IBS and constipation  Date of Discharge:  23  Primary Care Physician: Austin Mcfadden MD      Presenting Problem:   Abdominal pain [R10.9]  Generalized abdominal pain [R10.84]  Other ascites [R18.8]    Active and Resolved Hospital Problems:  Active Hospital Problems    Diagnosis POA    **Abdominal pain [R10.9] Yes      Resolved Hospital Problems   No resolved problems to display.         Hospital Course     Hospital Course:  Lucinda Cope is a 48 y.o. female with past medical history significant for history of gastric bypass surgery several years back complicated by splenectomy history of Ramon who presented to the hospital initially on 2023 with chief complaint of having abdominal pain, she stated that in the hospital couple weeks back she had an endoscopy which showed an ulcer, admitted and evaluated by gastroenterology. MRCP was done and did not show choledocholithiasis, patient is tolerating her diet. Being followed by GI. Labs improving, bilirubin down to 1.3, transaminases improving. Suspect abdominal pain mostly due to IBS and constipation. Needs outpat colonoscopy.  GI planning on ampullary stent placement as outpat.      DISCHARGE Follow Up Recommendations for labs and diagnostics:       Reasons For Change In Medications and Indications for New Medications:      Day of Discharge     Vital Signs:  Temp:  [98 °F (36.7 °C)-98.3 °F (36.8 °C)] 98 °F (36.7 °C)  Heart Rate:  [] 95  Resp:  [15-16] 15  BP: (116-127)/(73-81) 116/75    Physical Exam:  Physical Exam     Constitutional: Awake, alert in no distress at time of examination.              Eyes: PERRLA, sclerae anicteric, no conjunctival injection              HENT: NCAT, mucous membranes  moist              Neck: Supple, no thyromegaly, no lymphadenopathy, trachea midline              Respiratory: Clear to auscultation bilaterally, nonlabored respirations               Cardiovascular: RRR, no murmurs, rubs, or gallops, palpable pedal pulses bilaterally              Gastrointestinal: Positive bowel sounds, soft, nontender, nondistended              Musculoskeletal: No bilateral ankle edema, no clubbing or cyanosis to extremities              Psychiatric: Appropriate affect, cooperative              Neurologic: Oriented x 3, strength symmetric in all extremities, Cranial Nerves grossly intact to confrontation, speech clear              Skin: No rashes         Pertinent  and/or Most Recent Results     LAB RESULTS:      Lab 11/12/23  0433 11/11/23  0411 11/10/23  0537 11/09/23  1003 11/09/23  0049   WBC 11.10* 12.50* 12.50* 11.80* 11.00*   HEMOGLOBIN 11.8* 11.2* 10.6* 11.3* 10.0*   HEMATOCRIT 36.4 35.6 32.5* 35.9 32.3*   PLATELETS 659* 634* 605* 634* 613*   NEUTROS ABS  --  7.88* 7.88* 6.84 4.40   LYMPHS ABS  --   --   --   --  5.50*   MONOS ABS  --   --   --   --  0.60   EOS ABS  --  0.50* 0.13 0.12 0.30   MCV 79.7 82.2 79.8 82.6 81.7   CRP 0.65*  --   --   --   --    PROTIME  --   --  12.4*  --  11.4   APTT  --   --   --   --  27.1*         Lab 11/12/23  0433 11/11/23  1948 11/11/23  0411 11/10/23  0537 11/09/23 1943 11/09/23  1003 11/09/23  0049   SODIUM 129*  --  134* 134*  --  138 139   POTASSIUM 5.1 5.1 5.1 4.2 3.6 3.5 3.6   CHLORIDE 93*  --  93* 97*  --  102 105   CO2 28.0  --  29.0 28.0  --  25.0 25.0   ANION GAP 8.0  --  12.0 9.0  --  11.0 9.0   BUN 17  --  15 14  --  11 17   CREATININE 1.00  --  1.04* 0.83  --  0.63 0.72   EGFR 68.8  --  65.6 86.0  --  108.2 102.0   GLUCOSE 175*  --  141* 107*  --  97 105*   CALCIUM 9.5  --  9.1 8.8  --  9.1 9.1         Lab 11/12/23  0433 11/11/23  0411 11/10/23  0537 11/09/23  0049   TOTAL PROTEIN 7.8 7.4 7.2 6.9   ALBUMIN 3.7 3.7 3.6 3.7   GLOBULIN 4.1 3.7  3.6 3.2   ALT (SGPT) 75* 100* 139* 18   AST (SGOT) 49* 107* 235* 29   BILIRUBIN 1.3* 1.6* 1.9* 0.9   ALK PHOS 271* 278* 274* 159*   LIPASE  --   --   --  34         Lab 11/10/23  0537 11/09/23  1003 11/09/23  0049   HSTROP T  --  <6 <6   PROTIME 12.4*  --  11.4   INR 1.15*  --  1.05                 Brief Urine Lab Results  (Last result in the past 365 days)        Color   Clarity   Blood   Leuk Est   Nitrite   Protein   CREAT   Urine HCG        11/09/23 0035 Yellow   Clear   Negative   Trace   Negative   Negative                 Microbiology Results (last 10 days)       ** No results found for the last 240 hours. **            MRI abdomen w wo contrast mrcp    Result Date: 11/10/2023  Impression: Impression: 1. No choledocholithiasis. Common bile duct dilated to 10 mm which may relate to postcholecystectomy state. 2. Hepatic steatosis. 3. Moderate ascites with central mesenteric edema unchanged from prior CT. 4. Cavernous transformation of portal vein. 5. Postsurgical changes of gastric bypass, splenectomy and cholecystectomy. 6. Umbilical hernia containing a nonobstructed small bowel loop partially imaged. Electronically Signed: Blu Mancilla MD  11/10/2023 2:45 PM EST  Workstation ID: [id number] [employer]    US Liver    Result Date: 11/9/2023  Impression: Impression: Findings compatible with hepatic steatosis. Electronically Signed: Edna Finney MD  11/9/2023 2:03 PM CST  Workstation ID: [id number]    CT Abdomen Pelvis Without Contrast    Result Date: 11/9/2023  Impression: Impression: 1.No significant change. Small to moderate amount of ascites present throughout the abdomen and pelvis, similar as compared to the previous study. No acute intra-abdominal or intrapelvic process identified. 2.Ancillary findings as described above. Electronically Signed: Muriel Gil MD  11/9/2023 1:19 AM EST  Workstation ID: [id number]    CT Abdomen Pelvis Without Contrast    Result Date: 10/17/2023  Impression: Impression: No evidence  of new acute process in the abdomen or pelvis on this noncontrast exam. Similar multiple chronic/persistent findings including hepatic steatosis, small volume loculated ascites, mild bilateral hydronephrosis, fat-containing umbilical hernia, and multiorgan postsurgical changes, as above. Additionally, a prior contrasted exam  demonstrated portal vein thrombosis with cavernous transformation, though this cannot be seen on this noncontrast exam. Electronically Signed: Isak Hong MD  10/17/2023 8:53 PM EDT  Workstation ID: [id number]     Results for orders placed during the hospital encounter of 07/13/21    Duplex Venous Lower Extremity - Bilateral CAR    Interpretation Summary  · Sub-acute right lower extremity superficial thrombophlebitis noted in the greater saphenous (above knee).  · Acute right lower extremity superficial thrombophlebitis noted in the varicosity (above knee).  · All other veins appeared normal bilaterally.      Results for orders placed during the hospital encounter of 07/13/21    Duplex Venous Lower Extremity - Bilateral CAR    Interpretation Summary  · Sub-acute right lower extremity superficial thrombophlebitis noted in the greater saphenous (above knee).  · Acute right lower extremity superficial thrombophlebitis noted in the varicosity (above knee).  · All other veins appeared normal bilaterally.          Labs Pending at Discharge:      Procedures Performed           Consults:   Consults       Date and Time Order Name Status Description    11/10/2023  4:30 PM Inpatient Hospitalist Consult      11/9/2023  4:56 AM Inpatient Gastroenterology Consult Completed     10/24/2023  1:32 AM Inpatient Gastroenterology Consult Completed               Discharge Details        Discharge Medications        New Medications        Instructions Start Date   hyoscyamine 0.125 MG SL tablet  Commonly known as: LEVSIN   125 mcg, Sublingual, 3 Times Daily      simethicone 80 MG chewable tablet  Commonly known as:  Gas-X   80 mg, Oral, Every 6 Hours PRN      traMADol 50 MG tablet  Commonly known as: ULTRAM   50 mg, Oral, Every 4 Hours PRN             Continue These Medications        Instructions Start Date   apixaban 5 MG tablet tablet  Commonly known as: ELIQUIS   5 mg, Oral, 2 Times Daily      bumetanide 2 MG tablet  Commonly known as: BUMEX   4 mg, Oral, Daily      estradiol 0.1 MG/GM vaginal cream  Commonly known as: ESTRACE   1 g, Vaginal, 3 Times Weekly, Monday Wednesday Friday       levothyroxine 125 MCG tablet  Commonly known as: SYNTHROID, LEVOTHROID   125 mcg, Oral, Every Early Morning      liothyronine 5 MCG tablet  Commonly known as: CYTOMEL   5 mcg, Oral, Daily      miSOPROStol 200 MCG tablet  Commonly known as: CYTOTEC   200 mcg, Oral, 2 Times Daily      ondansetron ODT 8 MG disintegrating tablet  Commonly known as: ZOFRAN-ODT   8 mg, Translingual, Every 8 Hours PRN      ondansetron ODT 4 MG disintegrating tablet  Commonly known as: ZOFRAN-ODT   4 mg, Translingual, Every 8 Hours PRN      pantoprazole 40 MG EC tablet  Commonly known as: PROTONIX   40 mg, Oral, 2 Times Daily Before Meals      potassium chloride 20 MEQ CR tablet  Commonly known as: K-DUR,KLOR-CON   40 mEq, Oral, 2 Times Daily      spironolactone 100 MG tablet  Commonly known as: ALDACTONE   400 mg, Oral, Daily      sucralfate 1 g tablet  Commonly known as: CARAFATE   1 g, Oral, 2 Times Daily Before Meals               Allergies   Allergen Reactions    Contrast Dye (Echo Or Unknown Ct/Mr) Hives    Iodinated Contrast Media Hives    Iodine Hives    Gadoteridol Hives and Itching     Contrast Dye with MRI    Hydrocodone-Acetaminophen Hives and Itching    Morphine Itching, Hives and Rash    Red Dye Itching, Other (See Comments) and Unknown - High Severity    Compazine [Prochlorperazine Edisylate] Hives    Hydrocodone Rash     Lortab elixir per pt    Ketorolac Tromethamine Unknown - Low Severity    Nsaids Other (See Comments)     States I am not suppose  to take them  Cannot have due to gastric sleeve         Discharge Disposition:   Home or Self Care    Diet:  Hospital:  Diet Order   Procedures    Diet: Cardiac Diets; Low Sodium (2g); Texture: Regular Texture (IDDSI 7); Fluid Consistency: Thin (IDDSI 0)         Discharge Activity:         CODE STATUS:  Code Status and Medical Interventions:   Ordered at: 11/09/23 1126     Level Of Support Discussed With:    Patient     Code Status (Patient has no pulse and is not breathing):    CPR (Attempt to Resuscitate)     Medical Interventions (Patient has pulse or is breathing):    Full Support         No future appointments.        Time spent on Discharge including face to face service:  35 minutes      Signature: Electronically signed by Alia Matt MD, 11/13/23, 12:49 EST.  [facility] Hospitalist Team

## 2023-11-13 ENCOUNTER — INPATIENT HOSPITAL (AMBULATORY)
Dept: URBAN - METROPOLITAN AREA HOSPITAL 84 | Facility: HOSPITAL | Age: 48
End: 2023-11-13
Payer: COMMERCIAL

## 2023-11-13 ENCOUNTER — READMISSION MANAGEMENT (OUTPATIENT)
Dept: CALL CENTER | Facility: HOSPITAL | Age: 48
End: 2023-11-13
Payer: COMMERCIAL

## 2023-11-13 VITALS
SYSTOLIC BLOOD PRESSURE: 145 MMHG | TEMPERATURE: 98.1 F | OXYGEN SATURATION: 100 % | HEART RATE: 74 BPM | WEIGHT: 143.08 LBS | RESPIRATION RATE: 15 BRPM | HEIGHT: 63 IN | DIASTOLIC BLOOD PRESSURE: 88 MMHG | BODY MASS INDEX: 25.35 KG/M2

## 2023-11-13 DIAGNOSIS — K75.81 NONALCOHOLIC STEATOHEPATITIS (NASH): ICD-10-CM

## 2023-11-13 DIAGNOSIS — D72.829 ELEVATED WHITE BLOOD CELL COUNT, UNSPECIFIED: ICD-10-CM

## 2023-11-13 DIAGNOSIS — R10.10 UPPER ABDOMINAL PAIN, UNSPECIFIED: ICD-10-CM

## 2023-11-13 DIAGNOSIS — K28.9 GASTROJEJUNAL ULCER, UNSPECIFIED AS ACUTE OR CHRONIC, WITHOU: ICD-10-CM

## 2023-11-13 DIAGNOSIS — R18.8 OTHER ASCITES: ICD-10-CM

## 2023-11-13 DIAGNOSIS — R11.2 NAUSEA WITH VOMITING, UNSPECIFIED: ICD-10-CM

## 2023-11-13 DIAGNOSIS — D64.9 ANEMIA, UNSPECIFIED: ICD-10-CM

## 2023-11-13 DIAGNOSIS — I85.00 ESOPHAGEAL VARICES WITHOUT BLEEDING: ICD-10-CM

## 2023-11-13 DIAGNOSIS — K76.82 HEPATIC ENCEPHALOPATHY: ICD-10-CM

## 2023-11-13 DIAGNOSIS — K74.69 OTHER CIRRHOSIS OF LIVER: ICD-10-CM

## 2023-11-13 DIAGNOSIS — Z98.84 BARIATRIC SURGERY STATUS: ICD-10-CM

## 2023-11-13 DIAGNOSIS — R74.8 ABNORMAL LEVELS OF OTHER SERUM ENZYMES: ICD-10-CM

## 2023-11-13 LAB
ALBUMIN SERPL-MCNC: 3.8 G/DL (ref 3.5–5.2)
ALBUMIN/GLOB SERPL: 1 G/DL
ALP SERPL-CCNC: 245 U/L (ref 39–117)
ALT SERPL W P-5'-P-CCNC: 49 U/L (ref 1–33)
ANION GAP SERPL CALCULATED.3IONS-SCNC: 8 MMOL/L (ref 5–15)
AST SERPL-CCNC: 28 U/L (ref 1–32)
BILIRUB SERPL-MCNC: 1.4 MG/DL (ref 0–1.2)
BUN SERPL-MCNC: 12 MG/DL (ref 6–20)
BUN/CREAT SERPL: 12.9 (ref 7–25)
CALCIUM SPEC-SCNC: 9.5 MG/DL (ref 8.6–10.5)
CHLORIDE SERPL-SCNC: 97 MMOL/L (ref 98–107)
CO2 SERPL-SCNC: 26 MMOL/L (ref 22–29)
CREAT SERPL-MCNC: 0.93 MG/DL (ref 0.57–1)
DEPRECATED RDW RBC AUTO: 59.9 FL (ref 37–54)
EGFRCR SERPLBLD CKD-EPI 2021: 75 ML/MIN/1.73
ERYTHROCYTE [DISTWIDTH] IN BLOOD BY AUTOMATED COUNT: 20.4 % (ref 12.3–15.4)
GLOBULIN UR ELPH-MCNC: 4 GM/DL
GLUCOSE SERPL-MCNC: 129 MG/DL (ref 65–99)
HCT VFR BLD AUTO: 38.3 % (ref 34–46.6)
HGB BLD-MCNC: 12.3 G/DL (ref 12–15.9)
MCH RBC QN AUTO: 25.9 PG (ref 26.6–33)
MCHC RBC AUTO-ENTMCNC: 32 G/DL (ref 31.5–35.7)
MCV RBC AUTO: 81 FL (ref 79–97)
PLATELET # BLD AUTO: 679 10*3/MM3 (ref 140–450)
PMV BLD AUTO: 8.1 FL (ref 6–12)
POTASSIUM SERPL-SCNC: 5 MMOL/L (ref 3.5–5.2)
PROT SERPL-MCNC: 7.8 G/DL (ref 6–8.5)
RBC # BLD AUTO: 4.73 10*6/MM3 (ref 3.77–5.28)
SODIUM SERPL-SCNC: 131 MMOL/L (ref 136–145)
WBC NRBC COR # BLD: 7.8 10*3/MM3 (ref 3.4–10.8)

## 2023-11-13 PROCEDURE — 80053 COMPREHEN METABOLIC PANEL: CPT | Performed by: NURSE PRACTITIONER

## 2023-11-13 PROCEDURE — 85027 COMPLETE CBC AUTOMATED: CPT | Performed by: NURSE PRACTITIONER

## 2023-11-13 PROCEDURE — 25010000002 METOCLOPRAMIDE PER 10 MG: Performed by: NURSE PRACTITIONER

## 2023-11-13 PROCEDURE — 99232 SBSQ HOSP IP/OBS MODERATE 35: CPT | Performed by: NURSE PRACTITIONER

## 2023-11-13 PROCEDURE — 25010000002 ONDANSETRON PER 1 MG: Performed by: EMERGENCY MEDICINE

## 2023-11-13 RX ADMIN — METOCLOPRAMIDE 5 MG: 5 INJECTION, SOLUTION INTRAMUSCULAR; INTRAVENOUS at 12:25

## 2023-11-13 RX ADMIN — HYOSCYAMINE SULFATE 125 MCG: 0.12 TABLET ORAL; SUBLINGUAL at 08:26

## 2023-11-13 RX ADMIN — TRAMADOL HYDROCHLORIDE 50 MG: 50 TABLET, COATED ORAL at 12:26

## 2023-11-13 RX ADMIN — SUCRALFATE 1 G: 1 TABLET ORAL at 08:26

## 2023-11-13 RX ADMIN — SIMETHICONE 80 MG: 80 TABLET, CHEWABLE ORAL at 08:25

## 2023-11-13 RX ADMIN — POTASSIUM CHLORIDE 40 MEQ: 1500 TABLET, EXTENDED RELEASE ORAL at 08:27

## 2023-11-13 RX ADMIN — APIXABAN 5 MG: 5 TABLET, FILM COATED ORAL at 08:27

## 2023-11-13 RX ADMIN — Medication 10 ML: at 08:27

## 2023-11-13 RX ADMIN — PANTOPRAZOLE SODIUM 40 MG: 40 TABLET, DELAYED RELEASE ORAL at 08:26

## 2023-11-13 RX ADMIN — LIOTHYRONINE SODIUM 5 MCG: 5 TABLET ORAL at 06:04

## 2023-11-13 RX ADMIN — TRAMADOL HYDROCHLORIDE 50 MG: 50 TABLET, COATED ORAL at 08:27

## 2023-11-13 RX ADMIN — TRAMADOL HYDROCHLORIDE 50 MG: 50 TABLET, COATED ORAL at 04:25

## 2023-11-13 RX ADMIN — METOCLOPRAMIDE 5 MG: 5 INJECTION, SOLUTION INTRAMUSCULAR; INTRAVENOUS at 08:25

## 2023-11-13 RX ADMIN — Medication 10 ML: at 12:25

## 2023-11-13 RX ADMIN — ONDANSETRON 4 MG: 2 INJECTION INTRAMUSCULAR; INTRAVENOUS at 08:25

## 2023-11-13 RX ADMIN — TRAMADOL HYDROCHLORIDE 50 MG: 50 TABLET, COATED ORAL at 00:27

## 2023-11-13 RX ADMIN — POLYETHYLENE GLYCOL 3350 17 G: 17 POWDER, FOR SOLUTION ORAL at 08:25

## 2023-11-13 RX ADMIN — LEVOTHYROXINE SODIUM 125 MCG: 0.12 TABLET ORAL at 06:04

## 2023-11-13 RX ADMIN — MISOPROSTOL 200 MCG: 200 TABLET ORAL at 08:27

## 2023-11-13 NOTE — NURSING NOTE
Educated patient on discharge instructions patient verbalized understanding and had no further questions/concerns at this time. IV removed no heart monitor on. Remains without distress

## 2023-11-13 NOTE — PLAN OF CARE
Problem: Adult Inpatient Plan of Care  Goal: Plan of Care Review  11/13/2023 0115 by Jason Palma RN  Outcome: Ongoing, Progressing  11/13/2023 0115 by Jason Palma RN  Reactivated  Goal: Patient-Specific Goal (Individualized)  11/13/2023 0115 by Jason Palma RN  Outcome: Ongoing, Progressing  11/13/2023 0115 by Jason Palma RN  Reactivated  Goal: Absence of Hospital-Acquired Illness or Injury  11/13/2023 0115 by Jason Palma RN  Outcome: Ongoing, Progressing  11/13/2023 0115 by Jason Palma RN  Reactivated  Intervention: Identify and Manage Fall Risk  Recent Flowsheet Documentation  Taken 11/13/2023 0016 by Jason Palma RN  Safety Promotion/Fall Prevention: safety round/check completed  Taken 11/12/2023 2148 by Jason Palma RN  Safety Promotion/Fall Prevention: safety round/check completed  Taken 11/12/2023 1937 by Jason Palma RN  Safety Promotion/Fall Prevention: activity supervised  Intervention: Prevent Skin Injury  Recent Flowsheet Documentation  Taken 11/13/2023 0016 by Jason Palma RN  Body Position: position changed independently  Taken 11/12/2023 2148 by Jason Palma RN  Body Position: position changed independently  Taken 11/12/2023 1937 by Jason Palma RN  Body Position: position changed independently  Intervention: Prevent and Manage VTE (Venous Thromboembolism) Risk  Recent Flowsheet Documentation  Taken 11/12/2023 1937 by Jason Palma RN  Activity Management: activity encouraged  Range of Motion: active ROM (range of motion) encouraged  Intervention: Prevent Infection  Recent Flowsheet Documentation  Taken 11/13/2023 0016 by Jason Palma RN  Infection Prevention: environmental surveillance performed  Taken 11/12/2023 1937 by Jason Palma RN  Infection Prevention:   environmental surveillance performed   hand hygiene promoted  Goal: Optimal Comfort and Wellbeing  11/13/2023 0115 by Jason Palma RN  Outcome: Ongoing, Progressing  11/13/2023 0115 by Jason Palma  RN  Reactivated  Intervention: Monitor Pain and Promote Comfort  Recent Flowsheet Documentation  Taken 11/13/2023 0016 by Jason Palma RN  Pain Management Interventions: see MAR  Intervention: Provide Person-Centered Care  Recent Flowsheet Documentation  Taken 11/12/2023 1937 by Jason Palma, RN  Trust Relationship/Rapport: care explained  Goal: Readiness for Transition of Care  11/13/2023 0115 by Jason Palma, RN  Outcome: Ongoing, Progressing  11/13/2023 0115 by Jason Palma, RN  Reactivated   Goal Outcome Evaluation:

## 2023-11-13 NOTE — PROGRESS NOTES
LOS: 3 days   Patient Care Team:  Austin Mcfadden MD as PCP - General (Family Medicine)      Subjective     Subjective: Patient with continued upper abdominal discomfort that occasionally becomes severe at times and radiates to her back.  No nausea or vomiting.  Able to tolerate her diet.      ROS:   Review of Systems   Constitutional:  Negative for chills and fever.   Respiratory:  Negative for cough and shortness of breath.    Cardiovascular:  Negative for chest pain.   Gastrointestinal:  Positive for abdominal pain and nausea. Negative for blood in stool.   Musculoskeletal:  Positive for back pain.   Neurological:  Negative for dizziness.   Psychiatric/Behavioral:  Negative for agitation and confusion.         Medication Review:   Scheduled Meds:apixaban, 5 mg, Oral, Q12H  bumetanide, 4 mg, Oral, Daily  hyoscyamine, 125 mcg, Sublingual, TID  levothyroxine, 125 mcg, Oral, Q AM  liothyronine, 5 mcg, Oral, Q AM  metoclopramide, 5 mg, Intravenous, TID AC  miSOPROStol, 200 mcg, Oral, BID  pantoprazole, 40 mg, Oral, BID AC  polyethylene glycol, 17 g, Oral, BID  potassium chloride, 40 mEq, Oral, BID  simethicone, 80 mg, Oral, 4x Daily AC & at Bedtime  sodium chloride, 10 mL, Intravenous, Q12H  spironolactone, 400 mg, Oral, Daily  sucralfate, 1 g, Oral, BID AC      Continuous Infusions:   PRN Meds:.  melatonin    ondansetron    Potassium Replacement - Follow Nurse / BPA Driven Protocol    [COMPLETED] Insert Peripheral IV **AND** sodium chloride    sodium chloride    sodium chloride    traMADol      Objective     Vital Signs  Temp:  [98 °F (36.7 °C)-98.2 °F (36.8 °C)] 98 °F (36.7 °C)  Heart Rate:  [70-95] 70  Resp:  [15-16] 16  BP: (116-141)/(75-85) 127/82    Physical Exam:    General Appearance:    Awake and alert, in no acute distress   Head:    Normocephalic, without obvious abnormality   Eyes:          Conjunctivae normal, anicteric sclera   Ears:    Hearing intact   Throat:   No oral lesions, no thrush, oral  mucosa moist   Neck:   No adenopathy, supple, no JVD   Lungs:     Respirations regular, even and unlabored       Abdomen:     Soft, mild upper abdominal tenderness, no rebound or guarding, non-distended, umbilical hernia   Rectal:     Deferred   Extremities:   No edema, no cyanosis, no redness   Skin:   No bleeding, bruising or rash, no jaundice   Neurologic:   Sensation intact        Results Review:    CBC  Results from last 7 days   Lab Units 11/12/23  0433 11/11/23  0411 11/10/23  0537 11/09/23  1003 11/09/23  0049   RBC 10*6/mm3 4.56 4.33 4.07 4.35 3.95   WBC 10*3/mm3 11.10* 12.50* 12.50* 11.80* 11.00*   HEMOGLOBIN g/dL 11.8* 11.2* 10.6* 11.3* 10.0*   PLATELETS 10*3/mm3 659* 634* 605* 634* 613*       CMP  Results from last 7 days   Lab Units 11/12/23  0433 11/11/23  1948 11/11/23  0411 11/10/23  0537 11/09/23 1943 11/09/23  1003 11/09/23  0049   SODIUM mmol/L 129*  --  134* 134*  --  138 139   POTASSIUM mmol/L 5.1 5.1 5.1 4.2 3.6 3.5 3.6   CHLORIDE mmol/L 93*  --  93* 97*  --  102 105   CO2 mmol/L 28.0  --  29.0 28.0  --  25.0 25.0   BUN mg/dL 17  --  15 14  --  11 17   CREATININE mg/dL 1.00  --  1.04* 0.83  --  0.63 0.72   GLUCOSE mg/dL 175*  --  141* 107*  --  97 105*   ALBUMIN g/dL 3.7  --  3.7 3.6  --   --  3.7   BILIRUBIN mg/dL 1.3*  --  1.6* 1.9*  --   --  0.9   ALK PHOS U/L 271*  --  278* 274*  --   --  159*   AST (SGOT) U/L 49*  --  107* 235*  --   --  29   ALT (SGPT) U/L 75*  --  100* 139*  --   --  18       Amylase and Lipase  Results from last 7 days   Lab Units 11/09/23  0049   LIPASE U/L 34       CRP     Results from last 7 days   Lab Units 11/12/23  0433   CRP mg/dL 0.65*       Imaging Results (Last 24 Hours)       ** No results found for the last 24 hours. **              Assessment & Plan   48-year-old female with Ramon cirrhosis and complicated past surgical history with Alfonzo-en-Y anatomy presented to the hospital on 11/8/2023 with continued abdominal pain.  Was recently here last month and had  EGD.  Now has elevated liver enzymes.     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  At times it becomes severe and radiates to her back.  Her liver enzymes increased on 11/10/2023 but have trended down.  Total bilirubin 1.3, alk phos 271, AST 49, and ALT 75.  Lipase has been normal.  WBC 11.1.  MRI/MRCP showed no choledocholithiasis but 10 mm common bile duct as well as moderate amount of ascites.  She has an umbilical hernia containing nonobstructed small bowel loop.  She continues with intermittent symptoms.  Consider ampullary stenosis contributing.  We will discuss with Dr. Lambert or Dr. Johnson regarding possible placement of stent to gain access for ERCP.  Otherwise monitor liver enzymes.  She continues on Levsin as well as PPI/Carafate/misoprostol for anastomotic ulcer.  We have also recommended outpatient colonoscopy for screening purposes.    Kalani Lock, APRN  11/13/23  10:40 EST

## 2023-11-13 NOTE — PROGRESS NOTES
[facility] Medicine Services   Daily Progress Note    Patient Name: Lucinda Cope  : 1975  MRN: [id number]  Primary Care Physician:  Austin Mcfadden MD  Date of admission: 2023        Subjective      Chief Complaint: abd pain    Patient seen and examined this am, pain is about the same. LFTs trending down. GI cleared pat for dc    ROS   12 point ROS is negative except as in HPI      Objective      Vitals:   Temp:  [98 °F (36.7 °C)-98.2 °F (36.8 °C)] 98.1 °F (36.7 °C)  Heart Rate:  [70-95] 74  Resp:  [15-16] 15  BP: (116-145)/(75-88) 145/88    Physical Exam     Constitutional: Awake, alert in no distress at time of examination.              Eyes: PERRLA, sclerae anicteric, no conjunctival injection              HENT: NCAT, mucous membranes moist              Neck: Supple, no thyromegaly, no lymphadenopathy, trachea midline              Respiratory: Clear to auscultation bilaterally, nonlabored respirations               Cardiovascular: RRR, no murmurs, rubs, or gallops, palpable pedal pulses bilaterally              Gastrointestinal: Positive bowel sounds, soft, nontender, nondistended              Musculoskeletal: No bilateral ankle edema, no clubbing or cyanosis to extremities              Psychiatric: Appropriate affect, cooperative              Neurologic: Oriented x 3, strength symmetric in all extremities, Cranial Nerves grossly intact to confrontation, speech clear              Skin: No rashes             Result Review    Result Review:  I have personally reviewed the results from the time of this admission to 2023 11:31 EST and agree with these findings:  [x]  Laboratory  []  Microbiology  [x]  Radiology  []  EKG/Telemetry   []  Cardiology/Vascular   []  Pathology  []  Old records  []  Other:        Assessment & Plan      Brief Patient Summary:  Lucinda Cope is a 48 y.o. female past medical history significant for history of gastric bypass surgery several years back  complicated by splenectomy history of Carter who presented to the hospital initially on 11/9/2023 with chief complaint of having abdominal pain, she stated that in the hospital couple weeks back she had an endoscopy which showed an ulcer, admitted and evaluated by gastroenterology, and MRCP was done earlier today and showed no choledocholithiasis, patient is tolerating her diet. Being followed by GI       apixaban, 5 mg, Oral, Q12H  bumetanide, 4 mg, Oral, Daily  hyoscyamine, 125 mcg, Sublingual, TID  levothyroxine, 125 mcg, Oral, Q AM  liothyronine, 5 mcg, Oral, Q AM  metoclopramide, 5 mg, Intravenous, TID AC  miSOPROStol, 200 mcg, Oral, BID  pantoprazole, 40 mg, Oral, BID AC  polyethylene glycol, 17 g, Oral, BID  potassium chloride, 40 mEq, Oral, BID  simethicone, 80 mg, Oral, 4x Daily AC & at Bedtime  sodium chloride, 10 mL, Intravenous, Q12H  spironolactone, 400 mg, Oral, Daily  sucralfate, 1 g, Oral, BID AC             Active Hospital Problems:  Active Hospital Problems    Diagnosis     **Abdominal pain      Plan:     This is 48-year-old lady who presented to the hospital with  1.  Abdominal pain  2.  History of CARTER and ascites  3.  History of gastric sleeve        Plan:   Tolerating her diet, currently on analgesics and antiemetics   MRCP was done and showed no choledocholithiasis.  GI is following, cleared her for dc      DVT prophylaxis:  Medical and mechanical DVT prophylaxis orders are present.    CODE STATUS:    Level Of Support Discussed With: Patient  Code Status (Patient has no pulse and is not breathing): CPR (Attempt to Resuscitate)  Medical Interventions (Patient has pulse or is breathing): Full Support      Disposition:  I expect patient to be discharged in 1-2 days.    Electronically signed by Alia Matt MD, 11/13/23, 11:31 EST.  [facility] Hospitalist Team

## 2023-11-13 NOTE — PROGRESS NOTES
[facility] Medicine [facility]   Daily Progress Note    Patient Name: Lucinda Cope  : 1975  MRN: [id number]  Primary Care Physician:  Austin Mcfadden MD  Date of admission: 2023        Subjective      Chief Complaint: abd pain    Patient seen and examined this am, feels about the same but tolerating diet. Ddi not leave yesterday because she was  not feeling right.  GI planning on ampullary stent placement , timing not certain. BP elevated    ROS   12 point ROS is negative except as in HPI      Objective      Vitals:   Temp:  [98 °F (36.7 °C)-98.2 °F (36.8 °C)] 98.1 °F (36.7 °C)  Heart Rate:  [70-95] 74  Resp:  [15-16] 15  BP: (116-145)/(75-88) 145/88    Physical Exam     Constitutional: Awake, alert in no distress at time of examination.              Eyes: PERRLA, sclerae anicteric, no conjunctival injection              HENT: NCAT, mucous membranes moist              Neck: Supple, no thyromegaly, no lymphadenopathy, trachea midline              Respiratory: Clear to auscultation bilaterally, nonlabored respirations               Cardiovascular: RRR, no murmurs, rubs, or gallops, palpable pedal pulses bilaterally              Gastrointestinal: Positive bowel sounds, soft, nontender, nondistended              Musculoskeletal: No bilateral ankle edema, no clubbing or cyanosis to extremities              Psychiatric: Appropriate affect, cooperative              Neurologic: Oriented x 3, strength symmetric in all extremities, Cranial Nerves grossly intact to confrontation, speech clear              Skin: No rashes             Result Review    Result Review:  I have personally reviewed the results from the time of this admission to 2023 11:27 EST and agree with these findings:  [x]  Laboratory  []  Microbiology  [x]  Radiology  []  EKG/Telemetry   []  Cardiology/Vascular   []  Pathology  []  Old records  []  Other:        Assessment & Plan      Brief Patient Summary:  Lucinda Cope is a  48 y.o. female past medical history significant for history of gastric bypass surgery several years back complicated by splenectomy history of Carter who presented to the hospital initially on 11/9/2023 with chief complaint of having abdominal pain, she stated that in the hospital couple weeks back she had an endoscopy which showed an ulcer, admitted and evaluated by gastroenterology, and MRCP was done earlier today and showed no choledocholithiasis, patient is tolerating her diet. Being followed by GI who is planning ampullary stent placement timing unclear at this point      apixaban, 5 mg, Oral, Q12H  bumetanide, 4 mg, Oral, Daily  hyoscyamine, 125 mcg, Sublingual, TID  levothyroxine, 125 mcg, Oral, Q AM  liothyronine, 5 mcg, Oral, Q AM  metoclopramide, 5 mg, Intravenous, TID AC  miSOPROStol, 200 mcg, Oral, BID  pantoprazole, 40 mg, Oral, BID AC  polyethylene glycol, 17 g, Oral, BID  potassium chloride, 40 mEq, Oral, BID  simethicone, 80 mg, Oral, 4x Daily AC & at Bedtime  sodium chloride, 10 mL, Intravenous, Q12H  spironolactone, 400 mg, Oral, Daily  sucralfate, 1 g, Oral, BID AC             Active Hospital Problems:  Active Hospital Problems    Diagnosis     **Abdominal pain      Plan:     This is 48-year-old lady who presented to the hospital with  1.  Abdominal pain  2.  History of CARTER and ascites  3.  History of gastric sleeve        Plan:     MRCP was done and showed no choledocholithiasis.  GI is following- for ampullary stent placement  Cont supportive care  LFTs trending down  Labs pending today      DVT prophylaxis:  Medical and mechanical DVT prophylaxis orders are present.    CODE STATUS:    Level Of Support Discussed With: Patient  Code Status (Patient has no pulse and is not breathing): CPR (Attempt to Resuscitate)  Medical Interventions (Patient has pulse or is breathing): Full Support      Disposition:  I expect patient to be discharged in 1-2 days.    Electronically signed by Alia Matt MD,  11/13/23, 11:27 EST.  [facility]

## 2023-11-13 NOTE — PAYOR COMM NOTE
"AUTHORIZATION PENDING:   PLEASE CALL OR FAX DETERMINATION TO CONTACT BELOW. THANK YOU.        Sonia Morataya RN MSN  /UR  [facility]  [phone] office  [phone] fax  [email or web address]    [facility]  NPI: [id number]  Tax: [phone]            Apoorva Cope (48 y.o. Female)       Date of Birth   1975    Social Security Number       Address   [address] [place] [address] [address] [place] [address]    Home Phone   [phone]    MRN   [id number]       Orthodoxy   [employer]    Marital Status   Single                            Admission Date   11/8/23    Admission Type   Emergency    Admitting Provider   Leonardo Werner MD    Attending Provider   Alia Matt MD    Department, Room/Bed   [facility] OBSERVATION, 106/1       Discharge Date       Discharge Disposition   Home or Self Care    Discharge Destination                                 Attending Provider: Alia Matt MD    Allergies: Contrast Dye (Echo Or Unknown Ct/mr), Iodinated Contrast Media, Iodine, Gadoteridol, Hydrocodone-acetaminophen, Morphine, Red Dye, Compazine [Prochlorperazine Edisylate], Hydrocodone, Ketorolac Tromethamine, Nsaids    Isolation: None   Infection: None   Code Status: CPR    Ht: 160 cm (63\")   Wt: 64.9 kg (143 lb 1.3 oz)    Admission Cmt: None   Principal Problem: Abdominal pain [R10.9]                   Active Insurance as of 11/8/2023       Primary Coverage       Payor Plan Insurance Group Employer/Plan Group    [place] [id number]       Payor Plan Address Payor Plan Phone Number Payor Plan Fax Number Effective Dates    [address]   5/31/2015 - None Entered    [place] [id number]         Subscriber Name Subscriber Birth Date Member ID       APOORVA COPE 1975 [id number]                     Emergency Contacts        (Rel.) Home Phone Work Phone Mobile Phone    JORGE MORE (Mother) -- -- [phone]    HENRIKCARMEN (Daughter) -- -- " [phone]    Casi Mccann (Sister) -- -- [phone]          11/10/23 1710  Inpatient Admission  Once     Completed     Level of Care: Med/Surg  Diagnosis: Abdominal pain [454560]  Admitting Physician: DAKOTA WERNER [[id number]]  Attending Physician: DAKOTA WERNER [[id number]]  Certification: I Certify That Inpatient Hospital Services Are Medically Necessary For Greater Than 2 Midnights    11/10/23 1709   23 0238  Initiate ED Observation Status  Once     Completed     Level of Care: Observation Unit  Diagnosis: Abdominal pain [583016]  Admitting Physician: DAKOTA WERNER [[id number]]  Attending Physician: DAKOTA WERNER [[id number]]              History & Physical        Mateusz Kingston PA-C at 23 1550       Attestation signed by Dakota Werner MD at 23 2155    Reviewed                [facility] H&P    Patient Name: Lucinda Cope  : 1975  MRN: [id number]  Primary Care Physician: Austin Mcfadden MD  Date of admission: 2023     Patient Care Team:  Austin Mcfadden MD as PCP - General (Family Medicine)          Subjective  History Present Illness     Chief Complaint:   Chief Complaint   Patient presents with    Abdominal Pain         History of Present Illness  Obtained from admitting physician HPI on 2023:  History of present illness this is a 48-year-old female who has had a history in the past of gastric bypass surgery several years ago in [place] that had some complications that led to a splenectomy she reports also has a history of nonalcoholic cirrhosis complains of several week history of worsening abdominal pain.  She states she was in the hospital couple weeks ago she had endoscopy which showed an ulcer and started on some medicine for that which she states she continues to have pain and now swelling and she is worried that she has ascites and needs to be drained.  No fever chills been noted no vomiting no vomiting blood no black or bloody stool no dysuria  frequency no injury no chest pain neck arm or jaw pain is moderate degree constant nothing really seem to trigger nothing makes it better or worse.     11/09/23:  Patient confirms the HPI noted above including recent increase in her baseline abdominal pain as well as some transient distention with pain worsening over the past 3 days located in the mid abdomen radiating outward and occasionally up to the left side of her chest.  She has recently had evaluation with GI and underwent EGD on 10/25/2023.  She notes pain had been severe at times and that they found signs of an ulcer on previous scope.  She had felt as if her distention had been increasing though this does seem to have improved today.        Review of Systems   Constitutional: Negative.   HENT: Negative.     Eyes: Negative.    Cardiovascular: Negative.    Respiratory: Negative.     Skin: Negative.    Musculoskeletal: Negative.    Gastrointestinal:  Positive for bloating and abdominal pain.   Genitourinary: Negative.    Neurological: Negative.    Psychiatric/Behavioral: Negative.             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    Empyema of pleura     Encephalopathy, portal systemic 5/9/2017    Esophageal varices 7/21/2017    Gastric leak 5/29/2016    GERD (gastroesophageal reflux disease)     H/O thyroidectomy 6/17/2016    Hemorrhoids     Hernia, ventral     History of morbid obesity 3/3/2016    History of transfusion     Hypercoagulable state 8/24/2019    Irregular menstrual cycle     Liver cirrhosis secondary to CARTER 04/2017    Lupus anticoagulant positive 8/24/2019    Migraine     Parathyroid abnormality     Portal vein thrombosis 3/3/2016    Post-surgical hypothyroidism     Pulmonary embolism 10/27/2020    Radiation      Restless legs syndrome (RLS)     S/P laparoscopic sleeve gastrectomy 6/17/2016    S/P splenectomy 6/17/2016    Thyroid cancer     Thyroid cancer 6/26/2015    Urge and stress incontinence     Vaginal prolapse 7/14/2021    Varicose veins     Ventral hernia 4/18/2020    Vitamin D deficiency 1/21/2021       Surgical History:      Past Surgical History:   Procedure Laterality Date    ABDOMINAL SURGERY      BEDSIDE PARACENTESIS  3/22/2020         BEDSIDE PARACENTESIS  4/19/2020         BEDSIDE PARACENTESIS  3/1/2021         BEDSIDE PARACENTESIS  3/30/2021         CHOLECYSTECTOMY      Laparoscopic    ENDOSCOPY N/A 3/17/2016    Procedure: ESOPHAGOGASTRODUODENOSCOPY WITH BALOON DILATATION 18-20MM WITH GASTRIC SLEEVE SEALANT;  Surgeon: Leonardo Ortiz Jr., MD;  Location: [facility] ENDOSCOPY;  Service:     ENDOSCOPY N/A 3/14/2016    Procedure: esophagogastroduodenoscopy with fluoroscopy;  Surgeon: Greg Avila III, MD;  Location: [facility] ENDOSCOPY;  Service:     ENDOSCOPY N/A 4/15/2016    Procedure: EGD WITH DILITATION AND STENT PLACEMENT dilation of pylorus ;  Surgeon: Leonardo Ortiz Jr., MD;  Location: [facility] ENDOSCOPY;  Service:     ENDOSCOPY N/A 5/13/2016    Procedure: ESOPHAGOGASTRODUODENOSCOPY  W/ STENT;  Surgeon: Leonardo Ortiz Jr., MD;  Location: [facility] ENDOSCOPY;  Service:     ENDOSCOPY N/A 1/13/2021    Procedure: ESOPHAGOGASTRODUODENOSCOPY WITH POLYPECTOMY X1 AND  BIOPSY X1.;  Surgeon: Bryson Cotton MD;  Location: [facility] ENDOSCOPY;  Service: Gastroenterology;  Laterality: N/A;  anastomotic ulcer, antral polyp, GASTRITIS    ENDOSCOPY N/A 10/24/2023    Procedure: ESOPHAGOGASTRODUODENOSCOPY;  Surgeon: Hossein Delgadillo MD;  Location: [facility] ENDOSCOPY;  Service: Gastroenterology;  Laterality: N/A;  Post: anastomotic ulcer    GASTRECTOMY      Gastric Surgery for Morbid Obesity Laparoscopic Longitudinal Gastrectomy    GASTRIC SLEEVE LAPAROSCOPIC      HEMORRHOIDECTOMY      HERNIA REPAIR       LAPAROSCOPY REPAIR HIATAL HERNIA      LARYNGOSCOPY      Direct Laryngoscopy with Injection into Vocal Cords    OTHER SURGICAL HISTORY Bilateral     Ear Pressure Equalization Tube, Insertion, Bilaterally    PERIPHERALLY INSERTED CENTRAL CATHETER INSERTION      SPLENECTOMY      THYROID SURGERY      TUBAL ABDOMINAL LIGATION             Family History: family history includes Breast cancer in her maternal grandmother; Cancer in an other family member; Diabetes in her father; Hypertension in her father; Stroke in her father. Otherwise pertinent FHx was reviewed and unremarkable.     Social History:  reports that she has never smoked. She has never been exposed to tobacco smoke. She has never used smokeless tobacco. She reports that she does not drink alcohol and does not use drugs.      Medications:  Prior to Admission medications    Medication Sig Start Date End Date Taking? Authorizing Provider   apixaban (ELIQUIS) 5 MG tablet tablet Take 1 tablet by mouth 2 (Two) Times a Day.   Yes Justyn Erwin MD   bumetanide (BUMEX) 2 MG tablet Take 2 tablets by mouth Daily.   Yes Justyn Erwin MD   estradiol (ESTRACE) 0.1 MG/GM vaginal cream Insert 1 g into the vagina 3 (Three) Times a Week. Monday Wednesday Friday   Yes Justyn Erwin MD   levothyroxine (SYNTHROID, LEVOTHROID) 125 MCG tablet Take 1 tablet by mouth Every Morning.   Yes Justyn Erwin MD   liothyronine (CYTOMEL) 5 MCG tablet Take 1 tablet by mouth Daily.   Yes Justyn Erwin MD   miSOPROStol (CYTOTEC) 200 MCG tablet Take 1 tablet by mouth 2 (Two) Times a Day. 10/25/23  Yes Marianna Fernandez PA-C   ondansetron ODT (ZOFRAN-ODT) 4 MG disintegrating tablet Place 1 tablet on the tongue Every 8 (Eight) Hours As Needed for Nausea or Vomiting. 10/25/23  Yes Marianna Fernandez PA-C   ondansetron ODT (ZOFRAN-ODT) 8 MG disintegrating tablet Place 1 tablet on the tongue Every 8 (Eight) Hours As Needed for Nausea or Vomiting.   Yes Rip  Justyn, MD   pantoprazole (PROTONIX) 40 MG EC tablet Take 1 tablet by mouth 2 (Two) Times a Day Before Meals. 10/25/23  Yes Marianna Fernandez PA-C   potassium chloride (K-DUR,KLOR-CON) 20 MEQ CR tablet Take 2 tablets by mouth 2 (Two) Times a Day.   Yes Provider, MD Justyn   spironolactone (ALDACTONE) 100 MG tablet Take 4 tablets by mouth Daily.   Yes Provider, MD Justyn   sucralfate (CARAFATE) 1 g tablet Take 1 tablet by mouth 2 (Two) Times a Day Before Meals. 10/25/23  Yes Marianna Fernandez PA-C       Allergies:    Allergies   Allergen Reactions    Contrast Dye (Echo Or Unknown Ct/Mr) Hives    Iodinated Contrast Media Hives    Iodine Hives    Hydrocodone-Acetaminophen Hives and Itching    Morphine Itching, Hives and Rash    Red Dye Itching, Other (See Comments) and Unknown - High Severity    Compazine [Prochlorperazine Edisylate] Hives    Hydrocodone Rash     Lortab elixir per pt    Ketorolac Tromethamine Unknown - Low Severity    Nsaids Other (See Comments)     States I am not suppose to take them  Cannot have due to gastric sleeve       Objective  Objective     Vital Signs  Temp:  [98 °F (36.7 °C)-98.9 °F (37.2 °C)] 98 °F (36.7 °C)  Heart Rate:  [75-99] 75  Resp:  [16-17] 16  BP: (132-157)/(79-94) 132/89  SpO2:  [96 %-100 %] 96 %  on   ;   Device (Oxygen Therapy): room air  Body mass index is 25.35 kg/m².    Physical Exam  Vitals reviewed.   Constitutional:       General: She is not in acute distress.     Appearance: Normal appearance. She is normal weight. She is not ill-appearing, toxic-appearing or diaphoretic.   HENT:      Head: Normocephalic.      Right Ear: External ear normal.      Left Ear: External ear normal.      Nose: Nose normal.      Mouth/Throat:      Mouth: Mucous membranes are moist.   Eyes:      Extraocular Movements: Extraocular movements intact.   Cardiovascular:      Rate and Rhythm: Normal rate and regular rhythm.      Pulses: Normal pulses.   Pulmonary:      Effort: Pulmonary  effort is normal.      Breath sounds: Normal breath sounds.   Abdominal:      General: Bowel sounds are normal. There is no distension.      Palpations: Abdomen is soft.      Tenderness: There is abdominal tenderness.      Hernia: A hernia is present.   Musculoskeletal:      Cervical back: Normal range of motion.      Right lower leg: No edema.      Left lower leg: No edema.   Skin:     General: Skin is warm and dry.      Capillary Refill: Capillary refill takes less than 2 seconds.   Neurological:      General: No focal deficit present.      Mental Status: She is alert.   Psychiatric:         Mood and Affect: Mood normal.         Behavior: Behavior normal.         Thought Content: Thought content normal.         Judgment: Judgment normal.           Results Review:  I have personally reviewed most recent cardiac tracings, lab results, and radiology images and interpretations and agree with findings, most notably: CMP, CBC, CT of abdomen and pelvis and ultrasound of liver.    Results from last 7 days   Lab Units 11/09/23  1003 11/09/23  0049   WBC 10*3/mm3 11.80* 11.00*   HEMOGLOBIN g/dL 11.3* 10.0*   HEMATOCRIT % 35.9 32.3*   PLATELETS 10*3/mm3 634* 613*   INR   --  1.05     Results from last 7 days   Lab Units 11/09/23  1003 11/09/23  0049   SODIUM mmol/L 138 139   POTASSIUM mmol/L 3.5 3.6   CHLORIDE mmol/L 102 105   CO2 mmol/L 25.0 25.0   BUN mg/dL 11 17   CREATININE mg/dL 0.63 0.72   GLUCOSE mg/dL 97 105*   CALCIUM mg/dL 9.1 9.1   ALK PHOS U/L  --  159*   ALT (SGPT) U/L  --  18   AST (SGOT) U/L  --  29   HSTROP T ng/L <6 <6     Estimated Creatinine Clearance: 96.8 mL/min (by C-G formula based on SCr of 0.63 mg/dL).  Brief Urine Lab Results  (Last result in the past 365 days)        Color   Clarity   Blood   Leuk Est   Nitrite   Protein   CREAT   Urine HCG        11/09/23 0035 Yellow   Clear   Negative   Trace   Negative   Negative                   Microbiology Results (last 10 days)       ** No results found for  the last 240 hours. **            ECG/EMG Results (most recent)       None            Results for orders placed during the hospital encounter of 07/13/21    Duplex Venous Lower Extremity - Bilateral CAR    Interpretation Summary  · Sub-acute right lower extremity superficial thrombophlebitis noted in the greater saphenous (above knee).  · Acute right lower extremity superficial thrombophlebitis noted in the varicosity (above knee).  · All other veins appeared normal bilaterally.          US Liver    Result Date: 11/9/2023  Impression: Findings compatible with hepatic steatosis. Electronically Signed: Edna Finney MD  11/9/2023 2:03 PM CST  Workstation ID: [id number]    CT Abdomen Pelvis Without Contrast    Result Date: 11/9/2023  Impression: 1.No significant change. Small to moderate amount of ascites present throughout the abdomen and pelvis, similar as compared to the previous study. No acute intra-abdominal or intrapelvic process identified. 2.Ancillary findings as described above. Electronically Signed: Muriel Gil MD  11/9/2023 1:19 AM EST  Workstation ID: [id number]       Estimated Creatinine Clearance: 96.8 mL/min (by C-G formula based on SCr of 0.63 mg/dL).    Assessment & Plan  Assessment/Plan       Active Hospital Problems    Diagnosis  POA    **Abdominal pain [R10.9]  Yes      Resolved Hospital Problems   No resolved problems to display.       Abdominal pain with a history of cirrhosis and Ramon  Lab Results   Component Value Date    WBC 11.80 (H) 11/09/2023    AST 29 11/09/2023    ALT 18 11/09/2023    ALKPHOS 159 (H) 11/09/2023    BILITOT 0.9 11/09/2023    LIPASE 34 11/09/2023   -Recent alpha-fetoprotein: 7.04  -CT of abdomen and pelvis: Showed no significant change with small to moderate amount of ascites throughout the abdomen and pelvis similar to previous study  -In the ED patient was given Dilaudid, fentanyl and Zofran  -GI was consulted who ordered ultrasound of liver which showed hepatic steatosis  and recommend small bowel follow-through as well as plan for consideration of CT of abdomen with contrast following possible premedication.  -2 g sodium diet  -Continue Bumex and spironolactone  -Continue Eliquis with history of portal vein thrombosis    GERD  -PPI and Cytotec  -Hypothyroidism  -Cytomel            VTE Prophylaxis -   Mechanical Order History:        Ordered        11/09/23 0456  Place Sequential Compression Device  Once            11/09/23 0456  Maintain Sequential Compression Device  Continuous                          Pharmalogical Order History:        Ordered     Dose Route Frequency Stop    11/09/23 0631  apixaban (ELIQUIS) tablet 5 mg         5 mg PO Every 12 Hours Scheduled --                    CODE STATUS:    Code Status and Medical Interventions:   Ordered at: 11/09/23 1126     Level Of Support Discussed With:    Patient     Code Status (Patient has no pulse and is not breathing):    CPR (Attempt to Resuscitate)     Medical Interventions (Patient has pulse or is breathing):    Full Support       This patient has been examined wearing personal protective equipment.     I discussed the patient's findings and my recommendations with patient and nursing staff.      Signature:Electronically signed by Mateusz Kingston PA-C, 11/09/23, 4:01 PM EST.                Electronically signed by Leonardo Werner MD at 11/09/23 2731          Emergency Department Notes        Leonardo Werner MD at 11/09/23 0242          Subjective   History of Present Illness  Chief complaint abdominal pain swelling    History of present illness this is a 48-year-old female who has had a history in the past of gastric bypass surgery several years ago in [place] that had some complications that led to a splenectomy she reports also has a history of nonalcoholic cirrhosis complains of several week history of worsening abdominal pain.  She states she was in the hospital couple weeks ago she had endoscopy which showed an  ulcer and started on some medicine for that which she states she continues to have pain and now swelling and she is worried that she has ascites and needs to be drained.  No fever chills been noted no vomiting no vomiting blood no black or bloody stool no dysuria frequency no injury no chest pain neck arm or jaw pain is moderate degree constant nothing really seem to trigger nothing makes it better or worse.      Review of Systems   Constitutional:  Negative for chills and fever.   Respiratory:  Negative for chest tightness and shortness of breath.    Cardiovascular:  Negative for chest pain and palpitations.   Gastrointestinal:  Positive for abdominal distention, abdominal pain and nausea. Negative for blood in stool and vomiting.   Genitourinary:  Negative for difficulty urinating and dysuria.   Musculoskeletal:  Negative for back pain and neck pain.   Neurological:  Negative for dizziness and light-headedness.       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    Empyema of pleura     Encephalopathy, portal systemic 5/9/2017    Esophageal varices 7/21/2017    Gastric leak 5/29/2016    GERD (gastroesophageal reflux disease)     H/O thyroidectomy 6/17/2016    Hemorrhoids     Hernia, ventral     History of morbid obesity 3/3/2016    History of transfusion     Hypercoagulable state 8/24/2019    Irregular menstrual cycle     Liver cirrhosis secondary to CARTER 04/2017    Lupus anticoagulant positive 8/24/2019    Migraine     Parathyroid abnormality     Portal vein thrombosis 3/3/2016    Post-surgical hypothyroidism     Pulmonary embolism 10/27/2020    Radiation     Restless legs syndrome (RLS)     S/P laparoscopic sleeve gastrectomy 6/17/2016    S/P splenectomy 6/17/2016    Thyroid cancer     Thyroid cancer 6/26/2015    Urge and stress  incontinence     Vaginal prolapse 7/14/2021    Varicose veins     Ventral hernia 4/18/2020    Vitamin D deficiency 1/21/2021       Allergies   Allergen Reactions    Contrast Dye (Echo Or Unknown Ct/Mr) Hives    Iodinated Contrast Media Hives    Iodine Hives    Hydrocodone-Acetaminophen Hives and Itching    Morphine Itching, Hives and Rash    Red Dye Itching, Other (See Comments) and Unknown - High Severity    Compazine [Prochlorperazine Edisylate] Hives    Hydrocodone Rash     Lortab elixir per pt    Ketorolac Tromethamine Unknown - Low Severity    Nsaids Other (See Comments)     States I am not suppose to take them  Cannot have due to gastric sleeve       Past Surgical History:   Procedure Laterality Date    ABDOMINAL SURGERY      BEDSIDE PARACENTESIS  3/22/2020         BEDSIDE PARACENTESIS  4/19/2020         BEDSIDE PARACENTESIS  3/1/2021         BEDSIDE PARACENTESIS  3/30/2021         CHOLECYSTECTOMY      Laparoscopic    ENDOSCOPY N/A 3/17/2016    Procedure: ESOPHAGOGASTRODUODENOSCOPY WITH BALOON DILATATION 18-20MM WITH GASTRIC SLEEVE SEALANT;  Surgeon: Leonardo Ortiz Jr., MD;  Location: [facility] ENDOSCOPY;  Service:     ENDOSCOPY N/A 3/14/2016    Procedure: esophagogastroduodenoscopy with fluoroscopy;  Surgeon: Greg Avila III, MD;  Location: [facility] ENDOSCOPY;  Service:     ENDOSCOPY N/A 4/15/2016    Procedure: EGD WITH DILITATION AND STENT PLACEMENT dilation of pylorus ;  Surgeon: Leonardo Ortiz Jr., MD;  Location: [facility] ENDOSCOPY;  Service:     ENDOSCOPY N/A 5/13/2016    Procedure: ESOPHAGOGASTRODUODENOSCOPY  W/ STENT;  Surgeon: Leonardo Ortiz Jr., MD;  Location: [facility] ENDOSCOPY;  Service:     ENDOSCOPY N/A 1/13/2021    Procedure: ESOPHAGOGASTRODUODENOSCOPY WITH POLYPECTOMY X1 AND  BIOPSY X1.;  Surgeon: Bryson Cotton MD;  Location: [facility] ENDOSCOPY;  Service: Gastroenterology;  Laterality: N/A;  anastomotic ulcer, antral polyp, GASTRITIS    ENDOSCOPY N/A 10/24/2023    Procedure:  ESOPHAGOGASTRODUODENOSCOPY;  Surgeon: Hossein Delgadillo MD;  Location: [facility] ENDOSCOPY;  Service: Gastroenterology;  Laterality: N/A;  Post: anastomotic ulcer    GASTRECTOMY      Gastric Surgery for Morbid Obesity Laparoscopic Longitudinal Gastrectomy    GASTRIC SLEEVE LAPAROSCOPIC      HEMORRHOIDECTOMY      HERNIA REPAIR      LAPAROSCOPY REPAIR HIATAL HERNIA      LARYNGOSCOPY      Direct Laryngoscopy with Injection into Vocal Cords    OTHER SURGICAL HISTORY Bilateral     Ear Pressure Equalization Tube, Insertion, Bilaterally    PERIPHERALLY INSERTED CENTRAL CATHETER INSERTION      SPLENECTOMY      THYROID SURGERY      TUBAL ABDOMINAL LIGATION         Family History   Problem Relation Age of Onset    Stroke Father     Diabetes Father         Diabetes Mellitus    Hypertension Father     Breast cancer Maternal Grandmother     Cancer Other        Social History     Socioeconomic History    Marital status: Single   Tobacco Use    Smoking status: Never    Smokeless tobacco: Never   Vaping Use    Vaping Use: Never used   Substance and Sexual Activity    Alcohol use: No    Drug use: No    Sexual activity: Defer     Prior to Admission medications    Medication Sig Start Date End Date Taking? Authorizing Provider   apixaban (ELIQUIS) 5 MG tablet tablet Take 5 mg by mouth 2 (Two) Times a Day.    ProviderJustyn MD   bumetanide (BUMEX) 2 MG tablet Take 4 mg by mouth Daily.    Justyn Erwin MD   estradiol (ESTRACE) 0.1 MG/GM vaginal cream Insert 1 g into the vagina 3 (Three) Times a Week. Monday Wednesday Friday    Justyn Erwin MD   levothyroxine (SYNTHROID, LEVOTHROID) 125 MCG tablet Take 125 mcg by mouth Every Morning.    Justyn Erwin MD   liothyronine (CYTOMEL) 5 MCG tablet Take 1 tablet by mouth Daily.    Justyn Erwin MD   miSOPROStol (CYTOTEC) 200 MCG tablet Take 1 tablet by mouth 2 (Two) Times a Day. 10/25/23   Marianna Fernandez PA-C   ondansetron ODT (ZOFRAN-ODT) 4 MG  disintegrating tablet Place 1 tablet on the tongue Every 8 (Eight) Hours As Needed for Nausea or Vomiting. 10/25/23   Marianna Fernandez PA-C   ondansetron ODT (ZOFRAN-ODT) 8 MG disintegrating tablet Place 8 mg on the tongue Every 8 (Eight) Hours As Needed for Nausea or Vomiting.    ProviderJustyn MD   pantoprazole (PROTONIX) 40 MG EC tablet Take 1 tablet by mouth 2 (Two) Times a Day Before Meals. 10/25/23   Marianna Fernandez PA-C   potassium chloride (K-DUR,KLOR-CON) 20 MEQ CR tablet Take 40 mEq by mouth 2 (Two) Times a Day.    ProviderJustyn MD   spironolactone (ALDACTONE) 100 MG tablet Take 400 mg by mouth Daily.    ProviderJustyn MD   sucralfate (CARAFATE) 1 g tablet Take 1 tablet by mouth 2 (Two) Times a Day Before Meals. 10/25/23   Marianna Fernandez PA-C          Objective   Physical Exam  Constitutional this is a 48-year-old female awake alert chronically ill-appearing but nontoxic-appearing HEENT extraocular muscles are intact pupils equal round reactive sclera clear neck supple no adenopathy no JVD no bruits no meningeal signs lungs clear no retractions back no CVA tenderness heart regular without murmur abdomen soft has some mild diffuse tenderness but no rebound no guarding good bowel sounds no peritoneal findings or pulsatile masses I do not really appreciate any large amount of ascites.  Extremities pulses equal upper and lower extremities trace edema no cords or Homans' sign no evidence of DVT skin warm and dry without rashes or cellulitic changes neurologic awake alert and follows commands monitorings normal without focal weakness  Procedures          ED Course      Results for orders placed or performed during the hospital encounter of 11/08/23   Comprehensive Metabolic Panel    Specimen: Blood   Result Value Ref Range    Glucose 105 (H) 65 - 99 mg/dL    BUN 17 6 - 20 mg/dL    Creatinine 0.72 0.57 - 1.00 mg/dL    Sodium 139 136 - 145 mmol/L    Potassium 3.6 3.5 - 5.2 mmol/L     Chloride 105 98 - 107 mmol/L    CO2 25.0 22.0 - 29.0 mmol/L    Calcium 9.1 8.6 - 10.5 mg/dL    Total Protein 6.9 6.0 - 8.5 g/dL    Albumin 3.7 3.5 - 5.2 g/dL    ALT (SGPT) 18 1 - 33 U/L    AST (SGOT) 29 1 - 32 U/L    Alkaline Phosphatase 159 (H) 39 - 117 U/L    Total Bilirubin 0.9 0.0 - 1.2 mg/dL    Globulin 3.2 gm/dL    A/G Ratio 1.2 g/dL    BUN/Creatinine Ratio 23.6 7.0 - 25.0    Anion Gap 9.0 5.0 - 15.0 mmol/L    eGFR 102.0 >60.0 mL/min/1.73   Lipase    Specimen: Blood   Result Value Ref Range    Lipase 34 13 - 60 U/L   Protime-INR    Specimen: Blood   Result Value Ref Range    Protime 11.4 9.6 - 11.7 Seconds    INR 1.05 0.93 - 1.10   aPTT    Specimen: Blood   Result Value Ref Range    PTT 27.1 (L) 61.0 - 76.5 seconds   Urinalysis With Microscopic If Indicated (No Culture) - Urine, Clean Catch    Specimen: Urine, Clean Catch   Result Value Ref Range    Color, UA Yellow Yellow, Straw    Appearance, UA Clear Clear    pH, UA 5.5 5.0 - 8.0    Specific Gravity, UA 1.041 (H) 1.005 - 1.030    Glucose, UA Negative Negative    Ketones, UA Trace (A) Negative    Bilirubin, UA Negative Negative    Blood, UA Negative Negative    Protein, UA Negative Negative    Leuk Esterase, UA Trace (A) Negative    Nitrite, UA Negative Negative    Urobilinogen, UA 1.0 E.U./dL 0.2 - 1.0 E.U./dL   CBC Auto Differential    Specimen: Blood   Result Value Ref Range    WBC 11.00 (H) 3.40 - 10.80 10*3/mm3    RBC 3.95 3.77 - 5.28 10*6/mm3    Hemoglobin 10.0 (L) 12.0 - 15.9 g/dL    Hematocrit 32.3 (L) 34.0 - 46.6 %    MCV 81.7 79.0 - 97.0 fL    MCH 25.3 (L) 26.6 - 33.0 pg    MCHC 31.0 (L) 31.5 - 35.7 g/dL    RDW 21.0 (H) 12.3 - 15.4 %    RDW-SD 59.5 (H) 37.0 - 54.0 fl    MPV 8.1 6.0 - 12.0 fL    Platelets 613 (H) 140 - 450 10*3/mm3    Neutrophil % 39.8 (L) 42.7 - 76.0 %    Lymphocyte % 50.0 (H) 19.6 - 45.3 %    Monocyte % 5.5 5.0 - 12.0 %    Eosinophil % 2.5 0.3 - 6.2 %    Basophil % 2.2 (H) 0.0 - 1.5 %    Neutrophils, Absolute 4.40 1.70 - 7.00  10*3/mm3    Lymphocytes, Absolute 5.50 (H) 0.70 - 3.10 10*3/mm3    Monocytes, Absolute 0.60 0.10 - 0.90 10*3/mm3    Eosinophils, Absolute 0.30 0.00 - 0.40 10*3/mm3    Basophils, Absolute 0.20 0.00 - 0.20 10*3/mm3    nRBC 0.1 0.0 - 0.2 /100 WBC   Urinalysis, Microscopic Only - Urine, Clean Catch    Specimen: Urine, Clean Catch   Result Value Ref Range    RBC, UA 0-2 None Seen, 0-2 /HPF    WBC, UA 6-10 (A) None Seen, 0-2 /HPF    Bacteria, UA 1+ (A) None Seen /HPF    Squamous Epithelial Cells, UA 7-12 (A) None Seen, 0-2 /HPF    Hyaline Casts, UA 0-2 None Seen /LPF    Methodology Automated Microscopy      CT Abdomen Pelvis Without Contrast    Result Date: 11/9/2023  Impression: 1.No significant change. Small to moderate amount of ascites present throughout the abdomen and pelvis, similar as compared to the previous study. No acute intra-abdominal or intrapelvic process identified. 2.Ancillary findings as described above. Electronically Signed: Muriel Gil MD  11/9/2023 1:19 AM EST  Workstation ID: [id number]   Medications   sodium chloride 0.9 % flush 10 mL (has no administration in time range)   HYDROmorphone (DILAUDID) injection 0.5 mg (has no administration in time range)   fentaNYL citrate (PF) (SUBLIMAZE) injection 25 mcg (25 mcg Intravenous Given 11/9/23 0047)   ondansetron (ZOFRAN) injection 4 mg (4 mg Intravenous Given 11/9/23 0047)                                            Medical Decision Making  Medical decision making.  IV established monitor placed my review of sinus rhythm given fentanyl 25 mics IV.  Labs obtained independent reviewed by me comprehensive metabolic profile unremarkable other than alk phos of 159 coags unremarkable.  Lipase unremarkable urine negative CBC unremarkable and white count 11,000 hemoglobin of 10 platelets of 613,000.  CT abdomen pelvis obtained read by radiology report reviewed by me no significant change small to moderate amount of ascites present throughout the abdomen similar  to previous study no acute findings were noted per radiology report.  Patient required additional pain medication was given Dilaudid 0.5 mg IV.  Was soft with some mild tenderness but no rebound no guarding good bowel sounds no peritoneal findings I do not see evidence just an aneurysm or dissection I will see evidence of ischemic bowel see evidence of bowel obstruction diverticulitis pancreatitis consider spontaneous bacterial peritonitis although the ascites really has not changed significantly from the previous no other evidence of an acute surgical etiology based on the history and physical clinical findings although this is not a complete list of possibilities.  We talked about the findings to be placed in observation to have GI consultation from her gastroenterologist.  Record reviewed she was just discharged 2 weeks ago after having an ulcer found on endoscopy.  Placed on Carafate and some acid proton pump inhibitors.  The patient admitted to observation for further care stable unremarkable course    Problems Addressed:  Generalized abdominal pain: complicated acute illness or injury  Other ascites: complicated acute illness or injury    Amount and/or Complexity of Data Reviewed  Labs: ordered. Decision-making details documented in ED Course.  Radiology: ordered.  ECG/medicine tests: ordered.    Risk  Decision regarding hospitalization.        Final diagnoses:   Generalized abdominal pain   Other ascites       ED Disposition  ED Disposition       ED Disposition   Decision to Admit    Condition   --    Comment   --               No follow-up provider specified.       Medication List      No changes were made to your prescriptions during this visit.            Leonardo Werner MD  11/09/23 0655      Electronically signed by Leonardo Werner MD at 11/09/23 0655       Beena Burns PCT at 11/09/23 0041          Radioed team to take pt to CT 3.    Electronically signed by Beena Burns PCT at 11/09/23 0041        Operative/Procedure Notes (all)    No notes of this type exist for this encounter.          Physician Progress Notes (all)        Hayde Kalani M, APRN at 11/13/23 1040           LOS: 3 days   Patient Care Team:  Austin Mcfadden MD as PCP - General (Family Medicine)      Subjective     Subjective: Patient with continued upper abdominal discomfort that occasionally becomes severe at times and radiates to her back.  No nausea or vomiting.  Able to tolerate her diet.      ROS:   Review of Systems   Constitutional:  Negative for chills and fever.   Respiratory:  Negative for cough and shortness of breath.    Cardiovascular:  Negative for chest pain.   Gastrointestinal:  Positive for abdominal pain and nausea. Negative for blood in stool.   Musculoskeletal:  Positive for back pain.   Neurological:  Negative for dizziness.   Psychiatric/Behavioral:  Negative for agitation and confusion.         Medication Review:   Scheduled Meds:apixaban, 5 mg, Oral, Q12H  bumetanide, 4 mg, Oral, Daily  hyoscyamine, 125 mcg, Sublingual, TID  levothyroxine, 125 mcg, Oral, Q AM  liothyronine, 5 mcg, Oral, Q AM  metoclopramide, 5 mg, Intravenous, TID AC  miSOPROStol, 200 mcg, Oral, BID  pantoprazole, 40 mg, Oral, BID AC  polyethylene glycol, 17 g, Oral, BID  potassium chloride, 40 mEq, Oral, BID  simethicone, 80 mg, Oral, 4x Daily AC & at Bedtime  sodium chloride, 10 mL, Intravenous, Q12H  spironolactone, 400 mg, Oral, Daily  sucralfate, 1 g, Oral, BID AC      Continuous Infusions:   PRN Meds:.  melatonin    ondansetron    Potassium Replacement - Follow Nurse / BPA Driven Protocol    [COMPLETED] Insert Peripheral IV **AND** sodium chloride    sodium chloride    sodium chloride    traMADol      Objective     Vital Signs  Temp:  [98 °F (36.7 °C)-98.2 °F (36.8 °C)] 98 °F (36.7 °C)  Heart Rate:  [70-95] 70  Resp:  [15-16] 16  BP: (116-141)/(75-85) 127/82    Physical Exam:    General Appearance:    Awake and alert, in no acute distress    Head:    Normocephalic, without obvious abnormality   Eyes:          Conjunctivae normal, anicteric sclera   Ears:    Hearing intact   Throat:   No oral lesions, no thrush, oral mucosa moist   Neck:   No adenopathy, supple, no JVD   Lungs:     Respirations regular, even and unlabored       Abdomen:     Soft, mild upper abdominal tenderness, no rebound or guarding, non-distended, umbilical hernia   Rectal:     Deferred   Extremities:   No edema, no cyanosis, no redness   Skin:   No bleeding, bruising or rash, no jaundice   Neurologic:   Sensation intact        Results Review:    CBC  Results from last 7 days   Lab Units 11/12/23  0433 11/11/23  0411 11/10/23  0537 11/09/23  1003 11/09/23  0049   RBC 10*6/mm3 4.56 4.33 4.07 4.35 3.95   WBC 10*3/mm3 11.10* 12.50* 12.50* 11.80* 11.00*   HEMOGLOBIN g/dL 11.8* 11.2* 10.6* 11.3* 10.0*   PLATELETS 10*3/mm3 659* 634* 605* 634* 613*       CMP  Results from last 7 days   Lab Units 11/12/23  0433 11/11/23  1948 11/11/23  0411 11/10/23  0537 11/09/23  1943 11/09/23  1003 11/09/23  0049   SODIUM mmol/L 129*  --  134* 134*  --  138 139   POTASSIUM mmol/L 5.1 5.1 5.1 4.2 3.6 3.5 3.6   CHLORIDE mmol/L 93*  --  93* 97*  --  102 105   CO2 mmol/L 28.0  --  29.0 28.0  --  25.0 25.0   BUN mg/dL 17  --  15 14  --  11 17   CREATININE mg/dL 1.00  --  1.04* 0.83  --  0.63 0.72   GLUCOSE mg/dL 175*  --  141* 107*  --  97 105*   ALBUMIN g/dL 3.7  --  3.7 3.6  --   --  3.7   BILIRUBIN mg/dL 1.3*  --  1.6* 1.9*  --   --  0.9   ALK PHOS U/L 271*  --  278* 274*  --   --  159*   AST (SGOT) U/L 49*  --  107* 235*  --   --  29   ALT (SGPT) U/L 75*  --  100* 139*  --   --  18       Amylase and Lipase  Results from last 7 days   Lab Units 11/09/23  0049   LIPASE U/L 34       CRP     Results from last 7 days   Lab Units 11/12/23  0433   CRP mg/dL 0.65*       Imaging Results (Last 24 Hours)       ** No results found for the last 24 hours. **              Assessment & Plan   48-year-old female with Ramon  cirrhosis and complicated past surgical history with Alfonzo-en-Y anatomy presented to the hospital on 11/8/2023 with continued abdominal pain.  Was recently here last month and had EGD.  Now has elevated liver enzymes.     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  At times it becomes severe and radiates to her back.  Her liver enzymes increased on 11/10/2023 but have trended down.  Total bilirubin 1.3, alk phos 271, AST 49, and ALT 75.  Lipase has been normal.  WBC 11.1.  MRI/MRCP showed no choledocholithiasis but 10 mm common bile duct as well as moderate amount of ascites.  She has an umbilical hernia containing nonobstructed small bowel loop.  She continues with intermittent symptoms.  Consider ampullary stenosis contributing.  We will discuss with Dr. Lambert or Dr. Johnson regarding possible placement of stent to gain access for ERCP.  Otherwise monitor liver enzymes.  She continues on Levsin as well as PPI/Carafate/misoprostol for anastomotic ulcer.  We have also recommended outpatient colonoscopy for screening purposes.    GRIFFIN Mooney  11/13/23  10:40 EST      Electronically signed by Kalani Lock APRN at 11/13/23 1045       Carlee Peoples APRN at 11/12/23 1642       Attestation signed by PRASHANT Akers MD at 11/12/23 1701    Electronically signed on 11/12/23 16:59 EST by DAVID Akers  MD  The patient was seen and examined with an APRN. I personally performed the entire medical decision making, and physical exam. Labs and imaging and outpatient records reviewed, ordered.    Still some mild right-sided abdominal pain but overall improving.  Had several bowel movements yesterday.  No vomiting.  Less bloated.  On exam abdomen is soft and nontender.  Labs improving, bilirubin down to 1.3, transaminases improving.  Suspect abdominal pain mostly due to IBS and constipation.  Lower suspicion for choledocholithiasis with negative MRCP.  Considering ampullary stenosis but labs continue improving.  Add simethicone.  Continue Levsin.  Continue laxatives and PPI and Carafate daily.  Okay for discharge home with plans for close outpatient follow-up.  Needs outpatient screening colonoscopy.                   LOS: 2 days   Patient Care Team:  Austin Mcfadden MD as PCP - General (Family Medicine)      Subjective   Still having some Right sided abdominal pain.  4 bowel movements yesterday.  None today.  Decreased bloating.    Interval History:   Labs: Sodium 129 (134), creatinine 1.  Total bilirubin 1.3 (1.6), alk phos 271 (278), AST 49 (107), ALT 75 (100).  CRP 0.65.  WBCs 11.1 (12.5), hemoglobin 11.8, platelets 659.  Patient is feeling somewhat better today after having multiple bowel movements yesterday.  Patient was able to eat toast and turkey gill this morning.    ROS:   Bloating, right upper quadrant abdominal pain  No chest pain, shortness of breath, or cough.         Medication Review:     Current Facility-Administered Medications:     apixaban (ELIQUIS) tablet 5 mg, 5 mg, Oral, Q12H, Mateusz Kingston PA-C, 5 mg at 11/12/23 0753    bumetanide (BUMEX) tablet 4 mg, 4 mg, Oral, Daily, Mateusz Kingston PA-C, 4 mg at 11/11/23 0825    hyoscyamine (LEVSIN) SL tablet 125 mcg, 125 mcg, Sublingual, TID, Carlee Peoples APRN, 125 mcg at 11/12/23 1620    levothyroxine (SYNTHROID, LEVOTHROID) tablet 125  mcg, 125 mcg, Oral, Q AM, Alia Matt MD, 125 mcg at 11/12/23 0511    liothyronine (CYTOMEL) tablet 5 mcg, 5 mcg, Oral, Q AM, Mateusz Kingston PA-C, 5 mcg at 11/12/23 0511    melatonin tablet 5 mg, 5 mg, Oral, Nightly PRN, Leonardo Werner MD, 5 mg at 11/09/23 2118    metoclopramide (REGLAN) injection 5 mg, 5 mg, Intravenous, TID AC, Kalani Lock, APRN, 5 mg at 11/12/23 1620    miSOPROStol (CYTOTEC) tablet 200 mcg, 200 mcg, Oral, BID, Mateusz Kingston PA-C, 200 mcg at 11/12/23 0753    ondansetron (ZOFRAN) injection 4 mg, 4 mg, Intravenous, Q6H PRN, Leonardo Werner MD, 4 mg at 11/12/23 1620    pantoprazole (PROTONIX) EC tablet 40 mg, 40 mg, Oral, BID AC, Mateusz Kingston PA-C, 40 mg at 11/12/23 1619    polyethylene glycol (MIRALAX) packet 17 g, 17 g, Oral, BID, Carlee Peoples APRN, 17 g at 11/11/23 1257    potassium chloride (K-DUR,KLOR-CON) CR tablet 40 mEq, 40 mEq, Oral, BID, Mateusz Kingston PA-C, 40 mEq at 11/12/23 0751    Potassium Replacement - Follow Nurse / BPA Driven Protocol, , Does not apply, PRN, Mateusz Kingston PA-C    [COMPLETED] Insert Peripheral IV, , , Once **AND** sodium chloride 0.9 % flush 10 mL, 10 mL, Intravenous, PRN, Leonardo Werner MD, 10 mL at 11/09/23 1549    sodium chloride 0.9 % flush 10 mL, 10 mL, Intravenous, Q12H, Leonardo Wenrer MD, 10 mL at 11/12/23 0804    sodium chloride 0.9 % flush 10 mL, 10 mL, Intravenous, PRN, Leonardo Werner MD, 10 mL at 11/09/23 1916    sodium chloride 0.9 % infusion 40 mL, 40 mL, Intravenous, PRN, Leonardo Werner MD    spironolactone (ALDACTONE) tablet 400 mg, 400 mg, Oral, Daily, Mateusz Kingston PA-C, 400 mg at 11/11/23 0826    sucralfate (CARAFATE) tablet 1 g, 1 g, Oral, BID AC, Mateusz Kingston PA-C, 1 g at 11/12/23 1619    traMADol (ULTRAM) tablet 50 mg, 50 mg, Oral, Q4H PRN, Gogo Moreira, APRN, 50 mg at 11/12/23 1619      Objective resting in the hospital bed.  NAD.  No family present.    Vital Signs  Temp:  [97.8  °F (36.6 °C)-98.3 °F (36.8 °C)] 98.2 °F (36.8 °C)  Heart Rate:  [] 72  Resp:  [15-16] 16  BP: (119-138)/(73-94) 120/80  Physical Exam:    General Appearance:    Awake and alert, in no acute distress   Head:    Normocephalic, without obvious abnormality   Eyes:          Conjunctivae normal, anicteric sclerae   Ears:    Hearing intact   Throat:   No oral lesions, no thrush, oral mucosa moist   Neck:   No adenopathy, supple, no JVD   Lungs:     respirations regular, even and unlabored       Abdomen:     soft, right upper quadrant tender, no rebound or guarding, non-distended, no hepatosplenomegaly   Rectal:     Deferred   Extremities:   No edema, no cyanosis, no redness   Skin:   No bleeding, bruising or rash, no jaundice   Neurologic:   Cranial nerves 2 - 12 grossly intact, no asterixis, sensation   intact        Results Review:    CBC    Results from last 7 days   Lab Units 11/12/23  0433 11/11/23  0411 11/10/23  0537 11/09/23  1003 11/09/23  0049   WBC 10*3/mm3 11.10* 12.50* 12.50* 11.80* 11.00*   HEMOGLOBIN g/dL 11.8* 11.2* 10.6* 11.3* 10.0*   PLATELETS 10*3/mm3 659* 634* 605* 634* 613*     CMP   Results from last 7 days   Lab Units 11/12/23  0433 11/11/23  1948 11/11/23  0411 11/10/23  0537 11/09/23 1943 11/09/23  1003 11/09/23  0049   SODIUM mmol/L 129*  --  134* 134*  --  138 139   POTASSIUM mmol/L 5.1 5.1 5.1 4.2 3.6 3.5 3.6   CHLORIDE mmol/L 93*  --  93* 97*  --  102 105   CO2 mmol/L 28.0  --  29.0 28.0  --  25.0 25.0   BUN mg/dL 17  --  15 14  --  11 17   CREATININE mg/dL 1.00  --  1.04* 0.83  --  0.63 0.72   GLUCOSE mg/dL 175*  --  141* 107*  --  97 105*   ALBUMIN g/dL 3.7  --  3.7 3.6  --   --  3.7   BILIRUBIN mg/dL 1.3*  --  1.6* 1.9*  --   --  0.9   ALK PHOS U/L 271*  --  278* 274*  --   --  159*   AST (SGOT) U/L 49*  --  107* 235*  --   --  29   ALT (SGPT) U/L 75*  --  100* 139*  --   --  18   LIPASE U/L  --   --   --   --   --   --  34     Cr Clearance Estimated Creatinine Clearance: 61 mL/min  "(by C-G formula based on SCr of 1 mg/dL).  Coag   Results from last 7 days   Lab Units 11/10/23  0537 11/09/23  0049   INR  1.15* 1.05   APTT seconds  --  27.1*     HbA1C   Lab Results   Component Value Date    HGBA1C 5.20 01/20/2023    HGBA1C 5.26 05/27/2016    HGBA1C 5.6 05/12/2015     Blood Glucose No results found for: \"POCGLU\"  Infection     UA    Results from last 7 days   Lab Units 11/09/23  0035   NITRITE UA  Negative   WBC UA /HPF 6-10*   BACTERIA UA /HPF 1+*   SQUAM EPITHEL UA /HPF 7-12*     Radiology(recent) No radiology results for the last day          Assessment & Plan   48-year-old female with Ramon cirrhosis and complicated past surgical history with Alfonzo-en-Y anatomy presented to the hospital on 11/8/2023 with continued abdominal pain.  Was recently here last month and had EGD.  Now has elevated liver enzymes.     -Upper abdominal pain  -Nausea/vomiting  -Elevated liver enzymes  -Anastomotic ulcer  -Ascites  -RAMON cirrhosis that has been complicated by ascites, hepatic encephalopathy, and nonbleeding esophageal varices  -Mild normocytic anemia  -Leukocytosis  -Elevated alk phos  -History of gastric sleeve followed by Alfonzo-en-Y gastric bypass with multiple surgeries   -Chronic portal vein thrombus and DVT on Eliquis  -History of cholecystectomy and splenectomy  -Ramon cirrhosis that has been complicated by hepatic encephalopathy, ascites, and nonbleeding esophageal varices        PLAN:  LFTs continue to decrease.    4 bowel movements yesterday after bowel purge.  Feeling somewhat better.  Is having some bloating though.  We will add some simethicone.  Continue Levsin as she cannot take dicyclomine due to red dye allergy  Continue daily bowel regimen, PPI twice daily, Carafate, and misoprostol.  Recommend outpatient colonoscopy.  Hopefully home tomorrow.    Carleepako Peoples, APRN  11/12/23  16:42 EST              Electronically signed by PRASHANT Akers MD at 11/12/23 1701       Alia Matt MD at " 23 1135              [facility]   Daily Progress Note    Patient Name: Lucinda Cope  : 1975  MRN: [id number]  Primary Care Physician:  Austin Mcfadden MD  Date of admission: 2023        Subjective      Chief Complaint: abd pain    Patient seen and examined this am, still with 6/10 RUQ pain , did not eat much. LFTs still up but trending down    ROS   12 point ROS is negative except as in HPI      Objective      Vitals:   Temp:  [97.3 °F (36.3 °C)-98.6 °F (37 °C)] 97.5 °F (36.4 °C)  Heart Rate:  [] 78  Resp:  [15-19] 16  BP: (116-136)/(76-93) 118/84    Physical Exam     Constitutional: Awake, alert in no distress at time of examination.              Eyes: PERRLA, sclerae anicteric, no conjunctival injection              HENT: NCAT, mucous membranes moist              Neck: Supple, no thyromegaly, no lymphadenopathy, trachea midline              Respiratory: Clear to auscultation bilaterally, nonlabored respirations               Cardiovascular: RRR, no murmurs, rubs, or gallops, palpable pedal pulses bilaterally              Gastrointestinal: Positive bowel sounds, soft, nontender, nondistended              Musculoskeletal: No bilateral ankle edema, no clubbing or cyanosis to extremities              Psychiatric: Appropriate affect, cooperative              Neurologic: Oriented x 3, strength symmetric in all extremities, Cranial Nerves grossly intact to confrontation, speech clear              Skin: No rashes             Result Review    Result Review:  I have personally reviewed the results from the time of this admission to 2023 11:36 EST and agree with these findings:  [x]  Laboratory  []  Microbiology  [x]  Radiology  []  EKG/Telemetry   []  Cardiology/Vascular   []  Pathology  []  Old records  []  Other:        Assessment & Plan      Brief Patient Summary:  Lucinda Cope is a 48 y.o. female past medical history significant for history of gastric bypass  surgery several years back complicated by splenectomy history of Ramon who presented to the hospital initially on 11/9/2023 with chief complaint of having abdominal pain, she stated that in the hospital couple weeks back she had an endoscopy which showed an ulcer, admitted and evaluated by gastroenterology, and MRCP was done earlier today and showed no choledocholithiasis, patient is tolerating her diet. Being followed by GI       apixaban, 5 mg, Oral, Q12H  bumetanide, 4 mg, Oral, Daily  liothyronine, 5 mcg, Oral, Q AM  metoclopramide, 5 mg, Intravenous, TID AC  miSOPROStol, 200 mcg, Oral, BID  pantoprazole, 40 mg, Oral, BID AC  potassium chloride, 40 mEq, Oral, BID  sodium chloride, 10 mL, Intravenous, Q12H  spironolactone, 400 mg, Oral, Daily  sucralfate, 1 g, Oral, BID AC             Active Hospital Problems:  Active Hospital Problems    Diagnosis     **Abdominal pain      Plan:     This is 48-year-old lady who presented to the hospital with  1.  Abdominal pain  2.  History of RAMON and ascites  3.  History of gastric sleeve        Plan: Patient is tolerating her diet, currently on analgesics and antiemetics, GI is following  MRCP was done and showed no choledocholithiasis.         DVT prophylaxis:  Medical and mechanical DVT prophylaxis orders are present.    CODE STATUS:    Level Of Support Discussed With: Patient  Code Status (Patient has no pulse and is not breathing): CPR (Attempt to Resuscitate)  Medical Interventions (Patient has pulse or is breathing): Full Support      Disposition:  I expect patient to be discharged in 1-2 days.    Electronically signed by Alia Matt MD, 11/11/23, 11:36 EST.  [facility] Hospitalist Team             Electronically signed by Alia Matt MD at 11/11/23 1145       Carlee Peoples APRN at 11/11/23 1019       Attestation signed by PRASHANT Akers MD at 11/11/23 1318    Electronically signed on 11/11/23 13:14 EST by DAVID Akers MD  The patient was seen and examined  with an APRN. I personally performed the entire medical decision making, and physical exam. Labs and imaging and outpatient records reviewed, ordered.    Subjectively still complaining of some right-sided abdominal pain.  No bowel movement for the past 3 days.  On exam has mild tenderness but no rebound or guarding.  Labs/imaging reviewed, MRCP shows no choledocholithiasis.  Liver enzymes slightly improved. Lower suspicion for choledocholithiasis. No plan for percutaneous transhepatic cholangiography at this time.  Will give laxatives to try to encourage a bowel movement to help with abdominal pain, and can use Levsin as needed (allergy to dicyclomine).  Recommend outpatient colonoscopy after discharge.  Hopefully home soon.                   LOS: 1 day   Patient Care Team:  Austin Mcfadden MD as PCP - General (Family Medicine)      Subjective   Still having some Right sided abdominal pain    Interval History:   Labs: Sodium 134, potassium 5.1, creatinine 1.04.  TB 1.6 (1.9), alk phos 278 (274),  (235),  (139).  WBCs 12.5 and stable, hemoglobin 11.2, platelets 634.     MRCP 11/10/2023: No choledocholithiasis.  CBD 10 mm and post Jeanie state.  Hepatic steatosis.  Moderate ascites with central mesenteric edema unchanged from CT.  Cavernous transformation of portal vein.  Umbilical hernia containing a nonobstructing small bowel loop partially imaged.    She was able to eat some toast and yogurt this morning.  Complains of tightness in the right upper quadrant.  Has had some nausea but no vomiting.  Denies any GERD symptoms or esophageal burning.  Last bowel movement was Wednesday, 11/8/2023, states she normally has bowel movement every day without any laxatives or stool softeners at home.    ROS:   Constipation right upper quadrant abdominal pain.  No chest pain, shortness of breath, or cough.         Medication Review:     Current Facility-Administered Medications:     apixaban (ELIQUIS) tablet 5 mg, 5  mg, Oral, Q12H, Mateusz Kingston PA-C, 5 mg at 11/11/23 0826    bumetanide (BUMEX) tablet 4 mg, 4 mg, Oral, Daily, Mateusz Kingston PA-C, 4 mg at 11/11/23 0825    HYDROmorphone (DILAUDID) injection 0.5 mg, 0.5 mg, Intravenous, Q3H PRN, Gogo Moreira, GRIFFIN    liothyronine (CYTOMEL) tablet 5 mcg, 5 mcg, Oral, Q AM, Mateusz Kingston PA-C, 5 mcg at 11/11/23 0825    melatonin tablet 5 mg, 5 mg, Oral, Nightly PRN, Leonardo Werner MD, 5 mg at 11/09/23 2118    metoclopramide (REGLAN) injection 5 mg, 5 mg, Intravenous, TID AC, Kalani Lock, APRN, 5 mg at 11/11/23 0827    miSOPROStol (CYTOTEC) tablet 200 mcg, 200 mcg, Oral, BID, Mateusz Kingston PA-C, 200 mcg at 11/11/23 0825    ondansetron (ZOFRAN) injection 4 mg, 4 mg, Intravenous, Q6H PRN, Leonardo Werner MD, 4 mg at 11/11/23 0849    pantoprazole (PROTONIX) EC tablet 40 mg, 40 mg, Oral, BID AC, Mateusz Kingston PA-C, 40 mg at 11/11/23 0826    potassium chloride (K-DUR,KLOR-CON) CR tablet 40 mEq, 40 mEq, Oral, BID, Mateusz Kingston PA-C, 40 mEq at 11/11/23 0826    Potassium Replacement - Follow Nurse / BPA Driven Protocol, , Does not apply, PRN, Mateusz Kingston PA-C    [COMPLETED] Insert Peripheral IV, , , Once **AND** sodium chloride 0.9 % flush 10 mL, 10 mL, Intravenous, PRN, Leonardo Werner MD, 10 mL at 11/09/23 1549    sodium chloride 0.9 % flush 10 mL, 10 mL, Intravenous, Q12H, Leonardo Werner MD, 10 mL at 11/11/23 0828    sodium chloride 0.9 % flush 10 mL, 10 mL, Intravenous, PRN, Leonardo Werner MD, 10 mL at 11/09/23 1916    sodium chloride 0.9 % infusion 40 mL, 40 mL, Intravenous, PRN, Leonardo Werner MD    spironolactone (ALDACTONE) tablet 400 mg, 400 mg, Oral, Daily, Mateusz Kingston PA-C, 400 mg at 11/11/23 0826    sucralfate (CARAFATE) tablet 1 g, 1 g, Oral, BID AC, Mateusz Kingston PA-C, 1 g at 11/11/23 0826    traMADol (ULTRAM) tablet 50 mg, 50 mg, Oral, Q4H PRN, Gogo Moreira APRN, 50 mg at 11/11/23  0849      Objective resting in the hospital bed.  NAD.  No family present.    Vital Signs  Temp:  [97.3 °F (36.3 °C)-98.6 °F (37 °C)] 97.3 °F (36.3 °C)  Heart Rate:  [] 90  Resp:  [15-19] 19  BP: (116-136)/(76-93) 125/90  Physical Exam:    General Appearance:    Awake and alert, in no acute distress   Head:    Normocephalic, without obvious abnormality   Eyes:          Conjunctivae normal, anicteric sclerae   Ears:    Hearing intact   Throat:   No oral lesions, no thrush, oral mucosa moist   Neck:   No adenopathy, supple, no JVD   Lungs:     respirations regular, even and unlabored       Abdomen:     soft, right upper quadrant tender, no rebound or guarding, non-distended, no hepatosplenomegaly   Rectal:     Deferred   Extremities:   No edema, no cyanosis, no redness   Skin:   No bleeding, bruising or rash, no jaundice   Neurologic:   Cranial nerves 2 - 12 grossly intact, no asterixis, sensation   intact        Results Review:    CBC    Results from last 7 days   Lab Units 11/11/23  0411 11/10/23  0537 11/09/23  1003 11/09/23  0049   WBC 10*3/mm3 12.50* 12.50* 11.80* 11.00*   HEMOGLOBIN g/dL 11.2* 10.6* 11.3* 10.0*   PLATELETS 10*3/mm3 634* 605* 634* 613*     CMP   Results from last 7 days   Lab Units 11/11/23  0411 11/10/23  0537 11/09/23  1943 11/09/23  1003 11/09/23  0049   SODIUM mmol/L 134* 134*  --  138 139   POTASSIUM mmol/L 5.1 4.2 3.6 3.5 3.6   CHLORIDE mmol/L 93* 97*  --  102 105   CO2 mmol/L 29.0 28.0  --  25.0 25.0   BUN mg/dL 15 14  --  11 17   CREATININE mg/dL 1.04* 0.83  --  0.63 0.72   GLUCOSE mg/dL 141* 107*  --  97 105*   ALBUMIN g/dL 3.7 3.6  --   --  3.7   BILIRUBIN mg/dL 1.6* 1.9*  --   --  0.9   ALK PHOS U/L 278* 274*  --   --  159*   AST (SGOT) U/L 107* 235*  --   --  29   ALT (SGPT) U/L 100* 139*  --   --  18   LIPASE U/L  --   --   --   --  34     Cr Clearance Estimated Creatinine Clearance: 58.6 mL/min (A) (by C-G formula based on SCr of 1.04 mg/dL (H)).  Coag   Results from last 7  "days   Lab Units 11/10/23  0537 11/09/23  0049   INR  1.15* 1.05   APTT seconds  --  27.1*     HbA1C   Lab Results   Component Value Date    HGBA1C 5.20 01/20/2023    HGBA1C 5.26 05/27/2016    HGBA1C 5.6 05/12/2015     Blood Glucose No results found for: \"POCGLU\"  Infection     UA    Results from last 7 days   Lab Units 11/09/23  0035   NITRITE UA  Negative   WBC UA /HPF 6-10*   BACTERIA UA /HPF 1+*   SQUAM EPITHEL UA /HPF 7-12*     Radiology(recent) MRI abdomen w wo contrast mrcp    Result Date: 11/10/2023  Impression: 1. No choledocholithiasis. Common bile duct dilated to 10 mm which may relate to postcholecystectomy state. 2. Hepatic steatosis. 3. Moderate ascites with central mesenteric edema unchanged from prior CT. 4. Cavernous transformation of portal vein. 5. Postsurgical changes of gastric bypass, splenectomy and cholecystectomy. 6. Umbilical hernia containing a nonobstructed small bowel loop partially imaged. Electronically Signed: Blu Mancilla MD  11/10/2023 2:45 PM EST  Workstation ID: [id number] [employer]    US Liver    Result Date: 11/9/2023  Impression: Findings compatible with hepatic steatosis. Electronically Signed: Edna Finney MD  11/9/2023 2:03 PM CST  Workstation ID: [id number]           Assessment & Plan   48-year-old female with Carter cirrhosis and complicated past surgical history with Alfonzo-en-Y anatomy presented to the hospital on 11/8/2023 with continued abdominal pain.  Was recently here last month and had EGD.  Now has elevated liver enzymes.     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  MRCP did not show signs of choledocholithiasis.    Small bowel series was canceled yesterday.    Has not had a bowel movement since Wednesday, 2023.  Will give laxatives and started on bowel regimen.  She is complaining of some right upper quadrant abdominal pain may be related to constipation.  We will give Levsin as patient has a red dye allergy and evidently dicyclomine is contraindicated.  2 g sodium restriction diet.  Continue PPI twice daily, Carafate, and misoprostol.  Recommend outpatient colonoscopy.    GRIFFIN Ambrose  23  10:19 EST              Electronically signed by PRASHANT Akers MD at 23 1318       Tara Haro APRN at 11/10/23 1533          [facility] Observation Unit Progress Note     Patient Name: Lucinda Cope  : 1975  MRN: [id number]  Primary Care Physician: Austin Mcfadden MD  Date of admission: 2023     Patient Care Team:  Austin Mcfadden MD as PCP - General (Family Medicine)          Subjective   History Present Illness     Chief Complaint:   Chief Complaint   Patient presents with    Abdominal Pain     Abdominal Pain     Ms. Cope is a 48 y.o.  presents to [facility] complaining of abdominal pain       History of Present Illness  Obtained from admitting physician HPI on 2023:  History of present illness this is a 48-year-old female who has had a history in the past of gastric bypass surgery several years ago in [place] that had some complications that led to a splenectomy she reports also has a history of nonalcoholic cirrhosis complains of several week history of worsening abdominal pain.  She states she was in the hospital couple weeks ago she had endoscopy which showed an ulcer and started on some medicine for that which she states she continues to have pain and now  swelling and she is worried that she has ascites and needs to be drained.  No fever chills been noted no vomiting no vomiting blood no black or bloody stool no dysuria frequency no injury no chest pain neck arm or jaw pain is moderate degree constant nothing really seem to trigger nothing makes it better or worse.     11/09/23:  Patient confirms the HPI noted above including recent increase in her baseline abdominal pain as well as some transient distention with pain worsening over the past 3 days located in the mid abdomen radiating outward and occasionally up to the left side of her chest.  She has recently had evaluation with GI and underwent EGD on 10/25/2023.  She notes pain had been severe at times and that they found signs of an ulcer on previous scope.  She had felt as if her distention had been increasing though this does seem to have improved today.    11/10/23: Patient reports recent increase in abdominal pain that is worse in the past few days.  Patient denies chest pain, dyspnea, vomiting, fever or syncope. MRCP complete with monitoring of liver enzymes overnight.       Review of Systems   Constitutional: Positive for decreased appetite and malaise/fatigue.   HENT: Negative.     Eyes: Negative.    Cardiovascular: Negative.    Respiratory: Negative.     Endocrine: Negative.    Hematologic/Lymphatic: Negative.    Skin: Negative.    Musculoskeletal: Negative.    Gastrointestinal:  Positive for abdominal pain and nausea.   Genitourinary: Negative.    Neurological:  Positive for weakness.   Psychiatric/Behavioral: Negative.     Allergic/Immunologic: Negative.            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    Empyema of pleura     Encephalopathy, portal systemic 5/9/2017     Esophageal varices 7/21/2017    Gastric leak 5/29/2016    GERD (gastroesophageal reflux disease)     H/O thyroidectomy 6/17/2016    Hemorrhoids     Hernia, ventral     History of morbid obesity 3/3/2016    History of transfusion     Hypercoagulable state 8/24/2019    Irregular menstrual cycle     Liver cirrhosis secondary to CARTER 04/2017    Lupus anticoagulant positive 8/24/2019    Migraine     Parathyroid abnormality     Portal vein thrombosis 3/3/2016    Post-surgical hypothyroidism     Pulmonary embolism 10/27/2020    Radiation     Restless legs syndrome (RLS)     S/P laparoscopic sleeve gastrectomy 6/17/2016    S/P splenectomy 6/17/2016    Thyroid cancer     Thyroid cancer 6/26/2015    Urge and stress incontinence     Vaginal prolapse 7/14/2021    Varicose veins     Ventral hernia 4/18/2020    Vitamin D deficiency 1/21/2021       Surgical History:      Past Surgical History:   Procedure Laterality Date    ABDOMINAL SURGERY      BEDSIDE PARACENTESIS  3/22/2020         BEDSIDE PARACENTESIS  4/19/2020         BEDSIDE PARACENTESIS  3/1/2021         BEDSIDE PARACENTESIS  3/30/2021         CHOLECYSTECTOMY      Laparoscopic    ENDOSCOPY N/A 3/17/2016    Procedure: ESOPHAGOGASTRODUODENOSCOPY WITH BALOON DILATATION 18-20MM WITH GASTRIC SLEEVE SEALANT;  Surgeon: Leonardo Ortiz Jr., MD;  Location: [facility] ENDOSCOPY;  Service:     ENDOSCOPY N/A 3/14/2016    Procedure: esophagogastroduodenoscopy with fluoroscopy;  Surgeon: Greg Avila III, MD;  Location: [facility] ENDOSCOPY;  Service:     ENDOSCOPY N/A 4/15/2016    Procedure: EGD WITH DILITATION AND STENT PLACEMENT dilation of pylorus ;  Surgeon: Leonardo Ortiz Jr., MD;  Location: [facility] ENDOSCOPY;  Service:     ENDOSCOPY N/A 5/13/2016    Procedure: ESOPHAGOGASTRODUODENOSCOPY  W/ STENT;  Surgeon: Leonardo Ortiz Jr., MD;  Location: [facility] ENDOSCOPY;  Service:     ENDOSCOPY N/A 1/13/2021    Procedure: ESOPHAGOGASTRODUODENOSCOPY WITH POLYPECTOMY X1 AND  BIOPSY X1.;   Surgeon: Bryson Cotton MD;  Location: [facility] ENDOSCOPY;  Service: Gastroenterology;  Laterality: N/A;  anastomotic ulcer, antral polyp, GASTRITIS    ENDOSCOPY N/A 10/24/2023    Procedure: ESOPHAGOGASTRODUODENOSCOPY;  Surgeon: Hossein Delgadillo MD;  Location: [facility] ENDOSCOPY;  Service: Gastroenterology;  Laterality: N/A;  Post: anastomotic ulcer    GASTRECTOMY      Gastric Surgery for Morbid Obesity Laparoscopic Longitudinal Gastrectomy    GASTRIC SLEEVE LAPAROSCOPIC      HEMORRHOIDECTOMY      HERNIA REPAIR      LAPAROSCOPY REPAIR HIATAL HERNIA      LARYNGOSCOPY      Direct Laryngoscopy with Injection into Vocal Cords    OTHER SURGICAL HISTORY Bilateral     Ear Pressure Equalization Tube, Insertion, Bilaterally    PERIPHERALLY INSERTED CENTRAL CATHETER INSERTION      SPLENECTOMY      THYROID SURGERY      TUBAL ABDOMINAL LIGATION             Family History: family history includes Breast cancer in her maternal grandmother; Cancer in an other family member; Diabetes in her father; Hypertension in her father; Stroke in her father. Otherwise pertinent FHx was reviewed and unremarkable.     Social History:  reports that she has never smoked. She has never been exposed to tobacco smoke. She has never used smokeless tobacco. She reports that she does not drink alcohol and does not use drugs.      Medications:  Prior to Admission medications    Medication Sig Start Date End Date Taking? Authorizing Provider   apixaban (ELIQUIS) 5 MG tablet tablet Take 1 tablet by mouth 2 (Two) Times a Day.   Yes ProviderJustyn MD   bumetanide (BUMEX) 2 MG tablet Take 2 tablets by mouth Daily.   Yes ProviderJustyn MD   estradiol (ESTRACE) 0.1 MG/GM vaginal cream Insert 1 g into the vagina 3 (Three) Times a Week. Monday Wednesday Friday   Yes Justyn Erwin MD   levothyroxine (SYNTHROID, LEVOTHROID) 125 MCG tablet Take 1 tablet by mouth Every Morning.   Yes Justyn Erwin MD   liothyronine (CYTOMEL) 5 MCG  tablet Take 1 tablet by mouth Daily.   Yes ProviderJustyn MD   miSOPROStol (CYTOTEC) 200 MCG tablet Take 1 tablet by mouth 2 (Two) Times a Day. 10/25/23  Yes Marianna Fernandez PA-C   ondansetron ODT (ZOFRAN-ODT) 4 MG disintegrating tablet Place 1 tablet on the tongue Every 8 (Eight) Hours As Needed for Nausea or Vomiting. 10/25/23  Yes Marianna Fernandez PA-C   ondansetron ODT (ZOFRAN-ODT) 8 MG disintegrating tablet Place 1 tablet on the tongue Every 8 (Eight) Hours As Needed for Nausea or Vomiting.   Yes Justyn Erwin MD   pantoprazole (PROTONIX) 40 MG EC tablet Take 1 tablet by mouth 2 (Two) Times a Day Before Meals. 10/25/23  Yes Marianna Fernandez PA-C   potassium chloride (K-DUR,KLOR-CON) 20 MEQ CR tablet Take 2 tablets by mouth 2 (Two) Times a Day.   Yes ProviderJustyn MD   spironolactone (ALDACTONE) 100 MG tablet Take 4 tablets by mouth Daily.   Yes ProviderJustyn MD   sucralfate (CARAFATE) 1 g tablet Take 1 tablet by mouth 2 (Two) Times a Day Before Meals. 10/25/23  Yes Marianna Fernandez PA-C       Allergies:    Allergies   Allergen Reactions    Contrast Dye (Echo Or Unknown Ct/Mr) Hives    Iodinated Contrast Media Hives    Iodine Hives    Hydrocodone-Acetaminophen Hives and Itching    Morphine Itching, Hives and Rash    Red Dye Itching, Other (See Comments) and Unknown - High Severity    Compazine [Prochlorperazine Edisylate] Hives    Hydrocodone Rash     Lortab elixir per pt    Ketorolac Tromethamine Unknown - Low Severity    Nsaids Other (See Comments)     States I am not suppose to take them  Cannot have due to gastric sleeve       Objective   Objective     Vital Signs  Temp:  [97.8 °F (36.6 °C)-98.3 °F (36.8 °C)] 98.2 °F (36.8 °C)  Heart Rate:  [65-82] 65  Resp:  [15-19] 16  BP: (112-148)/(69-89) 128/76  SpO2:  [97 %-100 %] 97 %  on   ;   Device (Oxygen Therapy): room air  Body mass index is 25.35 kg/m².    Physical Exam  Vitals and nursing note reviewed.   Constitutional:        Appearance: Normal appearance.   HENT:      Head: Normocephalic and atraumatic.      Right Ear: External ear normal.      Left Ear: External ear normal.      Nose: Nose normal.      Mouth/Throat:      Pharynx: Oropharynx is clear.   Eyes:      Extraocular Movements: Extraocular movements intact.      Conjunctiva/sclera: Conjunctivae normal.      Pupils: Pupils are equal, round, and reactive to light.   Cardiovascular:      Rate and Rhythm: Normal rate and regular rhythm.      Pulses: Normal pulses.      Heart sounds: Normal heart sounds.   Pulmonary:      Effort: Pulmonary effort is normal.      Breath sounds: Normal breath sounds.   Abdominal:      General: There is distension.      Tenderness: There is abdominal tenderness.   Musculoskeletal:         General: Normal range of motion.      Cervical back: Normal range of motion.   Skin:     General: Skin is warm.      Capillary Refill: Capillary refill takes less than 2 seconds.      Findings: Rash present. Rash is urticarial.   Neurological:      Mental Status: She is alert and oriented to person, place, and time.   Psychiatric:         Mood and Affect: Mood normal.         Behavior: Behavior normal.         Thought Content: Thought content normal.         Judgment: Judgment normal.           Results Review:  I have personally reviewed most recent cardiac tracings, lab results, microbiology results, and radiology images and interpretations and agree with findings, most notably: CBC, CMP, CT abdomen pelvis, MRCP.    Results from last 7 days   Lab Units 11/10/23  0537   WBC 10*3/mm3 12.50*   HEMOGLOBIN g/dL 10.6*   HEMATOCRIT % 32.5*   PLATELETS 10*3/mm3 605*   INR  1.15*     Results from last 7 days   Lab Units 11/10/23  0537 11/09/23  1943 11/09/23  1003 11/09/23  0049   SODIUM mmol/L 134*  --  138 139   POTASSIUM mmol/L 4.2   < > 3.5 3.6   CHLORIDE mmol/L 97*  --  102 105   CO2 mmol/L 28.0  --  25.0 25.0   BUN mg/dL 14  --  11 17   CREATININE mg/dL 0.83  --  0.63  0.72   GLUCOSE mg/dL 107*  --  97 105*   CALCIUM mg/dL 8.8  --  9.1 9.1   ALK PHOS U/L 274*  --   --  159*   ALT (SGPT) U/L 139*  --   --  18   AST (SGOT) U/L 235*  --   --  29   HSTROP T ng/L  --   --  <6 <6    < > = values in this interval not displayed.     Estimated Creatinine Clearance: 73.5 mL/min (by C-G formula based on SCr of 0.83 mg/dL).  Brief Urine Lab Results  (Last result in the past 365 days)        Color   Clarity   Blood   Leuk Est   Nitrite   Protein   CREAT   Urine HCG        11/09/23 0035 Yellow   Clear   Negative   Trace   Negative   Negative                   Microbiology Results (last 10 days)       ** No results found for the last 240 hours. **            ECG/EMG Results (most recent)       Procedure Component Value Units Date/Time    SCANNED - TELEMETRY   [[id number]] Resulted: 11/08/23     Updated: 11/10/23 0409    SCANNED - TELEMETRY   [[id number]] Resulted: 11/08/23     Updated: 11/10/23 0409    SCANNED - TELEMETRY   [[id number]] Resulted: 11/08/23     Updated: 11/10/23 0409    SCANNED - TELEMETRY   [[id number]] Resulted: 11/08/23     Updated: 11/10/23 0444    SCANNED - TELEMETRY   [[id number]] Resulted: 11/08/23     Updated: 11/10/23 0444            Results for orders placed during the hospital encounter of 07/13/21    Duplex Venous Lower Extremity - Bilateral CAR    Interpretation Summary  · Sub-acute right lower extremity superficial thrombophlebitis noted in the greater saphenous (above knee).  · Acute right lower extremity superficial thrombophlebitis noted in the varicosity (above knee).  · All other veins appeared normal bilaterally.          MRI abdomen w wo contrast mrcp    Result Date: 11/10/2023  Impression: 1. No choledocholithiasis. Common bile duct dilated to 10 mm which may relate to postcholecystectomy state. 2. Hepatic steatosis. 3. Moderate ascites with central mesenteric edema unchanged from prior CT. 4. Cavernous transformation of portal vein. 5. Postsurgical changes of  gastric bypass, splenectomy and cholecystectomy. 6. Umbilical hernia containing a nonobstructed small bowel loop partially imaged. Electronically Signed: Blu Mancilla MD  11/10/2023 2:45 PM EST  Workstation ID: [id number] [employer]    US Liver    Result Date: 11/9/2023  Impression: Findings compatible with hepatic steatosis. Electronically Signed: Edna Finney MD  11/9/2023 2:03 PM CST  Workstation ID: [id number]    CT Abdomen Pelvis Without Contrast    Result Date: 11/9/2023  Impression: 1.No significant change. Small to moderate amount of ascites present throughout the abdomen and pelvis, similar as compared to the previous study. No acute intra-abdominal or intrapelvic process identified. 2.Ancillary findings as described above. Electronically Signed: Muriel Gil MD  11/9/2023 1:19 AM EST  Workstation ID: [id number]       Estimated Creatinine Clearance: 73.5 mL/min (by C-G formula based on SCr of 0.83 mg/dL).    Assessment & Plan   Assessment/Plan       Active Hospital Problems    Diagnosis  POA    **Abdominal pain [R10.9]  Yes      Resolved Hospital Problems   No resolved problems to display.     Abdominal pain with a history of cirrhosis and Ramon        Lab Results   Component Value Date     WBC 11.80 (H) 11/09/2023     AST 29 11/09/2023     ALT 18 11/09/2023     ALKPHOS 159 (H) 11/09/2023     BILITOT 0.9 11/09/2023     LIPASE 34 11/09/2023   -Trend LFTs  -Recent alpha-fetoprotein: 7.04  -CT of abdomen and pelvis: Showed no significant change with small to moderate amount of ascites throughout the abdomen and pelvis similar to previous study  -In the ED patient was given Dilaudid, fentanyl and Zofran  -GI was consulted who ordered ultrasound of liver which showed hepatic steatosis  -2 g sodium diet  -Continue Bumex and spironolactone  -Continue Eliquis with history of portal vein thrombosis  -MRCP pending   -GI consulted      GERD  -PPI and Cytotec  -Hypothyroidism  -Cytomel      VTE Prophylaxis -   Mechanical  Order History:        Ordered        23 0456  Place Sequential Compression Device  Once            23 0456  Maintain Sequential Compression Device  Continuous                          Pharmalogical Order History:        Ordered     Dose Route Frequency Stop    23 0631  apixaban (ELIQUIS) tablet 5 mg         5 mg PO Every 12 Hours Scheduled --                    CODE STATUS:    Code Status and Medical Interventions:   Ordered at: 23 1126     Level Of Support Discussed With:    Patient     Code Status (Patient has no pulse and is not breathing):    CPR (Attempt to Resuscitate)     Medical Interventions (Patient has pulse or is breathing):    Full Support       This patient has been examined wearing personal protective equipment.     I discussed the patient's findings and my recommendations with patient, family, nursing staff, primary care team, and consulting provider.      Signature:Electronically signed by GRIFFIN Colin, 11/10/23, 3:34 PM EST.          I spent 35 minutes caring for Lucinda on this date of service. This time includes time spent by me in the following activities: reviewing tests, obtaining and/or reviewing a separately obtained history, performing a medically appropriate examination and/or evaluation, counseling and educating the patient/family/caregiver, ordering medications, tests, or procedures, referring and communicating with other health care professionals, documenting information in the medical record, independently interpreting results and communicating that information with the patient/family/caregiver, and care coordination.       Electronically signed by Tara Haro APRN at 11/10/23 1606          Consult Notes (all)        Saw Harris MD at 11/10/23 1716           [facility]   Consult Note    Patient Name: Lucinda Cope  : 1975  MRN: [id number]  Primary Care Physician:  Austin Mcfadden MD  Date of admission: 2023  Service Date and  time: 11/10/23 17:16 EST  Subjective   Subjective     Chief Complaint: Abdominal pain    HPI:    Lucinda Cope is a 48 y.o. female past medical history significant for history of gastric bypass surgery several years back complicated by splenectomy history of Carter who presented to the hospital initially on 11/9/2023 with chief complaint of having abdominal pain, she stated that in the hospital couple weeks back she had an endoscopy which showed an ulcer, admitted and evaluated by gastroenterology, and MRCP was done earlier today and showed no choledocholithiasis, patient is tolerating her diet.      Review of Systems   All systems were reviewed and negative except for: HPI    Personal History     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    Empyema of pleura     Encephalopathy, portal systemic 5/9/2017    Esophageal varices 7/21/2017    Gastric leak 5/29/2016    GERD (gastroesophageal reflux disease)     H/O thyroidectomy 6/17/2016    Hemorrhoids     Hernia, ventral     History of morbid obesity 3/3/2016    History of transfusion     Hypercoagulable state 8/24/2019    Irregular menstrual cycle     Liver cirrhosis secondary to CARTER 04/2017    Lupus anticoagulant positive 8/24/2019    Migraine     Parathyroid abnormality     Portal vein thrombosis 3/3/2016    Post-surgical hypothyroidism     Pulmonary embolism 10/27/2020    Radiation     Restless legs syndrome (RLS)     S/P laparoscopic sleeve gastrectomy 6/17/2016    S/P splenectomy 6/17/2016    Thyroid cancer     Thyroid cancer 6/26/2015    Urge and stress incontinence     Vaginal prolapse 7/14/2021    Varicose veins     Ventral hernia 4/18/2020    Vitamin D deficiency 1/21/2021       Past Surgical History:   Procedure Laterality Date    ABDOMINAL SURGERY      BEDSIDE PARACENTESIS   3/22/2020         BEDSIDE PARACENTESIS  4/19/2020         BEDSIDE PARACENTESIS  3/1/2021         BEDSIDE PARACENTESIS  3/30/2021         CHOLECYSTECTOMY      Laparoscopic    ENDOSCOPY N/A 3/17/2016    Procedure: ESOPHAGOGASTRODUODENOSCOPY WITH BALOON DILATATION 18-20MM WITH GASTRIC SLEEVE SEALANT;  Surgeon: Leonardo Ortiz Jr., MD;  Location: [facility] ENDOSCOPY;  Service:     ENDOSCOPY N/A 3/14/2016    Procedure: esophagogastroduodenoscopy with fluoroscopy;  Surgeon: Greg Avila III, MD;  Location: [facility] ENDOSCOPY;  Service:     ENDOSCOPY N/A 4/15/2016    Procedure: EGD WITH DILITATION AND STENT PLACEMENT dilation of pylorus ;  Surgeon: Leonardo Ortiz Jr., MD;  Location: [facility] ENDOSCOPY;  Service:     ENDOSCOPY N/A 5/13/2016    Procedure: ESOPHAGOGASTRODUODENOSCOPY  W/ STENT;  Surgeon: Leonardo Ortiz Jr., MD;  Location: [facility] ENDOSCOPY;  Service:     ENDOSCOPY N/A 1/13/2021    Procedure: ESOPHAGOGASTRODUODENOSCOPY WITH POLYPECTOMY X1 AND  BIOPSY X1.;  Surgeon: Bryson Cotton MD;  Location: [facility] ENDOSCOPY;  Service: Gastroenterology;  Laterality: N/A;  anastomotic ulcer, antral polyp, GASTRITIS    ENDOSCOPY N/A 10/24/2023    Procedure: ESOPHAGOGASTRODUODENOSCOPY;  Surgeon: Hossein Delgadillo MD;  Location: [facility] ENDOSCOPY;  Service: Gastroenterology;  Laterality: N/A;  Post: anastomotic ulcer    GASTRECTOMY      Gastric Surgery for Morbid Obesity Laparoscopic Longitudinal Gastrectomy    GASTRIC SLEEVE LAPAROSCOPIC      HEMORRHOIDECTOMY      HERNIA REPAIR      LAPAROSCOPY REPAIR HIATAL HERNIA      LARYNGOSCOPY      Direct Laryngoscopy with Injection into Vocal Cords    OTHER SURGICAL HISTORY Bilateral     Ear Pressure Equalization Tube, Insertion, Bilaterally    PERIPHERALLY INSERTED CENTRAL CATHETER INSERTION      SPLENECTOMY      THYROID SURGERY      TUBAL ABDOMINAL LIGATION         Family History: family history includes Breast cancer in her maternal grandmother; Cancer in an other family  member; Diabetes in her father; Hypertension in her father; Stroke in her father. Otherwise pertinent FHx was reviewed and not pertinent to current issue.    Social History:  reports that she has never smoked. She has never been exposed to tobacco smoke. She has never used smokeless tobacco. She reports that she does not drink alcohol and does not use drugs.    Home Medications:  apixaban, bumetanide, estradiol, levothyroxine, liothyronine, miSOPROStol, ondansetron ODT, pantoprazole, potassium chloride, spironolactone, and sucralfate      Allergies:  Allergies   Allergen Reactions    Contrast Dye (Echo Or Unknown Ct/Mr) Hives    Iodinated Contrast Media Hives    Iodine Hives    Hydrocodone-Acetaminophen Hives and Itching    Morphine Itching, Hives and Rash    Red Dye Itching, Other (See Comments) and Unknown - High Severity    Compazine [Prochlorperazine Edisylate] Hives    Hydrocodone Rash     Lortab elixir per pt    Ketorolac Tromethamine Unknown - Low Severity    Nsaids Other (See Comments)     States I am not suppose to take them  Cannot have due to gastric sleeve       Objective   Objective     Vitals:   Temp:  [97.8 °F (36.6 °C)-98.3 °F (36.8 °C)] 98.2 °F (36.8 °C)  Heart Rate:  [65-82] 65  Resp:  [15-19] 16  BP: (112-148)/(69-89) 128/76  Physical Exam    Constitutional: Awake, alert in no distress at time of examination.   Eyes: PERRLA, sclerae anicteric, no conjunctival injection   HENT: NCAT, mucous membranes moist   Neck: Supple, no thyromegaly, no lymphadenopathy, trachea midline   Respiratory: Clear to auscultation bilaterally, nonlabored respirations    Cardiovascular: RRR, no murmurs, rubs, or gallops, palpable pedal pulses bilaterally   Gastrointestinal: Positive bowel sounds, soft, nontender, nondistended   Musculoskeletal: No bilateral ankle edema, no clubbing or cyanosis to extremities   Psychiatric: Appropriate affect, cooperative   Neurologic: Oriented x 3, strength symmetric in all extremities,  Cranial Nerves grossly intact to confrontation, speech clear   Skin: No rashes     Result Review    Result Review:  I have personally reviewed the results from the time of this admission to 11/10/2023 17:16 EST and agree with these findings:  [x]  Laboratory list / accordion  []  Microbiology  [x]  Radiology  []  EKG/Telemetry   []  Cardiology/Vascular   []  Pathology  []  Old records  []  Other:  Most notable findings include: MRCP showed    1. No choledocholithiasis. Common bile duct dilated to 10 mm which may relate to postcholecystectomy state.   2. Hepatic steatosis.   3. Moderate ascites with central mesenteric edema unchanged from prior CT.   4. Cavernous transformation of portal vein.   5. Postsurgical changes of gastric bypass, splenectomy and cholecystectomy.   6. Umbilical hernia containing a nonobstructed small bowel loop partially imaged     Was 107 sodium 134, potassium 4.2, chloride 97, CO2 28, , , alkaline phosphatase 274, total bilirubin 1.5, globulin 3.6, PT/INR 12.41.15 WBC count 12.5        Assessment & Plan   Assessment / Plan       Active Hospital Problems:  Active Hospital Problems    Diagnosis     **Abdominal pain    This is 48-year-old lady who presented to the hospital with  1.  Abdominal pain  2.  History of CARTER and ascites  3.  History of gastric sleeve      Plan: Patient is tolerating her diet, currently on analgesics and antiemetics, GI has been consulted MRCP was done and showed no choledocholithiasis.        DVT prophylaxis:  Medical and mechanical DVT prophylaxis orders are present.    CODE STATUS:    Level Of Support Discussed With: Patient  Code Status (Patient has no pulse and is not breathing): CPR (Attempt to Resuscitate)  Medical Interventions (Patient has pulse or is breathing): Full Support    Admission Status:  I believe this patient meets inpatient status.    Saw Harris MD    Electronically signed by Saw Harris MD at 11/10/23 3747       Jermaine  GRIFFIN Wilson at 11/10/23 0903       Attestation signed by Austin Mckeon MD at 11/10/23 0932    I have reviewed this documentation and agree.  Seen, examined, and evaluated on rounds with Megan.  The medical decision making and physical exam was performed by me.  The patient reports persistent epigastric pain.  Small bowel series has not been performed, however, her liver profile has jumped today suggesting the possibility of a common duct stone.  She does have gastric bypass anatomy making ERCP not technically possible.  AST is now 235 with an ALT of 139 and her bilirubin is risen to 1.9 suggesting the epigastric pain may be choledocholithiasis.  White count is 12.5.  On exam she is tender in the epigastrium with some mild voluntary guarding but does not have any physical jaundice.  She is afebrile with stable vital signs.  We will cancel the small bowel series as I do not think this is a partial intermittent small bowel obstruction any longer.  Admission CT did not show obvious obstruction but did show an umbilical/ventral hernia with bowel within it.  We will proceed with MRCP and trending her liver enzymes.  If she has choledocholithiasis, she will probably need Axios stenting from the gastric pouch to the native stomach followed by ERCP in 1 week.                 LOS: 0 days   Patient Care Team:  Austin Mcfadden MD as PCP - General (Family Medicine)      Subjective     Interval History: SBFT has not yet been done.  LFTs are now elevated.    Subjective: Still having epigastric pain today.  Liver enzymes are noted to be elevated with , , alk phos 274, TB 1.9.  Hgb is 10.6 and WBC 12.5.      ROS:   Review of Systems   Gastrointestinal:  Positive for abdominal pain.   All other systems reviewed and are negative.         Medication Review:     Current Facility-Administered Medications:     apixaban (ELIQUIS) tablet 5 mg, 5 mg, Oral, Q12H, Mateusz Kingston PA-C, 5 mg at 11/09/23 3084     bumetanide (BUMEX) tablet 4 mg, 4 mg, Oral, Daily, Mateusz Kingston PA-C, 4 mg at 11/09/23 0844    HYDROmorphone (DILAUDID) injection 1 mg, 1 mg, Intravenous, Q2H PRN, Mateusz Kingston PA-C, 1 mg at 11/10/23 0818    liothyronine (CYTOMEL) tablet 5 mcg, 5 mcg, Oral, Q AM, Mateusz Kingston PA-C, 5 mcg at 11/10/23 0524    melatonin tablet 5 mg, 5 mg, Oral, Nightly PRN, Leonardo Werner MD, 5 mg at 11/09/23 2118    metoclopramide (REGLAN) injection 5 mg, 5 mg, Intravenous, TID AC, Kalani Lock, APRN, 5 mg at 11/09/23 1649    miSOPROStol (CYTOTEC) tablet 200 mcg, 200 mcg, Oral, BID, Mateusz Kingston PA-C, 200 mcg at 11/09/23 2118    ondansetron (ZOFRAN) injection 4 mg, 4 mg, Intravenous, Q6H PRN, Leonardo Werner MD, 4 mg at 11/10/23 0818    pantoprazole (PROTONIX) EC tablet 40 mg, 40 mg, Oral, BID AC, Mateusz Kingston PA-C, 40 mg at 11/09/23 1650    potassium chloride (K-DUR,KLOR-CON) CR tablet 40 mEq, 40 mEq, Oral, BID, Mateusz Kingston PA-C, 40 mEq at 11/09/23 2118    Potassium Replacement - Follow Nurse / BPA Driven Protocol, , Does not apply, PRN, Mateusz Kingston PA-C    [COMPLETED] Insert Peripheral IV, , , Once **AND** sodium chloride 0.9 % flush 10 mL, 10 mL, Intravenous, PRN, Leonardo Werner MD, 10 mL at 11/09/23 1549    sodium chloride 0.9 % flush 10 mL, 10 mL, Intravenous, Q12H, Leonardo Werner MD, 10 mL at 11/09/23 2122    sodium chloride 0.9 % flush 10 mL, 10 mL, Intravenous, PRN, Leonardo Werner MD, 10 mL at 11/09/23 1916    sodium chloride 0.9 % infusion 40 mL, 40 mL, Intravenous, PRN, Leonardo Werner MD    spironolactone (ALDACTONE) tablet 400 mg, 400 mg, Oral, Daily, Mateusz Kingston PA-C, 400 mg at 11/09/23 0844    sucralfate (CARAFATE) tablet 1 g, 1 g, Oral, BID AC, Mateusz Kingston PA-C, 1 g at 11/09/23 1650      Objective     Vital Signs  Vitals:    11/09/23 1419 11/09/23 2330 11/10/23 0246 11/10/23 0625   BP: 132/89 148/89 116/69 112/78   BP Location: Left arm  Left arm Left arm Left arm   Patient Position: Lying Lying Lying Lying   Pulse:  80 82 77   Resp: 16 19 17 15   Temp: 98 °F (36.7 °C) 97.8 °F (36.6 °C) 98 °F (36.7 °C) 98.3 °F (36.8 °C)   TempSrc: Oral Oral Temporal Temporal   SpO2: 96% 100% 99% 99%   Weight:       Height:           Physical Exam: Ambulating in room    General Appearance:    Awake and alert, in no acute distress   Head:    Normocephalic, without obvious abnormality   Eyes:          Conjunctivae normal, anicteric sclera   Throat:   No oral lesions, no thrush, oral mucosa moist   Neck:   No adenopathy, supple, no JVD   Lungs:     Respirations regular, even and unlabored   Abdomen:     Soft, epigastric tenderness, non-distended, no rebound or guarding, no hepatosplenomegaly   Rectal:     Deferred   Extremities:   No edema, no cyanosis, moves equally bilaterally   Skin:   No bruising, no rash, no jaundice        Results Review:    CBC  Results from last 7 days   Lab Units 11/10/23  0537 11/09/23  1003 11/09/23  0049   RBC 10*6/mm3 4.07 4.35 3.95   WBC 10*3/mm3 12.50* 11.80* 11.00*   HEMOGLOBIN g/dL 10.6* 11.3* 10.0*   PLATELETS 10*3/mm3 605* 634* 613*       CMP  Results from last 7 days   Lab Units 11/10/23  0537 11/09/23  1943 11/09/23  1003 11/09/23  0049   SODIUM mmol/L 134*  --  138 139   POTASSIUM mmol/L 4.2 3.6 3.5 3.6   CHLORIDE mmol/L 97*  --  102 105   CO2 mmol/L 28.0  --  25.0 25.0   BUN mg/dL 14  --  11 17   CREATININE mg/dL 0.83  --  0.63 0.72   GLUCOSE mg/dL 107*  --  97 105*   ALBUMIN g/dL 3.6  --   --  3.7   BILIRUBIN mg/dL 1.9*  --   --  0.9   ALK PHOS U/L 274*  --   --  159*   AST (SGOT) U/L 235*  --   --  29   ALT (SGPT) U/L 139*  --   --  18       Amylase and Lipase  Results from last 7 days   Lab Units 11/09/23  0049   LIPASE U/L 34       CRP         Imaging Results (Last 24 Hours)       Procedure Component Value Units Date/Time    US Liver [[id number]] Collected: 11/09/23 1500     Updated: 11/09/23 1505    Narrative:      US  LIVER    Date of Exam: 11/9/2023 1:27 PM CST    Indication: cirrhosis, ascites, history PVT.    Comparison: No comparisons available.    Technique: Grayscale and color Doppler ultrasound evaluation of the right upper quadrant was performed.      Findings:  LIVER: The liver demonstrates increased parenchymal echogenicity. No focal lesions identified. Normal flow is present within the hepatic vasculature.     GALLBLADDER: The gallbladder is surgically absent.            Impression:      Impression:  Findings compatible with hepatic steatosis.        Electronically Signed: Edna Finney MD    11/9/2023 2:03 PM CST    Workstation ID: [id number]              ASSESSMENT:  48-year-old female with Ramon cirrhosis and complicated past surgical history with Alfonzo-en-Y anatomy presented to the hospital on 11/8/2023 with continued abdominal pain.  Was recently here last month and had EGD.  Now has elevated liver enzymes.     -Upper abdominal pain  -Nausea/vomiting  -Elevated liver enzymes  -Anastomotic ulcer  -Ascites  -RAMON cirrhosis that has been complicated by ascites, hepatic encephalopathy, and nonbleeding esophageal varices  -Mild normocytic anemia  -Leukocytosis  -Elevated alk phos  -History of gastric sleeve followed by Alfonzo-en-Y gastric bypass with multiple surgeries   -Chronic portal vein thrombus and DVT on Eliquis  -History of cholecystectomy and splenectomy  -Ramon cirrhosis that has been complicated by hepatic encephalopathy, ascites, and nonbleeding esophageal varices         PLAN:  Liver enzymes are elevated today so we will do MRCP.  Cancel small bowel series.  Monitor CBC and CMP.  2 g sodium restriction diet.  Continue PPI twice daily, Carafate, and misoprostol.  Recommend outpatient colonoscopy.  GI will follow.        GRIFFIN Ruiz  11/10/23  09:03 EST              Electronically signed by Austin Mckeon MD at 11/10/23 0937       Kalani Lock APRN at 11/09/23 1204        Consult Orders     1. Inpatient Gastroenterology Consult [[id number]] ordered by Leonardo Werner MD at 11/09/23 0240              Attestation signed by Austin Mckeon MD at 11/09/23 5780    I have reviewed this documentation and agree.  Seen, interviewed, and examined on rounds with Kalani.  The medical decision making physical examination was performed by me.  This is a patient whom I have seen in the past extensively but not in the last 2 years.  She presents with generalized epigastric and periumbilical abdominal pain of moderate severity.  Last night it became unbearable and she presented to the emergency room for evaluation.  She has a complicated history of gastric sleeve followed by creation of Alfonzo-en-Y gastric bypass further complicated by chronic anastomotic ulceration.  She has suffered a perforation during dilation of her anastomosis in the past which led to several additional surgeries.  She has chronic portal vein thrombosis and DVT on Eliquis and CARTER cirrhosis.  Gallbladder and spleen are surgically absent.  She just underwent upper GI endoscopy on October 24 by Dr. Delgadillo revealing a chronic 1 cm gastrojejunal anastomotic ulcer without stigmata of bleeding.  She reports recent abdominal distention consistent with reaccumulating ascites and does not report taking diuretics on a consistent basis at home.  She had a good diuresis here and ultrasound revealed hepatic steatosis with increased echogenicity of the liver and normal vascular flow in the hepatic vessels.  CT scan revealed small to moderate ascites which was not mentioned on ultrasound.  There was no acute intra-abdominal or intrapelvic process identified.  There is a ventral wall abdominal hernia containing bowel and mesenteric fat unchanged from prior study without evidence of obstruction.  No significant stool burden was noted.  Abdominal examination shows a firm somewhat indurated umbilical hernia that is tender but there is no rebound or peritoneal  signs.  I do not appreciate a fluid wave on today's exam.  Electrolytes are normal as is a liver profile.  Lipase is normal.  White count is 11.8 with a hemoglobin of 11.3.  She has no bands on her differential.  The differential diagnosis of the abdominal pain includes partial intermittent small bowel obstruction from her past complex intra-abdominal surgical history.  She clearly has a palpable umbilical hernia and this feels indurated.  SBP seems less likely in the absence of ascites mentioned on ultrasound but is in the differential.  However, she has a minimally elevated white count with no fever or left shift.  She was placed on misoprostol for the anastomotic ulcer and is having fairly regular loose stools secondary to that.  She does take PPI twice daily and Carafate.  I am going to proceed with a small bowel series to look for partial intermittent small bowel obstruction since ultrasound and CT are nondiagnostic for any etiology of the pain.  We could consider paracentesis if she accumulates enough ascites to evaluate.  We will follow.                  GI CONSULT  NOTE:    Referring Provider:  Dr. Werner    Chief complaint: Abdominal pain    Subjective .     History of present illness: Patient is a 48-year-old female with history of gastric sleeve originally followed by Alfonzo-en-Y gastric bypass and multiple surgeries in 2015, chronic portal vein thrombus and DVT on Eliquis, anastomotic ulcer, cholecystectomy, splenectomy, and Ramon cirrhosis that has been complicated by hepatic encephalopathy, ascites, and nonbleeding esophageal varices.  She had been doing well and had not been seen in our office in a while.  She was seen here last month by Dr. Delgadillo and had EGD with anastomotic ulcer.  She has been taking PPI twice daily, Carafate, and Misoprostol at home.  She reports that her pain continues.  It began on 10/12/2023.  She cannot identify any specific triggers.  She has noticed more swelling but after  receiving increased dose of diuretics this morning the swelling has improved.  She has been urinating a lot and her abdomen is less swollen.  Her pain is more so in the upper abdomen and radiates to her back and chest.  This pain is almost constant along with nausea and fullness.  She reports some vomiting.  No hematemesis.  Denies fevers or chills.  She reports loose stools since being on the Misoprostol.  She denies any bright red blood per rectum or melena.  She normally takes Bumex 2 mg twice daily and Aldactone 300 mg daily.  Has not required a paracentesis in a couple years.  In the past she has been on narcotics with pain management for chronic abdominal pain.      Endo History:  10/24/2023 EGD by Dr. Delgadillo -1 cm anastomotic ulcer with no stigmata of bleeding.    1/2021 EGD by Dr. Cotton -1 cm clean-based anastomotic ulcer, 2 cords of grade 1 esophageal varices, prior gastric bypass, antral polyp, and gastritis.     No previous colonoscopy.    Past Medical History:  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    Empyema of pleura     Encephalopathy, portal systemic 5/9/2017    Esophageal varices 7/21/2017    Gastric leak 5/29/2016    GERD (gastroesophageal reflux disease)     H/O thyroidectomy 6/17/2016    Hemorrhoids     Hernia, ventral     History of morbid obesity 3/3/2016    History of transfusion     Hypercoagulable state 8/24/2019    Irregular menstrual cycle     Liver cirrhosis secondary to CARTER 04/2017    Lupus anticoagulant positive 8/24/2019    Migraine     Parathyroid abnormality     Portal vein thrombosis 3/3/2016    Post-surgical hypothyroidism     Pulmonary embolism 10/27/2020    Radiation     Restless legs syndrome (RLS)     S/P laparoscopic sleeve gastrectomy 6/17/2016    S/P splenectomy 6/17/2016    Thyroid  cancer     Thyroid cancer 6/26/2015    Urge and stress incontinence     Vaginal prolapse 7/14/2021    Varicose veins     Ventral hernia 4/18/2020    Vitamin D deficiency 1/21/2021       Past Surgical History:  Past Surgical History:   Procedure Laterality Date    ABDOMINAL SURGERY      BEDSIDE PARACENTESIS  3/22/2020         BEDSIDE PARACENTESIS  4/19/2020         BEDSIDE PARACENTESIS  3/1/2021         BEDSIDE PARACENTESIS  3/30/2021         CHOLECYSTECTOMY      Laparoscopic    ENDOSCOPY N/A 3/17/2016    Procedure: ESOPHAGOGASTRODUODENOSCOPY WITH BALOON DILATATION 18-20MM WITH GASTRIC SLEEVE SEALANT;  Surgeon: Leonardo Ortiz Jr., MD;  Location: [facility] ENDOSCOPY;  Service:     ENDOSCOPY N/A 3/14/2016    Procedure: esophagogastroduodenoscopy with fluoroscopy;  Surgeon: Greg Avila III, MD;  Location: [facility] ENDOSCOPY;  Service:     ENDOSCOPY N/A 4/15/2016    Procedure: EGD WITH DILITATION AND STENT PLACEMENT dilation of pylorus ;  Surgeon: Leonardo Ortiz Jr., MD;  Location: [facility] ENDOSCOPY;  Service:     ENDOSCOPY N/A 5/13/2016    Procedure: ESOPHAGOGASTRODUODENOSCOPY  W/ STENT;  Surgeon: Leonardo Ortiz Jr., MD;  Location: [facility] ENDOSCOPY;  Service:     ENDOSCOPY N/A 1/13/2021    Procedure: ESOPHAGOGASTRODUODENOSCOPY WITH POLYPECTOMY X1 AND  BIOPSY X1.;  Surgeon: Bryson Cotton MD;  Location: [facility] ENDOSCOPY;  Service: Gastroenterology;  Laterality: N/A;  anastomotic ulcer, antral polyp, GASTRITIS    ENDOSCOPY N/A 10/24/2023    Procedure: ESOPHAGOGASTRODUODENOSCOPY;  Surgeon: Hossein Delgadillo MD;  Location: [facility] ENDOSCOPY;  Service: Gastroenterology;  Laterality: N/A;  Post: anastomotic ulcer    GASTRECTOMY      Gastric Surgery for Morbid Obesity Laparoscopic Longitudinal Gastrectomy    GASTRIC SLEEVE LAPAROSCOPIC      HEMORRHOIDECTOMY      HERNIA REPAIR      LAPAROSCOPY REPAIR HIATAL HERNIA      LARYNGOSCOPY      Direct Laryngoscopy with Injection into Vocal Cords    OTHER SURGICAL  HISTORY Bilateral     Ear Pressure Equalization Tube, Insertion, Bilaterally    PERIPHERALLY INSERTED CENTRAL CATHETER INSERTION      SPLENECTOMY      THYROID SURGERY      TUBAL ABDOMINAL LIGATION         Social History:  Social History     Tobacco Use    Smoking status: Never     Passive exposure: Never    Smokeless tobacco: Never   Vaping Use    Vaping Use: Never used   Substance Use Topics    Alcohol use: No    Drug use: No       Family History:  Family History   Problem Relation Age of Onset    Stroke Father     Diabetes Father         Diabetes Mellitus    Hypertension Father     Breast cancer Maternal Grandmother     Cancer Other        Medications:  Medications Prior to Admission   Medication Sig Dispense Refill Last Dose    apixaban (ELIQUIS) 5 MG tablet tablet Take 1 tablet by mouth 2 (Two) Times a Day.   11/8/2023    bumetanide (BUMEX) 2 MG tablet Take 2 tablets by mouth Daily.   Past Month    estradiol (ESTRACE) 0.1 MG/GM vaginal cream Insert 1 g into the vagina 3 (Three) Times a Week. Monday Wednesday Friday   Past Week    levothyroxine (SYNTHROID, LEVOTHROID) 125 MCG tablet Take 1 tablet by mouth Every Morning.   11/8/2023    liothyronine (CYTOMEL) 5 MCG tablet Take 1 tablet by mouth Daily.   11/8/2023    miSOPROStol (CYTOTEC) 200 MCG tablet Take 1 tablet by mouth 2 (Two) Times a Day. 60 tablet 3 11/8/2023    ondansetron ODT (ZOFRAN-ODT) 4 MG disintegrating tablet Place 1 tablet on the tongue Every 8 (Eight) Hours As Needed for Nausea or Vomiting. 15 tablet 0 11/8/2023    ondansetron ODT (ZOFRAN-ODT) 8 MG disintegrating tablet Place 1 tablet on the tongue Every 8 (Eight) Hours As Needed for Nausea or Vomiting.   11/8/2023    pantoprazole (PROTONIX) 40 MG EC tablet Take 1 tablet by mouth 2 (Two) Times a Day Before Meals. 60 tablet 3 11/8/2023    potassium chloride (K-DUR,KLOR-CON) 20 MEQ CR tablet Take 2 tablets by mouth 2 (Two) Times a Day.   Past Month    spironolactone (ALDACTONE) 100 MG tablet Take 4  "tablets by mouth Daily.   Past Month    sucralfate (CARAFATE) 1 g tablet Take 1 tablet by mouth 2 (Two) Times a Day Before Meals. 60 tablet 3 11/8/2023       Scheduled Meds:apixaban, 5 mg, Oral, Q12H  bumetanide, 4 mg, Oral, Daily  liothyronine, 5 mcg, Oral, Q AM  metoclopramide, 5 mg, Intravenous, TID AC  miSOPROStol, 200 mcg, Oral, BID  pantoprazole, 40 mg, Oral, BID AC  potassium chloride, 40 mEq, Oral, BID  sodium chloride, 10 mL, Intravenous, Q12H  spironolactone, 400 mg, Oral, Daily  sucralfate, 1 g, Oral, BID AC      Continuous Infusions:   PRN Meds:.  HYDROmorphone    melatonin    ondansetron    [COMPLETED] Insert Peripheral IV **AND** sodium chloride    sodium chloride    sodium chloride    ALLERGIES:  Contrast dye (echo or unknown ct/mr), Iodinated contrast media, Iodine, Hydrocodone-acetaminophen, Morphine, Red dye, Compazine [prochlorperazine edisylate], Hydrocodone, Ketorolac tromethamine, and Nsaids    ROS:  Review of Systems   Constitutional:  Negative for chills and fever.   Respiratory:  Negative for cough and shortness of breath.    Cardiovascular:  Negative for chest pain.   Gastrointestinal:  Positive for abdominal distention, abdominal pain, nausea and vomiting. Negative for blood in stool.   Musculoskeletal:  Positive for arthralgias and back pain.   Neurological:  Positive for weakness. Negative for dizziness.   Psychiatric/Behavioral:  Negative for agitation and confusion.        Objective     Vital Signs:   Visit Vitals  /94 (BP Location: Left arm, Patient Position: Lying)   Pulse 75   Temp 98.9 °F (37.2 °C) (Oral)   Resp 16   Ht 160 cm (63\")   Wt 64.9 kg (143 lb 1.3 oz)   LMP  (LMP Unknown)   SpO2 100%   BMI 25.35 kg/m²       Physical Exam:      General Appearance:    Awake and alert, in no acute distress   Head:    Normocephalic, without obvious abnormality, atraumatic   Eyes:            Conjunctivae normal, anicteric sclera   Ears:    Ears appear intact with no abnormalities noted "   Throat:   No oral lesions, no thrush, oral mucosa moist       Lungs:     Respirations regular, even and unlabored       Chest Wall:    No abnormalities observed   Abdomen:     Soft, generalized tenderness, no rebound or guarding, minimal distention, umbilical hernia that is reducible   Rectal:     Deferred   Extremities:   Moves all extremities well, no edema, no cyanosis, no  redness   Pulses:   Pulses palpable and equal bilaterally   Skin:   No bleeding, bruising or rash, no jaundice   Lymph nodes:   No palpable adenopathy   Neurologic:   Sensation intact       Results Review:   I reviewed the patient's labs and imaging.  CBC  Results from last 7 days   Lab Units 11/09/23  1003 11/09/23  0049   RBC 10*6/mm3 4.35 3.95   WBC 10*3/mm3 11.80* 11.00*   HEMOGLOBIN g/dL 11.3* 10.0*   PLATELETS 10*3/mm3 634* 613*       CMP  Results from last 7 days   Lab Units 11/09/23  1003 11/09/23  0049   SODIUM mmol/L 138 139   POTASSIUM mmol/L 3.5 3.6   CHLORIDE mmol/L 102 105   CO2 mmol/L 25.0 25.0   BUN mg/dL 11 17   CREATININE mg/dL 0.63 0.72   GLUCOSE mg/dL 97 105*   ALBUMIN g/dL  --  3.7   BILIRUBIN mg/dL  --  0.9   ALK PHOS U/L  --  159*   AST (SGOT) U/L  --  29   ALT (SGPT) U/L  --  18       Amylase and Lipase  Results from last 7 days   Lab Units 11/09/23  0049   LIPASE U/L 34       CRP         Imaging Results (Last 24 Hours)       Procedure Component Value Units Date/Time    CT Abdomen Pelvis Without Contrast [[id number]] Collected: 11/09/23 0116     Updated: 11/09/23 0121    Narrative:      CT ABDOMEN PELVIS WO CONTRAST    Date of Exam: 11/9/2023 1:00 AM EST    Indication: Abdominal pain.    Comparison: 10/17/2023.    Technique: Axial CT images were obtained of the abdomen and pelvis without the administration of contrast. Sagittal and coronal reconstructions were performed.  Automated exposure control and iterative reconstruction methods were used.      Findings:  Lung Bases:     The visualized lung bases and lower  mediastinal structures are unremarkable.    Limited evaluation of the solid organs due to lack of intravenous contrast.    Liver:  Liver is normal in size and CT density. No focal lesions.    Biliary/Gallbladder:    The gallbladder has been resected. The biliary tree is nondilated.    Spleen:  The spleen appears to have been resected..    Pancreas:    Pancreas is normal. There is no evidence of pancreatic mass or peripancreatic fluid.    Kidneys:    Kidneys are normal in size. There are no stones or hydronephrosis.    Adrenals:    Adrenal glands are unremarkable.    Retroperitoneal/Lymph Nodes/Vasculature:    No retroperitoneal adenopathy is identified.    Gastrointestinal/Mesentery:    The bowel loops are non-dilated without wall thickening or mass. The appendix is not well visualized.. No evidence of obstruction. No free air. No mesenteric fluid collections identified. Small to moderate amount of free fluid present throughout the   abdomen and pelvis, similar as compared to the previous study. Ventral wall hernia containing bowel and mesenteric fat present, unchanged compared to the previous study. No evidence of obstruction. No significant inflammatory changes. No significant   stool burden identified. Postsurgical changes are seen within the stomach related to previous gastric bypass.    Bladder:    The bladder is normal.    Genital:     Unremarkable. Pessary device in place.          Bony Structures:     Visualized bony structures are consistent with the patient's age.        Impression:      Impression:  1.No significant change. Small to moderate amount of ascites present throughout the abdomen and pelvis, similar as compared to the previous study. No acute intra-abdominal or intrapelvic process identified.  2.Ancillary findings as described above.        Electronically Signed: Muriel Gil MD    11/9/2023 1:19 AM EST    Workstation ID: [id number]              ASSESSMENT AND PLAN:  48-year-old female with Ramon  cirrhosis and complicated past surgical history with Alfonzo-en-Y anatomy presented to the hospital on 11/8/2023 with continued abdominal pain.  Was recently here last month and had EGD.    -Upper abdominal pain  -Nausea/vomiting  -Anastomotic ulcer  -Ascites  -CARTER cirrhosis that has been complicated by ascites, hepatic encephalopathy, and nonbleeding esophageal varices  -Mild normocytic anemia  -Leukocytosis  -Elevated alk phos  -History of gastric sleeve followed by Alfonzo-en-Y gastric bypass with multiple surgeries   -Chronic portal vein thrombus and DVT on Eliquis  -History of cholecystectomy and splenectomy  -Carter cirrhosis that has been complicated by hepatic encephalopathy, ascites, and nonbleeding esophageal varices    Principal Problem:    Abdominal pain     Plan:  48-year-old female presented to the hospital yesterday with continued abdominal pain and nausea/vomiting.  She was recently here a couple weeks ago and underwent EGD with anastomotic ulcer.  She has been on PPI twice daily, Carafate, and Misoprostol but she reports no change to her pain that started on 10/12/2023.  She has noticed increased abdominal swelling and noncontrasted CT suggests small to moderate ascites.  However, she was given higher dose diuretics this morning and has been urinating more frequently.  Her abdominal swelling has improved.  She remains on diuretics and creatinine is normal at 0.63.  WBC 11.8.  Hemoglobin 11.3.  Platelets 634.  Alk phos 159 otherwise liver enzymes normal.  Lipase normal.  She complains of significant fullness after she eats.  Consider postsurgical gastroparesis contributing.  We will start low-dose Reglan.  Proceed with right upper quadrant ultrasound with Dopplers.  Recent AFP was normal.  All of her imaging has been noncontrasted due to allergy to contrast, but she tolerates this when premedicated so we did discuss considering contrasted CT after premeds.  2 g sodium diet.  Would also recommend outpatient  colonoscopy.  MELD sodium score is 7.    I discussed the patients findings and my recommendations with the patient.  Kalani Lock, APRN  11/09/23  13:44 EST    Electronically signed by Austin Mckeon MD at 11/09/23 0731

## 2023-11-13 NOTE — OUTREACH NOTE
Prep Survey      Flowsheet Row Responses   [facility] facility patient discharged from? [facility]   Is LACE score < 7 ? No   Eligibility Readm Mgmt   Discharge diagnosis Abd pain   Does the patient have one of the following disease processes/diagnoses(primary or secondary)? Other   Does the patient have Home health ordered? No   Is there a DME ordered? No   Prep survey completed? Yes            DAVID TAN - Registered Nurse

## 2023-11-14 NOTE — CASE MANAGEMENT/SOCIAL WORK
Case Management Discharge Note      Final Note: Routine home    Provided Post Acute Provider List?: N/A  Provided Post Acute Provider Quality & Resource List?: N/A    Selected Continued Care - Discharged on 11/13/2023 Admission date: 11/8/2023 - Discharge disposition: Home or Self Care       Transportation Services  Private: Car    Final Discharge Disposition Code: 01 - home or self-care

## 2023-11-16 ENCOUNTER — READMISSION MANAGEMENT (OUTPATIENT)
Dept: CALL CENTER | Facility: HOSPITAL | Age: 48
End: 2023-11-16
Payer: COMMERCIAL

## 2023-11-16 NOTE — OUTREACH NOTE
Medical Week 1 Survey      Flowsheet Row Responses   [facility] facility patient discharged from? [facility]   Does the patient have one of the following disease processes/diagnoses(primary or secondary)? Other   Week 1 attempt successful? No   Unsuccessful attempts Attempt 1  [Was able to reach patient's Mother but she is out of town and could not provide info on patient]            Tammy LIGHT - Registered Nurse

## 2023-11-21 ENCOUNTER — READMISSION MANAGEMENT (OUTPATIENT)
Dept: CALL CENTER | Facility: HOSPITAL | Age: 48
End: 2023-11-21
Payer: COMMERCIAL

## 2023-11-21 NOTE — OUTREACH NOTE
Medical Week 1 Survey      Flowsheet Row Responses   [facility] patient discharged from? [facility]   Does the patient have one of the following disease processes/diagnoses(primary or secondary)? Other   Week 1 attempt successful? Yes   Call start time 0808   Call end time 0809   Discharge diagnosis Abd pain   Person spoke with today (if not patient) and relationship Mother   Meds reviewed with patient/caregiver? Yes   Is the patient having any side effects they believe may be caused by any medication additions or changes? No   Does the patient have all medications ordered at discharge? Yes   Is the patient taking all medications as directed (includes completed medication regime)? Yes   Does the patient have a primary care provider?  Yes   Does the patient have an appointment with their PCP within 7 days of discharge? Yes   Has the patient kept scheduled appointments due by today? N/A   Has home health visited the patient within 72 hours of discharge? N/A   Psychosocial issues? No   Did the patient receive a copy of their discharge instructions? Yes   Nursing interventions Reviewed instructions with patient   What is the patient's perception of their health status since discharge? Improving   Is the patient/caregiver able to teach back signs and symptoms related to disease process for when to call PCP? Yes   Is the patient/caregiver able to teach back signs and symptoms related to disease process for when to call 911? Yes   Is the patient/caregiver able to teach back the hierarchy of who to call/visit for symptoms/problems? PCP, Specialist, Home health nurse, Urgent Care, ED, 911 Yes   Week 1 call completed? Yes   Wrap up additional comments Mother reports Pt seems to be doing better. Mother states Pt has her meds and FU appts.   Call end time 0809            MELISSA LUCERO - Registered Nurse

## 2023-11-22 ENCOUNTER — TRANSCRIBE ORDERS (OUTPATIENT)
Dept: ADMINISTRATIVE | Facility: HOSPITAL | Age: 48
End: 2023-11-22
Payer: COMMERCIAL

## 2023-11-22 ENCOUNTER — HOSPITAL ENCOUNTER (OUTPATIENT)
Dept: GENERAL RADIOLOGY | Facility: HOSPITAL | Age: 48
Discharge: HOME | End: 2023-11-22
Admitting: NURSE PRACTITIONER
Payer: COMMERCIAL

## 2023-11-22 ENCOUNTER — LAB (OUTPATIENT)
Dept: LAB | Facility: HOSPITAL | Age: 48
End: 2023-11-22
Payer: COMMERCIAL

## 2023-11-22 DIAGNOSIS — K74.60 HEPATIC CIRRHOSIS, UNSPECIFIED HEPATIC CIRRHOSIS TYPE, UNSPECIFIED WHETHER ASCITES PRESENT: ICD-10-CM

## 2023-11-22 DIAGNOSIS — M54.6 PAIN IN THORACIC SPINE: Primary | ICD-10-CM

## 2023-11-22 DIAGNOSIS — E03.9 HYPOTHYROIDISM, UNSPECIFIED TYPE: ICD-10-CM

## 2023-11-22 DIAGNOSIS — K74.69 OTHER CIRRHOSIS OF LIVER: ICD-10-CM

## 2023-11-22 DIAGNOSIS — M54.6 PAIN IN THORACIC SPINE: ICD-10-CM

## 2023-11-22 DIAGNOSIS — E03.9 HYPOTHYROIDISM, UNSPECIFIED TYPE: Primary | ICD-10-CM

## 2023-11-22 DIAGNOSIS — M47.812 CERVICAL SPONDYLOSIS: ICD-10-CM

## 2023-11-22 DIAGNOSIS — K74.69 OTHER CIRRHOSIS OF LIVER: Primary | ICD-10-CM

## 2023-11-22 LAB
ALBUMIN SERPL-MCNC: 3.4 G/DL (ref 3.5–5.2)
ALBUMIN/GLOB SERPL: 1.1 G/DL
ALP SERPL-CCNC: 153 U/L (ref 39–117)
ALT SERPL W P-5'-P-CCNC: 18 U/L (ref 1–33)
ANION GAP SERPL CALCULATED.3IONS-SCNC: 9.7 MMOL/L (ref 5–15)
AST SERPL-CCNC: 26 U/L (ref 1–32)
BILIRUB SERPL-MCNC: 0.6 MG/DL (ref 0–1.2)
BUN SERPL-MCNC: 13 MG/DL (ref 6–20)
BUN/CREAT SERPL: 20.3 (ref 7–25)
CALCIUM SPEC-SCNC: 8.4 MG/DL (ref 8.6–10.5)
CHLORIDE SERPL-SCNC: 103 MMOL/L (ref 98–107)
CO2 SERPL-SCNC: 25.3 MMOL/L (ref 22–29)
CREAT SERPL-MCNC: 0.64 MG/DL (ref 0.57–1)
DEPRECATED RDW RBC AUTO: 51.3 FL (ref 37–54)
EGFRCR SERPLBLD CKD-EPI 2021: 107.8 ML/MIN/1.73
ERYTHROCYTE [DISTWIDTH] IN BLOOD BY AUTOMATED COUNT: 17.8 % (ref 12.3–15.4)
GLOBULIN UR ELPH-MCNC: 3.2 GM/DL
GLUCOSE SERPL-MCNC: 86 MG/DL (ref 65–99)
HCT VFR BLD AUTO: 29.8 % (ref 34–46.6)
HGB BLD-MCNC: 9.5 G/DL (ref 12–15.9)
INR PPP: 1.18 (ref 0.86–1.15)
MCH RBC QN AUTO: 26 PG (ref 26.6–33)
MCHC RBC AUTO-ENTMCNC: 31.9 G/DL (ref 31.5–35.7)
MCV RBC AUTO: 81.6 FL (ref 79–97)
NRBC BLD AUTO-RTO: 0.1 /100 WBC (ref 0–0.2)
PLATELET # BLD AUTO: 539 10*3/MM3 (ref 140–450)
PMV BLD AUTO: 10.8 FL (ref 6–12)
POTASSIUM SERPL-SCNC: 3.4 MMOL/L (ref 3.5–5.2)
PROT SERPL-MCNC: 6.6 G/DL (ref 6–8.5)
PROTHROMBIN TIME: 15.2 SECONDS (ref 11.8–14.9)
RBC # BLD AUTO: 3.65 10*6/MM3 (ref 3.77–5.28)
SODIUM SERPL-SCNC: 138 MMOL/L (ref 136–145)
T3FREE SERPL-MCNC: 2.49 PG/ML (ref 2–4.4)
T4 FREE SERPL-MCNC: 0.96 NG/DL (ref 0.93–1.7)
TSH SERPL DL<=0.05 MIU/L-ACNC: 17.2 UIU/ML (ref 0.27–4.2)
WBC NRBC COR # BLD AUTO: 10.46 10*3/MM3 (ref 3.4–10.8)

## 2023-11-22 PROCEDURE — 84075 ASSAY ALKALINE PHOSPHATASE: CPT

## 2023-11-22 PROCEDURE — 0014M HC ENHANCED LIVER FIBROSIS (ELF) TEST: CPT

## 2023-11-22 PROCEDURE — 72070 X-RAY EXAM THORAC SPINE 2VWS: CPT

## 2023-11-22 PROCEDURE — 84481 FREE ASSAY (FT-3): CPT

## 2023-11-22 PROCEDURE — 85610 PROTHROMBIN TIME: CPT

## 2023-11-22 PROCEDURE — 80050 GENERAL HEALTH PANEL: CPT

## 2023-11-22 PROCEDURE — 84439 ASSAY OF FREE THYROXINE: CPT

## 2023-11-22 PROCEDURE — 85007 BL SMEAR W/DIFF WBC COUNT: CPT

## 2023-11-22 PROCEDURE — 84482 T3 REVERSE: CPT

## 2023-11-22 PROCEDURE — 84080 ASSAY ALKALINE PHOSPHATASES: CPT

## 2023-11-22 PROCEDURE — 82106 ALPHA-FETOPROTEIN AMNIOTIC: CPT

## 2023-11-22 PROCEDURE — 72050 X-RAY EXAM NECK SPINE 4/5VWS: CPT

## 2023-11-22 PROCEDURE — 36415 COLL VENOUS BLD VENIPUNCTURE: CPT

## 2023-11-23 LAB
BASOPHILS # BLD MANUAL: 0.22 10*3/MM3 (ref 0–0.2)
BASOPHILS NFR BLD MANUAL: 2.1 % (ref 0–1.5)
EOSINOPHIL # BLD MANUAL: 0.22 10*3/MM3 (ref 0–0.4)
EOSINOPHIL NFR BLD MANUAL: 2.1 % (ref 0.3–6.2)
LYMPHOCYTES # BLD MANUAL: 6.12 10*3/MM3 (ref 0.7–3.1)
LYMPHOCYTES NFR BLD MANUAL: 8.5 % (ref 5–12)
MONOCYTES # BLD: 0.89 10*3/MM3 (ref 0.1–0.9)
NEUTROPHILS # BLD AUTO: 3 10*3/MM3 (ref 1.7–7)
NEUTROPHILS NFR BLD MANUAL: 28.7 % (ref 42.7–76)
PLAT MORPH BLD: NORMAL
POIKILOCYTOSIS BLD QL SMEAR: ABNORMAL
SMUDGE CELLS BLD QL SMEAR: ABNORMAL
TARGETS BLD QL SMEAR: ABNORMAL
VARIANT LYMPHS NFR BLD MANUAL: 58.5 % (ref 19.6–45.3)

## 2023-11-27 LAB
ALP BONE CFR SERPL: 36 % (ref 14–68)
ALP INTEST CFR SERPL: 13 % (ref 0–18)
ALP LIVER CFR SERPL: 52 % (ref 18–85)
ALP SERPL-CCNC: 161 IU/L (ref 44–121)

## 2023-11-28 LAB — T3REVERSE SERPL-MCNC: 14.5 NG/DL (ref 9.2–24.1)

## 2023-11-30 ENCOUNTER — READMISSION MANAGEMENT (OUTPATIENT)
Dept: CALL CENTER | Facility: HOSPITAL | Age: 48
End: 2023-11-30
Payer: COMMERCIAL

## 2023-11-30 NOTE — OUTREACH NOTE
Medical Week 2 Survey      Flowsheet Row Responses   [facility] facility patient discharged from? [facility]   Does the patient have one of the following disease processes/diagnoses(primary or secondary)? Other   Week 2 attempt successful? No   Unsuccessful attempts Attempt 1            Mariajose Vasques Registered Nurse

## 2023-12-04 ENCOUNTER — READMISSION MANAGEMENT (OUTPATIENT)
Dept: CALL CENTER | Facility: HOSPITAL | Age: 48
End: 2023-12-04
Payer: COMMERCIAL

## 2023-12-04 NOTE — OUTREACH NOTE
Medical Week 2 Survey      Flowsheet Row Responses   [facility] facility patient discharged from? [facility]   Does the patient have one of the following disease processes/diagnoses(primary or secondary)? Other   Week 2 attempt successful? No   Unsuccessful attempts Attempt 2            DAVID TAN - Registered Nurse

## 2023-12-11 LAB
AFP ADJ MOM AMN: NORMAL
AFP AMN-MCNC: NORMAL UG/ML
AFP INTERP AMN-IMP: NORMAL
GA (WEEKS): NORMAL WK
LAB DIRECTOR NAME PROVIDER: NORMAL
Lab: NORMAL

## 2023-12-14 ENCOUNTER — LAB (OUTPATIENT)
Dept: LAB | Facility: HOSPITAL | Age: 48
End: 2023-12-14
Payer: COMMERCIAL

## 2023-12-14 ENCOUNTER — TRANSCRIBE ORDERS (OUTPATIENT)
Dept: LAB | Facility: HOSPITAL | Age: 48
End: 2023-12-14
Payer: COMMERCIAL

## 2023-12-14 DIAGNOSIS — K74.60 HEPATIC CIRRHOSIS, UNSPECIFIED HEPATIC CIRRHOSIS TYPE, UNSPECIFIED WHETHER ASCITES PRESENT: ICD-10-CM

## 2023-12-14 DIAGNOSIS — E03.9 PRIMARY HYPOTHYROIDISM: ICD-10-CM

## 2023-12-14 DIAGNOSIS — E03.9 PRIMARY HYPOTHYROIDISM: Primary | ICD-10-CM

## 2023-12-14 LAB
HCV AB SER DONR QL: NORMAL
T3 SERPL-MCNC: 105 NG/DL (ref 80–200)

## 2023-12-14 PROCEDURE — 36415 COLL VENOUS BLD VENIPUNCTURE: CPT

## 2023-12-14 PROCEDURE — 84480 ASSAY TRIIODOTHYRONINE (T3): CPT

## 2023-12-14 PROCEDURE — 86803 HEPATITIS C AB TEST: CPT

## 2023-12-15 LAB — LIVER FIBR SCORE SERPL CALC.FIBROSURE: 10.52

## 2024-01-17 ENCOUNTER — TRANSCRIBE ORDERS (OUTPATIENT)
Dept: ADMINISTRATIVE | Facility: HOSPITAL | Age: 49
End: 2024-01-17
Payer: COMMERCIAL

## 2024-01-17 DIAGNOSIS — K72.90 DECOMPENSATED HEPATIC CIRRHOSIS: Primary | ICD-10-CM

## 2024-01-17 DIAGNOSIS — K74.60 DECOMPENSATED HEPATIC CIRRHOSIS: Primary | ICD-10-CM

## 2024-01-22 ENCOUNTER — LAB (OUTPATIENT)
Dept: LAB | Facility: HOSPITAL | Age: 49
End: 2024-01-22
Payer: COMMERCIAL

## 2024-01-22 DIAGNOSIS — K72.90 DECOMPENSATED HEPATIC CIRRHOSIS: ICD-10-CM

## 2024-01-22 DIAGNOSIS — K74.60 DECOMPENSATED HEPATIC CIRRHOSIS: ICD-10-CM

## 2024-01-22 LAB
ALBUMIN SERPL-MCNC: 4.3 G/DL (ref 3.5–5.2)
ALBUMIN/GLOB SERPL: 1.2 G/DL
ALP SERPL-CCNC: 165 U/L (ref 39–117)
ALT SERPL W P-5'-P-CCNC: 18 U/L (ref 1–33)
ANION GAP SERPL CALCULATED.3IONS-SCNC: 14 MMOL/L (ref 5–15)
AST SERPL-CCNC: 32 U/L (ref 1–32)
BILIRUB SERPL-MCNC: 1.3 MG/DL (ref 0–1.2)
BUN SERPL-MCNC: 11 MG/DL (ref 6–20)
BUN/CREAT SERPL: 14.3 (ref 7–25)
CALCIUM SPEC-SCNC: 9.4 MG/DL (ref 8.6–10.5)
CHLORIDE SERPL-SCNC: 101 MMOL/L (ref 98–107)
CO2 SERPL-SCNC: 23 MMOL/L (ref 22–29)
CREAT SERPL-MCNC: 0.77 MG/DL (ref 0.57–1)
EGFRCR SERPLBLD CKD-EPI 2021: 94.1 ML/MIN/1.73
GLOBULIN UR ELPH-MCNC: 3.6 GM/DL
GLUCOSE SERPL-MCNC: 135 MG/DL (ref 65–99)
INR PPP: 1.05 (ref 0.86–1.15)
POTASSIUM SERPL-SCNC: 3.8 MMOL/L (ref 3.5–5.2)
PROT SERPL-MCNC: 7.9 G/DL (ref 6–8.5)
PROTHROMBIN TIME: 13.9 SECONDS (ref 11.8–14.9)
SODIUM SERPL-SCNC: 138 MMOL/L (ref 136–145)
T4 FREE SERPL-MCNC: 0.68 NG/DL (ref 0.93–1.7)
TSH SERPL DL<=0.05 MIU/L-ACNC: 48.3 UIU/ML (ref 0.27–4.2)

## 2024-01-22 PROCEDURE — 80050 GENERAL HEALTH PANEL: CPT

## 2024-01-22 PROCEDURE — 85610 PROTHROMBIN TIME: CPT

## 2024-01-22 PROCEDURE — 36415 COLL VENOUS BLD VENIPUNCTURE: CPT

## 2024-01-22 PROCEDURE — 84439 ASSAY OF FREE THYROXINE: CPT

## 2024-01-22 PROCEDURE — 85007 BL SMEAR W/DIFF WBC COUNT: CPT

## 2024-01-23 LAB
BASOPHILS # BLD MANUAL: 0.91 10*3/MM3 (ref 0–0.2)
BASOPHILS NFR BLD MANUAL: 10 % (ref 0–1.5)
DEPRECATED RDW RBC AUTO: 44.9 FL (ref 37–54)
EOSINOPHIL # BLD MANUAL: 0.63 10*3/MM3 (ref 0–0.4)
EOSINOPHIL NFR BLD MANUAL: 7 % (ref 0.3–6.2)
ERYTHROCYTE [DISTWIDTH] IN BLOOD BY AUTOMATED COUNT: 16 % (ref 12.3–15.4)
HCT VFR BLD AUTO: 32.5 % (ref 34–46.6)
HGB BLD-MCNC: 10.3 G/DL (ref 12–15.9)
LAB AP CASE REPORT: NORMAL
LYMPHOCYTES # BLD MANUAL: 4.63 10*3/MM3 (ref 0.7–3.1)
LYMPHOCYTES NFR BLD MANUAL: 4 % (ref 5–12)
MCH RBC QN AUTO: 24.8 PG (ref 26.6–33)
MCHC RBC AUTO-ENTMCNC: 31.7 G/DL (ref 31.5–35.7)
MCV RBC AUTO: 78.3 FL (ref 79–97)
MONOCYTES # BLD: 0.36 10*3/MM3 (ref 0.1–0.9)
NEUTROPHILS # BLD AUTO: 2.54 10*3/MM3 (ref 1.7–7)
NEUTROPHILS NFR BLD MANUAL: 28 % (ref 42.7–76)
NRBC BLD AUTO-RTO: 0.2 /100 WBC (ref 0–0.2)
PATH REPORT.FINAL DX SPEC: NORMAL
PATHOLOGY REVIEW: YES
PLAT MORPH BLD: NORMAL
PLATELET # BLD AUTO: 563 10*3/MM3 (ref 140–450)
PMV BLD AUTO: 10.6 FL (ref 6–12)
RBC # BLD AUTO: 4.15 10*6/MM3 (ref 3.77–5.28)
RBC MORPH BLD: NORMAL
VARIANT LYMPHS NFR BLD MANUAL: 51 % (ref 19.6–45.3)
WBC MORPH BLD: NORMAL
WBC NRBC COR # BLD AUTO: 9.07 10*3/MM3 (ref 3.4–10.8)

## 2024-02-19 ENCOUNTER — TRANSCRIBE ORDERS (OUTPATIENT)
Dept: ADMINISTRATIVE | Facility: HOSPITAL | Age: 49
End: 2024-02-19
Payer: COMMERCIAL

## 2024-02-19 DIAGNOSIS — J38.01 VOCAL CORD PARALYSIS, UNILATERAL PARTIAL: Primary | ICD-10-CM

## 2024-03-15 ENCOUNTER — HOSPITAL ENCOUNTER (OUTPATIENT)
Dept: CT IMAGING | Facility: HOSPITAL | Age: 49
Discharge: HOME | End: 2024-03-15
Admitting: STUDENT IN AN ORGANIZED HEALTH CARE EDUCATION/TRAINING PROGRAM
Payer: COMMERCIAL

## 2024-03-15 DIAGNOSIS — J38.01 VOCAL CORD PARALYSIS, UNILATERAL PARTIAL: ICD-10-CM

## 2024-03-15 PROCEDURE — 25510000001 IOPAMIDOL PER 1 ML: Performed by: STUDENT IN AN ORGANIZED HEALTH CARE EDUCATION/TRAINING PROGRAM

## 2024-03-15 PROCEDURE — 70491 CT SOFT TISSUE NECK W/DYE: CPT

## 2024-03-15 RX ADMIN — IOPAMIDOL 72 ML: 755 INJECTION, SOLUTION INTRAVENOUS at 17:28

## 2024-04-03 ENCOUNTER — TRANSCRIBE ORDERS (OUTPATIENT)
Dept: ADMINISTRATIVE | Facility: HOSPITAL | Age: 49
End: 2024-04-03
Payer: COMMERCIAL

## 2024-04-03 DIAGNOSIS — Z85.850 PERSONAL HISTORY OF MALIGNANT NEOPLASM OF THYROID: Primary | ICD-10-CM

## 2024-05-22 ENCOUNTER — TRANSCRIBE ORDERS (OUTPATIENT)
Dept: ADMINISTRATIVE | Facility: HOSPITAL | Age: 49
End: 2024-05-22
Payer: COMMERCIAL

## 2024-05-22 DIAGNOSIS — K72.90 DECOMPENSATED HEPATIC CIRRHOSIS: Primary | ICD-10-CM

## 2024-05-22 DIAGNOSIS — K74.60 DECOMPENSATED HEPATIC CIRRHOSIS: Primary | ICD-10-CM

## 2024-05-24 ENCOUNTER — LAB (OUTPATIENT)
Dept: LAB | Facility: HOSPITAL | Age: 49
End: 2024-05-24
Payer: COMMERCIAL

## 2024-05-24 DIAGNOSIS — K72.90 DECOMPENSATED HEPATIC CIRRHOSIS: ICD-10-CM

## 2024-05-24 DIAGNOSIS — K74.60 DECOMPENSATED HEPATIC CIRRHOSIS: ICD-10-CM

## 2024-05-24 LAB
ALBUMIN SERPL-MCNC: 3.4 G/DL (ref 3.5–5.2)
ALBUMIN/GLOB SERPL: 1.1 G/DL
ALP SERPL-CCNC: 148 U/L (ref 39–117)
ALPHA-FETOPROTEIN: 2.75 NG/ML (ref 0–8.3)
ALT SERPL W P-5'-P-CCNC: 21 U/L (ref 1–33)
ANION GAP SERPL CALCULATED.3IONS-SCNC: 9.7 MMOL/L (ref 5–15)
AST SERPL-CCNC: 24 U/L (ref 1–32)
BILIRUB SERPL-MCNC: 1 MG/DL (ref 0–1.2)
BUN SERPL-MCNC: 9 MG/DL (ref 6–20)
BUN/CREAT SERPL: 13 (ref 7–25)
CALCIUM SPEC-SCNC: 8.7 MG/DL (ref 8.6–10.5)
CHLORIDE SERPL-SCNC: 104 MMOL/L (ref 98–107)
CO2 SERPL-SCNC: 24.3 MMOL/L (ref 22–29)
CREAT SERPL-MCNC: 0.69 MG/DL (ref 0.57–1)
DEPRECATED RDW RBC AUTO: 47.4 FL (ref 37–54)
EGFRCR SERPLBLD CKD-EPI 2021: 105.9 ML/MIN/1.73
ERYTHROCYTE [DISTWIDTH] IN BLOOD BY AUTOMATED COUNT: 17.5 % (ref 12.3–15.4)
GLOBULIN UR ELPH-MCNC: 3.2 GM/DL
GLUCOSE SERPL-MCNC: 197 MG/DL (ref 65–99)
HCT VFR BLD AUTO: 30.5 % (ref 34–46.6)
HGB BLD-MCNC: 8.9 G/DL (ref 12–15.9)
INR PPP: 1.28 (ref 0.86–1.15)
MCH RBC QN AUTO: 22.4 PG (ref 26.6–33)
MCHC RBC AUTO-ENTMCNC: 29.2 G/DL (ref 31.5–35.7)
MCV RBC AUTO: 76.6 FL (ref 79–97)
PLATELET # BLD AUTO: 667 10*3/MM3 (ref 140–450)
PMV BLD AUTO: 10.7 FL (ref 6–12)
POTASSIUM SERPL-SCNC: 3.9 MMOL/L (ref 3.5–5.2)
PROT SERPL-MCNC: 6.6 G/DL (ref 6–8.5)
PROTHROMBIN TIME: 16.3 SECONDS (ref 11.8–14.9)
RBC # BLD AUTO: 3.98 10*6/MM3 (ref 3.77–5.28)
SODIUM SERPL-SCNC: 138 MMOL/L (ref 136–145)
WBC NRBC COR # BLD AUTO: 7.51 10*3/MM3 (ref 3.4–10.8)

## 2024-05-24 PROCEDURE — 36415 COLL VENOUS BLD VENIPUNCTURE: CPT

## 2024-05-24 PROCEDURE — 82105 ALPHA-FETOPROTEIN SERUM: CPT

## 2024-05-24 PROCEDURE — 85025 COMPLETE CBC W/AUTO DIFF WBC: CPT

## 2024-05-24 PROCEDURE — 85610 PROTHROMBIN TIME: CPT

## 2024-05-24 PROCEDURE — 80053 COMPREHEN METABOLIC PANEL: CPT

## 2024-05-31 ENCOUNTER — HOSPITAL ENCOUNTER (OUTPATIENT)
Dept: CT IMAGING | Facility: HOSPITAL | Age: 49
Discharge: HOME | End: 2024-05-31
Admitting: STUDENT IN AN ORGANIZED HEALTH CARE EDUCATION/TRAINING PROGRAM
Payer: COMMERCIAL

## 2024-05-31 DIAGNOSIS — Z85.850 PERSONAL HISTORY OF MALIGNANT NEOPLASM OF THYROID: ICD-10-CM

## 2024-05-31 PROCEDURE — 25510000001 IOPAMIDOL PER 1 ML: Performed by: STUDENT IN AN ORGANIZED HEALTH CARE EDUCATION/TRAINING PROGRAM

## 2024-05-31 PROCEDURE — 71260 CT THORAX DX C+: CPT

## 2024-05-31 RX ADMIN — IOPAMIDOL 94 ML: 755 INJECTION, SOLUTION INTRAVENOUS at 16:06

## 2025-02-11 NOTE — PLAN OF CARE
PHYSICAL THERAPY NOTE          Patient Name: Ynes Mendoza  Today's Date: 2/11/2025 02/11/25 1055   PT Last Visit   PT Visit Date 02/11/25   Note Type   Note Type Treatment   Pain Assessment   Pain Assessment Tool 0-10   Pain Score No Pain   Restrictions/Precautions   Weight Bearing Precautions Per Order No   Other Precautions Chair Alarm;Bed Alarm;Fall Risk   General   Chart Reviewed Yes   Family/Caregiver Present No   Cognition   Overall Cognitive Status WFL   Arousal/Participation Cooperative   Attention Within functional limits   Orientation Level Oriented X4   Memory Within functional limits   Following Commands Follows all commands and directions without difficulty   Subjective   Subjective agreeable   Bed Mobility   Supine to Sit 5  Supervision   Sit to Supine 5  Supervision   Transfers   Sit to Stand 5  Supervision   Stand to Sit 5  Supervision   Stand pivot 5  Supervision   Ambulation/Elevation   Gait pattern Decreased foot clearance;Excessively slow   Gait Assistance 5  Supervision   Additional items Verbal cues   Assistive Device None   Distance 300'   Stair Management Assistance 5  Supervision   Additional items Verbal cues   Stair Management Technique One rail R;Alternating pattern;Foreward;Nonreciprocal   Number of Stairs 7   Ambulation/Elevation Additional Comments no LOB, slow gait speed, guarded at times   Balance   Static Sitting Good   Dynamic Sitting Fair +   Static Standing Fair   Dynamic Standing Fair -   Ambulatory Fair -   Endurance Deficit   Endurance Deficit Yes   Activity Tolerance   Activity Tolerance Patient tolerated treatment well   Medical Staff Made Aware SPT   Nurse Made Aware yes-cleared   Exercises   Balance training  standing with close S hitting ball with racket x10 reps L vs RUE Each   Assessment   Prognosis Good   Problem List Impaired balance;Decreased endurance   Assessment Pt agreeable to    Problem: Adult Inpatient Plan of Care  Goal: Plan of Care Review  Outcome: Ongoing, Progressing  Flowsheets (Taken 7/15/2021 0352 by Kathie Hill LPN)  Plan of Care Reviewed With: patient  Goal: Patient-Specific Goal (Individualized)  Outcome: Ongoing, Progressing  Goal: Absence of Hospital-Acquired Illness or Injury  Outcome: Ongoing, Progressing  Intervention: Identify and Manage Fall Risk  Recent Flowsheet Documentation  Taken 7/15/2021 1130 by Sarahi Allen RN  Safety Promotion/Fall Prevention: safety round/check completed  Taken 7/15/2021 0715 by Sarahi Allen RN  Safety Promotion/Fall Prevention:   assistive device/personal items within reach   clutter free environment maintained   nonskid shoes/slippers when out of bed   safety round/check completed  Intervention: Prevent Skin Injury  Recent Flowsheet Documentation  Taken 7/15/2021 0715 by Sarahi Allen RN  Skin Protection: tubing/devices free from skin contact  Intervention: Prevent Infection  Recent Flowsheet Documentation  Taken 7/15/2021 1130 by Sarahi Allen RN  Infection Prevention: hand hygiene promoted  Taken 7/15/2021 0715 by Sarahi Allen RN  Infection Prevention: hand hygiene promoted  Goal: Optimal Comfort and Wellbeing  Outcome: Ongoing, Progressing  Intervention: Provide Person-Centered Care  Recent Flowsheet Documentation  Taken 7/15/2021 0715 by Sarahi Allen RN  Trust Relationship/Rapport: care explained  Goal: Readiness for Transition of Care  Outcome: Ongoing, Progressing   Goal Outcome Evaluation:   Pt rested comfortably during shift today. Pt had complaints of pain & nausea, see MAR. Pt has Hx of blood clots, she reported a similar feeling of having a blood clot. Pt notified MD per rounding, & duplex was ordered. No other complaints were noted during shift. Will continue to observe.               participate in PT session. Pt performed functional mobility and therex as outlined above. No LOB throughout session. Functioning at a level suitable to return home. Pt left supine in bed with bed alarm donned, call bell, phone, and all personal needs within reach. No further acute care PT needs due to pt being S level and functioning near her baseline.The patient's AM-PAC Basic Mobility Inpatient Short Form Raw Score is 18. A Raw score of greater than 16 suggests the patient may benefit from discharge to home. Please also refer to the recommendation of the Physical Therapist for safe discharge planning.   Barriers to Discharge None   Goals   Patient Goals to go home   PT Treatment Day 1   Plan   Progress Discontinue PT   Discharge Recommendation   Rehab Resource Intensity Level, PT III (Minimum Resource Intensity)   AM-PAC Basic Mobility Inpatient   Turning in Flat Bed Without Bedrails 3   Lying on Back to Sitting on Edge of Flat Bed Without Bedrails 3   Moving Bed to Chair 3   Standing Up From Chair Using Arms 3   Walk in Room 3   Climb 3-5 Stairs With Railing 3   Basic Mobility Inpatient Raw Score 18   Basic Mobility Standardized Score 41.05   [facility] Highest Level Of Mobility   -HLM Goal 6: Walk 10 steps or more   [facility]-HLM Achieved 8: Walk 250 feet ot more     Chai Edwards, PT, DPT, NCS

## 2025-05-13 ENCOUNTER — TRANSCRIBE ORDERS (OUTPATIENT)
Age: 50
End: 2025-05-13
Payer: COMMERCIAL

## 2025-05-13 DIAGNOSIS — I83.813 VARICOSE VEINS OF BILATERAL LOWER EXTREMITIES WITH PAIN: Primary | ICD-10-CM

## 2025-07-09 ENCOUNTER — OFFICE VISIT (OUTPATIENT)
Age: 50
End: 2025-07-09
Payer: COMMERCIAL

## 2025-07-09 VITALS
WEIGHT: 143.1 LBS | BODY MASS INDEX: 25.36 KG/M2 | DIASTOLIC BLOOD PRESSURE: 83 MMHG | SYSTOLIC BLOOD PRESSURE: 123 MMHG | HEIGHT: 63 IN

## 2025-07-09 DIAGNOSIS — I87.323 CHRONIC VENOUS HYPERTENSION WITH INFLAMMATION INVOLVING BOTH SIDES: ICD-10-CM

## 2025-07-09 DIAGNOSIS — I87.2 VENOUS (PERIPHERAL) INSUFFICIENCY: ICD-10-CM

## 2025-07-09 DIAGNOSIS — Z86.718 HISTORY OF DVT (DEEP VEIN THROMBOSIS): ICD-10-CM

## 2025-07-09 DIAGNOSIS — I81 PORTAL VEIN THROMBOSIS: ICD-10-CM

## 2025-07-09 DIAGNOSIS — I87.8 VENOUS STASIS: ICD-10-CM

## 2025-07-09 DIAGNOSIS — R76.0 LUPUS ANTICOAGULANT POSITIVE: Primary | ICD-10-CM

## 2025-07-09 DIAGNOSIS — D68.59 HYPERCOAGULABLE STATE: ICD-10-CM

## 2025-07-09 DIAGNOSIS — R79.1 ELEVATED FACTOR VIII LEVEL: ICD-10-CM

## 2025-07-09 PROBLEM — I87.329 CHRONIC VENOUS HYPERTENSION WITH INFLAMMATION: Status: ACTIVE | Noted: 2025-07-09

## 2025-07-09 NOTE — PROGRESS NOTES
Patient Name: Lucinda Cope    MRN: [id number] Encounter Date: 07/09/2025      Consulting Service: Vascular Surgery    Referring Provider: Megan Hernandez DNP*       CHIEF COMPLAINT:  Chief Complaint   Patient presents with    Varicose Veins     Pain, edema,both legs, wears compression socks       Subjective    HPI: Lucinda Cope is a 49 y.o. female being seen for evaluation/management of complaints of symptomatic varicose veins and venous insufficiency of the right lower extremity.   Symptoms include Crane symptoms: Edema/Swelling, Varicose veins, Tiredness/Fatigue, Itching, Bulging veins, Pain/Aches, Heaviness/Fullness, and Throbbing.  Patient describes the discomfort from the veins as affecting their daily life.   Patient has negative family history of the varicose veins and positive family history of DVT.  At this point in time attempts at symptomatic control using elevation, compression and nonsteroidals have been attempted.  Prior venous interventions include: none.    PAST MEDICAL HISTORY:   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    Empyema of pleura     Encephalopathy, portal systemic 5/9/2017    Esophageal varices 7/21/2017    Gastric leak 5/29/2016    GERD (gastroesophageal reflux disease)     H/O thyroidectomy 6/17/2016    Hemorrhoids     Hernia, ventral     History of morbid obesity 3/3/2016    History of transfusion     Hypercoagulable state 8/24/2019    Irregular menstrual cycle     Liver cirrhosis secondary to CARTER 04/2017    Lupus anticoagulant positive 8/24/2019    Migraine     Parathyroid abnormality     Portal vein thrombosis 3/3/2016    Post-surgical hypothyroidism     Pulmonary embolism 10/27/2020    Radiation     Restless legs syndrome (RLS)     S/P laparoscopic sleeve gastrectomy 6/17/2016    S/P  splenectomy 6/17/2016    Thyroid cancer     Thyroid cancer 6/26/2015    Urge and stress incontinence     Vaginal prolapse 7/14/2021    Varicose veins     Ventral hernia 4/18/2020    Vitamin D deficiency 1/21/2021      PAST SURGICAL HISTORY:   Past Surgical History:   Procedure Laterality Date    ABDOMINAL SURGERY      BEDSIDE PARACENTESIS  3/22/2020         BEDSIDE PARACENTESIS  4/19/2020         BEDSIDE PARACENTESIS  3/1/2021         BEDSIDE PARACENTESIS  3/30/2021         CHOLECYSTECTOMY      Laparoscopic    ENDOSCOPY N/A 3/17/2016    Procedure: ESOPHAGOGASTRODUODENOSCOPY WITH BALOON DILATATION 18-20MM WITH GASTRIC SLEEVE SEALANT;  Surgeon: Leonardo Ortiz Jr., MD;  Location: [facility] ENDOSCOPY;  Service:     ENDOSCOPY N/A 3/14/2016    Procedure: esophagogastroduodenoscopy with fluoroscopy;  Surgeon: Greg Avila III, MD;  Location: [facility] ENDOSCOPY;  Service:     ENDOSCOPY N/A 4/15/2016    Procedure: EGD WITH DILITATION AND STENT PLACEMENT dilation of pylorus ;  Surgeon: Leonardo Ortiz Jr., MD;  Location: [facility] ENDOSCOPY;  Service:     ENDOSCOPY N/A 5/13/2016    Procedure: ESOPHAGOGASTRODUODENOSCOPY  W/ STENT;  Surgeon: Leonardo Ortiz Jr., MD;  Location: [facility] ENDOSCOPY;  Service:     ENDOSCOPY N/A 1/13/2021    Procedure: ESOPHAGOGASTRODUODENOSCOPY WITH POLYPECTOMY X1 AND  BIOPSY X1.;  Surgeon: Bryson Cotton MD;  Location: [facility] ENDOSCOPY;  Service: Gastroenterology;  Laterality: N/A;  anastomotic ulcer, antral polyp, GASTRITIS    ENDOSCOPY N/A 10/24/2023    Procedure: ESOPHAGOGASTRODUODENOSCOPY;  Surgeon: Hossein Delgadillo MD;  Location: [facility] ENDOSCOPY;  Service: Gastroenterology;  Laterality: N/A;  Post: anastomotic ulcer    GASTRECTOMY      Gastric Surgery for Morbid Obesity Laparoscopic Longitudinal Gastrectomy    GASTRIC SLEEVE LAPAROSCOPIC      HEMORRHOIDECTOMY      HERNIA REPAIR      LAPAROSCOPY REPAIR HIATAL HERNIA      LARYNGOSCOPY      Direct Laryngoscopy with Injection into  Vocal Cords    OTHER SURGICAL HISTORY Bilateral     Ear Pressure Equalization Tube, Insertion, Bilaterally    PERIPHERALLY INSERTED CENTRAL CATHETER INSERTION      SPLENECTOMY      THYROID SURGERY      TUBAL ABDOMINAL LIGATION        FAMILY HISTORY:   Family History   Problem Relation Age of Onset    Stroke Father     Diabetes Father         Diabetes Mellitus    Hypertension Father     Breast cancer Maternal Grandmother     Cancer Other       SOCIAL HISTORY:   Social History     Tobacco Use    Smoking status: Never     Passive exposure: Never    Smokeless tobacco: Never   Vaping Use    Vaping status: Never Used   Substance Use Topics    Alcohol use: No    Drug use: No      MEDICATIONS:   Current Outpatient Medications on File Prior to Visit   Medication Sig Dispense Refill    apixaban (ELIQUIS) 5 MG tablet tablet Take 1 tablet by mouth 2 (Two) Times a Day.      estradiol (ESTRACE) 0.1 MG/GM vaginal cream Insert 1 g into the vagina 3 (Three) Times a Week. Monday Wednesday Friday      levothyroxine (SYNTHROID, LEVOTHROID) 125 MCG tablet Take 1 tablet by mouth Every Morning.      liothyronine (CYTOMEL) 5 MCG tablet Take 1 tablet by mouth Daily.      ondansetron ODT (ZOFRAN-ODT) 8 MG disintegrating tablet Place 1 tablet on the tongue Every 8 (Eight) Hours As Needed for Nausea or Vomiting.      bumetanide (BUMEX) 2 MG tablet Take 2 tablets by mouth Daily.      hyoscyamine (LEVSIN) 0.125 MG SL tablet Place 1 tablet under the tongue 3 (Three) Times a Day. 21 tablet 0    miSOPROStol (CYTOTEC) 200 MCG tablet Take 1 tablet by mouth 2 (Two) Times a Day. 60 tablet 3    ondansetron ODT (ZOFRAN-ODT) 4 MG disintegrating tablet Place 1 tablet on the tongue Every 8 (Eight) Hours As Needed for Nausea or Vomiting. 15 tablet 0    pantoprazole (PROTONIX) 40 MG EC tablet Take 1 tablet by mouth 2 (Two) Times a Day Before Meals. 60 tablet 3    polyethylene glycol (MIRALAX) 17 g packet Take 17 g by mouth 2 (Two) Times a Day. 60 packet 0     "potassium chloride (K-DUR,KLOR-CON) 20 MEQ CR tablet Take 2 tablets by mouth 2 (Two) Times a Day.      simethicone (Gas-X) 80 MG chewable tablet Chew 1 tablet Every 6 (Six) Hours As Needed for Flatulence. 10 tablet 0    spironolactone (ALDACTONE) 100 MG tablet Take 4 tablets by mouth Daily.      sucralfate (CARAFATE) 1 g tablet Take 1 tablet by mouth 2 (Two) Times a Day Before Meals. 60 tablet 3     No current facility-administered medications on file prior to visit.       ALLERGIES: Contrast dye (echo or unknown ct/mr), Iodinated contrast media, Iodine, Gadoteridol, Hydrocodone-acetaminophen, Morphine, Red dye #40 (allura red), Compazine [prochlorperazine edisylate], Hydrocodone, Ketorolac tromethamine, and Nsaids       Objective   Vitals:    07/09/25 1539   BP: 123/83   BP Location: Right arm   Weight: 64.9 kg (143 lb 1.6 oz)   Height: 160 cm (62.99\")     Body mass index is 25.36 kg/m².  BMI is >= 25 and <30. (Overweight) The following options were offered after discussion;: weight loss educational material (shared in after visit summary), exercise counseling/recommendations, and Information on healthy weight added to patient's after visit summary.      PHYSICAL EXAM:   Physical Exam  Constitutional:       Appearance: Normal appearance.   HENT:      Head: Normocephalic and atraumatic.      Nose: Nose normal.   Eyes:      Extraocular Movements: Extraocular movements intact.      Pupils: Pupils are equal, round, and reactive to light.   Cardiovascular:      Rate and Rhythm: Normal rate.      Pulses: Normal pulses.      Heart sounds: Normal heart sounds.      Comments: Severe right leg varicosities medial thigh and medial calf.  Mild to moderate left posterior calf  Pulmonary:      Effort: Pulmonary effort is normal.      Breath sounds: Normal breath sounds.   Abdominal:      General: Abdomen is flat. Bowel sounds are normal.      Palpations: Abdomen is soft.   Musculoskeletal:         General: Normal range of " motion.      Cervical back: Normal range of motion and neck supple.      Right lower le+ Edema present.      Left lower le+ Edema present.   Skin:     General: Skin is warm and dry.   Neurological:      General: No focal deficit present.      Mental Status: She is alert and oriented to person, place, and time. Mental status is at baseline.   Psychiatric:         Mood and Affect: Mood normal.         Thought Content: Thought content normal.          Result Review   LABS:                   Results Review:       I reviewed the patient's new clinical results.    The following radiologic or non-invasive studies have been reviewed by me: Patient reports having a study done at [facility] but no results are available and classically we have never been able to get anything sent from that office even on request by the patient.  No results found for this or any previous visit from the past 365 days.     No radiology results for the last 30 days.                ASSESSMENT/PLAN:   Diagnoses and all orders for this visit:    1. Lupus anticoagulant positive (Primary)    2. Elevated factor VIII level    3. History of DVT (deep vein thrombosis)    4. Hypercoagulable state    5. Portal vein thrombosis    6. Venous stasis  -     Venous w Reflux Lower Extremity - Unilateral CAR; Future    7. Venous (peripheral) insufficiency  -     Venous w Reflux Lower Extremity - Unilateral CAR; Future    8. Chronic venous hypertension with inflammation involving both sides  -     Venous w Reflux Lower Extremity - Unilateral CAR; Future       49 y.o. female with with clinically severe venous insufficiency in the right.  Unfortunately for her she carries hypercoagulability and has had prior clots and with this the question is whether or not her clots in her leg are causing her veins to need to decompress around them which can cause varicosities or if this is all venous insufficiency causing pressure to back up this is obviously the  extremely important point is treating her and ablating her in the face of venous decongestion around-the-clock could actually make her leg much worse.  We are going to go ahead and pursue a class I mapping on the right and get her into 20 to 30 mm tor thigh-high compression.  She is already been wearing knee-high.  Going forward we will see what we can figure out before we make any recommendations but the history of Leiden factor V and elevated factor VIII and portal vein hypertension and recurrent DVT does make me want to take this slow and make sure we have checked everything before he make a recommendation.  Continue your Eliquis.  The restlessness in her leg which seems to be some of the biggest complaints could be neurogenic we will she will see if it gets better wearing the stocking.    I discussed the plan with the patient who is agreeable to the plan of care at this point. Thank you for this consult.   Follow Up  Return in about 6 weeks (around 8/20/2025).    Josué Crane MD   07/09/25

## 2025-08-14 ENCOUNTER — TRANSCRIBE ORDERS (OUTPATIENT)
Dept: ADMINISTRATIVE | Facility: HOSPITAL | Age: 50
End: 2025-08-14
Payer: COMMERCIAL

## 2025-08-14 DIAGNOSIS — K72.90 DECOMPENSATED HEPATIC CIRRHOSIS: Primary | ICD-10-CM

## 2025-08-14 DIAGNOSIS — K74.60 DECOMPENSATED HEPATIC CIRRHOSIS: Primary | ICD-10-CM

## 2025-08-18 ENCOUNTER — LAB (OUTPATIENT)
Facility: HOSPITAL | Age: 50
End: 2025-08-18
Payer: COMMERCIAL

## 2025-08-18 DIAGNOSIS — K72.90 DECOMPENSATED HEPATIC CIRRHOSIS: ICD-10-CM

## 2025-08-18 DIAGNOSIS — K74.60 DECOMPENSATED HEPATIC CIRRHOSIS: ICD-10-CM

## 2025-08-18 LAB
DEPRECATED RDW RBC AUTO: 46.1 FL (ref 37–54)
ERYTHROCYTE [DISTWIDTH] IN BLOOD BY AUTOMATED COUNT: 18.7 % (ref 12.3–15.4)
HBA1C MFR BLD: 5.4 % (ref 4.8–5.6)
HCT VFR BLD AUTO: 25.9 % (ref 34–46.6)
HGB BLD-MCNC: 7.6 G/DL (ref 12–15.9)
INR PPP: 1.35 (ref 0.86–1.15)
MCH RBC QN AUTO: 20.8 PG (ref 26.6–33)
MCHC RBC AUTO-ENTMCNC: 29.3 G/DL (ref 31.5–35.7)
MCV RBC AUTO: 71 FL (ref 79–97)
PLATELET # BLD AUTO: 649 10*3/MM3 (ref 140–450)
PMV BLD AUTO: 10.2 FL (ref 6–12)
PROTHROMBIN TIME: 17.3 SECONDS (ref 11.8–14.9)
RBC # BLD AUTO: 3.65 10*6/MM3 (ref 3.77–5.28)
TSH SERPL DL<=0.05 MIU/L-ACNC: 0.32 UIU/ML (ref 0.27–4.2)
WBC NRBC COR # BLD AUTO: 9.97 10*3/MM3 (ref 3.4–10.8)

## 2025-08-18 PROCEDURE — 83036 HEMOGLOBIN GLYCOSYLATED A1C: CPT

## 2025-08-18 PROCEDURE — 82105 ALPHA-FETOPROTEIN SERUM: CPT

## 2025-08-18 PROCEDURE — 80050 GENERAL HEALTH PANEL: CPT

## 2025-08-18 PROCEDURE — 36415 COLL VENOUS BLD VENIPUNCTURE: CPT

## 2025-08-18 PROCEDURE — 85610 PROTHROMBIN TIME: CPT

## 2025-08-19 LAB
ALBUMIN SERPL-MCNC: 3.5 G/DL (ref 3.5–5.2)
ALBUMIN/GLOB SERPL: 1.1 G/DL
ALP SERPL-CCNC: 136 U/L (ref 39–117)
ALT SERPL W P-5'-P-CCNC: 19 U/L (ref 1–33)
ANION GAP SERPL CALCULATED.3IONS-SCNC: 12.4 MMOL/L (ref 5–15)
AST SERPL-CCNC: 25 U/L (ref 1–32)
BILIRUB SERPL-MCNC: 1.1 MG/DL (ref 0–1.2)
BUN SERPL-MCNC: 9 MG/DL (ref 6–20)
BUN/CREAT SERPL: 13.4 (ref 7–25)
CALCIUM SPEC-SCNC: 8.9 MG/DL (ref 8.6–10.5)
CHLORIDE SERPL-SCNC: 107 MMOL/L (ref 98–107)
CO2 SERPL-SCNC: 19.6 MMOL/L (ref 22–29)
CREAT SERPL-MCNC: 0.67 MG/DL (ref 0.57–1)
EGFRCR SERPLBLD CKD-EPI 2021: 106 ML/MIN/1.73
GLOBULIN UR ELPH-MCNC: 3.3 GM/DL
GLUCOSE SERPL-MCNC: 107 MG/DL (ref 65–99)
POTASSIUM SERPL-SCNC: 3.5 MMOL/L (ref 3.5–5.2)
PROT SERPL-MCNC: 6.8 G/DL (ref 6–8.5)
SODIUM SERPL-SCNC: 139 MMOL/L (ref 136–145)

## 2025-08-26 LAB — SPECIMEN STATUS: NORMAL

## (undated) DEVICE — PK ENDO GI 50

## (undated) DEVICE — BITEBLOCK ENDO W/STRAP 60F A/ LF DISP

## (undated) DEVICE — PAPR PRNT PK SONY W RIBN UPC55

## (undated) DEVICE — SINGLE-USE BIOPSY FORCEPS: Brand: RADIAL JAW 4